# Patient Record
Sex: MALE | Race: WHITE | URBAN - NONMETROPOLITAN AREA
[De-identification: names, ages, dates, MRNs, and addresses within clinical notes are randomized per-mention and may not be internally consistent; named-entity substitution may affect disease eponyms.]

---

## 2024-02-28 ENCOUNTER — OFFICE VISIT (OUTPATIENT)
Dept: CARDIOLOGY CLINIC | Age: 64
End: 2024-02-28
Payer: COMMERCIAL

## 2024-02-28 VITALS
SYSTOLIC BLOOD PRESSURE: 132 MMHG | HEART RATE: 72 BPM | RESPIRATION RATE: 18 BRPM | BODY MASS INDEX: 38.36 KG/M2 | DIASTOLIC BLOOD PRESSURE: 70 MMHG | WEIGHT: 315 LBS | HEIGHT: 76 IN

## 2024-02-28 DIAGNOSIS — R07.89 CHEST DISCOMFORT: Primary | ICD-10-CM

## 2024-02-28 PROCEDURE — G8427 DOCREV CUR MEDS BY ELIG CLIN: HCPCS | Performed by: INTERNAL MEDICINE

## 2024-02-28 PROCEDURE — 4004F PT TOBACCO SCREEN RCVD TLK: CPT | Performed by: INTERNAL MEDICINE

## 2024-02-28 PROCEDURE — G8484 FLU IMMUNIZE NO ADMIN: HCPCS | Performed by: INTERNAL MEDICINE

## 2024-02-28 PROCEDURE — G8417 CALC BMI ABV UP PARAM F/U: HCPCS | Performed by: INTERNAL MEDICINE

## 2024-02-28 PROCEDURE — 99204 OFFICE O/P NEW MOD 45 MIN: CPT | Performed by: INTERNAL MEDICINE

## 2024-02-28 PROCEDURE — 3017F COLORECTAL CA SCREEN DOC REV: CPT | Performed by: INTERNAL MEDICINE

## 2024-02-28 PROCEDURE — 93000 ELECTROCARDIOGRAM COMPLETE: CPT | Performed by: INTERNAL MEDICINE

## 2024-02-28 RX ORDER — ATORVASTATIN CALCIUM 80 MG/1
80 TABLET, FILM COATED ORAL NIGHTLY
COMMUNITY
Start: 2024-01-30

## 2024-02-28 RX ORDER — FUROSEMIDE 40 MG/1
40 TABLET ORAL DAILY
COMMUNITY
Start: 2024-01-30

## 2024-02-28 RX ORDER — APIXABAN 5 MG/1
5 TABLET, FILM COATED ORAL EVERY 12 HOURS
COMMUNITY
Start: 2024-02-15

## 2024-02-28 RX ORDER — METOPROLOL SUCCINATE 25 MG/1
25 TABLET, EXTENDED RELEASE ORAL DAILY
COMMUNITY
Start: 2024-02-09

## 2024-02-28 RX ORDER — ASPIRIN 81 MG/1
81 TABLET ORAL DAILY
COMMUNITY

## 2024-02-28 NOTE — PROGRESS NOTES
CHIEF COMPLAINT: CAD/Aflutter    HISTORY OF PRESENT ILLNESS: Patient is a 64 y.o. male seen at the request of Otoniel Hope MD.      Patient presents to re-establish.     Hx of CAD and PAF.     No CP, SOB or palpitations.    Past Medical History:   Diagnosis Date    Heart attack (HCC)        There is no problem list on file for this patient.      No Known Allergies    Current Outpatient Medications   Medication Sig Dispense Refill    ELIQUIS 5 MG TABS tablet Take 1 tablet by mouth in the morning and 1 tablet in the evening.      atorvastatin (LIPITOR) 80 MG tablet Take 1 tablet by mouth nightly at bedtime.      metoprolol succinate (TOPROL XL) 25 MG extended release tablet Take 1 tablet by mouth daily      metFORMIN (GLUCOPHAGE) 1000 MG tablet Take 1 tablet by mouth every 12 hours      furosemide (LASIX) 40 MG tablet Take 1 tablet by mouth daily      aspirin 81 MG EC tablet Take 1 tablet by mouth daily       No current facility-administered medications for this visit.       Social History     Socioeconomic History    Marital status: Unknown     Spouse name: Not on file    Number of children: Not on file    Years of education: Not on file    Highest education level: Not on file   Occupational History    Not on file   Tobacco Use    Smoking status: Never    Smokeless tobacco: Never   Substance and Sexual Activity    Alcohol use: Yes     Comment: socially    Drug use: Not on file    Sexual activity: Not on file   Other Topics Concern    Not on file   Social History Narrative    Not on file     Social Determinants of Health     Financial Resource Strain: Not on file   Food Insecurity: Not on file   Transportation Needs: Not on file   Physical Activity: Not on file   Stress: Not on file   Social Connections: Not on file   Intimate Partner Violence: Not on file   Housing Stability: Not on file       No family history on file.    Review of Systems:  Heart: as above   Lungs: as above   Eyes: denies changes in vision or

## 2024-02-29 ENCOUNTER — TELEPHONE (OUTPATIENT)
Dept: CARDIOLOGY CLINIC | Age: 64
End: 2024-02-29

## 2024-02-29 NOTE — TELEPHONE ENCOUNTER
----- Message from César Wild DO sent at 2/28/2024 10:40 AM EST -----  Regarding: records  Records from [place] please

## 2024-05-18 ENCOUNTER — APPOINTMENT (OUTPATIENT)
Dept: ULTRASOUND IMAGING | Age: 64
DRG: 813 | End: 2024-05-18
Payer: COMMERCIAL

## 2024-05-18 ENCOUNTER — HOSPITAL ENCOUNTER (INPATIENT)
Age: 64
LOS: 4 days | Discharge: HOME | DRG: 813 | End: 2024-05-23
Attending: EMERGENCY MEDICINE | Admitting: HOSPITALIST
Payer: COMMERCIAL

## 2024-05-18 DIAGNOSIS — Z79.01 ANTICOAGULATED: ICD-10-CM

## 2024-05-18 DIAGNOSIS — R23.3 PETECHIAL RASH: ICD-10-CM

## 2024-05-18 DIAGNOSIS — D69.3 IMMUNE THROMBOCYTOPENIA (HCC): Primary | ICD-10-CM

## 2024-05-18 LAB
ALBUMIN SERPL-MCNC: 3.3 G/DL (ref 3.5–5.2)
ALP SERPL-CCNC: 175 U/L (ref 40–129)
ALT SERPL-CCNC: 26 U/L (ref 0–40)
ANION GAP SERPL CALCULATED.3IONS-SCNC: 9 MMOL/L (ref 7–16)
AST SERPL-CCNC: 69 U/L (ref 0–39)
BASOPHILS # BLD: 0.03 K/UL (ref 0–0.2)
BASOPHILS NFR BLD: 1 % (ref 0–2)
BILIRUB SERPL-MCNC: 1.9 MG/DL (ref 0–1.2)
BNP SERPL-MCNC: <36 PG/ML (ref 0–125)
BUN SERPL-MCNC: 23 MG/DL (ref 6–23)
CALCIUM SERPL-MCNC: 8.7 MG/DL (ref 8.6–10.2)
CHLORIDE SERPL-SCNC: 96 MMOL/L (ref 98–107)
CO2 SERPL-SCNC: 26 MMOL/L (ref 22–29)
CREAT SERPL-MCNC: 1 MG/DL (ref 0.7–1.2)
EOSINOPHIL # BLD: 0.17 K/UL (ref 0.05–0.5)
EOSINOPHILS RELATIVE PERCENT: 4 % (ref 0–6)
ERYTHROCYTE [DISTWIDTH] IN BLOOD BY AUTOMATED COUNT: 14.1 % (ref 11.5–15)
ERYTHROCYTE [SEDIMENTATION RATE] IN BLOOD BY WESTERGREN METHOD: 36 MM/HR (ref 0–15)
GFR, ESTIMATED: 82 ML/MIN/1.73M2
GLUCOSE SERPL-MCNC: 211 MG/DL (ref 74–99)
HCT VFR BLD AUTO: 35.8 % (ref 37–54)
HGB BLD-MCNC: 12 G/DL (ref 12.5–16.5)
IMM GRANULOCYTES # BLD AUTO: <0.03 K/UL (ref 0–0.58)
IMM GRANULOCYTES NFR BLD: 0 % (ref 0–5)
INR PPP: 2.2
LACTATE BLDV-SCNC: 2.2 MMOL/L (ref 0.5–2.2)
LYMPHOCYTES NFR BLD: 1.39 K/UL (ref 1.5–4)
LYMPHOCYTES RELATIVE PERCENT: 28 % (ref 20–42)
MAGNESIUM SERPL-MCNC: 1.5 MG/DL (ref 1.6–2.6)
MCH RBC QN AUTO: 31.3 PG (ref 26–35)
MCHC RBC AUTO-ENTMCNC: 33.5 G/DL (ref 32–34.5)
MCV RBC AUTO: 93.5 FL (ref 80–99.9)
MONOCYTES NFR BLD: 0.53 K/UL (ref 0.1–0.95)
MONOCYTES NFR BLD: 11 % (ref 2–12)
NEUTROPHILS NFR BLD: 57 % (ref 43–80)
NEUTS SEG NFR BLD: 2.79 K/UL (ref 1.8–7.3)
PLATELET CONFIRMATION: NORMAL
PLATELET, FLUORESCENCE: 3 K/UL (ref 130–450)
PMV BLD AUTO: ABNORMAL FL (ref 7–12)
POTASSIUM SERPL-SCNC: 3.8 MMOL/L (ref 3.5–5)
PROT SERPL-MCNC: 7.2 G/DL (ref 6.4–8.3)
PROTHROMBIN TIME: 23.9 SEC (ref 9.3–12.4)
RBC # BLD AUTO: 3.83 M/UL (ref 3.8–5.8)
SODIUM SERPL-SCNC: 131 MMOL/L (ref 132–146)
WBC OTHER # BLD: 4.9 K/UL (ref 4.5–11.5)

## 2024-05-18 PROCEDURE — 93005 ELECTROCARDIOGRAM TRACING: CPT | Performed by: EMERGENCY MEDICINE

## 2024-05-18 PROCEDURE — 83615 LACTATE (LD) (LDH) ENZYME: CPT

## 2024-05-18 PROCEDURE — 83735 ASSAY OF MAGNESIUM: CPT

## 2024-05-18 PROCEDURE — 86900 BLOOD TYPING SEROLOGIC ABO: CPT

## 2024-05-18 PROCEDURE — 6360000002 HC RX W HCPCS: Performed by: EMERGENCY MEDICINE

## 2024-05-18 PROCEDURE — 85652 RBC SED RATE AUTOMATED: CPT

## 2024-05-18 PROCEDURE — 99285 EMERGENCY DEPT VISIT HI MDM: CPT

## 2024-05-18 PROCEDURE — 86850 RBC ANTIBODY SCREEN: CPT

## 2024-05-18 PROCEDURE — 80053 COMPREHEN METABOLIC PANEL: CPT

## 2024-05-18 PROCEDURE — 85384 FIBRINOGEN ACTIVITY: CPT

## 2024-05-18 PROCEDURE — 85025 COMPLETE CBC W/AUTO DIFF WBC: CPT

## 2024-05-18 PROCEDURE — 93970 EXTREMITY STUDY: CPT

## 2024-05-18 PROCEDURE — 83880 ASSAY OF NATRIURETIC PEPTIDE: CPT

## 2024-05-18 PROCEDURE — 85610 PROTHROMBIN TIME: CPT

## 2024-05-18 PROCEDURE — 86901 BLOOD TYPING SEROLOGIC RH(D): CPT

## 2024-05-18 PROCEDURE — 96365 THER/PROPH/DIAG IV INF INIT: CPT

## 2024-05-18 PROCEDURE — 86140 C-REACTIVE PROTEIN: CPT

## 2024-05-18 PROCEDURE — 83605 ASSAY OF LACTIC ACID: CPT

## 2024-05-18 RX ORDER — MAGNESIUM SULFATE IN WATER 40 MG/ML
2000 INJECTION, SOLUTION INTRAVENOUS ONCE
Status: COMPLETED | OUTPATIENT
Start: 2024-05-18 | End: 2024-05-19

## 2024-05-18 RX ADMIN — MAGNESIUM SULFATE HEPTAHYDRATE 2000 MG: 40 INJECTION, SOLUTION INTRAVENOUS at 23:54

## 2024-05-18 ASSESSMENT — PAIN DESCRIPTION - ORIENTATION
ORIENTATION: RIGHT;LEFT
ORIENTATION: RIGHT;LEFT

## 2024-05-18 ASSESSMENT — PAIN - FUNCTIONAL ASSESSMENT: PAIN_FUNCTIONAL_ASSESSMENT: 0-10

## 2024-05-18 ASSESSMENT — PAIN DESCRIPTION - PAIN TYPE: TYPE: ACUTE PAIN

## 2024-05-18 ASSESSMENT — PAIN DESCRIPTION - LOCATION
LOCATION: LEG;FOOT
LOCATION: LEG;FOOT

## 2024-05-18 ASSESSMENT — LIFESTYLE VARIABLES
HOW OFTEN DO YOU HAVE A DRINK CONTAINING ALCOHOL: 2-4 TIMES A MONTH
HOW MANY STANDARD DRINKS CONTAINING ALCOHOL DO YOU HAVE ON A TYPICAL DAY: 1 OR 2

## 2024-05-18 ASSESSMENT — PAIN SCALES - GENERAL
PAINLEVEL_OUTOF10: 7
PAINLEVEL_OUTOF10: 7

## 2024-05-18 ASSESSMENT — PAIN DESCRIPTION - DESCRIPTORS
DESCRIPTORS: BURNING
DESCRIPTORS: BURNING

## 2024-05-19 ENCOUNTER — APPOINTMENT (OUTPATIENT)
Dept: CT IMAGING | Age: 64
DRG: 813 | End: 2024-05-19
Payer: COMMERCIAL

## 2024-05-19 PROBLEM — E11.65 TYPE 2 DIABETES MELLITUS WITH HYPERGLYCEMIA, WITHOUT LONG-TERM CURRENT USE OF INSULIN (HCC): Status: ACTIVE | Noted: 2024-05-19

## 2024-05-19 PROBLEM — E66.812 CLASS 2 SEVERE OBESITY WITH SERIOUS COMORBIDITY AND BODY MASS INDEX (BMI) OF 38.0 TO 38.9 IN ADULT: Status: ACTIVE | Noted: 2024-05-19

## 2024-05-19 PROBLEM — R79.89 ELEVATED LFTS: Status: ACTIVE | Noted: 2024-05-19

## 2024-05-19 PROBLEM — D64.9 ANEMIA: Status: ACTIVE | Noted: 2024-05-19

## 2024-05-19 PROBLEM — E78.2 MIXED HYPERLIPIDEMIA: Status: ACTIVE | Noted: 2024-05-19

## 2024-05-19 PROBLEM — R73.9 HYPERGLYCEMIA: Status: ACTIVE | Noted: 2024-05-19

## 2024-05-19 PROBLEM — Z86.79 HISTORY OF CAD (CORONARY ARTERY DISEASE): Status: ACTIVE | Noted: 2024-05-19

## 2024-05-19 PROBLEM — E87.1 HYPONATREMIA: Status: ACTIVE | Noted: 2024-05-19

## 2024-05-19 PROBLEM — I48.0 PAROXYSMAL ATRIAL FIBRILLATION (HCC): Status: ACTIVE | Noted: 2024-05-19

## 2024-05-19 PROBLEM — E66.01 CLASS 2 SEVERE OBESITY WITH SERIOUS COMORBIDITY AND BODY MASS INDEX (BMI) OF 38.0 TO 38.9 IN ADULT (HCC): Status: ACTIVE | Noted: 2024-05-19

## 2024-05-19 PROBLEM — E83.42 HYPOMAGNESEMIA: Status: ACTIVE | Noted: 2024-05-19

## 2024-05-19 PROBLEM — D69.3 ACUTE IDIOPATHIC THROMBOCYTOPENIC PURPURA (HCC): Status: ACTIVE | Noted: 2024-05-19

## 2024-05-19 LAB
ABO + RH BLD: NORMAL
ALBUMIN SERPL-MCNC: 3.2 G/DL (ref 3.5–5.2)
ALP SERPL-CCNC: 165 U/L (ref 40–129)
ALT SERPL-CCNC: 28 U/L (ref 0–40)
ANION GAP SERPL CALCULATED.3IONS-SCNC: 10 MMOL/L (ref 7–16)
ARM BAND NUMBER: NORMAL
AST SERPL-CCNC: 73 U/L (ref 0–39)
ATYPICAL LYMPHOCYTE ABSOLUTE COUNT: 0.03 K/UL (ref 0–0.46)
ATYPICAL LYMPHOCYTES: 1 % (ref 0–4)
BASOPHILS # BLD: 0 K/UL (ref 0–0.2)
BASOPHILS NFR BLD: 0 % (ref 0–2)
BILIRUB SERPL-MCNC: 1.8 MG/DL (ref 0–1.2)
BLOOD BANK SAMPLE EXPIRATION: NORMAL
BLOOD GROUP ANTIBODIES SERPL: NEGATIVE
BUN SERPL-MCNC: 22 MG/DL (ref 6–23)
CALCIUM SERPL-MCNC: 8.7 MG/DL (ref 8.6–10.2)
CHLORIDE SERPL-SCNC: 96 MMOL/L (ref 98–107)
CO2 SERPL-SCNC: 26 MMOL/L (ref 22–29)
CREAT SERPL-MCNC: 0.8 MG/DL (ref 0.7–1.2)
CRP SERPL HS-MCNC: 4 MG/L (ref 0–5)
EOSINOPHIL # BLD: 0 K/UL (ref 0.05–0.5)
EOSINOPHILS RELATIVE PERCENT: 0 % (ref 0–6)
ERYTHROCYTE [DISTWIDTH] IN BLOOD BY AUTOMATED COUNT: 14 % (ref 11.5–15)
FIBRINOGEN PPP-MCNC: 183 MG/DL (ref 200–400)
GFR, ESTIMATED: >90 ML/MIN/1.73M2
GLUCOSE BLD-MCNC: 137 MG/DL (ref 74–99)
GLUCOSE BLD-MCNC: 187 MG/DL (ref 74–99)
GLUCOSE BLD-MCNC: 189 MG/DL (ref 74–99)
GLUCOSE BLD-MCNC: 207 MG/DL (ref 74–99)
GLUCOSE SERPL-MCNC: 105 MG/DL (ref 74–99)
HCT VFR BLD AUTO: 33.7 % (ref 37–54)
HGB BLD-MCNC: 11.2 G/DL (ref 12.5–16.5)
LDH SERPL-CCNC: 223 U/L (ref 135–225)
LYMPHOCYTES NFR BLD: 0.1 K/UL (ref 1.5–4)
LYMPHOCYTES RELATIVE PERCENT: 4 % (ref 20–42)
MCH RBC QN AUTO: 31.4 PG (ref 26–35)
MCHC RBC AUTO-ENTMCNC: 33.2 G/DL (ref 32–34.5)
MCV RBC AUTO: 94.4 FL (ref 80–99.9)
MONOCYTES NFR BLD: 0.08 K/UL (ref 0.1–0.95)
MONOCYTES NFR BLD: 3 % (ref 2–12)
NEUTROPHILS NFR BLD: 93 % (ref 43–80)
NEUTS SEG NFR BLD: 2.7 K/UL (ref 1.8–7.3)
PLATELET CONFIRMATION: NORMAL
PLATELET, FLUORESCENCE: 3 K/UL (ref 130–450)
PMV BLD AUTO: 15.5 FL (ref 7–12)
POTASSIUM SERPL-SCNC: 4.1 MMOL/L (ref 3.5–5)
PROT SERPL-MCNC: 6.8 G/DL (ref 6.4–8.3)
RBC # BLD AUTO: 3.57 M/UL (ref 3.8–5.8)
RBC # BLD: ABNORMAL 10*6/UL
SODIUM SERPL-SCNC: 132 MMOL/L (ref 132–146)
WBC # BLD: ABNORMAL 10*3/UL
WBC OTHER # BLD: 2.9 K/UL (ref 4.5–11.5)

## 2024-05-19 PROCEDURE — 2060000000 HC ICU INTERMEDIATE R&B

## 2024-05-19 PROCEDURE — 2580000003 HC RX 258: Performed by: HOSPITALIST

## 2024-05-19 PROCEDURE — 6360000002 HC RX W HCPCS: Performed by: HOSPITALIST

## 2024-05-19 PROCEDURE — 80053 COMPREHEN METABOLIC PANEL: CPT

## 2024-05-19 PROCEDURE — P9073 PLATELETS PHERESIS PATH REDU: HCPCS

## 2024-05-19 PROCEDURE — 86038 ANTINUCLEAR ANTIBODIES: CPT

## 2024-05-19 PROCEDURE — 99223 1ST HOSP IP/OBS HIGH 75: CPT | Performed by: HOSPITALIST

## 2024-05-19 PROCEDURE — 82962 GLUCOSE BLOOD TEST: CPT

## 2024-05-19 PROCEDURE — 6370000000 HC RX 637 (ALT 250 FOR IP): Performed by: HOSPITALIST

## 2024-05-19 PROCEDURE — 85025 COMPLETE CBC W/AUTO DIFF WBC: CPT

## 2024-05-19 PROCEDURE — 36430 TRANSFUSION BLD/BLD COMPNT: CPT

## 2024-05-19 PROCEDURE — 30233R1 TRANSFUSION OF NONAUTOLOGOUS PLATELETS INTO PERIPHERAL VEIN, PERCUTANEOUS APPROACH: ICD-10-PCS | Performed by: INTERNAL MEDICINE

## 2024-05-19 PROCEDURE — 70450 CT HEAD/BRAIN W/O DYE: CPT

## 2024-05-19 PROCEDURE — 86039 ANTINUCLEAR ANTIBODIES (ANA): CPT

## 2024-05-19 RX ORDER — ACETAMINOPHEN 325 MG/1
650 TABLET ORAL EVERY 6 HOURS PRN
Status: DISCONTINUED | OUTPATIENT
Start: 2024-05-19 | End: 2024-05-23 | Stop reason: HOSPADM

## 2024-05-19 RX ORDER — BUMETANIDE 1 MG/1
2 TABLET ORAL DAILY
Status: ON HOLD | COMMUNITY
End: 2024-05-23 | Stop reason: HOSPADM

## 2024-05-19 RX ORDER — ACETAMINOPHEN 650 MG/1
650 SUPPOSITORY RECTAL EVERY 6 HOURS PRN
Status: DISCONTINUED | OUTPATIENT
Start: 2024-05-19 | End: 2024-05-23 | Stop reason: HOSPADM

## 2024-05-19 RX ORDER — ATORVASTATIN CALCIUM 40 MG/1
80 TABLET, FILM COATED ORAL NIGHTLY
Status: DISCONTINUED | OUTPATIENT
Start: 2024-05-19 | End: 2024-05-23 | Stop reason: HOSPADM

## 2024-05-19 RX ORDER — SODIUM CHLORIDE 0.9 % (FLUSH) 0.9 %
5-40 SYRINGE (ML) INJECTION PRN
Status: DISCONTINUED | OUTPATIENT
Start: 2024-05-19 | End: 2024-05-23 | Stop reason: HOSPADM

## 2024-05-19 RX ORDER — INSULIN LISPRO 100 [IU]/ML
0-4 INJECTION, SOLUTION INTRAVENOUS; SUBCUTANEOUS
Status: DISCONTINUED | OUTPATIENT
Start: 2024-05-19 | End: 2024-05-23 | Stop reason: HOSPADM

## 2024-05-19 RX ORDER — INSULIN LISPRO 100 [IU]/ML
0-4 INJECTION, SOLUTION INTRAVENOUS; SUBCUTANEOUS NIGHTLY
Status: DISCONTINUED | OUTPATIENT
Start: 2024-05-19 | End: 2024-05-23 | Stop reason: HOSPADM

## 2024-05-19 RX ORDER — EPINEPHRINE 1 MG/ML(1)
.2-.5 AMPUL (ML) INJECTION PRN
Status: DISCONTINUED | OUTPATIENT
Start: 2024-05-19 | End: 2024-05-23 | Stop reason: HOSPADM

## 2024-05-19 RX ORDER — ONDANSETRON 2 MG/ML
4 INJECTION INTRAMUSCULAR; INTRAVENOUS EVERY 6 HOURS PRN
Status: DISCONTINUED | OUTPATIENT
Start: 2024-05-19 | End: 2024-05-23 | Stop reason: HOSPADM

## 2024-05-19 RX ORDER — METOPROLOL SUCCINATE 25 MG/1
25 TABLET, EXTENDED RELEASE ORAL DAILY
Status: DISCONTINUED | OUTPATIENT
Start: 2024-05-19 | End: 2024-05-23 | Stop reason: HOSPADM

## 2024-05-19 RX ORDER — SODIUM CHLORIDE 9 MG/ML
INJECTION, SOLUTION INTRAVENOUS PRN
Status: DISCONTINUED | OUTPATIENT
Start: 2024-05-19 | End: 2024-05-23 | Stop reason: HOSPADM

## 2024-05-19 RX ORDER — POTASSIUM CHLORIDE 7.45 MG/ML
10 INJECTION INTRAVENOUS PRN
Status: DISCONTINUED | OUTPATIENT
Start: 2024-05-19 | End: 2024-05-23 | Stop reason: HOSPADM

## 2024-05-19 RX ORDER — POTASSIUM CHLORIDE 20 MEQ/1
40 TABLET, EXTENDED RELEASE ORAL PRN
Status: DISCONTINUED | OUTPATIENT
Start: 2024-05-19 | End: 2024-05-23 | Stop reason: HOSPADM

## 2024-05-19 RX ORDER — ONDANSETRON 4 MG/1
4 TABLET, ORALLY DISINTEGRATING ORAL EVERY 8 HOURS PRN
Status: DISCONTINUED | OUTPATIENT
Start: 2024-05-19 | End: 2024-05-23 | Stop reason: HOSPADM

## 2024-05-19 RX ORDER — MAGNESIUM SULFATE IN WATER 40 MG/ML
2000 INJECTION, SOLUTION INTRAVENOUS PRN
Status: DISCONTINUED | OUTPATIENT
Start: 2024-05-19 | End: 2024-05-23 | Stop reason: HOSPADM

## 2024-05-19 RX ORDER — FUROSEMIDE 40 MG/1
40 TABLET ORAL DAILY
Status: DISCONTINUED | OUTPATIENT
Start: 2024-05-19 | End: 2024-05-23 | Stop reason: HOSPADM

## 2024-05-19 RX ORDER — POLYETHYLENE GLYCOL 3350 17 G/17G
17 POWDER, FOR SOLUTION ORAL DAILY PRN
Status: DISCONTINUED | OUTPATIENT
Start: 2024-05-19 | End: 2024-05-23 | Stop reason: HOSPADM

## 2024-05-19 RX ORDER — SODIUM CHLORIDE 0.9 % (FLUSH) 0.9 %
5-40 SYRINGE (ML) INJECTION EVERY 12 HOURS SCHEDULED
Status: DISCONTINUED | OUTPATIENT
Start: 2024-05-19 | End: 2024-05-23 | Stop reason: HOSPADM

## 2024-05-19 RX ORDER — SPIRONOLACTONE 25 MG/1
25 TABLET ORAL DAILY
COMMUNITY

## 2024-05-19 RX ADMIN — ACETAMINOPHEN 650 MG: 325 TABLET ORAL at 21:16

## 2024-05-19 RX ADMIN — IMMUNE GLOBULIN (HUMAN) 70 G: 10 INJECTION INTRAVENOUS; SUBCUTANEOUS at 05:08

## 2024-05-19 RX ADMIN — METOPROLOL SUCCINATE 25 MG: 25 TABLET, FILM COATED, EXTENDED RELEASE ORAL at 07:56

## 2024-05-19 RX ADMIN — DEXAMETHASONE SODIUM PHOSPHATE 40 MG: 4 INJECTION, SOLUTION INTRAMUSCULAR; INTRAVENOUS at 04:16

## 2024-05-19 RX ADMIN — SODIUM CHLORIDE, PRESERVATIVE FREE 10 ML: 5 INJECTION INTRAVENOUS at 21:19

## 2024-05-19 RX ADMIN — SODIUM CHLORIDE, PRESERVATIVE FREE 10 ML: 5 INJECTION INTRAVENOUS at 07:56

## 2024-05-19 RX ADMIN — INSULIN LISPRO 1 UNITS: 100 INJECTION, SOLUTION INTRAVENOUS; SUBCUTANEOUS at 11:44

## 2024-05-19 RX ADMIN — FUROSEMIDE 40 MG: 40 TABLET ORAL at 07:56

## 2024-05-19 RX ADMIN — ATORVASTATIN CALCIUM 80 MG: 40 TABLET, FILM COATED ORAL at 21:16

## 2024-05-19 ASSESSMENT — PAIN SCALES - GENERAL
PAINLEVEL_OUTOF10: 4
PAINLEVEL_OUTOF10: 0

## 2024-05-19 ASSESSMENT — PAIN DESCRIPTION - PAIN TYPE: TYPE: ACUTE PAIN

## 2024-05-19 ASSESSMENT — PAIN DESCRIPTION - DESCRIPTORS: DESCRIPTORS: ACHING;DISCOMFORT

## 2024-05-19 ASSESSMENT — PAIN DESCRIPTION - LOCATION: LOCATION: LEG

## 2024-05-19 ASSESSMENT — PAIN DESCRIPTION - ONSET: ONSET: ON-GOING

## 2024-05-19 ASSESSMENT — PAIN - FUNCTIONAL ASSESSMENT: PAIN_FUNCTIONAL_ASSESSMENT: ACTIVITIES ARE NOT PREVENTED

## 2024-05-19 ASSESSMENT — PAIN DESCRIPTION - ORIENTATION: ORIENTATION: RIGHT;LEFT;LOWER

## 2024-05-19 ASSESSMENT — PAIN SCALES - WONG BAKER: WONGBAKER_NUMERICALRESPONSE: NO HURT

## 2024-05-19 ASSESSMENT — PAIN DESCRIPTION - FREQUENCY: FREQUENCY: INTERMITTENT

## 2024-05-19 NOTE — H&P
midline.  Respiratory: On room air, no significant wheezing/crackles  Cardiovascular:  regular rhythm with rate in acceptable range.  Abdomen:  soft, no tenderness or rebound pain, normal bowel sound  Musculoskeletal: No clubbing, cyanosis, 1+ edema of bilateral lower extremities.    Skin: Diffuse purplish petechia on bilateral lower extremities  Neurologic:  Neurovascularly intact without any focal sensory/motor deficits. Cranial nerves: II-XII intact, grossly non-focal.  Psychiatric: Alert and oriented, thought content appropriate, normal insight    Relevant labs:  CE:  No results for input(s): \"CKTOTAL\", \"TROPONINI\" in the last 72 hours.  PT/INR:   Recent Labs     05/18/24 2114   INR 2.2     BNP: No results for input(s): \"BNP\" in the last 72 hours.  ESR:   Lab Results   Component Value Date    SEDRATE 36 (H) 05/18/2024     CRP: No results found for: \"CRP\"  D Dimer: No results found for: \"DDIMER\"   Folate and B12: No results found for: \"PLHBFANG49\", No results found for: \"FOLATE\"  Lactic Acid:   Lab Results   Component Value Date    LACTA 2.2 05/18/2024     Thyroid Studies: No results found for: \"TSH\", \"J7RNLHR\", \"F0QCKJI\", \"THYROIDAB\"    Oupatient labs:  Lab Results   Component Value Date    INR 2.2 05/18/2024       Urinalysis:  No results found for: \"NITRU\", \"WBCUA\", \"BACTERIA\", \"RBCUA\", \"BLOODU\", \"SPECGRAV\", \"GLUCOSEU\"    Imaging:  CT HEAD WO CONTRAST    Result Date: 5/19/2024  EXAMINATION: CT OF THE HEAD WITHOUT CONTRAST  5/19/2024 1:21 am TECHNIQUE: CT of the head was performed without the administration of intravenous contrast. Automated exposure control, iterative reconstruction, and/or weight based adjustment of the mA/kV was utilized to reduce the radiation dose to as low as reasonably achievable. COMPARISON: None. HISTORY: ORDERING SYSTEM PROVIDED HISTORY: platelets 3 on eliquis TECHNOLOGIST PROVIDED HISTORY: Has a \"code stroke\" or \"stroke alert\" been called?->Yes Reason for exam:->platelets 3 on

## 2024-05-19 NOTE — CONSULTS
[facility]  Hematology/Oncology  Consult      Patient Name: Gilberto Villagran  YOB: 1960  PCP: Otoniel Hope MD   Referring Provider:      Reason for Consultation:   Chief Complaint   Patient presents with    Leg Swelling     Bilateral lower leg swelling and purplish-patchy discoloration: right>left: Numbness and burning sensation.        History of Present Illness:  Case discussed at length last night with Dr. Lentz in the ED department.    Mr. Villagran is a 64-year-old man with past medical history relevant for obesity, type 2 diabetes mellitus, hypertension, hyperlipidemia, CAD with PCI and paroxysmal atrial fibrillation on Eliquis.  He presented to ED for evaluation of bilateral leg edema and scattered diffuse petechial rash of few days duration.  No other bleeding manifestations such as nosebleeds or melena or hematuria.  No chest pain or shortness of breath.  His CMP was unremarkable with normal serum creatinine.  Hepatic panel showed slightly elevated AST with borderline bilirubin of 1.8 with an albumin of 3.2.  LDH was normal at 223.  CBC with slight borderline anemia with hemoglobin of 12 with an MCV of 93 with normal WBC count of 4.9 with WBC differential relevant for only mild lymphopenia without any immature cells.  Platelet count was very low at 3 confirmed on blood smear review.  Sed rate was elevated at 36.  PT was 23.9 seconds with an INR of 2.2  CT head was negative for any acute bleed  Patient was ordered 1 dose of platelets in ED.  He likely has a diagnosis of ITP and was recommended IVIG along with dexamethasone pulses.    Diagnostic Data:     Past Medical History:   Diagnosis Date    Heart attack (HCC)        Patient Active Problem List    Diagnosis Date Noted    Acute idiopathic thrombocytopenic purpura (HCC) 05/19/2024    Type 2 diabetes mellitus with hyperglycemia, without long-term current use of insulin (HCC) 05/19/2024    Hyponatremia 05/19/2024

## 2024-05-19 NOTE — ED PROVIDER NOTES
[facility] EMERGENCY DEPARTMENT  EMERGENCY DEPARTMENT ENCOUNTER        Pt Name: Gilberto Villagran  MRN: [id number]  Birthdate 1960  Date of evaluation: 5/18/2024  Provider: Dutch Lentz DO  PCP: Otoniel Hope MD  Note Started: 9:13 PM EDT 5/18/24    CHIEF COMPLAINT       Chief Complaint   Patient presents with    Leg Swelling     Bilateral lower leg swelling and purplish-patchy discoloration: right>left: Numbness and burning sensation.       HISTORY OF PRESENT ILLNESS: 1 or more Elements     History from : Patient and Family wife    Limitations to history : None    Gilberto Villagran is a 64 y.o. male who presents w history of CAD, PAF on asa and eliquis, metoprolol f/w dr rodrick cisneros recently put on diuretics for le edema by cp which include aldacone and bumex on 5/13, wife and pt notes new petechial rash within past 24-48 hours worse on lower legs and scant on abdomen and forearms. Pt also has bruise to left flank from work related injury. Pt denies any cp, sob, fever or any other complaints. He notes his legs burn. He notes the swelling improved w the diuretics    Nursing Notes were all reviewed and agreed with or any disagreements were addressed in the HPI.    REVIEW OF SYSTEMS :      Review of Systems   Skin:  Positive for rash.       Positives and Pertinent negatives as per HPI.     SURGICAL HISTORY     Past Surgical History:   Procedure Laterality Date    FOOT SURGERY      HX VASCULAR STENT  2021       CURRENTMEDICATIONS       Previous Medications    ASPIRIN 81 MG EC TABLET    Take 1 tablet by mouth daily    ATORVASTATIN (LIPITOR) 80 MG TABLET    Take 1 tablet by mouth nightly at bedtime.    ELIQUIS 5 MG TABS TABLET    Take 1 tablet by mouth in the morning and 1 tablet in the evening.    FUROSEMIDE (LASIX) 40 MG TABLET    Take 1 tablet by mouth daily    METFORMIN (GLUCOPHAGE) 1000 MG TABLET    Take 1 tablet by mouth every 12 hours    METOPROLOL SUCCINATE (TOPROL

## 2024-05-19 NOTE — ED NOTES
ED to Inpatient Handoff Report    Notified floor that electronic handoff available and patient ready for transport to room 621.    Safety Risks: None identified    Patient in Restraints: no    Constant Observer or Patient : no    Telemetry Monitoring Ordered: Yes          Order to transfer to unit without monitor: NO    Last MEWS: 1 Time completed: 0224         Vitals:    05/18/24 2059 05/19/24 0024 05/19/24 0125 05/19/24 0224   BP: 130/79 (!) 146/83 112/88 (!) 109/55   Pulse: 86 74 81 73   Resp: 16 15 19 17   Temp: 98.2 °F (36.8 °C)   97.9 °F (36.6 °C)   TempSrc: Oral      SpO2: 95% 93% 98% 95%   Weight:       Height:           Opportunity for questions and clarification was provided.

## 2024-05-20 LAB
ALBUMIN SERPL-MCNC: 3.1 G/DL (ref 3.5–5.2)
ALP SERPL-CCNC: 145 U/L (ref 40–129)
ALT SERPL-CCNC: 26 U/L (ref 0–40)
ANA SER QL IA: NEGATIVE
ANION GAP SERPL CALCULATED.3IONS-SCNC: 7 MMOL/L (ref 7–16)
ARM BAND NUMBER: NORMAL
AST SERPL-CCNC: 62 U/L (ref 0–39)
BASOPHILS # BLD: 0.01 K/UL (ref 0–0.2)
BASOPHILS NFR BLD: 0 % (ref 0–2)
BILIRUB SERPL-MCNC: 1.7 MG/DL (ref 0–1.2)
BLOOD BANK BLOOD PRODUCT EXPIRATION DATE: NORMAL
BLOOD BANK DISPENSE STATUS: NORMAL
BLOOD BANK ISBT PRODUCT BLOOD TYPE: 7300
BLOOD BANK PRODUCT CODE: NORMAL
BLOOD BANK UNIT TYPE AND RH: NORMAL
BPU ID: NORMAL
BUN SERPL-MCNC: 21 MG/DL (ref 6–23)
CALCIUM SERPL-MCNC: 8.1 MG/DL (ref 8.6–10.2)
CHLORIDE SERPL-SCNC: 104 MMOL/L (ref 98–107)
CO2 SERPL-SCNC: 24 MMOL/L (ref 22–29)
COMPONENT: NORMAL
CREAT SERPL-MCNC: 0.7 MG/DL (ref 0.7–1.2)
EKG ATRIAL RATE: 85 BPM
EKG P AXIS: 15 DEGREES
EKG P-R INTERVAL: 142 MS
EKG Q-T INTERVAL: 390 MS
EKG QRS DURATION: 98 MS
EKG QTC CALCULATION (BAZETT): 464 MS
EKG R AXIS: -33 DEGREES
EKG T AXIS: 14 DEGREES
EKG VENTRICULAR RATE: 85 BPM
EOSINOPHIL # BLD: 0.01 K/UL (ref 0.05–0.5)
EOSINOPHILS RELATIVE PERCENT: 0 % (ref 0–6)
ERYTHROCYTE [DISTWIDTH] IN BLOOD BY AUTOMATED COUNT: 14 % (ref 11.5–15)
GFR, ESTIMATED: >90 ML/MIN/1.73M2
GLUCOSE BLD-MCNC: 130 MG/DL (ref 74–99)
GLUCOSE BLD-MCNC: 164 MG/DL (ref 74–99)
GLUCOSE BLD-MCNC: 251 MG/DL (ref 74–99)
GLUCOSE BLD-MCNC: 263 MG/DL (ref 74–99)
GLUCOSE SERPL-MCNC: 150 MG/DL (ref 74–99)
HCT VFR BLD AUTO: 34 % (ref 37–54)
HGB BLD-MCNC: 11.4 G/DL (ref 12.5–16.5)
IMM GRANULOCYTES # BLD AUTO: 0.05 K/UL (ref 0–0.58)
IMM GRANULOCYTES NFR BLD: 0 % (ref 0–5)
LYMPHOCYTES NFR BLD: 0.67 K/UL (ref 1.5–4)
LYMPHOCYTES RELATIVE PERCENT: 6 % (ref 20–42)
MCH RBC QN AUTO: 31.4 PG (ref 26–35)
MCHC RBC AUTO-ENTMCNC: 33.5 G/DL (ref 32–34.5)
MCV RBC AUTO: 93.7 FL (ref 80–99.9)
MONOCYTES NFR BLD: 0.69 K/UL (ref 0.1–0.95)
MONOCYTES NFR BLD: 6 % (ref 2–12)
NEUTROPHILS NFR BLD: 88 % (ref 43–80)
NEUTS SEG NFR BLD: 9.98 K/UL (ref 1.8–7.3)
PATH REV BLD -IMP: NORMAL
PLATELET CONFIRMATION: NORMAL
PLATELET, FLUORESCENCE: 8 K/UL (ref 130–450)
PMV BLD AUTO: ABNORMAL FL (ref 7–12)
POTASSIUM SERPL-SCNC: 4.2 MMOL/L (ref 3.5–5)
PROT SERPL-MCNC: 7.9 G/DL (ref 6.4–8.3)
RBC # BLD AUTO: 3.63 M/UL (ref 3.8–5.8)
SODIUM SERPL-SCNC: 135 MMOL/L (ref 132–146)
TRANSFUSION STATUS: NORMAL
UNIT DIVISION: 0
UNIT ISSUE DATE/TIME: NORMAL
WBC OTHER # BLD: 11.4 K/UL (ref 4.5–11.5)

## 2024-05-20 PROCEDURE — 6370000000 HC RX 637 (ALT 250 FOR IP): Performed by: HOSPITALIST

## 2024-05-20 PROCEDURE — 36415 COLL VENOUS BLD VENIPUNCTURE: CPT

## 2024-05-20 PROCEDURE — 2060000000 HC ICU INTERMEDIATE R&B

## 2024-05-20 PROCEDURE — 85025 COMPLETE CBC W/AUTO DIFF WBC: CPT

## 2024-05-20 PROCEDURE — 2580000003 HC RX 258: Performed by: HOSPITALIST

## 2024-05-20 PROCEDURE — 80053 COMPREHEN METABOLIC PANEL: CPT

## 2024-05-20 PROCEDURE — 99232 SBSQ HOSP IP/OBS MODERATE 35: CPT | Performed by: INTERNAL MEDICINE

## 2024-05-20 PROCEDURE — 6360000002 HC RX W HCPCS: Performed by: HOSPITALIST

## 2024-05-20 PROCEDURE — 93010 ELECTROCARDIOGRAM REPORT: CPT | Performed by: INTERNAL MEDICINE

## 2024-05-20 PROCEDURE — 82962 GLUCOSE BLOOD TEST: CPT

## 2024-05-20 RX ORDER — DEXAMETHASONE 4 MG/1
40 TABLET ORAL DAILY
Status: COMPLETED | OUTPATIENT
Start: 2024-05-21 | End: 2024-05-22

## 2024-05-20 RX ADMIN — METOPROLOL SUCCINATE 25 MG: 25 TABLET, FILM COATED, EXTENDED RELEASE ORAL at 09:27

## 2024-05-20 RX ADMIN — FUROSEMIDE 40 MG: 40 TABLET ORAL at 09:27

## 2024-05-20 RX ADMIN — SODIUM CHLORIDE, PRESERVATIVE FREE 10 ML: 5 INJECTION INTRAVENOUS at 20:10

## 2024-05-20 RX ADMIN — IMMUNE GLOBULIN (HUMAN) 70 G: 10 INJECTION INTRAVENOUS; SUBCUTANEOUS at 10:35

## 2024-05-20 RX ADMIN — ATORVASTATIN CALCIUM 80 MG: 40 TABLET, FILM COATED ORAL at 20:08

## 2024-05-20 RX ADMIN — INSULIN LISPRO 2 UNITS: 100 INJECTION, SOLUTION INTRAVENOUS; SUBCUTANEOUS at 16:53

## 2024-05-20 RX ADMIN — DEXAMETHASONE SODIUM PHOSPHATE 40 MG: 4 INJECTION, SOLUTION INTRAMUSCULAR; INTRAVENOUS at 09:53

## 2024-05-20 RX ADMIN — SODIUM CHLORIDE, PRESERVATIVE FREE 10 ML: 5 INJECTION INTRAVENOUS at 09:27

## 2024-05-21 LAB
ALBUMIN SERPL-MCNC: 2.8 G/DL (ref 3.5–5.2)
ALP SERPL-CCNC: 142 U/L (ref 40–129)
ALT SERPL-CCNC: 29 U/L (ref 0–40)
ANION GAP SERPL CALCULATED.3IONS-SCNC: 6 MMOL/L (ref 7–16)
AST SERPL-CCNC: 56 U/L (ref 0–39)
BASOPHILS # BLD: 0.01 K/UL (ref 0–0.2)
BASOPHILS NFR BLD: 0 % (ref 0–2)
BILIRUB SERPL-MCNC: 1.4 MG/DL (ref 0–1.2)
BUN SERPL-MCNC: 21 MG/DL (ref 6–23)
CALCIUM SERPL-MCNC: 8 MG/DL (ref 8.6–10.2)
CHLORIDE SERPL-SCNC: 106 MMOL/L (ref 98–107)
CO2 SERPL-SCNC: 24 MMOL/L (ref 22–29)
CREAT SERPL-MCNC: 0.8 MG/DL (ref 0.7–1.2)
EOSINOPHIL # BLD: 0 K/UL (ref 0.05–0.5)
EOSINOPHILS RELATIVE PERCENT: 0 % (ref 0–6)
ERYTHROCYTE [DISTWIDTH] IN BLOOD BY AUTOMATED COUNT: 14.6 % (ref 11.5–15)
GFR, ESTIMATED: >90 ML/MIN/1.73M2
GLUCOSE BLD-MCNC: 151 MG/DL (ref 74–99)
GLUCOSE BLD-MCNC: 181 MG/DL (ref 74–99)
GLUCOSE BLD-MCNC: 214 MG/DL (ref 74–99)
GLUCOSE BLD-MCNC: 286 MG/DL (ref 74–99)
GLUCOSE SERPL-MCNC: 153 MG/DL (ref 74–99)
HCT VFR BLD AUTO: 31.4 % (ref 37–54)
HGB BLD-MCNC: 10.3 G/DL (ref 12.5–16.5)
IMM GRANULOCYTES # BLD AUTO: 0.07 K/UL (ref 0–0.58)
IMM GRANULOCYTES NFR BLD: 1 % (ref 0–5)
LYMPHOCYTES NFR BLD: 0.63 K/UL (ref 1.5–4)
LYMPHOCYTES RELATIVE PERCENT: 7 % (ref 20–42)
MCH RBC QN AUTO: 31.1 PG (ref 26–35)
MCHC RBC AUTO-ENTMCNC: 32.8 G/DL (ref 32–34.5)
MCV RBC AUTO: 94.9 FL (ref 80–99.9)
MONOCYTES NFR BLD: 0.4 K/UL (ref 0.1–0.95)
MONOCYTES NFR BLD: 4 % (ref 2–12)
NEUTROPHILS NFR BLD: 88 % (ref 43–80)
NEUTS SEG NFR BLD: 7.94 K/UL (ref 1.8–7.3)
PLATELET CONFIRMATION: NORMAL
PLATELET ESTIMATE: ABNORMAL
PLATELET, FLUORESCENCE: 13 K/UL (ref 130–450)
PMV BLD AUTO: 15.6 FL (ref 7–12)
POTASSIUM SERPL-SCNC: 4.5 MMOL/L (ref 3.5–5)
PROT SERPL-MCNC: 8 G/DL (ref 6.4–8.3)
RBC # BLD AUTO: 3.31 M/UL (ref 3.8–5.8)
SODIUM SERPL-SCNC: 136 MMOL/L (ref 132–146)
WBC OTHER # BLD: 9.1 K/UL (ref 4.5–11.5)

## 2024-05-21 PROCEDURE — 6360000002 HC RX W HCPCS: Performed by: INTERNAL MEDICINE

## 2024-05-21 PROCEDURE — 80053 COMPREHEN METABOLIC PANEL: CPT

## 2024-05-21 PROCEDURE — 99231 SBSQ HOSP IP/OBS SF/LOW 25: CPT | Performed by: INTERNAL MEDICINE

## 2024-05-21 PROCEDURE — 2580000003 HC RX 258: Performed by: HOSPITALIST

## 2024-05-21 PROCEDURE — 6370000000 HC RX 637 (ALT 250 FOR IP): Performed by: HOSPITALIST

## 2024-05-21 PROCEDURE — 82962 GLUCOSE BLOOD TEST: CPT

## 2024-05-21 PROCEDURE — 2060000000 HC ICU INTERMEDIATE R&B

## 2024-05-21 PROCEDURE — 36415 COLL VENOUS BLD VENIPUNCTURE: CPT

## 2024-05-21 PROCEDURE — 85025 COMPLETE CBC W/AUTO DIFF WBC: CPT

## 2024-05-21 RX ADMIN — FUROSEMIDE 40 MG: 40 TABLET ORAL at 10:00

## 2024-05-21 RX ADMIN — METOPROLOL SUCCINATE 25 MG: 25 TABLET, FILM COATED, EXTENDED RELEASE ORAL at 10:00

## 2024-05-21 RX ADMIN — IMMUNE GLOBULIN (HUMAN) 70 G: 10 INJECTION INTRAVENOUS; SUBCUTANEOUS at 12:27

## 2024-05-21 RX ADMIN — INSULIN LISPRO 2 UNITS: 100 INJECTION, SOLUTION INTRAVENOUS; SUBCUTANEOUS at 17:08

## 2024-05-21 RX ADMIN — SODIUM CHLORIDE, PRESERVATIVE FREE 10 ML: 5 INJECTION INTRAVENOUS at 20:26

## 2024-05-21 RX ADMIN — ROMIPLOSTIM: 250 INJECTION, POWDER, LYOPHILIZED, FOR SOLUTION SUBCUTANEOUS at 17:20

## 2024-05-21 RX ADMIN — SODIUM CHLORIDE, PRESERVATIVE FREE 10 ML: 5 INJECTION INTRAVENOUS at 10:00

## 2024-05-21 RX ADMIN — DEXAMETHASONE 40 MG: 4 TABLET ORAL at 11:34

## 2024-05-21 RX ADMIN — ATORVASTATIN CALCIUM 80 MG: 40 TABLET, FILM COATED ORAL at 20:19

## 2024-05-21 NOTE — ACP (ADVANCE CARE PLANNING)
Advance Care Planning   Healthcare Decision Maker:      Click here to complete Healthcare Decision Makers including selection of the Healthcare Decision Maker Relationship (ie \"Primary\").  Today we documented Decision Maker(s) consistent with Legal Next of Kin hierarchy.

## 2024-05-21 NOTE — CARE COORDINATION
Introduced my self and provided explanation of CM role to patient. Admitted for Acute idiopathic thrombocytopenic purpura, MILKA LE edema. Hx of DMT2, HTN, CAD, A-fib (Eliquis on hold). Hem/Onc is following. Plt 13 received 1u plt's.Currently on room air, PO lasix, PO decadron, IV immune globin. He voices he resides in a 1 story home with his spouse and completes his adl's with independence w/o assistive devices. Patient is established with a Dr. Hope and uses [facility] in [place]. Will continue to follow and assist with discharge planning as needed.  Cora Gordon BSN, RN  Case Management

## 2024-05-22 LAB
BASOPHILS # BLD: 0 K/UL (ref 0–0.2)
BASOPHILS NFR BLD: 0 % (ref 0–2)
EOSINOPHIL # BLD: 0 K/UL (ref 0.05–0.5)
EOSINOPHILS RELATIVE PERCENT: 0 % (ref 0–6)
ERYTHROCYTE [DISTWIDTH] IN BLOOD BY AUTOMATED COUNT: 14.6 % (ref 11.5–15)
GLUCOSE BLD-MCNC: 178 MG/DL (ref 74–99)
GLUCOSE BLD-MCNC: 258 MG/DL (ref 74–99)
GLUCOSE BLD-MCNC: 277 MG/DL (ref 74–99)
GLUCOSE BLD-MCNC: 403 MG/DL (ref 74–99)
HCT VFR BLD AUTO: 33.8 % (ref 37–54)
HGB BLD-MCNC: 10.9 G/DL (ref 12.5–16.5)
IMM GRANULOCYTES # BLD AUTO: 0.1 K/UL (ref 0–0.58)
IMM GRANULOCYTES NFR BLD: 1 % (ref 0–5)
LYMPHOCYTES NFR BLD: 0.74 K/UL (ref 1.5–4)
LYMPHOCYTES RELATIVE PERCENT: 7 % (ref 20–42)
MCH RBC QN AUTO: 31 PG (ref 26–35)
MCHC RBC AUTO-ENTMCNC: 32.2 G/DL (ref 32–34.5)
MCV RBC AUTO: 96 FL (ref 80–99.9)
MONOCYTES NFR BLD: 0.51 K/UL (ref 0.1–0.95)
MONOCYTES NFR BLD: 5 % (ref 2–12)
NEUTROPHILS NFR BLD: 87 % (ref 43–80)
NEUTS SEG NFR BLD: 9 K/UL (ref 1.8–7.3)
PLATELET CONFIRMATION: NORMAL
PLATELET, FLUORESCENCE: 30 K/UL (ref 130–450)
PMV BLD AUTO: 13.8 FL (ref 7–12)
RBC # BLD AUTO: 3.52 M/UL (ref 3.8–5.8)
WBC OTHER # BLD: 10.4 K/UL (ref 4.5–11.5)

## 2024-05-22 PROCEDURE — 2580000003 HC RX 258: Performed by: HOSPITALIST

## 2024-05-22 PROCEDURE — 6360000002 HC RX W HCPCS: Performed by: INTERNAL MEDICINE

## 2024-05-22 PROCEDURE — 82962 GLUCOSE BLOOD TEST: CPT

## 2024-05-22 PROCEDURE — 85025 COMPLETE CBC W/AUTO DIFF WBC: CPT

## 2024-05-22 PROCEDURE — 6370000000 HC RX 637 (ALT 250 FOR IP): Performed by: HOSPITALIST

## 2024-05-22 PROCEDURE — 2060000000 HC ICU INTERMEDIATE R&B

## 2024-05-22 PROCEDURE — 99231 SBSQ HOSP IP/OBS SF/LOW 25: CPT | Performed by: INTERNAL MEDICINE

## 2024-05-22 PROCEDURE — 36415 COLL VENOUS BLD VENIPUNCTURE: CPT

## 2024-05-22 RX ADMIN — ATORVASTATIN CALCIUM 80 MG: 40 TABLET, FILM COATED ORAL at 20:09

## 2024-05-22 RX ADMIN — INSULIN LISPRO 4 UNITS: 100 INJECTION, SOLUTION INTRAVENOUS; SUBCUTANEOUS at 20:15

## 2024-05-22 RX ADMIN — IMMUNE GLOBULIN (HUMAN) 70 G: 10 INJECTION INTRAVENOUS; SUBCUTANEOUS at 12:38

## 2024-05-22 RX ADMIN — DEXAMETHASONE 40 MG: 4 TABLET ORAL at 10:25

## 2024-05-22 RX ADMIN — FUROSEMIDE 40 MG: 40 TABLET ORAL at 10:25

## 2024-05-22 RX ADMIN — METOPROLOL SUCCINATE 25 MG: 25 TABLET, FILM COATED, EXTENDED RELEASE ORAL at 10:25

## 2024-05-22 RX ADMIN — INSULIN LISPRO 2 UNITS: 100 INJECTION, SOLUTION INTRAVENOUS; SUBCUTANEOUS at 16:50

## 2024-05-22 RX ADMIN — INSULIN LISPRO 2 UNITS: 100 INJECTION, SOLUTION INTRAVENOUS; SUBCUTANEOUS at 12:42

## 2024-05-22 RX ADMIN — SODIUM CHLORIDE, PRESERVATIVE FREE 10 ML: 5 INJECTION INTRAVENOUS at 10:56

## 2024-05-22 ASSESSMENT — ENCOUNTER SYMPTOMS: RESPIRATORY NEGATIVE: 1

## 2024-05-22 NOTE — CARE COORDINATION
Met with patient, currently on room air, IV IG, PO lasix, PO decadron. Plt 30. Anticipated to discharge home with no needs pending Hematology plan. Will continue to follow.  Cora CHERYN, RN  Case Management

## 2024-05-23 VITALS
DIASTOLIC BLOOD PRESSURE: 71 MMHG | RESPIRATION RATE: 18 BRPM | WEIGHT: 315 LBS | TEMPERATURE: 97.7 F | HEART RATE: 57 BPM | HEIGHT: 76 IN | BODY MASS INDEX: 38.36 KG/M2 | OXYGEN SATURATION: 97 % | SYSTOLIC BLOOD PRESSURE: 124 MMHG

## 2024-05-23 PROBLEM — E87.1 HYPONATREMIA: Status: RESOLVED | Noted: 2024-05-19 | Resolved: 2024-05-23

## 2024-05-23 PROBLEM — R79.89 ELEVATED LFTS: Status: RESOLVED | Noted: 2024-05-19 | Resolved: 2024-05-23

## 2024-05-23 PROBLEM — E83.42 HYPOMAGNESEMIA: Status: RESOLVED | Noted: 2024-05-19 | Resolved: 2024-05-23

## 2024-05-23 LAB
BASOPHILS # BLD: 0 K/UL (ref 0–0.2)
BASOPHILS NFR BLD: 0 % (ref 0–2)
EOSINOPHIL # BLD: 0 K/UL (ref 0.05–0.5)
EOSINOPHILS RELATIVE PERCENT: 0 % (ref 0–6)
ERYTHROCYTE [DISTWIDTH] IN BLOOD BY AUTOMATED COUNT: 14.8 % (ref 11.5–15)
GLUCOSE BLD-MCNC: 203 MG/DL (ref 74–99)
GLUCOSE BLD-MCNC: 238 MG/DL (ref 74–99)
HCT VFR BLD AUTO: 32.8 % (ref 37–54)
HGB BLD-MCNC: 11 G/DL (ref 12.5–16.5)
LYMPHOCYTES NFR BLD: 0.31 K/UL (ref 1.5–4)
LYMPHOCYTES RELATIVE PERCENT: 3 % (ref 20–42)
MCH RBC QN AUTO: 31.8 PG (ref 26–35)
MCHC RBC AUTO-ENTMCNC: 33.5 G/DL (ref 32–34.5)
MCV RBC AUTO: 94.8 FL (ref 80–99.9)
MONOCYTES NFR BLD: 1.03 K/UL (ref 0.1–0.95)
MONOCYTES NFR BLD: 9 % (ref 2–12)
NEUTROPHILS NFR BLD: 89 % (ref 43–80)
NEUTS SEG NFR BLD: 10.37 K/UL (ref 1.8–7.3)
PLATELET # BLD AUTO: 50 K/UL (ref 130–450)
PLATELET CONFIRMATION: NORMAL
PMV BLD AUTO: 12.6 FL (ref 7–12)
RBC # BLD AUTO: 3.46 M/UL (ref 3.8–5.8)
RBC # BLD: NORMAL 10*6/UL
WBC OTHER # BLD: 11.7 K/UL (ref 4.5–11.5)

## 2024-05-23 PROCEDURE — 82962 GLUCOSE BLOOD TEST: CPT

## 2024-05-23 PROCEDURE — 6370000000 HC RX 637 (ALT 250 FOR IP): Performed by: HOSPITALIST

## 2024-05-23 PROCEDURE — 85025 COMPLETE CBC W/AUTO DIFF WBC: CPT

## 2024-05-23 PROCEDURE — 2580000003 HC RX 258: Performed by: HOSPITALIST

## 2024-05-23 PROCEDURE — 99239 HOSP IP/OBS DSCHRG MGMT >30: CPT | Performed by: INTERNAL MEDICINE

## 2024-05-23 RX ADMIN — METOPROLOL SUCCINATE 25 MG: 25 TABLET, FILM COATED, EXTENDED RELEASE ORAL at 08:41

## 2024-05-23 RX ADMIN — INSULIN LISPRO 1 UNITS: 100 INJECTION, SOLUTION INTRAVENOUS; SUBCUTANEOUS at 06:41

## 2024-05-23 RX ADMIN — FUROSEMIDE 40 MG: 40 TABLET ORAL at 08:41

## 2024-05-23 RX ADMIN — SODIUM CHLORIDE, PRESERVATIVE FREE 10 ML: 5 INJECTION INTRAVENOUS at 08:42

## 2024-05-23 NOTE — PLAN OF CARE
Problem: Cardiovascular - Adult  Goal: Maintains optimal cardiac output and hemodynamic stability  Outcome: Progressing  Goal: Absence of cardiac dysrhythmias or at baseline  Outcome: Progressing     Problem: Skin/Tissue Integrity - Adult  Goal: Skin integrity remains intact  Outcome: Progressing     Problem: Metabolic/Fluid and Electrolytes - Adult  Goal: Electrolytes maintained within normal limits  Outcome: Progressing  Goal: Hemodynamic stability and optimal renal function maintained  Outcome: Progressing     
  Problem: Discharge Planning  Goal: Discharge to home or other facility with appropriate resources  Outcome: Progressing     Problem: Cardiovascular - Adult  Goal: Maintains optimal cardiac output and hemodynamic stability  Outcome: Progressing     Problem: Skin/Tissue Integrity - Adult  Goal: Skin integrity remains intact  Outcome: Progressing  Flowsheets (Taken 5/20/2024 0434)  Skin Integrity Remains Intact: Monitor for areas of redness and/or skin breakdown     
  Problem: Discharge Planning  Goal: Discharge to home or other facility with appropriate resources  Outcome: Progressing     Problem: Cardiovascular - Adult  Goal: Maintains optimal cardiac output and hemodynamic stability  Outcome: Progressing  Goal: Absence of cardiac dysrhythmias or at baseline  Outcome: Progressing     Problem: Skin/Tissue Integrity - Adult  Goal: Skin integrity remains intact  Outcome: Progressing  Flowsheets  Taken 5/20/2024 2231 by Indu Samuel, RN  Skin Integrity Remains Intact:   Monitor for areas of redness and/or skin breakdown   Assess vascular access sites hourly  Taken 5/20/2024 2030 by Indu Samuel, RN  Skin Integrity Remains Intact: Monitor for areas of redness and/or skin breakdown  Goal: Oral mucous membranes remain intact  Outcome: Progressing     Problem: Metabolic/Fluid and Electrolytes - Adult  Goal: Electrolytes maintained within normal limits  Outcome: Progressing  Goal: Hemodynamic stability and optimal renal function maintained  Outcome: Progressing  Goal: Glucose maintained within prescribed range  Outcome: Progressing     Problem: Hematologic - Adult  Goal: Maintains hematologic stability  Outcome: Progressing     Problem: Chronic Conditions and Co-morbidities  Goal: Patient's chronic conditions and co-morbidity symptoms are monitored and maintained or improved  Outcome: Progressing     
  Problem: Discharge Planning  Goal: Discharge to home or other facility with appropriate resources  Outcome: Progressing     Problem: Cardiovascular - Adult  Goal: Maintains optimal cardiac output and hemodynamic stability  Outcome: Progressing  Goal: Absence of cardiac dysrhythmias or at baseline  Outcome: Progressing     Problem: Skin/Tissue Integrity - Adult  Goal: Skin integrity remains intact  Outcome: Progressing  Goal: Oral mucous membranes remain intact  Outcome: Progressing     Problem: Metabolic/Fluid and Electrolytes - Adult  Goal: Electrolytes maintained within normal limits  Outcome: Progressing  Goal: Hemodynamic stability and optimal renal function maintained  Outcome: Progressing  Goal: Glucose maintained within prescribed range  Outcome: Progressing     Problem: Hematologic - Adult  Goal: Maintains hematologic stability  Outcome: Progressing     Problem: Chronic Conditions and Co-morbidities  Goal: Patient's chronic conditions and co-morbidity symptoms are monitored and maintained or improved  Outcome: Progressing     
  Problem: Discharge Planning  Goal: Discharge to home or other facility with appropriate resources  Outcome: Progressing  Flowsheets (Taken 5/22/2024 0730)  Discharge to home or other facility with appropriate resources:   Identify barriers to discharge with patient and caregiver   Refer to discharge planning if patient needs post-hospital services based on physician order or complex needs related to functional status, cognitive ability or social support system     Problem: Cardiovascular - Adult  Goal: Maintains optimal cardiac output and hemodynamic stability  Outcome: Progressing  Goal: Absence of cardiac dysrhythmias or at baseline  Outcome: Progressing     Problem: Skin/Tissue Integrity - Adult  Goal: Skin integrity remains intact  Outcome: Progressing  Flowsheets  Taken 5/22/2024 1107  Skin Integrity Remains Intact:   Monitor for areas of redness and/or skin breakdown   Assess vascular access sites hourly  Taken 5/22/2024 0730  Skin Integrity Remains Intact:   Monitor for areas of redness and/or skin breakdown   Assess vascular access sites hourly  Goal: Oral mucous membranes remain intact  Outcome: Progressing  Flowsheets (Taken 5/22/2024 1107)  Oral Mucous Membranes Remain Intact: Assess oral mucosa and hygiene practices     Problem: Metabolic/Fluid and Electrolytes - Adult  Goal: Electrolytes maintained within normal limits  Outcome: Progressing  Flowsheets (Taken 5/22/2024 0730)  Electrolytes maintained within normal limits: Monitor labs and assess patient for signs and symptoms of electrolyte imbalances  Goal: Hemodynamic stability and optimal renal function maintained  Outcome: Progressing  Flowsheets (Taken 5/22/2024 0730)  Hemodynamic stability and optimal renal function maintained:   Monitor labs and assess for signs and symptoms of volume excess or deficit   Monitor intake, output and patient weight  Goal: Glucose maintained within prescribed range  Outcome: Progressing     Problem: Hematologic - 
Patient was admitted this am by the nocturnist.  Chart reviewed, discussed with staff.   On Iv steroids  
exacerbation or deterioration   Update acute care plan with appropriate goals if chronic or comorbid symptoms are exacerbated and prevent overall improvement and discharge

## 2024-05-23 NOTE — PROGRESS NOTES
Progress  Note  Chief Complaint   Patient presents with    Leg Swelling     Bilateral lower leg swelling and purplish-patchy discoloration: right>left: Numbness and burning sensation.     Historical Issues:  Current Facility-Administered Medications   Medication Dose Route Frequency Provider Last Rate Last Admin    0.9 % sodium chloride infusion   IntraVENous PRN Mika Guzman MD        atorvastatin (LIPITOR) tablet 80 mg  80 mg Oral Nightly Mika Guzman MD   80 mg at 05/21/24 2019    furosemide (LASIX) tablet 40 mg  40 mg Oral Daily Mika Guzman MD   40 mg at 05/22/24 1025    metoprolol succinate (TOPROL XL) extended release tablet 25 mg  25 mg Oral Daily Mika Guzman MD   25 mg at 05/22/24 1025    sodium chloride flush 0.9 % injection 5-40 mL  5-40 mL IntraVENous 2 times per day Mika Guzman MD   10 mL at 05/22/24 1056    sodium chloride flush 0.9 % injection 5-40 mL  5-40 mL IntraVENous PRN Mika Guzman MD        0.9 % sodium chloride infusion   IntraVENous PRN Mika Guzman MD        potassium chloride (KLOR-CON M) extended release tablet 40 mEq  40 mEq Oral PRN Mika Guzman MD        Or    potassium bicarb-citric acid (EFFER-K) effervescent tablet 40 mEq  40 mEq Oral PRN Mika Guzman MD        Or    potassium chloride 10 mEq/100 mL IVPB (Peripheral Line)  10 mEq IntraVENous PRN Mika Guzman MD        magnesium sulfate 2000 mg in 50 mL IVPB premix  2,000 mg IntraVENous PRN Mika Guzman MD        ondansetron (ZOFRAN-ODT) disintegrating tablet 4 mg  4 mg Oral Q8H PRN Mika Guzman MD        Or    ondansetron (ZOFRAN) injection 4 mg  4 mg IntraVENous Q6H PRN Mika Guzman MD        polyethylene glycol (GLYCOLAX) packet 17 g  17 g Oral Daily PRN Mika Guzman MD        acetaminophen (TYLENOL) tablet 650 mg  650 mg Oral Q6H PRN Mika Guzman MD   650 mg at 05/19/24 2116    Or    acetaminophen (TYLENOL) suppository 650 mg  650 mg Rectal Q6H PRN Mika Guzman MD        insulin lispro (HUMALOG,ADMELOG) injection vial 0-4 Units  0-4 Units 
    Progress  Note  Chief Complaint   Patient presents with    Leg Swelling     Bilateral lower leg swelling and purplish-patchy discoloration: right>left: Numbness and burning sensation.     Historical Issues:  Current Facility-Administered Medications   Medication Dose Route Frequency Provider Last Rate Last Admin    dexAMETHasone (DECADRON) tablet 40 mg  40 mg Oral Daily Omar Johnson MD   40 mg at 05/21/24 1134    0.9 % sodium chloride infusion   IntraVENous PRN Mika Guzman MD        atorvastatin (LIPITOR) tablet 80 mg  80 mg Oral Nightly Mika Guzman MD   80 mg at 05/20/24 2008    furosemide (LASIX) tablet 40 mg  40 mg Oral Daily Mika Guzman MD   40 mg at 05/21/24 1000    metoprolol succinate (TOPROL XL) extended release tablet 25 mg  25 mg Oral Daily Mika Guzman MD   25 mg at 05/21/24 1000    sodium chloride flush 0.9 % injection 5-40 mL  5-40 mL IntraVENous 2 times per day Mika Guzman MD   10 mL at 05/20/24 2010    sodium chloride flush 0.9 % injection 5-40 mL  5-40 mL IntraVENous PRN Mika Guzman MD        0.9 % sodium chloride infusion   IntraVENous PRN Mika Guzman MD        potassium chloride (KLOR-CON M) extended release tablet 40 mEq  40 mEq Oral PRN Mika Guzman MD        Or    potassium bicarb-citric acid (EFFER-K) effervescent tablet 40 mEq  40 mEq Oral PRN Mika Guzman MD        Or    potassium chloride 10 mEq/100 mL IVPB (Peripheral Line)  10 mEq IntraVENous PRN Mika Guzman MD        magnesium sulfate 2000 mg in 50 mL IVPB premix  2,000 mg IntraVENous PRN Mika Guzman MD        ondansetron (ZOFRAN-ODT) disintegrating tablet 4 mg  4 mg Oral Q8H PRN Mika Guzman MD        Or    ondansetron (ZOFRAN) injection 4 mg  4 mg IntraVENous Q6H PRN Mika Guzman MD        polyethylene glycol (GLYCOLAX) packet 17 g  17 g Oral Daily PRN Mika Guzman MD        acetaminophen (TYLENOL) tablet 650 mg  650 mg Oral Q6H PRN Mika Guzman MD   650 mg at 05/19/24 2116    Or    acetaminophen (TYLENOL) suppository 650 mg  650 mg 
    Progress  Note  Chief Complaint   Patient presents with    Leg Swelling     Bilateral lower leg swelling and purplish-patchy discoloration: right>left: Numbness and burning sensation.     Historical Issues:  Current Facility-Administered Medications   Medication Dose Route Frequency Provider Last Rate Last Admin    immune globulin (GAMUNEX-C) 10% solution 70 g  70 g IntraVENous Once Jones FabyZOFIA Stevenson - CNP        0.9 % sodium chloride infusion   IntraVENous PRN Mika Guzman MD        atorvastatin (LIPITOR) tablet 80 mg  80 mg Oral Nightly Mika Guzman MD   80 mg at 05/19/24 2116    furosemide (LASIX) tablet 40 mg  40 mg Oral Daily iMka Guzman MD   40 mg at 05/20/24 0927    metoprolol succinate (TOPROL XL) extended release tablet 25 mg  25 mg Oral Daily Mika Guzman MD   25 mg at 05/20/24 0927    sodium chloride flush 0.9 % injection 5-40 mL  5-40 mL IntraVENous 2 times per day Mika Guzman MD   10 mL at 05/20/24 0927    sodium chloride flush 0.9 % injection 5-40 mL  5-40 mL IntraVENous PRN Mika Guzman MD        0.9 % sodium chloride infusion   IntraVENous PRN Mika Guzman MD        potassium chloride (KLOR-CON M) extended release tablet 40 mEq  40 mEq Oral PRN Mika Guzman MD        Or    potassium bicarb-citric acid (EFFER-K) effervescent tablet 40 mEq  40 mEq Oral PRN Mika Guzman MD        Or    potassium chloride 10 mEq/100 mL IVPB (Peripheral Line)  10 mEq IntraVENous PRN Mika Guzman MD        magnesium sulfate 2000 mg in 50 mL IVPB premix  2,000 mg IntraVENous PRN Mika Guzman MD        ondansetron (ZOFRAN-ODT) disintegrating tablet 4 mg  4 mg Oral Q8H PRN Mika Guzman MD        Or    ondansetron (ZOFRAN) injection 4 mg  4 mg IntraVENous Q6H PRN Mika Guzman MD        polyethylene glycol (GLYCOLAX) packet 17 g  17 g Oral Daily PRN Mika Guzman MD        acetaminophen (TYLENOL) tablet 650 mg  650 mg Oral Q6H PRN Mika Guzman MD   650 mg at 05/19/24 2116    Or    acetaminophen (TYLENOL) suppository 650 mg  650 mg 
  Physician Progress Note      PATIENT:               JEANNINE ELKINS  CSN #:                  [id number]  :                       1960  ADMIT DATE:       2024 9:01 PM  DISCH DATE:  RESPONDING  PROVIDER #:        Shilo De Anda MD        QUERY TEXT:    Type of Anemia: Please provide further specificity, if known.    Clinical indicators include: cancer, bleeding, nosebleeds, melena, hematuria,   anemia, hemoglobin, platelet, bleed, platelets, hgb, hct, rbc, iron, tibc,   ferritin, hemorrhage, hemolysis, 4 units, 1 units, 2 units  Options provided:  -- Anemia due to acute blood loss  -- Anemia due to chronic blood loss  -- Anemia due to iron deficiency  -- Anemia due to postoperative blood loss  -- Anemia due to chronic disease  -- Other - I will add my own diagnosis  -- Disagree - Not applicable / Not valid  -- Disagree - Clinically Unable to determine / Unknown        PROVIDER RESPONSE TEXT:    The patient has anemia due to acute blood loss.      Electronically signed by:  Shilo De Anda MD 2024 3:39 PM          
4 Eyes Skin Assessment     NAME:  Gilberto Villagran  YOB: 1960  MEDICAL RECORD NUMBER:  [id number]    The patient is being assessed for  Admission    I agree that at least one RN has performed a thorough Head to Toe Skin Assessment on the patient. ALL assessment sites listed below have been assessed.      Areas assessed by both nurses:    Head, Face, Ears, Shoulders, Back, Chest, Arms, Elbows, Hands, Sacrum. Buttock, Coccyx, Ischium, Legs. Feet and Heels, and Under Medical Devices         Does the Patient have a Wound? No noted wound(s)       Shan Prevention initiated by RN: No  Wound Care Orders initiated by RN: No    Pressure Injury (Stage 3,4, Unstageable, DTI, NWPT, and Complex wounds) if present, place Wound referral order by RN under : No    New Ostomies, if present place, Ostomy referral order under : No     Nurse 1 eSignature: Electronically signed by Caitlin Meraz RN on 5/19/24 at 5:59 AM EDT    **SHARE this note so that the co-signing nurse can place an eSignature**    Nurse 2 eSignature: Electronically signed by Yary Delgadillo RN on 5/19/24 at 6:00 AM EDT   
[facility]  Hematology/Oncology  Consult      Patient Name: Gilberto Villagran  YOB: 1960  PCP: Otoniel Hope MD   Referring Provider:      Reason for Consultation:   Chief Complaint   Patient presents with    Leg Swelling     Bilateral lower leg swelling and purplish-patchy discoloration: right>left: Numbness and burning sensation.        History of Present Illness:  Case discussed at length last night with Dr. Lentz in the ED department.    Mr. Villagran is a 64-year-old man with past medical history relevant for obesity, type 2 diabetes mellitus, hypertension, hyperlipidemia, CAD with PCI and paroxysmal atrial fibrillation on Eliquis.  He presented to ED for evaluation of bilateral leg edema and scattered diffuse petechial rash of few days duration.  No other bleeding manifestations such as nosebleeds or melena or hematuria.  No chest pain or shortness of breath.  His CMP was unremarkable with normal serum creatinine.  Hepatic panel showed slightly elevated AST with borderline bilirubin of 1.8 with an albumin of 3.2.  LDH was normal at 223.  CBC with slight borderline anemia with hemoglobin of 12 with an MCV of 93 with normal WBC count of 4.9 with WBC differential relevant for only mild lymphopenia without any immature cells.  Platelet count was very low at 3 confirmed on blood smear review.  Sed rate was elevated at 36.  PT was 23.9 seconds with an INR of 2.2  CT head was negative for any acute bleed  Patient was ordered 1 dose of platelets in ED.  He likely has a diagnosis of ITP and was recommended IVIG along with dexamethasone pulses.    Diagnostic Data:     Past Medical History:   Diagnosis Date    Heart attack (HCC)        Patient Active Problem List    Diagnosis Date Noted    Acute idiopathic thrombocytopenic purpura (HCC) 05/19/2024    Type 2 diabetes mellitus with hyperglycemia, without long-term current use of insulin (HCC) 05/19/2024    Hyponatremia 05/19/2024    
[facility]  Hematology/Oncology  Consult      Patient Name: Gilberto Villagran  YOB: 1960  PCP: Otoniel Hope MD   Referring Provider:      Reason for Consultation:   Chief Complaint   Patient presents with    Leg Swelling     Bilateral lower leg swelling and purplish-patchy discoloration: right>left: Numbness and burning sensation.        Subjective:  No overt bleeding reported. No side effects with medications    History of Present Illness:  Case discussed at length last night with Dr. Lentz in the ED department.    Mr. Villagran is a 64-year-old man with past medical history relevant for obesity, type 2 diabetes mellitus, hypertension, hyperlipidemia, CAD with PCI and paroxysmal atrial fibrillation on Eliquis.  He presented to ED for evaluation of bilateral leg edema and scattered diffuse petechial rash of few days duration.  No other bleeding manifestations such as nosebleeds or melena or hematuria.  No chest pain or shortness of breath.  His CMP was unremarkable with normal serum creatinine.  Hepatic panel showed slightly elevated AST with borderline bilirubin of 1.8 with an albumin of 3.2.  LDH was normal at 223.  CBC with slight borderline anemia with hemoglobin of 12 with an MCV of 93 with normal WBC count of 4.9 with WBC differential relevant for only mild lymphopenia without any immature cells.  Platelet count was very low at 3 confirmed on blood smear review.  Sed rate was elevated at 36.  PT was 23.9 seconds with an INR of 2.2  CT head was negative for any acute bleed  Patient was ordered 1 dose of platelets in ED.  He likely has a diagnosis of ITP and was recommended IVIG along with dexamethasone pulses.    Diagnostic Data:     Past Medical History:   Diagnosis Date    Heart attack (HCC)        Patient Active Problem List    Diagnosis Date Noted    Acute idiopathic thrombocytopenic purpura (HCC) 05/19/2024    Type 2 diabetes mellitus with hyperglycemia, without long-term 
Pt completed one bag platelets at 2302. Assessed pt, VS stable , no reaction , no distress this time. Recheck lab in the morning.   
Spoke with ADRIAN Reis for discharge and follow up next week.  
While rounding this  found the patient reclined in bed and receptive to the visit. The patient's spouse is visiting at this time. The patient is feeling better and looks forward to when he will be able to return home, didn't voice any concerns at this time. This  remained present to the patient and his spouse through attentive listening, validating feelings where appropriate, words of support, encouragement, reassurance and prayer. Hte patient expressed appreciation for the visit and  remains available for support.  
function  This is most consistent with ITP    Patient initiated on Decadron pulses and he will receive dexamethasone 40 mg IV x 2 days then he will be switched to oral Decadron 40 mg p.o. for 2 additional days and he will continue on IVIG 70 g daily for up to 3 days with daily monitoring of his CBC    If no improvement consideration for subcu romiplostim will be addressed.    Trend daily CBC.  Thank you for the consult, will follow.  Hold Eliquis till platelet count recovers above 50      5/20/24  - Acute severe thrombocytopenia most consistent with ITP as above  - Blood smear otherwise unremarkable except for severe thrombocytopenia without any schistocytes fragments or hemolysis  - Patient initiated on Decadron pulses and he will receive dexamethasone 40 mg IV x 2 days then he will be switched to oral Decadron 40 mg p.o. for 2 additional days and he will continue on IVIG 70 g daily for up to 3 days with daily monitoring of his CBC  - If no improvement consideration for subcu romiplostim will be addressed.  - CBC with platelets 8 today. 2nd dose of IVIG today. Trend daily CBC.  - Hold Eliquis till platelet count recovers above 50  - We will follow.    Thank you for the consult,      Faby Jones APRN-CNP  Electronically signed 5/20/2024 at 1:27 PM  Patient seen and examined.  Agree with CNP assessment plan.  Note updated.   Omar Johnson MD    
renal function  This is most consistent with ITP    Patient initiated on Decadron pulses and he will receive dexamethasone 40 mg IV x 2 days then he will be switched to oral Decadron 40 mg p.o. for 2 additional days and he will continue on IVIG 70 g daily for up to 3 days with daily monitoring of his CBC    If no improvement consideration for subcu romiplostim will be addressed.    Trend daily CBC.  Thank you for the consult, will follow.  Hold Eliquis till platelet count recovers above 50      5/20/24  - Acute severe thrombocytopenia most consistent with ITP as above  - Blood smear otherwise unremarkable except for severe thrombocytopenia without any schistocytes fragments or hemolysis  - Patient initiated on Decadron pulses and he will receive dexamethasone 40 mg IV x 2 days then he will be switched to oral Decadron 40 mg p.o. for 2 additional days and he will continue on IVIG 70 g daily for up to 3 days with daily monitoring of his CBC  - If no improvement consideration for subcu romiplostim will be addressed.  - CBC with platelets 8 today. 2nd dose of IVIG today. Trend daily CBC.  - Hold Eliquis till platelet count recovers above 50  - We will follow.    5/21/24  - Acute severe thrombocytopenia most consistent with ITP as above  - Blood smear otherwise unremarkable except for severe thrombocytopenia without any schistocytes fragments or hemolysis  - Patient initiated on Decadron- 40 mg q daily for a total of 4 days. He will continue on IVIG 70 g daily for a total of 4 days. platelets 13. subcu romiplostim also ordered-1 mcg/kg.   - Hold Eliquis till platelet count recovers above 50  - We will follow.    5/22/24  - Acute severe thrombocytopenia most consistent with ITP as above  - Blood smear as above.  - Patient initiated on Decadron- 40 mg q daily for a total of 4 days. S/p 4 days of IVIG 70 g. S/p 1 dose of subcu romiplostim 1 mcg/kg yesterday. Platelets today 30  - Seems to be improving. Continue to trend

## 2024-05-23 NOTE — DISCHARGE SUMMARY
THE BILATERAL LOWER EXTREMITIES5/18/2024 9:44 pm TECHNIQUE: Duplex ultrasound using B-mode/gray scaled imaging and Doppler spectral analysis and color flow was obtained of the deep venous structures of the bilateral extremities. COMPARISON: None. HISTORY: ORDERING SYSTEM PROVIDED HISTORY: Pain left entire leg FINDINGS: The visualized veins of the bilateral lower extremities are patent and free of echogenic thrombus. The veins demonstrate good compressibility with normal color flow study and spectral analysis.     No evidence of DVT in either lower extremity.       Patient Instructions:   Current Discharge Medication List        CONTINUE these medications which have NOT CHANGED    Details   spironolactone (ALDACTONE) 25 MG tablet Take 1 tablet by mouth daily Take the med at noon.      ELIQUIS 5 MG TABS tablet Take 1 tablet by mouth in the morning and 1 tablet in the evening.      atorvastatin (LIPITOR) 80 MG tablet Take 1 tablet by mouth nightly at bedtime.      metoprolol succinate (TOPROL XL) 25 MG extended release tablet Take 1 tablet by mouth daily      metFORMIN (GLUCOPHAGE) 1000 MG tablet Take 1 tablet by mouth every 12 hours      furosemide (LASIX) 40 MG tablet Take 1 tablet by mouth daily Pt updated the home med at 1930 5/19/24 that he stopped take the med instate of Bumex at 5/13/24.           STOP taking these medications       bumetanide (BUMEX) 1 MG tablet Comments:   Reason for Stopping:         aspirin 81 MG EC tablet Comments:   Reason for Stopping:                 Note that greater than 30 minutes was spent in preparing discharge papers, discussing discharge with patient, medication review, etc.      Signed:  Electronically signed by Shilo De Anda MD on 5/23/2024 at 2:35 PM

## 2024-07-05 ENCOUNTER — APPOINTMENT (OUTPATIENT)
Dept: GENERAL RADIOLOGY | Age: 64
End: 2024-07-05
Payer: COMMERCIAL

## 2024-07-05 ENCOUNTER — APPOINTMENT (OUTPATIENT)
Dept: CT IMAGING | Age: 64
End: 2024-07-05
Payer: COMMERCIAL

## 2024-07-05 ENCOUNTER — HOSPITAL ENCOUNTER (EMERGENCY)
Age: 64
Discharge: HOME | End: 2024-07-06
Attending: EMERGENCY MEDICINE
Payer: COMMERCIAL

## 2024-07-05 ENCOUNTER — APPOINTMENT (OUTPATIENT)
Dept: ULTRASOUND IMAGING | Age: 64
End: 2024-07-05
Payer: COMMERCIAL

## 2024-07-05 DIAGNOSIS — R18.8 OTHER ASCITES: ICD-10-CM

## 2024-07-05 DIAGNOSIS — R06.02 SHORTNESS OF BREATH: Primary | ICD-10-CM

## 2024-07-05 DIAGNOSIS — E87.1 HYPONATREMIA: ICD-10-CM

## 2024-07-05 DIAGNOSIS — K74.60 HEPATIC CIRRHOSIS, UNSPECIFIED HEPATIC CIRRHOSIS TYPE, UNSPECIFIED WHETHER ASCITES PRESENT (HCC): ICD-10-CM

## 2024-07-05 LAB
ABO + RH BLD: NORMAL
ALBUMIN SERPL-MCNC: 2.9 G/DL (ref 3.5–5.2)
ALP SERPL-CCNC: 254 U/L (ref 40–129)
ALT SERPL-CCNC: 44 U/L (ref 0–40)
ANION GAP SERPL CALCULATED.3IONS-SCNC: 11 MMOL/L (ref 7–16)
ARM BAND NUMBER: NORMAL
AST SERPL-CCNC: 84 U/L (ref 0–39)
BASOPHILS # BLD: 0.31 K/UL (ref 0–0.2)
BASOPHILS NFR BLD: 3 % (ref 0–2)
BILIRUB SERPL-MCNC: 1.9 MG/DL (ref 0–1.2)
BLOOD BANK SAMPLE EXPIRATION: NORMAL
BLOOD GROUP ANTIBODIES SERPL: NEGATIVE
BNP SERPL-MCNC: 82 PG/ML (ref 0–125)
BUN SERPL-MCNC: 19 MG/DL (ref 6–23)
CALCIUM SERPL-MCNC: 8.2 MG/DL (ref 8.6–10.2)
CHLORIDE SERPL-SCNC: 91 MMOL/L (ref 98–107)
CO2 SERPL-SCNC: 24 MMOL/L (ref 22–29)
CREAT SERPL-MCNC: 1 MG/DL (ref 0.7–1.2)
EKG ATRIAL RATE: 90 BPM
EKG P AXIS: 26 DEGREES
EKG P-R INTERVAL: 154 MS
EKG Q-T INTERVAL: 350 MS
EKG QRS DURATION: 58 MS
EKG QTC CALCULATION (BAZETT): 428 MS
EKG R AXIS: -65 DEGREES
EKG T AXIS: -97 DEGREES
EKG VENTRICULAR RATE: 90 BPM
EOSINOPHIL # BLD: 0.21 K/UL (ref 0.05–0.5)
EOSINOPHILS RELATIVE PERCENT: 2 % (ref 0–6)
ERYTHROCYTE [DISTWIDTH] IN BLOOD BY AUTOMATED COUNT: 15.2 % (ref 11.5–15)
GFR, ESTIMATED: 84 ML/MIN/1.73M2
GLUCOSE SERPL-MCNC: 106 MG/DL (ref 74–99)
HCT VFR BLD AUTO: 36.2 % (ref 37–54)
HGB BLD-MCNC: 11.9 G/DL (ref 12.5–16.5)
LYMPHOCYTES NFR BLD: 1.25 K/UL (ref 1.5–4)
LYMPHOCYTES RELATIVE PERCENT: 11 % (ref 20–42)
MAGNESIUM SERPL-MCNC: 1.8 MG/DL (ref 1.6–2.6)
MCH RBC QN AUTO: 30.1 PG (ref 26–35)
MCHC RBC AUTO-ENTMCNC: 32.9 G/DL (ref 32–34.5)
MCV RBC AUTO: 91.4 FL (ref 80–99.9)
MONOCYTES NFR BLD: 0.31 K/UL (ref 0.1–0.95)
MONOCYTES NFR BLD: 3 % (ref 2–12)
NEUTROPHILS NFR BLD: 83 % (ref 43–80)
NEUTS SEG NFR BLD: 9.81 K/UL (ref 1.8–7.3)
PLATELET # BLD AUTO: 264 K/UL (ref 130–450)
PMV BLD AUTO: 9.8 FL (ref 7–12)
POTASSIUM SERPL-SCNC: 3.8 MMOL/L (ref 3.5–5)
PROT SERPL-MCNC: 6.8 G/DL (ref 6.4–8.3)
RBC # BLD AUTO: 3.96 M/UL (ref 3.8–5.8)
RBC # BLD: NORMAL 10*6/UL
SARS-COV-2 RDRP RESP QL NAA+PROBE: NOT DETECTED
SODIUM SERPL-SCNC: 126 MMOL/L (ref 132–146)
SPECIMEN DESCRIPTION: NORMAL
TROPONIN I SERPL HS-MCNC: 14 NG/L (ref 0–11)
TSH SERPL DL<=0.05 MIU/L-ACNC: 1.72 UIU/ML (ref 0.27–4.2)
WBC OTHER # BLD: 11.9 K/UL (ref 4.5–11.5)

## 2024-07-05 PROCEDURE — 83735 ASSAY OF MAGNESIUM: CPT

## 2024-07-05 PROCEDURE — 83880 ASSAY OF NATRIURETIC PEPTIDE: CPT

## 2024-07-05 PROCEDURE — 93970 EXTREMITY STUDY: CPT

## 2024-07-05 PROCEDURE — 94640 AIRWAY INHALATION TREATMENT: CPT

## 2024-07-05 PROCEDURE — 71046 X-RAY EXAM CHEST 2 VIEWS: CPT

## 2024-07-05 PROCEDURE — 86850 RBC ANTIBODY SCREEN: CPT

## 2024-07-05 PROCEDURE — 6360000004 HC RX CONTRAST MEDICATION: Performed by: RADIOLOGY

## 2024-07-05 PROCEDURE — 85025 COMPLETE CBC W/AUTO DIFF WBC: CPT

## 2024-07-05 PROCEDURE — 87635 SARS-COV-2 COVID-19 AMP PRB: CPT

## 2024-07-05 PROCEDURE — 86901 BLOOD TYPING SEROLOGIC RH(D): CPT

## 2024-07-05 PROCEDURE — 84443 ASSAY THYROID STIM HORMONE: CPT

## 2024-07-05 PROCEDURE — 84484 ASSAY OF TROPONIN QUANT: CPT

## 2024-07-05 PROCEDURE — 80053 COMPREHEN METABOLIC PANEL: CPT

## 2024-07-05 PROCEDURE — 6370000000 HC RX 637 (ALT 250 FOR IP): Performed by: EMERGENCY MEDICINE

## 2024-07-05 PROCEDURE — 74177 CT ABD & PELVIS W/CONTRAST: CPT

## 2024-07-05 PROCEDURE — 71250 CT THORAX DX C-: CPT

## 2024-07-05 PROCEDURE — 86900 BLOOD TYPING SEROLOGIC ABO: CPT

## 2024-07-05 PROCEDURE — 0202U NFCT DS 22 TRGT SARS-COV-2: CPT

## 2024-07-05 PROCEDURE — 99285 EMERGENCY DEPT VISIT HI MDM: CPT

## 2024-07-05 RX ORDER — APIXABAN 5 MG/1
5 TABLET, FILM COATED ORAL EVERY 12 HOURS
COMMUNITY
Start: 2024-06-28

## 2024-07-05 RX ORDER — SODIUM CHLORIDE 9 MG/ML
INJECTION, SOLUTION INTRAVENOUS ONCE
Status: COMPLETED | OUTPATIENT
Start: 2024-07-05 | End: 2024-07-06

## 2024-07-05 RX ORDER — IPRATROPIUM BROMIDE AND ALBUTEROL SULFATE 2.5; .5 MG/3ML; MG/3ML
1 SOLUTION RESPIRATORY (INHALATION)
Status: COMPLETED | OUTPATIENT
Start: 2024-07-05 | End: 2024-07-05

## 2024-07-05 RX ADMIN — IPRATROPIUM BROMIDE AND ALBUTEROL SULFATE 1 DOSE: .5; 2.5 SOLUTION RESPIRATORY (INHALATION) at 21:23

## 2024-07-05 RX ADMIN — IPRATROPIUM BROMIDE AND ALBUTEROL SULFATE 1 DOSE: .5; 2.5 SOLUTION RESPIRATORY (INHALATION) at 21:24

## 2024-07-05 RX ADMIN — IPRATROPIUM BROMIDE AND ALBUTEROL SULFATE 1 DOSE: .5; 2.5 SOLUTION RESPIRATORY (INHALATION) at 21:25

## 2024-07-05 RX ADMIN — IOPAMIDOL 75 ML: 755 INJECTION, SOLUTION INTRAVENOUS at 23:28

## 2024-07-05 ASSESSMENT — LIFESTYLE VARIABLES
HOW OFTEN DO YOU HAVE A DRINK CONTAINING ALCOHOL: MONTHLY OR LESS
HOW MANY STANDARD DRINKS CONTAINING ALCOHOL DO YOU HAVE ON A TYPICAL DAY: 1 OR 2

## 2024-07-05 ASSESSMENT — PAIN DESCRIPTION - DESCRIPTORS: DESCRIPTORS: STABBING

## 2024-07-05 ASSESSMENT — PAIN DESCRIPTION - ORIENTATION: ORIENTATION: RIGHT

## 2024-07-05 ASSESSMENT — PAIN DESCRIPTION - LOCATION: LOCATION: ABDOMEN

## 2024-07-05 ASSESSMENT — PAIN SCALES - GENERAL: PAINLEVEL_OUTOF10: 7

## 2024-07-05 NOTE — ED NOTES
Department of Emergency Medicine  FIRST PROVIDER TRIAGE NOTE             In[facility]           7/5/24  3:14 PM EDT    Date of Encounter: 7/5/24   MRN: [id number]      HPI: Gilberto Villagran is a 64 y.o. male who presents to the ED for Shortness of Breath and Leg Swelling  Shortness of breath for the past few days.  Bilateral lower extremity swelling.  Patient also complaining of abdominal bloating and pain.  Pain worse on right side of abdomen.    ROS: Negative for fever, cough, vomiting, diarrhea, or urinary complaints.    PE: Gen Appearance/Constitutional: alert  Musculoskeletal: moves all extremities x 4     Initial Plan of Care: All treatment areas with department are currently occupied. Plan to order/Initiate the following while awaiting opening in ED: EKG and imaging studies.  Initiate Treatment-Testing, Proceed toTreatment Area When Bed Available for ED Attending/MLP to Continue Care    Electronically signed by ZOFIA Leonard NP   DD: 7/5/24       Avery Mcgovern APRN - NP  07/05/24 1444

## 2024-07-06 VITALS
BODY MASS INDEX: 38.36 KG/M2 | SYSTOLIC BLOOD PRESSURE: 130 MMHG | HEIGHT: 76 IN | WEIGHT: 315 LBS | TEMPERATURE: 97.9 F | HEART RATE: 82 BPM | OXYGEN SATURATION: 97 % | RESPIRATION RATE: 16 BRPM | DIASTOLIC BLOOD PRESSURE: 71 MMHG

## 2024-07-06 LAB
ANION GAP SERPL CALCULATED.3IONS-SCNC: 9 MMOL/L (ref 7–16)
B PARAP IS1001 DNA NPH QL NAA+NON-PROBE: NOT DETECTED
B PERT DNA SPEC QL NAA+PROBE: NOT DETECTED
BUN SERPL-MCNC: 18 MG/DL (ref 6–23)
C PNEUM DNA NPH QL NAA+NON-PROBE: NOT DETECTED
CALCIUM SERPL-MCNC: 7.5 MG/DL (ref 8.6–10.2)
CHLORIDE SERPL-SCNC: 97 MMOL/L (ref 98–107)
CO2 SERPL-SCNC: 24 MMOL/L (ref 22–29)
CREAT SERPL-MCNC: 0.8 MG/DL (ref 0.7–1.2)
FLUAV RNA NPH QL NAA+NON-PROBE: NOT DETECTED
FLUBV RNA NPH QL NAA+NON-PROBE: NOT DETECTED
GFR, ESTIMATED: >90 ML/MIN/1.73M2
GLUCOSE SERPL-MCNC: 113 MG/DL (ref 74–99)
HADV DNA NPH QL NAA+NON-PROBE: NOT DETECTED
HCOV 229E RNA NPH QL NAA+NON-PROBE: NOT DETECTED
HCOV HKU1 RNA NPH QL NAA+NON-PROBE: NOT DETECTED
HCOV NL63 RNA NPH QL NAA+NON-PROBE: NOT DETECTED
HCOV OC43 RNA NPH QL NAA+NON-PROBE: NOT DETECTED
HMPV RNA NPH QL NAA+NON-PROBE: NOT DETECTED
HPIV1 RNA NPH QL NAA+NON-PROBE: NOT DETECTED
HPIV2 RNA NPH QL NAA+NON-PROBE: NOT DETECTED
HPIV3 RNA NPH QL NAA+NON-PROBE: NOT DETECTED
HPIV4 RNA NPH QL NAA+NON-PROBE: NOT DETECTED
M PNEUMO DNA NPH QL NAA+NON-PROBE: NOT DETECTED
POTASSIUM SERPL-SCNC: 3.6 MMOL/L (ref 3.5–5)
RSV RNA NPH QL NAA+NON-PROBE: NOT DETECTED
RV+EV RNA NPH QL NAA+NON-PROBE: NOT DETECTED
SARS-COV-2 RNA NPH QL NAA+NON-PROBE: NOT DETECTED
SODIUM SERPL-SCNC: 130 MMOL/L (ref 132–146)
SPECIMEN DESCRIPTION: NORMAL
TROPONIN I SERPL HS-MCNC: 14 NG/L (ref 0–11)

## 2024-07-06 PROCEDURE — 2580000003 HC RX 258: Performed by: EMERGENCY MEDICINE

## 2024-07-06 PROCEDURE — 84484 ASSAY OF TROPONIN QUANT: CPT

## 2024-07-06 PROCEDURE — 80048 BASIC METABOLIC PNL TOTAL CA: CPT

## 2024-07-06 RX ORDER — 0.9 % SODIUM CHLORIDE 0.9 %
1000 INTRAVENOUS SOLUTION INTRAVENOUS ONCE
Status: COMPLETED | OUTPATIENT
Start: 2024-07-06 | End: 2024-07-06

## 2024-07-06 RX ADMIN — SODIUM CHLORIDE: 9 INJECTION, SOLUTION INTRAVENOUS at 01:07

## 2024-07-06 RX ADMIN — SODIUM CHLORIDE 1000 ML: 9 INJECTION, SOLUTION INTRAVENOUS at 02:48

## 2024-07-06 ASSESSMENT — PAIN - FUNCTIONAL ASSESSMENT
PAIN_FUNCTIONAL_ASSESSMENT: 0-10

## 2024-07-06 ASSESSMENT — PAIN DESCRIPTION - LOCATION
LOCATION: ABDOMEN
LOCATION: ABDOMEN

## 2024-07-06 ASSESSMENT — PAIN SCALES - GENERAL
PAINLEVEL_OUTOF10: 5

## 2024-07-06 NOTE — DISCHARGE INSTRUCTIONS
Call family doctor and GI doctor tomorrow and schedule a followup appointment     Have your doctor obtain  finalized report of all results

## 2024-07-07 NOTE — ED PROVIDER NOTES
[facility] EMERGENCY DEPARTMENT  EMERGENCY DEPARTMENT ENCOUNTER        Pt Name: Gilberto Villagran  MRN: [id number]  Birthdate 1960  Date of evaluation: 7/5/2024  Provider: Dutch Lentz DO  PCP: Otoniel Hope MD  Note Started: 12:08 AM EDT 7/7/24    CHIEF COMPLAINT       Chief Complaint   Patient presents with    Shortness of Breath    Leg Swelling       HISTORY OF PRESENT ILLNESS: 1 or more Elements     History from : Patient    Limitations to history : None    Gilberto Villagran is a 64 y.o. male who presents shortness of breath and leg swelling.  Patient notes he has abdominal fullness and pressure.  He states he previously was a regular drinker he denies any cough hemoptysis or hematemesis patient is chronically on Lasix Eliquis metoprolol and Aldactone for cardiomyopathy.  Patient denies any chest pain or exertional component.  He does have history of paroxysmal A-fib and CAD as well as ITP which I admitted him for previously    Nursing Notes were all reviewed and agreed with or any disagreements were addressed in the HPI.    REVIEW OF SYSTEMS :      Review of Systems   Respiratory:  Positive for shortness of breath.    Cardiovascular:  Positive for leg swelling.       Positives and Pertinent negatives as per HPI.     SURGICAL HISTORY     Past Surgical History:   Procedure Laterality Date    FOOT SURGERY      HX VASCULAR STENT  2021       CURRENTMEDICATIONS       Discharge Medication List as of 7/6/2024  8:43 AM        CONTINUE these medications which have NOT CHANGED    Details   ELIQUIS 5 MG TABS tablet Take 1 tablet by mouth in the morning and 1 tablet in the evening., DAWHistorical Med      spironolactone (ALDACTONE) 25 MG tablet Take 1 tablet by mouth daily Take the med at noon.Historical Med      atorvastatin (LIPITOR) 80 MG tablet Take 1 tablet by mouth nightly at bedtime.Historical Med      metoprolol succinate (TOPROL XL) 25 MG extended release tablet Take

## 2024-07-09 LAB
EKG ATRIAL RATE: 90 BPM
EKG P AXIS: 26 DEGREES
EKG P-R INTERVAL: 154 MS
EKG Q-T INTERVAL: 350 MS
EKG QRS DURATION: 58 MS
EKG QTC CALCULATION (BAZETT): 428 MS
EKG R AXIS: -65 DEGREES
EKG T AXIS: -97 DEGREES
EKG VENTRICULAR RATE: 90 BPM

## 2024-07-11 ENCOUNTER — HOSPITAL ENCOUNTER (OUTPATIENT)
Age: 64
Discharge: HOME | End: 2024-07-11
Payer: COMMERCIAL

## 2024-07-11 ENCOUNTER — HOSPITAL ENCOUNTER (OUTPATIENT)
Dept: INTERVENTIONAL RADIOLOGY/VASCULAR | Age: 64
Discharge: HOME | End: 2024-07-13
Payer: COMMERCIAL

## 2024-07-11 VITALS
OXYGEN SATURATION: 97 % | SYSTOLIC BLOOD PRESSURE: 119 MMHG | RESPIRATION RATE: 17 BRPM | DIASTOLIC BLOOD PRESSURE: 54 MMHG | HEART RATE: 85 BPM

## 2024-07-11 DIAGNOSIS — R18.8 CIRRHOSIS OF LIVER WITH ASCITES, UNSPECIFIED HEPATIC CIRRHOSIS TYPE (HCC): ICD-10-CM

## 2024-07-11 DIAGNOSIS — K74.60 CIRRHOSIS OF LIVER WITH ASCITES, UNSPECIFIED HEPATIC CIRRHOSIS TYPE (HCC): ICD-10-CM

## 2024-07-11 LAB
ALBUMIN FLD-MCNC: 0.2 G/DL
ALBUMIN SERPL-MCNC: 2.9 G/DL (ref 3.5–5.2)
ALP SERPL-CCNC: 222 U/L (ref 40–129)
ALT SERPL-CCNC: 41 U/L (ref 0–40)
AMYLASE FLD-CCNC: 14 U/L
ANION GAP SERPL CALCULATED.3IONS-SCNC: 15 MMOL/L (ref 7–16)
APPEARANCE FLD: CLEAR
AST SERPL-CCNC: 74 U/L (ref 0–39)
BASOPHILS # BLD: 0.1 K/UL (ref 0–0.2)
BASOPHILS NFR BLD: 1 % (ref 0–2)
BILIRUB SERPL-MCNC: 2.4 MG/DL (ref 0–1.2)
BODY FLD TYPE: NORMAL
BUN SERPL-MCNC: 27 MG/DL (ref 6–23)
CALCIUM SERPL-MCNC: 8.6 MG/DL (ref 8.6–10.2)
CHLORIDE SERPL-SCNC: 90 MMOL/L (ref 98–107)
CLOT CHECK: NORMAL
CO2 SERPL-SCNC: 22 MMOL/L (ref 22–29)
COLOR FLD: NORMAL
CREAT SERPL-MCNC: 1.4 MG/DL (ref 0.7–1.2)
CRP SERPL HS-MCNC: 13 MG/L (ref 0–5)
EOSINOPHIL # BLD: 0.28 K/UL (ref 0.05–0.5)
EOSINOPHILS RELATIVE PERCENT: 3 % (ref 0–6)
ERYTHROCYTE [DISTWIDTH] IN BLOOD BY AUTOMATED COUNT: 15.2 % (ref 11.5–15)
ERYTHROCYTE [SEDIMENTATION RATE] IN BLOOD BY WESTERGREN METHOD: 34 MM/HR (ref 0–15)
FERRITIN SERPL-MCNC: 64 NG/ML
GFR, ESTIMATED: 58 ML/MIN/1.73M2
GLUCOSE SERPL-MCNC: 116 MG/DL (ref 74–99)
HBV SURFACE AB SERPL IA-ACNC: 195.69 MIU/ML (ref 0–9.99)
HBV SURFACE AG SERPL QL IA: NONREACTIVE
HCT VFR BLD AUTO: 36.6 % (ref 37–54)
HCV AB SERPL QL IA: NONREACTIVE
HGB BLD-MCNC: 12.4 G/DL (ref 12.5–16.5)
IMM GRANULOCYTES # BLD AUTO: 0.03 K/UL (ref 0–0.58)
IMM GRANULOCYTES NFR BLD: 0 % (ref 0–5)
INR PPP: 1.4
IRON SATN MFR SERPL: 23 % (ref 20–55)
IRON SERPL-MCNC: 75 UG/DL (ref 59–158)
LYMPHOCYTES NFR BLD: 1.45 K/UL (ref 1.5–4)
LYMPHOCYTES RELATIVE PERCENT: 15 % (ref 20–42)
MCH RBC QN AUTO: 30.9 PG (ref 26–35)
MCHC RBC AUTO-ENTMCNC: 33.9 G/DL (ref 32–34.5)
MCV RBC AUTO: 91.3 FL (ref 80–99.9)
MONOCYTES NFR BLD: 1.1 K/UL (ref 0.1–0.95)
MONOCYTES NFR BLD: 11 % (ref 2–12)
MONOCYTES NFR FLD: 74 %
NEUTROPHILS NFR BLD: 69 % (ref 43–80)
NEUTROPHILS NFR FLD: 26 %
NEUTS SEG NFR BLD: 6.72 K/UL (ref 1.8–7.3)
PLATELET # BLD AUTO: 152 K/UL (ref 130–450)
PMV BLD AUTO: 10.3 FL (ref 7–12)
POTASSIUM SERPL-SCNC: 3.6 MMOL/L (ref 3.5–5)
PROT FLD-MCNC: 0.5 G/DL
PROT SERPL-MCNC: 6.8 G/DL (ref 6.4–8.3)
PROTHROMBIN TIME: 15.8 SEC (ref 9.3–12.4)
RBC # BLD AUTO: 4.01 M/UL (ref 3.8–5.8)
RBC # FLD: <2000 CELLS/UL
SODIUM SERPL-SCNC: 127 MMOL/L (ref 132–146)
SPECIMEN TYPE: NORMAL
TIBC SERPL-MCNC: 329 UG/DL (ref 250–450)
WBC # FLD: 140 CELLS/UL
WBC OTHER # BLD: 9.7 K/UL (ref 4.5–11.5)

## 2024-07-11 PROCEDURE — 82103 ALPHA-1-ANTITRYPSIN TOTAL: CPT

## 2024-07-11 PROCEDURE — 85025 COMPLETE CBC W/AUTO DIFF WBC: CPT

## 2024-07-11 PROCEDURE — 86376 MICROSOMAL ANTIBODY EACH: CPT

## 2024-07-11 PROCEDURE — 36415 COLL VENOUS BLD VENIPUNCTURE: CPT

## 2024-07-11 PROCEDURE — 82728 ASSAY OF FERRITIN: CPT

## 2024-07-11 PROCEDURE — 2500000003 HC RX 250 WO HCPCS: Performed by: PHYSICIAN ASSISTANT

## 2024-07-11 PROCEDURE — 87077 CULTURE AEROBIC IDENTIFY: CPT

## 2024-07-11 PROCEDURE — 87205 SMEAR GRAM STAIN: CPT

## 2024-07-11 PROCEDURE — 85610 PROTHROMBIN TIME: CPT

## 2024-07-11 PROCEDURE — 83540 ASSAY OF IRON: CPT

## 2024-07-11 PROCEDURE — 86317 IMMUNOASSAY INFECTIOUS AGENT: CPT

## 2024-07-11 PROCEDURE — 80053 COMPREHEN METABOLIC PANEL: CPT

## 2024-07-11 PROCEDURE — 49083 ABD PARACENTESIS W/IMAGING: CPT

## 2024-07-11 PROCEDURE — 84157 ASSAY OF PROTEIN OTHER: CPT

## 2024-07-11 PROCEDURE — 86038 ANTINUCLEAR ANTIBODIES: CPT

## 2024-07-11 PROCEDURE — 82390 ASSAY OF CERULOPLASMIN: CPT

## 2024-07-11 PROCEDURE — 83516 IMMUNOASSAY NONANTIBODY: CPT

## 2024-07-11 PROCEDURE — 86039 ANTINUCLEAR ANTIBODIES (ANA): CPT

## 2024-07-11 PROCEDURE — 86140 C-REACTIVE PROTEIN: CPT

## 2024-07-11 PROCEDURE — 86803 HEPATITIS C AB TEST: CPT

## 2024-07-11 PROCEDURE — 82150 ASSAY OF AMYLASE: CPT

## 2024-07-11 PROCEDURE — 85652 RBC SED RATE AUTOMATED: CPT

## 2024-07-11 PROCEDURE — 2709999900 IR US GUIDED PARACENTESIS

## 2024-07-11 PROCEDURE — 86255 FLUORESCENT ANTIBODY SCREEN: CPT

## 2024-07-11 PROCEDURE — 82105 ALPHA-FETOPROTEIN SERUM: CPT

## 2024-07-11 PROCEDURE — 96365 THER/PROPH/DIAG IV INF INIT: CPT

## 2024-07-11 PROCEDURE — 86704 HEP B CORE ANTIBODY TOTAL: CPT

## 2024-07-11 PROCEDURE — 82042 OTHER SOURCE ALBUMIN QUAN EA: CPT

## 2024-07-11 PROCEDURE — P9047 ALBUMIN (HUMAN), 25%, 50ML: HCPCS | Performed by: PHYSICIAN ASSISTANT

## 2024-07-11 PROCEDURE — 87070 CULTURE OTHR SPECIMN AEROBIC: CPT

## 2024-07-11 PROCEDURE — 83550 IRON BINDING TEST: CPT

## 2024-07-11 PROCEDURE — 89051 BODY FLUID CELL COUNT: CPT

## 2024-07-11 PROCEDURE — 6360000002 HC RX W HCPCS: Performed by: PHYSICIAN ASSISTANT

## 2024-07-11 PROCEDURE — 87340 HEPATITIS B SURFACE AG IA: CPT

## 2024-07-11 RX ORDER — ALBUMIN (HUMAN) 12.5 G/50ML
SOLUTION INTRAVENOUS CONTINUOUS PRN
Status: COMPLETED | OUTPATIENT
Start: 2024-07-11 | End: 2024-07-11

## 2024-07-11 RX ORDER — LIDOCAINE HYDROCHLORIDE 20 MG/ML
INJECTION, SOLUTION INFILTRATION; PERINEURAL PRN
Status: COMPLETED | OUTPATIENT
Start: 2024-07-11 | End: 2024-07-11

## 2024-07-11 RX ADMIN — ALBUMIN (HUMAN) 50 G: 25 SOLUTION INTRAVENOUS at 08:36

## 2024-07-11 RX ADMIN — LIDOCAINE HYDROCHLORIDE 10 ML: 20 INJECTION, SOLUTION INFILTRATION; PERINEURAL at 08:37

## 2024-07-11 NOTE — H&P
Interventional Radiology  Pre-operative History and Physical  [facility] SPECIAL PROCEDURES    DIAGNOSIS:    Patient Active Problem List   Diagnosis    Acute idiopathic thrombocytopenic purpura (HCC)    Type 2 diabetes mellitus with hyperglycemia, without long-term current use of insulin (HCC)    Class 2 severe obesity with serious comorbidity and body mass index (BMI) of 38.0 to 38.9 in adult (HCC)    Anemia    Mixed hyperlipidemia    History of CAD (coronary artery disease)    Paroxysmal atrial fibrillation (HCC)       CHIEF COMPLAINT: Abdominal Ascites      Current Outpatient Medications:     ELIQUIS 5 MG TABS tablet, Take 1 tablet by mouth in the morning and 1 tablet in the evening., Disp: , Rfl:     spironolactone (ALDACTONE) 25 MG tablet, Take 1 tablet by mouth daily Take the med at noon., Disp: , Rfl:     atorvastatin (LIPITOR) 80 MG tablet, Take 1 tablet by mouth nightly at bedtime., Disp: , Rfl:     metoprolol succinate (TOPROL XL) 25 MG extended release tablet, Take 1 tablet by mouth daily, Disp: , Rfl:     metFORMIN (GLUCOPHAGE) 1000 MG tablet, Take 1 tablet by mouth every 12 hours, Disp: , Rfl:     furosemide (LASIX) 40 MG tablet, Take 1 tablet by mouth daily Pt updated the home med at 1930 5/19/24 that he stopped take the med instate of Bumex at 5/13/24., Disp: , Rfl:   Allergy: No Known Allergies  Past Medical History:   Diagnosis Date    Heart attack (HCC)      Past Surgical History:   Procedure Laterality Date    FOOT SURGERY      HX VASCULAR STENT  2021     History reviewed. No pertinent family history.  Social History     Socioeconomic History    Marital status:      Spouse name: Not on file    Number of children: Not on file    Years of education: Not on file    Highest education level: Not on file   Occupational History    Not on file   Tobacco Use    Smoking status: Never    Smokeless tobacco: Never   Substance and Sexual Activity    Alcohol use: Yes     Comment:

## 2024-07-11 NOTE — OP NOTE
Estimated Blood Loss: < 10cc.     25g IV Albumin given.     Thank you for allowing Interventional Radiology to participate in the care of Gilberto Villagran.     Electronically signed by JOLEEN Fajardo   DD: 7/11/24  12:03 PM

## 2024-07-11 NOTE — PROGRESS NOTES
Patient arrived via private with wife to Radiology department for paracentesis. Allergies, home medications, H&P and fasting instructions reviewed with patient. Vital signs taken. 22g rac IV placed, blood obtained, IV flushed and prn adapter attached. Procedural instructions given, questions answered, understanding expressed and consent signed. Patient given fluoroscopy education, no questions at this time.

## 2024-07-11 NOTE — BRIEF OP NOTE
Brief-Op Note  ______________________________________________________________      IR U/S GUIDED PARACENTESIS  [facility] SPECIAL PROCEDURES    Patient Name: Gilberto Villagran   YOB: 1960  Medical Record Number: [id number]  Date of Procedure: 7/11/24  Room/Bed: Room/bed info not found      Pre-operative Diagnosis: Ascites    Post-operative Diagnosis: Ascites    Consent: Informed consent was obtained from the patient and spouse prior to the procedure. The details of the procedure, as well is its risks, benefits, and alternatives, were explained.      Anesthesia: Local anesthesia.    Performed by: JOLEEN Fajardo under on-site supervision by Fred Garcia MD.    Estimated blood loss: Minimal    Complications: None    Specimen Obtained: 7560mL of clear straw colored ascites fluid was withdrawn.    (See radiology dictation in PACs for image review and additional procedural information)    Electronically signed by JOLEEN Fajardo   DD: 7/11/24  12:02 PM

## 2024-07-12 LAB
A1AT SERPL-MCNC: 112 MG/DL (ref 90–200)
AFP SERPL-MCNC: 3.3 UG/L
ANA SER QL IA: NEGATIVE
CERULOPLASMIN SERPL-MCNC: 27 MG/DL (ref 15–30)
HBV CORE AB SER QL: NONREACTIVE
SMA IGG SER-ACNC: NEGATIVE

## 2024-07-14 LAB
MICROORGANISM SPEC CULT: ABNORMAL
MICROORGANISM/AGENT SPEC: ABNORMAL
MITOCHONDRIA M2 IGG SER-ACNC: 3.8 UNITS (ref 0–24.9)
SERVICE CMNT-IMP: ABNORMAL
SPECIMEN DESCRIPTION: ABNORMAL

## 2024-07-17 LAB — LKM-1 IGG SER IA-ACNC: 1.7 U (ref 0–24.9)

## 2024-07-19 ENCOUNTER — HOSPITAL ENCOUNTER (OUTPATIENT)
Dept: ULTRASOUND IMAGING | Age: 64
Discharge: HOME | End: 2024-07-19
Payer: COMMERCIAL

## 2024-07-19 VITALS
SYSTOLIC BLOOD PRESSURE: 105 MMHG | RESPIRATION RATE: 18 BRPM | OXYGEN SATURATION: 95 % | DIASTOLIC BLOOD PRESSURE: 58 MMHG | HEART RATE: 74 BPM

## 2024-07-19 DIAGNOSIS — R18.8 OTHER ASCITES: ICD-10-CM

## 2024-07-19 PROCEDURE — 2500000003 HC RX 250 WO HCPCS: Performed by: RADIOLOGY

## 2024-07-19 PROCEDURE — 6360000002 HC RX W HCPCS: Performed by: NURSE PRACTITIONER

## 2024-07-19 PROCEDURE — 49083 ABD PARACENTESIS W/IMAGING: CPT

## 2024-07-19 PROCEDURE — P9047 ALBUMIN (HUMAN), 25%, 50ML: HCPCS | Performed by: NURSE PRACTITIONER

## 2024-07-19 RX ORDER — LIDOCAINE HYDROCHLORIDE 10 MG/ML
INJECTION, SOLUTION INFILTRATION; PERINEURAL PRN
Status: COMPLETED | OUTPATIENT
Start: 2024-07-19 | End: 2024-07-19

## 2024-07-19 RX ORDER — ALBUMIN (HUMAN) 12.5 G/50ML
SOLUTION INTRAVENOUS CONTINUOUS PRN
Status: COMPLETED | OUTPATIENT
Start: 2024-07-19 | End: 2024-07-19

## 2024-07-19 RX ORDER — ASPIRIN 81 MG/1
81 TABLET ORAL DAILY
COMMUNITY

## 2024-07-19 RX ADMIN — ALBUMIN (HUMAN) 50 G: 25 SOLUTION INTRAVENOUS at 09:52

## 2024-07-19 RX ADMIN — LIDOCAINE HYDROCHLORIDE 10 ML: 10 INJECTION, SOLUTION INFILTRATION; PERINEURAL at 09:46

## 2024-07-19 NOTE — OR NURSING
Patient brought into room, discussed procedure. Dr. Clinton answered all questions and concerns related to the procedure. Treatment consent signed. Patient positioned and sterile prepped for the procedure. 1% Lidocaine administered by Dr. Clinton.  A total of 7150 mL clear yellow ascitic fluid was taken. Patient tolerated procedure well. Site cleaned and dressed with a bandage. Vitals monitored and remained stable throughout. Discharge papers reviewed and signed. Patient escorted out of department with all belongings and without distress.

## 2024-07-23 ENCOUNTER — HOSPITAL ENCOUNTER (OUTPATIENT)
Dept: ULTRASOUND IMAGING | Age: 64
Discharge: HOME | End: 2024-07-25
Payer: COMMERCIAL

## 2024-07-23 VITALS
SYSTOLIC BLOOD PRESSURE: 125 MMHG | RESPIRATION RATE: 18 BRPM | HEART RATE: 79 BPM | DIASTOLIC BLOOD PRESSURE: 58 MMHG | OXYGEN SATURATION: 98 %

## 2024-07-23 DIAGNOSIS — R18.8 OTHER ASCITES: ICD-10-CM

## 2024-07-23 PROCEDURE — P9047 ALBUMIN (HUMAN), 25%, 50ML: HCPCS | Performed by: NURSE PRACTITIONER

## 2024-07-23 PROCEDURE — 2500000003 HC RX 250 WO HCPCS: Performed by: PHYSICIAN ASSISTANT

## 2024-07-23 PROCEDURE — 6360000002 HC RX W HCPCS: Performed by: NURSE PRACTITIONER

## 2024-07-23 PROCEDURE — 49083 ABD PARACENTESIS W/IMAGING: CPT

## 2024-07-23 RX ORDER — ALBUMIN (HUMAN) 12.5 G/50ML
SOLUTION INTRAVENOUS CONTINUOUS PRN
Status: COMPLETED | OUTPATIENT
Start: 2024-07-23 | End: 2024-07-23

## 2024-07-23 RX ORDER — LIDOCAINE HYDROCHLORIDE 10 MG/ML
INJECTION, SOLUTION EPIDURAL; INFILTRATION; INTRACAUDAL; PERINEURAL PRN
Status: COMPLETED | OUTPATIENT
Start: 2024-07-23 | End: 2024-07-23

## 2024-07-23 RX ADMIN — LIDOCAINE HYDROCHLORIDE 10 ML: 10 INJECTION, SOLUTION EPIDURAL; INFILTRATION; INTRACAUDAL; PERINEURAL at 14:21

## 2024-07-23 RX ADMIN — ALBUMIN (HUMAN) 50 G: 25 SOLUTION INTRAVENOUS at 14:28

## 2024-07-23 NOTE — PROCEDURES
Procedure: Ultrasound Guided Paracentesis    Diagnosis: Ascites    Findings: Large volume ascites, successful catheter placement with clear pale yellow ascitic fluid return    Specimen: None at this time.  Total amount of fluid removed to be reported in PACS.    Anesthesia: Local infiltration of 10cc 1% Lidocaine without epi    EBL: Minimal.    Complication: None immediately post procedure.    Plan: Discharge to home following paracentesis.      Comments:    See radiology dictation in PACs for image review and additional procedural information.

## 2024-07-23 NOTE — OR NURSING
Patient arrived from home to  for ultrasound guided paracentesis. Vitals obtained, IV started in the RAC, and consent signed per patient. Mitzy Nance PA-C in to speak with the patient about the procedure, all questions answered. Abdomen scanned and prepped. 1% Lidocaine administered to procedural site. 5 [place] Islander centesis catheter inserted to (RLQ) with ultrasound guidance, catheter connected to suction. 50 G IV Albumin given per order. Patient tolerated procedure well. 5550 ml drained of clear pale yellow colored ascitic fluid. Centesis catheter removed post procedure. Puncture site cleansed and dry dressing applied. No bleeding, swelling or complications noted. Post procedure vitals obtained. IV site removed, dry dressing applied. Discharge instructions reviewed and understood by patient. Patient discharged home without complication.

## 2024-07-30 ENCOUNTER — HOSPITAL ENCOUNTER (OUTPATIENT)
Dept: ULTRASOUND IMAGING | Age: 64
Discharge: HOME | End: 2024-08-01
Payer: COMMERCIAL

## 2024-07-30 ENCOUNTER — HOSPITAL ENCOUNTER (OUTPATIENT)
Age: 64
Discharge: HOME | End: 2024-07-30
Payer: COMMERCIAL

## 2024-07-30 VITALS
RESPIRATION RATE: 18 BRPM | HEART RATE: 73 BPM | OXYGEN SATURATION: 96 % | SYSTOLIC BLOOD PRESSURE: 115 MMHG | DIASTOLIC BLOOD PRESSURE: 65 MMHG

## 2024-07-30 DIAGNOSIS — R18.8 OTHER ASCITES: ICD-10-CM

## 2024-07-30 LAB
ALBUMIN SERPL-MCNC: 3 G/DL (ref 3.5–5.2)
ALP SERPL-CCNC: 276 U/L (ref 40–129)
ALT SERPL-CCNC: 27 U/L (ref 0–40)
ANION GAP SERPL CALCULATED.3IONS-SCNC: 7 MMOL/L (ref 7–16)
AST SERPL-CCNC: 63 U/L (ref 0–39)
BILIRUB SERPL-MCNC: 1.4 MG/DL (ref 0–1.2)
BUN SERPL-MCNC: 13 MG/DL (ref 6–23)
CALCIUM SERPL-MCNC: 8.1 MG/DL (ref 8.6–10.2)
CHLORIDE SERPL-SCNC: 103 MMOL/L (ref 98–107)
CO2 SERPL-SCNC: 24 MMOL/L (ref 22–29)
CREAT SERPL-MCNC: 1.2 MG/DL (ref 0.7–1.2)
GFR, ESTIMATED: 68 ML/MIN/1.73M2
GLUCOSE SERPL-MCNC: 87 MG/DL (ref 74–99)
POTASSIUM SERPL-SCNC: 3.8 MMOL/L (ref 3.5–5)
PROT SERPL-MCNC: 6 G/DL (ref 6.4–8.3)
SODIUM SERPL-SCNC: 134 MMOL/L (ref 132–146)

## 2024-07-30 PROCEDURE — 36415 COLL VENOUS BLD VENIPUNCTURE: CPT

## 2024-07-30 PROCEDURE — 80053 COMPREHEN METABOLIC PANEL: CPT

## 2024-07-30 PROCEDURE — C1729 CATH, DRAINAGE: HCPCS

## 2024-07-30 PROCEDURE — 2500000003 HC RX 250 WO HCPCS: Performed by: PHYSICIAN ASSISTANT

## 2024-07-30 RX ORDER — LIDOCAINE HYDROCHLORIDE 10 MG/ML
INJECTION, SOLUTION INFILTRATION; PERINEURAL PRN
Status: COMPLETED | OUTPATIENT
Start: 2024-07-30 | End: 2024-07-30

## 2024-07-30 RX ORDER — LACTULOSE 10 G/15ML
20 SOLUTION ORAL 2 TIMES DAILY
COMMUNITY

## 2024-07-30 RX ADMIN — LIDOCAINE HYDROCHLORIDE 10 ML: 10 INJECTION, SOLUTION INFILTRATION; PERINEURAL at 14:17

## 2024-07-30 NOTE — OR NURSING
Patient brought into room, discussed procedure. Mitzy Nance PA-C answered all questions and concerns related to the procedure. Treatment consent signed. Patient positioned and sterile prepped for the procedure. 1% Lidocaine administered by Mitzy Nance PA-C.  A total of 5000 mL clear pale yellow ascitic fluid was taken. Patient tolerated procedure well. Site cleaned and dressed with a bandage. Vitals monitored and remained stable throughout. Discharge papers reviewed and signed. Patient escorted out of department with all belongings and without distress.

## 2024-07-30 NOTE — DISCHARGE INSTRUCTIONS
drug interactions.   Plan ahead for refills so you don't run out.   Lifestyle Changes    You and your doctor will plan lifestyle changes that will aid in your recovery. Keep in mind include that:   Fluid accumulation may continue until the reason for the extra fluid is diagnosed. Multiple paracenteses may be necessary until this happens.   A catheter may be inserted into the abdominal area if you have chronic fluid accumulation.   Possible complications include:   Bleeding   Persistent leakage of ascitic fluid   Low blood pressure   Peritonitis   Accidental piercing of other structures in the abdomen, such as the intestine, liver, spleen, stomach, bladder, or blood vessels   Follow-up    Schedule a follow-up appointment as directed by your doctor.    Call Your Doctor If Any of the Following Occurs   Monitor your recovery once you leave the hospital. If you have a problem, alert your doctor. If any of the following occur, call your doctor:   Signs of infection, including fever and chills   Redness, swelling, increasing pain, excessive bleeding, or fluid from the paracentesis site   Pain that you can't control with the medications you've been given   Cough, shortness of breath, feeling of faint, or chest pain   Swelling of the abdomen   In case of an emergency, call 911 immediately.     ***    Last Reviewed: December 2010 Vimal Mortensen MD   Updated: 12/1/2010

## 2024-07-30 NOTE — PROCEDURES
Procedure: Ultrasound Guided Paracentesis    Diagnosis: Ascites    Findings: Moderate volume ascites, successful catheter placement with clear pale yellow ascitic fluid return    Specimen: None at this time. Total amount of fluid removed 5000 mL.     Anesthesia: Local infiltration of 10cc 1% Lidocaine without epi    EBL: Minimal.    Complication: None immediately post procedure.    Plan: Discharge to home following paracentesis.      Comments:    See radiology dictation in PACs for image review and additional procedural information.

## 2024-08-06 ENCOUNTER — HOSPITAL ENCOUNTER (OUTPATIENT)
Age: 64
Discharge: HOME | End: 2024-08-06
Payer: COMMERCIAL

## 2024-08-06 ENCOUNTER — HOSPITAL ENCOUNTER (OUTPATIENT)
Dept: ULTRASOUND IMAGING | Age: 64
Discharge: HOME | End: 2024-08-08
Payer: COMMERCIAL

## 2024-08-06 VITALS
DIASTOLIC BLOOD PRESSURE: 56 MMHG | SYSTOLIC BLOOD PRESSURE: 118 MMHG | HEART RATE: 74 BPM | OXYGEN SATURATION: 96 % | RESPIRATION RATE: 18 BRPM

## 2024-08-06 DIAGNOSIS — R18.8 OTHER ASCITES: ICD-10-CM

## 2024-08-06 LAB
ALBUMIN SERPL-MCNC: 3.1 G/DL (ref 3.5–5.2)
ALP SERPL-CCNC: 219 U/L (ref 40–129)
ALT SERPL-CCNC: 24 U/L (ref 0–40)
ANION GAP SERPL CALCULATED.3IONS-SCNC: 13 MMOL/L (ref 7–16)
AST SERPL-CCNC: 67 U/L (ref 0–39)
BILIRUB SERPL-MCNC: 1.6 MG/DL (ref 0–1.2)
BUN SERPL-MCNC: 23 MG/DL (ref 6–23)
CALCIUM SERPL-MCNC: 8.6 MG/DL (ref 8.6–10.2)
CHLORIDE SERPL-SCNC: 97 MMOL/L (ref 98–107)
CO2 SERPL-SCNC: 21 MMOL/L (ref 22–29)
CREAT SERPL-MCNC: 1.3 MG/DL (ref 0.7–1.2)
ERYTHROCYTE [DISTWIDTH] IN BLOOD BY AUTOMATED COUNT: 15.9 % (ref 11.5–15)
GFR, ESTIMATED: 61 ML/MIN/1.73M2
GLUCOSE SERPL-MCNC: 110 MG/DL (ref 74–99)
HCT VFR BLD AUTO: 37.9 % (ref 37–54)
HGB BLD-MCNC: 12 G/DL (ref 12.5–16.5)
MCH RBC QN AUTO: 29.4 PG (ref 26–35)
MCHC RBC AUTO-ENTMCNC: 31.7 G/DL (ref 32–34.5)
MCV RBC AUTO: 92.9 FL (ref 80–99.9)
PLATELET, FLUORESCENCE: 139 K/UL (ref 130–450)
PMV BLD AUTO: 10.1 FL (ref 7–12)
POTASSIUM SERPL-SCNC: 3.8 MMOL/L (ref 3.5–5)
PROT SERPL-MCNC: 6.4 G/DL (ref 6.4–8.3)
RBC # BLD AUTO: 4.08 M/UL (ref 3.8–5.8)
SODIUM SERPL-SCNC: 131 MMOL/L (ref 132–146)
WBC OTHER # BLD: 7.9 K/UL (ref 4.5–11.5)

## 2024-08-06 PROCEDURE — 80053 COMPREHEN METABOLIC PANEL: CPT

## 2024-08-06 PROCEDURE — 49083 ABD PARACENTESIS W/IMAGING: CPT

## 2024-08-06 PROCEDURE — 2500000003 HC RX 250 WO HCPCS: Performed by: PHYSICIAN ASSISTANT

## 2024-08-06 PROCEDURE — 85027 COMPLETE CBC AUTOMATED: CPT

## 2024-08-06 PROCEDURE — 6360000002 HC RX W HCPCS: Performed by: NURSE PRACTITIONER

## 2024-08-06 PROCEDURE — P9047 ALBUMIN (HUMAN), 25%, 50ML: HCPCS | Performed by: NURSE PRACTITIONER

## 2024-08-06 PROCEDURE — 36415 COLL VENOUS BLD VENIPUNCTURE: CPT

## 2024-08-06 RX ORDER — AMOXICILLIN AND CLAVULANATE POTASSIUM 875; 125 MG/1; MG/1
1 TABLET, FILM COATED ORAL 2 TIMES DAILY
COMMUNITY

## 2024-08-06 RX ORDER — ALBUMIN (HUMAN) 12.5 G/50ML
SOLUTION INTRAVENOUS CONTINUOUS PRN
Status: COMPLETED | OUTPATIENT
Start: 2024-08-06 | End: 2024-08-06

## 2024-08-06 RX ORDER — METRONIDAZOLE 500 MG/1
500 TABLET ORAL 3 TIMES DAILY
COMMUNITY

## 2024-08-06 RX ORDER — LIDOCAINE HYDROCHLORIDE 10 MG/ML
INJECTION, SOLUTION EPIDURAL; INFILTRATION; INTRACAUDAL; PERINEURAL PRN
Status: COMPLETED | OUTPATIENT
Start: 2024-08-06 | End: 2024-08-06

## 2024-08-06 RX ADMIN — ALBUMIN (HUMAN) 50 G: 25 SOLUTION INTRAVENOUS at 13:28

## 2024-08-06 RX ADMIN — LIDOCAINE HYDROCHLORIDE 10 ML: 10 INJECTION, SOLUTION EPIDURAL; INFILTRATION; INTRACAUDAL; PERINEURAL at 13:19

## 2024-08-06 NOTE — OR NURSING
Patient arrived from home to  for ultrasound guided paracentesis. Vitals obtained, IV started in the LAC, and consent signed per patient. Mitzy Nance PA-C in to speak with the patient about the procedure, all questions answered. Abdomen scanned and prepped. 1% Lidocaine administered to procedural site. 5 Mosotho centesis catheter inserted to (LLQ) with ultrasound guidance, catheter connected to suction. 50 G IV Albumin given per order. Patient tolerated procedure well. (5080) ml drained of (clear pale yellow) colored ascitic fluid. Centesis catheter removed post procedure. Puncture site cleansed and dry dressing applied. No bleeding, swelling or complications noted. Post procedure vitals obtained. IV site removed, dry dressing applied. Discharge instructions reviewed and understood by patient. Patient discharged home without complication.

## 2024-08-13 ENCOUNTER — HOSPITAL ENCOUNTER (OUTPATIENT)
Age: 64
Discharge: HOME | End: 2024-08-13
Payer: COMMERCIAL

## 2024-08-13 ENCOUNTER — HOSPITAL ENCOUNTER (OUTPATIENT)
Dept: ULTRASOUND IMAGING | Age: 64
Discharge: HOME | End: 2024-08-15
Payer: COMMERCIAL

## 2024-08-13 VITALS
SYSTOLIC BLOOD PRESSURE: 101 MMHG | RESPIRATION RATE: 18 BRPM | DIASTOLIC BLOOD PRESSURE: 54 MMHG | OXYGEN SATURATION: 98 % | HEART RATE: 73 BPM

## 2024-08-13 DIAGNOSIS — Z87.898 HX OF ASCITES: ICD-10-CM

## 2024-08-13 LAB
ALBUMIN SERPL-MCNC: 3.2 G/DL (ref 3.5–5.2)
ALP SERPL-CCNC: 184 U/L (ref 40–129)
ALT SERPL-CCNC: 21 U/L (ref 0–40)
ANION GAP SERPL CALCULATED.3IONS-SCNC: 12 MMOL/L (ref 7–16)
AST SERPL-CCNC: 58 U/L (ref 0–39)
BILIRUB SERPL-MCNC: 1.7 MG/DL (ref 0–1.2)
BUN SERPL-MCNC: 29 MG/DL (ref 6–23)
CALCIUM SERPL-MCNC: 9 MG/DL (ref 8.6–10.2)
CHLORIDE SERPL-SCNC: 93 MMOL/L (ref 98–107)
CHOLEST SERPL-MCNC: 58 MG/DL
CK SERPL-CCNC: 118 U/L (ref 20–200)
CO2 SERPL-SCNC: 21 MMOL/L (ref 22–29)
CREAT SERPL-MCNC: 1.4 MG/DL (ref 0.7–1.2)
ERYTHROCYTE [DISTWIDTH] IN BLOOD BY AUTOMATED COUNT: 16.2 % (ref 11.5–15)
GFR, ESTIMATED: 55 ML/MIN/1.73M2
GLUCOSE SERPL-MCNC: 101 MG/DL (ref 74–99)
HBA1C MFR BLD: 6 % (ref 4–5.6)
HCT VFR BLD AUTO: 37.9 % (ref 37–54)
HDLC SERPL-MCNC: 27 MG/DL
HGB BLD-MCNC: 12 G/DL (ref 12.5–16.5)
INR PPP: 1.8
LDLC SERPL CALC-MCNC: 10 MG/DL
MCH RBC QN AUTO: 28.6 PG (ref 26–35)
MCHC RBC AUTO-ENTMCNC: 31.7 G/DL (ref 32–34.5)
MCV RBC AUTO: 90.2 FL (ref 80–99.9)
PLATELET # BLD AUTO: 141 K/UL (ref 130–450)
PLATELET # BLD AUTO: 156 K/UL (ref 130–450)
PMV BLD AUTO: 10.3 FL (ref 7–12)
POTASSIUM SERPL-SCNC: 4.2 MMOL/L (ref 3.5–5)
PROT SERPL-MCNC: 6.6 G/DL (ref 6.4–8.3)
PROTHROMBIN TIME: 19.7 SEC (ref 9.3–12.4)
RBC # BLD AUTO: 4.2 M/UL (ref 3.8–5.8)
SODIUM SERPL-SCNC: 126 MMOL/L (ref 132–146)
TRIGL SERPL-MCNC: 107 MG/DL
VLDLC SERPL CALC-MCNC: 21 MG/DL
WBC OTHER # BLD: 10.1 K/UL (ref 4.5–11.5)

## 2024-08-13 PROCEDURE — 80061 LIPID PANEL: CPT

## 2024-08-13 PROCEDURE — P9047 ALBUMIN (HUMAN), 25%, 50ML: HCPCS | Performed by: NURSE PRACTITIONER

## 2024-08-13 PROCEDURE — 6360000002 HC RX W HCPCS: Performed by: NURSE PRACTITIONER

## 2024-08-13 PROCEDURE — 86255 FLUORESCENT ANTIBODY SCREEN: CPT

## 2024-08-13 PROCEDURE — 80053 COMPREHEN METABOLIC PANEL: CPT

## 2024-08-13 PROCEDURE — 82550 ASSAY OF CK (CPK): CPT

## 2024-08-13 PROCEDURE — 85610 PROTHROMBIN TIME: CPT

## 2024-08-13 PROCEDURE — 83036 HEMOGLOBIN GLYCOSYLATED A1C: CPT

## 2024-08-13 PROCEDURE — 36415 COLL VENOUS BLD VENIPUNCTURE: CPT

## 2024-08-13 PROCEDURE — C1729 CATH, DRAINAGE: HCPCS

## 2024-08-13 PROCEDURE — 85049 AUTOMATED PLATELET COUNT: CPT

## 2024-08-13 PROCEDURE — 83516 IMMUNOASSAY NONANTIBODY: CPT

## 2024-08-13 PROCEDURE — 85027 COMPLETE CBC AUTOMATED: CPT

## 2024-08-13 PROCEDURE — 80074 ACUTE HEPATITIS PANEL: CPT

## 2024-08-13 RX ORDER — ALBUMIN (HUMAN) 12.5 G/50ML
SOLUTION INTRAVENOUS CONTINUOUS PRN
Status: COMPLETED | OUTPATIENT
Start: 2024-08-13 | End: 2024-08-13

## 2024-08-13 RX ADMIN — ALBUMIN (HUMAN) 50 G: 25 SOLUTION INTRAVENOUS at 13:52

## 2024-08-13 NOTE — OR NURSING
Patient arrival from home to IR for paracentesis. Vitals taken, Iv started, and consent signed. Dr. Clinton in to speak with the patient about the procedure, all questions answered. Abdomen scanned, prepped and centesis catheter inserted RLQ with ultrasound guidance by Dr Clinton @ 1358. Patient tolerated well. 5,290 ml drained of clear pale yellow colored ascitic fluid. Centesis catheter removed @ 1433. Puncture site cleansed and dry dressing applied. No bleeding, swelling or complications noted. Discharge instructions reviewed and understood by patient. Patient discharged home @ 1440.

## 2024-08-14 LAB
HAV IGM SERPL QL IA: NONREACTIVE
HBV CORE IGM SERPL QL IA: NONREACTIVE
HBV SURFACE AG SERPL QL IA: NONREACTIVE
HCV AB SERPL QL IA: NONREACTIVE
SMA IGG SER-ACNC: NEGATIVE

## 2024-08-16 LAB — MITOCHONDRIA M2 IGG SER-ACNC: 3.3 UNITS (ref 0–24.9)

## 2024-08-20 ENCOUNTER — HOSPITAL ENCOUNTER (OUTPATIENT)
Dept: ULTRASOUND IMAGING | Age: 64
Discharge: HOME | End: 2024-08-22
Payer: COMMERCIAL

## 2024-08-20 VITALS
SYSTOLIC BLOOD PRESSURE: 113 MMHG | DIASTOLIC BLOOD PRESSURE: 57 MMHG | RESPIRATION RATE: 16 BRPM | HEART RATE: 82 BPM | OXYGEN SATURATION: 98 %

## 2024-08-20 DIAGNOSIS — R18.8 OTHER ASCITES: ICD-10-CM

## 2024-08-20 PROCEDURE — 2500000003 HC RX 250 WO HCPCS: Performed by: PHYSICIAN ASSISTANT

## 2024-08-20 PROCEDURE — C1729 CATH, DRAINAGE: HCPCS

## 2024-08-20 PROCEDURE — P9047 ALBUMIN (HUMAN), 25%, 50ML: HCPCS | Performed by: NURSE PRACTITIONER

## 2024-08-20 PROCEDURE — 6360000002 HC RX W HCPCS: Performed by: NURSE PRACTITIONER

## 2024-08-20 RX ORDER — ALBUMIN (HUMAN) 12.5 G/50ML
SOLUTION INTRAVENOUS CONTINUOUS PRN
Status: COMPLETED | OUTPATIENT
Start: 2024-08-20 | End: 2024-08-20

## 2024-08-20 RX ORDER — BUMETANIDE 1 MG/1
2 TABLET ORAL DAILY
COMMUNITY

## 2024-08-20 RX ORDER — ATORVASTATIN CALCIUM 10 MG/1
10 TABLET, FILM COATED ORAL DAILY
COMMUNITY

## 2024-08-20 RX ORDER — LIDOCAINE HYDROCHLORIDE 10 MG/ML
INJECTION, SOLUTION EPIDURAL; INFILTRATION; INTRACAUDAL; PERINEURAL PRN
Status: COMPLETED | OUTPATIENT
Start: 2024-08-20 | End: 2024-08-20

## 2024-08-20 RX ADMIN — LIDOCAINE HYDROCHLORIDE 10 ML: 10 INJECTION, SOLUTION EPIDURAL; INFILTRATION; INTRACAUDAL; PERINEURAL at 14:33

## 2024-08-20 RX ADMIN — ALBUMIN (HUMAN) 50 G: 25 SOLUTION INTRAVENOUS at 14:43

## 2024-08-20 NOTE — PROCEDURES
Procedure: Ultrasound Guided Paracentesis    Diagnosis: Ascites    Findings: Large volume ascites, successful catheter placement with clear yellow ascitic fluid return    Specimen: None at this time.  Total amount of fluid removed to be reported in PACS.    Anesthesia: Local infiltration of 10cc 1% Lidocaine without epi    EBL: Minimal.    Complication: None immediately post procedure.    Plan: Discharge to home following paracentesis.      Comments:    See radiology dictation in PACs for image review and additional procedural information.

## 2024-08-20 NOTE — OR NURSING
Patient arrived from home to  for ultrasound guided paracentesis. Vitals obtained, IV started in the LFA, and consent signed per patient. Mitzy Nance PA-C in to speak with the patient about the procedure, all questions answered. Abdomen scanned and prepped. 1% Lidocaine administered to procedural site. 5 Sammarinese centesis catheter inserted to (RLQ) with ultrasound guidance, catheter connected to suction. 50 G IV Albumin given per order. Patient tolerated procedure well. () ml drained of (clear yellow) colored ascitic fluid. Centesis catheter removed post procedure. Puncture site cleansed and dry dressing applied. No bleeding, swelling or complications noted. Post procedure vitals obtained. IV site removed, dry dressing applied. Discharge instructions reviewed and understood by patient. Patient discharged home without complication.

## 2024-08-20 NOTE — OR NURSING
Patient tolerated procedure well. (3950) ml drained of (clear yellow) colored ascitic fluid. Centesis catheter removed post procedure. Puncture site cleansed and dry dressing applied. No bleeding, swelling or complications noted. Post procedure vitals obtained. IV site removed, dry dressing applied. Discharge instructions reviewed and understood by patient. Patient discharged home without complication.

## 2024-08-27 ENCOUNTER — HOSPITAL ENCOUNTER (OUTPATIENT)
Dept: ULTRASOUND IMAGING | Age: 64
Discharge: HOME | End: 2024-08-29
Payer: COMMERCIAL

## 2024-08-27 VITALS
DIASTOLIC BLOOD PRESSURE: 56 MMHG | OXYGEN SATURATION: 97 % | SYSTOLIC BLOOD PRESSURE: 116 MMHG | RESPIRATION RATE: 18 BRPM | HEART RATE: 81 BPM

## 2024-08-27 DIAGNOSIS — R18.8 OTHER ASCITES: ICD-10-CM

## 2024-08-27 PROCEDURE — 49083 ABD PARACENTESIS W/IMAGING: CPT

## 2024-08-27 PROCEDURE — 2500000003 HC RX 250 WO HCPCS: Performed by: PHYSICIAN ASSISTANT

## 2024-08-27 RX ORDER — LIDOCAINE HYDROCHLORIDE 10 MG/ML
INJECTION, SOLUTION INFILTRATION; PERINEURAL PRN
Status: COMPLETED | OUTPATIENT
Start: 2024-08-27 | End: 2024-08-27

## 2024-08-27 RX ADMIN — LIDOCAINE HYDROCHLORIDE 10 ML: 10 INJECTION, SOLUTION INFILTRATION; PERINEURAL at 14:15

## 2024-08-27 NOTE — OR NURSING
Patient brought into room, discussed procedure. Mitzy shepherd PA-C answered all questions and concerns related to the procedure. Treatment consent signed. Patient positioned and sterile prepped for the procedure. 1% Lidocaine administered by Mitzy Shepherd PA-C.  A total of 3320 mL clear yellow ascitic fluid was taken. Patient tolerated procedure well. Site cleaned and dressed with a bandage. Vitals monitored and remained stable throughout. Discharge papers reviewed and signed. Patient escorted out of department with all belongings and without distress.

## 2024-08-28 ENCOUNTER — OFFICE VISIT (OUTPATIENT)
Dept: CARDIOLOGY CLINIC | Age: 64
End: 2024-08-28
Payer: COMMERCIAL

## 2024-08-28 VITALS
DIASTOLIC BLOOD PRESSURE: 63 MMHG | HEART RATE: 88 BPM | BODY MASS INDEX: 35.19 KG/M2 | WEIGHT: 289 LBS | HEIGHT: 76 IN | SYSTOLIC BLOOD PRESSURE: 109 MMHG | RESPIRATION RATE: 18 BRPM

## 2024-08-28 DIAGNOSIS — I48.0 PAROXYSMAL ATRIAL FIBRILLATION (HCC): Primary | ICD-10-CM

## 2024-08-28 PROCEDURE — 99214 OFFICE O/P EST MOD 30 MIN: CPT | Performed by: INTERNAL MEDICINE

## 2024-08-28 PROCEDURE — 93000 ELECTROCARDIOGRAM COMPLETE: CPT | Performed by: INTERNAL MEDICINE

## 2024-08-28 RX ORDER — TRAZODONE HYDROCHLORIDE 50 MG/1
50 TABLET, FILM COATED ORAL NIGHTLY
COMMUNITY
Start: 2024-08-09

## 2024-08-28 NOTE — PROGRESS NOTES
CHIEF COMPLAINT: CAD/Aflutter    HISTORY OF PRESENT ILLNESS: Patient is a 64 y.o. male seen at the request of Otoniel Hope MD.      Hx of CAD and PAF.     No CP, SOB or palpitations.    Past Medical History:   Diagnosis Date    Heart attack (HCC)        Patient Active Problem List   Diagnosis    Acute idiopathic thrombocytopenic purpura (HCC)    Type 2 diabetes mellitus with hyperglycemia, without long-term current use of insulin (HCC)    Class 2 severe obesity with serious comorbidity and body mass index (BMI) of 38.0 to 38.9 in adult (HCC)    Anemia    Mixed hyperlipidemia    History of CAD (coronary artery disease)    Paroxysmal atrial fibrillation (HCC)       No Known Allergies    Current Outpatient Medications   Medication Sig Dispense Refill    traZODone (DESYREL) 50 MG tablet Take 1 tablet by mouth nightly      atorvastatin (LIPITOR) 10 MG tablet Take 1 tablet by mouth daily      bumetanide (BUMEX) 1 MG tablet Take 2 tablets by mouth daily      lactulose (CHRONULAC) 10 GM/15ML solution Take 30 mLs by mouth 2 times daily      ELIQUIS 5 MG TABS tablet Take 1 tablet by mouth in the morning and 1 tablet in the evening.      metFORMIN (GLUCOPHAGE) 1000 MG tablet Take 1 tablet by mouth every 12 hours       No current facility-administered medications for this visit.       Social History     Socioeconomic History    Marital status:      Spouse name: Not on file    Number of children: Not on file    Years of education: Not on file    Highest education level: Not on file   Occupational History    Not on file   Tobacco Use    Smoking status: Never    Smokeless tobacco: Never   Substance and Sexual Activity    Alcohol use: Yes     Comment: socially    Drug use: Not on file    Sexual activity: Not on file   Other Topics Concern    Not on file   Social History Narrative    Not on file     Social Determinants of Health     Financial Resource Strain: Not on file   Food Insecurity: No Food Insecurity (5/19/2024)

## 2024-09-03 ENCOUNTER — HOSPITAL ENCOUNTER (OUTPATIENT)
Dept: ULTRASOUND IMAGING | Age: 64
Discharge: HOME | End: 2024-09-05
Payer: COMMERCIAL

## 2024-09-03 ENCOUNTER — HOSPITAL ENCOUNTER (OUTPATIENT)
Age: 64
Discharge: HOME | End: 2024-09-03
Payer: COMMERCIAL

## 2024-09-03 VITALS
HEART RATE: 78 BPM | OXYGEN SATURATION: 99 % | SYSTOLIC BLOOD PRESSURE: 130 MMHG | DIASTOLIC BLOOD PRESSURE: 60 MMHG | RESPIRATION RATE: 18 BRPM

## 2024-09-03 DIAGNOSIS — R18.8 OTHER ASCITES: ICD-10-CM

## 2024-09-03 LAB
25(OH)D3 SERPL-MCNC: 29.2 NG/ML (ref 30–100)
ALBUMIN SERPL-MCNC: 3.3 G/DL (ref 3.5–5.2)
ALP SERPL-CCNC: 197 U/L (ref 40–129)
ALT SERPL-CCNC: 19 U/L (ref 0–40)
ANION GAP SERPL CALCULATED.3IONS-SCNC: 15 MMOL/L (ref 7–16)
AST SERPL-CCNC: 59 U/L (ref 0–39)
BILIRUB SERPL-MCNC: 2 MG/DL (ref 0–1.2)
BUN SERPL-MCNC: 21 MG/DL (ref 6–23)
CALCIUM SERPL-MCNC: 8.8 MG/DL (ref 8.6–10.2)
CHLORIDE SERPL-SCNC: 97 MMOL/L (ref 98–107)
CO2 SERPL-SCNC: 26 MMOL/L (ref 22–29)
CREAT SERPL-MCNC: 1.9 MG/DL (ref 0.7–1.2)
ERYTHROCYTE [DISTWIDTH] IN BLOOD BY AUTOMATED COUNT: 18.3 % (ref 11.5–15)
GFR, ESTIMATED: 39 ML/MIN/1.73M2
GLUCOSE SERPL-MCNC: 104 MG/DL (ref 74–99)
HBA1C MFR BLD: 5.5 % (ref 4–5.6)
HCT VFR BLD AUTO: 37.4 % (ref 37–54)
HGB BLD-MCNC: 11.6 G/DL (ref 12.5–16.5)
MCH RBC QN AUTO: 28.4 PG (ref 26–35)
MCHC RBC AUTO-ENTMCNC: 31 G/DL (ref 32–34.5)
MCV RBC AUTO: 91.7 FL (ref 80–99.9)
PLATELET # BLD AUTO: 98 K/UL (ref 130–450)
PLATELET CONFIRMATION: NORMAL
PMV BLD AUTO: 10.9 FL (ref 7–12)
POTASSIUM SERPL-SCNC: 3.5 MMOL/L (ref 3.5–5)
PROT SERPL-MCNC: 6.6 G/DL (ref 6.4–8.3)
RBC # BLD AUTO: 4.08 M/UL (ref 3.8–5.8)
SODIUM SERPL-SCNC: 138 MMOL/L (ref 132–146)
WBC OTHER # BLD: 6.2 K/UL (ref 4.5–11.5)

## 2024-09-03 PROCEDURE — 83036 HEMOGLOBIN GLYCOSYLATED A1C: CPT

## 2024-09-03 PROCEDURE — 85027 COMPLETE CBC AUTOMATED: CPT

## 2024-09-03 PROCEDURE — 82306 VITAMIN D 25 HYDROXY: CPT

## 2024-09-03 PROCEDURE — 80061 LIPID PANEL: CPT

## 2024-09-03 PROCEDURE — 36415 COLL VENOUS BLD VENIPUNCTURE: CPT

## 2024-09-03 PROCEDURE — C1729 CATH, DRAINAGE: HCPCS

## 2024-09-03 PROCEDURE — 2500000003 HC RX 250 WO HCPCS: Performed by: PHYSICIAN ASSISTANT

## 2024-09-03 PROCEDURE — 80053 COMPREHEN METABOLIC PANEL: CPT

## 2024-09-03 RX ORDER — LIDOCAINE HYDROCHLORIDE 10 MG/ML
INJECTION, SOLUTION EPIDURAL; INFILTRATION; INTRACAUDAL; PERINEURAL PRN
Status: COMPLETED | OUTPATIENT
Start: 2024-09-03 | End: 2024-09-03

## 2024-09-03 RX ADMIN — LIDOCAINE HYDROCHLORIDE 10 ML: 10 INJECTION, SOLUTION EPIDURAL; INFILTRATION; INTRACAUDAL; PERINEURAL at 13:27

## 2024-09-03 NOTE — PROCEDURES
Procedure: Ultrasound Guided Paracentesis    Diagnosis: Ascites    Findings: Moderate volume ascites, successful catheter placement with clear yellow ascitic fluid return    Specimen: Approximately 30cc obtained and sent for analysis (orders from referring physician)- awaiting a call back for lab orders.  Total amount of fluid removed to be reported in PACS.    Anesthesia: Local infiltration of 10cc 1% Lidocaine without epi    EBL: Minimal.    Complication: None immediately post procedure.    Plan: Discharge to home following paracentesis.      Comments:    See radiology dictation in PACs for image review and additional procedural information.

## 2024-09-03 NOTE — OR NURSING
Patient arrived from home to IR for ultrasound guided paracentesis. Vitals obtained, IV started in the RFA, and consent signed per patient. Mitzy Nance PA-C in to speak with the patient about the procedure, all questions answered. Abdomen scanned and prepped. 1% Lidocaine administered to procedural site. 5 [place]alese centesis catheter inserted to (LLQ) with ultrasound guidance, catheter connected to suction. Patient tolerated procedure well. 3950 ml drained of clear yellow colored ascitic fluid. Centesis catheter removed post procedure. Puncture site cleansed and dry dressing applied. No bleeding, swelling or complications noted. Post procedure vitals obtained. IV site removed, dry dressing applied. Discharge instructions reviewed and understood by patient. Patient discharged home without complication.

## 2024-09-05 LAB
CHOLEST SERPL-MCNC: 102 MG/DL
HDLC SERPL-MCNC: 31 MG/DL
LDLC SERPL CALC-MCNC: 37 MG/DL
TRIGL SERPL-MCNC: 168 MG/DL
VLDLC SERPL CALC-MCNC: 34 MG/DL

## 2024-09-10 ENCOUNTER — HOSPITAL ENCOUNTER (OUTPATIENT)
Dept: ULTRASOUND IMAGING | Age: 64
Discharge: HOME | End: 2024-09-12
Payer: COMMERCIAL

## 2024-09-10 VITALS
OXYGEN SATURATION: 97 % | HEART RATE: 88 BPM | SYSTOLIC BLOOD PRESSURE: 111 MMHG | DIASTOLIC BLOOD PRESSURE: 56 MMHG | RESPIRATION RATE: 18 BRPM

## 2024-09-10 DIAGNOSIS — R18.8 OTHER ASCITES: ICD-10-CM

## 2024-09-10 LAB
ALBUMIN FLD-MCNC: 0.5 G/DL
AMYLASE FLD-CCNC: 18 U/L
APPEARANCE FLD: CLEAR
BODY FLD TYPE: NORMAL
CLOT CHECK: NORMAL
COLOR FLD: YELLOW
MONOCYTES NFR FLD: 87 %
NEUTROPHILS NFR FLD: 13 %
PROT FLD-MCNC: 0.7 G/DL
RBC # FLD: <2000 CELLS/UL
SPECIMEN TYPE: NORMAL
WBC # FLD: 320 CELLS/UL

## 2024-09-10 PROCEDURE — 87205 SMEAR GRAM STAIN: CPT

## 2024-09-10 PROCEDURE — 6360000002 HC RX W HCPCS: Performed by: NURSE PRACTITIONER

## 2024-09-10 PROCEDURE — 82042 OTHER SOURCE ALBUMIN QUAN EA: CPT

## 2024-09-10 PROCEDURE — P9047 ALBUMIN (HUMAN), 25%, 50ML: HCPCS | Performed by: NURSE PRACTITIONER

## 2024-09-10 PROCEDURE — 82150 ASSAY OF AMYLASE: CPT

## 2024-09-10 PROCEDURE — 2500000003 HC RX 250 WO HCPCS: Performed by: PHYSICIAN ASSISTANT

## 2024-09-10 PROCEDURE — 88112 CYTOPATH CELL ENHANCE TECH: CPT

## 2024-09-10 PROCEDURE — 49083 ABD PARACENTESIS W/IMAGING: CPT

## 2024-09-10 PROCEDURE — 89051 BODY FLUID CELL COUNT: CPT

## 2024-09-10 PROCEDURE — 87070 CULTURE OTHR SPECIMN AEROBIC: CPT

## 2024-09-10 PROCEDURE — 84157 ASSAY OF PROTEIN OTHER: CPT

## 2024-09-10 PROCEDURE — 88305 TISSUE EXAM BY PATHOLOGIST: CPT

## 2024-09-10 RX ORDER — LIDOCAINE HYDROCHLORIDE 10 MG/ML
INJECTION, SOLUTION INFILTRATION; PERINEURAL PRN
Status: COMPLETED | OUTPATIENT
Start: 2024-09-10 | End: 2024-09-10

## 2024-09-10 RX ORDER — ALBUMIN (HUMAN) 12.5 G/50ML
SOLUTION INTRAVENOUS CONTINUOUS PRN
Status: COMPLETED | OUTPATIENT
Start: 2024-09-10 | End: 2024-09-10

## 2024-09-10 RX ADMIN — LIDOCAINE HYDROCHLORIDE 10 ML: 10 INJECTION, SOLUTION INFILTRATION; PERINEURAL at 14:25

## 2024-09-10 RX ADMIN — ALBUMIN (HUMAN) 50 G: 25 SOLUTION INTRAVENOUS at 14:23

## 2024-09-12 LAB — NON-GYN CYTOLOGY REPORT: NORMAL

## 2024-09-14 LAB
MICROORGANISM SPEC CULT: NO GROWTH
MICROORGANISM/AGENT SPEC: NORMAL
SERVICE CMNT-IMP: NORMAL
SPECIMEN DESCRIPTION: NORMAL

## 2024-09-17 ENCOUNTER — HOSPITAL ENCOUNTER (OUTPATIENT)
Dept: ULTRASOUND IMAGING | Age: 64
Discharge: HOME | End: 2024-09-19
Payer: COMMERCIAL

## 2024-09-17 ENCOUNTER — HOSPITAL ENCOUNTER (OUTPATIENT)
Age: 64
Discharge: HOME | End: 2024-09-17
Payer: COMMERCIAL

## 2024-09-17 VITALS
SYSTOLIC BLOOD PRESSURE: 116 MMHG | DIASTOLIC BLOOD PRESSURE: 56 MMHG | RESPIRATION RATE: 18 BRPM | HEART RATE: 84 BPM | OXYGEN SATURATION: 98 %

## 2024-09-17 DIAGNOSIS — R18.8 OTHER ASCITES: ICD-10-CM

## 2024-09-17 LAB
ALBUMIN FLD-MCNC: 0.7 G/DL
ALBUMIN SERPL-MCNC: 3.3 G/DL (ref 3.5–5.2)
ALP SERPL-CCNC: 259 U/L (ref 40–129)
ALT SERPL-CCNC: 17 U/L (ref 0–40)
AMYLASE FLD-CCNC: 15 U/L
ANION GAP SERPL CALCULATED.3IONS-SCNC: 12 MMOL/L (ref 7–16)
APPEARANCE FLD: CLEAR
AST SERPL-CCNC: 54 U/L (ref 0–39)
BASOPHILS # BLD: 0.1 K/UL (ref 0–0.2)
BASOPHILS NFR BLD: 1 % (ref 0–2)
BILIRUB SERPL-MCNC: 1.8 MG/DL (ref 0–1.2)
BODY FLD TYPE: NORMAL
BUN SERPL-MCNC: 27 MG/DL (ref 6–23)
CALCIUM SERPL-MCNC: 8.7 MG/DL (ref 8.6–10.2)
CHLORIDE SERPL-SCNC: 94 MMOL/L (ref 98–107)
CLOT CHECK: NORMAL
CO2 SERPL-SCNC: 26 MMOL/L (ref 22–29)
COLOR FLD: YELLOW
CREAT SERPL-MCNC: 2.8 MG/DL (ref 0.7–1.2)
EOSINOPHIL # BLD: 0.74 K/UL (ref 0.05–0.5)
EOSINOPHILS RELATIVE PERCENT: 8 % (ref 0–6)
ERYTHROCYTE [DISTWIDTH] IN BLOOD BY AUTOMATED COUNT: 18.3 % (ref 11.5–15)
GFR, ESTIMATED: 24 ML/MIN/1.73M2
GLUCOSE SERPL-MCNC: 135 MG/DL (ref 74–99)
HCT VFR BLD AUTO: 34.9 % (ref 37–54)
HGB BLD-MCNC: 11.4 G/DL (ref 12.5–16.5)
IMM GRANULOCYTES # BLD AUTO: 0.03 K/UL (ref 0–0.58)
IMM GRANULOCYTES NFR BLD: 0 % (ref 0–5)
INR PPP: 1.6
LYMPHOCYTES NFR BLD: 1.1 K/UL (ref 1.5–4)
LYMPHOCYTES RELATIVE PERCENT: 12 % (ref 20–42)
MCH RBC QN AUTO: 28.8 PG (ref 26–35)
MCHC RBC AUTO-ENTMCNC: 32.7 G/DL (ref 32–34.5)
MCV RBC AUTO: 88.1 FL (ref 80–99.9)
MONOCYTES NFR BLD: 1.04 K/UL (ref 0.1–0.95)
MONOCYTES NFR BLD: 12 % (ref 2–12)
MONOCYTES NFR FLD: 86 %
NEUTROPHILS NFR BLD: 66 % (ref 43–80)
NEUTROPHILS NFR FLD: 14 %
NEUTS SEG NFR BLD: 5.86 K/UL (ref 1.8–7.3)
PLATELET # BLD AUTO: 91 K/UL (ref 130–450)
PLATELET CONFIRMATION: NORMAL
PMV BLD AUTO: 10.8 FL (ref 7–12)
POTASSIUM SERPL-SCNC: 3.6 MMOL/L (ref 3.5–5)
PROT FLD-MCNC: 0.9 G/DL
PROT SERPL-MCNC: 6.2 G/DL (ref 6.4–8.3)
PROTHROMBIN TIME: 17.3 SEC (ref 9.3–12.4)
RBC # BLD AUTO: 3.96 M/UL (ref 3.8–5.8)
RBC # BLD: ABNORMAL 10*6/UL
RBC # FLD: <2000 CELLS/UL
SODIUM SERPL-SCNC: 132 MMOL/L (ref 132–146)
SPECIMEN TYPE: NORMAL
WBC # FLD: 292 CELLS/UL
WBC OTHER # BLD: 8.9 K/UL (ref 4.5–11.5)

## 2024-09-17 PROCEDURE — 36415 COLL VENOUS BLD VENIPUNCTURE: CPT

## 2024-09-17 PROCEDURE — 85610 PROTHROMBIN TIME: CPT

## 2024-09-17 PROCEDURE — 2500000003 HC RX 250 WO HCPCS: Performed by: PHYSICIAN ASSISTANT

## 2024-09-17 PROCEDURE — 6360000002 HC RX W HCPCS: Performed by: INTERNAL MEDICINE

## 2024-09-17 PROCEDURE — 82042 OTHER SOURCE ALBUMIN QUAN EA: CPT

## 2024-09-17 PROCEDURE — 82105 ALPHA-FETOPROTEIN SERUM: CPT

## 2024-09-17 PROCEDURE — 85025 COMPLETE CBC W/AUTO DIFF WBC: CPT

## 2024-09-17 PROCEDURE — P9047 ALBUMIN (HUMAN), 25%, 50ML: HCPCS | Performed by: INTERNAL MEDICINE

## 2024-09-17 PROCEDURE — 88112 CYTOPATH CELL ENHANCE TECH: CPT

## 2024-09-17 PROCEDURE — 87070 CULTURE OTHR SPECIMN AEROBIC: CPT

## 2024-09-17 PROCEDURE — 88305 TISSUE EXAM BY PATHOLOGIST: CPT

## 2024-09-17 PROCEDURE — 84157 ASSAY OF PROTEIN OTHER: CPT

## 2024-09-17 PROCEDURE — 80053 COMPREHEN METABOLIC PANEL: CPT

## 2024-09-17 PROCEDURE — 87205 SMEAR GRAM STAIN: CPT

## 2024-09-17 PROCEDURE — 89051 BODY FLUID CELL COUNT: CPT

## 2024-09-17 PROCEDURE — 49083 ABD PARACENTESIS W/IMAGING: CPT

## 2024-09-17 PROCEDURE — 82150 ASSAY OF AMYLASE: CPT

## 2024-09-17 RX ORDER — ALBUMIN (HUMAN) 12.5 G/50ML
SOLUTION INTRAVENOUS CONTINUOUS PRN
Status: COMPLETED | OUTPATIENT
Start: 2024-09-17 | End: 2024-09-17

## 2024-09-17 RX ORDER — LIDOCAINE HYDROCHLORIDE 10 MG/ML
INJECTION, SOLUTION EPIDURAL; INFILTRATION; INTRACAUDAL; PERINEURAL PRN
Status: COMPLETED | OUTPATIENT
Start: 2024-09-17 | End: 2024-09-17

## 2024-09-17 RX ORDER — ALBUMIN (HUMAN) 12.5 G/50ML
50 SOLUTION INTRAVENOUS ONCE
Status: DISCONTINUED | OUTPATIENT
Start: 2024-09-17 | End: 2024-09-20 | Stop reason: HOSPADM

## 2024-09-17 RX ADMIN — LIDOCAINE HYDROCHLORIDE 10 ML: 10 INJECTION, SOLUTION EPIDURAL; INFILTRATION; INTRACAUDAL; PERINEURAL at 14:02

## 2024-09-17 RX ADMIN — ALBUMIN (HUMAN) 50 G: 25 SOLUTION INTRAVENOUS at 14:22

## 2024-09-18 LAB — AFP SERPL-MCNC: 3.4 UG/L

## 2024-09-19 LAB — NON-GYN CYTOLOGY REPORT: NORMAL

## 2024-09-24 ENCOUNTER — HOSPITAL ENCOUNTER (OUTPATIENT)
Dept: ULTRASOUND IMAGING | Age: 64
Discharge: HOME | End: 2024-09-26
Payer: COMMERCIAL

## 2024-09-24 VITALS
SYSTOLIC BLOOD PRESSURE: 122 MMHG | HEART RATE: 78 BPM | OXYGEN SATURATION: 99 % | DIASTOLIC BLOOD PRESSURE: 60 MMHG | RESPIRATION RATE: 18 BRPM

## 2024-09-24 DIAGNOSIS — R18.8 OTHER ASCITES: ICD-10-CM

## 2024-09-24 LAB
ALBUMIN FLD-MCNC: 0.5 G/DL
AMYLASE FLD-CCNC: 20 U/L
APPEARANCE FLD: CLEAR
BODY FLD TYPE: NORMAL
CLOT CHECK: NORMAL
COLOR FLD: YELLOW
MONOCYTES NFR FLD: 90 %
NEUTROPHILS NFR FLD: 10 %
PROT FLD-MCNC: 1 G/DL
RBC # FLD: <2000 CELLS/UL
SPECIMEN TYPE: NORMAL
WBC # FLD: 359 CELLS/UL

## 2024-09-24 PROCEDURE — 2500000003 HC RX 250 WO HCPCS: Performed by: PHYSICIAN ASSISTANT

## 2024-09-24 PROCEDURE — P9047 ALBUMIN (HUMAN), 25%, 50ML: HCPCS | Performed by: NURSE PRACTITIONER

## 2024-09-24 PROCEDURE — 87205 SMEAR GRAM STAIN: CPT

## 2024-09-24 PROCEDURE — 82042 OTHER SOURCE ALBUMIN QUAN EA: CPT

## 2024-09-24 PROCEDURE — 82150 ASSAY OF AMYLASE: CPT

## 2024-09-24 PROCEDURE — 88305 TISSUE EXAM BY PATHOLOGIST: CPT

## 2024-09-24 PROCEDURE — 89051 BODY FLUID CELL COUNT: CPT

## 2024-09-24 PROCEDURE — 6360000002 HC RX W HCPCS: Performed by: NURSE PRACTITIONER

## 2024-09-24 PROCEDURE — 84157 ASSAY OF PROTEIN OTHER: CPT

## 2024-09-24 PROCEDURE — C1729 CATH, DRAINAGE: HCPCS

## 2024-09-24 PROCEDURE — 87070 CULTURE OTHR SPECIMN AEROBIC: CPT

## 2024-09-24 PROCEDURE — 88112 CYTOPATH CELL ENHANCE TECH: CPT

## 2024-09-24 RX ORDER — ALBUMIN (HUMAN) 12.5 G/50ML
SOLUTION INTRAVENOUS CONTINUOUS PRN
Status: COMPLETED | OUTPATIENT
Start: 2024-09-24 | End: 2024-09-24

## 2024-09-24 RX ORDER — LIDOCAINE HYDROCHLORIDE 10 MG/ML
INJECTION, SOLUTION INFILTRATION; PERINEURAL PRN
Status: COMPLETED | OUTPATIENT
Start: 2024-09-24 | End: 2024-09-24

## 2024-09-24 RX ADMIN — ALBUMIN (HUMAN) 50 G: 12.5 SOLUTION INTRAVENOUS at 14:42

## 2024-09-24 RX ADMIN — LIDOCAINE HYDROCHLORIDE 5 ML: 10 INJECTION, SOLUTION INFILTRATION; PERINEURAL at 14:41

## 2024-09-26 LAB — NON-GYN CYTOLOGY REPORT: NORMAL

## 2024-09-28 LAB
MICROORGANISM SPEC CULT: NO GROWTH
MICROORGANISM/AGENT SPEC: NORMAL
SPECIMEN DESCRIPTION: NORMAL

## 2024-10-01 ENCOUNTER — HOSPITAL ENCOUNTER (OUTPATIENT)
Dept: ULTRASOUND IMAGING | Age: 64
Discharge: HOME | End: 2024-10-03
Payer: COMMERCIAL

## 2024-10-01 VITALS
OXYGEN SATURATION: 97 % | HEART RATE: 82 BPM | DIASTOLIC BLOOD PRESSURE: 61 MMHG | SYSTOLIC BLOOD PRESSURE: 121 MMHG | RESPIRATION RATE: 16 BRPM

## 2024-10-01 DIAGNOSIS — R18.8 OTHER ASCITES: ICD-10-CM

## 2024-10-01 LAB
ALBUMIN FLD-MCNC: 0.6 G/DL
AMYLASE FLD-CCNC: 19 U/L
APPEARANCE FLD: CLEAR
BODY FLD TYPE: NORMAL
CLOT CHECK: NORMAL
COLOR FLD: NORMAL
MONOCYTES NFR FLD: 89 %
NEUTROPHILS NFR FLD: 11 %
PROT FLD-MCNC: 0.9 G/DL
RBC # FLD: <2000 CELLS/UL
SPECIMEN TYPE: NORMAL
WBC # FLD: 296 CELLS/UL

## 2024-10-01 PROCEDURE — 87070 CULTURE OTHR SPECIMN AEROBIC: CPT

## 2024-10-01 PROCEDURE — 2500000003 HC RX 250 WO HCPCS: Performed by: RADIOLOGY

## 2024-10-01 PROCEDURE — P9047 ALBUMIN (HUMAN), 25%, 50ML: HCPCS | Performed by: NURSE PRACTITIONER

## 2024-10-01 PROCEDURE — 82042 OTHER SOURCE ALBUMIN QUAN EA: CPT

## 2024-10-01 PROCEDURE — 89051 BODY FLUID CELL COUNT: CPT

## 2024-10-01 PROCEDURE — 6360000002 HC RX W HCPCS: Performed by: NURSE PRACTITIONER

## 2024-10-01 PROCEDURE — 88305 TISSUE EXAM BY PATHOLOGIST: CPT

## 2024-10-01 PROCEDURE — 87205 SMEAR GRAM STAIN: CPT

## 2024-10-01 PROCEDURE — 84157 ASSAY OF PROTEIN OTHER: CPT

## 2024-10-01 PROCEDURE — 88112 CYTOPATH CELL ENHANCE TECH: CPT

## 2024-10-01 PROCEDURE — 49083 ABD PARACENTESIS W/IMAGING: CPT

## 2024-10-01 PROCEDURE — 82150 ASSAY OF AMYLASE: CPT

## 2024-10-01 RX ORDER — LIDOCAINE HYDROCHLORIDE 10 MG/ML
INJECTION, SOLUTION EPIDURAL; INFILTRATION; INTRACAUDAL; PERINEURAL PRN
Status: COMPLETED | OUTPATIENT
Start: 2024-10-01 | End: 2024-10-01

## 2024-10-01 RX ORDER — ALBUMIN (HUMAN) 12.5 G/50ML
SOLUTION INTRAVENOUS CONTINUOUS PRN
Status: COMPLETED | OUTPATIENT
Start: 2024-10-01 | End: 2024-10-01

## 2024-10-01 RX ADMIN — LIDOCAINE HYDROCHLORIDE 10 ML: 10 INJECTION, SOLUTION EPIDURAL; INFILTRATION; INTRACAUDAL; PERINEURAL at 14:56

## 2024-10-01 RX ADMIN — ALBUMIN (HUMAN) 50 G: 0.25 INJECTION, SOLUTION INTRAVENOUS at 15:00

## 2024-10-01 NOTE — OR NURSING
Patient arrived from home to IR for ultrasound guided paracentesis. Vitals obtained, IV started in the LAC, and consent signed per patient. Dr Sam in to speak with the patient about the procedure, all questions answered. Abdomen scanned and prepped. 1% Lidocaine administered to procedural site. 5 Nepali centesis catheter inserted to (RLQ) with ultrasound guidance, catheter connected to suction. 50 G IV Albumin given per order. Patient tolerated procedure well. 3400 ml drained of clear yellow colored ascitic fluid. Centesis catheter removed post procedure. Specimens collected and sent to lab per order. Puncture site cleansed and dry dressing applied. No bleeding, swelling or complications noted. Post procedure vitals obtained. IV site removed, dry dressing applied. Discharge instructions reviewed and understood by patient. Patient discharged home without complication.

## 2024-10-03 LAB — NON-GYN CYTOLOGY REPORT: NORMAL

## 2024-10-05 LAB
MICROORGANISM SPEC CULT: NO GROWTH
MICROORGANISM/AGENT SPEC: NORMAL
SPECIMEN DESCRIPTION: NORMAL

## 2024-10-08 ENCOUNTER — HOSPITAL ENCOUNTER (OUTPATIENT)
Dept: ULTRASOUND IMAGING | Age: 64
Discharge: HOME | End: 2024-10-10
Payer: COMMERCIAL

## 2024-10-08 VITALS
HEART RATE: 83 BPM | RESPIRATION RATE: 16 BRPM | SYSTOLIC BLOOD PRESSURE: 129 MMHG | DIASTOLIC BLOOD PRESSURE: 74 MMHG | OXYGEN SATURATION: 100 %

## 2024-10-08 DIAGNOSIS — R18.8 OTHER ASCITES: ICD-10-CM

## 2024-10-08 LAB
ALBUMIN FLD-MCNC: 0.6 G/DL
AMYLASE FLD-CCNC: 20 U/L
APPEARANCE FLD: CLEAR
BODY FLD TYPE: NORMAL
CLOT CHECK: NORMAL
COLOR FLD: YELLOW
MONOCYTES NFR FLD: 90 %
NEUTROPHILS NFR FLD: 10 %
PROT FLD-MCNC: 0.9 G/DL
RBC # FLD: <2000 CELLS/UL
SPECIMEN TYPE: NORMAL
WBC # FLD: 271 CELLS/UL

## 2024-10-08 PROCEDURE — 89051 BODY FLUID CELL COUNT: CPT

## 2024-10-08 PROCEDURE — 87205 SMEAR GRAM STAIN: CPT

## 2024-10-08 PROCEDURE — 87070 CULTURE OTHR SPECIMN AEROBIC: CPT

## 2024-10-08 PROCEDURE — 6360000002 HC RX W HCPCS: Performed by: NURSE PRACTITIONER

## 2024-10-08 PROCEDURE — 2500000003 HC RX 250 WO HCPCS: Performed by: PHYSICIAN ASSISTANT

## 2024-10-08 PROCEDURE — 84157 ASSAY OF PROTEIN OTHER: CPT

## 2024-10-08 PROCEDURE — 88112 CYTOPATH CELL ENHANCE TECH: CPT

## 2024-10-08 PROCEDURE — 82042 OTHER SOURCE ALBUMIN QUAN EA: CPT

## 2024-10-08 PROCEDURE — P9047 ALBUMIN (HUMAN), 25%, 50ML: HCPCS | Performed by: NURSE PRACTITIONER

## 2024-10-08 PROCEDURE — 88305 TISSUE EXAM BY PATHOLOGIST: CPT

## 2024-10-08 PROCEDURE — 2709999900 US GUIDED PARACENTESIS

## 2024-10-08 PROCEDURE — 82150 ASSAY OF AMYLASE: CPT

## 2024-10-08 RX ORDER — ALBUMIN (HUMAN) 12.5 G/50ML
SOLUTION INTRAVENOUS CONTINUOUS PRN
Status: COMPLETED | OUTPATIENT
Start: 2024-10-08 | End: 2024-10-08

## 2024-10-08 RX ORDER — LIDOCAINE HYDROCHLORIDE 10 MG/ML
INJECTION, SOLUTION INFILTRATION; PERINEURAL PRN
Status: COMPLETED | OUTPATIENT
Start: 2024-10-08 | End: 2024-10-08

## 2024-10-08 RX ADMIN — LIDOCAINE HYDROCHLORIDE 5 ML: 10 INJECTION, SOLUTION INFILTRATION; PERINEURAL at 14:04

## 2024-10-08 RX ADMIN — ALBUMIN (HUMAN) 50 G: 0.25 INJECTION, SOLUTION INTRAVENOUS at 14:05

## 2024-10-08 NOTE — OR NURSING
Pt ambulated to IR room for paracentesis. Pt is alert and oriented, denies any pain prior to procedure. Ultrasound images taken prior to procedure. Procedure is explained to patient, including possible risks. Pt verbalizes understanding and consent from signed. Ultrasound images taken prior to procedure. Procedure is explained to patient, including possible risks. Pt verbalizes understanding and consent form signed. Procedure done under sterile technique and guidance of ultrasound imaging. 1% Lidocaine is used during procedure for comfort measures. A total of 3860 ml's of clear yellow colored ascitic fluid drained during procedure. Catheter removed and op-site dressing applied to site with no bleeding noted. Specimen collected and sent to lab for ordered testing. Albumin infusion given during procedure. Pt tolerated procedure well and denies any pain after. Pt given discharge instructions and pt verbalizes understanding of post procedure care/follow up. Pt ambulated out of department with no difficulties.

## 2024-10-10 LAB — NON-GYN CYTOLOGY REPORT: NORMAL

## 2024-10-12 LAB
MICROORGANISM SPEC CULT: NO GROWTH
MICROORGANISM/AGENT SPEC: NORMAL
SPECIMEN DESCRIPTION: NORMAL

## 2024-10-15 ENCOUNTER — HOSPITAL ENCOUNTER (OUTPATIENT)
Dept: ULTRASOUND IMAGING | Age: 64
Discharge: HOME | End: 2024-10-17
Payer: COMMERCIAL

## 2024-10-15 ENCOUNTER — HOSPITAL ENCOUNTER (OUTPATIENT)
Age: 64
Discharge: HOME | End: 2024-10-15
Payer: COMMERCIAL

## 2024-10-15 VITALS
SYSTOLIC BLOOD PRESSURE: 118 MMHG | OXYGEN SATURATION: 95 % | DIASTOLIC BLOOD PRESSURE: 64 MMHG | HEART RATE: 77 BPM | BODY MASS INDEX: 33.96 KG/M2 | RESPIRATION RATE: 16 BRPM | WEIGHT: 279 LBS

## 2024-10-15 DIAGNOSIS — R18.8 OTHER ASCITES: ICD-10-CM

## 2024-10-15 LAB
25(OH)D3 SERPL-MCNC: 27.1 NG/ML (ref 30–100)
ALBUMIN FLD-MCNC: 0.7 G/DL
ALBUMIN SERPL-MCNC: 3.3 G/DL (ref 3.5–5.2)
ALP SERPL-CCNC: 198 U/L (ref 40–129)
ALT SERPL-CCNC: 16 U/L (ref 0–40)
AMYLASE FLD-CCNC: 21 U/L
ANION GAP SERPL CALCULATED.3IONS-SCNC: 13 MMOL/L (ref 7–16)
APPEARANCE FLD: CLEAR
AST SERPL-CCNC: 45 U/L (ref 0–39)
BILIRUB SERPL-MCNC: 1.7 MG/DL (ref 0–1.2)
BODY FLD TYPE: NORMAL
BUN SERPL-MCNC: 19 MG/DL (ref 6–23)
CALCIUM SERPL-MCNC: 8.5 MG/DL (ref 8.6–10.2)
CHLORIDE SERPL-SCNC: 101 MMOL/L (ref 98–107)
CHOLEST SERPL-MCNC: 94 MG/DL
CLOT CHECK: NORMAL
CO2 SERPL-SCNC: 26 MMOL/L (ref 22–29)
COLOR FLD: YELLOW
CREAT SERPL-MCNC: 2.1 MG/DL (ref 0.7–1.2)
ERYTHROCYTE [DISTWIDTH] IN BLOOD BY AUTOMATED COUNT: 18 % (ref 11.5–15)
GFR, ESTIMATED: 34 ML/MIN/1.73M2
GGT SERPL-CCNC: 76 U/L (ref 10–71)
GLUCOSE SERPL-MCNC: 135 MG/DL (ref 74–99)
HCT VFR BLD AUTO: 32.8 % (ref 37–54)
HDLC SERPL-MCNC: 37 MG/DL
HGB BLD-MCNC: 10.3 G/DL (ref 12.5–16.5)
LDLC SERPL CALC-MCNC: 29 MG/DL
MAGNESIUM SERPL-MCNC: 1.5 MG/DL (ref 1.6–2.6)
MCH RBC QN AUTO: 29.5 PG (ref 26–35)
MCHC RBC AUTO-ENTMCNC: 31.4 G/DL (ref 32–34.5)
MCV RBC AUTO: 94 FL (ref 80–99.9)
MONOCYTES NFR FLD: 91 %
NEUTROPHILS NFR FLD: 9 %
PLATELET # BLD AUTO: 83 K/UL (ref 130–450)
PLATELET CONFIRMATION: NORMAL
PMV BLD AUTO: 10.5 FL (ref 7–12)
POTASSIUM SERPL-SCNC: 3.7 MMOL/L (ref 3.5–5)
PROT FLD-MCNC: 1.1 G/DL
PROT SERPL-MCNC: 6 G/DL (ref 6.4–8.3)
RBC # BLD AUTO: 3.49 M/UL (ref 3.8–5.8)
RBC # FLD: <2000 CELLS/UL
SODIUM SERPL-SCNC: 140 MMOL/L (ref 132–146)
SPECIMEN TYPE: NORMAL
TRIGL SERPL-MCNC: 140 MG/DL
VIT B12 SERPL-MCNC: >2000 PG/ML (ref 211–946)
VLDLC SERPL CALC-MCNC: 28 MG/DL
WBC # FLD: 341 CELLS/UL
WBC OTHER # BLD: 6.2 K/UL (ref 4.5–11.5)

## 2024-10-15 PROCEDURE — 2500000003 HC RX 250 WO HCPCS: Performed by: PHYSICIAN ASSISTANT

## 2024-10-15 PROCEDURE — 85027 COMPLETE CBC AUTOMATED: CPT

## 2024-10-15 PROCEDURE — 82306 VITAMIN D 25 HYDROXY: CPT

## 2024-10-15 PROCEDURE — 82042 OTHER SOURCE ALBUMIN QUAN EA: CPT

## 2024-10-15 PROCEDURE — 83735 ASSAY OF MAGNESIUM: CPT

## 2024-10-15 PROCEDURE — 80053 COMPREHEN METABOLIC PANEL: CPT

## 2024-10-15 PROCEDURE — 36415 COLL VENOUS BLD VENIPUNCTURE: CPT

## 2024-10-15 PROCEDURE — 88305 TISSUE EXAM BY PATHOLOGIST: CPT

## 2024-10-15 PROCEDURE — 84157 ASSAY OF PROTEIN OTHER: CPT

## 2024-10-15 PROCEDURE — 82977 ASSAY OF GGT: CPT

## 2024-10-15 PROCEDURE — 82607 VITAMIN B-12: CPT

## 2024-10-15 PROCEDURE — 80061 LIPID PANEL: CPT

## 2024-10-15 PROCEDURE — 87205 SMEAR GRAM STAIN: CPT

## 2024-10-15 PROCEDURE — 89051 BODY FLUID CELL COUNT: CPT

## 2024-10-15 PROCEDURE — 88112 CYTOPATH CELL ENHANCE TECH: CPT

## 2024-10-15 PROCEDURE — 87070 CULTURE OTHR SPECIMN AEROBIC: CPT

## 2024-10-15 PROCEDURE — 82150 ASSAY OF AMYLASE: CPT

## 2024-10-15 PROCEDURE — 49083 ABD PARACENTESIS W/IMAGING: CPT

## 2024-10-15 RX ORDER — LIDOCAINE HYDROCHLORIDE 10 MG/ML
INJECTION, SOLUTION INFILTRATION; PERINEURAL PRN
Status: COMPLETED | OUTPATIENT
Start: 2024-10-15 | End: 2024-10-15

## 2024-10-15 RX ADMIN — LIDOCAINE HYDROCHLORIDE 7 ML: 10 INJECTION, SOLUTION INFILTRATION; PERINEURAL at 13:40

## 2024-10-15 ASSESSMENT — PAIN - FUNCTIONAL ASSESSMENT: PAIN_FUNCTIONAL_ASSESSMENT: NONE - DENIES PAIN

## 2024-10-15 NOTE — OR NURSING
Pt ambulated with walker to IR room for paracentesis. Pt is alert and oriented, denies any pain prior to procedure. Ultrasound images taken prior to procedure. Procedure is explained to patient, including possible risks. Pt verbalizes understanding and consent form signed. Procedure done under sterile technique and guidance of ultrasound imaging. 1% Lidocaine is used during procedure for comfort measures one step needle used 5 Portuguese x 10 cm to LLQ.  A total of 4740 ml's of clear yellow  colored ascitic fluid drained during procedure.   Catheter removed and op-site dressing applied to site with no bleeding noted. Specimen collected and sent to lab for ordered testing.    Pt tolerated procedure well and denies any pain after. Pt given discharge instructions and pt verbalizes understanding of post procedure care/follow up. Pt escorted out of department with no difficulties.

## 2024-10-17 LAB — NON-GYN CYTOLOGY REPORT: NORMAL

## 2024-10-22 ENCOUNTER — HOSPITAL ENCOUNTER (OUTPATIENT)
Age: 64
Discharge: HOME | End: 2024-10-22
Payer: COMMERCIAL

## 2024-10-22 ENCOUNTER — HOSPITAL ENCOUNTER (OUTPATIENT)
Dept: ULTRASOUND IMAGING | Age: 64
Discharge: HOME | End: 2024-10-24
Payer: COMMERCIAL

## 2024-10-22 VITALS
SYSTOLIC BLOOD PRESSURE: 111 MMHG | DIASTOLIC BLOOD PRESSURE: 62 MMHG | HEART RATE: 81 BPM | RESPIRATION RATE: 18 BRPM | OXYGEN SATURATION: 96 %

## 2024-10-22 DIAGNOSIS — R18.8 OTHER ASCITES: ICD-10-CM

## 2024-10-22 LAB
ALBUMIN FLD-MCNC: 0.7 G/DL
ALBUMIN SERPL-MCNC: 3.3 G/DL (ref 3.5–5.2)
ALP SERPL-CCNC: 191 U/L (ref 40–129)
ALT SERPL-CCNC: 14 U/L (ref 0–40)
AMYLASE FLD-CCNC: 18 U/L
ANION GAP SERPL CALCULATED.3IONS-SCNC: 10 MMOL/L (ref 7–16)
APPEARANCE FLD: CLEAR
AST SERPL-CCNC: 46 U/L (ref 0–39)
BASOPHILS # BLD: 0.07 K/UL (ref 0–0.2)
BASOPHILS NFR BLD: 1 % (ref 0–2)
BILIRUB SERPL-MCNC: 2.6 MG/DL (ref 0–1.2)
BODY FLD TYPE: NORMAL
BUN SERPL-MCNC: 20 MG/DL (ref 6–23)
CALCIUM SERPL-MCNC: 8.7 MG/DL (ref 8.6–10.2)
CHLORIDE SERPL-SCNC: 98 MMOL/L (ref 98–107)
CLOT CHECK: NORMAL
CO2 SERPL-SCNC: 28 MMOL/L (ref 22–29)
COLOR FLD: YELLOW
CREAT SERPL-MCNC: 2.2 MG/DL (ref 0.7–1.2)
EOSINOPHIL # BLD: 1 K/UL (ref 0.05–0.5)
EOSINOPHILS RELATIVE PERCENT: 14 % (ref 0–6)
ERYTHROCYTE [DISTWIDTH] IN BLOOD BY AUTOMATED COUNT: 17.1 % (ref 11.5–15)
GFR, ESTIMATED: 34 ML/MIN/1.73M2
GLUCOSE SERPL-MCNC: 135 MG/DL (ref 74–99)
HCT VFR BLD AUTO: 32.9 % (ref 37–54)
HGB BLD-MCNC: 10.6 G/DL (ref 12.5–16.5)
IMM GRANULOCYTES # BLD AUTO: <0.03 K/UL (ref 0–0.58)
IMM GRANULOCYTES NFR BLD: 0 % (ref 0–5)
INR PPP: 1.5
LYMPHOCYTES NFR BLD: 1.3 K/UL (ref 1.5–4)
LYMPHOCYTES RELATIVE PERCENT: 18 % (ref 20–42)
MCH RBC QN AUTO: 29.9 PG (ref 26–35)
MCHC RBC AUTO-ENTMCNC: 32.2 G/DL (ref 32–34.5)
MCV RBC AUTO: 92.9 FL (ref 80–99.9)
MONOCYTES NFR BLD: 0.83 K/UL (ref 0.1–0.95)
MONOCYTES NFR BLD: 11 % (ref 2–12)
MONOCYTES NFR FLD: 90 %
NEUTROPHILS NFR BLD: 57 % (ref 43–80)
NEUTROPHILS NFR FLD: 10 %
NEUTS SEG NFR BLD: 4.21 K/UL (ref 1.8–7.3)
PLATELET # BLD AUTO: 94 K/UL (ref 130–450)
PLATELET CONFIRMATION: NORMAL
PMV BLD AUTO: 10.9 FL (ref 7–12)
POTASSIUM SERPL-SCNC: 3.5 MMOL/L (ref 3.5–5)
PROT FLD-MCNC: 0.9 G/DL
PROT SERPL-MCNC: 6.1 G/DL (ref 6.4–8.3)
PROTHROMBIN TIME: 16.1 SEC (ref 9.3–12.4)
RBC # BLD AUTO: 3.54 M/UL (ref 3.8–5.8)
RBC # FLD: <2000 CELLS/UL
SODIUM SERPL-SCNC: 136 MMOL/L (ref 132–146)
SPECIMEN TYPE: NORMAL
WBC # FLD: 299 CELLS/UL
WBC OTHER # BLD: 7.4 K/UL (ref 4.5–11.5)

## 2024-10-22 PROCEDURE — 80053 COMPREHEN METABOLIC PANEL: CPT

## 2024-10-22 PROCEDURE — 87070 CULTURE OTHR SPECIMN AEROBIC: CPT

## 2024-10-22 PROCEDURE — 85025 COMPLETE CBC W/AUTO DIFF WBC: CPT

## 2024-10-22 PROCEDURE — 88305 TISSUE EXAM BY PATHOLOGIST: CPT

## 2024-10-22 PROCEDURE — P9047 ALBUMIN (HUMAN), 25%, 50ML: HCPCS | Performed by: INTERNAL MEDICINE

## 2024-10-22 PROCEDURE — 88112 CYTOPATH CELL ENHANCE TECH: CPT

## 2024-10-22 PROCEDURE — 87205 SMEAR GRAM STAIN: CPT

## 2024-10-22 PROCEDURE — 89051 BODY FLUID CELL COUNT: CPT

## 2024-10-22 PROCEDURE — 82150 ASSAY OF AMYLASE: CPT

## 2024-10-22 PROCEDURE — 49083 ABD PARACENTESIS W/IMAGING: CPT

## 2024-10-22 PROCEDURE — 82105 ALPHA-FETOPROTEIN SERUM: CPT

## 2024-10-22 PROCEDURE — 85610 PROTHROMBIN TIME: CPT

## 2024-10-22 PROCEDURE — 36415 COLL VENOUS BLD VENIPUNCTURE: CPT

## 2024-10-22 PROCEDURE — 84157 ASSAY OF PROTEIN OTHER: CPT

## 2024-10-22 PROCEDURE — 6360000002 HC RX W HCPCS: Performed by: INTERNAL MEDICINE

## 2024-10-22 PROCEDURE — 2500000003 HC RX 250 WO HCPCS: Performed by: PHYSICIAN ASSISTANT

## 2024-10-22 PROCEDURE — 82042 OTHER SOURCE ALBUMIN QUAN EA: CPT

## 2024-10-22 RX ORDER — ALBUMIN (HUMAN) 12.5 G/50ML
SOLUTION INTRAVENOUS CONTINUOUS PRN
Status: COMPLETED | OUTPATIENT
Start: 2024-10-22 | End: 2024-10-22

## 2024-10-22 RX ORDER — ALBUMIN (HUMAN) 12.5 G/50ML
50 SOLUTION INTRAVENOUS ONCE
Status: DISCONTINUED | OUTPATIENT
Start: 2024-10-22 | End: 2024-10-25 | Stop reason: HOSPADM

## 2024-10-22 RX ORDER — LIDOCAINE HYDROCHLORIDE 10 MG/ML
INJECTION, SOLUTION INFILTRATION; PERINEURAL PRN
Status: COMPLETED | OUTPATIENT
Start: 2024-10-22 | End: 2024-10-22

## 2024-10-22 RX ADMIN — LIDOCAINE HYDROCHLORIDE 10 ML: 10 INJECTION, SOLUTION INFILTRATION; PERINEURAL at 14:58

## 2024-10-22 RX ADMIN — ALBUMIN (HUMAN) 50 G: 0.25 INJECTION, SOLUTION INTRAVENOUS at 15:14

## 2024-10-22 NOTE — OR NURSING
Patient arrived from home to IR for ultrasound guided paracentesis. Vitals obtained, IV started in the LAC, and consent signed per patient. Mitzy Nance PA-C in to speak with the patient about the procedure, all questions answered. Abdomen scanned and prepped. 1% Lidocaine administered to procedural site. 5 Algerian centesis catheter inserted to (area) with ultrasound guidance, catheter connected to suction. 50 G IV Albumin given per order. Patient tolerated procedure well. 5900 ml drained of clear yellow colored ascitic fluid. Centesis catheter removed post procedure. Specimens collected and sent to lab per order. Puncture site cleansed and dry dressing applied. No bleeding, swelling or complications noted. Post procedure vitals obtained. IV site removed, dry dressing applied. Discharge instructions reviewed and understood by patient. Patient discharged home without complication.

## 2024-10-24 LAB
AFP SERPL-MCNC: 3.4 UG/L
NON-GYN CYTOLOGY REPORT: NORMAL

## 2024-10-29 ENCOUNTER — HOSPITAL ENCOUNTER (OUTPATIENT)
Dept: ULTRASOUND IMAGING | Age: 64
Discharge: HOME | End: 2024-10-31
Payer: COMMERCIAL

## 2024-10-29 VITALS
OXYGEN SATURATION: 96 % | RESPIRATION RATE: 18 BRPM | DIASTOLIC BLOOD PRESSURE: 61 MMHG | SYSTOLIC BLOOD PRESSURE: 126 MMHG | HEART RATE: 83 BPM

## 2024-10-29 DIAGNOSIS — R18.8 OTHER ASCITES: ICD-10-CM

## 2024-10-29 LAB
ALBUMIN FLD-MCNC: 0.6 G/DL
AMYLASE FLD-CCNC: 18 U/L
APPEARANCE FLD: CLEAR
BODY FLD TYPE: NORMAL
CLOT CHECK: NORMAL
COLOR FLD: YELLOW
MONOCYTES NFR FLD: 89 %
NEUTROPHILS NFR FLD: 11 %
PROT FLD-MCNC: 0.9 G/DL
RBC # FLD: <2000 CELLS/UL
SPECIMEN TYPE: NORMAL
WBC # FLD: 301 CELLS/UL

## 2024-10-29 PROCEDURE — 49083 ABD PARACENTESIS W/IMAGING: CPT

## 2024-10-29 PROCEDURE — 2500000003 HC RX 250 WO HCPCS: Performed by: PHYSICIAN ASSISTANT

## 2024-10-29 PROCEDURE — 87205 SMEAR GRAM STAIN: CPT

## 2024-10-29 PROCEDURE — 88305 TISSUE EXAM BY PATHOLOGIST: CPT

## 2024-10-29 PROCEDURE — 87070 CULTURE OTHR SPECIMN AEROBIC: CPT

## 2024-10-29 PROCEDURE — 84157 ASSAY OF PROTEIN OTHER: CPT

## 2024-10-29 PROCEDURE — 89051 BODY FLUID CELL COUNT: CPT

## 2024-10-29 PROCEDURE — 88112 CYTOPATH CELL ENHANCE TECH: CPT

## 2024-10-29 PROCEDURE — 82150 ASSAY OF AMYLASE: CPT

## 2024-10-29 PROCEDURE — 82042 OTHER SOURCE ALBUMIN QUAN EA: CPT

## 2024-10-29 RX ORDER — FUROSEMIDE 20 MG/1
20 TABLET ORAL 2 TIMES DAILY
COMMUNITY

## 2024-10-29 RX ORDER — LIDOCAINE HYDROCHLORIDE 10 MG/ML
INJECTION, SOLUTION EPIDURAL; INFILTRATION; INTRACAUDAL; PERINEURAL PRN
Status: COMPLETED | OUTPATIENT
Start: 2024-10-29 | End: 2024-10-29

## 2024-10-29 RX ADMIN — LIDOCAINE HYDROCHLORIDE 10 ML: 10 INJECTION, SOLUTION EPIDURAL; INFILTRATION; INTRACAUDAL; PERINEURAL at 11:59

## 2024-10-29 NOTE — OR NURSING
Patient arrived from home to  for ultrasound guided paracentesis. Vitals obtained and consent signed per patient. Mitzy Nance PA-C in to speak with the patient about the procedure, all questions answered. Abdomen scanned and prepped. 1% Lidocaine administered to procedural site. 5 Macedonian centesis catheter inserted to (LLQ) with ultrasound guidance, catheter connected to suction. Patient tolerated procedure well. (5330) ml drained of (clear yellow) colored ascitic fluid. Centesis catheter removed post procedure. Specimens collected and sent to lab per order. Puncture site cleansed and dry dressing applied. No bleeding, swelling or complications noted. Post procedure vitals obtained. Discharge instructions reviewed and understood by patient. Patient discharged home without complication.

## 2024-10-30 LAB — NON-GYN CYTOLOGY REPORT: NORMAL

## 2024-11-01 LAB
MICROORGANISM SPEC CULT: NO GROWTH
MICROORGANISM/AGENT SPEC: NORMAL
SPECIMEN DESCRIPTION: NORMAL

## 2024-11-05 ENCOUNTER — HOSPITAL ENCOUNTER (OUTPATIENT)
Dept: ULTRASOUND IMAGING | Age: 64
Discharge: HOME | End: 2024-11-07
Payer: COMMERCIAL

## 2024-11-05 VITALS
TEMPERATURE: 64.4 F | SYSTOLIC BLOOD PRESSURE: 105 MMHG | OXYGEN SATURATION: 96 % | DIASTOLIC BLOOD PRESSURE: 58 MMHG | RESPIRATION RATE: 19 BRPM | HEART RATE: 81 BPM

## 2024-11-05 DIAGNOSIS — R18.8 OTHER ASCITES: ICD-10-CM

## 2024-11-05 LAB
ALBUMIN FLD-MCNC: 0.6 G/DL
AMYLASE FLD-CCNC: 23 U/L
APPEARANCE FLD: NORMAL
BODY FLD TYPE: NORMAL
CLOT CHECK: NORMAL
COLOR FLD: YELLOW
MONOCYTES NFR FLD: 90 %
NEUTROPHILS NFR FLD: 10 %
PROT FLD-MCNC: 0.8 G/DL
RBC # FLD: <2000 CELLS/UL
SPECIMEN TYPE: NORMAL
WBC # FLD: 263 CELLS/UL

## 2024-11-05 PROCEDURE — 87070 CULTURE OTHR SPECIMN AEROBIC: CPT

## 2024-11-05 PROCEDURE — 87205 SMEAR GRAM STAIN: CPT

## 2024-11-05 PROCEDURE — 88112 CYTOPATH CELL ENHANCE TECH: CPT

## 2024-11-05 PROCEDURE — P9047 ALBUMIN (HUMAN), 25%, 50ML: HCPCS | Performed by: INTERNAL MEDICINE

## 2024-11-05 PROCEDURE — 82150 ASSAY OF AMYLASE: CPT

## 2024-11-05 PROCEDURE — 84157 ASSAY OF PROTEIN OTHER: CPT

## 2024-11-05 PROCEDURE — 6360000002 HC RX W HCPCS: Performed by: INTERNAL MEDICINE

## 2024-11-05 PROCEDURE — 2500000003 HC RX 250 WO HCPCS: Performed by: PHYSICIAN ASSISTANT

## 2024-11-05 PROCEDURE — 88305 TISSUE EXAM BY PATHOLOGIST: CPT

## 2024-11-05 PROCEDURE — 82042 OTHER SOURCE ALBUMIN QUAN EA: CPT

## 2024-11-05 PROCEDURE — 89051 BODY FLUID CELL COUNT: CPT

## 2024-11-05 PROCEDURE — 49083 ABD PARACENTESIS W/IMAGING: CPT

## 2024-11-05 RX ORDER — ALBUMIN (HUMAN) 12.5 G/50ML
SOLUTION INTRAVENOUS CONTINUOUS PRN
Status: COMPLETED | OUTPATIENT
Start: 2024-11-05 | End: 2024-11-05

## 2024-11-05 RX ORDER — LIDOCAINE HYDROCHLORIDE 10 MG/ML
INJECTION, SOLUTION INFILTRATION; PERINEURAL PRN
Status: COMPLETED | OUTPATIENT
Start: 2024-11-05 | End: 2024-11-05

## 2024-11-05 RX ADMIN — ALBUMIN (HUMAN) 50 G: 0.25 INJECTION, SOLUTION INTRAVENOUS at 13:55

## 2024-11-05 RX ADMIN — LIDOCAINE HYDROCHLORIDE 10 ML: 10 INJECTION, SOLUTION INFILTRATION; PERINEURAL at 14:01

## 2024-11-05 NOTE — OR NURSING
Patient brought into room, discussed procedure. Mitzy Nance PA-C answered all questions and concerns related to the procedure. Treatment consent signed. Patient positioned and sterile prepped for the procedure. 1% Lidocaine administered by Mitzy Nance PA-C.  A total of 5520 mL clear yellow ascitic fluid was taken. Patient tolerated procedure well. Site cleaned and dressed with a bandage. Vitals monitored and remained stable throughout. Discharge papers declined by patient. Patient escorted out of department with all belongings and without distress.

## 2024-11-05 NOTE — PROCEDURES
Procedure: Ultrasound Guided Paracentesis    Diagnosis: Ascites    Findings: Large volume ascites, successful catheter placement with clear pale yellow ascitic fluid return    Specimen: Approximately 20cc obtained and sent for analysis (orders from referring physician).  Total amount of fluid removed to be reported in PACS.    Anesthesia: Local infiltration of 10cc 1% Lidocaine without epi    EBL: Minimal.    Complication: None immediately post procedure.    Plan: Discharge to home following paracentesis.      Comments:    See radiology dictation in PACs for image review and additional procedural information.

## 2024-11-07 LAB — NON-GYN CYTOLOGY REPORT: NORMAL

## 2024-11-08 ENCOUNTER — HOSPITAL ENCOUNTER (OUTPATIENT)
Age: 64
Discharge: HOME | End: 2024-11-08
Payer: COMMERCIAL

## 2024-11-08 LAB
ALBUMIN SERPL-MCNC: 3.4 G/DL (ref 3.5–5.2)
ALP SERPL-CCNC: 258 U/L (ref 40–129)
ALT SERPL-CCNC: 15 U/L (ref 0–40)
ANION GAP SERPL CALCULATED.3IONS-SCNC: 11 MMOL/L (ref 7–16)
AST SERPL-CCNC: 41 U/L (ref 0–39)
BASOPHILS # BLD: 0.06 K/UL (ref 0–0.2)
BASOPHILS NFR BLD: 1 % (ref 0–2)
BILIRUB SERPL-MCNC: 1.4 MG/DL (ref 0–1.2)
BUN SERPL-MCNC: 27 MG/DL (ref 6–23)
CALCIUM SERPL-MCNC: 8.8 MG/DL (ref 8.6–10.2)
CHLORIDE SERPL-SCNC: 99 MMOL/L (ref 98–107)
CO2 SERPL-SCNC: 24 MMOL/L (ref 22–29)
CREAT SERPL-MCNC: 3 MG/DL (ref 0.7–1.2)
EOSINOPHIL # BLD: 1.09 K/UL (ref 0.05–0.5)
EOSINOPHILS RELATIVE PERCENT: 16 % (ref 0–6)
ERYTHROCYTE [DISTWIDTH] IN BLOOD BY AUTOMATED COUNT: 16.2 % (ref 11.5–15)
GFR, ESTIMATED: 23 ML/MIN/1.73M2
GLUCOSE SERPL-MCNC: 119 MG/DL (ref 74–99)
HCT VFR BLD AUTO: 33.1 % (ref 37–54)
HGB BLD-MCNC: 10.8 G/DL (ref 12.5–16.5)
IMM GRANULOCYTES # BLD AUTO: <0.03 K/UL (ref 0–0.58)
IMM GRANULOCYTES NFR BLD: 0 % (ref 0–5)
INR PPP: 1.8
LYMPHOCYTES NFR BLD: 1.13 K/UL (ref 1.5–4)
LYMPHOCYTES RELATIVE PERCENT: 16 % (ref 20–42)
MCH RBC QN AUTO: 30.9 PG (ref 26–35)
MCHC RBC AUTO-ENTMCNC: 32.6 G/DL (ref 32–34.5)
MCV RBC AUTO: 94.8 FL (ref 80–99.9)
MICROORGANISM SPEC CULT: NO GROWTH
MICROORGANISM/AGENT SPEC: NORMAL
MONOCYTES NFR BLD: 0.67 K/UL (ref 0.1–0.95)
MONOCYTES NFR BLD: 10 % (ref 2–12)
NEUTROPHILS NFR BLD: 57 % (ref 43–80)
NEUTS SEG NFR BLD: 3.91 K/UL (ref 1.8–7.3)
PLATELET # BLD AUTO: 83 K/UL (ref 130–450)
PLATELET CONFIRMATION: NORMAL
PMV BLD AUTO: 10.4 FL (ref 7–12)
POTASSIUM SERPL-SCNC: 4.3 MMOL/L (ref 3.5–5)
PROT SERPL-MCNC: 6.2 G/DL (ref 6.4–8.3)
PROTHROMBIN TIME: 18.9 SEC (ref 9.3–12.4)
RBC # BLD AUTO: 3.49 M/UL (ref 3.8–5.8)
RBC # BLD: ABNORMAL 10*6/UL
SERVICE CMNT-IMP: NORMAL
SODIUM SERPL-SCNC: 134 MMOL/L (ref 132–146)
SPECIMEN DESCRIPTION: NORMAL
WBC OTHER # BLD: 6.9 K/UL (ref 4.5–11.5)

## 2024-11-08 PROCEDURE — 36415 COLL VENOUS BLD VENIPUNCTURE: CPT

## 2024-11-08 PROCEDURE — 85025 COMPLETE CBC W/AUTO DIFF WBC: CPT

## 2024-11-08 PROCEDURE — 85610 PROTHROMBIN TIME: CPT

## 2024-11-08 PROCEDURE — 82105 ALPHA-FETOPROTEIN SERUM: CPT

## 2024-11-08 PROCEDURE — 80053 COMPREHEN METABOLIC PANEL: CPT

## 2024-11-10 LAB — AFP SERPL-MCNC: 2.8 UG/L

## 2024-11-11 ENCOUNTER — HOSPITAL ENCOUNTER (OUTPATIENT)
Dept: ULTRASOUND IMAGING | Age: 64
Discharge: HOME | End: 2024-11-13
Payer: COMMERCIAL

## 2024-11-11 VITALS
HEART RATE: 82 BPM | OXYGEN SATURATION: 99 % | RESPIRATION RATE: 20 BRPM | DIASTOLIC BLOOD PRESSURE: 61 MMHG | SYSTOLIC BLOOD PRESSURE: 116 MMHG

## 2024-11-11 DIAGNOSIS — R18.8 OTHER ASCITES: ICD-10-CM

## 2024-11-11 PROBLEM — I48.0 PAROXYSMAL ATRIAL FIBRILLATION (MULTI): Status: ACTIVE | Noted: 2024-05-19

## 2024-11-11 PROBLEM — D64.9 ANEMIA: Status: ACTIVE | Noted: 2024-05-19

## 2024-11-11 PROBLEM — E11.65 TYPE 2 DIABETES MELLITUS WITH HYPERGLYCEMIA, WITHOUT LONG-TERM CURRENT USE OF INSULIN: Status: ACTIVE | Noted: 2024-05-19

## 2024-11-11 PROBLEM — Z86.79 HISTORY OF CAD (CORONARY ARTERY DISEASE): Status: ACTIVE | Noted: 2024-05-19

## 2024-11-11 PROBLEM — D69.3 ACUTE IDIOPATHIC THROMBOCYTOPENIC PURPURA (MULTI): Status: ACTIVE | Noted: 2024-05-19

## 2024-11-11 PROBLEM — E78.2 MIXED HYPERLIPIDEMIA: Status: ACTIVE | Noted: 2024-05-19

## 2024-11-11 LAB
ALBUMIN FLD-MCNC: 0.6 G/DL
AMYLASE FLD-CCNC: 24 U/L
APPEARANCE FLD: CLEAR
BODY FLD TYPE: NORMAL
CLOT CHECK: NORMAL
COLOR FLD: YELLOW
MONOCYTES NFR FLD: 90 %
NEUTROPHILS NFR FLD: 10 %
PROT FLD-MCNC: 0.9 G/DL
RBC # FLD: <2000 CELLS/UL
SPECIMEN TYPE: NORMAL
WBC # FLD: 294 CELLS/UL

## 2024-11-11 PROCEDURE — 82042 OTHER SOURCE ALBUMIN QUAN EA: CPT

## 2024-11-11 PROCEDURE — 2500000003 HC RX 250 WO HCPCS: Performed by: PHYSICIAN ASSISTANT

## 2024-11-11 PROCEDURE — 87205 SMEAR GRAM STAIN: CPT

## 2024-11-11 PROCEDURE — 89051 BODY FLUID CELL COUNT: CPT

## 2024-11-11 PROCEDURE — 6360000002 HC RX W HCPCS: Performed by: INTERNAL MEDICINE

## 2024-11-11 PROCEDURE — 88305 TISSUE EXAM BY PATHOLOGIST: CPT

## 2024-11-11 PROCEDURE — 84157 ASSAY OF PROTEIN OTHER: CPT

## 2024-11-11 PROCEDURE — 82150 ASSAY OF AMYLASE: CPT

## 2024-11-11 PROCEDURE — 88112 CYTOPATH CELL ENHANCE TECH: CPT

## 2024-11-11 PROCEDURE — P9047 ALBUMIN (HUMAN), 25%, 50ML: HCPCS | Performed by: INTERNAL MEDICINE

## 2024-11-11 PROCEDURE — 87070 CULTURE OTHR SPECIMN AEROBIC: CPT

## 2024-11-11 PROCEDURE — 49083 ABD PARACENTESIS W/IMAGING: CPT

## 2024-11-11 RX ORDER — ATORVASTATIN CALCIUM 10 MG/1
1 TABLET, FILM COATED ORAL DAILY
COMMUNITY

## 2024-11-11 RX ORDER — FUROSEMIDE 20 MG/1
1 TABLET ORAL 2 TIMES DAILY
COMMUNITY

## 2024-11-11 RX ORDER — METFORMIN HYDROCHLORIDE 1000 MG/1
1 TABLET ORAL EVERY 12 HOURS
COMMUNITY
Start: 2024-02-15

## 2024-11-11 RX ORDER — LACTULOSE 10 G/15ML
20 SOLUTION ORAL; RECTAL
COMMUNITY

## 2024-11-11 RX ORDER — LIDOCAINE HYDROCHLORIDE 10 MG/ML
INJECTION, SOLUTION INFILTRATION; PERINEURAL PRN
Status: COMPLETED | OUTPATIENT
Start: 2024-11-11 | End: 2024-11-11

## 2024-11-11 RX ORDER — BUMETANIDE 1 MG/1
2 TABLET ORAL DAILY
COMMUNITY

## 2024-11-11 RX ORDER — ALBUMIN (HUMAN) 12.5 G/50ML
SOLUTION INTRAVENOUS CONTINUOUS PRN
Status: COMPLETED | OUTPATIENT
Start: 2024-11-11 | End: 2024-11-11

## 2024-11-11 RX ORDER — TRAZODONE HYDROCHLORIDE 50 MG/1
1 TABLET ORAL NIGHTLY
COMMUNITY
Start: 2024-08-09

## 2024-11-11 RX ADMIN — LIDOCAINE HYDROCHLORIDE 10 ML: 10 INJECTION, SOLUTION INFILTRATION; PERINEURAL at 09:58

## 2024-11-11 RX ADMIN — ALBUMIN (HUMAN) 50 G: 0.25 INJECTION, SOLUTION INTRAVENOUS at 10:09

## 2024-11-11 ASSESSMENT — PAIN SCALES - GENERAL: PAINLEVEL_OUTOF10: 0

## 2024-11-11 NOTE — PROCEDURES
Procedure: Ultrasound Guided Paracentesis    Diagnosis: Ascites    Findings: Large volume ascites, successful catheter placement with clear yellow ascitic fluid return    Specimen:  Approximately 20cc obtained and sent for analysis (orders from referring physician).  Total amount of fluid removed to be reported in PACS.    Anesthesia: Local infiltration of 10cc 1% Lidocaine without epi    EBL: Minimal.    Complication: None immediately post procedure.    Plan: Discharge to home following paracentesis.     Comments:    See radiology dictation in PACs for image review and additional procedural information.

## 2024-11-11 NOTE — OR NURSING
Patient arrival from home to IR for paracentesis. Vitals taken, Iv started, and consent signed. Mitzy Nance PA-C in to speak with the patient about the procedure, all questions answered. Abdomen scanned, prepped and 5 Kenyan 10 cm centesis catheter inserted (LLQ) with ultrasound guidance by Mitzy Nance PA-C. Patient tolerated well. 5880 ml drained of clear yellow colored ascitic fluid. Centesis catheter removed @ 10:25. Puncture site cleansed and dry dressing applied. No bleeding, swelling or complications noted. Discharge instructions reviewed and understood by patient. Patient discharged home.

## 2024-11-12 ENCOUNTER — OFFICE VISIT (OUTPATIENT)
Dept: GASTROENTEROLOGY | Facility: CLINIC | Age: 64
End: 2024-11-12
Payer: COMMERCIAL

## 2024-11-12 ENCOUNTER — DOCUMENTATION (OUTPATIENT)
Dept: TRANSPLANT | Facility: HOSPITAL | Age: 64
End: 2024-11-12

## 2024-11-12 VITALS
TEMPERATURE: 98.2 F | HEART RATE: 70 BPM | HEIGHT: 76 IN | DIASTOLIC BLOOD PRESSURE: 67 MMHG | WEIGHT: 267 LBS | BODY MASS INDEX: 32.51 KG/M2 | SYSTOLIC BLOOD PRESSURE: 107 MMHG

## 2024-11-12 DIAGNOSIS — R60.9 EDEMA: ICD-10-CM

## 2024-11-12 DIAGNOSIS — K72.90 DECOMPENSATED CIRRHOSIS: ICD-10-CM

## 2024-11-12 DIAGNOSIS — K74.60 DECOMPENSATED CIRRHOSIS: ICD-10-CM

## 2024-11-12 DIAGNOSIS — K75.81 METABOLIC DYSFUNCTION-ASSOCIATED STEATOHEPATITIS (MASH): ICD-10-CM

## 2024-11-12 DIAGNOSIS — K74.60 UNSPECIFIED CIRRHOSIS OF LIVER (MULTI): ICD-10-CM

## 2024-11-12 DIAGNOSIS — R18.8 OTHER ASCITES: ICD-10-CM

## 2024-11-12 DIAGNOSIS — R16.1 SPLENOMEGALY, NOT ELSEWHERE CLASSIFIED: ICD-10-CM

## 2024-11-12 LAB — NON-GYN CYTOLOGY REPORT: NORMAL

## 2024-11-12 PROCEDURE — 1036F TOBACCO NON-USER: CPT | Performed by: INTERNAL MEDICINE

## 2024-11-12 PROCEDURE — 3074F SYST BP LT 130 MM HG: CPT | Performed by: INTERNAL MEDICINE

## 2024-11-12 PROCEDURE — 99215 OFFICE O/P EST HI 40 MIN: CPT | Performed by: INTERNAL MEDICINE

## 2024-11-12 PROCEDURE — 99205 OFFICE O/P NEW HI 60 MIN: CPT | Performed by: INTERNAL MEDICINE

## 2024-11-12 PROCEDURE — 3078F DIAST BP <80 MM HG: CPT | Performed by: INTERNAL MEDICINE

## 2024-11-12 ASSESSMENT — ENCOUNTER SYMPTOMS
FATIGUE: 1
ABDOMINAL DISTENTION: 1
ABDOMINAL PAIN: 1
ARTHRALGIAS: 1

## 2024-11-12 NOTE — PATIENT INSTRUCTIONS
If you have any questions or need assistance, please don't hesitate to contact us.     Our offices are located at [facility].  Please use the numbers below when calling.   Dr. Mckeon:         Office [phone] Fax: [phone]  Hepatology Nurse Coordinator:         Pallavi SPENCE [phone]     Main Scheduling Number: [phone]   (See below for department name when scheduling)        DAILY WEIGHTS  It is important to weigh yourself every morning.    Weight gain is the first sign of fluid retention and helps us recognize when fluid overload is potentially or already happening.  Smaller amounts of fluid cannot always be detected visually.   Daily weights helps us know if there is too much water on your body and when to make needed adjustments to diuretics and/or diet.    A paracentesis (tap) is performed when salt restriction or water pills are not enough.   Having a paracentesis is not a cure.  Long-term serial taps can lead to significant loss of protein and worsen malnutrition, leading to muscle wasting.     HOW IS ASCITES MANAGED AND TREATED?  Follow a low salt 2,000 mg/day diet  Take diuretics (water pills) as prescribed to get rid of excess fluid  Weigh yourself every morning after going to the bathroom and record it.  Reporting changes in your weight.  You should call the office if you are not losing at least 5 lbs a week or gaining 5 lbs or more a week.  Do not drink alcohol     You should have no more than 2,000 mg (sodium/salt) per day.  1 teaspoon of salt = about 2,300 mg.  An average sandwich is 1,522mg. Despite what people think, 70% of sodium intake comes from packaged and prepared foods-not from table salt added to food.  Read labels and choose foods low in sodium.  Using label information on food packages will help make the best low salt choices.  Avoid salt by preparing your own foods when you can, using fresh ingredients.  Season home cooking with fresh herbs and spices.      Foods to AVOID  Eliminate the salt shaker  Buy fresh  Avoid canned soups, entrees, vegetables, boxed, and frozen foods  Avoid bread, rolls, bagels...  Cold cuts, cured meats  Pizza  Poultry should be fresh, choose 15% solution  Sandwiches (bread and lunch meats)  Consider your condiments  Restaurant food

## 2024-11-12 NOTE — ASSESSMENT & PLAN NOTE
This patient has decompensated cirrhosis and we discussed natural history. we emphasized the importance of health maintenance interventions to prevent complications of liver disease including  a) lifelong abstinence  b) MELD/US/AFP every 6 months to assess liver function and survey for HCC  c) initiation of carvedilol if fibroscan and other fibrosis non invasive tests are consistent with clinically significant portal hypertension  d) initiation of a transplant evaluation given advanced disease as an effort to achieve the only potential curative option   e) minimize diuretics only to settings where there is dyspnea or extreme pain and suffering. Ideally no more than every 2-3 weeks

## 2024-11-12 NOTE — PROGRESS NOTES
Per [facility] Marlena SHRAVAN Plan active.  Opened case for liver txp eval with [facility]  and faxed clinical to [phone] ref# [id number].

## 2024-11-12 NOTE — PROGRESS NOTES
"Subjective    He has a history of severe obesity and decompensated cirrhosis manifest as fluid overload which has required an extraordinary frequency of LVPs (We count over 25 in the past 4 months) with resultant significant muscle wasting. In the meantime, kidney function has declined with eGFR fluctuating between 25 and 35 mgl/mn. He denies fluid overload or cognitive impairment. He notes a \"small\" heart attack 4 years ago at which time PCI stent was placed. He also carries a diagnosis of paroxismal Afib on apixaban. He consumes alcohol occasionally which his spouse corroborates.        Review of Systems   Constitutional:  Positive for fatigue.   Gastrointestinal:  Positive for abdominal distention and abdominal pain.   Endocrine: Positive for cold intolerance.   Musculoskeletal:  Positive for arthralgias.       Physical Exam  Constitutional:       General: He is awake.      Appearance: Normal appearance. He is obese.   HENT:      Head: Normocephalic and atraumatic.      Nose: Nose normal.      Mouth/Throat:      Mouth: Mucous membranes are moist.   Eyes:      Pupils: Pupils are equal, round, and reactive to light.   Neck:      Thyroid: No thyroid mass.      Trachea: Phonation normal.   Cardiovascular:      Rate and Rhythm: Normal rate and regular rhythm.      Heart sounds: Normal heart sounds. No murmur heard.     No gallop.   Pulmonary:      Effort: Pulmonary effort is normal. No respiratory distress.      Breath sounds: Normal air entry. No decreased breath sounds, wheezing, rhonchi or rales.   Abdominal:      General: Bowel sounds are normal. There is distension.      Palpations: Abdomen is soft.      Tenderness: There is no abdominal tenderness.   Musculoskeletal:      Cervical back: Neck supple.      Right lower leg: Edema present.      Left lower leg: Edema present.   Skin:     General: Skin is warm.      Capillary Refill: Capillary refill takes less than 2 seconds.   Neurological:      General: No focal " "deficit present.      Mental Status: He is alert and oriented to person, place, and time. Mental status is at baseline.      Cranial Nerves: Cranial nerves 2-12 are intact.      Motor: Motor function is intact.   Psychiatric:         Attention and Perception: Attention and perception normal.         Mood and Affect: Mood normal.         Speech: Speech normal.         Behavior: Behavior normal.       LABS: No results found for: \"ALBUMIN\", \"BILITOT\", \"BILIDIR\", \"ALKPHOS\", \"ALT\", \"AST\", \"PROT\", \"C4VMEQPYEHH\", \"AFP\", \"CHENG\", \"MITOAB\", \"SMOOTHMUSCAB\", \"CERULOPLSM\", \"HAVTO\", \"HEPATOT\", \"ASMAB\", \"HEPBSAB\", \"HEPBCAB\", \"HEPBSAG\", \"HEPCAB\", \"INR\", \"FERRITIN\", \"IRON\", \"IRONSAT\"   No results found for: \"HSVI487\", \"ARPS833\"   No results found for: \"NA\", \"CREATININE\", \"BILITOT\", \"INR\"  No results found for: \"AFP\"                  MELD 3.0 23-25    Decompensated cirrhosis  This patient has decompensated cirrhosis and we discussed natural history. we emphasized the importance of health maintenance interventions to prevent complications of liver disease including  a) lifelong abstinence  b) MELD/US/AFP every 6 months to assess liver function and survey for HCC  c) initiation of carvedilol if fibroscan and other fibrosis non invasive tests are consistent with clinically significant portal hypertension  d) initiation of a transplant evaluation given advanced disease as an effort to achieve the only potential curative option   e) minimize diuretics only to settings where there is dyspnea or extreme pain and suffering. Ideally no more than every 2-3 weeks    "

## 2024-11-13 ENCOUNTER — TELEPHONE (OUTPATIENT)
Dept: TRANSPLANT | Facility: HOSPITAL | Age: 64
End: 2024-11-13
Payer: COMMERCIAL

## 2024-11-13 ENCOUNTER — DOCUMENTATION (OUTPATIENT)
Dept: TRANSPLANT | Facility: HOSPITAL | Age: 64
End: 2024-11-13
Payer: COMMERCIAL

## 2024-11-13 ENCOUNTER — HOSPITAL ENCOUNTER (INPATIENT)
Age: 64
End: 2024-11-13
Attending: SURGERY | Admitting: SURGERY
Payer: COMMERCIAL

## 2024-11-13 NOTE — PROGRESS NOTES
Rec'd [facility] fax for kidney txp consult valid 11/12/24-11/12/25.  Patient will access [employer].

## 2024-11-13 NOTE — Clinical Note
Rec'd [facility] fax for kidney txp consult valid 11/12/24-11/12/25.  Patient will access [employer].

## 2024-11-13 NOTE — TELEPHONE ENCOUNTER
GENERAL HEALTH STATUS LI  What is the primary cause of your organ disease?   FATTY LIVER METOBOLIC  Who is your referring provider?   DR MARES  Have you received a transplant before?   no  If yes, what organ, and when and where was your transplant?   N/A  Do you have someone in your support system that can drive you to and from all your appointments?   yes  Are you currently listed or being evaluated by any other transplant center?   no  If yes, what transplant center are you listed or being evaluated at?   N/A  Have you had a heart attack or stroke?   yes  Do you have a history of diabetes?   yes  Have you ever been on dialysis?   no  Do you have a history of cancer?  no  If yes, what type of cancer, and when and where were you treated?   N/A  Have you had a colonoscopy or EGD in past 5 year?   no  If yes, where were they performed?   N/A  Are you currently or have you previously been seen by a mental health professional?   no  If yes, what is the name of your mental health provider?   N/A  Did you receive a specific diagnosis?   N/A  Are you a current or former tobacco user?   yes  Do you have history of alcohol abuse or dependence?  no  Do you have a history of illegal drug abuse or dependence?   no  Do you have difficulty reading or writing in English?  no  Comments:  HEART ATTACK END OF APRIL 2022 TREATED BY DR SANCHEZ AT [facility] IN [place]

## 2024-11-13 NOTE — PROGRESS NOTES
Per 11/12/24 v/m from cheyenne Steward of [employer]  [id number] liver eval has been approved eff 11/12/24-11/12/25 & patient has T&L benefits but must call .  Fax clinical to [phone] & call him prior to faxing.  Transplant services only are approved.  [facility] is OON with this plan.

## 2024-11-14 ENCOUNTER — OTHER (OUTPATIENT)
Dept: TRANSPLANT | Facility: HOSPITAL | Age: 64
End: 2024-11-14
Payer: COMMERCIAL

## 2024-11-14 ENCOUNTER — TELEPHONE (OUTPATIENT)
Dept: TRANSPLANT | Facility: HOSPITAL | Age: 64
End: 2024-11-14
Payer: COMMERCIAL

## 2024-11-14 LAB
MICROORGANISM SPEC CULT: NORMAL
MICROORGANISM/AGENT SPEC: NORMAL
SERVICE CMNT-IMP: NORMAL
SPECIMEN DESCRIPTION: NORMAL

## 2024-11-21 ENCOUNTER — APPOINTMENT (OUTPATIENT)
Dept: TRANSPLANT | Facility: HOSPITAL | Age: 64
End: 2024-11-21
Payer: COMMERCIAL

## 2024-11-21 ENCOUNTER — LAB (OUTPATIENT)
Dept: LAB | Facility: LAB | Age: 64
End: 2024-11-21
Payer: COMMERCIAL

## 2024-11-21 DIAGNOSIS — Z01.818 ENCOUNTER FOR PRE-TRANSPLANT EVALUATION FOR KIDNEY TRANSPLANT: ICD-10-CM

## 2024-11-21 DIAGNOSIS — Z01.818 ENCOUNTER FOR PRE-TRANSPLANT EVALUATION FOR LIVER TRANSPLANT: ICD-10-CM

## 2024-11-21 DIAGNOSIS — K72.90 DECOMPENSATED CIRRHOSIS: ICD-10-CM

## 2024-11-21 DIAGNOSIS — N18.9 ANEMIA DUE TO CHRONIC KIDNEY DISEASE, UNSPECIFIED CKD STAGE: ICD-10-CM

## 2024-11-21 DIAGNOSIS — K74.60 DECOMPENSATED CIRRHOSIS: ICD-10-CM

## 2024-11-21 DIAGNOSIS — D63.1 ANEMIA DUE TO CHRONIC KIDNEY DISEASE, UNSPECIFIED CKD STAGE: ICD-10-CM

## 2024-11-21 DIAGNOSIS — E11.65 TYPE 2 DIABETES MELLITUS WITH HYPERGLYCEMIA, WITHOUT LONG-TERM CURRENT USE OF INSULIN: ICD-10-CM

## 2024-11-21 LAB
ABO GROUP (TYPE) IN BLOOD: NORMAL
ALBUMIN SERPL BCP-MCNC: 3.3 G/DL (ref 3.4–5)
ALP SERPL-CCNC: 151 U/L (ref 33–136)
ALT SERPL W P-5'-P-CCNC: 15 U/L (ref 10–52)
AMPHETAMINES UR QL SCN: NORMAL
ANION GAP SERPL CALC-SCNC: 13 MMOL/L (ref 10–20)
ANTIBODY SCREEN: NORMAL
AST SERPL W P-5'-P-CCNC: 36 U/L (ref 9–39)
BARBITURATES UR QL SCN: NORMAL
BASOPHILS # BLD AUTO: 0.07 X10*3/UL (ref 0–0.1)
BASOPHILS NFR BLD AUTO: 1.2 %
BENZODIAZ UR QL SCN: NORMAL
BILIRUB DIRECT SERPL-MCNC: 0.5 MG/DL (ref 0–0.3)
BILIRUB SERPL-MCNC: 1.7 MG/DL (ref 0–1.2)
BUN SERPL-MCNC: 20 MG/DL (ref 6–23)
BZE UR QL SCN: NORMAL
CALCIUM SERPL-MCNC: 8.6 MG/DL (ref 8.6–10.6)
CANCER AG19-9 SERPL-ACNC: 16.19 U/ML
CANNABINOIDS UR QL SCN: NORMAL
CEA SERPL-MCNC: 15.9 UG/L
CHLORIDE SERPL-SCNC: 107 MMOL/L (ref 98–107)
CMV IGG AVIDITY SERPL IA-RTO: REACTIVE %
CO2 SERPL-SCNC: 23 MMOL/L (ref 21–32)
CREAT SERPL-MCNC: 2.45 MG/DL (ref 0.5–1.3)
CREAT UR-MCNC: 205.8 MG/DL (ref 20–370)
EBV EA IGG SER QL: NEGATIVE
EBV NA AB SER QL: POSITIVE
EBV VCA IGG SER IA-ACNC: POSITIVE
EBV VCA IGM SER IA-ACNC: NEGATIVE
EGFRCR SERPLBLD CKD-EPI 2021: 29 ML/MIN/1.73M*2
EOSINOPHIL # BLD AUTO: 0.93 X10*3/UL (ref 0–0.7)
EOSINOPHIL NFR BLD AUTO: 15.4 %
ERYTHROCYTE [DISTWIDTH] IN BLOOD BY AUTOMATED COUNT: 15.9 % (ref 11.5–14.5)
FENTANYL+NORFENTANYL UR QL SCN: NORMAL
GLUCOSE SERPL-MCNC: 97 MG/DL (ref 74–99)
HAV AB SER QL IA: REACTIVE
HBV CORE AB SER QL: NONREACTIVE
HCT VFR BLD AUTO: 30.7 % (ref 41–52)
HERPES SIMPLEX VIRUS 1 IGG: 5.8 INDEX
HERPES SIMPLEX VIRUS 2 IGG: <0.2 INDEX
HGB BLD-MCNC: 10 G/DL (ref 13.5–17.5)
HIV 1+2 AB+HIV1 P24 AG SERPL QL IA: NONREACTIVE
IMM GRANULOCYTES # BLD AUTO: 0.01 X10*3/UL (ref 0–0.7)
IMM GRANULOCYTES NFR BLD AUTO: 0.2 % (ref 0–0.9)
INR PPP: 2.1 (ref 0.9–1.1)
LYMPHOCYTES # BLD AUTO: 1.1 X10*3/UL (ref 1.2–4.8)
LYMPHOCYTES NFR BLD AUTO: 18.2 %
MCH RBC QN AUTO: 31.3 PG (ref 26–34)
MCHC RBC AUTO-ENTMCNC: 32.6 G/DL (ref 32–36)
MCV RBC AUTO: 96 FL (ref 80–100)
METHADONE UR QL SCN: NORMAL
MONOCYTES # BLD AUTO: 0.53 X10*3/UL (ref 0.1–1)
MONOCYTES NFR BLD AUTO: 8.8 %
NEUTROPHILS # BLD AUTO: 3.41 X10*3/UL (ref 1.2–7.7)
NEUTROPHILS NFR BLD AUTO: 56.2 %
NRBC BLD-RTO: 0 /100 WBCS (ref 0–0)
OPIATES UR QL SCN: NORMAL
OXYCODONE+OXYMORPHONE UR QL SCN: NORMAL
PCP UR QL SCN: NORMAL
PLATELET # BLD AUTO: 95 X10*3/UL (ref 150–450)
POTASSIUM SERPL-SCNC: 4.6 MMOL/L (ref 3.5–5.3)
PREALB SERPL-MCNC: 6.4 MG/DL (ref 18–40)
PROT SERPL-MCNC: 5.9 G/DL (ref 6.4–8.2)
PROT UR-ACNC: 13 MG/DL (ref 5–25)
PROT/CREAT UR: 0.06 MG/MG CREAT (ref 0–0.17)
PROTHROMBIN TIME: 23.8 SECONDS (ref 9.8–12.8)
RBC # BLD AUTO: 3.19 X10*6/UL (ref 4.5–5.9)
RH FACTOR (ANTIGEN D): NORMAL
SODIUM SERPL-SCNC: 138 MMOL/L (ref 136–145)
TREPONEMA PALLIDUM IGG+IGM AB [PRESENCE] IN SERUM OR PLASMA BY IMMUNOASSAY: NONREACTIVE
TSH SERPL-ACNC: 3.93 MIU/L (ref 0.44–3.98)
VARICELLA ZOSTER IGG INDEX: 5.4 IA
VZV IGG SER QL IA: POSITIVE
WBC # BLD AUTO: 6.1 X10*3/UL (ref 4.4–11.3)

## 2024-11-21 PROCEDURE — 86664 EPSTEIN-BARR NUCLEAR ANTIGEN: CPT

## 2024-11-21 PROCEDURE — 86833 HLA CLASS II HIGH DEFIN QUAL: CPT | Mod: OUT | Performed by: SURGERY

## 2024-11-21 PROCEDURE — 86833 HLA CLASS II HIGH DEFIN QUAL: CPT | Mod: 59,OUT | Performed by: INTERNAL MEDICINE

## 2024-11-21 PROCEDURE — 84153 ASSAY OF PSA TOTAL: CPT

## 2024-11-21 PROCEDURE — 86481 TB AG RESPONSE T-CELL SUSP: CPT

## 2024-11-21 PROCEDURE — 82610 CYSTATIN C: CPT

## 2024-11-21 PROCEDURE — 36415 COLL VENOUS BLD VENIPUNCTURE: CPT

## 2024-11-21 PROCEDURE — 84134 ASSAY OF PREALBUMIN: CPT

## 2024-11-21 PROCEDURE — 84156 ASSAY OF PROTEIN URINE: CPT

## 2024-11-21 PROCEDURE — 86644 CMV ANTIBODY: CPT

## 2024-11-21 PROCEDURE — 86695 HERPES SIMPLEX TYPE 1 TEST: CPT

## 2024-11-21 PROCEDURE — 82570 ASSAY OF URINE CREATININE: CPT

## 2024-11-21 PROCEDURE — 82248 BILIRUBIN DIRECT: CPT

## 2024-11-21 PROCEDURE — 86900 BLOOD TYPING SEROLOGIC ABO: CPT

## 2024-11-21 PROCEDURE — 86832 HLA CLASS I HIGH DEFIN QUAL: CPT | Mod: OUT | Performed by: INTERNAL MEDICINE

## 2024-11-21 PROCEDURE — 85025 COMPLETE CBC W/AUTO DIFF WBC: CPT

## 2024-11-21 PROCEDURE — 86850 RBC ANTIBODY SCREEN: CPT

## 2024-11-21 PROCEDURE — 81379 HLA I TYPING COMPLETE HR: CPT | Mod: OUT | Performed by: INTERNAL MEDICINE

## 2024-11-21 PROCEDURE — 85610 PROTHROMBIN TIME: CPT

## 2024-11-21 PROCEDURE — 86901 BLOOD TYPING SEROLOGIC RH(D): CPT

## 2024-11-21 PROCEDURE — 84443 ASSAY THYROID STIM HORMONE: CPT

## 2024-11-21 PROCEDURE — 82378 CARCINOEMBRYONIC ANTIGEN: CPT

## 2024-11-21 PROCEDURE — 86707 HEPATITIS BE ANTIBODY: CPT

## 2024-11-21 PROCEDURE — 80307 DRUG TEST PRSMV CHEM ANLYZR: CPT

## 2024-11-21 PROCEDURE — 81382 HLA II TYPING 1 LOC HR: CPT | Mod: 59,OUT | Performed by: INTERNAL MEDICINE

## 2024-11-21 PROCEDURE — 80053 COMPREHEN METABOLIC PANEL: CPT

## 2024-11-21 PROCEDURE — 86663 EPSTEIN-BARR ANTIBODY: CPT

## 2024-11-21 PROCEDURE — 87389 HIV-1 AG W/HIV-1&-2 AB AG IA: CPT

## 2024-11-21 PROCEDURE — 86708 HEPATITIS A ANTIBODY: CPT

## 2024-11-21 PROCEDURE — 86665 EPSTEIN-BARR CAPSID VCA: CPT

## 2024-11-21 PROCEDURE — 86704 HEP B CORE ANTIBODY TOTAL: CPT

## 2024-11-21 PROCEDURE — 84154 ASSAY OF PSA FREE: CPT

## 2024-11-21 PROCEDURE — 86780 TREPONEMA PALLIDUM: CPT

## 2024-11-21 PROCEDURE — 86301 IMMUNOASSAY TUMOR CA 19-9: CPT

## 2024-11-21 PROCEDURE — 86696 HERPES SIMPLEX TYPE 2 TEST: CPT

## 2024-11-21 PROCEDURE — 86787 VARICELLA-ZOSTER ANTIBODY: CPT

## 2024-11-21 PROCEDURE — 87350 HEPATITIS BE AG IA: CPT

## 2024-11-21 PROCEDURE — 86645 CMV ANTIBODY IGM: CPT

## 2024-11-22 ENCOUNTER — LAB REQUISITION (OUTPATIENT)
Dept: LAB | Facility: CLINIC | Age: 64
End: 2024-11-22
Payer: COMMERCIAL

## 2024-11-22 DIAGNOSIS — N18.6 END STAGE RENAL DISEASE (MULTI): ICD-10-CM

## 2024-11-22 LAB
CMV IGM SERPL-ACNC: <8 AU/ML
CREAT SERPL-MCNC: 2.57 MG/DL (ref 0.69–1.22)
CYSTATIN C SERPL-MCNC: 2.61 MG/L (ref 0.61–0.95)
EGFRCR-CYS SERPLBLD CKD-EPI 2021: 24 ML/MIN/{1.73_M2}
HBV E AB SERPL QL IA: NEGATIVE
HBV E AG SERPL QL IA: NEGATIVE
HLA CLS I TYP PNL BLD/T DONR HIGH RES: NORMAL
HLA RESULTS: NORMAL
HLA-A+B+C AB NFR SER: NORMAL %
HLA-DP+DQ+DR AB NFR SER: NORMAL %
HLA-DP2 QL: NORMAL
HLA-DQB1 HIGH RES: NORMAL
HLA-DRB1 HIGH RES: NORMAL
PSA FREE MFR SERPL: 40 %
PSA FREE SERPL-MCNC: 0.2 NG/ML
PSA SERPL IA-MCNC: 0.5 NG/ML (ref 0–4)

## 2024-11-23 LAB
NIL(NEG) CONTROL SPOT COUNT: NORMAL
PANEL A SPOT COUNT: 0
PANEL B SPOT COUNT: 0
POS CONTROL SPOT COUNT: NORMAL
T-SPOT. TB INTERPRETATION: NEGATIVE

## 2024-11-26 ENCOUNTER — HOSPITAL ENCOUNTER (OUTPATIENT)
Dept: ULTRASOUND IMAGING | Age: 64
Discharge: HOME | End: 2024-11-28
Payer: COMMERCIAL

## 2024-11-26 VITALS
SYSTOLIC BLOOD PRESSURE: 116 MMHG | DIASTOLIC BLOOD PRESSURE: 61 MMHG | HEART RATE: 76 BPM | RESPIRATION RATE: 18 BRPM | OXYGEN SATURATION: 99 %

## 2024-11-26 DIAGNOSIS — R18.8 OTHER ASCITES: ICD-10-CM

## 2024-11-26 LAB
ALBUMIN FLD-MCNC: 0.7 G/DL
AMYLASE FLD-CCNC: 17 U/L
APPEARANCE FLD: NORMAL
BODY FLD TYPE: NORMAL
CLOT CHECK: NORMAL
COLOR FLD: YELLOW
HLA RESULTS: NORMAL
HLA-A+B+C AB NFR SER: NORMAL %
HLA-DP+DQ+DR AB NFR SER: NORMAL %
MONOCYTES NFR FLD: 88 %
NEUTROPHILS NFR FLD: 12 %
PROT FLD-MCNC: 1.1 G/DL
RBC # FLD: <2000 CELLS/UL
SPECIMEN TYPE: NORMAL
WBC # FLD: 226 CELLS/UL

## 2024-11-26 PROCEDURE — 87205 SMEAR GRAM STAIN: CPT

## 2024-11-26 PROCEDURE — 82042 OTHER SOURCE ALBUMIN QUAN EA: CPT

## 2024-11-26 PROCEDURE — 49083 ABD PARACENTESIS W/IMAGING: CPT

## 2024-11-26 PROCEDURE — 88112 CYTOPATH CELL ENHANCE TECH: CPT

## 2024-11-26 PROCEDURE — 84157 ASSAY OF PROTEIN OTHER: CPT

## 2024-11-26 PROCEDURE — 82150 ASSAY OF AMYLASE: CPT

## 2024-11-26 PROCEDURE — 6360000002 HC RX W HCPCS: Performed by: INTERNAL MEDICINE

## 2024-11-26 PROCEDURE — P9047 ALBUMIN (HUMAN), 25%, 50ML: HCPCS | Performed by: INTERNAL MEDICINE

## 2024-11-26 PROCEDURE — 88305 TISSUE EXAM BY PATHOLOGIST: CPT

## 2024-11-26 PROCEDURE — 6360000002 HC RX W HCPCS: Performed by: PHYSICIAN ASSISTANT

## 2024-11-26 PROCEDURE — 89051 BODY FLUID CELL COUNT: CPT

## 2024-11-26 PROCEDURE — 87070 CULTURE OTHR SPECIMN AEROBIC: CPT

## 2024-11-26 RX ORDER — LIDOCAINE HYDROCHLORIDE 10 MG/ML
INJECTION, SOLUTION INFILTRATION; PERINEURAL PRN
Status: COMPLETED | OUTPATIENT
Start: 2024-11-26 | End: 2024-11-26

## 2024-11-26 RX ORDER — ALBUMIN (HUMAN) 12.5 G/50ML
SOLUTION INTRAVENOUS CONTINUOUS PRN
Status: COMPLETED | OUTPATIENT
Start: 2024-11-26 | End: 2024-11-26

## 2024-11-26 RX ADMIN — ALBUMIN (HUMAN) 50 G: 0.25 INJECTION, SOLUTION INTRAVENOUS at 13:47

## 2024-11-26 RX ADMIN — LIDOCAINE HYDROCHLORIDE 10 ML: 10 INJECTION, SOLUTION INFILTRATION; PERINEURAL at 13:42

## 2024-11-26 NOTE — OR NURSING
Patient brought into room, discussed procedure. Mitzy Nance PA-C answered all questions and concerns related to the procedure. Treatment consent signed. Patient positioned and sterile prepped for the procedure. 1% Lidocaine administered by Mitzy Nance PA-C.  A total of 7100 mL clear yellow ascitic fluid was taken. Patient tolerated procedure well. Site cleaned and dressed with a bandage. Vitals monitored and remained stable throughout. Discharge papers declined by patient. Patient escorted out of department with all belongings and without distress.

## 2024-11-28 LAB
HBV DNA SERPL NAA+PROBE-ACNC: NOT DETECTED [IU]/ML
HBV DNA SERPL NAA+PROBE-LOG IU: NORMAL {LOG_IU}/ML

## 2024-11-29 ENCOUNTER — LAB REQUISITION (OUTPATIENT)
Dept: LAB | Facility: CLINIC | Age: 64
End: 2024-11-29
Payer: COMMERCIAL

## 2024-11-29 DIAGNOSIS — N18.6 END STAGE RENAL DISEASE (MULTI): ICD-10-CM

## 2024-11-29 LAB
HLA RESULTS: NORMAL
HLA-DP+DQ+DR AB NFR SER: NORMAL %
NON-GYN CYTOLOGY REPORT: NORMAL

## 2024-11-30 LAB
MICROORGANISM SPEC CULT: NO GROWTH
MICROORGANISM/AGENT SPEC: NORMAL
SPECIMEN DESCRIPTION: NORMAL

## 2024-12-02 ENCOUNTER — APPOINTMENT (OUTPATIENT)
Dept: TRANSPLANT | Facility: HOSPITAL | Age: 64
End: 2024-12-02
Payer: COMMERCIAL

## 2024-12-02 ENCOUNTER — SOCIAL WORK (OUTPATIENT)
Dept: TRANSPLANT | Facility: HOSPITAL | Age: 64
End: 2024-12-02
Payer: COMMERCIAL

## 2024-12-02 ENCOUNTER — OFFICE VISIT (OUTPATIENT)
Dept: TRANSPLANT | Facility: HOSPITAL | Age: 64
End: 2024-12-02
Payer: COMMERCIAL

## 2024-12-02 ENCOUNTER — EDUCATION (OUTPATIENT)
Dept: TRANSPLANT | Facility: HOSPITAL | Age: 64
End: 2024-12-02

## 2024-12-02 ENCOUNTER — LAB (OUTPATIENT)
Dept: LAB | Facility: LAB | Age: 64
End: 2024-12-02
Payer: COMMERCIAL

## 2024-12-02 ENCOUNTER — NURSE ONLY (OUTPATIENT)
Dept: TRANSPLANT | Facility: HOSPITAL | Age: 64
End: 2024-12-02
Payer: COMMERCIAL

## 2024-12-02 VITALS
WEIGHT: 276.9 LBS | DIASTOLIC BLOOD PRESSURE: 75 MMHG | TEMPERATURE: 97.7 F | HEIGHT: 75 IN | OXYGEN SATURATION: 96 % | SYSTOLIC BLOOD PRESSURE: 122 MMHG | BODY MASS INDEX: 34.43 KG/M2 | HEART RATE: 90 BPM

## 2024-12-02 DIAGNOSIS — D64.9 ANEMIA, UNSPECIFIED TYPE: ICD-10-CM

## 2024-12-02 DIAGNOSIS — Z01.818 ENCOUNTER FOR PRE-TRANSPLANT EVALUATION FOR LIVER TRANSPLANT: Primary | ICD-10-CM

## 2024-12-02 DIAGNOSIS — Z01.818 ENCOUNTER FOR PRE-TRANSPLANT EVALUATION FOR KIDNEY TRANSPLANT: ICD-10-CM

## 2024-12-02 DIAGNOSIS — K74.60 DECOMPENSATED CIRRHOSIS: Primary | ICD-10-CM

## 2024-12-02 DIAGNOSIS — K75.81 METABOLIC DYSFUNCTION-ASSOCIATED STEATOHEPATITIS (MASH): ICD-10-CM

## 2024-12-02 DIAGNOSIS — K72.90 DECOMPENSATED CIRRHOSIS: Primary | ICD-10-CM

## 2024-12-02 DIAGNOSIS — Z01.818 ENCOUNTER FOR PRE-TRANSPLANT EVALUATION FOR LIVER TRANSPLANT: ICD-10-CM

## 2024-12-02 LAB
FERRITIN SERPL-MCNC: 85 NG/ML (ref 20–300)
HBV CORE IGM SER QL: NONREACTIVE
HLA CLS I TYP PNL BLD/T DONR HIGH RES: NORMAL
HLA RESULTS: NORMAL
HLA-DP2 QL: NORMAL
HLA-DQB1 HIGH RES: NORMAL
HLA-DRB1 HIGH RES: NORMAL
IRON SATN MFR SERPL: 67 % (ref 25–45)
IRON SERPL-MCNC: 177 UG/DL (ref 35–150)
TIBC SERPL-MCNC: 264 UG/DL (ref 240–445)
UIBC SERPL-MCNC: 87 UG/DL (ref 110–370)

## 2024-12-02 PROCEDURE — 80321 ALCOHOLS BIOMARKERS 1OR 2: CPT

## 2024-12-02 PROCEDURE — 90791 PSYCH DIAGNOSTIC EVALUATION: CPT | Performed by: PSYCHOLOGIST

## 2024-12-02 PROCEDURE — 1036F TOBACCO NON-USER: CPT | Performed by: PSYCHOLOGIST

## 2024-12-02 PROCEDURE — 36415 COLL VENOUS BLD VENIPUNCTURE: CPT

## 2024-12-02 PROCEDURE — 83540 ASSAY OF IRON: CPT

## 2024-12-02 PROCEDURE — 82728 ASSAY OF FERRITIN: CPT

## 2024-12-02 PROCEDURE — 99204 OFFICE O/P NEW MOD 45 MIN: CPT | Performed by: TRANSPLANT SURGERY

## 2024-12-02 PROCEDURE — 86705 HEP B CORE ANTIBODY IGM: CPT

## 2024-12-02 PROCEDURE — 3078F DIAST BP <80 MM HG: CPT | Performed by: TRANSPLANT SURGERY

## 2024-12-02 PROCEDURE — 3061F NEG MICROALBUMINURIA REV: CPT | Performed by: PSYCHOLOGIST

## 2024-12-02 PROCEDURE — 3061F NEG MICROALBUMINURIA REV: CPT | Performed by: TRANSPLANT SURGERY

## 2024-12-02 PROCEDURE — 3074F SYST BP LT 130 MM HG: CPT | Performed by: TRANSPLANT SURGERY

## 2024-12-02 PROCEDURE — 99214 OFFICE O/P EST MOD 30 MIN: CPT | Performed by: TRANSPLANT SURGERY

## 2024-12-02 PROCEDURE — 3008F BODY MASS INDEX DOCD: CPT | Performed by: TRANSPLANT SURGERY

## 2024-12-02 PROCEDURE — 83550 IRON BINDING TEST: CPT

## 2024-12-02 SDOH — ECONOMIC STABILITY: FOOD INSECURITY: WITHIN THE PAST 12 MONTHS, THE FOOD YOU BOUGHT JUST DIDN'T LAST AND YOU DIDN'T HAVE MONEY TO GET MORE.: NEVER TRUE

## 2024-12-02 SDOH — ECONOMIC STABILITY: FOOD INSECURITY: WITHIN THE PAST 12 MONTHS, YOU WORRIED THAT YOUR FOOD WOULD RUN OUT BEFORE YOU GOT MONEY TO BUY MORE.: NEVER TRUE

## 2024-12-02 SDOH — ECONOMIC STABILITY: TRANSPORTATION INSECURITY
IN THE PAST 12 MONTHS, HAS THE LACK OF TRANSPORTATION KEPT YOU FROM MEDICAL APPOINTMENTS OR FROM GETTING MEDICATIONS?: NO

## 2024-12-02 SDOH — ECONOMIC STABILITY: TRANSPORTATION INSECURITY
IN THE PAST 12 MONTHS, HAS LACK OF TRANSPORTATION KEPT YOU FROM MEETINGS, WORK, OR FROM GETTING THINGS NEEDED FOR DAILY LIVING?: NO

## 2024-12-02 SDOH — ECONOMIC STABILITY: INCOME INSECURITY: IN THE LAST 12 MONTHS, WAS THERE A TIME WHEN YOU WERE NOT ABLE TO PAY THE MORTGAGE OR RENT ON TIME?: NO

## 2024-12-02 ASSESSMENT — SOCIAL DETERMINANTS OF HEALTH (SDOH)
WITHIN THE LAST YEAR, HAVE TO BEEN RAPED OR FORCED TO HAVE ANY KIND OF SEXUAL ACTIVITY BY YOUR PARTNER OR EX-PARTNER?: NO
WITHIN THE LAST YEAR, HAVE YOU BEEN AFRAID OF YOUR PARTNER OR EX-PARTNER?: NO
WITHIN THE LAST YEAR, HAVE YOU BEEN KICKED, HIT, SLAPPED, OR OTHERWISE PHYSICALLY HURT BY YOUR PARTNER OR EX-PARTNER?: NO
WITHIN THE LAST YEAR, HAVE YOU BEEN HUMILIATED OR EMOTIONALLY ABUSED IN OTHER WAYS BY YOUR PARTNER OR EX-PARTNER?: NO
IN THE PAST 12 MONTHS, HAS THE ELECTRIC, GAS, OIL, OR WATER COMPANY THREATENED TO SHUT OFF SERVICE IN YOUR HOME?: NO
HOW HARD IS IT FOR YOU TO PAY FOR THE VERY BASICS LIKE FOOD, HOUSING, MEDICAL CARE, AND HEATING?: NOT VERY HARD

## 2024-12-02 ASSESSMENT — LIFESTYLE VARIABLES
AUDIT-C TOTAL SCORE: 0
HOW OFTEN DO YOU HAVE A DRINK CONTAINING ALCOHOL: NEVER
HOW OFTEN DO YOU HAVE SIX OR MORE DRINKS ON ONE OCCASION: NEVER
SKIP TO QUESTIONS 9-10: 1
HOW MANY STANDARD DRINKS CONTAINING ALCOHOL DO YOU HAVE ON A TYPICAL DAY: PATIENT DOES NOT DRINK

## 2024-12-02 ASSESSMENT — PATIENT HEALTH QUESTIONNAIRE - PHQ9
10. IF YOU CHECKED OFF ANY PROBLEMS, HOW DIFFICULT HAVE THESE PROBLEMS MADE IT FOR YOU TO DO YOUR WORK, TAKE CARE OF THINGS AT HOME, OR GET ALONG WITH OTHER PEOPLE: SOMEWHAT DIFFICULT
SUM OF ALL RESPONSES TO PHQ9 QUESTIONS 1 & 2: 2
2. FEELING DOWN, DEPRESSED OR HOPELESS: NOT AT ALL
1. LITTLE INTEREST OR PLEASURE IN DOING THINGS: MORE THAN HALF THE DAYS

## 2024-12-02 ASSESSMENT — PAIN SCALES - GENERAL: PAINLEVEL_OUTOF10: 0-NO PAIN

## 2024-12-02 NOTE — PROGRESS NOTES
"ENCOUNTER    Visit Type Initial Visit  Location: [place] [address]    What is your primary language? English  Other languages/fluency?     Barriers to Communication / Understanding:   [] Language [] Vision [] Hearing [] Other      []  Present     Accompanied By: WifeBobbi    Organ For Transplant: Liver and  Kidney    Ethnicity:  White    ADLs Fully Independent      Instrumental ADLs Fully Independent      Level of Activity Sedentary      DME: Rollator for long distances    Knowledge of Health Good    Why Do You Have End Stage Organ Disease   Pt reported the cause of his liver disease is metabolic.     When did you become aware of your diagnosis?   Pt reported he became aware in July.     Knowledge of Transplant / VAD:  Yes Patient Is Able To Make An Informed Decision    Patient Understands the Risks of Transplant / VAD   Yes Rejection  Yes Infection Yes Complications  Yes Low Risk of Death    Patient Understands Recovery and Follow-Up from Transplant / VAD  Yes Length Of State Yes Appointments  Yes Labs  Yes Rehabilitation    Patient Has Identified Goals of Transplant / VAD Yes  Pt reported a goal of transplant is \"to prolong my life.\"    What is your biggest concern?   Pt reported his biggest concern is \"hoping it all goes well.\"     If previously denied listing, why were you denied? Please describe that experience and how it is different now.   N/A    Overall Compliance  Good       Amount of Daily Medications: 5 (Pt's wife reported it is actually 7.)   Compliance With Medications Good    Managed By Patient  Organized By: Bottles    Have you ever changed the way you take a medication without talking to the doctor?   No     Understanding Of Medication  Good  Compliance With Appointments Good    What has your relationship been like with previous medical providers?   Pt reported he \"gets along pretty good\" with medical providers.      Fluid Restriction:   Yes [x] Compliant   64 oz    Dialysis:  [] " "What Dialysis Center   Pre [] Began       [] In Center [] Home Hemo [] Peritoneal       Attendance:  Treatment Attendance  Treatment Time     [] Shortened Treatments [] Rescheduled Treatments [] Missed Treatments          Diet:   Patient is compliant with renal restrictions     Patient is compliant with low sodium diet       # of Binders:  [] # of Binders per meal [] Meals per day      []  # of Binders per Snack [] Snacks per day       Pancreas:  [] Checks blood sugar      times/day     Hypoglycemic Episodes  Outside Interventions      Liver:  Is Lactulose prescribed Yes Dose: 15 mL  Timesper day: 2  Is patient compliant Yes      SOCIAL HISTORY  No       Years of Service      Were you involved in active combat?                 Do you receive benefits through the [facility]?        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     Resources:  SW provided resource sheets.    Patient was provided information on transplant fundraising       Insurance:  Primary Insurance [employer]  Pt reported this insurance is through his previous employer. Pt shared " "he is unsure how long this employer will continue to provide coverage due to him being laid off.     Secondary Insurance     Prescription Coverage Copay cost per month Deductible has been met for this year    Comments:     FAMILY SYSTEM    Single    Yes How long 35 years  Describe Relationship \"Good\"    How long    Describe Relationship    When      When  In a Relationship   How Long  Describe Relationship    Spouse / SO Name Bobbi  Age 58  Health  \"Good\"  Other Caregiver Responsibilities  Spouse / Significant others reaction to donation    Children:  Yes # Biological (2 sons)   # Adopted    # Step Children    :     Child #1 Name Iker  Age 34  Health \"Good\"    Lives With patient  How Much Contact Daily    Child #2 Name Karma  Age 32  Health \"Very good\"    Lives Local  How Much Contact Daily    Child #3 Name Age  Health    Lives   How Much Contact     Parents:  Raised By Both Biological Parents    Did the patient have contact with the other parent     Mother  Yes Age 68  Cause of Death Blood clot after surgery  Father  Yes Age 87  Cause of Death Heart failure    Living Parent #1  Living Parent #2    Additional Information    Siblings:  [x] # Biological (1 brother ) [x] # Half Siblings (1 brother, 1 sister total; 1 sister ) [] # Step Siblings     Sibling #1 Shahid, lives in [place], couple times a week contact  Sibling #2     Support & Recovery Plan:  Yes Adequate    Primary Support:  Name Bobbi Phone [phone]  Age 58  Relationship to Patient Wife  If employed, can they take time off work Not working   If so, is it paid time off    If not, will this impact your finances    Did they attend education classes Yes   Do they have other caregiver responsibilities (child or eldercare) No   Do they have their own conditions which may prevent them from providing care for you No  (Medical, psychological, physical limitations)    Are they available on short " notice Yes   Are they reliable Yes   Are they responsible Yes   Are they able to understand and process new information Yes   Do they have reliable transportation or will you allow them to use your vehicle Yes   Are they currently involved in your care Yes   Comments    Secondary Support:  Name Karma Phone [phone] Age 32  Relationship to Patient Son  If employed, can they take time off work Yes   If so, is it paid time off    If not, will this impact your finances    Did they attend education classes No   Do they have other caregiver responsibilities (child or eldercare) No   Do they have their own conditions which may prevent them from providing care for you No  (Medical, psychological, physical limitations)    Are they available on short notice Yes   Are they reliable Yes   Are they responsible Yes   Are they able to understand and process new information Yes   Do they have reliable transportation or will you allow them to use your vehicle Yes   Are they currently involved in your care Yes   Comments    Alternate Support:  Chito Dong Phone [phone] Age 36  Relationship to Patient Son  If employed, can they take time off work Yes   If so, is it paid time off    If not, will this impact your finances    Did they attend education classes No   Do they have other caregiver responsibilities (child or eldercare) No   Do they have their own conditions which may prevent them from providing care for you No  (Medical, psychological, physical limitations)    Are they available on short notice Yes   Are they reliable Yes   Are they responsible Yes   Are they able to understand and process new information Yes   Do they have reliable transportation or will you allow them to use your vehicle Yes   Are they currently involved in your care Yes   Comments    Alternate Support:  Name Lisandra Phone [phone] Age 71  Relationship to Patient OLINDA  If employed, can they take time off work Not working   If so, is it paid time  "off    If not, will this impact your finances    Did they attend education classes No   Do they have other caregiver responsibilities (child or eldercare) No   Do they have their own conditions which may prevent them from providing care for you No  (Medical, psychological, physical limitations)    Are they available on short notice Yes   Are they reliable Yes   Are they responsible Yes   Are they able to understand and process new information Yes   Do they have reliable transportation or will you allow them to use your vehicle Yes   Are they currently involved in your care Yes   Comments    How comfortable are you asking for and/or receiving help?      Housing:  Yes Adequate Owns home  Type of Home Trailer  Indicate steps into home/bed/bathroom: 1 step into home  Who all lives with Pt: Wife and son  Distance to [facility] 2.5 hrs  Pets 2 dogs    Transportation:  Yes Adequate  # Licensed Drivers in the Home 3  Does Patient Drive Yes If not, why  # Reliable Vehicles 3  Does Patient use Public Transportation No  Does Patient use Medical Transportation No  Comments      MENTAL HEALTH    Cognition:  No impairment observed / reported    The patient reports their mood as \"up and down.\" Pt shared he takes the good with the bad and tries to stay positive.     Reported Mental Health Diagnosis   Denied  Family History of Mental Health Diagnosis   Denied  What are patient psychosocial stressors   Pt reported his health and having low energy are current stressors.     Current Medications:  Yes  Mental Health Meds  Denied  Rx'd by   Sleep Meds  Trazodone Rx'd by PCP  Pain Meds  Denied  Rx'd by     OTC Meds   Vitamins  Past Mental Health Medications   Denied      Counseling Never  Pt declined MH resources at this time.     Has patient ever been hospitalized for mental health reasons No   Was the hospitalization voluntary  Duration   Where    When  Describe situation    Discharge Plan for Follow Up  Was Discharge Plan Completed "   Referral to Transplant Psych   Yes       Suicide Assessment:  History of Suicide Ideation No  [] Timeframe     Frequency   Plan Created  Intent to Follow Through  Outcome      History of Suicide Attempt No       History of Suicidal Ideation in the past 3 months No   Intensity   Duration     Description of Plan      Plans thought of:   Intent to Follow Through:     Current Plan for Safety    Plan for Follow-Up        In the past 6 months, has anyone physically, sexually, or emotionally abused you?   No     Ever in the past, has anyone physically, sexually, or emotionally abused you?   No    Patient's Reported Trauma History      Does Patient Feel Safe in Home   Yes        What are patient's coping behaviors   Pt reported watching videos on his phone and watching the history channel as coping behaviors.     Do you have any activities that you're unable to do now, that you hope to return to after transplant?   Pt reported he is looking forward to being able to do more outdoor activities.     Restorationism / Spirituality   Yarsani    Do you have any Zoroastrian, ethical, or personal objections to accepting blood products, surgery, and/or transplant?   No      Thought Processes:   Attitude toward interviewer Cooperative and Appropriate    Eye Contact Patient maintained good eye contact throughout appointment    Appearance The patient was neatly groomed, appropriately dressed and adequately nourished    Affect Appropriate    Thought Process Appropriate      Substance Use /Abuse History:    Current Tobacco User No  Patient uses   Tobacco Frequency   For How Long      Former Tobacco User Yes  Describe past tobacco use and date quit  Pt reported he stopped smoking cigarettes 27 years ago. Pt shared he smoked for 30 years and would smoke 1.5 packs a day at the heaviest.     Current Alcohol User No  Type of Alcohol Used   Amount  Frequency   Pattern of Alcohol Use      If alcohol use disorder is suspected, ask the following:  "  Continued to use the substance despite being told the substance is affecting their health    History of problems at work, school or home due to substance use    History of DUI's/legal issues related to substance use       Former Alcohol User Yes  Describe past alcohol use and date quit  Pt reported his last alcohol use was in April. Pt shared he would drink biweekly when out to dinner. Pt reported he would have 1-2 beers in a sitting. Pt shared he had one reckless operation in 1996 when he was \"right on the limit\" of receiving a DUI. Pt reported he attended [employer] classes after this charge.     Has patient ever gone to CD treatment   If yes, When, Where and What type of Program  Attends AA meetings    Sponsor  Do support people drink alcohol   If yes, describe support people's use  Is alcohol kept in the home   Does Patient need to sign a CD contract       Current Illegal / Unprescribed Drug User No  Type of Illegal Drug Used   Frequency  Pattern of Drug Use      Illegal / Unprescribed Drug #2  Type of Illegal Drug Used   Frequency  Pattern of Drug Use      Continue to use the substance despite being told the substance is affecting their health    History of problems at work, school or home due to substance use      Former Illegal / Unprescribed Drug User Yes  Describe past illegal drug use and date quit  Pt reported he smoked marijuana as a teenager.     Has patient ever gone to CD treatment   If yes, When, Where and What type of Program   Attends AA/NA  meetings    Is patient on a Methadone / Suboxone regiment   Do support people use illegal drugs   If yes, describe support people's use  Are illegal drugs kept in the home   Does Patient need to sign a CD contract       Prescription Drug Abuse:  No Has patient experienced feelings of addiction  No Has patient experienced symptoms of withdrawal  No Has patient experienced any side effects? e.g.  hallucinations or delusions    Does Patient Meet the Criteria " "for Alcohol Use Disorder No Diagnosis  Does Patient Meet [facility] guidelines Yes  Does Patient Meet the Criteria for Illegal Drug Use Disorder No Diagnosis  Does Patient Meet [facility] guidelines Yes    [facility] Substance Relapse Risk Factors   DSM-5 Severity Factors:       LEGAL ISSUES  Yes Arrests Pt reported he had a reckless operation in 1996.   No Currently probation or parole No   penitentiary No  When   How long   Where       Citizenship:   Where were you born? [place], [place]   If outside of , where?   What year did you move to the [place]?     Yes [place] Citizen   Green Card  Visa    Any outstanding citizenship concerns?      Advance Directives: No  HCPOA   Living Will   Who is the proxy?      SW provided documents.     Guardian:      CLARICE PARKER met with Pt and Pt's wife, Bobbi, for initial psychosocial assessment. Pt was pleasant and engaged. Pt reported he has [employer] for insurance. Pt showed a good understanding of the risks of transplant and recovery process. Pt expressed having financial concerns at this time. Pt reported the cause of his liver disease is metabolic. Pt reported a goal of transplant is \"to prolong my life.\" Pt reported good compliance with medications and appointments. Pt is pre-dialysis at this time. Pt listed his wife, Bobbi, as primary support and his son, Karma, as secondary support. Pt also listed his son, Iker, and his OLINDA, Lisandra, as alternate supports. Pt reported all supports are adequate. Pt reported his mood as \"up and down.\" Pt denied any MH history. Pt scored a 10 on the PHQ-9, indicating moderate clinical depression. Pt scored a 6 on the RAMY-7, indicating mild clinical anxiety. KEITH referral to transplant psych. Pt denied any current tobacco, alcohol, or illicit drug use. Pt reported he stopped smoking cigarettes 27 years ago. Pt shared he smoked for 30 years and would smoke 1.5 packs a day at the heaviest. Pt reported his last alcohol use was in April. Pt shared he " would have 1-2 beers in a sitting biweekly when out to dinner. Pt reported he had one reckless operation in 1996. Pt reported he smoked marijuana as a teenager. Pt scored an 11 on the SIPAT, indicating Pt is a good candidate for transplant. SW would recommend listing Pt while monitoring Pt's financial situation and once transplant psych clears Pt.    PLAN  Pt to meet with transplant psych. SW to await recommendations from transplant psych. SW to meet with Pt annually.

## 2024-12-02 NOTE — PROGRESS NOTES
BEHAVIORAL HEALTH: PSYCHOLOGICAL EVALUATION FOR TRANSPLANT CANDIDACY    Patient's Name: López Lopez  :  1960  MRN:  [id number]    Diagnosis: Pre-transplant evaluation for liver transplant and Pre-transplant evaluation for kidney transplant    Date of Service: 2024    Start Time: 11:10 am   End Time: 11:40 am  Location: [facility]plant [facility]     Reason for Referral: López Lopez has been diagnosed with end-stage liver and kidney disease in the setting of [facility] and is being seen for a psychological assessment as one part of a comprehensive evaluation for consideration for transplantation.    PROBLEM LIST   Patient Active Problem List   Diagnosis    Acute idiopathic thrombocytopenic purpura (Multi)    Anemia    History of CAD (coronary artery disease)    Mixed hyperlipidemia    Paroxysmal atrial fibrillation (Multi)    Type 2 diabetes mellitus with hyperglycemia, without long-term current use of insulin    Decompensated cirrhosis      CURRENT MEDICATIONS  Current Outpatient Medications:     apixaban (Eliquis) 5 mg tablet, Take 1 tablet (5 mg) by mouth 2 times daily (morning and late afternoon)., Disp: , Rfl:     atorvastatin (Lipitor) 10 mg tablet, Take 1 tablet (10 mg) by mouth once daily., Disp: , Rfl:     bumetanide (Bumex) 1 mg tablet, Take 2 tablets (2 mg) by mouth once daily., Disp: , Rfl:     furosemide (Lasix) 20 mg tablet, Take 1 tablet (20 mg) by mouth 2 times a day., Disp: , Rfl:     lactulose 20 gram/30 mL oral solution, Take 30 mL (20 g) by mouth., Disp: , Rfl:     metFORMIN (Glucophage) 1,000 mg tablet, Take 1 tablet (1,000 mg) by mouth every 12 hours., Disp: , Rfl:     traZODone (Desyrel) 50 mg tablet, Take 1 tablet (50 mg) by mouth once daily at bedtime., Disp: , Rfl:     PRESENTING INFORMATION: López was seen today in the company of his wife, Bobbi . Mood was  euthymic . Affect was mood congruent. Motivation to move forward with  "transplant was considered high. He spoke of his situation in a pragmatic fashion. He explained that his symptoms appeared to manifest in May 2024, at which time his platelets were found to be off. After a couple of months he started developing ascites. He expressed frustration that his doctor did not seem to realize what was happening; for instance, his ascites was attributed to the steroids he'd been prescribed. He has been going for paracenteses to every two weeks, though he was going weekly until his local treating physician told him that was too much.    ADHERENCE  Medications:   -Organization: turns bottles upside down after he takes a dose   -Missed doses: never or rarely.   -Refills: Pharmacy provides medications on time.   -Use of reminders: none.    Diet:   -Type of diet followed:  His appetite is low--he is trying to consume a low sodium diet. He's lost 65 or 70 pounds.   -Barriers to following this diet: poor appetite    Attendance at Appointments:   -Dialysis: not applicable.   -Medical appointments: Yes   -Mental health: N/A   -PT/Rehabilitation: he is doing weights at home and is walking    SUBSTANCE USE  Tobacco:  quit a long time ago    Alcohol: He was never much of a drinker, has stopped. He would have a beer with dinner on payday.     Illicit Substance Use: Never used recreational drugs    Substance Disorder Treatment: none reported    PSYCHOLOGICAL HISTORY   Current Mood: He described his mood as \"not too bad,\" adding that he has \"a lot of support and prayers.\" He admitted he has times he can't shut his brain off.    Previously Diagnosed Psychological Disorders:   Mood Disorders: none formally diagnosed  Developmental/Behavioral Disorders: none formally diagnosed   Cognitive Disorders: none formally diagnosed  Personality Disorders: none formally diagnosed  Psychotic Disorders: none formally diagnosed    Treatment for Psychological Disorders:  Outpatient counseling: none  Pharmacological " management: none  Inpatient treatment: none    Suicidal/Homicidal Tendencies: denied    Coping Strategies: talks to friends    Suicide Risk Assessment  Risk factors: chronic medical illness  Protective factors: marriage/partnered, children, expressed desire to live, and [employer] beliefs    Mental Status Exam:  General Appearance: well groomed, appropriate eye contact  Attitude/Behavior: cooperative  Motor: no psychomotor agitation or retardation, no tremor or other abnormal movements  Speech: normal rate, volume, prosody  Gait/Station: WFL  Mood: euthymic   Affect: euthymic, full-range  Thought Process: linear, goal directed  Thought Associations: no loosening of associations  Thought Content: normal  Perception: no perceptual abnormalities noted  Sensorium: alert and oriented to person, place, time and situation  Insight: intact  Judgment: intact  Cognition: cognitively intact to conversational testing with respect to attention, orientation, fund of knowledge, recent and remote memory, and language    SOCIAL HISTORY  Siblings: one living brother, 2 sibs have   Parents:   Relationship Status:  almost 35 years  Children: two   Education: high school, college prep class  Occupation: N/A  Employment Status: unemployed  Current living situation: Their older son, Iker, lives with them. Their younger son makes himself available to help when needed.      IMPRESSIONS  Adherence: Mr. Lopez appears to use his medications appropriately and is trying to adhere to a recommended diet. He attends appointments as scheduled.    Relapse Risk Issues:  no history of significant alcohol or other substance use    Psychological Issues: Mr. Lopez seems to be an even-keeled person with good coping skills; however, he acknowledged some tendency to ruminate lately over his situation. I did not perceive that this is interfering with his ability to engage in usual daily activities or his own care. No mental health  interventions appear warranted.     Social Support: appropriate    PLAN  This assessment was conducted as part of a comprehensive evaluation with input provided by all members of the multidisciplinary team. Recommendations are not developed by any one team member and might be modified over time with additional care visits, input from other providers, or new test results.    Mr. Lopez is appropriate for listing from a psychological perspective.    Doris Gómez, PhD  Clinical Psychologist, Transplant [facility]  Clinical Professor, Department of Psychiatry  [facility]      Note to patient: The 21st Century Cares Act makes medical notes like these available to patients in the interest of transparency; however, be advised this is a medical document. It is intended as communication with other medical professionals, not as a form of communication from the practitioner to the patient. It is written in medical language and may contain abbreviations or verbiage that are unfamiliar. It may appear blunt or direct. Medical documents are intended to carry relevant information, facts as evident, and the clinical opinion of the practitioner.

## 2024-12-02 NOTE — PROGRESS NOTES
Listing education provided to patient and wife by Dr. Snell.  Patient was alert and oriented. All questions answered. Patient provided a copy of consent.     LISTING EDUCATION   Patient educated regarding the following prior to placement on the transplant waiting list:   The patient's medical condition, prognosis, and treatment plan.   The expectations and patient responsibilities while on the waiting list, including:   Keeping the transplant center informed of any changes in contact information or insurance coverage   Notifying the transplant center of any changes in medical status   Required testing and/or re-evaluation appointments while awaiting transplant  An overview of the surgical procedure, including potential risks and alternatives.   Information regarding what to expect during the inpatient admission and recovery period.   A discussion regarding organ offers and types of potential donors, including potential risks that may be associated with specific types of donors that could affect the success of the transplant or the health of the patient.   The right to refuse transplantation.   Patient was given the opportunity to have questions answered. Patient was provided a copy of the signed informed consent for transplant listing.   Education provided by:   Transplant Coordinator: Desirae Clemons RN   Transplant Physician: Jason Snell MD   Signed listing informed consent received? Liver & Kidney  Patient agrees to be listed for the following: DCD, HCV+, HepBcAb+, KDPI > 85%

## 2024-12-02 NOTE — PATIENT INSTRUCTIONS
Plan:    Please go to the lab prior to leaving today.    Follow up with Dr. Shah's office to schedule an EGD and Colonoscopy within 1 month. If they are unable to complete procedures in that time frame please notify your transplant coordinator.     Your transplant coordinator will be placing several orders for upcoming evaluation testing. You may receive messages in [employer] advising you to schedule testing (related to your evaluation) - please do not schedule. You will be contacted by one of the liver transplant secretaries to schedule your evaluation testing and follow up appointments.     If you have any questions or concerns please contact your coordinator via [employer] message, or by calling the liver transplant office at [phone].

## 2024-12-03 ENCOUNTER — DOCUMENTATION (OUTPATIENT)
Dept: TRANSPLANT | Facility: HOSPITAL | Age: 64
End: 2024-12-03
Payer: COMMERCIAL

## 2024-12-03 ENCOUNTER — TELEPHONE (OUTPATIENT)
Dept: TRANSPLANT | Facility: HOSPITAL | Age: 64
End: 2024-12-03
Payer: COMMERCIAL

## 2024-12-03 DIAGNOSIS — L29.9 PRURITUS: Primary | ICD-10-CM

## 2024-12-03 DIAGNOSIS — K75.81 METABOLIC DYSFUNCTION-ASSOCIATED STEATOHEPATITIS (MASH): ICD-10-CM

## 2024-12-03 ASSESSMENT — PATIENT HEALTH QUESTIONNAIRE - PHQ9
1. LITTLE INTEREST OR PLEASURE IN DOING THINGS: MORE THAN HALF THE DAYS
2. FEELING DOWN, DEPRESSED OR HOPELESS: NOT AT ALL
6. FEELING BAD ABOUT YOURSELF - OR THAT YOU ARE A FAILURE OR HAVE LET YOURSELF OR YOUR FAMILY DOWN: SEVERAL DAYS
5. POOR APPETITE OR OVEREATING: NEARLY EVERY DAY
7. TROUBLE CONCENTRATING ON THINGS, SUCH AS READING THE NEWSPAPER OR WATCHING TELEVISION: NOT AT ALL
9. THOUGHTS THAT YOU WOULD BE BETTER OFF DEAD, OR OF HURTING YOURSELF: NOT AT ALL
SUM OF ALL RESPONSES TO PHQ9 QUESTIONS 1 & 2: 2
SUM OF ALL RESPONSES TO PHQ QUESTIONS 1-9: 10
3. TROUBLE FALLING OR STAYING ASLEEP OR SLEEPING TOO MUCH: MORE THAN HALF THE DAYS
8. MOVING OR SPEAKING SO SLOWLY THAT OTHER PEOPLE COULD HAVE NOTICED. OR THE OPPOSITE, BEING SO FIGETY OR RESTLESS THAT YOU HAVE BEEN MOVING AROUND A LOT MORE THAN USUAL: NOT AT ALL
10. IF YOU CHECKED OFF ANY PROBLEMS, HOW DIFFICULT HAVE THESE PROBLEMS MADE IT FOR YOU TO DO YOUR WORK, TAKE CARE OF THINGS AT HOME, OR GET ALONG WITH OTHER PEOPLE: SOMEWHAT DIFFICULT
4. FEELING TIRED OR HAVING LITTLE ENERGY: MORE THAN HALF THE DAYS

## 2024-12-03 ASSESSMENT — ANXIETY QUESTIONNAIRES
6. BECOMING EASILY ANNOYED OR IRRITABLE: SEVERAL DAYS
IF YOU CHECKED OFF ANY PROBLEMS ON THIS QUESTIONNAIRE, HOW DIFFICULT HAVE THESE PROBLEMS MADE IT FOR YOU TO DO YOUR WORK, TAKE CARE OF THINGS AT HOME, OR GET ALONG WITH OTHER PEOPLE: SOMEWHAT DIFFICULT
GAD7 TOTAL SCORE: 6
2. NOT BEING ABLE TO STOP OR CONTROL WORRYING: SEVERAL DAYS
1. FEELING NERVOUS, ANXIOUS, OR ON EDGE: SEVERAL DAYS
7. FEELING AFRAID AS IF SOMETHING AWFUL MIGHT HAPPEN: SEVERAL DAYS
4. TROUBLE RELAXING: SEVERAL DAYS
3. WORRYING TOO MUCH ABOUT DIFFERENT THINGS: SEVERAL DAYS
5. BEING SO RESTLESS THAT IT IS HARD TO SIT STILL: NOT AT ALL

## 2024-12-03 NOTE — PROGRESS NOTES
Late Entry: Patient attended Pre transplant education for liver/kidney transplant with his wife on 11/21/2024.     PRE-TRANSPLANT EDUCATION  .Patient received education regarding the following topics as part of their pre-transplant evaluation:  .The evaluation process, including:  .Transplant team members and roles     Required consultations and testing  · Selection criteria and suitability for transplant   · Listing process and receiving an organ offer   · Psychosocial and financial considerations for a successful transplant   · Patient responsibilities, including the necessity of adhering to a strict medical regimen   An overview of the surgical procedure   Potential medical, surgical, and psychosocial risks to transplantation, including:   · Wound infection   · Pneumonia   · Blood clot formation   · Organ rejection, failure, and possibility of re-transplantation   · Lifetime immunosuppression therapy and associated risks   · Arrhythmias and cardiovascular collapse   · Multi-organ system failure   · Death   · Depression   · Post-Traumatic Stress Disorder   · Generalized anxiety, issues of dependence, and feelings of guilt   Available alternatives to transplantation   Donor risk factors that could affect the success of the transplant and the health of the patient, including:   · Donor age   · Donor medical and social history   · Condition of the organ   · Risk of cy cancer, HIV, Hepatitis B, Hepatitis C, or malaria if the infection is not detectable at the time of donation   Patient's right to withdraw consent for transplantation at any time during the process   Transplants not performed in a Medicare-approved transplant center could affect the patient's ability to have immunosuppression medication paid for under Medicare part B.   Multiple listing options.   Patient was given the opportunity to have questions answered. Patient was provided a copy of the signed informed consent for transplant evaluation.    Signed evaluation informed consent received? Liver and Kidney 11/21/2024. Clotilde Monterroso RN

## 2024-12-05 LAB
LABORATORY REPORT: NORMAL
PETH INTERPRETATION: NORMAL
PLPETH BLD-MCNC: <10 NG/ML
POPETH BLD-MCNC: <10 NG/ML

## 2024-12-05 RX ORDER — CYPROHEPTADINE HYDROCHLORIDE 4 MG/1
4 TABLET ORAL 3 TIMES DAILY PRN
Qty: 30 TABLET | Refills: 0 | Status: SHIPPED | OUTPATIENT
Start: 2024-12-05 | End: 2025-03-05

## 2024-12-10 ENCOUNTER — HOSPITAL ENCOUNTER (OUTPATIENT)
Dept: ULTRASOUND IMAGING | Age: 64
Discharge: HOME | End: 2024-12-12
Payer: COMMERCIAL

## 2024-12-10 VITALS
HEART RATE: 79 BPM | OXYGEN SATURATION: 96 % | SYSTOLIC BLOOD PRESSURE: 111 MMHG | RESPIRATION RATE: 18 BRPM | DIASTOLIC BLOOD PRESSURE: 60 MMHG

## 2024-12-10 DIAGNOSIS — Z01.818 ENCOUNTER FOR PRE-TRANSPLANT EVALUATION FOR KIDNEY TRANSPLANT: ICD-10-CM

## 2024-12-10 DIAGNOSIS — Z86.79 HISTORY OF CAD (CORONARY ARTERY DISEASE): ICD-10-CM

## 2024-12-10 DIAGNOSIS — E78.2 MIXED HYPERLIPIDEMIA: ICD-10-CM

## 2024-12-10 DIAGNOSIS — R18.8 OTHER ASCITES: ICD-10-CM

## 2024-12-10 DIAGNOSIS — K75.81 METABOLIC DYSFUNCTION-ASSOCIATED STEATOHEPATITIS (MASH): ICD-10-CM

## 2024-12-10 DIAGNOSIS — Z01.818 ENCOUNTER FOR PRE-TRANSPLANT EVALUATION FOR LIVER TRANSPLANT: Primary | ICD-10-CM

## 2024-12-10 DIAGNOSIS — D64.9 ANEMIA, UNSPECIFIED TYPE: ICD-10-CM

## 2024-12-10 DIAGNOSIS — Z95.5 STATUS POST INSERTION OF DRUG-ELUTING STENT INTO LEFT ANTERIOR DESCENDING ARTERY FOR CORONARY ARTERY DISEASE: ICD-10-CM

## 2024-12-10 DIAGNOSIS — E11.22 TYPE 2 DIABETES MELLITUS WITH DIABETIC CHRONIC KIDNEY DISEASE, UNSPECIFIED CKD STAGE, UNSPECIFIED WHETHER LONG TERM INSULIN USE: ICD-10-CM

## 2024-12-10 LAB
ALBUMIN FLD-MCNC: 0.7 G/DL
AMYLASE FLD-CCNC: 19 U/L
APPEARANCE FLD: CLEAR
BODY FLD TYPE: NORMAL
CLOT CHECK: NORMAL
COLOR FLD: YELLOW
MONOCYTES NFR FLD: 89 %
NEUTROPHILS NFR FLD: 11 %
PROT FLD-MCNC: 1.1 G/DL
RBC # FLD: <2000 CELLS/UL
SPECIMEN TYPE: NORMAL
WBC # FLD: 255 CELLS/UL

## 2024-12-10 PROCEDURE — 82150 ASSAY OF AMYLASE: CPT

## 2024-12-10 PROCEDURE — 49083 ABD PARACENTESIS W/IMAGING: CPT

## 2024-12-10 PROCEDURE — P9047 ALBUMIN (HUMAN), 25%, 50ML: HCPCS | Performed by: INTERNAL MEDICINE

## 2024-12-10 PROCEDURE — 87205 SMEAR GRAM STAIN: CPT

## 2024-12-10 PROCEDURE — 84157 ASSAY OF PROTEIN OTHER: CPT

## 2024-12-10 PROCEDURE — 6360000002 HC RX W HCPCS: Performed by: INTERNAL MEDICINE

## 2024-12-10 PROCEDURE — 6360000002 HC RX W HCPCS: Performed by: PHYSICIAN ASSISTANT

## 2024-12-10 PROCEDURE — 88305 TISSUE EXAM BY PATHOLOGIST: CPT

## 2024-12-10 PROCEDURE — 82042 OTHER SOURCE ALBUMIN QUAN EA: CPT

## 2024-12-10 PROCEDURE — 89051 BODY FLUID CELL COUNT: CPT

## 2024-12-10 PROCEDURE — 87070 CULTURE OTHR SPECIMN AEROBIC: CPT

## 2024-12-10 PROCEDURE — 88112 CYTOPATH CELL ENHANCE TECH: CPT

## 2024-12-10 RX ORDER — ALBUTEROL SULFATE 90 UG/1
1 INHALANT RESPIRATORY (INHALATION) ONCE
OUTPATIENT
Start: 2024-12-10

## 2024-12-10 RX ORDER — ALBUTEROL SULFATE 0.83 MG/ML
3 SOLUTION RESPIRATORY (INHALATION) ONCE
OUTPATIENT
Start: 2024-12-10 | End: 2024-12-10

## 2024-12-10 RX ORDER — ALBUMIN (HUMAN) 12.5 G/50ML
SOLUTION INTRAVENOUS CONTINUOUS PRN
Status: COMPLETED | OUTPATIENT
Start: 2024-12-10 | End: 2024-12-10

## 2024-12-10 RX ORDER — LIDOCAINE HYDROCHLORIDE 10 MG/ML
INJECTION, SOLUTION INFILTRATION; PERINEURAL PRN
Status: COMPLETED | OUTPATIENT
Start: 2024-12-10 | End: 2024-12-10

## 2024-12-10 RX ADMIN — ALBUMIN (HUMAN) 50 G: 0.25 INJECTION, SOLUTION INTRAVENOUS at 12:05

## 2024-12-10 RX ADMIN — LIDOCAINE HYDROCHLORIDE 10 ML: 10 INJECTION, SOLUTION INFILTRATION; PERINEURAL at 12:09

## 2024-12-10 ASSESSMENT — ENCOUNTER SYMPTOMS
CONFUSION: 0
TREMORS: 0
WEAKNESS: 0
SHORTNESS OF BREATH: 0
FREQUENCY: 1
ABDOMINAL PAIN: 0
FATIGUE: 1
ABDOMINAL DISTENTION: 1

## 2024-12-10 NOTE — PROGRESS NOTES
"History of Present Illness  López Lopez is a 64 y.o. male who presents for kidney/pancreas transplant evaluation visit. He is not currently on dialysis.  The cause of his liver disease MASLD.  History of morbid obesity.  GFR is now less than 30. .  He does have a history of heart attack. He  does not have a history of malignancy. López Lopez's overall functional status is Fair.  He has good support.  He continues on Apixiban.  Occasional alcohol.    Patient Active Problem List   Diagnosis    Acute idiopathic thrombocytopenic purpura (Multi)    Anemia    History of CAD (coronary artery disease)    Mixed hyperlipidemia    Paroxysmal atrial fibrillation (Multi)    Type 2 diabetes mellitus with hyperglycemia, without long-term current use of insulin    Decompensated cirrhosis       Review of Systems   Constitutional:  Positive for fatigue.   Respiratory:  Negative for shortness of breath.    Cardiovascular:  Positive for leg swelling.   Gastrointestinal:  Positive for abdominal distention. Negative for abdominal pain.   Genitourinary:  Positive for frequency.   Neurological:  Negative for tremors and weakness.   Psychiatric/Behavioral:  Negative for confusion.         Objective   /75   Pulse 90   Temp 36.5 °C (97.7 °F) (Temporal)   Ht 1.892 m (6' 2.5\")   Wt 126 kg (276 lb 14.4 oz)   SpO2 96%   BMI 35.08 kg/m²   Physical Exam  Constitutional:       General: He is not in acute distress.     Appearance: He is obese. He is not ill-appearing.   Cardiovascular:      Rate and Rhythm: Normal rate.      Pulses: Normal pulses.   Pulmonary:      Effort: Pulmonary effort is normal.   Abdominal:      Palpations: Abdomen is soft. There is no mass.      Hernia: No hernia is present.   Musculoskeletal:         General: Normal range of motion.   Skin:     General: Skin is warm.          Lab Review    Blood Type: No results found for: \"ABORH\"  Lab Results   Component Value Date    CREATININE 6.09 (H) 09/26/2023    " K 4.8 09/26/2023    GLUCOSE 180 (H) 09/26/2023    HCT 37.8 09/26/2023    WBC 8.1 09/26/2023     09/26/2023    CALCIUM 10.3 09/26/2023     MELD 3.0: 25 at 11/21/2024 11:36 AM  MELD-Na: 26 at 11/21/2024 11:36 AM  Calculated from:  Serum Creatinine: 2.45 mg/dL at 11/21/2024 11:36 AM  Serum Sodium: 138 mmol/L (Using max of 137 mmol/L) at 11/21/2024 11:36 AM  Total Bilirubin: 1.7 mg/dL at 11/21/2024 11:36 AM  Serum Albumin: 3.3 g/dL at 11/21/2024 11:36 AM  INR(ratio): 2.1 at 11/21/2024 11:36 AM  Age at listing (hypothetical): 64 years  Sex: Male at 11/21/2024 11:36 AM      CT   Yes    Date: 7/25/24  demonstrating   1. Hepatic cirrhosis with mild splenomegaly and small/moderate abdominopelvic   ascites.  The portal vein appears patent.   2. Perisplenic collaterals related to spleno renal shunt noted.     Cardiographics  ECHO: No  STRESS TEST; No  CATH: No    Assessment/Plan    Diagnoses: ESRD requiring renal replacement therapy.     López Lopez is a good candidate for liver/kidney transplantation. He has morbid obesity but his BMI is down to 35.  He has    He will need the following tests prior to listing     Cardiac:    Echocardiogram  Stress test  MRI cardiac  CT Cardiac calcium score  Malignancy screening: Yes - needs colonoscopy, PSA  Six minute walk: No  CT Scan: Yes - import [facility] from [facility]  Screening Chest CT: No    Advantages and disadvantages of transplantation were reviewed. Discussed the operative procedure and potential complications, particularly in regard to the need for re-operation. I addressed issues of post-operative recovery and follow-up. Lastly, I discussed the need for immunosuppressive therapy and the risk associated with this treatment.  I reviewed the risks of various donor organs including transmission of disease and need for dialysis after transplant. The patient consents to receive offers from high KDPI (Yes), hepatitis B core +  (Yes), Hepatitis C+ (Yes) donors. Current  center SRTR results were provided to the patient. I answered all of his questions to the best of my ability.     He appears to have good understanding of his medical condition and the transplant process.    Jason Snell MD  Professor of Surgery    I spent 45 minutes on the professional care of this patient including record review, physical exam, patient counseling, and documentation.

## 2024-12-10 NOTE — PROCEDURES
Procedure: Ultrasound Guided Paracentesis    Diagnosis: Ascites    Findings: Large volume ascites, successful catheter placement with clear yellow ascitic fluid return    Specimen:  Approximately 60cc obtained and sent for analysis (orders from referring physician).  Total amount of fluid removed 8450 ml    Anesthesia: Local infiltration of 10cc 1% Lidocaine without epi    EBL: Minimal.    Complication: None immediately post procedure.    Plan: Discharge to home following paracentesis.      Comments:    See radiology dictation in PACs for image review and additional procedural information.

## 2024-12-10 NOTE — OR NURSING
Patient brought into room for ultrasound guided paracentesis, discussed procedure. Mitzy Nance PA-C answered all questions and concerns related to the procedure. Treatment consent signed. Patient positioned and sterile prepped for the procedure. 1% Lidocaine administered by Mitzy Nance PA-C.  A total of 8450  mL clear yellow ascitic fluid was taken. Patient tolerated procedure well. Site cleaned and dressed with a bandage. Albumin given per order.  Vitals monitored and remained stable throughout. Discharge papers declined. Patient escorted out of department with all belongings and without distress.

## 2024-12-11 LAB — NON-GYN CYTOLOGY REPORT: NORMAL

## 2024-12-13 ENCOUNTER — TELEPHONE (OUTPATIENT)
Dept: TRANSPLANT | Facility: HOSPITAL | Age: 64
End: 2024-12-13
Payer: COMMERCIAL

## 2024-12-13 NOTE — TELEPHONE ENCOUNTER
PT called saying he is scheduled for an EGD on 12/23 with Amador at [facility], and they were going to discuss a colonoscopy. Told PT we can ask Desirae about getting the colonoscopy scheduled through our [facility], since he has had one scheduled several times but due to hospitalizations or low PLT counts, it never got done. Dr. Schroeder phone number is [phone] and PT number is [phone]

## 2024-12-24 ENCOUNTER — HOSPITAL ENCOUNTER (OUTPATIENT)
Dept: ULTRASOUND IMAGING | Age: 64
Discharge: HOME | End: 2024-12-26
Payer: COMMERCIAL

## 2024-12-24 ENCOUNTER — HOSPITAL ENCOUNTER (OUTPATIENT)
Age: 64
Discharge: HOME | End: 2024-12-24
Payer: COMMERCIAL

## 2024-12-24 VITALS
RESPIRATION RATE: 18 BRPM | OXYGEN SATURATION: 98 % | HEART RATE: 73 BPM | SYSTOLIC BLOOD PRESSURE: 114 MMHG | DIASTOLIC BLOOD PRESSURE: 58 MMHG

## 2024-12-24 DIAGNOSIS — R18.8 OTHER ASCITES: ICD-10-CM

## 2024-12-24 LAB
ALBUMIN FLD-MCNC: 0.6 G/DL
AMYLASE FLD-CCNC: 20 U/L
APPEARANCE FLD: CLEAR
BODY FLD TYPE: NORMAL
CLOT CHECK: NORMAL
COLOR FLD: YELLOW
INR PPP: 1.5
MONOCYTES NFR FLD: 89 %
NEUTROPHILS NFR FLD: 11 %
PLATELET # BLD AUTO: 98 K/UL (ref 130–450)
PLATELET CONFIRMATION: NORMAL
PROT FLD-MCNC: 1.1 G/DL
PROTHROMBIN TIME: 16.4 SEC (ref 9.3–12.4)
RBC # FLD: <2000 CELLS/UL
SPECIMEN TYPE: NORMAL
WBC # FLD: 209 CELLS/UL

## 2024-12-24 PROCEDURE — 87205 SMEAR GRAM STAIN: CPT

## 2024-12-24 PROCEDURE — 6360000002 HC RX W HCPCS: Performed by: INTERNAL MEDICINE

## 2024-12-24 PROCEDURE — 36415 COLL VENOUS BLD VENIPUNCTURE: CPT

## 2024-12-24 PROCEDURE — 88112 CYTOPATH CELL ENHANCE TECH: CPT

## 2024-12-24 PROCEDURE — 89051 BODY FLUID CELL COUNT: CPT

## 2024-12-24 PROCEDURE — 82042 OTHER SOURCE ALBUMIN QUAN EA: CPT

## 2024-12-24 PROCEDURE — 85049 AUTOMATED PLATELET COUNT: CPT

## 2024-12-24 PROCEDURE — 6360000002 HC RX W HCPCS: Performed by: PHYSICIAN ASSISTANT

## 2024-12-24 PROCEDURE — 49083 ABD PARACENTESIS W/IMAGING: CPT

## 2024-12-24 PROCEDURE — 88305 TISSUE EXAM BY PATHOLOGIST: CPT

## 2024-12-24 PROCEDURE — 87077 CULTURE AEROBIC IDENTIFY: CPT

## 2024-12-24 PROCEDURE — P9047 ALBUMIN (HUMAN), 25%, 50ML: HCPCS | Performed by: INTERNAL MEDICINE

## 2024-12-24 PROCEDURE — 85610 PROTHROMBIN TIME: CPT

## 2024-12-24 PROCEDURE — 87070 CULTURE OTHR SPECIMN AEROBIC: CPT

## 2024-12-24 PROCEDURE — 84157 ASSAY OF PROTEIN OTHER: CPT

## 2024-12-24 PROCEDURE — 82150 ASSAY OF AMYLASE: CPT

## 2024-12-24 RX ORDER — ALBUMIN (HUMAN) 12.5 G/50ML
SOLUTION INTRAVENOUS CONTINUOUS PRN
Status: COMPLETED | OUTPATIENT
Start: 2024-12-24 | End: 2024-12-24

## 2024-12-24 RX ORDER — LIDOCAINE HYDROCHLORIDE 10 MG/ML
INJECTION, SOLUTION INFILTRATION; PERINEURAL PRN
Status: COMPLETED | OUTPATIENT
Start: 2024-12-24 | End: 2024-12-24

## 2024-12-24 RX ADMIN — ALBUMIN (HUMAN) 50 G: 0.25 INJECTION, SOLUTION INTRAVENOUS at 08:50

## 2024-12-24 RX ADMIN — LIDOCAINE HYDROCHLORIDE 10 ML: 10 INJECTION, SOLUTION INFILTRATION; PERINEURAL at 08:47

## 2024-12-24 NOTE — OR NURSING
Patient brought into room, discussed procedure. Mitzy Nance PA-C answered all questions and concerns related to the procedure. Treatment consent signed. Patient positioned and sterile prepped for the procedure. 1% Lidocaine administered by Mitzy Nance PA-C.  A total of 8900 mL clear yellow ascitic fluid was taken. Patient tolerated procedure well. Site cleaned and dressed with a bandage. Vitals monitored and remained stable throughout. Discharge papers declined. Patient escorted out of department with all belongings and without distress.

## 2024-12-24 NOTE — PROCEDURES
Procedure: Ultrasound Guided Paracentesis    Diagnosis: Ascites    Findings: Large volume ascites, successful catheter placement with clear yellow ascitic fluid return    Specimen: Approximately 50cc obtained and sent for analysis (orders from referring physician).  Total amount of fluid removed to be reported in PACS.    Anesthesia: Local infiltration of 10cc 1% Lidocaine without epi    EBL: Minimal.    Complication: None immediately post procedure.    Plan: Discharge to home following paracentesis.     Comments:    See radiology dictation in PACs for image review and additional procedural information.

## 2024-12-26 LAB — NON-GYN CYTOLOGY REPORT: NORMAL

## 2024-12-27 LAB
MICROORGANISM SPEC CULT: ABNORMAL
MICROORGANISM/AGENT SPEC: ABNORMAL
SPECIMEN DESCRIPTION: ABNORMAL

## 2025-01-07 ENCOUNTER — HOSPITAL ENCOUNTER (OUTPATIENT)
Dept: ULTRASOUND IMAGING | Age: 65
Discharge: HOME | End: 2025-01-09
Payer: COMMERCIAL

## 2025-01-07 VITALS
DIASTOLIC BLOOD PRESSURE: 68 MMHG | HEART RATE: 81 BPM | OXYGEN SATURATION: 95 % | SYSTOLIC BLOOD PRESSURE: 129 MMHG | RESPIRATION RATE: 18 BRPM

## 2025-01-07 DIAGNOSIS — R18.8 OTHER ASCITES: ICD-10-CM

## 2025-01-07 PROCEDURE — P9047 ALBUMIN (HUMAN), 25%, 50ML: HCPCS | Performed by: PHYSICIAN ASSISTANT

## 2025-01-07 PROCEDURE — 6360000002 HC RX W HCPCS: Performed by: PHYSICIAN ASSISTANT

## 2025-01-07 PROCEDURE — 49083 ABD PARACENTESIS W/IMAGING: CPT

## 2025-01-07 RX ORDER — ALBUMIN (HUMAN) 12.5 G/50ML
SOLUTION INTRAVENOUS CONTINUOUS PRN
Status: DISCONTINUED | OUTPATIENT
Start: 2025-01-07 | End: 2025-01-10 | Stop reason: HOSPADM

## 2025-01-07 RX ADMIN — ALBUMIN (HUMAN) 50 G: 0.25 INJECTION, SOLUTION INTRAVENOUS at 14:20

## 2025-01-07 NOTE — PROGRESS NOTES
Patient was identified via 2 patient identifiers and arrived to pre-work up area in stable condition.  The patient is here for an ultrasound-guided abdominal paracentesis. Procedure explained by TIFF Guzmán and Mitzy GOODRICH.  All questions answered, patient expresses understanding and informed consent was signed. Pre-procedure routine/checklist was completed.  The patient's last paracentesis was on 12/24/24, performed by Mitzy GOODRICH with removal of 8900 mL of ascitic fluid.    The abdomen was scanned and marked under the guidance of ultrasound.  The tray was prepared sterilely and the patient was prepped and draped sterilely.  Lidocaine 2% 7 mL was injected intradermally for anesthetic and an incision was made into the RLQ of the abdomen.      Time Out: 1401  Procedure Start:  1401    Drain Pulled:  1437  Fluid drained: 6750 mL, clear pale yellow colored fluid  No specimens were sent for testing.     The patient tolerated the procedure well.  The incision site cleansed and dry dressing of gauze and OpSite were applied. No bleeding, swelling or complications noted.   Discharge instructions were reviewed.  The patient had no questions.  Vital signs remained stable and the dressing remained clean, dry, and intact.  The patient left the department in stable condition, with no complaints of pain or discomfort, .

## 2025-01-08 ENCOUNTER — APPOINTMENT (OUTPATIENT)
Dept: RADIOLOGY | Facility: CLINIC | Age: 65
End: 2025-01-08
Payer: COMMERCIAL

## 2025-01-08 ENCOUNTER — HOSPITAL ENCOUNTER (OUTPATIENT)
Dept: CARDIOLOGY | Facility: CLINIC | Age: 65
Discharge: HOME | End: 2025-01-08
Payer: COMMERCIAL

## 2025-01-08 ENCOUNTER — HOSPITAL ENCOUNTER (OUTPATIENT)
Dept: RADIOLOGY | Facility: CLINIC | Age: 65
Discharge: HOME | End: 2025-01-08
Payer: COMMERCIAL

## 2025-01-08 DIAGNOSIS — Z86.79 HISTORY OF CAD (CORONARY ARTERY DISEASE): ICD-10-CM

## 2025-01-08 DIAGNOSIS — E11.22 TYPE 2 DIABETES MELLITUS WITH DIABETIC CHRONIC KIDNEY DISEASE, UNSPECIFIED CKD STAGE, UNSPECIFIED WHETHER LONG TERM INSULIN USE: ICD-10-CM

## 2025-01-08 DIAGNOSIS — Z95.5 STATUS POST INSERTION OF DRUG-ELUTING STENT INTO LEFT ANTERIOR DESCENDING ARTERY FOR CORONARY ARTERY DISEASE: ICD-10-CM

## 2025-01-08 DIAGNOSIS — Z01.818 ENCOUNTER FOR PRE-TRANSPLANT EVALUATION FOR KIDNEY TRANSPLANT: ICD-10-CM

## 2025-01-08 DIAGNOSIS — D64.9 ANEMIA, UNSPECIFIED TYPE: ICD-10-CM

## 2025-01-08 DIAGNOSIS — K75.81 METABOLIC DYSFUNCTION-ASSOCIATED STEATOHEPATITIS (MASH): ICD-10-CM

## 2025-01-08 DIAGNOSIS — Z01.818 ENCOUNTER FOR PRE-TRANSPLANT EVALUATION FOR LIVER TRANSPLANT: ICD-10-CM

## 2025-01-08 PROCEDURE — 93005 ELECTROCARDIOGRAM TRACING: CPT

## 2025-01-08 PROCEDURE — 93306 TTE W/DOPPLER COMPLETE: CPT | Performed by: INTERNAL MEDICINE

## 2025-01-08 PROCEDURE — 2500000004 HC RX 250 GENERAL PHARMACY W/ HCPCS (ALT 636 FOR OP/ED): Performed by: INTERNAL MEDICINE

## 2025-01-08 PROCEDURE — 76700 US EXAM ABDOM COMPLETE: CPT | Mod: 59

## 2025-01-08 PROCEDURE — C8929 TTE W OR WO FOL WCON,DOPPLER: HCPCS

## 2025-01-08 PROCEDURE — 93975 VASCULAR STUDY: CPT | Mod: 59

## 2025-01-08 RX ADMIN — PERFLUTREN 2 ML OF DILUTION: 6.52 INJECTION, SUSPENSION INTRAVENOUS at 13:47

## 2025-01-08 NOTE — PROGRESS NOTES
"Reason for Nutrition Visit:  Pt is a 65 y.o. male referred for initial nutrition assessment for SLK transplant evaluation.     Transplant Coordinator: Desirae Clemons RN    Past Medical Hx:  Patient Active Problem List   Diagnosis    Acute idiopathic thrombocytopenic purpura (Multi)    Anemia    History of CAD (coronary artery disease)    Mixed hyperlipidemia    Paroxysmal atrial fibrillation (Multi)    Type 2 diabetes mellitus with hyperglycemia, without long-term current use of insulin    Decompensated cirrhosis    Encounter for pre-transplant evaluation for liver transplant    Encounter for pre-transplant evaluation for kidney transplant    Metabolic dysfunction-associated steatohepatitis (MASH)    Status post insertion of drug-eluting stent into left anterior descending artery for coronary artery disease    Type 2 diabetes mellitus with diabetic chronic kidney disease      Labs:  Lab Results   Component Value Date    HGBA1C 5.5 09/03/2024    HGBA1C 6.0 (H) 08/13/2024     11/21/2024    K 4.6 11/21/2024     11/21/2024    CO2 23 11/21/2024    BUN 20 11/21/2024    CREATININE 2.45 (H) 11/21/2024    CALCIUM 8.6 11/21/2024    ALBUMIN 3.3 (L) 11/21/2024    PROT 5.9 (L) 11/21/2024    BILITOT 1.7 (H) 11/21/2024    ALKPHOS 151 (H) 11/21/2024    ALT 15 11/21/2024    AST 36 11/21/2024    GLUCOSE 97 11/21/2024     Lab Results   Component Value Date    HGB 10.0 (L) 11/21/2024     Vit D: No results found for: \"VITD25\" , Iron Panel:   Lab Results   Component Value Date    IRON 177 (H) 12/02/2024    TIBC 264 12/02/2024    FERRITIN 85 12/02/2024    , and Vit B12: No results found for: \"TZHAOFHO90\"     Food History and Nutrition Assessment    Appetite: Poor  Intake: 50-75%  GI Symptoms : diarrhea and reflux. Patient describes sensitivity to tomato sauces and does not eat before bed d/t symptoms of reflux lying down.   Swallowing Difficulty: No problems with swallowing  Dentition : own  Allergies: None  Intolerance: " None    Types of Activities: Walking  Duration: <30 minutes daily    Sleep duration/quality : 7+ hours, disrupted sleep, and inconsistent sleep pattern . Napping throughout the day.   Sleep disorders: none    Energy Levels: Low    Food and Nutrition History:  Met with patient and wife. Patient describes low appetite, dysgeusia, and sensitivity to food smells. Typically eats two meals with minimal snacks, skips lunch. Is familiar with sodium restriction - does not use salt shaker and wife makes most meals homemade. Utilizes Mrs. Velasco for seasoning select foods. Has Boost each day for breakfast.    24 Diet Recall:  Meal 1: Toast with peanut butter and honey and fruit cup or protein yogurt with blueberries and granola. Has Boost in morning.   AM Snack: None  Meal 2: None  PM Snack: None  Meal 3 (4-5 pm): Chicken piccatta (4-6oz) or Fettucini vicki or homemade soups (tomato, broccoli cheese, wedding [with 10 meatballs], vegetable beef)  Night Snack: Cheese with crackers (5) and mustard and clementines   Beverages: Water, milk, sparkling water, and 7 up with raspberry lemonade.   Alcohol: None  Fluid restrictions: Patient states they were told to limit fluid but unclear exact restriction. Sips on water bottle throughout the day.     Food Preparation: Partner/Spouse  Cooking Skills/Barriers: None reported  Grocery Shopping: Partner/Spouse    Eating Out Type: Restaurant  2 times per month   Convenience Foods: Denies     Supplements: Vitamin B12 every 3 days    Weight History:    CBW: 120 kg (265 lbs)  BMI: 33.1 kg/m2  IBW: 89.1 kg    Wt Readings from Last 10 Encounters:   01/09/25 120 kg (265 lb)   12/02/24 126 kg (276 lb 14.4 oz)   11/12/24 121 kg (267 lb)   8/28/24           131 kg (289 lbs) per EMR  7/5/2024         147.9 kg (326 lbs) per EMR     Weight change:  Significant weight loss of 18.9% over 6 months. Note fluid shifts may influence recorded weight as patient has been receiving regular paracentesis.   Patient  Stated Weight: Patient states that was 320 lbs in May 2024. Lost significant amount of weight and now has plateaued around 260-265 lbs. Recorded weight in EMR confirms. Patient stated that at most recent paracentesis, he was 270# prior and 260# after 7L removal.     Nutrition Focused Physical Exam:    Performed/Deferred: Performed  Patient states he has lost muscle mass with weight loss    Muscle Wasting:  Temporal: Moderate  Shoulder: Moderate  Clavicle: Moderate  Interosseous Muscle: Moderate    Loss of Subcutaneous Fat:  Eyes: Moderate  Perioral: Mild  Triceps: Moderate    Other Physical Findings:  Hair: None  Eyes: None  Skin: Pallor    Malnutrition Present: Severe Malnutrition    Estimated Energy Needs:    Calorie Needs (25 - 30 kcal/kg IBW): 6475-0781 kcal  Protein Needs (0.8 - 1.0 g/kg IBW): 71 - 89 g    Nutrition Diagnosis    Diagnosis Status: New   Diagnosis: Malnutrition; severe chronic disease or condition related related to increased needs due to prolonged catabolic illness (cirrhosis) as evidence by weight loss of >10% in 6 months, moderate loss of muscle mass, mild-moderate loss of subcutaneous fat, and estimated intake <75% estimated energy requirements for >1 month .    Nutrition Education, Intervention, and Goals    Educated on dietary guidance for cirrhosis and related liver conditions. Highlights include:     Eat small, frequent meals and snacks to provide your body with a regular supply of energy and to prevent muscle loss and malnutrition. Attempt to eat every 3-4 hours.   Maintain adequate protein intake. Reviewed animal- and plant-based sources and appropriate portion sizes to meet protein recommendation of 71-89 grams per day.    Limit sodium to less than 2,000 mg per day. Discussed common sources of high sodium including fast food, frozen dinners, canned soups, and other convenience foods. When reading a food label, try to minimize to either 1) less than 140 mg or 2) less than 20% daily  value (DV) per serving.   Continue oral nutrition supplements (protein shakes) high in calorie and protein. Please increase to two Boost per day. Try to have one in evening before bed.     Provided Nutrition in Cirrhosis: A Guide for Patients. Please review the information packet provided during our appointment, including the protein foods list, high and low sodium foods list, tip sheet for when you do not feel like eating and healthy recipe ideas.    Please contact us immediately if you see any signs of malnutrition occurring such as unexpected weight loss, loss of muscle mass (in your face, upper arms, chest or thighs), muscle weakness (such as climbing stairs etc.) or you have a loss of appetite/find yourself eating less food/skipping meals.     Educational Handouts: Nutrition in Cirrhosis: A Guide for Patients    Nutrition Goals:    Nutrition Goals : Consistent meal/snack pattern  Maintain stable weight  Oral intake greater than 75%  Consume meal or snack every 3-4 hours    Nutrition Monitoring and Evaluation    Additional Nutrition Recommendations / Referrals: N/A    Follow up: It was a pleasure speaking with you today,  Mr. Lopez!  Let's plan to follow up in 3 month(s) to check in on your progress. I'll had the 's put you on my schedule for Monday April 7th at 11:00 for a phone visit. Please feel free to reach out with any questions. Take care!

## 2025-01-09 ENCOUNTER — HOSPITAL ENCOUNTER (OUTPATIENT)
Dept: RADIOLOGY | Facility: CLINIC | Age: 65
Discharge: HOME | End: 2025-01-09
Payer: COMMERCIAL

## 2025-01-09 ENCOUNTER — NUTRITION (OUTPATIENT)
Dept: TRANSPLANT | Facility: HOSPITAL | Age: 65
End: 2025-01-09
Payer: COMMERCIAL

## 2025-01-09 ENCOUNTER — HOSPITAL ENCOUNTER (OUTPATIENT)
Dept: RADIOLOGY | Facility: HOSPITAL | Age: 65
Discharge: HOME | End: 2025-01-09
Payer: COMMERCIAL

## 2025-01-09 ENCOUNTER — HOSPITAL ENCOUNTER (OUTPATIENT)
Dept: RESPIRATORY THERAPY | Facility: HOSPITAL | Age: 65
Discharge: HOME | End: 2025-01-09
Payer: COMMERCIAL

## 2025-01-09 ENCOUNTER — CONSULT (OUTPATIENT)
Dept: CARDIOLOGY | Facility: HOSPITAL | Age: 65
End: 2025-01-09
Payer: COMMERCIAL

## 2025-01-09 ENCOUNTER — APPOINTMENT (OUTPATIENT)
Dept: CARDIOLOGY | Facility: HOSPITAL | Age: 65
End: 2025-01-09
Payer: COMMERCIAL

## 2025-01-09 ENCOUNTER — HOSPITAL ENCOUNTER (OUTPATIENT)
Dept: CARDIOLOGY | Facility: HOSPITAL | Age: 65
Discharge: HOME | End: 2025-01-09
Payer: COMMERCIAL

## 2025-01-09 VITALS
TEMPERATURE: 98.4 F | BODY MASS INDEX: 32.95 KG/M2 | HEART RATE: 97 BPM | SYSTOLIC BLOOD PRESSURE: 119 MMHG | WEIGHT: 265 LBS | HEIGHT: 75 IN | OXYGEN SATURATION: 98 % | DIASTOLIC BLOOD PRESSURE: 71 MMHG

## 2025-01-09 DIAGNOSIS — E11.22 TYPE 2 DIABETES MELLITUS WITH DIABETIC CHRONIC KIDNEY DISEASE, UNSPECIFIED CKD STAGE, UNSPECIFIED WHETHER LONG TERM INSULIN USE: ICD-10-CM

## 2025-01-09 DIAGNOSIS — Z01.818 ENCOUNTER FOR PRE-TRANSPLANT EVALUATION FOR LIVER TRANSPLANT: ICD-10-CM

## 2025-01-09 DIAGNOSIS — Z01.818 ENCOUNTER FOR PRE-TRANSPLANT EVALUATION FOR KIDNEY TRANSPLANT: ICD-10-CM

## 2025-01-09 DIAGNOSIS — Z01.818 PRE-OPERATIVE CLEARANCE: Primary | ICD-10-CM

## 2025-01-09 DIAGNOSIS — D64.9 ANEMIA, UNSPECIFIED TYPE: ICD-10-CM

## 2025-01-09 DIAGNOSIS — K75.81 METABOLIC DYSFUNCTION-ASSOCIATED STEATOHEPATITIS (MASH): ICD-10-CM

## 2025-01-09 DIAGNOSIS — Z95.5 STATUS POST INSERTION OF DRUG-ELUTING STENT INTO LEFT ANTERIOR DESCENDING ARTERY FOR CORONARY ARTERY DISEASE: ICD-10-CM

## 2025-01-09 DIAGNOSIS — Z86.79 HISTORY OF CAD (CORONARY ARTERY DISEASE): ICD-10-CM

## 2025-01-09 DIAGNOSIS — Z95.5 STATUS POST INSERTION OF NON-DRUG ELUTING CORONARY ARTERY STENT: ICD-10-CM

## 2025-01-09 DIAGNOSIS — I48.0 PAROXYSMAL ATRIAL FIBRILLATION (MULTI): ICD-10-CM

## 2025-01-09 LAB
AORTIC VALVE PEAK VELOCITY: 1.45 M/S
AV PEAK GRADIENT: 8 MMHG
AVA (PEAK VEL): 3.68 CM2
BASE EXCESS BLDA CALC-SCNC: -9.1 MMOL/L (ref -2–3)
BODY TEMPERATURE: 37 DEGREES CELSIUS
COHGB MFR BLDA: 1.8 %
DO-HGB MFR BLDA: 0.6 % (ref 0–5)
EJECTION FRACTION APICAL 4 CHAMBER: 76.7
EJECTION FRACTION: 77 %
HCO3 BLDA-SCNC: 15.6 MMOL/L (ref 22–26)
HGB BLDA-MCNC: 9.6 G/DL (ref 13.5–17.5)
LEFT ATRIUM VOLUME AREA LENGTH INDEX BSA: 28.5 ML/M2
LEFT VENTRICLE INTERNAL DIMENSION DIASTOLE: 5.06 CM (ref 3.5–6)
LEFT VENTRICULAR OUTFLOW TRACT DIAMETER: 2.34 CM
METHGB MFR BLDA: 0.7 % (ref 0–1.5)
MGC ASCENT PFT - FEV1 - PRE: 3.43
MGC ASCENT PFT - FEV1 - PRE: 3.43
MGC ASCENT PFT - FEV1 - PREDICTED: 3.8
MGC ASCENT PFT - FEV1 - PREDICTED: 3.8
MGC ASCENT PFT - FVC - PRE: 4.47
MGC ASCENT PFT - FVC - PRE: 4.47
MGC ASCENT PFT - FVC - PREDICTED: 5
MGC ASCENT PFT - FVC - PREDICTED: 5
MITRAL VALVE E/A RATIO: 0.87
OXYHGB MFR BLDA: 96.9 % (ref 94–98)
PCO2 BLDA: 29 MM HG (ref 38–42)
PH BLDA: 7.34 PH (ref 7.38–7.42)
PO2 BLDA: 97 MM HG (ref 85–95)
RIGHT VENTRICLE FREE WALL PEAK S': 17.51 CM/S
RIGHT VENTRICLE PEAK SYSTOLIC PRESSURE: 11.8 MMHG
SAO2 % BLDA: 99 % (ref 94–100)
TRICUSPID ANNULAR PLANE SYSTOLIC EXCURSION: 2.4 CM

## 2025-01-09 PROCEDURE — 82375 ASSAY CARBOXYHB QUANT: CPT

## 2025-01-09 PROCEDURE — A9502 TC99M TETROFOSMIN: HCPCS | Performed by: INTERNAL MEDICINE

## 2025-01-09 PROCEDURE — 93005 ELECTROCARDIOGRAM TRACING: CPT

## 2025-01-09 PROCEDURE — 3430000001 HC RX 343 DIAGNOSTIC RADIOPHARMACEUTICALS: Performed by: INTERNAL MEDICINE

## 2025-01-09 PROCEDURE — 99204 OFFICE O/P NEW MOD 45 MIN: CPT | Performed by: INTERNAL MEDICINE

## 2025-01-09 PROCEDURE — 94726 PLETHYSMOGRAPHY LUNG VOLUMES: CPT

## 2025-01-09 PROCEDURE — 74183 MRI ABD W/O CNTR FLWD CNTR: CPT

## 2025-01-09 PROCEDURE — 78452 HT MUSCLE IMAGE SPECT MULT: CPT | Performed by: STUDENT IN AN ORGANIZED HEALTH CARE EDUCATION/TRAINING PROGRAM

## 2025-01-09 PROCEDURE — 2550000001 HC RX 255 CONTRASTS: Performed by: INTERNAL MEDICINE

## 2025-01-09 PROCEDURE — 99214 OFFICE O/P EST MOD 30 MIN: CPT | Mod: 25 | Performed by: INTERNAL MEDICINE

## 2025-01-09 PROCEDURE — 78452 HT MUSCLE IMAGE SPECT MULT: CPT

## 2025-01-09 PROCEDURE — 93005 ELECTROCARDIOGRAM TRACING: CPT | Performed by: INTERNAL MEDICINE

## 2025-01-09 PROCEDURE — A9575 INJ GADOTERATE MEGLUMI 0.1ML: HCPCS | Performed by: INTERNAL MEDICINE

## 2025-01-09 PROCEDURE — 93017 CV STRESS TEST TRACING ONLY: CPT

## 2025-01-09 PROCEDURE — 93018 CV STRESS TEST I&R ONLY: CPT | Performed by: INTERNAL MEDICINE

## 2025-01-09 PROCEDURE — 93016 CV STRESS TEST SUPVJ ONLY: CPT | Performed by: INTERNAL MEDICINE

## 2025-01-09 RX ORDER — GADOTERATE MEGLUMINE 376.9 MG/ML
0.2 INJECTION INTRAVENOUS
Status: COMPLETED | OUTPATIENT
Start: 2025-01-09 | End: 2025-01-09

## 2025-01-09 RX ORDER — REGADENOSON 0.08 MG/ML
0.4 INJECTION, SOLUTION INTRAVENOUS
OUTPATIENT
Start: 2025-01-09

## 2025-01-09 RX ADMIN — GADOTERATE MEGLUMINE 24 ML: 376.9 INJECTION INTRAVENOUS at 18:15

## 2025-01-09 RX ADMIN — TETROFOSMIN 11.9 MILLICURIE: 0.23 INJECTION, POWDER, LYOPHILIZED, FOR SOLUTION INTRAVENOUS at 08:36

## 2025-01-09 RX ADMIN — TETROFOSMIN 32.1 MILLICURIE: 0.23 INJECTION, POWDER, LYOPHILIZED, FOR SOLUTION INTRAVENOUS at 11:44

## 2025-01-09 ASSESSMENT — PAIN SCALES - GENERAL: PAINLEVEL_OUTOF10: 0-NO PAIN

## 2025-01-09 NOTE — PROGRESS NOTES
Subjective   López Lopez is a 65 y.o. male.    Past medical history  End-stage renal disease and liver disease..  He is currently being considered for double transplant of liver and kidney.  Unclear etiology.  Diagnosed in May 2024 with rapid decline in both liver and kidney function  History of multiple large-volume paracenteses, according to recent notes from hepatology more than 25 in the past 4 months    Cardiac history:   -MI 4 years back with a PCI in the proximal LAD  -Paroxysmal A-fib on apixaban.  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  Lives with his wife    Alcohol: Currently none.  Previously social use    Drugs: None    Tobacco: None      Chief Complaint:  No chief complaint on file.    HPI  Patient denies all cardiopulmonary symptoms.  He does get short of breath when there is excessive ascites pressing on his diaphragm and also get lower extremity edema intermittently because of ascites.  No chest pain.  No palpitations.  No syncope history.    ROS  No significant history    Objective   Constitutional:       Appearance: Not in distress.   Neck:      Vascular: JVD normal.   Pulmonary:      Effort: Pulmonary effort is normal.      Breath sounds: Normal breath sounds.   Cardiovascular:      PMI at left midclavicular line. Normal rate. Regular rhythm. Normal S1. Normal S2.       Murmurs: There is no murmur.   Edema:     Peripheral edema present.  Abdominal:      General: Bowel sounds are normal.      Palpations: Abdomen is soft.   Skin:     General: Skin is warm.   Neurological:      General: No focal deficit present.      Mental Status: Alert.           Lab Review:   Lab Results   Component Value Date     11/21/2024    K 4.6 11/21/2024     11/21/2024    CO2 23 11/21/2024    BUN 20 11/21/2024    CREATININE 2.45 (H) 11/21/2024    GLUCOSE 97 11/21/2024    CALCIUM 8.6 11/21/2024     Lab Results   Component Value Date    WBC 6.1 11/21/2024    HGB 10.0  "(L) 11/21/2024    HCT 30.7 (L) 11/21/2024    MCV 96 11/21/2024    PLT 95 (L) 11/21/2024     No results found for: \"CHOL\", \"TRIG\", \"HDL\", \"LDLDIRECT\"      TTE Jan 2025  CONCLUSIONS:   1. Left ventricular ejection fraction is hyperdynamic, calculated by Manzanares's biplane at 77%.   2. There is normal right ventricular global systolic function.   3. Right ventricular systolic pressure is within normal limits.     Nuclear stress test: Pending, done today            Assessment/Plan   The encounter diagnosis was Pre-operative clearance.    65-year-old gentleman is here for preop clearance for renal plus liver transplant.  Patient had a rapid decline in kidney and liver functions since May 2024 unknown etiology.  Past medical history of MI s/p PCI of the proximal LAD and a single episode of atrial flutter apparently 10 years back on anticoagulation.  No cardiopulmonary symptoms except related to ascites causing dyspnea and peripheral edema    -Transthoracic echo completely normal    Preop clearance  -No significant cardiopulmonary symptoms  -TTE normal  -Nuclear stress test is done today pending results  -Given that patient is going for a double transplant and have a prior history of stent, will refer patient for MRI stress test as it is more sensitive.  -If MRI stress test is normal, patient can be cleared for surgery      CAD with a history of MI and status post PCI  -Asymptomatic,  -Continue secondary prevention  -Continue with atorvastatin    Atrial flutter:  -A single episode 10 years back apparently  -Asymptomatic  -On high-dose Eliquis      No follow-up necessary.  Patient is advised to reach out to me after the MRI stress test  "

## 2025-01-10 ENCOUNTER — APPOINTMENT (OUTPATIENT)
Dept: CARDIOLOGY | Facility: CLINIC | Age: 65
End: 2025-01-10
Payer: COMMERCIAL

## 2025-01-13 LAB
ATRIAL RATE: 89 BPM
MGC ASCENT PFT - FEV1 - PRE: 3.43
MGC ASCENT PFT - FEV1 - PRE: 3.43
MGC ASCENT PFT - FEV1 - PREDICTED: 3.8
MGC ASCENT PFT - FEV1 - PREDICTED: 3.8
MGC ASCENT PFT - FVC - PRE: 4.47
MGC ASCENT PFT - FVC - PRE: 4.47
MGC ASCENT PFT - FVC - PREDICTED: 5
MGC ASCENT PFT - FVC - PREDICTED: 5
P AXIS: 6 DEGREES
P OFFSET: 196 MS
P ONSET: 142 MS
PR INTERVAL: 138 MS
Q ONSET: 211 MS
QRS COUNT: 15 BEATS
QRS DURATION: 88 MS
QT INTERVAL: 362 MS
QTC CALCULATION(BAZETT): 440 MS
QTC FREDERICIA: 412 MS
R AXIS: -14 DEGREES
T AXIS: 32 DEGREES
T OFFSET: 392 MS
VENTRICULAR RATE: 89 BPM

## 2025-01-16 ENCOUNTER — OFFICE VISIT (OUTPATIENT)
Facility: HOSPITAL | Age: 65
End: 2025-01-16
Payer: MEDICARE

## 2025-01-16 ENCOUNTER — LAB (OUTPATIENT)
Dept: LAB | Facility: LAB | Age: 65
End: 2025-01-16
Payer: COMMERCIAL

## 2025-01-16 VITALS
TEMPERATURE: 97.8 F | HEIGHT: 74 IN | HEART RATE: 94 BPM | SYSTOLIC BLOOD PRESSURE: 121 MMHG | OXYGEN SATURATION: 95 % | BODY MASS INDEX: 35.58 KG/M2 | WEIGHT: 277.2 LBS | DIASTOLIC BLOOD PRESSURE: 72 MMHG

## 2025-01-16 VITALS
HEIGHT: 74 IN | HEART RATE: 94 BPM | BODY MASS INDEX: 35.58 KG/M2 | TEMPERATURE: 97.8 F | OXYGEN SATURATION: 95 % | DIASTOLIC BLOOD PRESSURE: 72 MMHG | SYSTOLIC BLOOD PRESSURE: 121 MMHG | WEIGHT: 277.2 LBS

## 2025-01-16 DIAGNOSIS — K75.81 METABOLIC DYSFUNCTION-ASSOCIATED STEATOHEPATITIS (MASH): ICD-10-CM

## 2025-01-16 DIAGNOSIS — Z01.818 ENCOUNTER FOR PRE-TRANSPLANT EVALUATION FOR LIVER TRANSPLANT: ICD-10-CM

## 2025-01-16 DIAGNOSIS — K72.90 LIVER FAILURE WITHOUT HEPATIC COMA, UNSPECIFIED CHRONICITY (MULTI): ICD-10-CM

## 2025-01-16 DIAGNOSIS — D64.9 ANEMIA, UNSPECIFIED TYPE: ICD-10-CM

## 2025-01-16 DIAGNOSIS — N18.4 CKD (CHRONIC KIDNEY DISEASE) STAGE 4, GFR 15-29 ML/MIN (MULTI): ICD-10-CM

## 2025-01-16 DIAGNOSIS — Z01.818 ENCOUNTER FOR PRE-TRANSPLANT EVALUATION FOR KIDNEY TRANSPLANT: ICD-10-CM

## 2025-01-16 DIAGNOSIS — Z01.818 PRE-TRANSPLANT EVALUATION FOR KIDNEY TRANSPLANT: Primary | ICD-10-CM

## 2025-01-16 LAB
ABO GROUP (TYPE) IN BLOOD: NORMAL
ALBUMIN SERPL BCP-MCNC: 3.1 G/DL (ref 3.4–5)
ALP SERPL-CCNC: 144 U/L (ref 33–136)
ALT SERPL W P-5'-P-CCNC: 14 U/L (ref 10–52)
ANION GAP SERPL CALC-SCNC: 14 MMOL/L (ref 10–20)
ANTIBODY SCREEN: NORMAL
AST SERPL W P-5'-P-CCNC: 34 U/L (ref 9–39)
BASOPHILS # BLD AUTO: 0.06 X10*3/UL (ref 0–0.1)
BASOPHILS NFR BLD AUTO: 0.8 %
BILIRUB SERPL-MCNC: 1.5 MG/DL (ref 0–1.2)
BUN SERPL-MCNC: 34 MG/DL (ref 6–23)
CALCIUM SERPL-MCNC: 8.6 MG/DL (ref 8.6–10.6)
CHLORIDE SERPL-SCNC: 106 MMOL/L (ref 98–107)
CO2 SERPL-SCNC: 23 MMOL/L (ref 21–32)
CREAT SERPL-MCNC: 2.5 MG/DL (ref 0.5–1.3)
EGFRCR SERPLBLD CKD-EPI 2021: 28 ML/MIN/1.73M*2
EOSINOPHIL # BLD AUTO: 0.75 X10*3/UL (ref 0–0.7)
EOSINOPHIL NFR BLD AUTO: 10.1 %
ERYTHROCYTE [DISTWIDTH] IN BLOOD BY AUTOMATED COUNT: 15 % (ref 11.5–14.5)
GLUCOSE SERPL-MCNC: 103 MG/DL (ref 74–99)
HCT VFR BLD AUTO: 29.9 % (ref 41–52)
HGB BLD-MCNC: 9.9 G/DL (ref 13.5–17.5)
IMM GRANULOCYTES # BLD AUTO: 0.04 X10*3/UL (ref 0–0.7)
IMM GRANULOCYTES NFR BLD AUTO: 0.5 % (ref 0–0.9)
INR PPP: 2 (ref 0.9–1.1)
LYMPHOCYTES # BLD AUTO: 1.4 X10*3/UL (ref 1.2–4.8)
LYMPHOCYTES NFR BLD AUTO: 18.9 %
MCH RBC QN AUTO: 31.5 PG (ref 26–34)
MCHC RBC AUTO-ENTMCNC: 33.1 G/DL (ref 32–36)
MCV RBC AUTO: 95 FL (ref 80–100)
MONOCYTES # BLD AUTO: 0.79 X10*3/UL (ref 0.1–1)
MONOCYTES NFR BLD AUTO: 10.7 %
NEUTROPHILS # BLD AUTO: 4.36 X10*3/UL (ref 1.2–7.7)
NEUTROPHILS NFR BLD AUTO: 59 %
NRBC BLD-RTO: 0 /100 WBCS (ref 0–0)
PLATELET # BLD AUTO: 124 X10*3/UL (ref 150–450)
POTASSIUM SERPL-SCNC: 4.5 MMOL/L (ref 3.5–5.3)
PROT SERPL-MCNC: 6.4 G/DL (ref 6.4–8.2)
PROTHROMBIN TIME: 22.5 SECONDS (ref 9.8–12.8)
RBC # BLD AUTO: 3.14 X10*6/UL (ref 4.5–5.9)
RH FACTOR (ANTIGEN D): NORMAL
SODIUM SERPL-SCNC: 138 MMOL/L (ref 136–145)
WBC # BLD AUTO: 7.4 X10*3/UL (ref 4.4–11.3)

## 2025-01-16 PROCEDURE — 86900 BLOOD TYPING SEROLOGIC ABO: CPT

## 2025-01-16 PROCEDURE — 80053 COMPREHEN METABOLIC PANEL: CPT

## 2025-01-16 PROCEDURE — 86901 BLOOD TYPING SEROLOGIC RH(D): CPT

## 2025-01-16 PROCEDURE — 3008F BODY MASS INDEX DOCD: CPT | Performed by: INTERNAL MEDICINE

## 2025-01-16 PROCEDURE — 99215 OFFICE O/P EST HI 40 MIN: CPT | Performed by: INTERNAL MEDICINE

## 2025-01-16 PROCEDURE — 99214 OFFICE O/P EST MOD 30 MIN: CPT | Performed by: INTERNAL MEDICINE

## 2025-01-16 PROCEDURE — 85610 PROTHROMBIN TIME: CPT

## 2025-01-16 PROCEDURE — 3074F SYST BP LT 130 MM HG: CPT | Performed by: INTERNAL MEDICINE

## 2025-01-16 PROCEDURE — 3078F DIAST BP <80 MM HG: CPT | Performed by: INTERNAL MEDICINE

## 2025-01-16 PROCEDURE — 82610 CYSTATIN C: CPT

## 2025-01-16 PROCEDURE — 36415 COLL VENOUS BLD VENIPUNCTURE: CPT

## 2025-01-16 PROCEDURE — 82565 ASSAY OF CREATININE: CPT

## 2025-01-16 PROCEDURE — 86850 RBC ANTIBODY SCREEN: CPT

## 2025-01-16 PROCEDURE — 85025 COMPLETE CBC W/AUTO DIFF WBC: CPT

## 2025-01-16 ASSESSMENT — PAIN SCALES - GENERAL
PAINLEVEL_OUTOF10: 0-NO PAIN
PAINLEVEL_OUTOF10: 0-NO PAIN

## 2025-01-17 LAB
CREAT SERPL-MCNC: 2.47 MG/DL (ref 0.69–1.22)
CYSTATIN C SERPL-MCNC: 2.69 MG/L (ref 0.61–0.95)
EGFRCR-CYS SERPLBLD CKD-EPI 2021: 24 ML/MIN/{1.73_M2}

## 2025-01-17 RX ORDER — LACTULOSE 10 G/15ML
20 SOLUTION ORAL; RECTAL 2 TIMES DAILY
Qty: 1800 ML | Refills: 2 | Status: SHIPPED | OUTPATIENT
Start: 2025-01-17 | End: 2025-04-17

## 2025-01-17 ASSESSMENT — ENCOUNTER SYMPTOMS
DIARRHEA: 0
COUGH: 0
ARTHRALGIAS: 1
SPEECH DIFFICULTY: 0
VOMITING: 0
HEADACHES: 0
CONSTIPATION: 0
JOINT SWELLING: 0
TROUBLE SWALLOWING: 0
NAUSEA: 0
DIZZINESS: 0
CONFUSION: 0
WHEEZING: 0
COLOR CHANGE: 0
DIFFICULTY URINATING: 0
FEVER: 0
ABDOMINAL PAIN: 1
CHILLS: 0
SLEEP DISTURBANCE: 0
ABDOMINAL DISTENTION: 1
FATIGUE: 1
SHORTNESS OF BREATH: 0
LIGHT-HEADEDNESS: 0
UNEXPECTED WEIGHT CHANGE: 0

## 2025-01-17 NOTE — PROGRESS NOTES
Subjective  He continues to need to be tapped every other week. He has completed most of his transplant eval with pending items including a stress Cardiac MRI. He denies cognitive impairment. He has not had reassessment of renal function since November but at the time clearly was in need of SLK.     Review of Systems   Constitutional:  Positive for fatigue. Negative for chills, fever and unexpected weight change.   HENT:  Negative for congestion and trouble swallowing.    Respiratory:  Negative for cough, shortness of breath and wheezing.    Cardiovascular:  Negative for chest pain.   Gastrointestinal:  Positive for abdominal distention and abdominal pain. Negative for constipation, diarrhea, nausea and vomiting.   Genitourinary:  Negative for difficulty urinating.   Musculoskeletal:  Positive for arthralgias. Negative for joint swelling.   Skin:  Negative for color change.   Neurological:  Negative for dizziness, speech difficulty, light-headedness and headaches.   Psychiatric/Behavioral:  Negative for confusion and sleep disturbance.        Physical Exam  Constitutional:       General: He is awake.      Appearance: Normal appearance.   HENT:      Head: Normocephalic and atraumatic.      Nose: Nose normal.      Mouth/Throat:      Mouth: Mucous membranes are moist.   Eyes:      Pupils: Pupils are equal, round, and reactive to light.   Neck:      Thyroid: No thyroid mass.      Trachea: Phonation normal.   Cardiovascular:      Rate and Rhythm: Normal rate and regular rhythm.      Heart sounds: Normal heart sounds. No murmur heard.     No gallop.   Pulmonary:      Effort: Pulmonary effort is normal. No respiratory distress.      Breath sounds: Normal air entry. No decreased breath sounds, wheezing, rhonchi or rales.   Abdominal:      General: Bowel sounds are normal. There is no distension.      Palpations: Abdomen is soft.      Tenderness: There is no abdominal tenderness.      Comments: SHIFTING DULLNESS.  "SPLENOMEGALY   Musculoskeletal:      Cervical back: Neck supple.      Right lower leg: No edema.      Left lower leg: No edema.   Skin:     General: Skin is warm.      Capillary Refill: Capillary refill takes less than 2 seconds.   Neurological:      General: No focal deficit present.      Mental Status: He is alert and oriented to person, place, and time. Mental status is at baseline.      Cranial Nerves: Cranial nerves 2-12 are intact.      Motor: Motor function is intact.   Psychiatric:         Attention and Perception: Attention and perception normal.         Mood and Affect: Mood normal.         Speech: Speech normal.         Behavior: Behavior normal.       MELD 3.0: 24 at 1/16/2025  9:35 AM  MELD-Na: 24 at 1/16/2025  9:35 AM  Calculated from:  Serum Creatinine: 2.5 mg/dL at 1/16/2025  9:35 AM  Serum Sodium: 138 mmol/L (Using max of 137 mmol/L) at 1/16/2025  9:35 AM  Total Bilirubin: 1.5 mg/dL at 1/16/2025  9:35 AM  Serum Albumin: 3.1 g/dL at 1/16/2025  9:35 AM  INR(ratio): 2 at 1/16/2025  9:35 AM  Age at listing (hypothetical): 65 years  Sex: Male at 1/16/2025  9:35 AM   LABS:   Lab Results   Component Value Date    ALBUMIN 3.1 (L) 01/16/2025    BILITOT 1.5 (H) 01/16/2025    BILIDIR 0.5 (H) 11/21/2024    ALKPHOS 144 (H) 01/16/2025    ALT 14 01/16/2025    AST 34 01/16/2025    PROT 6.4 01/16/2025    HEPATOT Reactive (A) 11/21/2024    HEPBCAB Nonreactive 11/21/2024    INR 2.0 (H) 01/16/2025    FERRITIN 85 12/02/2024    IRON 177 (H) 12/02/2024    IRONSAT 67 (H) 12/02/2024      Lab Results   Component Value Date    BSRL218 <10 12/02/2024    EEPM461 <10 12/02/2024      Lab Results   Component Value Date     01/16/2025    CREATININE 2.50 (H) 01/16/2025    BILITOT 1.5 (H) 01/16/2025    INR 2.0 (H) 01/16/2025     No results found for: \"AFP\"   Lab Results   Component Value Date    ALBUMIN 3.1 (L) 01/16/2025    BILITOT 1.5 (H) 01/16/2025    BILIDIR 0.5 (H) 11/21/2024    ALKPHOS 144 (H) 01/16/2025    ALT 14 " 01/16/2025    AST 34 01/16/2025    PROT 6.4 01/16/2025      Lab Results   Component Value Date    WBC 7.4 01/16/2025    HGB 9.9 (L) 01/16/2025    HCT 29.9 (L) 01/16/2025    MCV 95 01/16/2025     (L) 01/16/2025     Lab Results   Component Value Date    GLUCOSE 103 (H) 01/16/2025    CALCIUM 8.6 01/16/2025     01/16/2025    K 4.5 01/16/2025    CO2 23 01/16/2025     01/16/2025    BUN 34 (H) 01/16/2025    CREATININE 2.50 (H) 01/16/2025     Lab Results   Component Value Date    INR 2.0 (H) 01/16/2025        === 01/08/25 ===    US ABDOMEN COMPLETE    - Impression -  Liver small in size with heterogeneous coarse echogenicity and with  splenorenal varices noted. The main portal vein is dilated at 1.4 cm  with small amount of ascites seen in abdomen and with splenomegaly  observed. The findings would indicate cirrhosis and portal venous  hypertension.    Gallbladder wall thickening with small amount of gallbladder sludge.  The wall thickening may very well be secondary to systemic process  such as hepatic or renal failure.    No biliary ductal dilatation with pancreas unremarkable.    Normal abdominal aorta and IVC. No renal abnormality on either side  is observed.        MACRO:  None.    Signed by: Kecia Rasmussen 1/9/2025 10:07 PM  Dictation workstation:   [id number]     Encounter for pre-transplant evaluation for liver transplant  He will complete his SLK evaluation soon  This patient has cirrhosis and we discussed natural history. we emphasized the importance of health maintenance interventions to prevent complications of liver disease including  a) lifelong abstinence  b) MELD/US/AFP every 6 months to assess liver function and survey for HCC  c) current meds  d) watch out for symptoms of decompensated liver disease including cognitive impairment, excessive somnolence, lower extremity edema and increase in abdominal girth  e) follow up in the office every 3 months

## 2025-01-17 NOTE — ASSESSMENT & PLAN NOTE
He will complete his SLK evaluation soon  This patient has cirrhosis and we discussed natural history. we emphasized the importance of health maintenance interventions to prevent complications of liver disease including  a) lifelong abstinence  b) MELD/US/AFP every 6 months to assess liver function and survey for HCC  c) current meds  d) watch out for symptoms of decompensated liver disease including cognitive impairment, excessive somnolence, lower extremity edema and increase in abdominal girth  e) follow up in the office every 3 months

## 2025-01-21 ENCOUNTER — HOSPITAL ENCOUNTER (OUTPATIENT)
Dept: ULTRASOUND IMAGING | Age: 65
Discharge: HOME | End: 2025-01-23
Payer: COMMERCIAL

## 2025-01-21 VITALS
HEART RATE: 80 BPM | OXYGEN SATURATION: 94 % | RESPIRATION RATE: 18 BRPM | SYSTOLIC BLOOD PRESSURE: 108 MMHG | DIASTOLIC BLOOD PRESSURE: 59 MMHG

## 2025-01-21 DIAGNOSIS — R18.8 OTHER ASCITES: ICD-10-CM

## 2025-01-21 LAB
ALBUMIN FLD-MCNC: 0.7 G/DL
AMYLASE FLD-CCNC: 19 U/L
APPEARANCE FLD: NORMAL
BODY FLD TYPE: NORMAL
CLOT CHECK: NORMAL
COLOR FLD: YELLOW
MONOCYTES NFR FLD: 88 %
NEUTROPHILS NFR FLD: 12 %
PROT FLD-MCNC: 1.3 G/DL
RBC # FLD: <2000 CELLS/UL
SPECIMEN TYPE: NORMAL
WBC # FLD: 201 CELLS/UL

## 2025-01-21 PROCEDURE — 87205 SMEAR GRAM STAIN: CPT

## 2025-01-21 PROCEDURE — 88305 TISSUE EXAM BY PATHOLOGIST: CPT

## 2025-01-21 PROCEDURE — 88112 CYTOPATH CELL ENHANCE TECH: CPT

## 2025-01-21 PROCEDURE — 82150 ASSAY OF AMYLASE: CPT

## 2025-01-21 PROCEDURE — 84157 ASSAY OF PROTEIN OTHER: CPT

## 2025-01-21 PROCEDURE — 6360000002 HC RX W HCPCS: Performed by: PHYSICIAN ASSISTANT

## 2025-01-21 PROCEDURE — C1729 CATH, DRAINAGE: HCPCS

## 2025-01-21 PROCEDURE — 87070 CULTURE OTHR SPECIMN AEROBIC: CPT

## 2025-01-21 PROCEDURE — 82042 OTHER SOURCE ALBUMIN QUAN EA: CPT

## 2025-01-21 PROCEDURE — 89051 BODY FLUID CELL COUNT: CPT

## 2025-01-21 RX ORDER — LIDOCAINE HYDROCHLORIDE 10 MG/ML
INJECTION, SOLUTION EPIDURAL; INFILTRATION; INTRACAUDAL; PERINEURAL PRN
Status: COMPLETED | OUTPATIENT
Start: 2025-01-21 | End: 2025-01-21

## 2025-01-21 RX ORDER — ALBUMIN (HUMAN) 12.5 G/50ML
50 SOLUTION INTRAVENOUS ONCE
Status: DISCONTINUED | OUTPATIENT
Start: 2025-01-21 | End: 2025-01-24 | Stop reason: HOSPADM

## 2025-01-21 RX ADMIN — LIDOCAINE HYDROCHLORIDE 10 ML: 10 INJECTION, SOLUTION EPIDURAL; INFILTRATION; INTRACAUDAL; PERINEURAL at 13:52

## 2025-01-21 NOTE — OR NURSING
Patient arrived from home to  for ultrasound guided paracentesis. Vitals obtained, IV started in the LFA, and consent signed per patient. Mitzy Nance PA-C in to speak with the patient about the procedure, all questions answered. Abdomen scanned and prepped. 1% Lidocaine administered to procedural site. 5 [place] centesis catheter inserted to (LLQ) with ultrasound guidance, catheter connected to suction. 50 G IV Albumin given per order. Patient tolerated procedure well. (30287) ml drained of (clear yellow) colored ascitic fluid. Centesis catheter removed post procedure. Specimens collected and sent to lab per order. Puncture site cleansed and dry dressing applied. No bleeding, swelling or complications noted. Post procedure vitals obtained. IV site removed, dry dressing applied. Discharge instructions reviewed and understood by patient. Patient discharged home without complication.

## 2025-01-21 NOTE — PROCEDURES
Procedure: Ultrasound Guided Paracentesis    Diagnosis: Ascites    Findings: Large volume ascites, successful catheter placement with clear pale yellow ascitic fluid return    Specimen: Approximately 40cc obtained and sent for analysis (orders from referring physician).  Total amount of fluid removed to be reported in PACS.    Anesthesia: Local infiltration of 10cc 1% Lidocaine without epi    EBL: Minimal.    Complication: None immediately post procedure.    Plan: Discharge to home following paracentesis.      Comments:    See radiology dictation in PACs for image review and additional procedural information.

## 2025-01-22 ENCOUNTER — APPOINTMENT (OUTPATIENT)
Dept: RADIOLOGY | Facility: HOSPITAL | Age: 65
End: 2025-01-22
Payer: MEDICARE

## 2025-01-22 ENCOUNTER — TELEPHONE (OUTPATIENT)
Facility: HOSPITAL | Age: 65
End: 2025-01-22
Payer: MEDICARE

## 2025-01-22 NOTE — TELEPHONE ENCOUNTER
Pt left a vm about getting a call back to get appts rescheduled   At different location [facility] or other location     Callback number [phone]

## 2025-01-23 ENCOUNTER — DOCUMENTATION (OUTPATIENT)
Dept: TRANSPLANT | Facility: HOSPITAL | Age: 65
End: 2025-01-23
Payer: MEDICARE

## 2025-01-23 LAB — NON-GYN CYTOLOGY REPORT: NORMAL

## 2025-01-23 NOTE — PROGRESS NOTES
TRANSPLANT NEPHROLOGY CONSULT :   KIDNEY TRANSPLANT RECIPIENT EVALUATION        SERVICE DATE: 01/16/2025     REASON FOR CONSULT/CHIEF COMPLAINT:    FOR KIDNEY TRANSPLANT RECIPIENT EVALUATION.    HPI:    Mr. Lopez is a 65 y.o. male with past medical history significant for decompensated cirrhosis sec to MASLD (recurrent ascites, portal HTN,), CKD 4, DM2, HTN, CAD s/p PCI to prox LAD 2021, h/o pAFib on Eliquis, h/o ITP dx 5/2024 s/p steroids and IVIG, hyperlipidemia, obesity and other pmh as listed below who is being evaluated for SLK.    Pt was diagnosed with cirrhosis May '24 which has progressed rapidly. Now requiring large volume paracentesis every other week.     Noted to have decline in GFR over the past several months. GFR has been <30 since 11/2024.   Most recent labs with GFR 28.     Pt has completed several pre-op testing. TTE normal, stress test neg. Cardiac MRI pending.    Renal US 1/2025 unremarkable. Normal sized kidneys, no obstruction, normal echogenicity.   No h/o proteinuria.    Pt makes normal amt of urine.  Reports being fairly active on a daily basis limited by worsening fatigue due to muscle wasting.     Dx with ITP 5/2024, treated with dexamethasone and IVIG. Recent plt count 124k.     Denied chest pain, shortness of breath, palpitation, dyspnea on exertion, dysuria, fever, nausea, vomiting, diarrhea and flu-liked symptoms. No recent hospitalization or ED visit.      ROS:  Review of  14 systems was performed system by system. See HPI. Otherwise, the symptoms were negative.    PAST MEDICAL HISTORY: Please see HPI.    No past medical history on file.     PAST SURGICAL HISTORY: Please see HPI.    No past surgical history on file.     SOCIAL HISTORY: Please see our 's note for details.    Social History     Socioeconomic History    Marital status:      Spouse name: Not on file    Number of children: Not on file    Years of education: Not on file    Highest education  level: Not on file   Occupational History    Not on file   Tobacco Use    Smoking status: Former     Types: Cigarettes    Smokeless tobacco: Never   Substance and Sexual Activity    Alcohol use: Not Currently     Comment: occ    Drug use: Not Currently     Types: Marijuana    Sexual activity: Not on file   Other Topics Concern    Not on file   Social History Narrative    Not on file     Social Drivers of Health     Financial Resource Strain: Low Risk  (12/2/2024)    Overall Financial Resource Strain (CARDIA)     Difficulty of Paying Living Expenses: Not very hard   Food Insecurity: No Food Insecurity (12/2/2024)    Hunger Vital Sign     Worried About Running Out of Food in the Last Year: Never true     Ran Out of Food in the Last Year: Never true   Transportation Needs: No Transportation Needs (12/2/2024)    PRAPARE - Transportation     Lack of Transportation (Medical): No     Lack of Transportation (Non-Medical): No   Physical Activity: Not on file   Stress: Not on file   Social Connections: Not on file   Intimate Partner Violence: Not At Risk (12/2/2024)    Humiliation, Afraid, Rape, and Kick questionnaire     Fear of Current or Ex-Partner: No     Emotionally Abused: No     Physically Abused: No     Sexually Abused: No   Housing Stability: Unknown (12/2/2024)    Housing Stability Vital Sign     Unable to Pay for Housing in the Last Year: No     Number of Times Moved in the Last Year: Not on file     Homeless in the Last Year: No       FAMILY HISTORY:  No family history on file.    MEDICATION LIST:  Current Outpatient Medications   Medication Instructions    apixaban (Eliquis) 5 mg tablet 1 tablet, 2 times daily (morning and late afternoon)    atorvastatin (Lipitor) 10 mg tablet 1 tablet, Daily    cyproheptadine (PERIACTIN) 4 mg, oral, 3 times daily PRN, Please follow instructions provided by transplant team.    furosemide (Lasix) 20 mg tablet 1 tablet, 2 times daily    lactulose 20 g, oral, 2 times daily     "metFORMIN (Glucophage) 1,000 mg tablet 1 tablet, Every 12 hours    traZODone (Desyrel) 50 mg tablet 1 tablet, Nightly       ALLERGY  No Known Allergies    PHYSICAL EXAM:    Visit Vitals  /72   Pulse 94   Temp 36.6 °C (97.8 °F) (Temporal)   Ht 1.88 m (6' 2\")   Wt 126 kg (277 lb 3.2 oz)   SpO2 95%   BMI 35.59 kg/m²   Smoking Status Former   BSA 2.57 m²        General Appearance - NAD, Good speech, oriented and alert  HEENT - Supple. Not pale. No jaundice. No cervical lymphadenopathy. Pharynx and tonsils are not injected.  CVS - RRR. Normal S1/S2. No murmur, click , rub or gallop  Lungs- clear to auscultation bilaterally  Abdomen - soft , not tender, no guarding, no rigidity. No hepatosplenomegaly. Normal bowel sounds. No masses and ascites.   Musculoskeletal /Extremities - no edema. Full ROM. No joint tenderness.   Neuro/Psych - appropriate mood and affect. Motor power V/V all extremities. CN I -XII were grossly intact.  Skin - No visible rash    LABS:    Lab Results   Component Value Date    WBC 7.4 01/16/2025    HGB 9.9 (L) 01/16/2025    HCT 29.9 (L) 01/16/2025     (L) 01/16/2025    ALT 14 01/16/2025    AST 34 01/16/2025     01/16/2025    K 4.5 01/16/2025     01/16/2025    CREATININE 2.50 (H) 01/16/2025    BUN 34 (H) 01/16/2025    CO2 23 01/16/2025    TSH 3.93 11/21/2024    PSA 0.5 11/21/2024    INR 2.0 (H) 01/16/2025    HGBA1C 5.5 09/03/2024       EKG:  Reviewed    Echocardiogram:   No results found for this or any previous visit from the past 1825 days.    MR liver: 12/2024  1. Cirrhotic morphology of the liver with splenomegaly, moderate  caliber collaterals with splenorenal shunt, and large volume  abdominopelvic ascites consistent with portal hypertension. No  evidence of suspicious arterially enhancing hepatic lesion.  2. Moderate left pleural effusion.    Abd US: 1/2025  IMPRESSION:  Liver small in size with heterogeneous coarse echogenicity and with  splenorenal varices noted. The " main portal vein is dilated at 1.4 cm  with small amount of ascites seen in abdomen and with splenomegaly  observed. The findings would indicate cirrhosis and portal venous  hypertension.      Gallbladder wall thickening with small amount of gallbladder sludge.  The wall thickening may very well be secondary to systemic process  such as hepatic or renal failure.      No biliary ductal dilatation with pancreas unremarkable.      Normal abdominal aorta and IVC. No renal abnormality on either side  is observed.        ASSESSMENT AND PLAN:    Pt appears to be a reasonable candidate for simultaneous liver kidney transplant.  GFR has been <30 ml/min since 11/2024 and <60 since 8/2024. eGFR by cystatin C 24 ml/min.  Renal imaging with no abnormalities. No h/o proteinuria. No recent UA on file.   CKD likely sec to type 2 HRS.    Pt has started pre-txp eval testing. Seen by cardiology, final recs pending cardiac MRI. TTE and stress test unremarkable.     Pt with good functional status. Appears to have adequate family support.    Abd imaging reviewed by txp surgery.     Rest of the eval per protocol.     Update cancer screening per age/sex    The case will be presented at the selection committee at the Transplant [place], [facility].  The final decision from the committee will be sent out to notify the patient/primary care physician/ nephrologist. The above recommendations were discussed with the patient at length.     In addition, the following were also discussed:    - Risks and benefits of transplantation, both short-term and long-term    - Risk of primary graft non-function, DGF, SGF, rejection, primary disease recurrence, return to dialysis    - Risks of immunosuppression including infections, CA, CV risk    - Need for compliance with medications and medical care in general    - We reviewed the necessity of HBV vaccination and other recommended vaccines before a kidney transplant,  following the Centers for Disease Control and Prevention(CDC)'s guidelines [https://www.cdc.gov/vaccines/schedules/downloads/adult/adult-combined-schedule.pdf]     Currently, the patient has received the following vaccines:    Immunization History   Administered Date(s) Administered    COVID-19, mRNA, LNP-S, PF, 30 mcg/0.3 mL dose 10/25/2021, 11/16/2021    Pfizer COVID-19 vaccine, bivalent, age 12 years and older (30 mcg/0.3 mL) 11/14/2022         The patient expressed understanding of the above and wishes to proceed.  I answered all of his questions. I urged the patient to look for living donors.    - I have spent over 60 minutes with the patient, reviewing medical record, lab result , CXR result and other specialty's notes. More than 50% of the time was spent in counseling, explaining about the transplantation and answering the questions. I also reviewed the medical record, blood test results, imaging and previous studies which were obtained from the nephrologists. I also order the tests needed to complete the evaluation and I will review the results of those tests.    Thank you for this consultation. Please feel free to contact me for questions.

## 2025-01-23 NOTE — PROGRESS NOTES
"Per EPIC message; \"López Lopez, [id number], cancelled his Renal US and Cardiac MRI because [employer] ins. said we were out of network. He has an [employer] [facility] coverage. Am I able to schedule him for this testing? He is pre-transplant liver. Thanks!\"   [facility] response;   plan is OON for services other than transplant. Beatriz has auth on file for transplant evaluation. Per 11/12/24 v/m from [place] Steward of [employer]  [id number] liver eval has been approved eff 11/12/24-11/12/25 & patient has T&L benefits but must call .  Fax clinical to [phone] & call him prior to faxing.  Transplant services only are approved.  [facility] is OON with this plan.\"  This same issue just came up with kidney pt.  staff don't seemed to be trained on what [employer] is or means and some  staff can't even see the transplant guarantor. Has been an issue and continues to be an issue. Maybe when Beatriz and I know when their eval testing is schd we can pre empt  precert staff and pt from canceling? I added Barak because Yogi Steele has a do not disturb status. Some new [employer]place plans for 2025 with [employer] and [employer].   "

## 2025-01-27 ENCOUNTER — DOCUMENTATION (OUTPATIENT)
Dept: TRANSPLANT | Facility: HOSPITAL | Age: 65
End: 2025-01-27
Payer: COMMERCIAL

## 2025-01-27 ENCOUNTER — TELEPHONE (OUTPATIENT)
Facility: HOSPITAL | Age: 65
End: 2025-01-27
Payer: COMMERCIAL

## 2025-01-27 NOTE — TELEPHONE ENCOUNTER
1/27/25 - US no  longer needed, called PT and scheduled [facility] at [facility] on 2[facility] for Cardiac MRI. Gave him directions and prep

## 2025-01-27 NOTE — PROGRESS NOTES
Transplant Candidate Pharmacist Screening Note     Current Outpatient Medications on File Prior to Visit   Medication Sig Dispense Refill    apixaban (Eliquis) 5 mg tablet Take 1 tablet (5 mg) by mouth 2 times daily (morning and late afternoon).      atorvastatin (Lipitor) 10 mg tablet Take 1 tablet (10 mg) by mouth once daily.      cyproheptadine (Periactin) 4 mg tablet Take 1 tablet (4 mg) by mouth 3 times a day as needed for allergies. Please follow instructions provided by transplant team. 30 tablet 0    furosemide (Lasix) 20 mg tablet Take 1 tablet (20 mg) by mouth 2 times a day.      lactulose 20 gram/30 mL oral solution Take 30 mL (20 g) by mouth 2 times a day. 1800 mL 2    metFORMIN (Glucophage) 1,000 mg tablet Take 1 tablet (1,000 mg) by mouth every 12 hours.      traZODone (Desyrel) 50 mg tablet Take 1 tablet (50 mg) by mouth once daily at bedtime.       No current facility-administered medications on file prior to visit.     The patient's reported medications have been reviewed. Based on the above medication list, there are no pharmacologic contraindications to kidney or liver transplantation.    Of note, the patient is on apixaban for history of afib.    Sonja Garcia, PharmD, BCTXP   Solid Organ Transplant Clinical Pharmacy Specialist

## 2025-02-03 ENCOUNTER — DOCUMENTATION (OUTPATIENT)
Dept: TRANSPLANT | Facility: HOSPITAL | Age: 65
End: 2025-02-03
Payer: MEDICARE

## 2025-02-03 NOTE — PROGRESS NOTES
[employer] marketplace policy is active for 2025. Pt has some issues with pre txp testing at ; staff recognize [employer] coverage. [employer] auth is in place for kid lvr eval.

## 2025-02-04 ENCOUNTER — HOSPITAL ENCOUNTER (OUTPATIENT)
Dept: ULTRASOUND IMAGING | Age: 65
Discharge: HOME | End: 2025-02-06
Payer: COMMERCIAL

## 2025-02-04 ENCOUNTER — TELEPHONE (OUTPATIENT)
Facility: HOSPITAL | Age: 65
End: 2025-02-04
Payer: MEDICARE

## 2025-02-04 ENCOUNTER — HOSPITAL ENCOUNTER (OUTPATIENT)
Age: 65
Discharge: HOME | End: 2025-02-04
Payer: COMMERCIAL

## 2025-02-04 ENCOUNTER — DOCUMENTATION (OUTPATIENT)
Facility: HOSPITAL | Age: 65
End: 2025-02-04
Payer: MEDICARE

## 2025-02-04 ENCOUNTER — COMMITTEE REVIEW (OUTPATIENT)
Facility: HOSPITAL | Age: 65
End: 2025-02-04
Payer: MEDICARE

## 2025-02-04 VITALS
HEART RATE: 89 BPM | DIASTOLIC BLOOD PRESSURE: 63 MMHG | OXYGEN SATURATION: 99 % | RESPIRATION RATE: 18 BRPM | SYSTOLIC BLOOD PRESSURE: 131 MMHG

## 2025-02-04 DIAGNOSIS — R18.8 OTHER ASCITES: ICD-10-CM

## 2025-02-04 LAB
INR PPP: 1.5
PLATELET # BLD AUTO: 101 K/UL (ref 130–450)
PROTHROMBIN TIME: 15.5 SEC (ref 9.3–12.4)

## 2025-02-04 PROCEDURE — 36415 COLL VENOUS BLD VENIPUNCTURE: CPT

## 2025-02-04 PROCEDURE — 6360000002 HC RX W HCPCS: Performed by: PHYSICIAN ASSISTANT

## 2025-02-04 PROCEDURE — 85610 PROTHROMBIN TIME: CPT

## 2025-02-04 PROCEDURE — 85049 AUTOMATED PLATELET COUNT: CPT

## 2025-02-04 PROCEDURE — P9047 ALBUMIN (HUMAN), 25%, 50ML: HCPCS | Performed by: INTERNAL MEDICINE

## 2025-02-04 PROCEDURE — C1729 CATH, DRAINAGE: HCPCS

## 2025-02-04 PROCEDURE — 6360000002 HC RX W HCPCS: Performed by: INTERNAL MEDICINE

## 2025-02-04 RX ORDER — LIDOCAINE HYDROCHLORIDE 10 MG/ML
INJECTION, SOLUTION INFILTRATION; PERINEURAL PRN
Status: COMPLETED | OUTPATIENT
Start: 2025-02-04 | End: 2025-02-04

## 2025-02-04 RX ORDER — ALBUMIN (HUMAN) 12.5 G/50ML
SOLUTION INTRAVENOUS CONTINUOUS PRN
Status: COMPLETED | OUTPATIENT
Start: 2025-02-04 | End: 2025-02-04

## 2025-02-04 RX ADMIN — LIDOCAINE HYDROCHLORIDE 10 ML: 10 INJECTION, SOLUTION INFILTRATION; PERINEURAL at 13:10

## 2025-02-04 RX ADMIN — ALBUMIN (HUMAN) 50 G: 0.25 INJECTION, SOLUTION INTRAVENOUS at 13:05

## 2025-02-04 ASSESSMENT — PAIN - FUNCTIONAL ASSESSMENT: PAIN_FUNCTIONAL_ASSESSMENT: NONE - DENIES PAIN

## 2025-02-04 NOTE — TELEPHONE ENCOUNTER
Called PT to let him know Dr. Kevan Bennett will see him tomorrow morning at 8:40 am in [place] [address]. Called Jacqueline to ask when to schedule him, she said 8:40 am. Put note saying okay per Dr. Bennett in the appointment

## 2025-02-04 NOTE — COMMITTEE REVIEW
"Presentation for Listing:     Evaluation Date: 11/21/2024   Committee Review Date: 2/3/2025   Organ being evaluated for: Liver     Transplant Phase:  Evaluation   Transplant Status: Active     Transplant Physician: Abel Mckeon     Primary Diagnosis: Cirrhosis: Metabolic Dysfunction-Associated Steatohepatitis (MASH)     Committee Members:   Lynn Hope RDN, LD   Manuel Mata   Pharmacy Gwen Sanz, PharmD   Psychology Doris Gómez, PhD    Jacqueline Ruggiero [facility]   Transplant Daina Garcia RN; Memo Chicas Jr., RN; Clotilde Monterroso RN; Melody Pelayo RN; Desirae Clemons RN   Transplant Hepatology Ger Jones MD; Abel Mckeon MD; Linnette Humphrey, APRN-CNP; David Mao MD; Richi Conteh MD; Lizette Jha MD; Marco Christiansen MD   Transplant Surgery Jason Snell MD; Franky Cross MD; Meghan Noyola MD; Yaa Wolf MD; Eliezer Barrett MD       Committee Review Decision: Needs Re-presentation     Committee Discussion Details:   The candidate's evaluation was presented and discussed at the Liver Transplant Selection Committee meeting. After review of the candidate's diagnosis and the evaluations of the multidisciplinary team members, it was the consensus of the Selection Committee that the candidate does not meet Liver Selection Criteria at this time and requires completion of the following committee recommendations prior to re-presentation.    Committee Recommendations:  Per cardiac algorithm patient requires LHC.   Dr. Wilson will reach out to Dr. Bennett to discuss scheduling.     Patient attests to being fully vaccinated against Hepatitis B: No - Immunity           Do we have records uploaded into Epic? Yes    No results found for: \"HEPBSAB\"    Immunization History   Administered Date(s) Administered Comments    None   Deferred Date(s) Deferred Comments    Hepatitis B vaccine, adult *Check Product/Dose* 02/04/2025 Immunity     "

## 2025-02-04 NOTE — OR NURSING
Pt ambulated to IR room with use of rolling walker for paracentesis. Pt is alert and oriented, denies any pain prior to procedure. Ultrasound images taken prior to procedure. Procedure is explained to patient, including possible risks. Pt verbalizes understanding and consent from signed.   Procedure done under sterile technique and guidance of ultrasound imaging. 1% Lidocaine is used during procedure for comfort measures. A total of 10,850 ml's of clear yellow colored ascitic fluid drained during procedure. Catheter removed and op-site dressing applied to site with no bleeding noted.  Albumin infusion given during procedure. Pt tolerated procedure well and denies any pain after. Pt given discharge instructions and pt verbalizes understanding of post procedure care/follow up. Pt  via rolling walker out of department with no difficulties.

## 2025-02-04 NOTE — TELEPHONE ENCOUNTER
Spoke with patient and reviewed LTSC discussion and need for LHC. Per Dr. Bennett, patient can be scheduled in his clinic tomorrow in [place]. Patient states he is available for appointment. Will inquire if stress MRI is still needed. [employer] will contact patient today to schedule.

## 2025-02-05 ENCOUNTER — OFFICE VISIT (OUTPATIENT)
Dept: CARDIOLOGY | Facility: HOSPITAL | Age: 65
End: 2025-02-05
Payer: COMMERCIAL

## 2025-02-05 ENCOUNTER — HOSPITAL ENCOUNTER (OUTPATIENT)
Dept: RADIOLOGY | Facility: HOSPITAL | Age: 65
Discharge: HOME | End: 2025-02-05
Payer: COMMERCIAL

## 2025-02-05 VITALS
BODY MASS INDEX: 33.21 KG/M2 | WEIGHT: 258.8 LBS | OXYGEN SATURATION: 99 % | HEIGHT: 74 IN | SYSTOLIC BLOOD PRESSURE: 113 MMHG | HEART RATE: 94 BPM | DIASTOLIC BLOOD PRESSURE: 77 MMHG

## 2025-02-05 DIAGNOSIS — Z98.61 HISTORY OF PERCUTANEOUS TRANSLUMINAL CORONARY ANGIOPLASTY: ICD-10-CM

## 2025-02-05 DIAGNOSIS — I25.10 CORONARY ARTERY DISEASE INVOLVING NATIVE CORONARY ARTERY OF NATIVE HEART, UNSPECIFIED WHETHER ANGINA PRESENT: ICD-10-CM

## 2025-02-05 DIAGNOSIS — Z01.818 PRE-TRANSPLANT EVALUATION FOR LIVER TRANSPLANT: Primary | ICD-10-CM

## 2025-02-05 DIAGNOSIS — Z01.818 PRE-TRANSPLANT EVALUATION FOR KIDNEY TRANSPLANT: ICD-10-CM

## 2025-02-05 PROCEDURE — 3078F DIAST BP <80 MM HG: CPT | Performed by: STUDENT IN AN ORGANIZED HEALTH CARE EDUCATION/TRAINING PROGRAM

## 2025-02-05 PROCEDURE — 3074F SYST BP LT 130 MM HG: CPT | Performed by: STUDENT IN AN ORGANIZED HEALTH CARE EDUCATION/TRAINING PROGRAM

## 2025-02-05 PROCEDURE — 99215 OFFICE O/P EST HI 40 MIN: CPT | Performed by: STUDENT IN AN ORGANIZED HEALTH CARE EDUCATION/TRAINING PROGRAM

## 2025-02-05 PROCEDURE — 3008F BODY MASS INDEX DOCD: CPT | Performed by: STUDENT IN AN ORGANIZED HEALTH CARE EDUCATION/TRAINING PROGRAM

## 2025-02-05 ASSESSMENT — PATIENT HEALTH QUESTIONNAIRE - PHQ9
SUM OF ALL RESPONSES TO PHQ QUESTIONS 1-9: 0
9. THOUGHTS THAT YOU WOULD BE BETTER OFF DEAD, OR OF HURTING YOURSELF: NOT AT ALL
8. MOVING OR SPEAKING SO SLOWLY THAT OTHER PEOPLE COULD HAVE NOTICED. OR THE OPPOSITE, BEING SO FIGETY OR RESTLESS THAT YOU HAVE BEEN MOVING AROUND A LOT MORE THAN USUAL: NOT AT ALL
1. LITTLE INTEREST OR PLEASURE IN DOING THINGS: NOT AT ALL
5. POOR APPETITE OR OVEREATING: NOT AT ALL
3. TROUBLE FALLING OR STAYING ASLEEP OR SLEEPING TOO MUCH: NOT AT ALL
7. TROUBLE CONCENTRATING ON THINGS, SUCH AS READING THE NEWSPAPER OR WATCHING TELEVISION: NOT AT ALL
SUM OF ALL RESPONSES TO PHQ9 QUESTIONS 1 AND 2: 0
2. FEELING DOWN, DEPRESSED OR HOPELESS: NOT AT ALL
6. FEELING BAD ABOUT YOURSELF - OR THAT YOU ARE A FAILURE OR HAVE LET YOURSELF OR YOUR FAMILY DOWN: NOT AT ALL
4. FEELING TIRED OR HAVING LITTLE ENERGY: NOT AT ALL

## 2025-02-05 ASSESSMENT — ENCOUNTER SYMPTOMS
OCCASIONAL FEELINGS OF UNSTEADINESS: 1
DEPRESSION: 0
LOSS OF SENSATION IN FEET: 0

## 2025-02-05 ASSESSMENT — COLUMBIA-SUICIDE SEVERITY RATING SCALE - C-SSRS
2. HAVE YOU ACTUALLY HAD ANY THOUGHTS OF KILLING YOURSELF?: NO
6. HAVE YOU EVER DONE ANYTHING, STARTED TO DO ANYTHING, OR PREPARED TO DO ANYTHING TO END YOUR LIFE?: NO
1. IN THE PAST MONTH, HAVE YOU WISHED YOU WERE DEAD OR WISHED YOU COULD GO TO SLEEP AND NOT WAKE UP?: NO

## 2025-02-05 ASSESSMENT — PAIN SCALES - GENERAL: PAINLEVEL_OUTOF10: 0-NO PAIN

## 2025-02-05 NOTE — PATIENT INSTRUCTIONS
Dear López Lopez,    It was a pleasure meeting you today at the Cardiology office. As we dicussed, your clinical condition is pre-transplant evaluation for liver and kidney. I recommended a diagnostic Left heart catheterization due to your prior history of coronary artery disease and coronary stent. I will contact the Transplant Team once the results are available to define if the cardiac clearance is completed or if you will need a follow-up appointment.    Sincerely,     Kevan Bennett MD

## 2025-02-05 NOTE — PROGRESS NOTES
Location of visit: [facility]   Type of Visit: Established      Chief Complaint:  Patient was referred to Cardiology by Abdominal Transplant Team for double transplant evaluation.    History Of Present Illness:    López Lopez is a 65 y.o. male, with history significant for CAD s/p LAD PCI 2021, mixed HLD, PAFib on DOAC, T2DM, MASH, end stage liver disease MELD Score 24, who visits Cardiology today as an follow-up assessment for liver and kidney pre-transplant evaluation. He was recently assessed by Dr. Gamboa planning a stress MRI but per protocol he will require a Left heart catheterization after more than 3 years from prior invasive study. His liver disease was diagnosed in May 2024 with rapid decline in both liver and kidney function.    Patient denies chest pain, dyspnea on exertion, shortness of breath, orthopnea, PND, nocturia, edema, palpitations, dizziness, lightheadedness, syncope, claudication, or snoring/apnea.    Blood pressure: 113/77 mmHg  Heart rate: 94 bpm    Contrast allergy: no    Past Medical History:  He has no past medical history on file.    Past Surgical History:  He has no past surgical history on file.    Social History:  He reports that he has quit smoking. His smoking use included cigarettes. He has never used smokeless tobacco. He reports that he does not currently use alcohol. He reports that he does not currently use drugs after having used the following drugs: Marijuana.    Family History:  No family history on file.  Allergies:  Patient has no known allergies.    Outpatient Medications:  Current Outpatient Medications   Medication Instructions    apixaban (Eliquis) 5 mg tablet 1 tablet, 2 times daily (morning and late afternoon)    atorvastatin (Lipitor) 10 mg tablet 1 tablet, Daily    cyproheptadine (PERIACTIN) 4 mg, oral, 3 times daily PRN, Please follow instructions provided by transplant team.    furosemide (Lasix) 20 mg tablet 1 tablet, 2 times daily    lactulose 20  "g, oral, 2 times daily    metFORMIN (Glucophage) 1,000 mg tablet 1 tablet, Every 12 hours    traZODone (Desyrel) 50 mg tablet 1 tablet, Nightly     Last Recorded Vitals:  Vitals:    02/05/25 0802   BP: 113/77   BP Location: Left arm   Patient Position: Sitting   Pulse: 94   SpO2: 99%   Weight: 117 kg (258 lb 12.8 oz)   Height: 1.88 m (6' 2\")     Physical Exam:      2/5/2025     8:02 AM 1/16/2025     8:06 AM 1/16/2025     8:05 AM 1/9/2025    12:51 PM 12/2/2024    12:44 PM 11/12/2024     9:05 AM   Vitals   Systolic 113 121 121 119 122 107   Diastolic 77 72 72 71 75 67   BP Location Left arm   Left arm  Right arm   Heart Rate 94 94 94 97 90 70   Temp  36.6 °C (97.8 °F) 36.6 °C (97.8 °F) 36.9 °C (98.4 °F) 36.5 °C (97.7 °F) 36.8 °C (98.2 °F)   Height 1.88 m (6' 2\") 1.88 m (6' 2\") 1.88 m (6' 2\") 1.905 m (6' 3\") 1.892 m (6' 2.5\") 1.93 m (6' 4\")   Weight (lb) 258.8 277.2 277.2 265 276.9 267   BMI 33.23 kg/m2 35.59 kg/m2 35.59 kg/m2 33.12 kg/m2 35.08 kg/m2 32.5 kg/m2   BSA (m2) 2.47 m2 2.56 m2 2.56 m2 2.52 m2 2.57 m2 2.55 m2   Visit Report Report Report    Report Report    Report  Report    Report Report     Wt Readings from Last 5 Encounters:   02/05/25 117 kg (258 lb 12.8 oz)   01/16/25 126 kg (277 lb 3.2 oz)   01/16/25 126 kg (277 lb 3.2 oz)   01/09/25 120 kg (265 lb)   12/02/24 126 kg (276 lb 14.4 oz)     General: Sitting up comfortably in chair; in no apparent distress.  HEENT: Normocephalic; atraumatic. Well hydrated.  Eyes: Anicteric sclera. Extraocular movement intact.  Neck: Supple; no thyromegaly; normal jugular venous pressure, no bruits.  Respiratory: Bilateral air entry equal. No wheezing.  Cardiovascular: Normal S1, S2; no murmurs auscultated.  Abdomen: Nondistended; nontender. (+) bowel sounds.  Extremities: No peripheral edema present. Pulses 2+ diffusely.  Neurological: Oriented to time, place, and person; nonfocal.  Psychiatric: Normal affect.     Last Labs Reviewed:  CBC -  Recent Labs     01/16/25  0935 " "11/21/24  1136   WBC 7.4 6.1   HGB 9.9* 10.0*   HCT 29.9* 30.7*   * 95*   MCV 95 96     CMP -  Recent Labs     01/16/25  0935 11/21/24  1136    138   K 4.5 4.6    107   CO2 23 23   ANIONGAP 14 13   BUN 34* 20   CREATININE 2.50* 2.45*   EGFR 28* 29*     Recent Labs     01/16/25  0935 11/21/24  1136   ALBUMIN 3.1* 3.3*   ALKPHOS 144* 151*   ALT 14 15   AST 34 36   BILITOT 1.5* 1.7*     LIPID PANEL -   No results for input(s): \"CHOL\", \"LDLF\", \"LDLCALC\", \"HDL\", \"TRIG\" in the last 76541 hours.  COAGULATION PANEL -  Recent Labs     01/16/25  0935 11/21/24  1136   INR 2.0* 2.1*     HEME/ENDO -  Recent Labs     12/02/24  1429 11/21/24  1136 09/03/24  1123 08/13/24  1225   FERRITIN 85  --   --   --    IRONSAT 67*  --   --   --    TSH  --  3.93  --   --    HGBA1C  --   --  5.5 6.0*      Last Cardiology/Imaging Tests Personally Reviewed (if images available) and Interpreted:  ECG:  Encounter Date: 01/09/25   ECG 12 Lead   Result Value    Ventricular Rate 89    Atrial Rate 89    OR Interval 138    QRS Duration 88    QT Interval 362    QTC Calculation(Bazett) 440    P Axis 6    R Axis -14    T Axis 32    QRS Count 15    Q Onset 211    P Onset 142    P Offset 196    T Offset 392    QTC Fredericia 412    Narrative    Normal sinus rhythm  Low voltage QRS     Echocardiogram:  Transthoracic Echo (TTE) Complete 01/08/2025   1. Left ventricular ejection fraction is hyperdynamic, calculated by Manzanares's biplane at 77%.   2. There is normal right ventricular global systolic function.   3. Right ventricular systolic pressure is within normal limits.    Cath:  No results found.    Stress Test:  Nuclear Stress Test 01/09/2025  1. Negative myocardial perfusion study without evidence of inducible myocardial ischemia or prior infarction.  2. The left ventricle is normal in size.  3. Normal LV wall motion with a post-stress LV EF estimated at 63%.   4. Low-dose CT images demonstrate a large left pleural effusion and " ascites    Cardiac CT/MRI:  No results found.    Other CT:  No results found.    CV RISK FACTORS:   # Hypertension: Last BP: 113/77.  # Hyperlipidemia: Last Tchol No results found for requested labs within last 365 days. / LDL No results found for requested labs within last 365 days. / HDL No results found for requested labs within last 365 days. / TRIG No results found for requested labs within last 365 days. (No results in last year.).  # Type II Diabetes Mellitus: Last A1c 5.5 (9/3/2024: 11:23 AM).  # Obesity: Last BMI: 33.21.  # CKD: Last BUN/Cr (GFR): 34/2.50 (28), 1/16/2025:  9:35 AM.    ASCV RISK:  The ASCVD Risk score (Paulo DRAKE, et al., 2019) failed to calculate for the following reasons:    Cannot find a previous HDL lab    Cannot find a previous total cholesterol lab    Assessment:  65 y.o. male, with history significant for CAD s/p LAD PCI 2021, mixed HLD, PAFib, T2DM, MASH, end stage liver disease MELD Score 24, who visits Cardiology today as an follow-up assessment for liver and kidney pre-transplant evaluation.     Assessment & Plan  Pre-transplant evaluation for liver transplant  - Per pretransplant protocol and high risk procedure patient needs diagnostic Left heart catheterization in the context of known coronary artery disease a prior LAD stent.  Pre-transplant evaluation for kidney transplant  -  Per pretransplant protocol and high risk procedure patient needs diagnostic Left heart catheterization in the context of known coronary artery disease a prior LAD stent.  History of percutaneous transluminal coronary angioplasty  - On apixaban as a single agent in context of PAfib and PCI >6 months  Coronary artery disease involving native coronary artery of native heart, unspecified whether angina present  - Continue moderate dose Lipitor for LDL <70 (last LDL 29 10/2024)    I will contact the Transplant team once the results are available to define if the cardiac clearance is completed or patient will  need a follow-up appointment.  I spent 40 minutes assessing the case between pre-charting, face-to-face patient interaction, and documentation    Kevan Bennett MD

## 2025-02-06 ENCOUNTER — HOSPITAL ENCOUNTER (OUTPATIENT)
Dept: RADIOLOGY | Facility: HOSPITAL | Age: 65
Discharge: HOME | End: 2025-02-06
Payer: COMMERCIAL

## 2025-02-07 ENCOUNTER — TELEPHONE (OUTPATIENT)
Facility: HOSPITAL | Age: 65
End: 2025-02-07
Payer: MEDICARE

## 2025-02-07 NOTE — TELEPHONE ENCOUNTER
Returned call to patient let him know he should here from the cath lab regarding scheduling for his catheterization.  Told him to let us know if he has not heard by mid next week.  Clotilde Monterroso RN

## 2025-02-10 ENCOUNTER — TELEPHONE (OUTPATIENT)
Dept: CARDIOLOGY | Facility: HOSPITAL | Age: 65
End: 2025-02-10
Payer: MEDICARE

## 2025-02-10 ENCOUNTER — TELEPHONE (OUTPATIENT)
Facility: HOSPITAL | Age: 65
End: 2025-02-10
Payer: MEDICARE

## 2025-02-10 PROBLEM — Z98.61 HISTORY OF PERCUTANEOUS TRANSLUMINAL CORONARY ANGIOPLASTY: Status: ACTIVE | Noted: 2025-02-10

## 2025-02-10 PROBLEM — I25.10 CORONARY ARTERY DISEASE INVOLVING NATIVE CORONARY ARTERY OF NATIVE HEART: Status: ACTIVE | Noted: 2025-02-10

## 2025-02-10 NOTE — TELEPHONE ENCOUNTER
Returned patients call.  Discussed that he should call the cardiology department regarding having heart cath scheduled.   Dr. Bennett ordered heart cath at his appointment on 2/5/25

## 2025-02-10 NOTE — ASSESSMENT & PLAN NOTE
-  Per pretransplant protocol and high risk procedure patient needs diagnostic Left heart catheterization in the context of known coronary artery disease a prior LAD stent.   O-L Flap Text: The defect edges were debeveled with a #15 scalpel blade.  Given the location of the defect, shape of the defect and the proximity to free margins an O-L flap was deemed most appropriate.  Using a sterile surgical marker, an appropriate advancement flap was drawn incorporating the defect and placing the expected incisions within the relaxed skin tension lines where possible.    The area thus outlined was incised deep to adipose tissue with a #15 scalpel blade.  The skin margins were undermined to an appropriate distance in all directions utilizing iris scissors.

## 2025-02-10 NOTE — ASSESSMENT & PLAN NOTE
- Per pretransplant protocol and high risk procedure patient needs diagnostic Left heart catheterization in the context of known coronary artery disease a prior LAD stent.

## 2025-02-14 ENCOUNTER — DOCUMENTATION (OUTPATIENT)
Dept: TRANSPLANT | Facility: HOSPITAL | Age: 65
End: 2025-02-14
Payer: MEDICARE

## 2025-02-14 ENCOUNTER — TELEPHONE (OUTPATIENT)
Facility: HOSPITAL | Age: 65
End: 2025-02-14
Payer: MEDICARE

## 2025-02-14 NOTE — PROGRESS NOTES
Transplant evaluation auth is on file with [facility];  should be able to assist with authorization for pre transplant testing. [facility] Gold SHRAVAN Plan. Case was Opened for liver txp eval with [facility] transplant ph# ref# [id number].    Preet Steward of [employer]  [id number] liver eval has been approved eff 11/12/24-11/12/25.  fax is [phone]

## 2025-02-14 NOTE — TELEPHONE ENCOUNTER
PT called - Cardiology hasn't scheduled his L heart cath yet, it's supposed to be Monday 2/17. I called and left VM at Dr. Bennett's office to call PT with the info.

## 2025-02-18 ENCOUNTER — HOSPITAL ENCOUNTER (OUTPATIENT)
Dept: ULTRASOUND IMAGING | Age: 65
Discharge: HOME | End: 2025-02-20
Payer: COMMERCIAL

## 2025-02-18 VITALS
DIASTOLIC BLOOD PRESSURE: 57 MMHG | HEART RATE: 83 BPM | SYSTOLIC BLOOD PRESSURE: 113 MMHG | RESPIRATION RATE: 18 BRPM | OXYGEN SATURATION: 98 %

## 2025-02-18 DIAGNOSIS — R18.8 OTHER ASCITES: ICD-10-CM

## 2025-02-18 PROCEDURE — 6360000002 HC RX W HCPCS: Performed by: INTERNAL MEDICINE

## 2025-02-18 PROCEDURE — P9047 ALBUMIN (HUMAN), 25%, 50ML: HCPCS | Performed by: INTERNAL MEDICINE

## 2025-02-18 PROCEDURE — 6360000002 HC RX W HCPCS: Performed by: PHYSICIAN ASSISTANT

## 2025-02-18 PROCEDURE — C1729 CATH, DRAINAGE: HCPCS

## 2025-02-18 RX ORDER — ALBUMIN (HUMAN) 12.5 G/50ML
SOLUTION INTRAVENOUS CONTINUOUS PRN
Status: COMPLETED | OUTPATIENT
Start: 2025-02-18 | End: 2025-02-18

## 2025-02-18 RX ORDER — LIDOCAINE HYDROCHLORIDE 10 MG/ML
INJECTION, SOLUTION INFILTRATION; PERINEURAL PRN
Status: COMPLETED | OUTPATIENT
Start: 2025-02-18 | End: 2025-02-18

## 2025-02-18 RX ADMIN — ALBUMIN (HUMAN) 50 G: 0.25 INJECTION, SOLUTION INTRAVENOUS at 13:22

## 2025-02-18 RX ADMIN — LIDOCAINE HYDROCHLORIDE 10 ML: 10 INJECTION, SOLUTION INFILTRATION; PERINEURAL at 13:12

## 2025-02-18 NOTE — OR NURSING
Patient brought into room, discussed procedure. Mitzy Nance PA-C answered all questions and concerns related to the procedure. Treatment consent signed. Patient positioned and sterile prepped for the procedure. 1% Lidocaine administered by Mitzy Nance PA-C.  A total of 12,200 mL clear pale yellow ascitic fluid was taken. Patient tolerated procedure well. Site cleaned and dressed with a bandage. Vitals monitored and remained stable throughout. Discharge papers declined. Patient escorted out of department with all belongings and without distress.

## 2025-02-26 ENCOUNTER — HOSPITAL ENCOUNTER (OUTPATIENT)
Facility: HOSPITAL | Age: 65
Setting detail: OUTPATIENT SURGERY
Discharge: HOME | End: 2025-02-26
Attending: HOSPITALIST | Admitting: HOSPITALIST
Payer: MEDICARE

## 2025-02-26 ENCOUNTER — DOCUMENTATION (OUTPATIENT)
Dept: CARDIOLOGY | Facility: CLINIC | Age: 65
End: 2025-02-26

## 2025-02-26 VITALS
RESPIRATION RATE: 16 BRPM | TEMPERATURE: 97.2 F | BODY MASS INDEX: 33.37 KG/M2 | SYSTOLIC BLOOD PRESSURE: 100 MMHG | WEIGHT: 260 LBS | HEART RATE: 74 BPM | HEIGHT: 74 IN | OXYGEN SATURATION: 96 % | DIASTOLIC BLOOD PRESSURE: 56 MMHG

## 2025-02-26 DIAGNOSIS — Z01.818 PRE-TRANSPLANT EVALUATION FOR KIDNEY TRANSPLANT: ICD-10-CM

## 2025-02-26 DIAGNOSIS — Z01.818 PRE-TRANSPLANT EVALUATION FOR LIVER TRANSPLANT: Primary | ICD-10-CM

## 2025-02-26 DIAGNOSIS — N18.6 END STAGE RENAL DISEASE (MULTI): ICD-10-CM

## 2025-02-26 DIAGNOSIS — I25.10 CORONARY ARTERY DISEASE INVOLVING NATIVE CORONARY ARTERY OF NATIVE HEART, UNSPECIFIED WHETHER ANGINA PRESENT: ICD-10-CM

## 2025-02-26 DIAGNOSIS — Z98.61 HISTORY OF PERCUTANEOUS TRANSLUMINAL CORONARY ANGIOPLASTY: ICD-10-CM

## 2025-02-26 LAB
ANION GAP SERPL CALC-SCNC: 15 MMOL/L (ref 10–20)
BUN SERPL-MCNC: 40 MG/DL (ref 6–23)
CALCIUM SERPL-MCNC: 8.6 MG/DL (ref 8.6–10.3)
CHLORIDE SERPL-SCNC: 100 MMOL/L (ref 98–107)
CO2 SERPL-SCNC: 23 MMOL/L (ref 21–32)
CREAT SERPL-MCNC: 2.74 MG/DL (ref 0.5–1.3)
EGFRCR SERPLBLD CKD-EPI 2021: 25 ML/MIN/1.73M*2
ERYTHROCYTE [DISTWIDTH] IN BLOOD BY AUTOMATED COUNT: 14.5 % (ref 11.5–14.5)
GLUCOSE BLD MANUAL STRIP-MCNC: 91 MG/DL (ref 74–99)
GLUCOSE SERPL-MCNC: 90 MG/DL (ref 74–99)
HCT VFR BLD AUTO: 30.9 % (ref 41–52)
HGB BLD-MCNC: 10.2 G/DL (ref 13.5–17.5)
MCH RBC QN AUTO: 31.1 PG (ref 26–34)
MCHC RBC AUTO-ENTMCNC: 33 G/DL (ref 32–36)
MCV RBC AUTO: 94 FL (ref 80–100)
NRBC BLD-RTO: 0 /100 WBCS (ref 0–0)
PLATELET # BLD AUTO: 125 X10*3/UL (ref 150–450)
POTASSIUM SERPL-SCNC: 3.7 MMOL/L (ref 3.5–5.3)
RBC # BLD AUTO: 3.28 X10*6/UL (ref 4.5–5.9)
SODIUM SERPL-SCNC: 134 MMOL/L (ref 136–145)
WBC # BLD AUTO: 9.9 X10*3/UL (ref 4.4–11.3)

## 2025-02-26 PROCEDURE — 93454 CORONARY ARTERY ANGIO S&I: CPT | Performed by: HOSPITALIST

## 2025-02-26 PROCEDURE — 36415 COLL VENOUS BLD VENIPUNCTURE: CPT | Performed by: NURSE PRACTITIONER

## 2025-02-26 PROCEDURE — C1769 GUIDE WIRE: HCPCS | Performed by: HOSPITALIST

## 2025-02-26 PROCEDURE — 99152 MOD SED SAME PHYS/QHP 5/>YRS: CPT | Performed by: HOSPITALIST

## 2025-02-26 PROCEDURE — 80048 BASIC METABOLIC PNL TOTAL CA: CPT | Performed by: NURSE PRACTITIONER

## 2025-02-26 PROCEDURE — 82947 ASSAY GLUCOSE BLOOD QUANT: CPT | Mod: 59

## 2025-02-26 PROCEDURE — 2500000001 HC RX 250 WO HCPCS SELF ADMINISTERED DRUGS (ALT 637 FOR MEDICARE OP): Performed by: NURSE PRACTITIONER

## 2025-02-26 PROCEDURE — 96373 THER/PROPH/DIAG INJ IA: CPT | Performed by: HOSPITALIST

## 2025-02-26 PROCEDURE — 2500000005 HC RX 250 GENERAL PHARMACY W/O HCPCS: Performed by: HOSPITALIST

## 2025-02-26 PROCEDURE — 7100000009 HC PHASE TWO TIME - INITIAL BASE CHARGE: Performed by: HOSPITALIST

## 2025-02-26 PROCEDURE — 82374 ASSAY BLOOD CARBON DIOXIDE: CPT | Performed by: NURSE PRACTITIONER

## 2025-02-26 PROCEDURE — 2550000001 HC RX 255 CONTRASTS: Performed by: HOSPITALIST

## 2025-02-26 PROCEDURE — 85027 COMPLETE CBC AUTOMATED: CPT | Performed by: NURSE PRACTITIONER

## 2025-02-26 PROCEDURE — 7100000010 HC PHASE TWO TIME - EACH INCREMENTAL 1 MINUTE: Performed by: HOSPITALIST

## 2025-02-26 PROCEDURE — C1760 CLOSURE DEV, VASC: HCPCS | Performed by: HOSPITALIST

## 2025-02-26 PROCEDURE — 2720000007 HC OR 272 NO HCPCS: Performed by: HOSPITALIST

## 2025-02-26 PROCEDURE — 99222 1ST HOSP IP/OBS MODERATE 55: CPT | Performed by: NURSE PRACTITIONER

## 2025-02-26 PROCEDURE — C1894 INTRO/SHEATH, NON-LASER: HCPCS | Performed by: HOSPITALIST

## 2025-02-26 PROCEDURE — 93458 L HRT ARTERY/VENTRICLE ANGIO: CPT | Performed by: HOSPITALIST

## 2025-02-26 PROCEDURE — 2500000004 HC RX 250 GENERAL PHARMACY W/ HCPCS (ALT 636 FOR OP/ED): Performed by: HOSPITALIST

## 2025-02-26 RX ORDER — MIDAZOLAM HYDROCHLORIDE 5 MG/ML
INJECTION, SOLUTION INTRAMUSCULAR; INTRAVENOUS AS NEEDED
Status: DISCONTINUED | OUTPATIENT
Start: 2025-02-26 | End: 2025-02-26 | Stop reason: HOSPADM

## 2025-02-26 RX ORDER — FENTANYL CITRATE 50 UG/ML
INJECTION, SOLUTION INTRAMUSCULAR; INTRAVENOUS AS NEEDED
Status: DISCONTINUED | OUTPATIENT
Start: 2025-02-26 | End: 2025-02-26 | Stop reason: HOSPADM

## 2025-02-26 RX ORDER — NITROGLYCERIN 40 MG/100ML
INJECTION INTRAVENOUS AS NEEDED
Status: DISCONTINUED | OUTPATIENT
Start: 2025-02-26 | End: 2025-02-26 | Stop reason: HOSPADM

## 2025-02-26 RX ORDER — DEXTROSE 50 % IN WATER (D50W) INTRAVENOUS SYRINGE
12.5
Status: DISCONTINUED | OUTPATIENT
Start: 2025-02-26 | End: 2025-02-26 | Stop reason: HOSPADM

## 2025-02-26 RX ORDER — NAPROXEN SODIUM 220 MG/1
324 TABLET, FILM COATED ORAL ONCE
Status: COMPLETED | OUTPATIENT
Start: 2025-02-26 | End: 2025-02-26

## 2025-02-26 RX ORDER — ACETAMINOPHEN 325 MG/1
650 TABLET ORAL EVERY 6 HOURS PRN
Status: DISCONTINUED | OUTPATIENT
Start: 2025-02-26 | End: 2025-02-26 | Stop reason: HOSPADM

## 2025-02-26 RX ORDER — SODIUM CHLORIDE 9 MG/ML
100 INJECTION, SOLUTION INTRAVENOUS CONTINUOUS
Status: DISCONTINUED | OUTPATIENT
Start: 2025-02-26 | End: 2025-02-26

## 2025-02-26 RX ORDER — DEXTROSE 50 % IN WATER (D50W) INTRAVENOUS SYRINGE
25
Status: DISCONTINUED | OUTPATIENT
Start: 2025-02-26 | End: 2025-02-26 | Stop reason: HOSPADM

## 2025-02-26 RX ORDER — ACETAMINOPHEN 160 MG/5ML
650 SOLUTION ORAL EVERY 6 HOURS PRN
Status: DISCONTINUED | OUTPATIENT
Start: 2025-02-26 | End: 2025-02-26 | Stop reason: HOSPADM

## 2025-02-26 RX ORDER — LIDOCAINE HYDROCHLORIDE 10 MG/ML
INJECTION, SOLUTION EPIDURAL; INFILTRATION; INTRACAUDAL; PERINEURAL AS NEEDED
Status: DISCONTINUED | OUTPATIENT
Start: 2025-02-26 | End: 2025-02-26 | Stop reason: HOSPADM

## 2025-02-26 RX ORDER — HEPARIN SODIUM 1000 [USP'U]/ML
INJECTION, SOLUTION INTRAVENOUS; SUBCUTANEOUS AS NEEDED
Status: DISCONTINUED | OUTPATIENT
Start: 2025-02-26 | End: 2025-02-26 | Stop reason: HOSPADM

## 2025-02-26 RX ORDER — ACETAMINOPHEN 650 MG/1
650 SUPPOSITORY RECTAL EVERY 6 HOURS PRN
Status: DISCONTINUED | OUTPATIENT
Start: 2025-02-26 | End: 2025-02-26 | Stop reason: HOSPADM

## 2025-02-26 RX ORDER — SODIUM CHLORIDE 9 MG/ML
150 INJECTION, SOLUTION INTRAVENOUS CONTINUOUS
Status: DISCONTINUED | OUTPATIENT
Start: 2025-02-26 | End: 2025-02-26 | Stop reason: HOSPADM

## 2025-02-26 RX ADMIN — ASPIRIN 324 MG: 81 TABLET, CHEWABLE ORAL at 09:57

## 2025-02-26 ASSESSMENT — PAIN SCALES - GENERAL
PAINLEVEL_OUTOF10: 0 - NO PAIN

## 2025-02-26 ASSESSMENT — ENCOUNTER SYMPTOMS
NEUROLOGICAL NEGATIVE: 1
EYES NEGATIVE: 1
RESPIRATORY NEGATIVE: 1
CARDIOVASCULAR NEGATIVE: 1
DIARRHEA: 1
MUSCULOSKELETAL NEGATIVE: 1
PSYCHIATRIC NEGATIVE: 1
CONSTITUTIONAL NEGATIVE: 1
ENDOCRINE NEGATIVE: 1
ALLERGIC/IMMUNOLOGIC NEGATIVE: 1
HEMATOLOGIC/LYMPHATIC NEGATIVE: 1
ROS GI COMMENTS: ASCITES

## 2025-02-26 ASSESSMENT — PAIN - FUNCTIONAL ASSESSMENT
PAIN_FUNCTIONAL_ASSESSMENT: 0-10

## 2025-02-26 NOTE — PROGRESS NOTES
CARDIOLOGY UPDATE    Patient underwent Left heart catheterization with Dr. Teresa which showed mild CAD with patent mid LAD stent in a right-dominant system. The mid LAD stent had only minimal to mild ISR and it appears undersized but is widely patent. Only 12 mL of contrast used.    Recommendation:  - Given the patient's clinical assessment, there is no contraindication to list for kidney and liver transplant from the cardiac standpoint.      Kevan Bennett MD

## 2025-02-26 NOTE — Clinical Note
Closure device placed in the right radial artery. Site closed by radial compression system. 14ml of air

## 2025-02-26 NOTE — H&P
History Of Present Illness  López Lopez is a 65 y.o. male with PMHx significant for CAD s/p LAD PCI 2021, mixed HLD, pAFib on DOAC, T2DM, FORMAN, end stage liver disease, recurrent ascites requiring paracentesis ~every 2 weeks, CKD IV here for [facility] in the setting of CAD, pre-op cardiac clearance for liver and kidney transplant.   Last dose of Eliquis this past Saturday 2/22/25. Referred by Dr. Bennett.     Past Medical History:  No past medical history on file.     Past Surgical History:  No past surgical history on file.       Social History:  Social History     Tobacco Use    Smoking status: Former     Types: Cigarettes    Smokeless tobacco: Never   Substance Use Topics    Alcohol use: Not Currently     Comment: occ    Drug use: Not Currently     Types: Marijuana       Family History:  No family history on file.     Allergies:  No Known Allergies     Home Medications:  Current Outpatient Medications   Medication Instructions    apixaban (Eliquis) 5 mg tablet 1 tablet, 2 times daily (morning and late afternoon)    atorvastatin (Lipitor) 10 mg tablet 1 tablet, Daily    cyproheptadine (PERIACTIN) 4 mg, oral, 3 times daily PRN, Please follow instructions provided by transplant team.    furosemide (Lasix) 20 mg tablet 1 tablet, 2 times daily    lactulose 20 g, oral, 2 times daily    metFORMIN (Glucophage) 1,000 mg tablet 1 tablet, Every 12 hours    traZODone (Desyrel) 50 mg tablet 1 tablet, Nightly       Inpatient Medications:  Scheduled medications   Medication Dose Route Frequency    aspirin  324 mg oral Once     PRN medications   Medication    dextrose    dextrose    glucagon    glucagon     Continuous Medications   Medication Dose Last Rate    sodium chloride 0.9%  100 mL/hr           Review of Systems   Constitutional: Negative.    HENT: Negative.     Eyes: Negative.    Respiratory: Negative.     Cardiovascular: Negative.    Gastrointestinal:  Positive for diarrhea.        Ascites   Endocrine: Negative.   "  Genitourinary: Negative.    Musculoskeletal: Negative.    Skin: Negative.    Allergic/Immunologic: Negative.    Neurological: Negative.    Hematological: Negative.    Psychiatric/Behavioral: Negative.            Physical Exam  General:  Patient is awake, alert, and oriented.  Patient is in no acute distress.  HEENT:  Pupils equal and reactive.  Normocephalic.  Moist mucosa.    Neck:  No JVD.   Cardiovascular:  Regular rate and rhythm.  Normal S1 and S2. No murmurs/rubs/gallops. Radial pulses 2+.   Pulmonary:  Clear to auscultation bilaterally.  Abdomen: Ascites.  Soft. Non-tender.   Non-distended.  Positive bowel sounds.  Lower Extremities:  Pedal pulses 1+ No LE edema.  Neurologic:  Cranial nerves II-XII grossly intact.   No focal deficit.   Skin: Skin warm and dry, no lesions. Normal skin turgor.   Psychiatric: Normal affect.     Sedation Preparation     NPO since 0000    Last Recorded Vitals  Blood pressure 125/75, pulse 88, temperature 36.6 °C (97.9 °F), temperature source Temporal, resp. rate 18, height 1.88 m (6' 2\"), weight 118 kg (260 lb).         Vitals from the Past 24 Hours            Relevant Results    Labs    POCT Glucose:      POCT Urine Pregnancy:       CBC:   Recent Labs     01/16/25  0935 11/21/24  1136   WBC 7.4 6.1   HGB 9.9* 10.0*   HCT 29.9* 30.7*   * 95*   MCV 95 96     BMP/CMP:   Recent Labs     01/16/25  0935 11/21/24  1136    138   K 4.5 4.6    107   BUN 34* 20   CREATININE 2.50* 2.45*   CO2 23 23   CALCIUM 8.6 8.6   PROT 6.4 5.9*   BILITOT 1.5* 1.7*   ALKPHOS 144* 151*   ALT 14 15   AST 34 36   GLUCOSE 103* 97      Magnesium: No results for input(s): \"MG\" in the last 64287 hours.  Lipid Panel: No results for input(s): \"CHOL\", \"HDL\", \"CHHDL\", \"LDL\", \"VLDL\", \"TRIG\", \"NHDL\" in the last 15695 hours.  Cardiac       No lab exists for component: \"CK\", \"CKMBP\"   Hemoglobin A1C:   Recent Labs     09/03/24  1123 08/13/24  1225   HGBA1C 5.5 6.0*     TSH/ Free T4:   Recent Labs    " " 11/21/24  1136   TSH 3.93     Iron:   Recent Labs     12/02/24  1429   FERRITIN 85   TIBC 264   IRONSAT 67*     Coag:     ABO: No results found for: \"ABO\"    Past Cardiology Tests (Last 3 Years):    EKG:  Recent Labs     01/09/25  1408   ATRRATE 89   VENTRATE 89   PRINT 138   QRSDUR 88   QTCFRED 412   QTCCALCB 440     Encounter Date: 01/09/25   ECG 12 Lead   Result Value    Ventricular Rate 89    Atrial Rate 89    NV Interval 138    QRS Duration 88    QT Interval 362    QTC Calculation(Bazett) 440    P Axis 6    R Axis -14    T Axis 32    QRS Count 15    Q Onset 211    P Onset 142    P Offset 196    T Offset 392    QTC Fredericia 412    Narrative    Normal sinus rhythm  Low voltage QRS  Cannot rule out Anterior infarct , age undetermined  Abnormal ECG  No previous ECGs available  Confirmed by Ming Andrade ([id number]) on 1/13/2025 4:28:25 PM     Echo:  Echocardiogram:   Transthoracic Echo (TTE) Complete With Contrast 01/08/2025    [place] [id number]  Tel [phone] and Fax [phone]    TRANSTHORACIC ECHOCARDIOGRAM REPORT      Patient Name:       CHEMA MACIAS   Reading Physician:    [id number] Araiza MD  Study Date:         1/8/2025            Ordering Provider:    [id number] GRISELDA MARES  MRN/PID:            [id number]            Fellow:  Accession#:         [id number]        Nurse:                Clotilde Nagel RN  Date of Birth/Age:  1960 / 65 years Sonographer:          Mckayla DAMIAN  Gender assigned at                     Additional Staff:  Birth:  Height:             187.96 cm           Admit Date:  Weight:             125.19 kg           Admission Status:     Outpatient  BSA / BMI:          2.49 m2 / 35.44     Encounter#:           [id number]  kg/m2  Blood Pressure:     122/76 mmHg         Department Location:  [facility]    Study Type:    TRANSTHORACIC ECHO (TTE) COMPLETE  Diagnosis/ICD: Encounter for other preprocedural " examination-Z01.818  Indication:    Pre-liver and kidney transplant  CPT Code:      Echo Complete w Full Doppler-80595    Patient History:  Diabetes:          Yes  Pertinent History: A-Fib, CAD and Hyperlipidemia.    Study Detail: The following Echo studies were performed: 2D, M-Mode, Doppler and  color flow. Image quality for this study is poor. Technically  challenging study due to body habitus. Definity used as a contrast  agent for endocardial border definition and agitated saline used  as a contrast agent for intraseptal flow evaluation. Total  contrast used for this procedure was 2 mL via IV push.      PHYSICIAN INTERPRETATION:  Left Ventricle: Left ventricular ejection fraction is hyperdynamic, calculated by Manzanares's biplane at 77%. There are no regional left ventricular wall motion abnormalities. The left ventricular cavity size is normal. There is normal septal and normal posterior left ventricular wall thickness. Spectral Doppler shows a normal pattern of left ventricular diastolic filling.  Left Atrium: The left atrium is normal in size. A bubble study using agitated saline was performed. Bubble study is negative.  Right Ventricle: The right ventricle is normal in size. There is normal right ventricular global systolic function.  Right Atrium: The right atrium is normal in size.  Aortic Valve: The aortic valve is probably trileaflet. There is no evidence of aortic valve regurgitation. The peak instantaneous gradient of the aortic valve is 8 mmHg.  Mitral Valve: The mitral valve is normal in structure. There is no evidence of mitral valve regurgitation.  Tricuspid Valve: The tricuspid valve is structurally normal. There is trace tricuspid regurgitation. The Doppler estimated RVSP is within normal limits at 11.8 mmHg.  Pulmonic Valve: The pulmonic valve is structurally normal. There is physiologic pulmonic valve regurgitation.  Pericardium: There is no pericardial effusion noted.  Aorta: The aortic root is  normal. The Ao Sinus is 3.70 cm. The Asc Ao is 4.00 cm.  Systemic Veins: The inferior vena cava appears normal in size, with IVC inspiratory collapse greater than 50%.  In comparison to the previous echocardiogram(s): There are no prior studies on this patient for comparison purposes.      CONCLUSIONS:  1. Left ventricular ejection fraction is hyperdynamic, calculated by Manzanares's biplane at 77%.  2. There is normal right ventricular global systolic function.  3. Right ventricular systolic pressure is within normal limits.    QUANTITATIVE DATA SUMMARY:    2D MEASUREMENTS:          Normal Ranges:  LAs:             3.36 cm  (2.7-4.0cm)  RVIDd:           3.09 cm  (0.9-3.6cm)  IVSd:            0.97 cm  (0.6-1.1cm)  LVPWd:           0.95 cm  (0.6-1.1cm)  LVIDd:           5.06 cm  (3.9-5.9cm)  LVIDs:           3.02 cm  LV Mass Index:   70 g/m2  LVEDV Index:     60 ml/m2  LV % FS          40.2 %      LA VOLUME:                    Normal Ranges:  LA Vol A4C:        70.2 ml    (22+/-6mL/m2)  LA Vol A2C:        70.9 ml  LA Vol BP:         71.1 ml  LA Vol Index A4C:  28.2ml/m2  LA Vol Index A2C:  28.4 ml/m2  LA Vol Index BP:   28.5 ml/m2  LA Area A4C:       21.5 cm2  LA Area A2C:       21.8 cm2  LA Major Axis A4C: 5.6 cm  LA Major Axis A2C: 5.7 cm  LA Volume Index:   28.5 ml/m2  LA Vol A4C:        61.4 ml  LA Vol A2C:        67.9 ml  LA Vol Index BSA:  25.9 ml/m2      RA VOLUME BY A/L METHOD:           Normal Ranges:  RA Vol A4C:              22.0 ml   (8.3-19.5ml)  RA Vol Index A4C:        8.8 ml/m2  RA Area A4C:             10.8 cm2  RA Major Axis A4C:       4.5 cm      AORTA MEASUREMENTS:         Normal Ranges:  Ao Sinus, d:        3.70 cm (2.1-3.5cm)  Asc Ao, d:          4.00 cm (2.1-3.4cm)      LV SYSTOLIC FUNCTION BY 2D PLANIMETRY (MOD):  Normal Ranges:  EF-A4C View:    77 % (>=55%)  EF-A2C View:    79 %  EF-Biplane:     77 %  LV EF Reported: 77 %      LV DIASTOLIC FUNCTION:             Normal Ranges:  MV Peak E:              0.82 m/s    (0.7-1.2 m/s)  MV Peak A:             0.93 m/s    (0.42-0.7 m/s)  E/A Ratio:             0.87        (1.0-2.2)  MV e'                  0.158 m/s   (>8.0)  MV lateral e'          0.17 m/s  MV medial e'           0.14 m/s  MV A Dur:              139.87 msec  E/e' Ratio:            5.15        (<8.0)  MV DT:                 295 msec    (150-240 msec)  PulmV Sys Mark:         86.55 cm/s  PulmV Lin Mark:        63.51 cm/s  PulmV S/D Mark:         1.36  PulmV A Revs Mark:      35.83 cm/s  PulmV A Revs Dur:      119.89 msec      MITRAL VALVE:          Normal Ranges:  MV DT:        295 msec (150-240msec)      AORTIC VALVE:           Normal Ranges:  AoV Vmax:      1.45 m/s (<=1.7m/s)  AoV Peak P.4 mmHg (<20mmHg)  LVOT Max Mark:  1.24 m/s (<=1.1m/s)  LVOT VTI:      28.85 cm  LVOT Diameter: 2.34 cm  (1.8-2.4cm)  AoV Area,Vmax: 3.68 cm2 (2.5-4.5cm2)      RIGHT VENTRICLE:  RV Basal 4.50 cm  RV Mid   3.10 cm  RV Major 8.4 cm  TAPSE:   24.0 mm  RV s'    0.18 m/s      TRICUSPID VALVE/RVSP:          Normal Ranges:  Peak TR Velocity:     1.48 m/s  Est. RA Pressure:     3 mmHg  RV Syst Pressure:     12 mmHg  (< 30mmHg)  IVC Diam:             1.90 cm      PULMONIC VALVE:          Normal Ranges:  PV Accel Time:  88 msec  (>120ms)  PV Max Mark:     1.2 m/s  (0.6-0.9m/s)  PV Max P.3 mmHg      Pulmonary Veins:  PulmV A Revs Dur: 119.89 msec  PulmV A Revs Mark: 35.83 cm/s  PulmV Lin Mark:   63.51 cm/s  PulmV S/D Mark:    1.36  PulmV Sys Mark:    86.55 cm/s      AORTA:  Asc Ao Diam 3.98 cm      [id number] Handy Araiza MD  Electronically signed on 2025 at 8:04:28 AM        ** Final **    Ejection Fractions:  LV EF   Date/Time Value Ref Range Status   2025 02:04 PM 77 %      Cath:  Coronary Angiography: No results found for this or any previous visit from the past 1800 days.    Right Heart Cath: No results found for this or any previous visit from the past 1800 days.    Stress Test:  Nuclear:No results found for  this or any previous visit from the past 1800 days.    Metabolic Stress: No results found for this or any previous visit from the past 1800 days.    Cardiac Imaging:  Cardiac Scoring: No results found for this or any previous visit from the past 1800 days.    Cardiac MRI: No results found for this or any previous visit from the past 1800 days.         Assessment/Plan  Assessment/Plan   Assessment & Plan  Encounter for pre-transplant evaluation for liver transplant    Pre-transplant evaluation for kidney transplant    Coronary artery disease involving native coronary artery of native heart        -C with Dr. Teresa today 2/26/25  - mg PO pre-procedure  -CBC, BMP, INR pre-procedure        NP discussed with Dr. Teresa regarding plan of care/ discharge plan      I spent 30 minutes in the professional and overall care of this patient.      Josefina Amaya, XENIA-CNP

## 2025-02-26 NOTE — POST-PROCEDURE NOTE
Physician Transition of Care Summary  Invasive Cardiovascular Lab    Procedure Date: 2/26/2025  Attending:    Mike Teresa - Primary  Resident/Fellow/Other Assistant: Surgeons and Role:  * No surgeons found with a matching role *    Indications:   Pre-op Diagnosis      * Pre-transplant evaluation for liver transplant [Z01.818]     * Pre-transplant evaluation for kidney transplant [Z01.818]     * History of percutaneous transluminal coronary angioplasty [Z98.61]     * Coronary artery disease involving native coronary artery of native heart, unspecified whether angina present [I25.10]    Post-procedure diagnosis:   Post-op Diagnosis     * Pre-transplant evaluation for liver transplant [Z01.818]     * Pre-transplant evaluation for kidney transplant [Z01.818]     * History of percutaneous transluminal coronary angioplasty [Z98.61]     * Coronary artery disease involving native coronary artery of native heart, unspecified whether angina present [I25.10]    Procedure(s):   Left Heart Cath with Coronary Angiography and LV  87540 - [facility] CATH PLMT L HRT & ARTS W/NJX & ANGIO IMG S&I    Procedure Findings:   -Mild CAD with patent mid LAD stent in a right-dominant system.  The mid LAD stent has only minimal to mild ISR and it appears undersized but is widely patent.    -We only used 12 mL of contrast.    Access of the Procedure:   6 Welsh right radial artery, closed with TR band.    Complications:   None.    Stents/Implants:   None.    Anticoagulation/Antiplatelet Plan:   Heparin bolus for radial access.    Estimated Blood Loss:   5 mL    Anesthesia: Moderate Sedation Anesthesia Staff: No anesthesia staff entered.    Any Specimen(s) Removed:   Order Name Source Comment Collection Info Order Time   CBC Blood, Venous  Collected By: Mustapha Goncalves RN 2/26/2025  9:41 AM     Release result to [facility]   Immediate        BASIC METABOLIC PANEL Blood, Venous  Collected By: Mustapha Goncalves RN 2/26/2025  9:41 AM     Release result to  [facility]   Immediate            Disposition:   -TR band removal and hydration per protocol.  -Okay to resume home Eliquis tomorrow morning.  -Follow-up with Dr. Bennett in the clinic and renal/ liver transplant teams.     Electronically signed by: Cesilia Teresa MD, 2/26/2025 11:37 AM

## 2025-02-26 NOTE — DISCHARGE INSTRUCTIONS
CARDIAC CATHETERIZATION DISCHARGE INSTRUCTIONS    RESUME ELIQUIS TOMORROW THURSDAY 2/27.    HOLD METFORMIN FOR 48 HOURS DUE TO CONTRAST. OK TO RESUME FRIDAY 2/28.    DR. HOPE'S OFFICE WILL BE IN TOUCH WITH THE TRANSPLANT TEAM AND WILL NOTIFY YOU IF ADDITIONAL TESTING AND/OR FOLLOW UP IS NEEDED.        FOR SUDDEN AND SEVERE CHEST PAIN, SHORTNESS OF BREATH, EXCESSIVE BLEEDING, SIGNS OF STROKE, OR CHANGES IN MENTAL STATUS YOU SHOULD CALL 911 IMMEDIATELY.     If your provider has prescribed aspirin and/or clopidogrel (Plavix), or prasugrel (Effient), or ticagrelor (Brilinta), DO NOT STOP THESE MEDICATIONS for any reason without talking to your cardiologist first. If any of these were prescribed, you must take them every day without missing a single dose. If you are getting low on these medications, contact your provider immediately for a refill.     FOR NEXT 24 HOURS  - Upon discharge, you should return home and rest for the remainder of the day and evening. You do not have to stay on bed rest but should not be very active.  It is recommended a responsible adult be with you for the first 24 hours after the procedure.    - No driving for 24 hours after procedure. Please arrange for someone to drive you home from the hospital today.     - Do not drive, operate machinery, or use power tools for 24 hours after your procedure.     - Do not make any legal decisions for 24 hours after your procedure.     - Do not drink alcoholic beverages for 24 hours after your procedure.    -Stay extra hydrated for 24 hours after your procedure; goal fluid intake around 72-96 ounces for the next 24 hours.       WOUND CARE   *FOR FEMORAL (LEG) ACCESS*  ·      Avoid heavy lifting (over 10 pounds) for 7 days, squatting or excessive bending for 2 days, and strenuous exercise for 7 days.  ·      No submerged bathing, swimming, or hot tubs for the next 7 days, or until fully healed.  ·      Avoid sexual activity for 3-4 days until any  groin discomfort has ceased.     *FOR RADIAL (WRIST) ACCESS*  ·      No lifting more than 5 pounds or excessive use of the wrist for 24 hours - for example, treat your wrist as if it is sprained.  ·      Do not engage in vigorous activities (tennis, golf, bowling, weights) for at least 48 hours after the procedure.  ·      Do not submerge the wrist for 7 days after the procedure.  ·      You should expect mild tingling in your hand and tenderness at the puncture site for up to 3 days.    - The transparent dressing should be removed from the site 24 hours after the procedure.  Wash the site gently with soap and water. Rinse well and pat dry. Keep the area clean and dry. You may apply a Band-Aid to the site. Avoid lotions, ointments, or powders until fully healed.     - You may shower the day after your procedure.      - It is normal to notice a small bruise around the puncture site and/or a small grape sized or smaller lump. Any large bruising or large lump warrants a call to the office.     - If bleeding should occur, lay down and apply pressure to the affected area for 10 minutes.  If the bleeding stops notify your physician.  If there is a large amount of bleeding or spurting of blood CALL 911 immediately.  DO NOT drive yourself to the hospital.    - You may experience some tenderness, bruising or minimal inflammation.  If you have any concerns, you may contact the Cath Lab or if any of these symptoms become excessive, contact your cardiologist or go to the emergency room.     OTHER INSTRUCTIONS  - You may take acetaminophen (Tylenol) as directed for discomfort.  If pain is not relieved with acetaminophen (Tylenol), contact your doctor.    - If you notice or experience any of the following, you should notify your doctor or seek medical attention  Chest pain or discomfort  Change in mental status or weakness in extremities.  Dizziness, light headedness, or feeling faint.  Change in the site where the procedure was  performed, such as bleeding or an increased area of bruising or swelling.  Tingling, numbness, pain, or coolness in the leg/arm beyond the site where the procedure was performed.  Signs of infection (i.e. shaking chills, temperature > 100 degrees Fahrenheit, warmth, redness) in the leg/arm area where the procedure was performed.  Changes in urination   Bloody or black stools  Vomiting blood  Severe nose bleeds  Any excessive bleeding    - If you DO NOT have an appointment with your cardiologist within 2-4 weeks following your procedure, please contact their office.

## 2025-02-27 ENCOUNTER — DOCUMENTATION (OUTPATIENT)
Facility: HOSPITAL | Age: 65
End: 2025-02-27
Payer: MEDICARE

## 2025-02-27 NOTE — PROGRESS NOTES
KEITH received VM from Pt's wife, Bobbi, stating Pt's insurance plan will be ending soon. Pt's wife requested assistance with choosing a plan that will allow Pt to still have transplant benefits. KEITH reached out to financial counselors and requested they contact Pt.     Plan: SW to remain available.

## 2025-02-28 ENCOUNTER — DOCUMENTATION (OUTPATIENT)
Dept: TRANSPLANT | Facility: HOSPITAL | Age: 65
End: 2025-02-28
Payer: MEDICARE

## 2025-02-28 NOTE — PROGRESS NOTES
Rec'd email from S/W patient would like a call to discuss insurance coverage.  Left v/m for patient to call me at [phone].

## 2025-03-04 ENCOUNTER — HOSPITAL ENCOUNTER (OUTPATIENT)
Dept: ULTRASOUND IMAGING | Age: 65
Discharge: HOME | End: 2025-03-06
Payer: COMMERCIAL

## 2025-03-04 ENCOUNTER — HOSPITAL ENCOUNTER (OUTPATIENT)
Age: 65
Discharge: HOME | End: 2025-03-04
Payer: COMMERCIAL

## 2025-03-04 VITALS
RESPIRATION RATE: 18 BRPM | HEART RATE: 75 BPM | SYSTOLIC BLOOD PRESSURE: 131 MMHG | OXYGEN SATURATION: 99 % | DIASTOLIC BLOOD PRESSURE: 70 MMHG

## 2025-03-04 DIAGNOSIS — Z01.818 ENCOUNTER FOR PRE-TRANSPLANT EVALUATION FOR KIDNEY TRANSPLANT: ICD-10-CM

## 2025-03-04 DIAGNOSIS — N18.4 CKD (CHRONIC KIDNEY DISEASE) STAGE 4, GFR 15-29 ML/MIN (MULTI): ICD-10-CM

## 2025-03-04 DIAGNOSIS — Z01.818 ENCOUNTER FOR PRE-TRANSPLANT EVALUATION FOR LIVER TRANSPLANT: Primary | ICD-10-CM

## 2025-03-04 DIAGNOSIS — K75.81 METABOLIC DYSFUNCTION-ASSOCIATED STEATOHEPATITIS (MASH): ICD-10-CM

## 2025-03-04 DIAGNOSIS — R18.8 OTHER ASCITES: ICD-10-CM

## 2025-03-04 LAB
ALBUMIN FLD-MCNC: 0.6 G/DL
AMYLASE FLD-CCNC: 21 U/L
APPEARANCE FLD: CLEAR
BODY FLD TYPE: NORMAL
CLOT CHECK: NORMAL
COLOR FLD: YELLOW
INR PPP: 1.7
MONOCYTES NFR FLD: 89 %
NEUTROPHILS NFR FLD: 11 %
PLATELET # BLD AUTO: 109 K/UL (ref 130–450)
PROT FLD-MCNC: 1 G/DL
PROTHROMBIN TIME: 18 SEC (ref 9.3–12.4)
RBC # FLD: <2000 CELLS/UL
SPECIMEN TYPE: NORMAL
WBC # FLD: 169 CELLS/UL

## 2025-03-04 PROCEDURE — 88112 CYTOPATH CELL ENHANCE TECH: CPT

## 2025-03-04 PROCEDURE — 6360000002 HC RX W HCPCS: Performed by: RADIOLOGY

## 2025-03-04 PROCEDURE — 82150 ASSAY OF AMYLASE: CPT

## 2025-03-04 PROCEDURE — 87070 CULTURE OTHR SPECIMN AEROBIC: CPT

## 2025-03-04 PROCEDURE — 6360000002 HC RX W HCPCS: Performed by: INTERNAL MEDICINE

## 2025-03-04 PROCEDURE — 89051 BODY FLUID CELL COUNT: CPT

## 2025-03-04 PROCEDURE — 87205 SMEAR GRAM STAIN: CPT

## 2025-03-04 PROCEDURE — 88305 TISSUE EXAM BY PATHOLOGIST: CPT

## 2025-03-04 PROCEDURE — 85049 AUTOMATED PLATELET COUNT: CPT

## 2025-03-04 PROCEDURE — 85610 PROTHROMBIN TIME: CPT

## 2025-03-04 PROCEDURE — C1729 CATH, DRAINAGE: HCPCS

## 2025-03-04 PROCEDURE — 36415 COLL VENOUS BLD VENIPUNCTURE: CPT

## 2025-03-04 PROCEDURE — 84157 ASSAY OF PROTEIN OTHER: CPT

## 2025-03-04 PROCEDURE — 82042 OTHER SOURCE ALBUMIN QUAN EA: CPT

## 2025-03-04 PROCEDURE — P9047 ALBUMIN (HUMAN), 25%, 50ML: HCPCS | Performed by: INTERNAL MEDICINE

## 2025-03-04 RX ORDER — CYPROHEPTADINE HYDROCHLORIDE 4 MG/1
4 TABLET ORAL
COMMUNITY

## 2025-03-04 RX ORDER — ALBUMIN (HUMAN) 12.5 G/50ML
SOLUTION INTRAVENOUS CONTINUOUS PRN
Status: COMPLETED | OUTPATIENT
Start: 2025-03-04 | End: 2025-03-04

## 2025-03-04 RX ORDER — LIDOCAINE HYDROCHLORIDE 10 MG/ML
INJECTION, SOLUTION INFILTRATION; PERINEURAL PRN
Status: COMPLETED | OUTPATIENT
Start: 2025-03-04 | End: 2025-03-04

## 2025-03-04 RX ADMIN — LIDOCAINE HYDROCHLORIDE 10 ML: 10 INJECTION, SOLUTION INFILTRATION; PERINEURAL at 13:42

## 2025-03-04 RX ADMIN — ALBUMIN (HUMAN) 50 G: 0.25 INJECTION, SOLUTION INTRAVENOUS at 13:50

## 2025-03-04 NOTE — OR NURSING
Patient brought into room, discussed procedure. Dr. Irene answered all questions and concerns related to the procedure. Treatment consent signed. Patient positioned and sterile prepped for the procedure. 1% Lidocaine administered by Dr. Irene.  A total of 10,950 mL clear yellow ascitic fluid was taken. Patient tolerated procedure well. Site cleaned and dressed with a bandage. Vitals monitored and remained stable throughout. Discharge papers declined. Patient escorted out of department with all belongings and without distress.

## 2025-03-06 ENCOUNTER — LAB (OUTPATIENT)
Dept: LAB | Facility: HOSPITAL | Age: 65
End: 2025-03-06
Payer: COMMERCIAL

## 2025-03-06 DIAGNOSIS — N18.4 CHRONIC KIDNEY DISEASE, STAGE 4 (SEVERE) (MULTI): ICD-10-CM

## 2025-03-06 DIAGNOSIS — Z01.818 ENCOUNTER FOR OTHER PREPROCEDURAL EXAMINATION: Primary | ICD-10-CM

## 2025-03-06 DIAGNOSIS — K75.81 NONALCOHOLIC STEATOHEPATITIS (NASH): ICD-10-CM

## 2025-03-06 LAB — NON-GYN CYTOLOGY REPORT: NORMAL

## 2025-03-06 PROCEDURE — 82565 ASSAY OF CREATININE: CPT

## 2025-03-06 PROCEDURE — 86900 BLOOD TYPING SEROLOGIC ABO: CPT

## 2025-03-06 PROCEDURE — 80053 COMPREHEN METABOLIC PANEL: CPT

## 2025-03-06 PROCEDURE — 86850 RBC ANTIBODY SCREEN: CPT

## 2025-03-06 PROCEDURE — 85025 COMPLETE CBC W/AUTO DIFF WBC: CPT

## 2025-03-06 PROCEDURE — 85610 PROTHROMBIN TIME: CPT

## 2025-03-06 PROCEDURE — 36415 COLL VENOUS BLD VENIPUNCTURE: CPT

## 2025-03-06 PROCEDURE — 86901 BLOOD TYPING SEROLOGIC RH(D): CPT

## 2025-03-06 PROCEDURE — 86832 HLA CLASS I HIGH DEFIN QUAL: CPT

## 2025-03-06 PROCEDURE — 86833 HLA CLASS II HIGH DEFIN QUAL: CPT

## 2025-03-06 PROCEDURE — 82610 CYSTATIN C: CPT

## 2025-03-10 ENCOUNTER — COMMITTEE REVIEW (OUTPATIENT)
Facility: HOSPITAL | Age: 65
End: 2025-03-10
Payer: MEDICARE

## 2025-03-10 ENCOUNTER — TELEPHONE (OUTPATIENT)
Facility: HOSPITAL | Age: 65
End: 2025-03-10
Payer: MEDICARE

## 2025-03-10 NOTE — TELEPHONE ENCOUNTER
VM left with notification of [facility] decision to list pending call with  (to be done tomorrow -  will call patient with time), presentation to kidney selection on Thursday, final insurance auth and updated MELD labs.

## 2025-03-10 NOTE — COMMITTEE REVIEW
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)     Committee Members:   Lynn Hope RDN, LD   Beatriz Seaman   Pharmacy Maximo Ogden, PharmD   Psychology Doris Gómez, PhD   Transplant Daina Garcia RN; Clotilde Monterroso, BEBE; Melody Pelayo, BEBE; Desirae Clemons RN   Transplant Hepatology Ger Jones MD; David Mao MD; Richi Conteh MD; Lizette Jha MD; Marco Christiansen MD   Transplant Surgery Jason Snell MD; Franky Cross MD; Meghan Noyola MD; Yaa Wolf MD; Eliezer Barrett MD       Committee Review Decision: Pending     Committee Discussion Details:   The candidate's evaluation was presented and discussed at the Liver Transplant Selection Committee meeting. After review of the candidate's diagnosis and the evaluations of the multidisciplinary team members, it was the consensus of the Selection Committee that the candidate does meet Liver Selection Criteria at this time and is approved for liver transplant listing pending completion of the following committee recommendations.    Committee Recommendations:  Candidate pending phone call with .   Present to Kidney Selection on 3/13/25 for final SLK approval.       Patient attests to being fully vaccinated against Hepatitis B: Yes            Do we have records uploaded into Epic? Yes - OS ([facility]) lab on 7/11/2024 showing Hep B S ab 195.69    No results found for: \"HEPBSAB\"    Immunization History   Administered Date(s) Administered Comments    None   Deferred Date(s) Deferred Comments    Hepatitis B vaccine, adult *Check Product/Dose* 02/04/2025 Immunity     "

## 2025-03-11 ENCOUNTER — DOCUMENTATION (OUTPATIENT)
Dept: TRANSPLANT | Facility: HOSPITAL | Age: 65
End: 2025-03-11
Payer: MEDICARE

## 2025-03-12 ENCOUNTER — APPOINTMENT (OUTPATIENT)
Facility: HOSPITAL | Age: 65
End: 2025-03-12
Payer: COMMERCIAL

## 2025-03-12 ENCOUNTER — DOCUMENTATION (OUTPATIENT)
Dept: TRANSPLANT | Facility: HOSPITAL | Age: 65
End: 2025-03-12
Payer: MEDICARE

## 2025-03-12 NOTE — PROGRESS NOTES
Per 03/11/25 kavita [place] of [employer]  [id number] liver/kidney evl approved under ref# [id number].  He was able to add kidney to the existing liver eval approval.  Clinical & Selection notes from both organs will need to be faxed once he is approved by kidney.

## 2025-03-12 NOTE — PROGRESS NOTES
Called patient back and discussed ESRD Medicare.  He is aware if he has a L/K he can apply for Medicare post txp and cancel his SHRAVAN coverage once the Medicare is active.

## 2025-03-12 NOTE — Clinical Note
Patient now has Ambetter and will need dental.  Patient is aware of this and will call his dentist.

## 2025-03-12 NOTE — Clinical Note
Per 03/11/25 kavita [place] of [employer]  [id number] liver/kidney evl approved under ref# [id number].  He was able to add kidney to the existing liver eval approval.  Clinical & Selection notes from both organs will need to be faxed once he is approved by kidney.

## 2025-03-12 NOTE — PROGRESS NOTES
Spoke to patient & support person today via the phone re his [facility] SHRAVAN benefits.  Patient signed up for [employer] SHRAVAN coverage eff 03/01/25 but did not term his [facility] SHRAVAN plan.  They are aware he cannot have both and they will call [facility] and cancel that plan.  The [employer] plan has coverage for his local providers and [facility].  Kuldip has a smaller oop max.  His prior employer was picking up a majority of the permium but that has stopped.  He is aware he will need dental clearance and will call his dentist.  Patient last saw a dentist 01/24.  Patient is collecting about $3,042.00 per month in SS disability but will not be eligible for Medicare for about 2 yrs.  They will call me after they call [facility].

## 2025-03-13 ENCOUNTER — TELEPHONE (OUTPATIENT)
Facility: HOSPITAL | Age: 65
End: 2025-03-13

## 2025-03-13 ENCOUNTER — LAB (OUTPATIENT)
Dept: LAB | Facility: HOSPITAL | Age: 65
End: 2025-03-13
Payer: COMMERCIAL

## 2025-03-13 DIAGNOSIS — Z01.818 ENCOUNTER FOR OTHER PREPROCEDURAL EXAMINATION: Primary | ICD-10-CM

## 2025-03-13 DIAGNOSIS — Z01.818 ENCOUNTER FOR PRE-TRANSPLANT EVALUATION FOR LIVER TRANSPLANT: Primary | ICD-10-CM

## 2025-03-13 DIAGNOSIS — Z01.818 PRE-TRANSPLANT EVALUATION FOR KIDNEY TRANSPLANT: ICD-10-CM

## 2025-03-13 DIAGNOSIS — Z98.61 CORONARY ANGIOPLASTY STATUS: ICD-10-CM

## 2025-03-13 DIAGNOSIS — K75.81 METABOLIC DYSFUNCTION-ASSOCIATED STEATOHEPATITIS (MASH): ICD-10-CM

## 2025-03-13 DIAGNOSIS — I25.10 ATHEROSCLEROTIC HEART DISEASE OF NATIVE CORONARY ARTERY WITHOUT ANGINA PECTORIS: ICD-10-CM

## 2025-03-13 LAB
ANION GAP SERPL CALC-SCNC: 17 MMOL/L (ref 10–20)
BUN SERPL-MCNC: 49 MG/DL (ref 6–23)
CALCIUM SERPL-MCNC: 8.8 MG/DL (ref 8.6–10.3)
CHLORIDE SERPL-SCNC: 101 MMOL/L (ref 98–107)
CO2 SERPL-SCNC: 21 MMOL/L (ref 21–32)
CREAT SERPL-MCNC: 3.09 MG/DL (ref 0.5–1.3)
EGFRCR SERPLBLD CKD-EPI 2021: 22 ML/MIN/1.73M*2
GLUCOSE SERPL-MCNC: 96 MG/DL (ref 74–99)
POTASSIUM SERPL-SCNC: 4.7 MMOL/L (ref 3.5–5.3)
SODIUM SERPL-SCNC: 134 MMOL/L (ref 136–145)

## 2025-03-13 PROCEDURE — 80048 BASIC METABOLIC PNL TOTAL CA: CPT

## 2025-03-13 NOTE — TELEPHONE ENCOUNTER
VM left for patient to follow up on need for dental clearance for listing. Asked patient to call office to let us know when he is scheduled for dental appointment and if he's not able to be seen soon we can just have him get a Panorex xray for clearance. Patient returned call and said that his appointment is on 3/19 but prefers to get Panorex if possible. Order entered.  tasked for scheduling.

## 2025-03-13 NOTE — TELEPHONE ENCOUNTER
PT called to say his dental appointment is scheduled Wed. 3/19. Desirae put in a Panorex order to get that done beforehand. Will call PT [phone]   Winlevi Pregnancy And Lactation Text: This medication is considered safe during pregnancy and breastfeeding.

## 2025-03-17 ENCOUNTER — DOCUMENTATION (OUTPATIENT)
Dept: TRANSPLANT | Facility: HOSPITAL | Age: 65
End: 2025-03-17
Payer: COMMERCIAL

## 2025-03-17 ENCOUNTER — HOSPITAL ENCOUNTER (OUTPATIENT)
Dept: RADIOLOGY | Facility: HOSPITAL | Age: 65
Discharge: HOME | End: 2025-03-17
Payer: COMMERCIAL

## 2025-03-17 DIAGNOSIS — Z01.818 ENCOUNTER FOR PRE-TRANSPLANT EVALUATION FOR LIVER TRANSPLANT: ICD-10-CM

## 2025-03-17 DIAGNOSIS — K75.81 METABOLIC DYSFUNCTION-ASSOCIATED STEATOHEPATITIS (MASH): ICD-10-CM

## 2025-03-17 DIAGNOSIS — Z01.818 PRE-TRANSPLANT EVALUATION FOR KIDNEY TRANSPLANT: ICD-10-CM

## 2025-03-17 PROCEDURE — 70355 PANORAMIC X-RAY OF JAWS: CPT

## 2025-03-17 PROCEDURE — 70355 PANORAMIC X-RAY OF JAWS: CPT | Performed by: RADIOLOGY

## 2025-03-17 NOTE — PROGRESS NOTES
Per EV [facility] termed 02/28/25.  Kuldip a/theodore 03/01/25.  Faxed L/K listing request to Kuldip at [phone].

## 2025-03-18 ENCOUNTER — TELEPHONE (OUTPATIENT)
Facility: HOSPITAL | Age: 65
End: 2025-03-18
Payer: COMMERCIAL

## 2025-03-18 ENCOUNTER — DOCUMENTATION (OUTPATIENT)
Dept: TRANSPLANT | Facility: HOSPITAL | Age: 65
End: 2025-03-18
Payer: COMMERCIAL

## 2025-03-18 ENCOUNTER — HOSPITAL ENCOUNTER (OUTPATIENT)
Dept: ULTRASOUND IMAGING | Age: 65
Discharge: HOME | End: 2025-03-20
Payer: COMMERCIAL

## 2025-03-18 VITALS
OXYGEN SATURATION: 100 % | RESPIRATION RATE: 18 BRPM | HEART RATE: 86 BPM | DIASTOLIC BLOOD PRESSURE: 59 MMHG | SYSTOLIC BLOOD PRESSURE: 105 MMHG

## 2025-03-18 DIAGNOSIS — K75.81 METABOLIC DYSFUNCTION-ASSOCIATED STEATOHEPATITIS (MASH): ICD-10-CM

## 2025-03-18 DIAGNOSIS — R18.8 OTHER ASCITES: ICD-10-CM

## 2025-03-18 DIAGNOSIS — Z01.818 ENCOUNTER FOR PRE-TRANSPLANT EVALUATION FOR LIVER TRANSPLANT: Primary | ICD-10-CM

## 2025-03-18 LAB
ALBUMIN FLD-MCNC: 0.6 G/DL
AMYLASE FLD-CCNC: 20 U/L
APPEARANCE FLD: CLEAR
BODY FLD TYPE: NORMAL
CLOT CHECK: NORMAL
COLOR FLD: YELLOW
MONOCYTES NFR FLD: 85 %
NEUTROPHILS NFR FLD: 15 %
PROT FLD-MCNC: 0.8 G/DL
RBC # FLD: <2000 CELLS/UL
SPECIMEN TYPE: NORMAL
WBC # FLD: 150 CELLS/UL

## 2025-03-18 PROCEDURE — P9047 ALBUMIN (HUMAN), 25%, 50ML: HCPCS | Performed by: PHYSICIAN ASSISTANT

## 2025-03-18 PROCEDURE — 49083 ABD PARACENTESIS W/IMAGING: CPT

## 2025-03-18 PROCEDURE — 6360000002 HC RX W HCPCS: Performed by: PHYSICIAN ASSISTANT

## 2025-03-18 PROCEDURE — 87070 CULTURE OTHR SPECIMN AEROBIC: CPT

## 2025-03-18 PROCEDURE — 82042 OTHER SOURCE ALBUMIN QUAN EA: CPT

## 2025-03-18 PROCEDURE — 88305 TISSUE EXAM BY PATHOLOGIST: CPT

## 2025-03-18 PROCEDURE — 89051 BODY FLUID CELL COUNT: CPT

## 2025-03-18 PROCEDURE — 88112 CYTOPATH CELL ENHANCE TECH: CPT

## 2025-03-18 PROCEDURE — 84157 ASSAY OF PROTEIN OTHER: CPT

## 2025-03-18 PROCEDURE — 82150 ASSAY OF AMYLASE: CPT

## 2025-03-18 PROCEDURE — 87205 SMEAR GRAM STAIN: CPT

## 2025-03-18 RX ORDER — ALBUMIN (HUMAN) 12.5 G/50ML
SOLUTION INTRAVENOUS CONTINUOUS PRN
Status: COMPLETED | OUTPATIENT
Start: 2025-03-18 | End: 2025-03-18

## 2025-03-18 RX ORDER — LIDOCAINE HYDROCHLORIDE 10 MG/ML
INJECTION, SOLUTION EPIDURAL; INFILTRATION; INTRACAUDAL; PERINEURAL PRN
Status: COMPLETED | OUTPATIENT
Start: 2025-03-18 | End: 2025-03-18

## 2025-03-18 RX ORDER — ALBUMIN (HUMAN) 12.5 G/50ML
50 SOLUTION INTRAVENOUS ONCE
Status: DISCONTINUED | OUTPATIENT
Start: 2025-03-18 | End: 2025-03-21 | Stop reason: HOSPADM

## 2025-03-18 RX ADMIN — LIDOCAINE HYDROCHLORIDE 10 ML: 10 INJECTION, SOLUTION EPIDURAL; INFILTRATION; INTRACAUDAL; PERINEURAL at 14:10

## 2025-03-18 RX ADMIN — ALBUMIN (HUMAN) 50 G: 0.25 INJECTION, SOLUTION INTRAVENOUS at 14:35

## 2025-03-18 NOTE — PROGRESS NOTES
Rec'd [employer]etter fax requesting Hep C & statement why we do not have a drug screen within 90 days.  Emailed the nurse.

## 2025-03-18 NOTE — TELEPHONE ENCOUNTER
Liver txp coordinator notified by  that Kuldip is requiring an updated drug screen for listing authorization. VM left for patient explaining his insurance is now requiring an updated drug screen for listing authorization. Patient advised to go to lab today/tomorrow. Coordinator will follow up once listing auth is obtained.

## 2025-03-18 NOTE — TELEPHONE ENCOUNTER
PT called back, had just gotten done with Paracentesis and hadn't listened to message from Desirae. Read it to him and he will go tomorrow for labs because he's tired from the Paracentesis.

## 2025-03-18 NOTE — OR NURSING
Patient arrived from home to  for ultrasound guided paracentesis. Vitals obtained, IV started in the (left arm) , and consent signed per patient. Mitzy Nance PA-C in to speak with the patient about the procedure, all questions answered. Abdomen scanned and prepped. 1% Lidocaine administered to procedural site. 5 Ugandan centesis catheter inserted to (LLQ) with ultrasound guidance, catheter connected to suction. 50 G IV Albumin given per order. Patient tolerated procedure well. (12762) ml drained of (clear yellow) colored ascitic fluid. Centesis catheter removed post procedure. Specimens collected and sent to lab per order. Puncture site cleansed and dry dressing applied. No bleeding, swelling or complications noted. Post procedure vitals obtained. IV site removed, dry dressing applied. Discharge instructions reviewed and understood by patient. Patient discharged home without complication.

## 2025-03-18 NOTE — PROGRESS NOTES
Faxed Kuldip RFI to  with a note staing if an updated DS was needed we would need to withdraw request.

## 2025-03-19 ENCOUNTER — APPOINTMENT (OUTPATIENT)
Dept: LAB | Facility: HOSPITAL | Age: 65
End: 2025-03-19
Payer: COMMERCIAL

## 2025-03-19 DIAGNOSIS — Z01.818 ENCOUNTER FOR PRE-TRANSPLANT EVALUATION FOR LIVER TRANSPLANT: ICD-10-CM

## 2025-03-19 DIAGNOSIS — K75.81 METABOLIC DYSFUNCTION-ASSOCIATED STEATOHEPATITIS (MASH): ICD-10-CM

## 2025-03-19 LAB
AMPHETAMINES UR QL SCN: NORMAL
BARBITURATES UR QL SCN: NORMAL
BENZODIAZ UR QL SCN: NORMAL
BZE UR QL SCN: NORMAL
CANNABINOIDS UR QL SCN: NORMAL
FENTANYL+NORFENTANYL UR QL SCN: NORMAL
METHADONE UR QL SCN: NORMAL
OPIATES UR QL SCN: NORMAL
OXYCODONE+OXYMORPHONE UR QL SCN: NORMAL
PCP UR QL SCN: NORMAL

## 2025-03-19 PROCEDURE — 80307 DRUG TEST PRSMV CHEM ANLYZR: CPT

## 2025-03-20 ENCOUNTER — DOCUMENTATION (OUTPATIENT)
Dept: TRANSPLANT | Facility: HOSPITAL | Age: 65
End: 2025-03-20
Payer: COMMERCIAL

## 2025-03-20 ENCOUNTER — TELEPHONE (OUTPATIENT)
Facility: HOSPITAL | Age: 65
End: 2025-03-20
Payer: COMMERCIAL

## 2025-03-20 LAB — NON-GYN CYTOLOGY REPORT: NORMAL

## 2025-03-20 NOTE — PROGRESS NOTES
Rec'd 03/20/25 Kuldip fax for L/K txp listing ref# [id number] valid 03/17/25-03/17/26.  Precert needed at time of txp.

## 2025-03-20 NOTE — TELEPHONE ENCOUNTER
Shelia PT and spoke to Bobbi - asked her to tell PT, per Desirae, to go back to the lab and tell them to draw the INR, CMP and CBC so she can list him. Bobbi said he came home frustrated because he knew they didn't do it right but wasn't sure exactly what to tell them.

## 2025-03-21 ENCOUNTER — TELEPHONE (OUTPATIENT)
Facility: HOSPITAL | Age: 65
End: 2025-03-21
Payer: COMMERCIAL

## 2025-03-21 ENCOUNTER — LAB (OUTPATIENT)
Dept: LAB | Facility: HOSPITAL | Age: 65
End: 2025-03-21
Payer: COMMERCIAL

## 2025-03-21 DIAGNOSIS — Z01.818 ENCOUNTER FOR OTHER PREPROCEDURAL EXAMINATION: Primary | ICD-10-CM

## 2025-03-21 DIAGNOSIS — K75.81 NONALCOHOLIC STEATOHEPATITIS (NASH): ICD-10-CM

## 2025-03-21 LAB
ALBUMIN SERPL BCP-MCNC: 2.9 G/DL (ref 3.4–5)
ALP SERPL-CCNC: 143 U/L (ref 33–136)
ALT SERPL W P-5'-P-CCNC: 15 U/L (ref 10–52)
ANION GAP SERPL CALC-SCNC: 14 MMOL/L (ref 10–20)
AST SERPL W P-5'-P-CCNC: 31 U/L (ref 9–39)
BASOPHILS # BLD AUTO: 0.07 X10*3/UL (ref 0–0.1)
BASOPHILS NFR BLD AUTO: 1.2 %
BILIRUB SERPL-MCNC: 1.3 MG/DL (ref 0–1.2)
BUN SERPL-MCNC: 49 MG/DL (ref 6–23)
CALCIUM SERPL-MCNC: 8.5 MG/DL (ref 8.6–10.3)
CHLORIDE SERPL-SCNC: 103 MMOL/L (ref 98–107)
CO2 SERPL-SCNC: 23 MMOL/L (ref 21–32)
CREAT SERPL-MCNC: 3.19 MG/DL (ref 0.5–1.3)
EGFRCR SERPLBLD CKD-EPI 2021: 21 ML/MIN/1.73M*2
EOSINOPHIL # BLD AUTO: 1 X10*3/UL (ref 0–0.7)
EOSINOPHIL NFR BLD AUTO: 17.8 %
ERYTHROCYTE [DISTWIDTH] IN BLOOD BY AUTOMATED COUNT: 15.1 % (ref 11.5–14.5)
GLUCOSE SERPL-MCNC: 88 MG/DL (ref 74–99)
HCT VFR BLD AUTO: 29.4 % (ref 41–52)
HGB BLD-MCNC: 9.4 G/DL (ref 13.5–17.5)
IMM GRANULOCYTES # BLD AUTO: 0.01 X10*3/UL (ref 0–0.7)
IMM GRANULOCYTES NFR BLD AUTO: 0.2 % (ref 0–0.9)
INR PPP: 1.6 (ref 0.9–1.1)
LYMPHOCYTES # BLD AUTO: 1.36 X10*3/UL (ref 1.2–4.8)
LYMPHOCYTES NFR BLD AUTO: 24.2 %
MCH RBC QN AUTO: 30.1 PG (ref 26–34)
MCHC RBC AUTO-ENTMCNC: 32 G/DL (ref 32–36)
MCV RBC AUTO: 94 FL (ref 80–100)
MONOCYTES # BLD AUTO: 0.43 X10*3/UL (ref 0.1–1)
MONOCYTES NFR BLD AUTO: 7.6 %
NEUTROPHILS # BLD AUTO: 2.76 X10*3/UL (ref 1.2–7.7)
NEUTROPHILS NFR BLD AUTO: 49 %
NRBC BLD-RTO: 0 /100 WBCS (ref 0–0)
PLATELET # BLD AUTO: 112 X10*3/UL (ref 150–450)
POTASSIUM SERPL-SCNC: 4.5 MMOL/L (ref 3.5–5.3)
PROT SERPL-MCNC: 5.8 G/DL (ref 6.4–8.2)
PROTHROMBIN TIME: 18.1 SECONDS (ref 9.8–12.4)
RBC # BLD AUTO: 3.12 X10*6/UL (ref 4.5–5.9)
SODIUM SERPL-SCNC: 135 MMOL/L (ref 136–145)
WBC # BLD AUTO: 5.6 X10*3/UL (ref 4.4–11.3)

## 2025-03-21 PROCEDURE — 80053 COMPREHEN METABOLIC PANEL: CPT

## 2025-03-21 PROCEDURE — 85025 COMPLETE CBC W/AUTO DIFF WBC: CPT

## 2025-03-21 PROCEDURE — 85610 PROTHROMBIN TIME: CPT

## 2025-03-22 ENCOUNTER — TELEPHONE (OUTPATIENT)
Facility: HOSPITAL | Age: 65
End: 2025-03-22
Payer: COMMERCIAL

## 2025-03-22 DIAGNOSIS — N18.4 STAGE 4 CHRONIC KIDNEY DISEASE (MULTI): ICD-10-CM

## 2025-03-22 DIAGNOSIS — K75.81 METABOLIC DYSFUNCTION-ASSOCIATED STEATOHEPATITIS (MASH): ICD-10-CM

## 2025-03-22 DIAGNOSIS — Z76.82 PRE-LIVER TRANSPLANT, LISTED: Primary | ICD-10-CM

## 2025-03-22 DIAGNOSIS — D64.9 ANEMIA, UNSPECIFIED TYPE: ICD-10-CM

## 2025-03-22 DIAGNOSIS — Z76.82 PRE-KIDNEY TRANSPLANT, LISTED: ICD-10-CM

## 2025-03-22 PROBLEM — Z01.818 ENCOUNTER FOR PRE-TRANSPLANT EVALUATION FOR LIVER TRANSPLANT: Status: RESOLVED | Noted: 2024-12-10 | Resolved: 2025-03-22

## 2025-03-22 PROBLEM — R18.8 REFRACTORY ASCITES: Status: ACTIVE | Noted: 2025-03-22

## 2025-03-22 PROBLEM — Z01.818 PRE-TRANSPLANT EVALUATION FOR KIDNEY TRANSPLANT: Status: RESOLVED | Noted: 2024-12-10 | Resolved: 2025-03-22

## 2025-03-22 LAB
MICROORGANISM SPEC CULT: NORMAL
MICROORGANISM/AGENT SPEC: NORMAL
SPECIMEN DESCRIPTION: NORMAL

## 2025-03-22 NOTE — TELEPHONE ENCOUNTER
Patient notified of active listing for liver/kidney transplant at .   Listing MELD: 25  Labs due: 3/27   Upcoming appointment: Tasked for scheduling with Dr. Mckeon and surgeon    Reviewed alert for ELIQUIS.     Reviewed demographics, answering every phone call, voicemail inbox cleared for messages, 1 hour to respond to donor offer, listing letter, transfer to [facility] if at outside hospital ED, and importance of nutrition and exercise.     Patient knows to contact coordinator with any questions or concerns via Zero9 message, or by calling the liver transplant office at [phone]. Patient knows to contact the on call liver transplant coordinator for medical emergencies outside of office hours and on weekends.     Also discussed patient having 11-13L fluid removed q2 weeks. Advised weekly willie to reduce large volume removal. Patient confirmed he receives albumin infusions.    Standing lab orders entered: (MELD labs weekly, Cystatin C q2 weeks, HLA q3 months)  Patient will go to local [facility] for blood draw.

## 2025-03-27 ENCOUNTER — LAB (OUTPATIENT)
Dept: LAB | Facility: HOSPITAL | Age: 65
End: 2025-03-27
Payer: COMMERCIAL

## 2025-03-27 ENCOUNTER — TELEPHONE (OUTPATIENT)
Facility: HOSPITAL | Age: 65
End: 2025-03-27

## 2025-03-27 DIAGNOSIS — Z76.82 AWAITING ORGAN TRANSPLANT STATUS: Primary | ICD-10-CM

## 2025-03-27 DIAGNOSIS — K76.82 HEPATIC ENCEPHALOPATHY: ICD-10-CM

## 2025-03-27 LAB
ALBUMIN SERPL BCP-MCNC: 2.9 G/DL (ref 3.4–5)
ALP SERPL-CCNC: 192 U/L (ref 33–136)
ALT SERPL W P-5'-P-CCNC: 17 U/L (ref 10–52)
ANION GAP SERPL CALC-SCNC: 17 MMOL/L (ref 10–20)
AST SERPL W P-5'-P-CCNC: 33 U/L (ref 9–39)
BASOPHILS # BLD AUTO: 0.09 X10*3/UL (ref 0–0.1)
BASOPHILS NFR BLD AUTO: 1.3 %
BILIRUB SERPL-MCNC: 1.2 MG/DL (ref 0–1.2)
BUN SERPL-MCNC: 60 MG/DL (ref 6–23)
CALCIUM SERPL-MCNC: 8.7 MG/DL (ref 8.6–10.6)
CHLORIDE SERPL-SCNC: 100 MMOL/L (ref 98–107)
CO2 SERPL-SCNC: 21 MMOL/L (ref 21–32)
CREAT SERPL-MCNC: 3.65 MG/DL (ref 0.5–1.3)
EGFRCR SERPLBLD CKD-EPI 2021: 18 ML/MIN/1.73M*2
EOSINOPHIL # BLD AUTO: 1.49 X10*3/UL (ref 0–0.7)
EOSINOPHIL NFR BLD AUTO: 20.8 %
ERYTHROCYTE [DISTWIDTH] IN BLOOD BY AUTOMATED COUNT: 14.6 % (ref 11.5–14.5)
GLUCOSE SERPL-MCNC: 95 MG/DL (ref 74–99)
HCT VFR BLD AUTO: 26.8 % (ref 41–52)
HGB BLD-MCNC: 8.9 G/DL (ref 13.5–17.5)
IMM GRANULOCYTES # BLD AUTO: 0.02 X10*3/UL (ref 0–0.7)
IMM GRANULOCYTES NFR BLD AUTO: 0.3 % (ref 0–0.9)
INR PPP: 1.7 (ref 0.9–1.1)
LYMPHOCYTES # BLD AUTO: 1.47 X10*3/UL (ref 1.2–4.8)
LYMPHOCYTES NFR BLD AUTO: 20.6 %
MCH RBC QN AUTO: 31 PG (ref 26–34)
MCHC RBC AUTO-ENTMCNC: 33.2 G/DL (ref 32–36)
MCV RBC AUTO: 93 FL (ref 80–100)
MONOCYTES # BLD AUTO: 0.56 X10*3/UL (ref 0.1–1)
MONOCYTES NFR BLD AUTO: 7.8 %
NEUTROPHILS # BLD AUTO: 3.52 X10*3/UL (ref 1.2–7.7)
NEUTROPHILS NFR BLD AUTO: 49.2 %
NRBC BLD-RTO: 0 /100 WBCS (ref 0–0)
PLATELET # BLD AUTO: 125 X10*3/UL (ref 150–450)
POTASSIUM SERPL-SCNC: 4.7 MMOL/L (ref 3.5–5.3)
PROT SERPL-MCNC: 6.2 G/DL (ref 6.4–8.2)
PROTHROMBIN TIME: 19.1 SECONDS (ref 9.8–12.4)
RBC # BLD AUTO: 2.87 X10*6/UL (ref 4.5–5.9)
SODIUM SERPL-SCNC: 133 MMOL/L (ref 136–145)
WBC # BLD AUTO: 7.2 X10*3/UL (ref 4.4–11.3)

## 2025-03-27 PROCEDURE — 82565 ASSAY OF CREATININE: CPT

## 2025-03-27 PROCEDURE — 80053 COMPREHEN METABOLIC PANEL: CPT

## 2025-03-27 PROCEDURE — 82610 CYSTATIN C: CPT

## 2025-03-27 PROCEDURE — 85025 COMPLETE CBC W/AUTO DIFF WBC: CPT

## 2025-03-27 PROCEDURE — 86832 HLA CLASS I HIGH DEFIN QUAL: CPT

## 2025-03-27 PROCEDURE — 86833 HLA CLASS II HIGH DEFIN QUAL: CPT

## 2025-03-27 PROCEDURE — 85610 PROTHROMBIN TIME: CPT

## 2025-03-27 NOTE — TELEPHONE ENCOUNTER
03/27/25  Can't get refill of Xifaxan due to how its written, insurance is not accepting it, Mrs Lopez was wondering if we can help with this.

## 2025-03-28 ENCOUNTER — SPECIALTY PHARMACY (OUTPATIENT)
Dept: PHARMACY | Facility: CLINIC | Age: 65
End: 2025-03-28

## 2025-03-28 DIAGNOSIS — Z76.82 PRE-LIVER TRANSPLANT, LISTED: ICD-10-CM

## 2025-03-28 DIAGNOSIS — D64.9 ANEMIA, UNSPECIFIED TYPE: ICD-10-CM

## 2025-03-28 DIAGNOSIS — Z76.82 PRE-KIDNEY TRANSPLANT, LISTED: ICD-10-CM

## 2025-03-28 DIAGNOSIS — N18.4 STAGE 4 CHRONIC KIDNEY DISEASE (MULTI): ICD-10-CM

## 2025-03-28 DIAGNOSIS — K75.81 METABOLIC DYSFUNCTION-ASSOCIATED STEATOHEPATITIS (MASH): ICD-10-CM

## 2025-03-28 LAB
CREAT SERPL-MCNC: 3.65 MG/DL (ref 0.69–1.22)
CYSTATIN C SERPL-MCNC: 3.6 MG/L (ref 0.61–0.95)
EGFRCR-CYS SERPLBLD CKD-EPI 2021: 15 ML/MIN/{1.73_M2}

## 2025-03-29 ENCOUNTER — TELEPHONE (OUTPATIENT)
Facility: HOSPITAL | Age: 65
End: 2025-03-29
Payer: COMMERCIAL

## 2025-03-29 NOTE — TELEPHONE ENCOUNTER
Spoke with patient and his wife about worsening creatinine. Reviewed s/s of uremia - none noted. Patient is on fluid restriction. Paracentesis weekly now with 7L cap and confirmed receiving albumin. Advised patient/wife to contact coordinator immediately if s/s of uremia develop.

## 2025-03-31 ENCOUNTER — TELEPHONE (OUTPATIENT)
Dept: TRANSPLANT | Facility: HOSPITAL | Age: 65
End: 2025-03-31
Payer: COMMERCIAL

## 2025-03-31 ENCOUNTER — SPECIALTY PHARMACY (OUTPATIENT)
Dept: PHARMACY | Facility: CLINIC | Age: 65
End: 2025-03-31

## 2025-03-31 PROCEDURE — RXMED WILLOW AMBULATORY MEDICATION CHARGE

## 2025-03-31 PROCEDURE — 80505 PATH CLIN CONSLTJ HIGH 41-60: CPT | Performed by: TRANSPLANT SURGERY

## 2025-03-31 NOTE — TELEPHONE ENCOUNTER
Spoke with Dr. Snell about patient's paracentesis tomorrow and that he was holding his Eliquis since Saturday due to this. He stated this is okay and continue to hold. Patient verbalized understanding.

## 2025-03-31 NOTE — TELEPHONE ENCOUNTER
UNOS ID: [id number]  Match ID: [id number]  Organ: Liver/Kidney  KDPI: 49.0  Known risk status: None (meets criteria within 30 days)  Local vs Import: Import  HCV: Ab: Anti-HCV: Negative; HSUBHAM: HCV SHUBHAM: Negative  HepBcAb: Anti-HBc: Negative    Confirm the following:  Any COVID symptoms (nausea, vomiting, diarrhea, fever, chills, cough, sore throat, headache): YES-DESCRIBE/NO: No  Any contact with anyone positive for or suspected to have COVID: YES-DESCRIBE/NO: No  Any recent hospitalizations: YES-DESCRIBE/NO: No  Any recent illnesses: YES-DESCRIBE/NO: No  Any recent injuries (cracked teeth, broken bones, wounds that won’t heal): YES-DESCRIBE/NO: No  Any antibiotics: YES-DESCRIBE/NO: No  Any blood thinners: YES-DESCRIBE/NO: Yes, describe: Has been holding Eliquis since Saturday for his paracentesis   Any blood transfusions: YES-DESCRIBE/NO: No  When was last dialysis/type: YES/NA/NO: No    Patient is on standby for DCD donor and is aware we will call him with an update.

## 2025-04-01 ENCOUNTER — LAB REQUISITION (OUTPATIENT)
Dept: LAB | Facility: CLINIC | Age: 65
End: 2025-04-01
Payer: COMMERCIAL

## 2025-04-01 ENCOUNTER — TELEPHONE (OUTPATIENT)
Dept: TRANSPLANT | Facility: HOSPITAL | Age: 65
End: 2025-04-01
Payer: COMMERCIAL

## 2025-04-01 ENCOUNTER — PHARMACY VISIT (OUTPATIENT)
Dept: PHARMACY | Facility: CLINIC | Age: 65
End: 2025-04-01
Payer: COMMERCIAL

## 2025-04-01 ENCOUNTER — HOSPITAL ENCOUNTER (OUTPATIENT)
Dept: ULTRASOUND IMAGING | Age: 65
Discharge: HOME | End: 2025-04-03
Payer: COMMERCIAL

## 2025-04-01 ENCOUNTER — TELEPHONE (OUTPATIENT)
Facility: HOSPITAL | Age: 65
End: 2025-04-01
Payer: COMMERCIAL

## 2025-04-01 VITALS
RESPIRATION RATE: 18 BRPM | HEART RATE: 80 BPM | DIASTOLIC BLOOD PRESSURE: 57 MMHG | OXYGEN SATURATION: 100 % | SYSTOLIC BLOOD PRESSURE: 112 MMHG

## 2025-04-01 DIAGNOSIS — N18.6 END STAGE RENAL DISEASE (MULTI): ICD-10-CM

## 2025-04-01 DIAGNOSIS — R18.8 OTHER ASCITES: ICD-10-CM

## 2025-04-01 LAB
ALBUMIN FLD-MCNC: 0.6 G/DL
AMYLASE FLD-CCNC: 26 U/L
APPEARANCE FLD: CLEAR
BODY FLD TYPE: NORMAL
CLOT CHECK: NORMAL
COLOR FLD: YELLOW
DIAGNOSIS-VIRTUAL CROSSMATCH: NORMAL
MONOCYTES NFR FLD: 85 %
NEUTROPHILS NFR FLD: 15 %
PATH REVIEW-VIRTUAL CROSSMATCH: NORMAL
PATHOLOGIST ID-VIRTUAL CROSSMATCH: NORMAL
PROT FLD-MCNC: 0.7 G/DL
RBC # FLD: <2000 CELLS/UL
SPECIMEN TYPE: NORMAL
WBC # FLD: 131 CELLS/UL

## 2025-04-01 PROCEDURE — 89051 BODY FLUID CELL COUNT: CPT

## 2025-04-01 PROCEDURE — 87205 SMEAR GRAM STAIN: CPT

## 2025-04-01 PROCEDURE — 87070 CULTURE OTHR SPECIMN AEROBIC: CPT

## 2025-04-01 PROCEDURE — 49083 ABD PARACENTESIS W/IMAGING: CPT

## 2025-04-01 PROCEDURE — 82042 OTHER SOURCE ALBUMIN QUAN EA: CPT

## 2025-04-01 PROCEDURE — 84157 ASSAY OF PROTEIN OTHER: CPT

## 2025-04-01 PROCEDURE — 82150 ASSAY OF AMYLASE: CPT

## 2025-04-01 PROCEDURE — 80505 PATH CLIN CONSLTJ HIGH 41-60: CPT | Performed by: TRANSPLANT SURGERY

## 2025-04-01 PROCEDURE — 6360000002 HC RX W HCPCS: Performed by: PHYSICIAN ASSISTANT

## 2025-04-01 PROCEDURE — 88112 CYTOPATH CELL ENHANCE TECH: CPT

## 2025-04-01 PROCEDURE — P9047 ALBUMIN (HUMAN), 25%, 50ML: HCPCS | Performed by: INTERNAL MEDICINE

## 2025-04-01 PROCEDURE — 6360000002 HC RX W HCPCS: Performed by: INTERNAL MEDICINE

## 2025-04-01 PROCEDURE — 88305 TISSUE EXAM BY PATHOLOGIST: CPT

## 2025-04-01 RX ORDER — ALBUMIN (HUMAN) 12.5 G/50ML
SOLUTION INTRAVENOUS CONTINUOUS PRN
Status: COMPLETED | OUTPATIENT
Start: 2025-04-01 | End: 2025-04-01

## 2025-04-01 RX ORDER — LIDOCAINE HYDROCHLORIDE 10 MG/ML
INJECTION, SOLUTION INFILTRATION; PERINEURAL PRN
Status: COMPLETED | OUTPATIENT
Start: 2025-04-01 | End: 2025-04-01

## 2025-04-01 RX ADMIN — ALBUMIN (HUMAN) 50 G: 0.25 INJECTION, SOLUTION INTRAVENOUS at 13:46

## 2025-04-01 RX ADMIN — LIDOCAINE HYDROCHLORIDE 10 ML: 10 INJECTION, SOLUTION INFILTRATION; PERINEURAL at 13:41

## 2025-04-01 NOTE — TELEPHONE ENCOUNTER
LATE ENTRY 3/31/2025 1859  Call received from [place] coordinator Suzi asking to confirm the last time HLAs were drawn on patient- advised 3/27/25. NFQ at this time

## 2025-04-01 NOTE — TELEPHONE ENCOUNTER
6:04 PM  04/01/25  Called pt. To give update to become NPO after midnight with Am admission anticipation per Dr. Snell.     Ac Zuniga RN, [facility] Transplant Coordinator  [phone]

## 2025-04-01 NOTE — OR NURSING
Patient brought into room, discussed procedure. Mitzy Nance PA-C answered all questions and concerns related to the procedure. Treatment consent signed. Patient positioned and sterile prepped for the procedure. 1% Lidocaine administered by Mitzy Nance PA-C.  A total of 9640 mL hazy yellow ascitic fluid was taken. Patient tolerated procedure well. Site cleaned and dressed with a bandage. Vitals monitored and remained stable throughout. Discharge papers declined. Patient escorted out of department with all belongings and without distress.

## 2025-04-02 ENCOUNTER — TELEPHONE (OUTPATIENT)
Dept: TRANSPLANT | Facility: HOSPITAL | Age: 65
End: 2025-04-02
Payer: COMMERCIAL

## 2025-04-02 ENCOUNTER — ANESTHESIA (OUTPATIENT)
Dept: OPERATING ROOM | Facility: HOSPITAL | Age: 65
End: 2025-04-02
Payer: COMMERCIAL

## 2025-04-02 ENCOUNTER — SURGERY (OUTPATIENT)
Age: 65
End: 2025-04-02
Payer: COMMERCIAL

## 2025-04-02 ENCOUNTER — TELEPHONE (OUTPATIENT)
Facility: HOSPITAL | Age: 65
End: 2025-04-02
Payer: COMMERCIAL

## 2025-04-02 ENCOUNTER — ANESTHESIA EVENT (OUTPATIENT)
Dept: OPERATING ROOM | Facility: HOSPITAL | Age: 65
End: 2025-04-02
Payer: COMMERCIAL

## 2025-04-02 ENCOUNTER — APPOINTMENT (OUTPATIENT)
Dept: RADIOLOGY | Facility: HOSPITAL | Age: 65
DRG: 005 | End: 2025-04-02
Payer: COMMERCIAL

## 2025-04-02 ENCOUNTER — HOSPITAL ENCOUNTER (INPATIENT)
Facility: HOSPITAL | Age: 65
End: 2025-04-02
Attending: TRANSPLANT SURGERY | Admitting: TRANSPLANT SURGERY
Payer: COMMERCIAL

## 2025-04-02 DIAGNOSIS — G89.18 POST-OPERATIVE PAIN: ICD-10-CM

## 2025-04-02 DIAGNOSIS — K70.31 ALCOHOLIC CIRRHOSIS OF LIVER WITH ASCITES (MULTI): ICD-10-CM

## 2025-04-02 DIAGNOSIS — Z76.82 PRE-LIVER TRANSPLANT, LISTED: Primary | ICD-10-CM

## 2025-04-02 DIAGNOSIS — T38.0X5A STEROID-INDUCED HYPERGLYCEMIA: ICD-10-CM

## 2025-04-02 DIAGNOSIS — N18.4 STAGE 4 CHRONIC KIDNEY DISEASE (MULTI): ICD-10-CM

## 2025-04-02 DIAGNOSIS — R73.9 STEROID-INDUCED HYPERGLYCEMIA: ICD-10-CM

## 2025-04-02 DIAGNOSIS — E11.65 TYPE 2 DIABETES MELLITUS WITH HYPERGLYCEMIA, WITHOUT LONG-TERM CURRENT USE OF INSULIN: Primary | ICD-10-CM

## 2025-04-02 DIAGNOSIS — R26.2 DIFFICULTY WALKING: ICD-10-CM

## 2025-04-02 DIAGNOSIS — Z76.82 PRE-KIDNEY TRANSPLANT, LISTED: ICD-10-CM

## 2025-04-02 DIAGNOSIS — Z94.4 LIVER TRANSPLANT RECIPIENT (MULTI): ICD-10-CM

## 2025-04-02 DIAGNOSIS — N18.4 CKD (CHRONIC KIDNEY DISEASE) STAGE 4, GFR 15-29 ML/MIN (MULTI): ICD-10-CM

## 2025-04-02 DIAGNOSIS — I48.0 PAROXYSMAL ATRIAL FIBRILLATION (MULTI): ICD-10-CM

## 2025-04-02 LAB
ABO GROUP (TYPE) IN BLOOD: NORMAL
ALBUMIN SERPL BCP-MCNC: 3.2 G/DL (ref 3.4–5)
ALP SERPL-CCNC: 163 U/L (ref 33–136)
ALT SERPL W P-5'-P-CCNC: 18 U/L (ref 10–52)
ANION GAP BLDA CALCULATED.4IONS-SCNC: 13 MMO/L (ref 10–25)
ANION GAP BLDA CALCULATED.4IONS-SCNC: 14 MMO/L (ref 10–25)
ANION GAP BLDA CALCULATED.4IONS-SCNC: 14 MMO/L (ref 10–25)
ANION GAP BLDA CALCULATED.4IONS-SCNC: 15 MMO/L (ref 10–25)
ANION GAP BLDA CALCULATED.4IONS-SCNC: 16 MMO/L (ref 10–25)
ANION GAP BLDA CALCULATED.4IONS-SCNC: 16 MMO/L (ref 10–25)
ANION GAP BLDA CALCULATED.4IONS-SCNC: 17 MMO/L (ref 10–25)
ANION GAP BLDV CALCULATED.4IONS-SCNC: 13 MMOL/L (ref 10–25)
ANION GAP SERPL CALC-SCNC: 16 MMOL/L (ref 10–20)
ANTIBODY SCREEN: NORMAL
APTT PPP: 35 SECONDS (ref 26–36)
AST SERPL W P-5'-P-CCNC: 31 U/L (ref 9–39)
BASE EXCESS BLDA CALC-SCNC: -10 MMOL/L (ref -2–3)
BASE EXCESS BLDA CALC-SCNC: -10.2 MMOL/L (ref -2–3)
BASE EXCESS BLDA CALC-SCNC: -6.2 MMOL/L (ref -2–3)
BASE EXCESS BLDA CALC-SCNC: -7.2 MMOL/L (ref -2–3)
BASE EXCESS BLDA CALC-SCNC: -7.2 MMOL/L (ref -2–3)
BASE EXCESS BLDA CALC-SCNC: -7.6 MMOL/L (ref -2–3)
BASE EXCESS BLDA CALC-SCNC: -7.6 MMOL/L (ref -2–3)
BASE EXCESS BLDV CALC-SCNC: -7 MMOL/L (ref -2–3)
BASOPHILS # BLD AUTO: 0.05 X10*3/UL (ref 0–0.1)
BASOPHILS NFR BLD AUTO: 0.9 %
BILIRUB DIRECT SERPL-MCNC: 0.6 MG/DL (ref 0–0.3)
BILIRUB SERPL-MCNC: 1.8 MG/DL (ref 0–1.2)
BLOOD EXPIRATION DATE: NORMAL
BODY TEMPERATURE: 37 DEGREES CELSIUS
BUN SERPL-MCNC: 67 MG/DL (ref 6–23)
CA-I BLDA-SCNC: 0.82 MMOL/L (ref 1.1–1.33)
CA-I BLDA-SCNC: 1 MMOL/L (ref 1.1–1.33)
CA-I BLDA-SCNC: 1.08 MMOL/L (ref 1.1–1.33)
CA-I BLDA-SCNC: 1.1 MMOL/L (ref 1.1–1.33)
CA-I BLDA-SCNC: 1.13 MMOL/L (ref 1.1–1.33)
CA-I BLDA-SCNC: 1.16 MMOL/L (ref 1.1–1.33)
CA-I BLDA-SCNC: 1.23 MMOL/L (ref 1.1–1.33)
CA-I BLDV-SCNC: 1.2 MMOL/L (ref 1.1–1.33)
CALCIUM SERPL-MCNC: 8.8 MG/DL (ref 8.6–10.6)
CHLORIDE BLDA-SCNC: 103 MMOL/L (ref 98–107)
CHLORIDE BLDA-SCNC: 104 MMOL/L (ref 98–107)
CHLORIDE BLDV-SCNC: 105 MMOL/L (ref 98–107)
CHLORIDE SERPL-SCNC: 103 MMOL/L (ref 98–107)
CMV IGG AVIDITY SERPL IA-RTO: REACTIVE %
CO2 SERPL-SCNC: 18 MMOL/L (ref 21–32)
CREAT SERPL-MCNC: 3.89 MG/DL (ref 0.5–1.3)
DISPENSE STATUS: NORMAL
EBV NA AB SER QL: POSITIVE
EGFRCR SERPLBLD CKD-EPI 2021: 17 ML/MIN/1.73M*2
EOSINOPHIL # BLD AUTO: 1.16 X10*3/UL (ref 0–0.7)
EOSINOPHIL NFR BLD AUTO: 21.2 %
ERYTHROCYTE [DISTWIDTH] IN BLOOD BY AUTOMATED COUNT: 15.3 % (ref 11.5–14.5)
GLUCOSE BLD MANUAL STRIP-MCNC: 86 MG/DL (ref 74–99)
GLUCOSE BLDA-MCNC: 122 MG/DL (ref 74–99)
GLUCOSE BLDA-MCNC: 133 MG/DL (ref 74–99)
GLUCOSE BLDA-MCNC: 135 MG/DL (ref 74–99)
GLUCOSE BLDA-MCNC: 142 MG/DL (ref 74–99)
GLUCOSE BLDA-MCNC: 231 MG/DL (ref 74–99)
GLUCOSE BLDA-MCNC: 92 MG/DL (ref 74–99)
GLUCOSE BLDA-MCNC: 94 MG/DL (ref 74–99)
GLUCOSE BLDV-MCNC: 96 MG/DL (ref 74–99)
GLUCOSE SERPL-MCNC: 98 MG/DL (ref 74–99)
HBV CORE AB SER QL: NONREACTIVE
HBV SURFACE AB SER-ACNC: 4.8 MIU/ML
HBV SURFACE AG SERPL QL IA: NONREACTIVE
HCO3 BLDA-SCNC: 16.2 MMOL/L (ref 22–26)
HCO3 BLDA-SCNC: 16.7 MMOL/L (ref 22–26)
HCO3 BLDA-SCNC: 17.8 MMOL/L (ref 22–26)
HCO3 BLDA-SCNC: 17.8 MMOL/L (ref 22–26)
HCO3 BLDA-SCNC: 18.2 MMOL/L (ref 22–26)
HCO3 BLDA-SCNC: 18.3 MMOL/L (ref 22–26)
HCO3 BLDA-SCNC: 18.9 MMOL/L (ref 22–26)
HCO3 BLDV-SCNC: 18.5 MMOL/L (ref 22–26)
HCT VFR BLD AUTO: 25.3 % (ref 41–52)
HCT VFR BLD EST: 23 % (ref 41–52)
HCT VFR BLD EST: 24 % (ref 41–52)
HCT VFR BLD EST: 27 % (ref 41–52)
HCT VFR BLD EST: 28 % (ref 41–52)
HCT VFR BLD EST: 28 % (ref 41–52)
HCT VFR BLD EST: 29 % (ref 41–52)
HCV AB SER QL: NONREACTIVE
HGB BLD-MCNC: 8.2 G/DL (ref 13.5–17.5)
HGB BLDA-MCNC: 7.5 G/DL (ref 13.5–17.5)
HGB BLDA-MCNC: 7.8 G/DL (ref 13.5–17.5)
HGB BLDA-MCNC: 8 G/DL (ref 13.5–17.5)
HGB BLDA-MCNC: 9.1 G/DL (ref 13.5–17.5)
HGB BLDA-MCNC: 9.3 G/DL (ref 13.5–17.5)
HGB BLDA-MCNC: 9.3 G/DL (ref 13.5–17.5)
HGB BLDA-MCNC: 9.5 G/DL (ref 13.5–17.5)
HGB BLDV-MCNC: 7.8 G/DL (ref 13.5–17.5)
HIV 1+2 AB+HIV1 P24 AG SERPL QL IA: NONREACTIVE
IMM GRANULOCYTES # BLD AUTO: 0.02 X10*3/UL (ref 0–0.7)
IMM GRANULOCYTES NFR BLD AUTO: 0.4 % (ref 0–0.9)
INHALED O2 CONCENTRATION: 40 %
INHALED O2 CONCENTRATION: 50 %
INHALED O2 CONCENTRATION: 96 %
INR PPP: 1.4 (ref 0.9–1.1)
LACTATE BLDA-SCNC: 2.3 MMOL/L (ref 0.4–2)
LACTATE BLDA-SCNC: 2.7 MMOL/L (ref 0.4–2)
LACTATE BLDA-SCNC: 2.9 MMOL/L (ref 0.4–2)
LACTATE BLDA-SCNC: 3.1 MMOL/L (ref 0.4–2)
LACTATE BLDA-SCNC: 3.4 MMOL/L (ref 0.4–2)
LACTATE BLDA-SCNC: 3.6 MMOL/L (ref 0.4–2)
LACTATE BLDA-SCNC: 3.8 MMOL/L (ref 0.4–2)
LACTATE BLDV-SCNC: 2.7 MMOL/L (ref 0.4–2)
LYMPHOCYTES # BLD AUTO: 1.09 X10*3/UL (ref 1.2–4.8)
LYMPHOCYTES NFR BLD AUTO: 19.9 %
MAGNESIUM SERPL-MCNC: 2.34 MG/DL (ref 1.6–2.4)
MCH RBC QN AUTO: 30.4 PG (ref 26–34)
MCHC RBC AUTO-ENTMCNC: 32.4 G/DL (ref 32–36)
MCV RBC AUTO: 94 FL (ref 80–100)
MONOCYTES # BLD AUTO: 0.46 X10*3/UL (ref 0.1–1)
MONOCYTES NFR BLD AUTO: 8.4 %
NEUTROPHILS # BLD AUTO: 2.69 X10*3/UL (ref 1.2–7.7)
NEUTROPHILS NFR BLD AUTO: 49.2 %
NRBC BLD-RTO: 0 /100 WBCS (ref 0–0)
OXYHGB MFR BLDA: 97.6 % (ref 94–98)
OXYHGB MFR BLDA: 97.7 % (ref 94–98)
OXYHGB MFR BLDA: 97.8 % (ref 94–98)
OXYHGB MFR BLDA: 97.9 % (ref 94–98)
OXYHGB MFR BLDA: 98 % (ref 94–98)
OXYHGB MFR BLDA: 98.4 % (ref 94–98)
OXYHGB MFR BLDA: 98.4 % (ref 94–98)
OXYHGB MFR BLDV: 49.1 % (ref 45–75)
PCO2 BLDA: 33 MM HG (ref 38–42)
PCO2 BLDA: 33 MM HG (ref 38–42)
PCO2 BLDA: 35 MM HG (ref 38–42)
PCO2 BLDA: 37 MM HG (ref 38–42)
PCO2 BLDA: 37 MM HG (ref 38–42)
PCO2 BLDA: 38 MM HG (ref 38–42)
PCO2 BLDA: 39 MM HG (ref 38–42)
PCO2 BLDV: 36 MM HG (ref 41–51)
PH BLDA: 7.24 PH (ref 7.38–7.42)
PH BLDA: 7.25 PH (ref 7.38–7.42)
PH BLDA: 7.29 PH (ref 7.38–7.42)
PH BLDA: 7.3 PH (ref 7.38–7.42)
PH BLDA: 7.34 PH (ref 7.38–7.42)
PH BLDV: 7.32 PH (ref 7.33–7.43)
PHOSPHATE SERPL-MCNC: 4.5 MG/DL (ref 2.5–4.9)
PLATELET # BLD AUTO: 92 X10*3/UL (ref 150–450)
PO2 BLDA: 170 MM HG (ref 85–95)
PO2 BLDA: 172 MM HG (ref 85–95)
PO2 BLDA: 176 MM HG (ref 85–95)
PO2 BLDA: 187 MM HG (ref 85–95)
PO2 BLDA: 203 MM HG (ref 85–95)
PO2 BLDA: 213 MM HG (ref 85–95)
PO2 BLDA: 244 MM HG (ref 85–95)
PO2 BLDV: 34 MM HG (ref 35–45)
POTASSIUM BLDA-SCNC: 4.5 MMOL/L (ref 3.5–5.3)
POTASSIUM BLDA-SCNC: 4.7 MMOL/L (ref 3.5–5.3)
POTASSIUM BLDA-SCNC: 5 MMOL/L (ref 3.5–5.3)
POTASSIUM BLDA-SCNC: 5.2 MMOL/L (ref 3.5–5.3)
POTASSIUM BLDA-SCNC: 5.4 MMOL/L (ref 3.5–5.3)
POTASSIUM BLDV-SCNC: 4.5 MMOL/L (ref 3.5–5.3)
POTASSIUM SERPL-SCNC: 4.5 MMOL/L (ref 3.5–5.3)
PRODUCT BLOOD TYPE: 7300
PRODUCT CODE: NORMAL
PROT SERPL-MCNC: 6.2 G/DL (ref 6.4–8.2)
PROTHROMBIN TIME: 15.5 SECONDS (ref 9.8–12.4)
RBC # BLD AUTO: 2.7 X10*6/UL (ref 4.5–5.9)
RH FACTOR (ANTIGEN D): NORMAL
SAO2 % BLDA: 100 % (ref 94–100)
SAO2 % BLDV: 50 % (ref 45–75)
SODIUM BLDA-SCNC: 131 MMOL/L (ref 136–145)
SODIUM BLDA-SCNC: 132 MMOL/L (ref 136–145)
SODIUM BLDA-SCNC: 133 MMOL/L (ref 136–145)
SODIUM BLDV-SCNC: 132 MMOL/L (ref 136–145)
SODIUM SERPL-SCNC: 132 MMOL/L (ref 136–145)
UNIT ABO: NORMAL
UNIT NUMBER: NORMAL
UNIT RH: NORMAL
UNIT VOLUME: 350
WBC # BLD AUTO: 5.5 X10*3/UL (ref 4.4–11.3)
XM INTEP: NORMAL

## 2025-04-02 PROCEDURE — 81379 HLA I TYPING COMPLETE HR: CPT | Mod: OUT | Performed by: TRANSPLANT SURGERY

## 2025-04-02 PROCEDURE — 3600000010 HC OR TIME - EACH INCREMENTAL 1 MINUTE - PROCEDURE LEVEL FIVE: Performed by: TRANSPLANT SURGERY

## 2025-04-02 PROCEDURE — 86644 CMV ANTIBODY: CPT

## 2025-04-02 PROCEDURE — A4312 CATH W/O BAG 2-WAY SILICONE: HCPCS | Performed by: TRANSPLANT SURGERY

## 2025-04-02 PROCEDURE — 86803 HEPATITIS C AB TEST: CPT

## 2025-04-02 PROCEDURE — 86704 HEP B CORE ANTIBODY TOTAL: CPT

## 2025-04-02 PROCEDURE — 88309 TISSUE EXAM BY PATHOLOGIST: CPT | Performed by: STUDENT IN AN ORGANIZED HEALTH CARE EDUCATION/TRAINING PROGRAM

## 2025-04-02 PROCEDURE — 86664 EPSTEIN-BARR NUCLEAR ANTIGEN: CPT

## 2025-04-02 PROCEDURE — 2500000005 HC RX 250 GENERAL PHARMACY W/O HCPCS

## 2025-04-02 PROCEDURE — 2720000007 HC OR 272 NO HCPCS: Performed by: TRANSPLANT SURGERY

## 2025-04-02 PROCEDURE — 71045 X-RAY EXAM CHEST 1 VIEW: CPT | Performed by: RADIOLOGY

## 2025-04-02 PROCEDURE — 8120000003 HC CADAVER DONOR - LIVER: Performed by: TRANSPLANT SURGERY

## 2025-04-02 PROCEDURE — 82435 ASSAY OF BLOOD CHLORIDE: CPT

## 2025-04-02 PROCEDURE — 2500000005 HC RX 250 GENERAL PHARMACY W/O HCPCS: Performed by: ANESTHESIOLOGIST ASSISTANT

## 2025-04-02 PROCEDURE — 99223 1ST HOSP IP/OBS HIGH 75: CPT | Performed by: TRANSPLANT SURGERY

## 2025-04-02 PROCEDURE — 36556 INSERT NON-TUNNEL CV CATH: CPT | Performed by: STUDENT IN AN ORGANIZED HEALTH CARE EDUCATION/TRAINING PROGRAM

## 2025-04-02 PROCEDURE — 83735 ASSAY OF MAGNESIUM: CPT

## 2025-04-02 PROCEDURE — 86706 HEP B SURFACE ANTIBODY: CPT

## 2025-04-02 PROCEDURE — 37799 UNLISTED PX VASCULAR SURGERY: CPT

## 2025-04-02 PROCEDURE — 84100 ASSAY OF PHOSPHORUS: CPT

## 2025-04-02 PROCEDURE — 87070 CULTURE OTHR SPECIMN AEROBIC: CPT

## 2025-04-02 PROCEDURE — 2500000004 HC RX 250 GENERAL PHARMACY W/ HCPCS (ALT 636 FOR OP/ED)

## 2025-04-02 PROCEDURE — 82330 ASSAY OF CALCIUM: CPT

## 2025-04-02 PROCEDURE — 85610 PROTHROMBIN TIME: CPT

## 2025-04-02 PROCEDURE — 87389 HIV-1 AG W/HIV-1&-2 AB AG IA: CPT

## 2025-04-02 PROCEDURE — 36415 COLL VENOUS BLD VENIPUNCTURE: CPT

## 2025-04-02 PROCEDURE — 86901 BLOOD TYPING SEROLOGIC RH(D): CPT

## 2025-04-02 PROCEDURE — 88108 CYTOPATH CONCENTRATE TECH: CPT | Performed by: PATHOLOGY

## 2025-04-02 PROCEDURE — 71045 X-RAY EXAM CHEST 1 VIEW: CPT

## 2025-04-02 PROCEDURE — 90937 HEMODIALYSIS REPEATED EVAL: CPT

## 2025-04-02 PROCEDURE — 2500000001 HC RX 250 WO HCPCS SELF ADMINISTERED DRUGS (ALT 637 FOR MEDICARE OP)

## 2025-04-02 PROCEDURE — 3700000001 HC GENERAL ANESTHESIA TIME - INITIAL BASE CHARGE: Performed by: TRANSPLANT SURGERY

## 2025-04-02 PROCEDURE — 87340 HEPATITIS B SURFACE AG IA: CPT

## 2025-04-02 PROCEDURE — 88108 CYTOPATH CONCENTRATE TECH: CPT | Mod: TC,MCY | Performed by: TRANSPLANT SURGERY

## 2025-04-02 PROCEDURE — 2500000004 HC RX 250 GENERAL PHARMACY W/ HCPCS (ALT 636 FOR OP/ED): Performed by: ANESTHESIOLOGIST ASSISTANT

## 2025-04-02 PROCEDURE — 2500000004 HC RX 250 GENERAL PHARMACY W/ HCPCS (ALT 636 FOR OP/ED): Performed by: INTERNAL MEDICINE

## 2025-04-02 PROCEDURE — 82248 BILIRUBIN DIRECT: CPT

## 2025-04-02 PROCEDURE — 82947 ASSAY GLUCOSE BLOOD QUANT: CPT

## 2025-04-02 PROCEDURE — 93503 INSERT/PLACE HEART CATHETER: CPT | Performed by: STUDENT IN AN ORGANIZED HEALTH CARE EDUCATION/TRAINING PROGRAM

## 2025-04-02 PROCEDURE — 36430 TRANSFUSION BLD/BLD COMPNT: CPT | Performed by: STUDENT IN AN ORGANIZED HEALTH CARE EDUCATION/TRAINING PROGRAM

## 2025-04-02 PROCEDURE — P9040 RBC LEUKOREDUCED IRRADIATED: HCPCS

## 2025-04-02 PROCEDURE — 85025 COMPLETE CBC W/AUTO DIFF WBC: CPT

## 2025-04-02 PROCEDURE — 47135 TRANSPLANTATION OF LIVER: CPT | Performed by: TRANSPLANT SURGERY

## 2025-04-02 PROCEDURE — 2780000003 HC OR 278 NO HCPCS: Performed by: TRANSPLANT SURGERY

## 2025-04-02 PROCEDURE — 6ABF0BZ PERFUSION OF HEPATOBILIARY SYSTEM AND PANCREAS DONOR ORGAN, SINGLE: ICD-10-PCS | Performed by: TRANSPLANT SURGERY

## 2025-04-02 PROCEDURE — 87522 HEPATITIS C REVRS TRNSCRPJ: CPT

## 2025-04-02 PROCEDURE — 86923 COMPATIBILITY TEST ELECTRIC: CPT

## 2025-04-02 PROCEDURE — P9012 CRYOPRECIPITATE EACH UNIT: HCPCS

## 2025-04-02 PROCEDURE — P9017 PLASMA 1 DONOR FRZ W/IN 8 HR: HCPCS

## 2025-04-02 PROCEDURE — 0FY00Z0 TRANSPLANTATION OF LIVER, ALLOGENEIC, OPEN APPROACH: ICD-10-PCS | Performed by: TRANSPLANT SURGERY

## 2025-04-02 PROCEDURE — 3700000002 HC GENERAL ANESTHESIA TIME - EACH INCREMENTAL 1 MINUTE: Performed by: TRANSPLANT SURGERY

## 2025-04-02 PROCEDURE — 1100000001 HC PRIVATE ROOM DAILY

## 2025-04-02 PROCEDURE — 3600000005 HC OR TIME - INITIAL BASE CHARGE - PROCEDURE LEVEL FIVE: Performed by: TRANSPLANT SURGERY

## 2025-04-02 PROCEDURE — 88309 TISSUE EXAM BY PATHOLOGIST: CPT | Mod: TC,SUR | Performed by: TRANSPLANT SURGERY

## 2025-04-02 PROCEDURE — P9016 RBC LEUKOCYTES REDUCED: HCPCS

## 2025-04-02 PROCEDURE — 88313 SPECIAL STAINS GROUP 2: CPT | Performed by: STUDENT IN AN ORGANIZED HEALTH CARE EDUCATION/TRAINING PROGRAM

## 2025-04-02 RX ORDER — CALCIUM CHLORIDE INJECTION 100 MG/ML
INJECTION, SOLUTION INTRAVENOUS AS NEEDED
Status: DISCONTINUED | OUTPATIENT
Start: 2025-04-02 | End: 2025-04-03

## 2025-04-02 RX ORDER — MIDAZOLAM HYDROCHLORIDE 1 MG/ML
INJECTION INTRAMUSCULAR; INTRAVENOUS AS NEEDED
Status: DISCONTINUED | OUTPATIENT
Start: 2025-04-02 | End: 2025-04-03

## 2025-04-02 RX ORDER — NOREPINEPHRINE BITARTRATE 0.03 MG/ML
INJECTION, SOLUTION INTRAVENOUS CONTINUOUS PRN
Status: DISCONTINUED | OUTPATIENT
Start: 2025-04-02 | End: 2025-04-03

## 2025-04-02 RX ORDER — DEXTROSE 50 % IN WATER (D50W) INTRAVENOUS SYRINGE
12.5
Status: DISCONTINUED | OUTPATIENT
Start: 2025-04-02 | End: 2025-04-03

## 2025-04-02 RX ORDER — HEPARIN SODIUM 1000 [USP'U]/ML
1000 INJECTION, SOLUTION INTRAVENOUS; SUBCUTANEOUS AS NEEDED
Status: DISCONTINUED | OUTPATIENT
Start: 2025-04-02 | End: 2025-04-05

## 2025-04-02 RX ORDER — AMIODARONE HYDROCHLORIDE 150 MG/3ML
INJECTION, SOLUTION INTRAVENOUS AS NEEDED
Status: DISCONTINUED | OUTPATIENT
Start: 2025-04-02 | End: 2025-04-03

## 2025-04-02 RX ORDER — ATORVASTATIN CALCIUM 10 MG/1
10 TABLET, FILM COATED ORAL DAILY
Status: DISCONTINUED | OUTPATIENT
Start: 2025-04-02 | End: 2025-04-03

## 2025-04-02 RX ORDER — FLUCONAZOLE 2 MG/ML
400 INJECTION, SOLUTION INTRAVENOUS ONCE
Status: COMPLETED | OUTPATIENT
Start: 2025-04-02 | End: 2025-04-02

## 2025-04-02 RX ORDER — DEXTROSE MONOHYDRATE AND SODIUM CHLORIDE 5; .45 G/100ML; G/100ML
40 INJECTION, SOLUTION INTRAVENOUS CONTINUOUS
Status: DISCONTINUED | OUTPATIENT
Start: 2025-04-02 | End: 2025-04-03

## 2025-04-02 RX ORDER — SODIUM CHLORIDE, SODIUM GLUCONATE, SODIUM ACETATE, POTASSIUM CHLORIDE AND MAGNESIUM CHLORIDE 30; 37; 368; 526; 502 MG/100ML; MG/100ML; MG/100ML; MG/100ML; MG/100ML
INJECTION, SOLUTION INTRAVENOUS CONTINUOUS PRN
Status: DISCONTINUED | OUTPATIENT
Start: 2025-04-02 | End: 2025-04-03

## 2025-04-02 RX ORDER — FUROSEMIDE 10 MG/ML
INJECTION INTRAMUSCULAR; INTRAVENOUS AS NEEDED
Status: DISCONTINUED | OUTPATIENT
Start: 2025-04-02 | End: 2025-04-03

## 2025-04-02 RX ORDER — FENTANYL CITRATE 50 UG/ML
INJECTION, SOLUTION INTRAMUSCULAR; INTRAVENOUS AS NEEDED
Status: DISCONTINUED | OUTPATIENT
Start: 2025-04-02 | End: 2025-04-03

## 2025-04-02 RX ORDER — DEXTROSE MONOHYDRATE AND SODIUM CHLORIDE 5; .45 G/100ML; G/100ML
40 INJECTION, SOLUTION INTRAVENOUS CONTINUOUS
Status: DISCONTINUED | OUTPATIENT
Start: 2025-04-02 | End: 2025-04-02

## 2025-04-02 RX ORDER — ROCURONIUM BROMIDE 10 MG/ML
INJECTION, SOLUTION INTRAVENOUS AS NEEDED
Status: DISCONTINUED | OUTPATIENT
Start: 2025-04-02 | End: 2025-04-03

## 2025-04-02 RX ORDER — INDOMETHACIN 25 MG/1
CAPSULE ORAL AS NEEDED
Status: DISCONTINUED | OUTPATIENT
Start: 2025-04-02 | End: 2025-04-03

## 2025-04-02 RX ORDER — VECURONIUM BROMIDE 1 MG/ML
INJECTION, POWDER, LYOPHILIZED, FOR SOLUTION INTRAVENOUS AS NEEDED
Status: DISCONTINUED | OUTPATIENT
Start: 2025-04-02 | End: 2025-04-03

## 2025-04-02 RX ORDER — LIDOCAINE HYDROCHLORIDE 20 MG/ML
INJECTION, SOLUTION INFILTRATION; PERINEURAL AS NEEDED
Status: DISCONTINUED | OUTPATIENT
Start: 2025-04-02 | End: 2025-04-03

## 2025-04-02 RX ORDER — VASOPRESSIN 20 [USP'U]/ML
INJECTION, SOLUTION INTRAVENOUS AS NEEDED
Status: DISCONTINUED | OUTPATIENT
Start: 2025-04-02 | End: 2025-04-03

## 2025-04-02 RX ORDER — INSULIN LISPRO 100 [IU]/ML
0-5 INJECTION, SOLUTION INTRAVENOUS; SUBCUTANEOUS EVERY 4 HOURS
Status: DISCONTINUED | OUTPATIENT
Start: 2025-04-02 | End: 2025-04-03

## 2025-04-02 RX ORDER — PHENYLEPHRINE HYDROCHLORIDE 10 MG/ML
INJECTION INTRAVENOUS AS NEEDED
Status: DISCONTINUED | OUTPATIENT
Start: 2025-04-02 | End: 2025-04-03

## 2025-04-02 RX ORDER — ESMOLOL HYDROCHLORIDE 10 MG/ML
INJECTION INTRAVENOUS AS NEEDED
Status: DISCONTINUED | OUTPATIENT
Start: 2025-04-02 | End: 2025-04-03

## 2025-04-02 RX ORDER — TRAZODONE HYDROCHLORIDE 50 MG/1
50 TABLET ORAL NIGHTLY
Status: DISCONTINUED | OUTPATIENT
Start: 2025-04-02 | End: 2025-04-03

## 2025-04-02 RX ORDER — ALBUMIN HUMAN 50 G/1000ML
SOLUTION INTRAVENOUS AS NEEDED
Status: DISCONTINUED | OUTPATIENT
Start: 2025-04-02 | End: 2025-04-03

## 2025-04-02 RX ORDER — PROPOFOL 10 MG/ML
INJECTION, EMULSION INTRAVENOUS AS NEEDED
Status: DISCONTINUED | OUTPATIENT
Start: 2025-04-02 | End: 2025-04-03

## 2025-04-02 RX ORDER — DEXTROSE 50 % IN WATER (D50W) INTRAVENOUS SYRINGE
25
Status: DISCONTINUED | OUTPATIENT
Start: 2025-04-02 | End: 2025-04-03

## 2025-04-02 RX ORDER — MAGNESIUM SULFATE 1 G/100ML
INJECTION INTRAVENOUS AS NEEDED
Status: DISCONTINUED | OUTPATIENT
Start: 2025-04-02 | End: 2025-04-03

## 2025-04-02 RX ORDER — LACTULOSE 10 G/15ML
20 SOLUTION ORAL 2 TIMES DAILY
Status: DISCONTINUED | OUTPATIENT
Start: 2025-04-02 | End: 2025-04-03

## 2025-04-02 RX ORDER — FUROSEMIDE 20 MG/1
20 TABLET ORAL 2 TIMES DAILY
Status: DISCONTINUED | OUTPATIENT
Start: 2025-04-02 | End: 2025-04-03

## 2025-04-02 RX ADMIN — SODIUM BICARBONATE 50 MEQ: 84 INJECTION, SOLUTION INTRAVENOUS at 22:41

## 2025-04-02 RX ADMIN — PIPERACILLIN SODIUM AND TAZOBACTAM SODIUM 3.38 G: 3; .375 INJECTION, SOLUTION INTRAVENOUS at 20:58

## 2025-04-02 RX ADMIN — CALCIUM CHLORIDE 0.5 G: 100 INJECTION, SOLUTION INTRAVENOUS at 23:13

## 2025-04-02 RX ADMIN — CALCIUM CHLORIDE, MAGNESIUM CHLORIDE, DEXTROSE MONOHYDRATE, LACTIC ACID, SODIUM CHLORIDE, SODIUM BICARBONATE AND POTASSIUM CHLORIDE 3000 ML/HR: 3.68; 3.05; 22; 5.4; 6.46; 3.09; .314 INJECTION INTRAVENOUS at 23:00

## 2025-04-02 RX ADMIN — Medication 0.02 MCG/KG/MIN: at 22:03

## 2025-04-02 RX ADMIN — MIDAZOLAM HYDROCHLORIDE 2 MG: 2 INJECTION, SOLUTION INTRAMUSCULAR; INTRAVENOUS at 20:31

## 2025-04-02 RX ADMIN — CALCIUM CHLORIDE, MAGNESIUM CHLORIDE, DEXTROSE MONOHYDRATE, LACTIC ACID, SODIUM CHLORIDE, SODIUM BICARBONATE AND POTASSIUM CHLORIDE 3000 ML/HR: 5.15; 2.03; 22; 5.4; 6.46; 3.09; .157 INJECTION INTRAVENOUS at 23:19

## 2025-04-02 RX ADMIN — CALCIUM CHLORIDE, MAGNESIUM CHLORIDE, DEXTROSE MONOHYDRATE, LACTIC ACID, SODIUM CHLORIDE, SODIUM BICARBONATE AND POTASSIUM CHLORIDE 2400 ML/HR: 3.68; 3.05; 22; 5.4; 6.46; 3.09; .314 INJECTION INTRAVENOUS at 22:00

## 2025-04-02 RX ADMIN — PHENYLEPHRINE HYDROCHLORIDE 120 MCG: 10 INJECTION INTRAVENOUS at 21:19

## 2025-04-02 RX ADMIN — DEXTROSE MONOHYDRATE 500 MG: 50 INJECTION, SOLUTION INTRAVENOUS at 23:54

## 2025-04-02 RX ADMIN — SODIUM BICARBONATE 50 MEQ: 84 INJECTION, SOLUTION INTRAVENOUS at 23:14

## 2025-04-02 RX ADMIN — ALBUMIN HUMAN 250 ML: 0.05 INJECTION, SOLUTION INTRAVENOUS at 22:08

## 2025-04-02 RX ADMIN — VECURONIUM BROMIDE 5 MG: 10 INJECTION, POWDER, LYOPHILIZED, FOR SOLUTION INTRAVENOUS at 22:03

## 2025-04-02 RX ADMIN — LIDOCAINE HYDROCHLORIDE 100 MG: 20 INJECTION, SOLUTION INFILTRATION; PERINEURAL at 20:35

## 2025-04-02 RX ADMIN — ROCURONIUM BROMIDE 100 MG: 10 INJECTION INTRAVENOUS at 20:36

## 2025-04-02 RX ADMIN — NOREPINEPHRINE BITARTRATE 8 MCG: 1 INJECTION, SOLUTION, CONCENTRATE INTRAVENOUS at 22:05

## 2025-04-02 RX ADMIN — DEXTROSE AND SODIUM CHLORIDE 40 ML/HR: 5; 450 INJECTION, SOLUTION INTRAVENOUS at 17:25

## 2025-04-02 RX ADMIN — NOREPINEPHRINE BITARTRATE 16 MCG: 1 INJECTION, SOLUTION, CONCENTRATE INTRAVENOUS at 22:29

## 2025-04-02 RX ADMIN — NOREPINEPHRINE BITARTRATE 16 MCG: 1 INJECTION, SOLUTION, CONCENTRATE INTRAVENOUS at 23:53

## 2025-04-02 RX ADMIN — SODIUM CHLORIDE, SODIUM GLUCONATE, SODIUM ACETATE, POTASSIUM CHLORIDE AND MAGNESIUM CHLORIDE: 526; 502; 368; 37; 30 INJECTION, SOLUTION INTRAVENOUS at 20:26

## 2025-04-02 RX ADMIN — ALBUMIN HUMAN 500 ML: 0.05 INJECTION, SOLUTION INTRAVENOUS at 23:52

## 2025-04-02 RX ADMIN — PHENYLEPHRINE HYDROCHLORIDE 200 MCG: 10 INJECTION INTRAVENOUS at 21:22

## 2025-04-02 RX ADMIN — EPINEPHRINE 32 MCG: 1 INJECTION INTRAMUSCULAR; INTRAVENOUS; SUBCUTANEOUS at 21:22

## 2025-04-02 RX ADMIN — DEXTROSE MONOHYDRATE 25 G: 25 INJECTION, SOLUTION INTRAVENOUS at 23:46

## 2025-04-02 RX ADMIN — NOREPINEPHRINE BITARTRATE 16 MCG: 1 INJECTION, SOLUTION, CONCENTRATE INTRAVENOUS at 22:17

## 2025-04-02 RX ADMIN — NOREPINEPHRINE BITARTRATE 8 MCG: 1 INJECTION, SOLUTION, CONCENTRATE INTRAVENOUS at 22:03

## 2025-04-02 RX ADMIN — VECURONIUM BROMIDE 3 MG: 10 INJECTION, POWDER, LYOPHILIZED, FOR SOLUTION INTRAVENOUS at 23:06

## 2025-04-02 RX ADMIN — FLUCONAZOLE 400 MG: 400 INJECTION, SOLUTION INTRAVENOUS at 20:57

## 2025-04-02 RX ADMIN — ATORVASTATIN CALCIUM 10 MG: 10 TABLET, FILM COATED ORAL at 17:26

## 2025-04-02 RX ADMIN — FENTANYL CITRATE 50 MCG: 50 INJECTION, SOLUTION INTRAMUSCULAR; INTRAVENOUS at 20:35

## 2025-04-02 RX ADMIN — PHENYLEPHRINE HYDROCHLORIDE 80 MCG: 10 INJECTION INTRAVENOUS at 21:16

## 2025-04-02 RX ADMIN — ESMOLOL HYDROCHLORIDE 50 MG: 10 INJECTION, SOLUTION INTRAVENOUS at 21:21

## 2025-04-02 RX ADMIN — SODIUM CHLORIDE, SODIUM GLUCONATE, SODIUM ACETATE, POTASSIUM CHLORIDE AND MAGNESIUM CHLORIDE: 30; 37; 368; 526; 502 INJECTION, SOLUTION INTRAVENOUS at 20:50

## 2025-04-02 RX ADMIN — MAGNESIUM SULFATE IN DEXTROSE 1 G: 10 INJECTION, SOLUTION INTRAVENOUS at 23:58

## 2025-04-02 RX ADMIN — FUROSEMIDE 60 MG: 10 INJECTION, SOLUTION INTRAMUSCULAR; INTRAVENOUS at 22:56

## 2025-04-02 RX ADMIN — CALCIUM CHLORIDE 0.5 G: 100 INJECTION, SOLUTION INTRAVENOUS at 23:57

## 2025-04-02 RX ADMIN — LIDOCAINE HYDROCHLORIDE 100 MG: 20 INJECTION, SOLUTION INFILTRATION; PERINEURAL at 21:20

## 2025-04-02 RX ADMIN — NOREPINEPHRINE BITARTRATE 16 MCG: 1 INJECTION, SOLUTION, CONCENTRATE INTRAVENOUS at 22:10

## 2025-04-02 RX ADMIN — VASOPRESSIN 1 UNITS: 20 INJECTION, SOLUTION INTRAMUSCULAR; SUBCUTANEOUS at 22:05

## 2025-04-02 RX ADMIN — VASOPRESSIN 0.03 UNITS/MIN: 20 INJECTION, SOLUTION INTRAMUSCULAR; SUBCUTANEOUS at 22:16

## 2025-04-02 RX ADMIN — SODIUM CHLORIDE, SODIUM GLUCONATE, SODIUM ACETATE, POTASSIUM CHLORIDE AND MAGNESIUM CHLORIDE: 30; 37; 368; 526; 502 INJECTION, SOLUTION INTRAVENOUS at 21:04

## 2025-04-02 RX ADMIN — NOREPINEPHRINE BITARTRATE 16 MCG: 1 INJECTION, SOLUTION, CONCENTRATE INTRAVENOUS at 22:06

## 2025-04-02 RX ADMIN — PROPOFOL 120 MG: 10 INJECTION, EMULSION INTRAVENOUS at 20:35

## 2025-04-02 RX ADMIN — AMIODARONE HYDROCHLORIDE 150 MG: 50 INJECTION, SOLUTION INTRAVENOUS at 21:22

## 2025-04-02 SDOH — SOCIAL STABILITY: SOCIAL INSECURITY: HAVE YOU HAD THOUGHTS OF HARMING ANYONE ELSE?: NO

## 2025-04-02 SDOH — ECONOMIC STABILITY: FOOD INSECURITY: WITHIN THE PAST 12 MONTHS, THE FOOD YOU BOUGHT JUST DIDN'T LAST AND YOU DIDN'T HAVE MONEY TO GET MORE.: NEVER TRUE

## 2025-04-02 SDOH — SOCIAL STABILITY: SOCIAL INSECURITY: WITHIN THE LAST YEAR, HAVE YOU BEEN AFRAID OF YOUR PARTNER OR EX-PARTNER?: NO

## 2025-04-02 SDOH — ECONOMIC STABILITY: TRANSPORTATION INSECURITY: IN THE PAST 12 MONTHS, HAS LACK OF TRANSPORTATION KEPT YOU FROM MEDICAL APPOINTMENTS OR FROM GETTING MEDICATIONS?: NO

## 2025-04-02 SDOH — SOCIAL STABILITY: SOCIAL INSECURITY: WITHIN THE LAST YEAR, HAVE YOU BEEN HUMILIATED OR EMOTIONALLY ABUSED IN OTHER WAYS BY YOUR PARTNER OR EX-PARTNER?: NO

## 2025-04-02 SDOH — SOCIAL STABILITY: SOCIAL INSECURITY: WERE YOU ABLE TO COMPLETE ALL THE BEHAVIORAL HEALTH SCREENINGS?: YES

## 2025-04-02 SDOH — ECONOMIC STABILITY: FOOD INSECURITY: WITHIN THE PAST 12 MONTHS, YOU WORRIED THAT YOUR FOOD WOULD RUN OUT BEFORE YOU GOT THE MONEY TO BUY MORE.: NEVER TRUE

## 2025-04-02 SDOH — SOCIAL STABILITY: SOCIAL INSECURITY: DO YOU FEEL UNSAFE GOING BACK TO THE PLACE WHERE YOU ARE LIVING?: NO

## 2025-04-02 SDOH — ECONOMIC STABILITY: HOUSING INSECURITY: AT ANY TIME IN THE PAST 12 MONTHS, WERE YOU HOMELESS OR LIVING IN A SHELTER (INCLUDING NOW)?: NO

## 2025-04-02 SDOH — ECONOMIC STABILITY: HOUSING INSECURITY: IN THE LAST 12 MONTHS, WAS THERE A TIME WHEN YOU WERE NOT ABLE TO PAY THE MORTGAGE OR RENT ON TIME?: NO

## 2025-04-02 SDOH — SOCIAL STABILITY: SOCIAL INSECURITY: HAVE YOU HAD ANY THOUGHTS OF HARMING ANYONE ELSE?: NO

## 2025-04-02 SDOH — ECONOMIC STABILITY: FOOD INSECURITY: HOW HARD IS IT FOR YOU TO PAY FOR THE VERY BASICS LIKE FOOD, HOUSING, MEDICAL CARE, AND HEATING?: NOT HARD AT ALL

## 2025-04-02 SDOH — SOCIAL STABILITY: SOCIAL INSECURITY: HAS ANYONE EVER THREATENED TO HURT YOUR FAMILY OR YOUR PETS?: NO

## 2025-04-02 SDOH — SOCIAL STABILITY: SOCIAL INSECURITY
WITHIN THE LAST YEAR, HAVE YOU BEEN RAPED OR FORCED TO HAVE ANY KIND OF SEXUAL ACTIVITY BY YOUR PARTNER OR EX-PARTNER?: NO

## 2025-04-02 SDOH — ECONOMIC STABILITY: HOUSING INSECURITY: IN THE PAST 12 MONTHS, HOW MANY TIMES HAVE YOU MOVED WHERE YOU WERE LIVING?: 0

## 2025-04-02 SDOH — ECONOMIC STABILITY: HOUSING INSECURITY: DO YOU FEEL UNSAFE GOING BACK TO THE PLACE WHERE YOU LIVE?: NO

## 2025-04-02 SDOH — ECONOMIC STABILITY: INCOME INSECURITY: IN THE PAST 12 MONTHS HAS THE ELECTRIC, GAS, OIL, OR WATER COMPANY THREATENED TO SHUT OFF SERVICES IN YOUR HOME?: NO

## 2025-04-02 SDOH — SOCIAL STABILITY: SOCIAL INSECURITY
WITHIN THE LAST YEAR, HAVE YOU BEEN KICKED, HIT, SLAPPED, OR OTHERWISE PHYSICALLY HURT BY YOUR PARTNER OR EX-PARTNER?: NO

## 2025-04-02 SDOH — SOCIAL STABILITY: SOCIAL INSECURITY: ABUSE: ADULT

## 2025-04-02 SDOH — SOCIAL STABILITY: SOCIAL INSECURITY: DO YOU FEEL ANYONE HAS EXPLOITED OR TAKEN ADVANTAGE OF YOU FINANCIALLY OR OF YOUR PERSONAL PROPERTY?: NO

## 2025-04-02 SDOH — SOCIAL STABILITY: SOCIAL INSECURITY: ARE THERE ANY APPARENT SIGNS OF INJURIES/BEHAVIORS THAT COULD BE RELATED TO ABUSE/NEGLECT?: NO

## 2025-04-02 SDOH — HEALTH STABILITY: MENTAL HEALTH: CURRENT SMOKER: 0

## 2025-04-02 SDOH — SOCIAL STABILITY: SOCIAL INSECURITY: ARE YOU OR HAVE YOU BEEN THREATENED OR ABUSED PHYSICALLY, EMOTIONALLY, OR SEXUALLY BY ANYONE?: NO

## 2025-04-02 SDOH — SOCIAL STABILITY: SOCIAL INSECURITY: DOES ANYONE TRY TO KEEP YOU FROM HAVING/CONTACTING OTHER FRIENDS OR DOING THINGS OUTSIDE YOUR HOME?: NO

## 2025-04-02 ASSESSMENT — PATIENT HEALTH QUESTIONNAIRE - PHQ9
2. FEELING DOWN, DEPRESSED OR HOPELESS: NOT AT ALL
SUM OF ALL RESPONSES TO PHQ9 QUESTIONS 1 & 2: 0
1. LITTLE INTEREST OR PLEASURE IN DOING THINGS: NOT AT ALL

## 2025-04-02 ASSESSMENT — COGNITIVE AND FUNCTIONAL STATUS - GENERAL
DAILY ACTIVITIY SCORE: 24
MOBILITY SCORE: 24
DAILY ACTIVITIY SCORE: 24
MOBILITY SCORE: 24
PATIENT BASELINE BEDBOUND: NO

## 2025-04-02 ASSESSMENT — LIFESTYLE VARIABLES
SUBSTANCE_ABUSE_PAST_12_MONTHS: NO
HOW OFTEN DO YOU HAVE 6 OR MORE DRINKS ON ONE OCCASION: NEVER
PRESCIPTION_ABUSE_PAST_12_MONTHS: NO
SKIP TO QUESTIONS 9-10: 1
AUDIT-C TOTAL SCORE: 0
HOW MANY STANDARD DRINKS CONTAINING ALCOHOL DO YOU HAVE ON A TYPICAL DAY: PATIENT DOES NOT DRINK
AUDIT-C TOTAL SCORE: 0
HOW OFTEN DO YOU HAVE A DRINK CONTAINING ALCOHOL: NEVER

## 2025-04-02 ASSESSMENT — COLUMBIA-SUICIDE SEVERITY RATING SCALE - C-SSRS
1. IN THE PAST MONTH, HAVE YOU WISHED YOU WERE DEAD OR WISHED YOU COULD GO TO SLEEP AND NOT WAKE UP?: NO
6. HAVE YOU EVER DONE ANYTHING, STARTED TO DO ANYTHING, OR PREPARED TO DO ANYTHING TO END YOUR LIFE?: NO
2. HAVE YOU ACTUALLY HAD ANY THOUGHTS OF KILLING YOURSELF?: NO

## 2025-04-02 ASSESSMENT — ACTIVITIES OF DAILY LIVING (ADL)
WALKS IN HOME: INDEPENDENT
GROOMING: INDEPENDENT
DRESSING YOURSELF: INDEPENDENT
LACK_OF_TRANSPORTATION: NO
BATHING: INDEPENDENT
ADEQUATE_TO_COMPLETE_ADL: YES
FEEDING YOURSELF: INDEPENDENT
HEARING - LEFT EAR: FUNCTIONAL
TOILETING: INDEPENDENT
PATIENT'S MEMORY ADEQUATE TO SAFELY COMPLETE DAILY ACTIVITIES?: YES
JUDGMENT_ADEQUATE_SAFELY_COMPLETE_DAILY_ACTIVITIES: YES
LACK_OF_TRANSPORTATION: NO
HEARING - RIGHT EAR: FUNCTIONAL

## 2025-04-02 ASSESSMENT — ENCOUNTER SYMPTOMS: DYSRHYTHMIAS: 1

## 2025-04-02 ASSESSMENT — PAIN SCALES - GENERAL: PAINLEVEL_OUTOF10: 0 - NO PAIN

## 2025-04-02 ASSESSMENT — PAIN - FUNCTIONAL ASSESSMENT: PAIN_FUNCTIONAL_ASSESSMENT: 0-10

## 2025-04-02 NOTE — H&P
Transplant Surgery History and Physical    Subjective   Chief Complaint/Reason for Admission: Possible simultaneous Liver and Kidney Transplant     HPI:  López Lopez is a 65 y.o. male with history of  decompensated cirrhosis 2/2 to MASLD (recurrent ascites, portal HTN requires paracentesis every other week), CKD 4, DM2, HTN, CAD s/p PCI to prox LAD 2021, h/o pAFib on Eliquis, h/o ITP dx 5/2024 s/p steroids and IVIG, hyperlipidemia, obesity   who presents to [facility] for possible simultaneous liver and kidney transplant.      Per cards, he underwent LHC and showed mild CAD with patent mid LAD stent in a right-dominant system. Mid LAD stent had only minimal to mild ISR and it appears undersized but is widely patent. Was subsequently cleared by [facility] for transplant surgery.     He makes 2-3 cups of urine at baseline, drinks 2L fluids per day. Had a paracentesis done yesterday where they removed >8L fluids. Reports low appetite and muscle wasting since June 2024. Last took Eliquis on Saturday. Denies hx of chronic diarrhea/constipation. Denies fever, chills, nausea/vomiting or chest pain/SOB.     A 12-point ROS was performed and was unremarkable except as above.    PMH:  Past Medical History:   Diagnosis Date    Chronic kidney disease     Coronary artery disease     Diabetes mellitus (Multi)     Hyperlipidemia      PSH:  Past Surgical History:   Procedure Laterality Date    CARDIAC CATHETERIZATION N/A 2/26/2025    Procedure: Left Heart Cath with Coronary Angiography and LV;  Surgeon: Cesilia Teresa MD;  Location: [facility] Cardiac Cath Lab;  Service: Cardiovascular;  Laterality: N/A;     Soc Hx:  Social History     Socioeconomic History    Marital status:      Spouse name: Not on file    Number of children: Not on file    Years of education: Not on file    Highest education level: Not on file   Occupational History    Not on file   Tobacco Use    Smoking status: Former     Types: Cigarettes    Smokeless tobacco:  Never   Substance and Sexual Activity    Alcohol use: Not Currently     Comment: occ    Drug use: Not Currently    Sexual activity: Not on file   Other Topics Concern    Not on file   Social History Narrative    Not on file     Social Drivers of Health     Financial Resource Strain: Low Risk  (12/2/2024)    Overall Financial Resource Strain (CARDIA)     Difficulty of Paying Living Expenses: Not very hard   Food Insecurity: No Food Insecurity (12/2/2024)    Hunger Vital Sign     Worried About Running Out of Food in the Last Year: Never true     Ran Out of Food in the Last Year: Never true   Transportation Needs: No Transportation Needs (12/2/2024)    PRAPARE - Transportation     Lack of Transportation (Medical): No     Lack of Transportation (Non-Medical): No   Physical Activity: Not on file   Stress: Not on file   Social Connections: Not on file   Intimate Partner Violence: Not At Risk (12/2/2024)    Humiliation, Afraid, Rape, and Kick questionnaire     Fear of Current or Ex-Partner: No     Emotionally Abused: No     Physically Abused: No     Sexually Abused: No   Housing Stability: Unknown (12/2/2024)    Housing Stability Vital Sign     Unable to Pay for Housing in the Last Year: No     Number of Times Moved in the Last Year: Not on file     Homeless in the Last Year: No     Fam Hx:  No family history on file.   Allergies:  No Known Allergies  Current Medications:  No current facility-administered medications on file prior to encounter.     Current Outpatient Medications on File Prior to Encounter   Medication Sig Dispense Refill    apixaban (Eliquis) 5 mg tablet Take 1 tablet (5 mg) by mouth 2 times daily (morning and late afternoon).      atorvastatin (Lipitor) 10 mg tablet Take 1 tablet (10 mg) by mouth once daily.      furosemide (Lasix) 20 mg tablet Take 1 tablet (20 mg) by mouth 2 times a day.      lactulose 20 gram/30 mL oral solution Take 30 mL (20 g) by mouth 2 times a day. 1800 mL 2    metFORMIN  (Glucophage) 1,000 mg tablet Take 1 tablet (1,000 mg) by mouth every 12 hours.      rifAXIMin (Xifaxan) 550 mg tablet Take 1 tablet (550 mg) by mouth 2 times a day. 60 tablet 11    traZODone (Desyrel) 50 mg tablet Take 1 tablet (50 mg) by mouth once daily at bedtime.      [DISCONTINUED] rifAXIMin (Xifaxan) 550 mg tablet Take 1 tablet (550 mg) by mouth 2 times a day.           Objective   Vitals:  There were no vitals taken for this visit.    Physical Exam:  GEN: No acute distress. Alert, awake and conversant.  HEENT: Sclera anicteric. Dry mucous membranes.  RESP: Breathing non-labored, equal chest rise. On RA.  CV: Regular rate, normotensive  GI: Abdomen soft, mildly distended, nontender. Bandage from paracentesis in the RUQ.   : Deferred.  MSK: No gross deformities. Moves all extremities spontaneously.  NEURO: Alert and oriented x3. No focal deficits.  PSYCH: Appropriate mood and affect.  SKIN: No rashes or lesions.    Labs within past 24h:  No results found for this or any previous visit (from the past 24 hours).    Imaging within past 24h:  Imaging  No results found.    Cardiology, Vascular, and Other Imaging  No other imaging results found for the past 2 days      No pertinent imaging to review.     ASSESSMENT  López Lopez is a 65 y.o. male with history of  decompensated cirrhosis 2/2 to MASLD (recurrent ascites, portal HTN requires paracentesis every other week), CKD 4, DM2, HTN, CAD s/p PCI to prox LAD 2021, h/o pAFib on Eliquis, h/o ITP dx 5/2024 s/p steroids and IVIG, hyperlipidemia, obesity  who presents to [facility] for possible simultaneous liver and kidney transplant.Patient is in fair condition.     PLAN:  - Admit to transplant surgery  - Preop labs and viral serologies   - preop CXR   - T&S   - admission EKG   - Case request placed      Discussed with Dr. Snell.    Samson Coleman MD  PGY-1 General Surgery  Transplant Surgery [id number]       I have reviewed all laboratory and imaging results  ordered/pertinent for this encounter.     I saw and evaluated the patient. I personally obtained the key and critical portions of the history and physical exam for López Lopez or was physically present for key and critical portions performed by the resident/fellow. I reviewed the resident/fellow's documentation and discussed the patient with the resident/fellow. I agree with the resident/fellow's medical decision making as documented in the note.      There are no surgical or medical contraindications to proceeding with  donor organ transplant. The risks and benefits of the transplant procedure were reviewed with López Lopez.  Informed consent was obtained.  I have determined that it is safe to proceed with the planned transplant procedure.     Jason Snell MD

## 2025-04-02 NOTE — TELEPHONE ENCOUNTER
Called and spoke with patient, he is to start heading in to the hospital now, NPO now. He verbalized understanding.

## 2025-04-02 NOTE — SIGNIFICANT EVENT
Discussed indications/risks / benefits of dialysis with patient , Esau- wife,Mekhi-son.    All questions were answered. Patient and family verbally understood and want to proceed with dialysis.

## 2025-04-02 NOTE — TELEPHONE ENCOUNTER
On call liver txp coordinator notified by Lacy bro that patient is being admitted for potential OLT. Patient will be arriving in approximately 2.5 hour(s). Lacy coordinator to be notified with bed assignment. Direct admission order entered.

## 2025-04-02 NOTE — ANESTHESIA PREPROCEDURE EVALUATION
Patient: López Lopez    Procedure Information       Date/Time: 04/02/25 2135    Procedures:       TRANSPLANT, LIVER      TRANSPLANT, KIDNEY    Location: [facility] OR 15 / [facility] OR    Surgeons: Jason Snell MD        ALLERGIES:  No Known Allergies     MEDICAL HISTORY:  Past Medical History:   Diagnosis Date   • Alcoholic cirrhosis (Multi)    • Anticoagulated    • Ascites    • CKD (chronic kidney disease) stage 4, GFR 15-29 ml/min (Multi)    • Coronary artery disease    • Diabetes mellitus (Multi)    • Hyperlipidemia    • FORMAN (nonalcoholic steatohepatitis)    • Pancytopenia         Relevant Problems   Anesthesia (within normal limits)      Cardiac   (+) Coronary artery disease involving native coronary artery of native heart   (+) Mixed hyperlipidemia   (+) Paroxysmal atrial fibrillation (Multi)      /Renal   (+) Chronic renal impairment, stage 4 (severe) (Multi)      Liver   (+) Alcoholic cirrhosis of liver with ascites (Multi)   (+) Liver failure without hepatic coma (Multi)   (+) Metabolic dysfunction-associated steatohepatitis (MASH)      Endocrine   (+) Type 2 diabetes mellitus with diabetic chronic kidney disease   (+) Type 2 diabetes mellitus with hyperglycemia, without long-term current use of insulin      Hematology   (+) Acute idiopathic thrombocytopenic purpura (Multi)   (+) Anemia        SURGICAL HISTORY:  Past Surgical History:   Procedure Laterality Date   • CARDIAC CATHETERIZATION N/A 02/26/2025    Procedure: Left Heart Cath with Coronary Angiography and LV;  Surgeon: Cesilia Teresa MD;  Location: [facility];  Service: Cardiovascular;  Laterality: N/A;   • CORONARY ANGIOPLASTY WITH STENT PLACEMENT          MEDICATIONS:  Current Outpatient Medications   Medication Instructions   • apixaban (Eliquis) 5 mg tablet 1 tablet, 2 times daily (morning and late afternoon)   • atorvastatin (Lipitor) 10 mg tablet 1 tablet, Daily   • furosemide (Lasix) 20 mg tablet 1 tablet, 2  "times daily   • lactulose 20 g, oral, 2 times daily   • metFORMIN (Glucophage) 1,000 mg tablet 1 tablet, Every 12 hours   • traZODone (Desyrel) 50 mg tablet 1 tablet, Nightly   • Xifaxan 550 mg, oral, 2 times daily        VITALS:      4/2/2025     3:40 PM 4/2/2025     3:30 PM 2/26/2025     1:45 PM   Vitals   Systolic 122 122 100   Diastolic 74 74 56   BP Location Left arm Left arm    Heart Rate 72 85 74   Temp 36.6 °C (97.9 °F) 36.8 °C (98.2 °F)    Resp 17 16 16   Height 1.93 m (6' 4\")     Weight (lb) 247.7     BMI 30.15 kg/m2     BSA (m2) 2.45 m2         LABS:   BMP   Lab Results   Component Value Date    GLUCOSE 98 04/02/2025    CALCIUM 8.8 04/02/2025     (L) 04/02/2025    K 4.5 04/02/2025    CO2 18 (L) 04/02/2025     04/02/2025    BUN 67 (H) 04/02/2025    CREATININE 3.89 (H) 04/02/2025   , LFT   Lab Results   Component Value Date    ALT 18 04/02/2025    AST 31 04/02/2025    ALKPHOS 163 (H) 04/02/2025    BILITOT 1.8 (H) 04/02/2025   , CBC  Lab Results   Component Value Date    WBC 5.5 04/02/2025    HGB 8.2 (L) 04/02/2025    HCT 25.3 (L) 04/02/2025    MCV 94 04/02/2025    PLT 92 (L) 04/02/2025          , Coags   Lab Results   Component Value Date/Time    PROTIME 15.5 (H) 04/02/2025 1532    INR 1.4 (H) 04/02/2025 1532    APTT 35 04/02/2025 1532      , A1C   Lab Results   Component Value Date    HGBA1C 5.5 09/03/2024       IMAGES:  EKG          Encounter Date: 01/09/25   ECG 12 Lead   Result Value    Ventricular Rate 89    Atrial Rate 89    WV Interval 138    QRS Duration 88    QT Interval 362    QTC Calculation(Bazett) 440    P Axis 6    R Axis -14    T Axis 32    QRS Count 15    Q Onset 211    P Onset 142    P Offset 196    T Offset 392    QTC Fredericia 412    Narrative    Normal sinus rhythm  Low voltage QRS  Cannot rule out Anterior infarct , age undetermined  Abnormal ECG  No previous ECGs available  Confirmed by Ming Andrade ([id number]) on 1/13/2025 4:28:25 PM      , ECHO         Transthoracic Echo " (TTE) Complete With Contrast 01/08/2025    [facility], [address] [facility], [place] [id number]  Tel [phone] and Fax [phone]    TRANSTHORACIC ECHOCARDIOGRAM REPORT      Patient Name:       CHEMA MACIAS   Reading Physician:    [id number] Araiza MD  Study Date:         1/8/2025            Ordering Provider:    [id number] GRISELDA MARES  MRN/PID:            [id number]            Fellow:  Accession#:         [id number]        Nurse:                Clotilde Nagel RN  Date of Birth/Age:  1960 / 65 years Sonographer:          Mckayla DAMIAN  Gender assigned at                     Additional Staff:  Birth:  Height:             187.96 cm           Admit Date:  Weight:             125.19 kg           Admission Status:     Outpatient  BSA / BMI:          2.49 m2 / 35.44     Encounter#:           [id number]  kg/m2  Blood Pressure:     122/76 mmHg         Department Location:  [facility]    Study Type:    TRANSTHORACIC ECHO (TTE) COMPLETE  Diagnosis/ICD: Encounter for other preprocedural examination-Z01.818  Indication:    Pre-liver and kidney transplant  CPT Code:      Echo Complete w Full Doppler-46281    Patient History:  Diabetes:          Yes  Pertinent History: A-Fib, CAD and Hyperlipidemia.    Study Detail: The following Echo studies were performed: 2D, M-Mode, Doppler and  color flow. Image quality for this study is poor. Technically  challenging study due to body habitus. Definity used as a contrast  agent for endocardial border definition and agitated saline used  as a contrast agent for intraseptal flow evaluation. Total  contrast used for this procedure was 2 mL via IV push.      PHYSICIAN INTERPRETATION:  Left Ventricle: Left ventricular ejection fraction is hyperdynamic, calculated by Manzanares's biplane at 77%. There are no regional left ventricular wall motion abnormalities. The left ventricular cavity size is normal. There is normal septal and normal  posterior left ventricular wall thickness. Spectral Doppler shows a normal pattern of left ventricular diastolic filling.  Left Atrium: The left atrium is normal in size. A bubble study using agitated saline was performed. Bubble study is negative.  Right Ventricle: The right ventricle is normal in size. There is normal right ventricular global systolic function.  Right Atrium: The right atrium is normal in size.  Aortic Valve: The aortic valve is probably trileaflet. There is no evidence of aortic valve regurgitation. The peak instantaneous gradient of the aortic valve is 8 mmHg.  Mitral Valve: The mitral valve is normal in structure. There is no evidence of mitral valve regurgitation.  Tricuspid Valve: The tricuspid valve is structurally normal. There is trace tricuspid regurgitation. The Doppler estimated RVSP is within normal limits at 11.8 mmHg.  Pulmonic Valve: The pulmonic valve is structurally normal. There is physiologic pulmonic valve regurgitation.  Pericardium: There is no pericardial effusion noted.  Aorta: The aortic root is normal. The Ao Sinus is 3.70 cm. The Asc Ao is 4.00 cm.  Systemic Veins: The inferior vena cava appears normal in size, with IVC inspiratory collapse greater than 50%.  In comparison to the previous echocardiogram(s): There are no prior studies on this patient for comparison purposes.      CONCLUSIONS:  1. Left ventricular ejection fraction is hyperdynamic, calculated by Manzanares's biplane at 77%.  2. There is normal right ventricular global systolic function.  3. Right ventricular systolic pressure is within normal limits.    QUANTITATIVE DATA SUMMARY:    2D MEASUREMENTS:          Normal Ranges:  LAs:             3.36 cm  (2.7-4.0cm)  RVIDd:           3.09 cm  (0.9-3.6cm)  IVSd:            0.97 cm  (0.6-1.1cm)  LVPWd:           0.95 cm  (0.6-1.1cm)  LVIDd:           5.06 cm  (3.9-5.9cm)  LVIDs:           3.02 cm  LV Mass Index:   70 g/m2  LVEDV Index:     60 ml/m2  LV % FS           40.2 %      LA VOLUME:                    Normal Ranges:  LA Vol A4C:        70.2 ml    (22+/-6mL/m2)  LA Vol A2C:        70.9 ml  LA Vol BP:         71.1 ml  LA Vol Index A4C:  28.2ml/m2  LA Vol Index A2C:  28.4 ml/m2  LA Vol Index BP:   28.5 ml/m2  LA Area A4C:       21.5 cm2  LA Area A2C:       21.8 cm2  LA Major Axis A4C: 5.6 cm  LA Major Axis A2C: 5.7 cm  LA Volume Index:   28.5 ml/m2  LA Vol A4C:        61.4 ml  LA Vol A2C:        67.9 ml  LA Vol Index BSA:  25.9 ml/m2      RA VOLUME BY A/L METHOD:           Normal Ranges:  RA Vol A4C:              22.0 ml   (8.3-19.5ml)  RA Vol Index A4C:        8.8 ml/m2  RA Area A4C:             10.8 cm2  RA Major Axis A4C:       4.5 cm      AORTA MEASUREMENTS:         Normal Ranges:  Ao Sinus, d:        3.70 cm (2.1-3.5cm)  Asc Ao, d:          4.00 cm (2.1-3.4cm)      LV SYSTOLIC FUNCTION BY 2D PLANIMETRY (MOD):  Normal Ranges:  EF-A4C View:    77 % (>=55%)  EF-A2C View:    79 %  EF-Biplane:     77 %  LV EF Reported: 77 %      LV DIASTOLIC FUNCTION:             Normal Ranges:  MV Peak E:             0.82 m/s    (0.7-1.2 m/s)  MV Peak A:             0.93 m/s    (0.42-0.7 m/s)  E/A Ratio:             0.87        (1.0-2.2)  MV e'                  0.158 m/s   (>8.0)  MV lateral e'          0.17 m/s  MV medial e'           0.14 m/s  MV A Dur:              139.87 msec  E/e' Ratio:            5.15        (<8.0)  MV DT:                 295 msec    (150-240 msec)  PulmV Sys Mark:         86.55 cm/s  PulmV Lin Mark:        63.51 cm/s  PulmV S/D Mark:         1.36  PulmV A Revs Mark:      35.83 cm/s  PulmV A Revs Dur:      119.89 msec      MITRAL VALVE:          Normal Ranges:  MV DT:        295 msec (150-240msec)      AORTIC VALVE:           Normal Ranges:  AoV Vmax:      1.45 m/s (<=1.7m/s)  AoV Peak P.4 mmHg (<20mmHg)  LVOT Max Mark:  1.24 m/s (<=1.1m/s)  LVOT VTI:      28.85 cm  LVOT Diameter: 2.34 cm  (1.8-2.4cm)  AoV Area,Vmax: 3.68 cm2 (2.5-4.5cm2)      RIGHT VENTRICLE:  RV  Basal 4.50 cm  RV Mid   3.10 cm  RV Major 8.4 cm  TAPSE:   24.0 mm  RV s'    0.18 m/s      TRICUSPID VALVE/RVSP:          Normal Ranges:  Peak TR Velocity:     1.48 m/s  Est. RA Pressure:     3 mmHg  RV Syst Pressure:     12 mmHg  (< 30mmHg)  IVC Diam:             1.90 cm      PULMONIC VALVE:          Normal Ranges:  PV Accel Time:  88 msec  (>120ms)  PV Max Mark:     1.2 m/s  (0.6-0.9m/s)  PV Max P.3 mmHg      Pulmonary Veins:  PulmV A Revs Dur: 119.89 msec  PulmV A Revs Mark: 35.83 cm/s  PulmV Lin Mark:   63.51 cm/s  PulmV S/D Mark:    1.36  PulmV Sys Mark:    86.55 cm/s      AORTA:  Asc Ao Diam 3.98 cm      [id number] Handy Araiza MD  Electronically signed on 2025 at 8:04:28 AM        ** Final **    , CARDIAC CATH        Left Heart Cath with Coronary Angiography and LV 2025    [facility], Cath Lab, [address] [place] [address]    Cardiovascular Catheterization Report    Patient Name:      CHEMA MACIAS  Performing Physician:  [id number] Teresa MD  Study Date:        2025          Verifying Physician:   Kaylee Teresa MD  MRN/PID:           [id number]           Cardiologist/Co-Scrub:  Accession#:        [id number]       Ordering Provider:     [id number] HOPE  Date of Birth/Age: 1960      Cardiologist:  reinaldo  Gender:            M                  Fellow:  Encounter#:        [id number]         Surgeon:      Study: Left Heart Cath      Indications:  CHEMA MACIAS is a 65 year old male who presents with diabetes, dyslipidemia, Advanced CKD and liver cirrhosis and an asymptomatic chest pain assessment. Pre-operative evaluation.    Procedure Description:  After infiltration with 2% Lidocaine, the right radial artery was cannulated with a modified Seldinger technique. Subsequently a 6 Liberian sheath was placed in the right radial artery. Selective coronary catheterization was performed using a 5 Fr catheter(s) exchanged over a guide wire to  cannulate the coronary arteries. A JL 3.5 tip catheter was used for left coronary injections. A JR 4 tip catheter was used for right coronary injections.  Multiple injections of contrast were made into the left and right coronary arteries with angiograms recorded in multiple projections.    Coronary Angiography:  The coronary circulation is right dominant.    Left Main Coronary Artery:  The left main coronary artery is a normal caliber vessel. The left main arises normally from the left coronary sinus of Valsalva and bifurcates into the LAD and circumflex coronary arteries. The left main coronary artery showed no significant disease or stenosis greater than 30%.    Left Anterior Descending Coronary Artery Distribution:  The left anterior descending coronary artery is a normal caliber vessel. The LAD arises normally from the left main coronary artery. The LAD demonstrated no significant disease or stenosis greater than 30%. The mid LAD stent has only minimal to mild ISR and it appears undersized but is widely patent.  The 1st diagonal branch showed no significant disease or stenosis greater than 30%. The 2nd diagonal branch demonstrated no significant disease or stenosis greater than 30%. The 3rd diagonal branch revealed no significant disease or stenosis greater than 30%.    Circumflex Coronary Artery Distribution:  The circumflex coronary artery is a normal caliber vessel. The circumflex arises normally from the left main coronary artery and terminates in the AV groove. The circumflex revealed no significant disease or stenosis greater than 30%. The 1st obtuse marginal branch showed no significant disease or stenosis greater than 30%. The 2nd obtuse marginal branch demonstrated no significant disease or stenosis greater than 30%.    Right Coronary Artery Distribution:    The right coronary artery is a normal caliber vessel. The RCA arises normally from the right sinus of Valsalva. The RCA showed no significant  disease or stenosis greater than 30%. The acute marginal branch showed no significant disease or stenosis greater than 30%.  The right posterolateral branch showed no significant disease or stenosis greater than 30%. The right posterior descending artery showed no significant disease or stenosis greater than 30%.    Hemo Personnel:  +----------------+---------+  Name            Duty       +----------------+---------+  Cesilia Teresa MD 1  +----------------+---------+      Hemodynamic Pressures:    +----+---------------------+----------+-------------+--------------+---------+  Site      Date Time      Phase NameSystolic mmHgDiastolic mmHgMean mmHg  +----+---------------------+----------+-------------+--------------+---------+    AO2/26/2025 11:05:05 AM  AIR REST           73            60       65  +----+---------------------+----------+-------------+--------------+---------+    AO2/26/2025 11:10:19 AM  AIR REST           94            45       60  +----+---------------------+----------+-------------+--------------+---------+      Complications:  No in-lab complications observed.    Cardiac Cath Post Procedure Notes:  Post Procedure Diagnosis: See below.  Blood Loss:               Estimated blood loss during the procedure was 5 mls.  Specimens Removed:        Number of specimen(s) removed: none.    ____________________________________________________________________________________  CONCLUSIONS:  1. Mild CAD with patent mid LAD stent in a right_dominant system. The mid LAD stent has only minimal to mild ISR and it appears undersized but is widely patent.  2. We only used 12 mL of contrast.  3. Okay to resume home Eliquis tomorrow morning.  4. Follow-up with Dr. Bennett in the clinic and renal/ liver transplant teams.    ICD 10 Codes:  End stage renal disease (ESRD)-N18.6    CPT Codes:  Coronary Angiography ([facility])-39323    [id number] Cesilia Teresa MD  Performing Physician  Electronically  "signed by [id number] Cesilia Teresa MD on 2/26/2025 at 11:55:59 AM          ** Final **   , CXR       XR chest 1 view 04/02/2025    Narrative  Interpreted By:  Daniele Mike and Stevens Alex  STUDY:  XR CHEST 1 VIEW;  4/2/2025 4:15 pm    INDICATION:  Signs/Symptoms:LIver Transplant Protocol.      COMPARISON:  XR chest 07/05/2024    ACCESSION NUMBER(S):  [id number]    ORDERING CLINICIAN:  RICKY WALKER    FINDINGS:  AP radiograph of the chest was provided.        CARDIOMEDIASTINAL SILHOUETTE:  Cardiomediastinal silhouette is normal in size and configuration.    LUNGS:  Mild blunting of the left costophrenic angle consistent with a small  pleural effusion or nonspecific pleural thickening. No focal  consolidations. No pneumothorax.    ABDOMEN:  No remarkable upper abdominal findings.    BONES:  No acute osseous changes.    Impression  1.  Mild blunting of the left costophrenic angle which is slightly  more prominent than on the prior study. No focal consolidations or  pneumothorax.    I personally reviewed the images/study and I agree with the findings  as stated by John Martin DO PGY-2. This study was interpreted at  [place], [place].    MACRO:  None    Signed by: Daniele Mike 4/2/2025 4:33 PM  Dictation workstation:   [id number]    , CT Head/Neck       CT head wo IV contrast 05/19/2024    Narrative  EXAMINATION:  CT OF THE HEAD WITHOUT CONTRAST  5/19/2024 1:21 am    TECHNIQUE:  CT of the head was performed without the administration of intravenous  contrast. Automated exposure control, iterative reconstruction, and/or weight  based adjustment of the mA/kV was utilized to reduce the radiation dose to as  low as reasonably achievable.    COMPARISON:  None.    HISTORY:  ORDERING SYSTEM PROVIDED HISTORY: platelets 3 on eliquis  TECHNOLOGIST PROVIDED HISTORY:  Has a \"code stroke\" or \"stroke alert\" been called?->Yes  Reason for exam:->platelets 3 on eliquis  Decision " Support Exception - unselect if not a suspected or confirmed  emergency medical condition->Emergency Medical Condition (MA)    FINDINGS:  BRAIN/VENTRICLES: There is no acute intracranial hemorrhage, mass effect or  midline shift.  No abnormal extra-axial fluid collection.  The gray-white  differentiation is maintained without evidence of an acute infarct.  There is  no evidence of hydrocephalus.    ORBITS: The visualized portion of the orbits demonstrate no acute abnormality.    SINUSES: The visualized paranasal sinuses and mastoid air cells demonstrate  no acute abnormality.    SOFT TISSUES/SKULL:  No acute abnormality of the visualized skull or soft  tissues.    Impression  No acute intracranial abnormality.    , CT Chest        CT chest wo IV contrast 07/05/2024    Narrative  EXAMINATION:  CT OF THE CHEST WITHOUT CONTRAST 7/5/2024 11:25 pm    TECHNIQUE:  CT of the chest was performed without the administration of intravenous  contrast. Multiplanar reformatted images are provided for review. Automated  exposure control, iterative reconstruction, and/or weight based adjustment of  the mA/kV was utilized to reduce the radiation dose to as low as reasonably  achievable.    COMPARISON:  None.    HISTORY:  ORDERING SYSTEM PROVIDED HISTORY: sob hyponatremia  TECHNOLOGIST PROVIDED HISTORY:  Reason for exam:->sob hyponatremia  Decision Support Exception - unselect if not a suspected or confirmed  emergency medical condition->Emergency Medical Condition (MA)    FINDINGS:  Mediastinum: Normal caliber and contour of the thoracic aorta.  No  mediastinal adenopathy.  No pericardial effusion.    Lungs/pleura: No pulmonary infiltrate or effusion.  No pneumothorax.  Mild  basilar atelectasis.    Upper Abdomen: Please see accompanying report.    Soft Tissues/Bones: No acute osseous thoracic or body wall soft tissue  abnormalities.    Impression  No acute cardiopulmonary process. Mild basilar atelectasis.   , CT Abdomin       CT  abdomen pelvis w IV contrast 07/05/2024    Narrative  EXAMINATION:  CT OF THE ABDOMEN AND PELVIS WITH CONTRAST 7/5/2024 11:25 pm    TECHNIQUE:  CT of the abdomen and pelvis was performed with the administration of  intravenous contrast. Multiplanar reformatted images are provided for review.  Automated exposure control, iterative reconstruction, and/or weight based  adjustment of the mA/kV was utilized to reduce the radiation dose to as low  as reasonably achievable.    COMPARISON:  None.    HISTORY:  ORDERING SYSTEM PROVIDED HISTORY: abdominal pain, bloating  TECHNOLOGIST PROVIDED HISTORY:  Additional Contrast?->None  Reason for exam:->abdominal pain, bloating  Decision Support Exception - unselect if not a suspected or confirmed  emergency medical condition->Emergency Medical Condition (MA)    FINDINGS:  Lower Chest: No infiltrate or effusion.    Organs: Nodular surface contour of the liver compatible with hepatic  cirrhosis.  Portal vein patent.  Mild splenomegaly, 16.1 cm.  Gallbladder,  pancreas, bilateral kidneys and adrenal glands are otherwise unremarkable.    GI/Bowel:  No ileus or obstruction. No inflammatory bowel process. Normal  appendix right lower quadrant.    Pelvis: No adenopathy or mass.    Peritoneum/Retroperitoneum: Small/moderate abdominopelvic ascites.  Small,  fatty umbilical hernia.  Normal caliber and contour of the aorta.  Perisplenic collaterals related to spleno renal shunt noted.    Bones/Soft Tissues: No acute osseous or body wall soft tissue abnormalities.    Impression  1. Hepatic cirrhosis with mild splenomegaly and small/moderate abdominopelvic  ascites.  The portal vein appears patent.  2. Perisplenic collaterals related to spleno renal shunt noted.    SOCIAL:  Social History     Tobacco Use   Smoking Status Former   • Types: Cigarettes   Smokeless Tobacco Never      Social History     Substance and Sexual Activity   Alcohol Use Not Currently    Comment: occ      Social History      Substance and Sexual Activity   Drug Use Not Currently        NPO STATUS:  No data recorded    Clinical Areas Reviewed:    Allergies                Anesthesia Assessment:    Physical Exam    Airway  Mallampati: III  TM distance: >3 FB  Neck ROM: full     Cardiovascular - normal exam     Dental    Pulmonary - normal exam     Abdominal - normal exam           Anesthesia Plan    History of general anesthesia?: yes  History of complications of general anesthesia?: no    ASA 4     general   (RSI and intubation. RIJ Mac with PAC, RIJ trialysis with intra-operative CVVH. )  The patient is not a current smoker.    intravenous induction   Postoperative administration of opioids is intended.  Anesthetic plan and risks discussed with patient.  Use of blood products discussed with patient and spouse who.    Plan discussed with CAA and attending.

## 2025-04-02 NOTE — CARE PLAN
The patient's goals for the shift include  assist when needed    The clinical goals for the shift include will remain safe and free from injury throughout shift  Problem: Diabetes  Goal: Achieve decreasing blood glucose levels by end of shift  Outcome: Progressing  Goal: Increase stability of blood glucose readings by end of shift  Outcome: Progressing  Goal: Decrease in ketones present in urine by end of shift  Outcome: Progressing  Goal: Maintain electrolyte levels within acceptable range throughout shift  Outcome: Progressing  Goal: Maintain glucose levels >70mg/dl to <250mg/dl throughout shift  Outcome: Progressing  Goal: No changes in neurological exam by end of shift  Outcome: Progressing  Goal: Learn about and adhere to nutrition recommendations by end of shift  Outcome: Progressing  Goal: Vital signs within normal range for age by end of shift  Outcome: Progressing  Goal: Increase self care and/or family involovement by end of shift  Outcome: Progressing  Goal: Receive DSME education by end of shift  Outcome: Progressing     Problem: Pain - Adult  Goal: Verbalizes/displays adequate comfort level or baseline comfort level  Outcome: Progressing     Problem: Safety - Adult  Goal: Free from fall injury  Outcome: Progressing     Problem: Discharge Planning  Goal: Discharge to home or other facility with appropriate resources  Outcome: Progressing     Problem: Chronic Conditions and Co-morbidities  Goal: Patient's chronic conditions and co-morbidity symptoms are monitored and maintained or improved  Outcome: Progressing     Problem: Nutrition  Goal: Nutrient intake appropriate for maintaining nutritional needs  Outcome: Progressing     Problem: Skin  Goal: Decreased wound size/increased tissue granulation at next dressing change  Outcome: Progressing  Goal: Participates in plan/prevention/treatment measures  Outcome: Progressing  Goal: Prevent/manage excess moisture  Outcome: Progressing  Goal: Prevent/minimize  sheer/friction injuries  Outcome: Progressing  Goal: Promote/optimize nutrition  Outcome: Progressing  Goal: Promote skin healing  Outcome: Progressing     Problem: Fall/Injury  Goal: Not fall by end of shift  Outcome: Progressing  Goal: Be free from injury by end of the shift  Outcome: Progressing  Goal: Verbalize understanding of personal risk factors for fall in the hospital  Outcome: Progressing  Goal: Verbalize understanding of risk factor reduction measures to prevent injury from fall in the home  Outcome: Progressing  Goal: Use assistive devices by end of the shift  Outcome: Progressing  Goal: Pace activities to prevent fatigue by end of the shift  Outcome: Progressing

## 2025-04-03 ENCOUNTER — APPOINTMENT (OUTPATIENT)
Dept: RADIOLOGY | Facility: HOSPITAL | Age: 65
DRG: 005 | End: 2025-04-03
Payer: COMMERCIAL

## 2025-04-03 ENCOUNTER — DOCUMENTATION (OUTPATIENT)
Dept: TRANSPLANT | Facility: HOSPITAL | Age: 65
End: 2025-04-03
Payer: COMMERCIAL

## 2025-04-03 ENCOUNTER — PREP FOR PROCEDURE (OUTPATIENT)
Facility: HOSPITAL | Age: 65
End: 2025-04-03
Payer: COMMERCIAL

## 2025-04-03 ENCOUNTER — SURGERY (OUTPATIENT)
Age: 65
End: 2025-04-03
Payer: COMMERCIAL

## 2025-04-03 ENCOUNTER — ANESTHESIA EVENT (OUTPATIENT)
Dept: OPERATING ROOM | Facility: HOSPITAL | Age: 65
End: 2025-04-03
Payer: COMMERCIAL

## 2025-04-03 ENCOUNTER — ANESTHESIA (OUTPATIENT)
Dept: OPERATING ROOM | Facility: HOSPITAL | Age: 65
End: 2025-04-03
Payer: COMMERCIAL

## 2025-04-03 DIAGNOSIS — N18.4 CKD (CHRONIC KIDNEY DISEASE) STAGE 4, GFR 15-29 ML/MIN (MULTI): Primary | ICD-10-CM

## 2025-04-03 LAB
ALBUMIN SERPL BCP-MCNC: 2.7 G/DL (ref 3.4–5)
ALBUMIN SERPL BCP-MCNC: 2.7 G/DL (ref 3.4–5)
ALBUMIN SERPL BCP-MCNC: 2.8 G/DL (ref 3.4–5)
ALBUMIN SERPL BCP-MCNC: 2.9 G/DL (ref 3.4–5)
ALBUMIN SERPL BCP-MCNC: 3 G/DL (ref 3.4–5)
ALBUMIN SERPL BCP-MCNC: 3 G/DL (ref 3.4–5)
ALBUMIN SERPL BCP-MCNC: 3.1 G/DL (ref 3.4–5)
ALBUMIN SERPL BCP-MCNC: 3.1 G/DL (ref 3.4–5)
ALBUMIN SERPL BCP-MCNC: 3.3 G/DL (ref 3.4–5)
ALP SERPL-CCNC: 42 U/L (ref 33–136)
ALP SERPL-CCNC: 42 U/L (ref 33–136)
ALP SERPL-CCNC: 57 U/L (ref 33–136)
ALP SERPL-CCNC: 62 U/L (ref 33–136)
ALP SERPL-CCNC: 64 U/L (ref 33–136)
ALP SERPL-CCNC: 78 U/L (ref 33–136)
ALT SERPL W P-5'-P-CCNC: 398 U/L (ref 10–52)
ALT SERPL W P-5'-P-CCNC: 419 U/L (ref 10–52)
ALT SERPL W P-5'-P-CCNC: 606 U/L (ref 10–52)
ALT SERPL W P-5'-P-CCNC: 738 U/L (ref 10–52)
ALT SERPL W P-5'-P-CCNC: 748 U/L (ref 10–52)
ALT SERPL W P-5'-P-CCNC: 770 U/L (ref 10–52)
ANION GAP BLDA CALCULATED.4IONS-SCNC: 10 MMO/L (ref 10–25)
ANION GAP BLDA CALCULATED.4IONS-SCNC: 11 MMO/L (ref 10–25)
ANION GAP BLDA CALCULATED.4IONS-SCNC: 12 MMO/L (ref 10–25)
ANION GAP BLDA CALCULATED.4IONS-SCNC: 13 MMO/L (ref 10–25)
ANION GAP BLDA CALCULATED.4IONS-SCNC: 14 MMO/L (ref 10–25)
ANION GAP BLDA CALCULATED.4IONS-SCNC: 16 MMO/L (ref 10–25)
ANION GAP BLDA CALCULATED.4IONS-SCNC: 17 MMO/L (ref 10–25)
ANION GAP BLDA CALCULATED.4IONS-SCNC: 9 MMO/L (ref 10–25)
ANION GAP BLDA CALCULATED.4IONS-SCNC: ABNORMAL MMOL/L
ANION GAP SERPL CALC-SCNC: 13 MMOL/L (ref 10–20)
ANION GAP SERPL CALC-SCNC: 14 MMOL/L (ref 10–20)
ANION GAP SERPL CALC-SCNC: 14 MMOL/L (ref 10–20)
ANION GAP SERPL CALC-SCNC: 15 MMOL/L (ref 10–20)
ANION GAP SERPL CALC-SCNC: 16 MMOL/L (ref 10–20)
ANION GAP SERPL CALC-SCNC: 21 MMOL/L (ref 10–20)
APTT PPP: 30 SECONDS (ref 26–36)
APTT PPP: 30 SECONDS (ref 26–36)
APTT PPP: 31 SECONDS (ref 26–36)
APTT PPP: 31 SECONDS (ref 26–36)
APTT PPP: 35 SECONDS (ref 26–36)
APTT PPP: 47 SECONDS (ref 26–36)
AST SERPL W P-5'-P-CCNC: 248 U/L (ref 9–39)
AST SERPL W P-5'-P-CCNC: 311 U/L (ref 9–39)
AST SERPL W P-5'-P-CCNC: 484 U/L (ref 9–39)
AST SERPL W P-5'-P-CCNC: 581 U/L (ref 9–39)
AST SERPL W P-5'-P-CCNC: 648 U/L (ref 9–39)
AST SERPL W P-5'-P-CCNC: 690 U/L (ref 9–39)
BASE EXCESS BLDA CALC-SCNC: -2 MMOL/L (ref -2–3)
BASE EXCESS BLDA CALC-SCNC: -2.1 MMOL/L (ref -2–3)
BASE EXCESS BLDA CALC-SCNC: -2.3 MMOL/L (ref -2–3)
BASE EXCESS BLDA CALC-SCNC: -2.5 MMOL/L (ref -2–3)
BASE EXCESS BLDA CALC-SCNC: -2.6 MMOL/L (ref -2–3)
BASE EXCESS BLDA CALC-SCNC: -2.7 MMOL/L (ref -2–3)
BASE EXCESS BLDA CALC-SCNC: -2.8 MMOL/L (ref -2–3)
BASE EXCESS BLDA CALC-SCNC: -2.9 MMOL/L (ref -2–3)
BASE EXCESS BLDA CALC-SCNC: -3.4 MMOL/L (ref -2–3)
BASE EXCESS BLDA CALC-SCNC: -3.9 MMOL/L (ref -2–3)
BASE EXCESS BLDA CALC-SCNC: -4.1 MMOL/L (ref -2–3)
BASE EXCESS BLDA CALC-SCNC: -4.8 MMOL/L (ref -2–3)
BASE EXCESS BLDA CALC-SCNC: -5.8 MMOL/L (ref -2–3)
BASE EXCESS BLDA CALC-SCNC: -5.9 MMOL/L (ref -2–3)
BASE EXCESS BLDA CALC-SCNC: -7.7 MMOL/L (ref -2–3)
BASE EXCESS BLDA CALC-SCNC: -7.8 MMOL/L (ref -2–3)
BILIRUB DIRECT SERPL-MCNC: 0.9 MG/DL (ref 0–0.3)
BILIRUB DIRECT SERPL-MCNC: 1.2 MG/DL (ref 0–0.3)
BILIRUB DIRECT SERPL-MCNC: 1.6 MG/DL (ref 0–0.3)
BILIRUB DIRECT SERPL-MCNC: 2.2 MG/DL (ref 0–0.3)
BILIRUB DIRECT SERPL-MCNC: 3.1 MG/DL (ref 0–0.3)
BILIRUB DIRECT SERPL-MCNC: 3.4 MG/DL (ref 0–0.3)
BILIRUB SERPL-MCNC: 1.7 MG/DL (ref 0–1.2)
BILIRUB SERPL-MCNC: 2.1 MG/DL (ref 0–1.2)
BILIRUB SERPL-MCNC: 2.2 MG/DL (ref 0–1.2)
BILIRUB SERPL-MCNC: 2.9 MG/DL (ref 0–1.2)
BILIRUB SERPL-MCNC: 4 MG/DL (ref 0–1.2)
BILIRUB SERPL-MCNC: 4.4 MG/DL (ref 0–1.2)
BLOOD EXPIRATION DATE: NORMAL
BODY TEMPERATURE: 37 DEGREES CELSIUS
BODY TEMPERATURE: ABNORMAL
BODY TEMPERATURE: ABNORMAL
BUN SERPL-MCNC: 52 MG/DL (ref 6–23)
BUN SERPL-MCNC: 54 MG/DL (ref 6–23)
BUN SERPL-MCNC: 54 MG/DL (ref 6–23)
BUN SERPL-MCNC: 55 MG/DL (ref 6–23)
BUN SERPL-MCNC: 56 MG/DL (ref 6–23)
BUN SERPL-MCNC: 56 MG/DL (ref 6–23)
CA-I BLD-SCNC: 1.07 MMOL/L (ref 1.1–1.33)
CA-I BLD-SCNC: 1.09 MMOL/L (ref 1.1–1.33)
CA-I BLD-SCNC: 1.1 MMOL/L (ref 1.1–1.33)
CA-I BLD-SCNC: 1.14 MMOL/L (ref 1.1–1.33)
CA-I BLD-SCNC: 1.17 MMOL/L (ref 1.1–1.33)
CA-I BLDA-SCNC: 1.07 MMOL/L (ref 1.1–1.33)
CA-I BLDA-SCNC: 1.08 MMOL/L (ref 1.1–1.33)
CA-I BLDA-SCNC: 1.12 MMOL/L (ref 1.1–1.33)
CA-I BLDA-SCNC: 1.13 MMOL/L (ref 1.1–1.33)
CA-I BLDA-SCNC: 1.13 MMOL/L (ref 1.1–1.33)
CA-I BLDA-SCNC: 1.14 MMOL/L (ref 1.1–1.33)
CA-I BLDA-SCNC: 1.15 MMOL/L (ref 1.1–1.33)
CA-I BLDA-SCNC: 1.16 MMOL/L (ref 1.1–1.33)
CA-I BLDA-SCNC: 1.17 MMOL/L (ref 1.1–1.33)
CA-I BLDA-SCNC: 1.17 MMOL/L (ref 1.1–1.33)
CA-I BLDA-SCNC: 1.18 MMOL/L (ref 1.1–1.33)
CA-I BLDA-SCNC: 1.18 MMOL/L (ref 1.1–1.33)
CA-I BLDA-SCNC: 1.21 MMOL/L (ref 1.1–1.33)
CA-I BLDA-SCNC: 1.21 MMOL/L (ref 1.1–1.33)
CA-I BLDA-SCNC: 1.26 MMOL/L (ref 1.1–1.33)
CALCIUM SERPL-MCNC: 7.8 MG/DL (ref 8.6–10.6)
CALCIUM SERPL-MCNC: 8 MG/DL (ref 8.6–10.6)
CALCIUM SERPL-MCNC: 8.1 MG/DL (ref 8.6–10.6)
CALCIUM SERPL-MCNC: 8.2 MG/DL (ref 8.6–10.6)
CALCIUM SERPL-MCNC: 8.3 MG/DL (ref 8.6–10.6)
CALCIUM SERPL-MCNC: 8.5 MG/DL (ref 8.6–10.6)
CFT FORM KAOLIN IND BLD RES TEG: 2.3 MIN (ref 0.8–2.1)
CFT FORM KAOLIN IND BLD RES TEG: 2.4 MIN (ref 0.8–2.1)
CFT FORM KAOLIN IND BLD RES TEG: 2.4 MIN (ref 0.8–2.1)
CFT FORM KAOLIN IND BLD RES TEG: 2.9 MIN (ref 0.8–2.1)
CHLORIDE BLDA-SCNC: 103 MMOL/L (ref 98–107)
CHLORIDE BLDA-SCNC: 104 MMOL/L (ref 98–107)
CHLORIDE BLDA-SCNC: 105 MMOL/L (ref 98–107)
CHLORIDE BLDA-SCNC: 107 MMOL/L (ref 98–107)
CHLORIDE BLDA-SCNC: ABNORMAL MMOL/L
CHLORIDE SERPL-SCNC: 103 MMOL/L (ref 98–107)
CHLORIDE SERPL-SCNC: 104 MMOL/L (ref 98–107)
CHLORIDE SERPL-SCNC: 105 MMOL/L (ref 98–107)
CHLORIDE SERPL-SCNC: 106 MMOL/L (ref 98–107)
CLOT ANGLE.KAOLIN INDUCED BLD RES TEG: 62 DEG (ref 63–78)
CLOT ANGLE.KAOLIN INDUCED BLD RES TEG: 64 DEG (ref 63–78)
CLOT INIT KAO IND P HEP NEUT BLD RES TEG: 6.1 MIN (ref 4.6–9.1)
CLOT INIT KAO IND P HEP NEUT BLD RES TEG: 6.4 MIN (ref 4.6–9.1)
CLOT INIT KAO IND P HEP NEUT BLD RES TEG: 6.7 MIN (ref 4.3–8.3)
CLOT INIT KAO IND P HEP NEUT BLD RES TEG: 6.7 MIN (ref 4.6–9.1)
CLOT INIT KAO IND P HEP NEUT BLD RES TEG: 7.4 MIN (ref 4.6–9.1)
CLOT INIT KAO IND P HEP NEUT BLD RES TEG: 7.6 MIN (ref 4.3–8.3)
CLOT INIT KAO IND P HEP NEUT BLD RES TEG: 7.7 MIN (ref 4.3–8.3)
CLOT INIT KAO IND P HEP NEUT BLD RES TEG: 7.7 MIN (ref 4.3–8.3)
CLOT INIT KAO IND P HEP NEUT BLD RES TEG: 7.8 MIN (ref 4.6–9.1)
CO2 SERPL-SCNC: 18 MMOL/L (ref 21–32)
CO2 SERPL-SCNC: 21 MMOL/L (ref 21–32)
CO2 SERPL-SCNC: 22 MMOL/L (ref 21–32)
CO2 SERPL-SCNC: 23 MMOL/L (ref 21–32)
CO2 SERPL-SCNC: 23 MMOL/L (ref 21–32)
CO2 SERPL-SCNC: 24 MMOL/L (ref 21–32)
CREAT SERPL-MCNC: 2.64 MG/DL (ref 0.5–1.3)
CREAT SERPL-MCNC: 2.69 MG/DL (ref 0.5–1.3)
CREAT SERPL-MCNC: 2.93 MG/DL (ref 0.5–1.3)
CREAT SERPL-MCNC: 2.96 MG/DL (ref 0.5–1.3)
CREAT SERPL-MCNC: 3.01 MG/DL (ref 0.5–1.3)
CREAT SERPL-MCNC: 3.05 MG/DL (ref 0.5–1.3)
DISPENSE STATUS: NORMAL
EGFRCR SERPLBLD CKD-EPI 2021: 22 ML/MIN/1.73M*2
EGFRCR SERPLBLD CKD-EPI 2021: 23 ML/MIN/1.73M*2
EGFRCR SERPLBLD CKD-EPI 2021: 26 ML/MIN/1.73M*2
EGFRCR SERPLBLD CKD-EPI 2021: 26 ML/MIN/1.73M*2
ERYTHROCYTE [DISTWIDTH] IN BLOOD BY AUTOMATED COUNT: 14.9 % (ref 11.5–14.5)
ERYTHROCYTE [DISTWIDTH] IN BLOOD BY AUTOMATED COUNT: 15.1 % (ref 11.5–14.5)
ERYTHROCYTE [DISTWIDTH] IN BLOOD BY AUTOMATED COUNT: 15.4 % (ref 11.5–14.5)
ERYTHROCYTE [DISTWIDTH] IN BLOOD BY AUTOMATED COUNT: 15.6 % (ref 11.5–14.5)
ERYTHROCYTE [DISTWIDTH] IN BLOOD BY AUTOMATED COUNT: 15.9 % (ref 11.5–14.5)
ERYTHROCYTE [DISTWIDTH] IN BLOOD BY AUTOMATED COUNT: 16 % (ref 11.5–14.5)
FIBRINOGEN BLD CALC-MCNC: 208 MG/DL (ref 278–581)
FIBRINOGEN BLD CALC-MCNC: 219 MG/DL (ref 278–581)
FIBRINOGEN BLD CALC-MCNC: 252 MG/DL (ref 278–581)
FIBRINOGEN BLD CALC-MCNC: 257 MG/DL (ref 278–581)
FIBRINOGEN PPP-MCNC: 118 MG/DL (ref 200–400)
FIBRINOGEN PPP-MCNC: 172 MG/DL (ref 200–400)
FIBRINOGEN PPP-MCNC: 185 MG/DL (ref 200–400)
FIBRINOGEN PPP-MCNC: 194 MG/DL (ref 200–400)
FIBRINOGEN PPP-MCNC: 197 MG/DL (ref 200–400)
GGT SERPL-CCNC: 30 U/L (ref 5–64)
GGT SERPL-CCNC: 46 U/L (ref 5–64)
GGT SERPL-CCNC: 48 U/L (ref 5–64)
GGT SERPL-CCNC: 53 U/L (ref 5–64)
GGT SERPL-CCNC: 53 U/L (ref 5–64)
GLUCOSE BLD MANUAL STRIP-MCNC: 119 MG/DL (ref 74–99)
GLUCOSE BLD MANUAL STRIP-MCNC: 130 MG/DL (ref 74–99)
GLUCOSE BLD MANUAL STRIP-MCNC: 131 MG/DL (ref 74–99)
GLUCOSE BLD MANUAL STRIP-MCNC: 134 MG/DL (ref 74–99)
GLUCOSE BLD MANUAL STRIP-MCNC: 141 MG/DL (ref 74–99)
GLUCOSE BLD MANUAL STRIP-MCNC: 164 MG/DL (ref 74–99)
GLUCOSE BLD MANUAL STRIP-MCNC: 191 MG/DL (ref 74–99)
GLUCOSE BLDA-MCNC: 122 MG/DL (ref 74–99)
GLUCOSE BLDA-MCNC: 126 MG/DL (ref 74–99)
GLUCOSE BLDA-MCNC: 136 MG/DL (ref 74–99)
GLUCOSE BLDA-MCNC: 142 MG/DL (ref 74–99)
GLUCOSE BLDA-MCNC: 146 MG/DL (ref 74–99)
GLUCOSE BLDA-MCNC: 149 MG/DL (ref 74–99)
GLUCOSE BLDA-MCNC: 154 MG/DL (ref 74–99)
GLUCOSE BLDA-MCNC: 162 MG/DL (ref 74–99)
GLUCOSE BLDA-MCNC: 169 MG/DL (ref 74–99)
GLUCOSE BLDA-MCNC: 177 MG/DL (ref 74–99)
GLUCOSE BLDA-MCNC: 188 MG/DL (ref 74–99)
GLUCOSE BLDA-MCNC: 193 MG/DL (ref 74–99)
GLUCOSE BLDA-MCNC: 193 MG/DL (ref 74–99)
GLUCOSE BLDA-MCNC: 197 MG/DL (ref 74–99)
GLUCOSE BLDA-MCNC: 208 MG/DL (ref 74–99)
GLUCOSE SERPL-MCNC: 133 MG/DL (ref 74–99)
GLUCOSE SERPL-MCNC: 142 MG/DL (ref 74–99)
GLUCOSE SERPL-MCNC: 157 MG/DL (ref 74–99)
GLUCOSE SERPL-MCNC: 182 MG/DL (ref 74–99)
GLUCOSE SERPL-MCNC: 204 MG/DL (ref 74–99)
GLUCOSE SERPL-MCNC: 212 MG/DL (ref 74–99)
HCO3 BLDA-SCNC: 17.4 MMOL/L (ref 22–26)
HCO3 BLDA-SCNC: 18.3 MMOL/L (ref 22–26)
HCO3 BLDA-SCNC: 19.4 MMOL/L (ref 22–26)
HCO3 BLDA-SCNC: 20.2 MMOL/L (ref 22–26)
HCO3 BLDA-SCNC: 20.5 MMOL/L (ref 22–26)
HCO3 BLDA-SCNC: 20.9 MMOL/L (ref 22–26)
HCO3 BLDA-SCNC: 21.3 MMOL/L (ref 22–26)
HCO3 BLDA-SCNC: 21.6 MMOL/L (ref 22–26)
HCO3 BLDA-SCNC: 21.7 MMOL/L (ref 22–26)
HCO3 BLDA-SCNC: 21.8 MMOL/L (ref 22–26)
HCO3 BLDA-SCNC: 21.9 MMOL/L (ref 22–26)
HCO3 BLDA-SCNC: 22.1 MMOL/L (ref 22–26)
HCO3 BLDA-SCNC: 22.2 MMOL/L (ref 22–26)
HCO3 BLDA-SCNC: 22.4 MMOL/L (ref 22–26)
HCT VFR BLD AUTO: 22.5 % (ref 41–52)
HCT VFR BLD AUTO: 24.8 % (ref 41–52)
HCT VFR BLD AUTO: 25.8 % (ref 41–52)
HCT VFR BLD AUTO: 25.9 % (ref 41–52)
HCT VFR BLD AUTO: 26 % (ref 41–52)
HCT VFR BLD AUTO: 28.7 % (ref 41–52)
HCT VFR BLD EST: 23 % (ref 41–52)
HCT VFR BLD EST: 24 % (ref 41–52)
HCT VFR BLD EST: 26 % (ref 41–52)
HCT VFR BLD EST: 26 % (ref 41–52)
HCT VFR BLD EST: 27 % (ref 41–52)
HCT VFR BLD EST: 28 % (ref 41–52)
HCT VFR BLD EST: 29 % (ref 41–52)
HCT VFR BLD EST: 34 % (ref 41–52)
HCV RNA SERPL NAA+PROBE-ACNC: NOT DETECTED K[IU]/ML
HCV RNA SERPL NAA+PROBE-LOG IU: NORMAL {LOG_IU}/ML
HGB BLD-MCNC: 7.6 G/DL (ref 13.5–17.5)
HGB BLD-MCNC: 8.7 G/DL (ref 13.5–17.5)
HGB BLD-MCNC: 8.8 G/DL (ref 13.5–17.5)
HGB BLD-MCNC: 8.9 G/DL (ref 13.5–17.5)
HGB BLD-MCNC: 9.5 G/DL (ref 13.5–17.5)
HGB BLD-MCNC: 9.9 G/DL (ref 13.5–17.5)
HGB BLDA-MCNC: 11.2 G/DL (ref 13.5–17.5)
HGB BLDA-MCNC: 7.8 G/DL (ref 13.5–17.5)
HGB BLDA-MCNC: 7.9 G/DL (ref 13.5–17.5)
HGB BLDA-MCNC: 8.5 G/DL (ref 13.5–17.5)
HGB BLDA-MCNC: 8.8 G/DL (ref 13.5–17.5)
HGB BLDA-MCNC: 8.9 G/DL (ref 13.5–17.5)
HGB BLDA-MCNC: 8.9 G/DL (ref 13.5–17.5)
HGB BLDA-MCNC: 9.1 G/DL (ref 13.5–17.5)
HGB BLDA-MCNC: 9.2 G/DL (ref 13.5–17.5)
HGB BLDA-MCNC: 9.3 G/DL (ref 13.5–17.5)
HGB BLDA-MCNC: 9.4 G/DL (ref 13.5–17.5)
HGB BLDA-MCNC: 9.5 G/DL (ref 13.5–17.5)
HGB BLDA-MCNC: 9.6 G/DL (ref 13.5–17.5)
HGB BLDA-MCNC: 9.7 G/DL (ref 13.5–17.5)
HGB BLDA-MCNC: 9.7 G/DL (ref 13.5–17.5)
HLA RESULTS: NORMAL
HLA-A+B+C AB NFR SER: NORMAL %
HLA-DP+DQ+DR AB NFR SER: NORMAL %
INHALED O2 CONCENTRATION: 30 %
INHALED O2 CONCENTRATION: 40 %
INHALED O2 CONCENTRATION: 60 %
INR PPP: 1.4 (ref 0.9–1.1)
INR PPP: 1.4 (ref 0.9–1.1)
INR PPP: 1.5 (ref 0.9–1.1)
INR PPP: 1.5 (ref 0.9–1.1)
INR PPP: 1.6 (ref 0.9–1.1)
INR PPP: 1.8 (ref 0.9–1.1)
LACTATE BLDA-SCNC: 1.1 MMOL/L (ref 0.4–2)
LACTATE BLDA-SCNC: 1.2 MMOL/L (ref 0.4–2)
LACTATE BLDA-SCNC: 1.3 MMOL/L (ref 0.4–2)
LACTATE BLDA-SCNC: 1.5 MMOL/L (ref 0.4–2)
LACTATE BLDA-SCNC: 2.3 MMOL/L (ref 0.4–2)
LACTATE BLDA-SCNC: 2.4 MMOL/L (ref 0.4–2)
LACTATE BLDA-SCNC: 2.5 MMOL/L (ref 0.4–2)
LACTATE BLDA-SCNC: 2.7 MMOL/L (ref 0.4–2)
LACTATE BLDA-SCNC: 2.8 MMOL/L (ref 0.4–2)
LACTATE BLDA-SCNC: 2.8 MMOL/L (ref 0.4–2)
LACTATE BLDA-SCNC: 4.3 MMOL/L (ref 0.4–2)
LACTATE BLDA-SCNC: 4.9 MMOL/L (ref 0.4–2)
LACTATE BLDA-SCNC: 5 MMOL/L (ref 0.4–2)
LACTATE BLDA-SCNC: 5.4 MMOL/L (ref 0.4–2)
LACTATE BLDA-SCNC: 5.5 MMOL/L (ref 0.4–2)
LYS KAO IND CLT 30M P MA LENFR BL RESTEG: 0 % (ref 0–2.6)
MA KAOLIN BLD RES TEG: 42 MM (ref 52–69)
MA KAOLIN BLD RES TEG: 45 MM (ref 52–69)
MA KAOLIN BLD RES TEG: 45 MM (ref 52–69)
MA KAOLIN BLD RES TEG: 46 MM (ref 52–69)
MA KAOLIN+TF BLD RES TEG: 41 MM (ref 52–70)
MA KAOLIN+TF BLD RES TEG: 44 MM (ref 52–70)
MA KAOLIN+TF BLD RES TEG: 45 MM (ref 52–70)
MA KAOLIN+TF BLD RES TEG: 46 MM (ref 52–70)
MA KAOLIN+TF BLD RES TEG: 47 MM (ref 52–70)
MA TF IND+IIB-IIIA INH BLD RES TEG: 11 MM (ref 15–32)
MA TF IND+IIB-IIIA INH BLD RES TEG: 12 MM (ref 15–32)
MA TF IND+IIB-IIIA INH BLD RES TEG: 13 MM (ref 15–32)
MA TF IND+IIB-IIIA INH BLD RES TEG: 14 MM (ref 15–32)
MA TF IND+IIB-IIIA INH BLD RES TEG: 14 MM (ref 15–32)
MAGNESIUM SERPL-MCNC: 2.01 MG/DL (ref 1.6–2.4)
MAGNESIUM SERPL-MCNC: 2.01 MG/DL (ref 1.6–2.4)
MAGNESIUM SERPL-MCNC: 2.04 MG/DL (ref 1.6–2.4)
MAGNESIUM SERPL-MCNC: 2.19 MG/DL (ref 1.6–2.4)
MAGNESIUM SERPL-MCNC: 2.23 MG/DL (ref 1.6–2.4)
MCH RBC QN AUTO: 29.9 PG (ref 26–34)
MCH RBC QN AUTO: 30.3 PG (ref 26–34)
MCH RBC QN AUTO: 30.3 PG (ref 26–34)
MCH RBC QN AUTO: 30.6 PG (ref 26–34)
MCH RBC QN AUTO: 30.7 PG (ref 26–34)
MCH RBC QN AUTO: 30.8 PG (ref 26–34)
MCHC RBC AUTO-ENTMCNC: 33.8 G/DL (ref 32–36)
MCHC RBC AUTO-ENTMCNC: 33.8 G/DL (ref 32–36)
MCHC RBC AUTO-ENTMCNC: 34.5 G/DL (ref 32–36)
MCHC RBC AUTO-ENTMCNC: 34.5 G/DL (ref 32–36)
MCHC RBC AUTO-ENTMCNC: 35.1 G/DL (ref 32–36)
MCHC RBC AUTO-ENTMCNC: 36.7 G/DL (ref 32–36)
MCV RBC AUTO: 84 FL (ref 80–100)
MCV RBC AUTO: 86 FL (ref 80–100)
MCV RBC AUTO: 88 FL (ref 80–100)
MCV RBC AUTO: 88 FL (ref 80–100)
MCV RBC AUTO: 89 FL (ref 80–100)
MCV RBC AUTO: 91 FL (ref 80–100)
NON-GYN CYTOLOGY REPORT: NORMAL
NRBC BLD-RTO: 0 /100 WBCS (ref 0–0)
OXYHGB MFR BLDA: 94.2 % (ref 94–98)
OXYHGB MFR BLDA: 96.2 % (ref 94–98)
OXYHGB MFR BLDA: 96.3 % (ref 94–98)
OXYHGB MFR BLDA: 96.4 % (ref 94–98)
OXYHGB MFR BLDA: 96.7 % (ref 94–98)
OXYHGB MFR BLDA: 96.8 % (ref 94–98)
OXYHGB MFR BLDA: 96.9 % (ref 94–98)
OXYHGB MFR BLDA: 96.9 % (ref 94–98)
OXYHGB MFR BLDA: 97 % (ref 94–98)
OXYHGB MFR BLDA: 97.3 % (ref 94–98)
OXYHGB MFR BLDA: 97.4 % (ref 94–98)
OXYHGB MFR BLDA: 97.4 % (ref 94–98)
OXYHGB MFR BLDA: 97.7 % (ref 94–98)
OXYHGB MFR BLDA: 98.1 % (ref 94–98)
PCO2 BLDA: 33 MM HG (ref 38–42)
PCO2 BLDA: 34 MM HG (ref 38–42)
PCO2 BLDA: 35 MM HG (ref 38–42)
PCO2 BLDA: 35 MM HG (ref 38–42)
PCO2 BLDA: 36 MM HG (ref 38–42)
PCO2 BLDA: 37 MM HG (ref 38–42)
PCO2 BLDA: 38 MM HG (ref 38–42)
PCO2 BLDA: 39 MM HG (ref 38–42)
PCO2 BLDA: 40 MM HG (ref 38–42)
PCO2 BLDA: 42 MM HG (ref 38–42)
PH BLDA: 7.28 PH (ref 7.38–7.42)
PH BLDA: 7.29 PH (ref 7.38–7.42)
PH BLDA: 7.33 PH (ref 7.38–7.42)
PH BLDA: 7.34 PH (ref 7.38–7.42)
PH BLDA: 7.36 PH (ref 7.38–7.42)
PH BLDA: 7.38 PH (ref 7.38–7.42)
PH BLDA: 7.38 PH (ref 7.38–7.42)
PH BLDA: 7.39 PH (ref 7.38–7.42)
PH BLDA: 7.39 PH (ref 7.38–7.42)
PH BLDA: 7.4 PH (ref 7.38–7.42)
PH BLDA: 7.41 PH (ref 7.38–7.42)
PH BLDA: 7.42 PH (ref 7.38–7.42)
PHOSPHATE SERPL-MCNC: 2.9 MG/DL (ref 2.5–4.9)
PHOSPHATE SERPL-MCNC: 3.8 MG/DL (ref 2.5–4.9)
PHOSPHATE SERPL-MCNC: 3.8 MG/DL (ref 2.5–4.9)
PHOSPHATE SERPL-MCNC: 4 MG/DL (ref 2.5–4.9)
PHOSPHATE SERPL-MCNC: 4.5 MG/DL (ref 2.5–4.9)
PHOSPHATE SERPL-MCNC: 5 MG/DL (ref 2.5–4.9)
PLATELET # BLD AUTO: 49 X10*3/UL (ref 150–450)
PLATELET # BLD AUTO: 55 X10*3/UL (ref 150–450)
PLATELET # BLD AUTO: 60 X10*3/UL (ref 150–450)
PLATELET # BLD AUTO: 62 X10*3/UL (ref 150–450)
PLATELET # BLD AUTO: 75 X10*3/UL (ref 150–450)
PLATELET # BLD AUTO: 88 X10*3/UL (ref 150–450)
PO2 BLDA: 112 MM HG (ref 85–95)
PO2 BLDA: 128 MM HG (ref 85–95)
PO2 BLDA: 129 MM HG (ref 85–95)
PO2 BLDA: 134 MM HG (ref 85–95)
PO2 BLDA: 135 MM HG (ref 85–95)
PO2 BLDA: 142 MM HG (ref 85–95)
PO2 BLDA: 162 MM HG (ref 85–95)
PO2 BLDA: 162 MM HG (ref 85–95)
PO2 BLDA: 163 MM HG (ref 85–95)
PO2 BLDA: 175 MM HG (ref 85–95)
PO2 BLDA: 177 MM HG (ref 85–95)
PO2 BLDA: 177 MM HG (ref 85–95)
PO2 BLDA: 183 MM HG (ref 85–95)
PO2 BLDA: 229 MM HG (ref 85–95)
PO2 BLDA: 73 MM HG (ref 85–95)
PO2 BLDA: 98 MM HG (ref 85–95)
POTASSIUM BLDA-SCNC: 4 MMOL/L (ref 3.5–5.3)
POTASSIUM BLDA-SCNC: 4.1 MMOL/L (ref 3.5–5.3)
POTASSIUM BLDA-SCNC: 4.2 MMOL/L (ref 3.5–5.3)
POTASSIUM BLDA-SCNC: 4.3 MMOL/L (ref 3.5–5.3)
POTASSIUM BLDA-SCNC: 4.5 MMOL/L (ref 3.5–5.3)
POTASSIUM BLDA-SCNC: 4.8 MMOL/L (ref 3.5–5.3)
POTASSIUM SERPL-SCNC: 4 MMOL/L (ref 3.5–5.3)
POTASSIUM SERPL-SCNC: 4 MMOL/L (ref 3.5–5.3)
POTASSIUM SERPL-SCNC: 4.1 MMOL/L (ref 3.5–5.3)
POTASSIUM SERPL-SCNC: 4.2 MMOL/L (ref 3.5–5.3)
PRODUCT BLOOD TYPE: 7300
PRODUCT BLOOD TYPE: 8400
PRODUCT CODE: NORMAL
PROT SERPL-MCNC: 4.3 G/DL (ref 6.4–8.2)
PROT SERPL-MCNC: 4.4 G/DL (ref 6.4–8.2)
PROT SERPL-MCNC: 4.6 G/DL (ref 6.4–8.2)
PROTHROMBIN TIME: 15.3 SECONDS (ref 9.8–12.4)
PROTHROMBIN TIME: 15.6 SECONDS (ref 9.8–12.4)
PROTHROMBIN TIME: 16.5 SECONDS (ref 9.8–12.4)
PROTHROMBIN TIME: 16.7 SECONDS (ref 9.8–12.4)
PROTHROMBIN TIME: 17.6 SECONDS (ref 9.8–12.4)
PROTHROMBIN TIME: 20.3 SECONDS (ref 9.8–12.4)
RBC # BLD AUTO: 2.48 X10*6/UL (ref 4.5–5.9)
RBC # BLD AUTO: 2.87 X10*6/UL (ref 4.5–5.9)
RBC # BLD AUTO: 2.9 X10*6/UL (ref 4.5–5.9)
RBC # BLD AUTO: 2.94 X10*6/UL (ref 4.5–5.9)
RBC # BLD AUTO: 3.08 X10*6/UL (ref 4.5–5.9)
RBC # BLD AUTO: 3.27 X10*6/UL (ref 4.5–5.9)
SAO2 % BLDA: 100 % (ref 94–100)
SAO2 % BLDA: 97 % (ref 94–100)
SAO2 % BLDA: 99 % (ref 94–100)
SODIUM BLDA-SCNC: 132 MMOL/L (ref 136–145)
SODIUM BLDA-SCNC: 133 MMOL/L (ref 136–145)
SODIUM BLDA-SCNC: 133 MMOL/L (ref 136–145)
SODIUM BLDA-SCNC: 134 MMOL/L (ref 136–145)
SODIUM BLDA-SCNC: 135 MMOL/L (ref 136–145)
SODIUM BLDA-SCNC: 136 MMOL/L (ref 136–145)
SODIUM SERPL-SCNC: 137 MMOL/L (ref 136–145)
SODIUM SERPL-SCNC: 138 MMOL/L (ref 136–145)
SODIUM SERPL-SCNC: 139 MMOL/L (ref 136–145)
UNIT ABO: NORMAL
UNIT NUMBER: NORMAL
UNIT RH: NORMAL
UNIT VOLUME: 196
UNIT VOLUME: 206
UNIT VOLUME: 208
UNIT VOLUME: 217
UNIT VOLUME: 218
UNIT VOLUME: 220
UNIT VOLUME: 221
UNIT VOLUME: 224
UNIT VOLUME: 228
UNIT VOLUME: 228
UNIT VOLUME: 229
UNIT VOLUME: 230
UNIT VOLUME: 239
UNIT VOLUME: 278
UNIT VOLUME: 295
UNIT VOLUME: 299
UNIT VOLUME: 300
UNIT VOLUME: 301
UNIT VOLUME: 306
UNIT VOLUME: 315
UNIT VOLUME: 319
UNIT VOLUME: 350
UNIT VOLUME: 388
UNIT VOLUME: 94
WBC # BLD AUTO: 10.5 X10*3/UL (ref 4.4–11.3)
WBC # BLD AUTO: 5.3 X10*3/UL (ref 4.4–11.3)
WBC # BLD AUTO: 5.9 X10*3/UL (ref 4.4–11.3)
WBC # BLD AUTO: 6.7 X10*3/UL (ref 4.4–11.3)
WBC # BLD AUTO: 7.5 X10*3/UL (ref 4.4–11.3)
WBC # BLD AUTO: 9.4 X10*3/UL (ref 4.4–11.3)
XM INTEP: NORMAL

## 2025-04-03 PROCEDURE — P9037 PLATE PHERES LEUKOREDU IRRAD: HCPCS

## 2025-04-03 PROCEDURE — 82947 ASSAY GLUCOSE BLOOD QUANT: CPT

## 2025-04-03 PROCEDURE — 80053 COMPREHEN METABOLIC PANEL: CPT

## 2025-04-03 PROCEDURE — 37799 UNLISTED PX VASCULAR SURGERY: CPT | Performed by: ANESTHESIOLOGIST ASSISTANT

## 2025-04-03 PROCEDURE — 85610 PROTHROMBIN TIME: CPT

## 2025-04-03 PROCEDURE — 74018 RADEX ABDOMEN 1 VIEW: CPT

## 2025-04-03 PROCEDURE — 3700000002 HC GENERAL ANESTHESIA TIME - EACH INCREMENTAL 1 MINUTE: Performed by: TRANSPLANT SURGERY

## 2025-04-03 PROCEDURE — 82330 ASSAY OF CALCIUM: CPT

## 2025-04-03 PROCEDURE — 94002 VENT MGMT INPAT INIT DAY: CPT

## 2025-04-03 PROCEDURE — 71045 X-RAY EXAM CHEST 1 VIEW: CPT

## 2025-04-03 PROCEDURE — P9045 ALBUMIN (HUMAN), 5%, 250 ML: HCPCS | Mod: JZ

## 2025-04-03 PROCEDURE — P9012 CRYOPRECIPITATE EACH UNIT: HCPCS

## 2025-04-03 PROCEDURE — 2500000004 HC RX 250 GENERAL PHARMACY W/ HCPCS (ALT 636 FOR OP/ED)

## 2025-04-03 PROCEDURE — 3600000009 HC OR TIME - EACH INCREMENTAL 1 MINUTE - PROCEDURE LEVEL FOUR: Performed by: TRANSPLANT SURGERY

## 2025-04-03 PROCEDURE — 88307 TISSUE EXAM BY PATHOLOGIST: CPT | Mod: TC,SUR | Performed by: TRANSPLANT SURGERY

## 2025-04-03 PROCEDURE — 76705 ECHO EXAM OF ABDOMEN: CPT

## 2025-04-03 PROCEDURE — 85730 THROMBOPLASTIN TIME PARTIAL: CPT | Performed by: ANESTHESIOLOGIST ASSISTANT

## 2025-04-03 PROCEDURE — 88307 TISSUE EXAM BY PATHOLOGIST: CPT | Performed by: STUDENT IN AN ORGANIZED HEALTH CARE EDUCATION/TRAINING PROGRAM

## 2025-04-03 PROCEDURE — 76776 US EXAM K TRANSPL W/DOPPLER: CPT

## 2025-04-03 PROCEDURE — 2500000005 HC RX 250 GENERAL PHARMACY W/O HCPCS: Performed by: ANESTHESIOLOGIST ASSISTANT

## 2025-04-03 PROCEDURE — 8120000002 HC CADAVER DONOR - KIDNEY: Performed by: TRANSPLANT SURGERY

## 2025-04-03 PROCEDURE — 85384 FIBRINOGEN ACTIVITY: CPT

## 2025-04-03 PROCEDURE — 82435 ASSAY OF BLOOD CHLORIDE: CPT

## 2025-04-03 PROCEDURE — 76705 ECHO EXAM OF ABDOMEN: CPT | Performed by: RADIOLOGY

## 2025-04-03 PROCEDURE — P9045 ALBUMIN (HUMAN), 5%, 250 ML: HCPCS | Mod: JZ | Performed by: ANESTHESIOLOGIST ASSISTANT

## 2025-04-03 PROCEDURE — 84132 ASSAY OF SERUM POTASSIUM: CPT

## 2025-04-03 PROCEDURE — 86825 HLA X-MATH NON-CYTOTOXIC: CPT | Mod: OUT | Performed by: INTERNAL MEDICINE

## 2025-04-03 PROCEDURE — P9047 ALBUMIN (HUMAN), 25%, 50ML: HCPCS

## 2025-04-03 PROCEDURE — 3600000004 HC OR TIME - INITIAL BASE CHARGE - PROCEDURE LEVEL FOUR: Performed by: TRANSPLANT SURGERY

## 2025-04-03 PROCEDURE — 2020000001 HC ICU ROOM DAILY

## 2025-04-03 PROCEDURE — 82040 ASSAY OF SERUM ALBUMIN: CPT | Performed by: ANESTHESIOLOGIST ASSISTANT

## 2025-04-03 PROCEDURE — 3700000001 HC GENERAL ANESTHESIA TIME - INITIAL BASE CHARGE: Performed by: TRANSPLANT SURGERY

## 2025-04-03 PROCEDURE — 47000 NEEDLE BIOPSY OF LIVER PERQ: CPT | Performed by: TRANSPLANT SURGERY

## 2025-04-03 PROCEDURE — 88304 TISSUE EXAM BY PATHOLOGIST: CPT | Performed by: STUDENT IN AN ORGANIZED HEALTH CARE EDUCATION/TRAINING PROGRAM

## 2025-04-03 PROCEDURE — 0TY10Z0 TRANSPLANTATION OF LEFT KIDNEY, ALLOGENEIC, OPEN APPROACH: ICD-10-PCS | Performed by: TRANSPLANT SURGERY

## 2025-04-03 PROCEDURE — 93975 VASCULAR STUDY: CPT | Performed by: RADIOLOGY

## 2025-04-03 PROCEDURE — 99291 CRITICAL CARE FIRST HOUR: CPT | Performed by: STUDENT IN AN ORGANIZED HEALTH CARE EDUCATION/TRAINING PROGRAM

## 2025-04-03 PROCEDURE — 83735 ASSAY OF MAGNESIUM: CPT

## 2025-04-03 PROCEDURE — 85384 FIBRINOGEN ACTIVITY: CPT | Performed by: ANESTHESIOLOGIST ASSISTANT

## 2025-04-03 PROCEDURE — 37799 UNLISTED PX VASCULAR SURGERY: CPT

## 2025-04-03 PROCEDURE — 82805 BLOOD GASES W/O2 SATURATION: CPT | Performed by: INTERNAL MEDICINE

## 2025-04-03 PROCEDURE — 2500000004 HC RX 250 GENERAL PHARMACY W/ HCPCS (ALT 636 FOR OP/ED): Performed by: TRANSPLANT SURGERY

## 2025-04-03 PROCEDURE — 88304 TISSUE EXAM BY PATHOLOGIST: CPT | Mod: TC,SUR | Performed by: TRANSPLANT SURGERY

## 2025-04-03 PROCEDURE — 85610 PROTHROMBIN TIME: CPT | Performed by: ANESTHESIOLOGIST ASSISTANT

## 2025-04-03 PROCEDURE — 2500000005 HC RX 250 GENERAL PHARMACY W/O HCPCS

## 2025-04-03 PROCEDURE — P9017 PLASMA 1 DONOR FRZ W/IN 8 HR: HCPCS

## 2025-04-03 PROCEDURE — 87081 CULTURE SCREEN ONLY: CPT

## 2025-04-03 PROCEDURE — P9073 PLATELETS PHERESIS PATH REDU: HCPCS

## 2025-04-03 PROCEDURE — 2500000005 HC RX 250 GENERAL PHARMACY W/O HCPCS: Performed by: TRANSPLANT SURGERY

## 2025-04-03 PROCEDURE — 2500000004 HC RX 250 GENERAL PHARMACY W/ HCPCS (ALT 636 FOR OP/ED): Mod: JZ

## 2025-04-03 PROCEDURE — 82040 ASSAY OF SERUM ALBUMIN: CPT

## 2025-04-03 PROCEDURE — 88313 SPECIAL STAINS GROUP 2: CPT | Performed by: STUDENT IN AN ORGANIZED HEALTH CARE EDUCATION/TRAINING PROGRAM

## 2025-04-03 PROCEDURE — P9016 RBC LEUKOCYTES REDUCED: HCPCS

## 2025-04-03 PROCEDURE — 36430 TRANSFUSION BLD/BLD COMPNT: CPT | Performed by: ANESTHESIOLOGY

## 2025-04-03 PROCEDURE — P9040 RBC LEUKOREDUCED IRRADIATED: HCPCS

## 2025-04-03 PROCEDURE — 2500000004 HC RX 250 GENERAL PHARMACY W/ HCPCS (ALT 636 FOR OP/ED): Performed by: STUDENT IN AN ORGANIZED HEALTH CARE EDUCATION/TRAINING PROGRAM

## 2025-04-03 PROCEDURE — 2720000007 HC OR 272 NO HCPCS: Performed by: TRANSPLANT SURGERY

## 2025-04-03 PROCEDURE — 71045 X-RAY EXAM CHEST 1 VIEW: CPT | Performed by: RADIOLOGY

## 2025-04-03 PROCEDURE — 0FB10ZX EXCISION OF RIGHT LOBE LIVER, OPEN APPROACH, DIAGNOSTIC: ICD-10-PCS | Performed by: TRANSPLANT SURGERY

## 2025-04-03 PROCEDURE — 76705 ECHO EXAM OF ABDOMEN: CPT | Mod: 59

## 2025-04-03 PROCEDURE — 80076 HEPATIC FUNCTION PANEL: CPT | Mod: CCI | Performed by: STUDENT IN AN ORGANIZED HEALTH CARE EDUCATION/TRAINING PROGRAM

## 2025-04-03 PROCEDURE — 82977 ASSAY OF GGT: CPT

## 2025-04-03 PROCEDURE — 74018 RADEX ABDOMEN 1 VIEW: CPT | Performed by: RADIOLOGY

## 2025-04-03 PROCEDURE — 82435 ASSAY OF BLOOD CHLORIDE: CPT | Performed by: ANESTHESIOLOGIST ASSISTANT

## 2025-04-03 PROCEDURE — 50360 RNL ALTRNSPLJ W/O RCP NFRCT: CPT | Performed by: TRANSPLANT SURGERY

## 2025-04-03 PROCEDURE — 84100 ASSAY OF PHOSPHORUS: CPT

## 2025-04-03 PROCEDURE — 2780000003 HC OR 278 NO HCPCS: Performed by: TRANSPLANT SURGERY

## 2025-04-03 PROCEDURE — 85027 COMPLETE CBC AUTOMATED: CPT

## 2025-04-03 PROCEDURE — 85027 COMPLETE CBC AUTOMATED: CPT | Performed by: ANESTHESIOLOGIST ASSISTANT

## 2025-04-03 PROCEDURE — 2500000004 HC RX 250 GENERAL PHARMACY W/ HCPCS (ALT 636 FOR OP/ED): Performed by: ANESTHESIOLOGIST ASSISTANT

## 2025-04-03 PROCEDURE — 50605 INSERT URETERAL SUPPORT: CPT | Performed by: TRANSPLANT SURGERY

## 2025-04-03 PROCEDURE — 36430 TRANSFUSION BLD/BLD COMPNT: CPT

## 2025-04-03 PROCEDURE — C2617 STENT, NON-COR, TEM W/O DEL: HCPCS | Performed by: TRANSPLANT SURGERY

## 2025-04-03 DEVICE — 6.0FR(2MM)X 12 CM SURGITEK DOUBLE-J CLOSED TIP URETERAL STENT
Type: IMPLANTABLE DEVICE | Site: KIDNEY | Status: FUNCTIONAL
Brand: DOUBLE-J

## 2025-04-03 RX ORDER — SODIUM CHLORIDE 9 MG/ML
0-1000 INJECTION, SOLUTION INTRAVENOUS CONTINUOUS
Status: DISCONTINUED | OUTPATIENT
Start: 2025-04-03 | End: 2025-04-04

## 2025-04-03 RX ORDER — FUROSEMIDE 10 MG/ML
80 INJECTION INTRAMUSCULAR; INTRAVENOUS ONCE
Status: COMPLETED | OUTPATIENT
Start: 2025-04-03 | End: 2025-04-03

## 2025-04-03 RX ORDER — PANTOPRAZOLE SODIUM 40 MG/10ML
40 INJECTION, POWDER, LYOPHILIZED, FOR SOLUTION INTRAVENOUS 2 TIMES DAILY
Status: DISCONTINUED | OUTPATIENT
Start: 2025-04-03 | End: 2025-04-06

## 2025-04-03 RX ORDER — ROCURONIUM BROMIDE 10 MG/ML
INJECTION, SOLUTION INTRAVENOUS AS NEEDED
Status: DISCONTINUED | OUTPATIENT
Start: 2025-04-03 | End: 2025-04-03

## 2025-04-03 RX ORDER — INSULIN LISPRO 100 [IU]/ML
0-5 INJECTION, SOLUTION INTRAVENOUS; SUBCUTANEOUS EVERY 4 HOURS
Status: DISCONTINUED | OUTPATIENT
Start: 2025-04-03 | End: 2025-04-03

## 2025-04-03 RX ORDER — PROPOFOL 10 MG/ML
0-50 INJECTION, EMULSION INTRAVENOUS CONTINUOUS
Status: DISCONTINUED | OUTPATIENT
Start: 2025-04-03 | End: 2025-04-04

## 2025-04-03 RX ORDER — MAGNESIUM SULFATE HEPTAHYDRATE 40 MG/ML
2 INJECTION, SOLUTION INTRAVENOUS EVERY 6 HOURS PRN
Status: DISCONTINUED | OUTPATIENT
Start: 2025-04-03 | End: 2025-04-05

## 2025-04-03 RX ORDER — MAGNESIUM SULFATE HEPTAHYDRATE 40 MG/ML
4 INJECTION, SOLUTION INTRAVENOUS EVERY 6 HOURS PRN
Status: DISCONTINUED | OUTPATIENT
Start: 2025-04-03 | End: 2025-04-05

## 2025-04-03 RX ORDER — PREDNISONE 20 MG/1
20 TABLET ORAL DAILY
Status: DISCONTINUED | OUTPATIENT
Start: 2025-04-07 | End: 2025-04-12 | Stop reason: HOSPADM

## 2025-04-03 RX ORDER — NOREPINEPHRINE BITARTRATE/D5W 8 MG/250ML
0-.5 PLASTIC BAG, INJECTION (ML) INTRAVENOUS CONTINUOUS
Status: DISCONTINUED | OUTPATIENT
Start: 2025-04-03 | End: 2025-04-04

## 2025-04-03 RX ORDER — PANTOPRAZOLE SODIUM 40 MG/10ML
40 INJECTION, POWDER, LYOPHILIZED, FOR SOLUTION INTRAVENOUS
Status: DISCONTINUED | OUTPATIENT
Start: 2025-04-03 | End: 2025-04-03

## 2025-04-03 RX ORDER — FLUCONAZOLE 2 MG/ML
400 INJECTION, SOLUTION INTRAVENOUS EVERY 24 HOURS
Status: DISCONTINUED | OUTPATIENT
Start: 2025-04-03 | End: 2025-04-04

## 2025-04-03 RX ORDER — INSULIN LISPRO 100 [IU]/ML
0-10 INJECTION, SOLUTION INTRAVENOUS; SUBCUTANEOUS EVERY 4 HOURS
Status: DISCONTINUED | OUTPATIENT
Start: 2025-04-03 | End: 2025-04-06

## 2025-04-03 RX ORDER — PREDNISONE 20 MG/1
20 TABLET ORAL DAILY
Status: DISCONTINUED | OUTPATIENT
Start: 2025-04-07 | End: 2025-04-03

## 2025-04-03 RX ORDER — ALBUMIN HUMAN 50 G/1000ML
25 SOLUTION INTRAVENOUS ONCE
Status: COMPLETED | OUTPATIENT
Start: 2025-04-03 | End: 2025-04-03

## 2025-04-03 RX ORDER — MANNITOL 20 G/100ML
INJECTION, SOLUTION INTRAVENOUS AS NEEDED
Status: DISCONTINUED | OUTPATIENT
Start: 2025-04-03 | End: 2025-04-03

## 2025-04-03 RX ORDER — OXYCODONE HYDROCHLORIDE 5 MG/1
5 TABLET ORAL EVERY 6 HOURS PRN
Status: DISCONTINUED | OUTPATIENT
Start: 2025-04-03 | End: 2025-04-03

## 2025-04-03 RX ORDER — DEXTROSE 50 % IN WATER (D50W) INTRAVENOUS SYRINGE
12.5
Status: DISCONTINUED | OUTPATIENT
Start: 2025-04-03 | End: 2025-04-12 | Stop reason: HOSPADM

## 2025-04-03 RX ORDER — HYDROMORPHONE HYDROCHLORIDE 0.2 MG/ML
0.2 INJECTION INTRAMUSCULAR; INTRAVENOUS; SUBCUTANEOUS EVERY 4 HOURS PRN
Status: DISCONTINUED | OUTPATIENT
Start: 2025-04-03 | End: 2025-04-05

## 2025-04-03 RX ORDER — SODIUM CHLORIDE 450 MG/100ML
60 INJECTION, SOLUTION INTRAVENOUS CONTINUOUS
Status: DISPENSED | OUTPATIENT
Start: 2025-04-03 | End: 2025-04-04

## 2025-04-03 RX ORDER — DEXTROSE 50 % IN WATER (D50W) INTRAVENOUS SYRINGE
25
Status: DISCONTINUED | OUTPATIENT
Start: 2025-04-03 | End: 2025-04-12 | Stop reason: HOSPADM

## 2025-04-03 RX ORDER — ALBUMIN HUMAN 50 G/1000ML
SOLUTION INTRAVENOUS AS NEEDED
Status: DISCONTINUED | OUTPATIENT
Start: 2025-04-03 | End: 2025-04-03

## 2025-04-03 RX ORDER — ALBUMIN HUMAN 250 G/1000ML
25 SOLUTION INTRAVENOUS EVERY 6 HOURS
Status: COMPLETED | OUTPATIENT
Start: 2025-04-03 | End: 2025-04-04

## 2025-04-03 RX ORDER — SODIUM CHLORIDE 0.9 G/100ML
INJECTION, SOLUTION IRRIGATION AS NEEDED
Status: DISCONTINUED | OUTPATIENT
Start: 2025-04-03 | End: 2025-04-03 | Stop reason: HOSPADM

## 2025-04-03 RX ADMIN — SODIUM CHLORIDE 50 ML/HR: 0.9 INJECTION, SOLUTION INTRAVENOUS at 22:08

## 2025-04-03 RX ADMIN — PROPOFOL 50 MCG/KG/MIN: 10 INJECTION, EMULSION INTRAVENOUS at 02:22

## 2025-04-03 RX ADMIN — PIPERACILLIN SODIUM AND TAZOBACTAM SODIUM 3.38 G: 3; .375 INJECTION, SOLUTION INTRAVENOUS at 10:10

## 2025-04-03 RX ADMIN — NOREPINEPHRINE BITARTRATE 32 MCG: 1 INJECTION, SOLUTION, CONCENTRATE INTRAVENOUS at 00:35

## 2025-04-03 RX ADMIN — FENTANYL CITRATE 25 MCG: 50 INJECTION, SOLUTION INTRAMUSCULAR; INTRAVENOUS at 02:23

## 2025-04-03 RX ADMIN — ALBUMIN HUMAN 250 ML: 0.05 INJECTION, SOLUTION INTRAVENOUS at 10:37

## 2025-04-03 RX ADMIN — ROCURONIUM 20 MG: 50 INJECTION, SOLUTION INTRAVENOUS at 11:23

## 2025-04-03 RX ADMIN — ALBUMIN HUMAN 25 G: 0.05 INJECTION, SOLUTION INTRAVENOUS at 17:30

## 2025-04-03 RX ADMIN — DEXTROSE MONOHYDRATE 250 MG: 50 INJECTION, SOLUTION INTRAVENOUS at 08:08

## 2025-04-03 RX ADMIN — SODIUM CHLORIDE: 9 INJECTION, SOLUTION INTRAVENOUS at 08:28

## 2025-04-03 RX ADMIN — CALCIUM CHLORIDE 500 MG: 100 INJECTION INTRAVENOUS; INTRAVENTRICULAR at 22:28

## 2025-04-03 RX ADMIN — ALBUMIN HUMAN 25 G: 0.25 SOLUTION INTRAVENOUS at 23:02

## 2025-04-03 RX ADMIN — PIPERACILLIN SODIUM AND TAZOBACTAM SODIUM 3.38 G: 3; .375 INJECTION, SOLUTION INTRAVENOUS at 04:10

## 2025-04-03 RX ADMIN — ALBUMIN HUMAN 25 G: 0.05 INJECTION, SOLUTION INTRAVENOUS at 22:30

## 2025-04-03 RX ADMIN — HYDROMORPHONE HYDROCHLORIDE 0.2 MG: 0.2 INJECTION, SOLUTION INTRAMUSCULAR; INTRAVENOUS; SUBCUTANEOUS at 15:54

## 2025-04-03 RX ADMIN — Medication 40 PERCENT: at 08:00

## 2025-04-03 RX ADMIN — METHYLPREDNISOLONE SODIUM SUCCINATE 250 MG: 125 INJECTION INTRAMUSCULAR; INTRAVENOUS at 07:28

## 2025-04-03 RX ADMIN — PROPOFOL 30 MCG/KG/MIN: 10 INJECTION, EMULSION INTRAVENOUS at 15:53

## 2025-04-03 RX ADMIN — NOREPINEPHRINE BITARTRATE 16 MCG: 1 INJECTION, SOLUTION, CONCENTRATE INTRAVENOUS at 00:29

## 2025-04-03 RX ADMIN — TACROLIMUS 1 MG: 5 CAPSULE, GELATIN COATED ORAL at 18:49

## 2025-04-03 RX ADMIN — CALCIUM CHLORIDE 0.5 G: 100 INJECTION INTRAVENOUS; INTRAVENTRICULAR at 18:49

## 2025-04-03 RX ADMIN — MYCOPHENOLATE MOFETIL HYDROCHLORIDE 1000 MG: 500 INJECTION, POWDER, LYOPHILIZED, FOR SOLUTION INTRAVENOUS at 18:49

## 2025-04-03 RX ADMIN — VASOPRESSIN 1 UNITS: 20 INJECTION, SOLUTION INTRAMUSCULAR; SUBCUTANEOUS at 00:29

## 2025-04-03 RX ADMIN — NOREPINEPHRINE BITARTRATE 16 MCG: 1 INJECTION, SOLUTION, CONCENTRATE INTRAVENOUS at 00:32

## 2025-04-03 RX ADMIN — ALBUMIN HUMAN 250 ML: 0.05 INJECTION, SOLUTION INTRAVENOUS at 00:25

## 2025-04-03 RX ADMIN — NOREPINEPHRINE BITARTRATE 0.01 MCG/KG/MIN: 8 INJECTION, SOLUTION INTRAVENOUS at 20:56

## 2025-04-03 RX ADMIN — PROPOFOL 40 MCG/KG/MIN: 10 INJECTION, EMULSION INTRAVENOUS at 23:35

## 2025-04-03 RX ADMIN — ROCURONIUM 30 MG: 50 INJECTION, SOLUTION INTRAVENOUS at 09:42

## 2025-04-03 RX ADMIN — HEPARIN SODIUM 1000 UNITS: 1000 INJECTION INTRAVENOUS; SUBCUTANEOUS at 05:15

## 2025-04-03 RX ADMIN — ALBUMIN HUMAN 250 ML: 0.05 INJECTION, SOLUTION INTRAVENOUS at 12:42

## 2025-04-03 RX ADMIN — ALBUMIN HUMAN 250 ML: 0.05 INJECTION, SOLUTION INTRAVENOUS at 00:16

## 2025-04-03 RX ADMIN — SUGAMMADEX 200 MG: 100 INJECTION, SOLUTION INTRAVENOUS at 13:30

## 2025-04-03 RX ADMIN — ALBUMIN HUMAN 25 G: 0.25 SOLUTION INTRAVENOUS at 15:02

## 2025-04-03 RX ADMIN — VECURONIUM BROMIDE 2 MG: 10 INJECTION, POWDER, LYOPHILIZED, FOR SOLUTION INTRAVENOUS at 01:32

## 2025-04-03 RX ADMIN — PANTOPRAZOLE SODIUM 40 MG: 40 INJECTION, POWDER, FOR SOLUTION INTRAVENOUS at 06:43

## 2025-04-03 RX ADMIN — SODIUM CHLORIDE 20 MG: 9 INJECTION, SOLUTION INTRAVENOUS at 09:54

## 2025-04-03 RX ADMIN — HEPARIN SODIUM 25000 UNITS: 5000 INJECTION INTRAVENOUS; SUBCUTANEOUS at 13:19

## 2025-04-03 RX ADMIN — ALBUMIN HUMAN 250 ML: 0.05 INJECTION, SOLUTION INTRAVENOUS at 10:45

## 2025-04-03 RX ADMIN — NOREPINEPHRINE BITARTRATE 16 MCG: 1 INJECTION, SOLUTION, CONCENTRATE INTRAVENOUS at 00:24

## 2025-04-03 RX ADMIN — INSULIN HUMAN 2 UNITS/HR: 1 INJECTION, SOLUTION INTRAVENOUS at 04:11

## 2025-04-03 RX ADMIN — SODIUM CHLORIDE 45 ML/HR: 0.9 INJECTION, SOLUTION INTRAVENOUS at 23:03

## 2025-04-03 RX ADMIN — PIPERACILLIN SODIUM AND TAZOBACTAM SODIUM 3.38 G: 3; .375 INJECTION, SOLUTION INTRAVENOUS at 20:31

## 2025-04-03 RX ADMIN — FUROSEMIDE 80 MG: 10 INJECTION, SOLUTION INTRAMUSCULAR; INTRAVENOUS at 05:12

## 2025-04-03 RX ADMIN — SODIUM CHLORIDE 3000 ML: 900 IRRIGANT IRRIGATION at 09:43

## 2025-04-03 RX ADMIN — ALBUMIN HUMAN 25 G: 0.05 INJECTION, SOLUTION INTRAVENOUS at 15:02

## 2025-04-03 RX ADMIN — SODIUM CHLORIDE 60 ML/HR: 4.5 INJECTION, SOLUTION INTRAVENOUS at 15:41

## 2025-04-03 RX ADMIN — VASOPRESSIN 1 UNITS: 20 INJECTION, SOLUTION INTRAMUSCULAR; SUBCUTANEOUS at 00:27

## 2025-04-03 RX ADMIN — VASOPRESSIN 2 UNITS: 20 INJECTION, SOLUTION INTRAMUSCULAR; SUBCUTANEOUS at 00:33

## 2025-04-03 RX ADMIN — NOREPINEPHRINE BITARTRATE 32 MCG: 1 INJECTION, SOLUTION, CONCENTRATE INTRAVENOUS at 00:27

## 2025-04-03 RX ADMIN — CALCIUM CHLORIDE 0.5 G: 100 INJECTION, SOLUTION INTRAVENOUS at 01:07

## 2025-04-03 RX ADMIN — ROCURONIUM 20 MG: 50 INJECTION, SOLUTION INTRAVENOUS at 12:17

## 2025-04-03 RX ADMIN — NOREPINEPHRINE BITARTRATE 16 MCG: 1 INJECTION, SOLUTION, CONCENTRATE INTRAVENOUS at 00:26

## 2025-04-03 RX ADMIN — PANTOPRAZOLE SODIUM 40 MG: 40 INJECTION, POWDER, LYOPHILIZED, FOR SOLUTION INTRAVENOUS at 20:31

## 2025-04-03 RX ADMIN — NOREPINEPHRINE BITARTRATE 16 MCG: 1 INJECTION, SOLUTION, CONCENTRATE INTRAVENOUS at 00:31

## 2025-04-03 RX ADMIN — PROPOFOL 40 MCG/KG/MIN: 10 INJECTION, EMULSION INTRAVENOUS at 06:44

## 2025-04-03 RX ADMIN — PIPERACILLIN SODIUM AND TAZOBACTAM SODIUM 3.38 G: 3; .375 INJECTION, SOLUTION INTRAVENOUS at 00:20

## 2025-04-03 RX ADMIN — ANGIOTENSIN II 20 NG/KG/MIN: 2.5 INJECTION INTRAVENOUS at 00:51

## 2025-04-03 RX ADMIN — HYDROMORPHONE HYDROCHLORIDE 0.4 MG: 1 INJECTION, SOLUTION INTRAMUSCULAR; INTRAVENOUS; SUBCUTANEOUS at 03:34

## 2025-04-03 RX ADMIN — FLUCONAZOLE 400 MG: 2 INJECTION, SOLUTION INTRAVENOUS at 04:52

## 2025-04-03 RX ADMIN — MANNITOL 25 G: 20 INJECTION, SOLUTION INTRAVENOUS at 10:41

## 2025-04-03 RX ADMIN — SODIUM CHLORIDE 230 ML/HR: 0.9 INJECTION, SOLUTION INTRAVENOUS at 15:23

## 2025-04-03 RX ADMIN — PIPERACILLIN SODIUM AND TAZOBACTAM SODIUM 3.38 G: 3; .375 INJECTION, SOLUTION INTRAVENOUS at 15:02

## 2025-04-03 RX ADMIN — SODIUM CHLORIDE 60 ML/HR: 0.9 INJECTION, SOLUTION INTRAVENOUS at 21:19

## 2025-04-03 RX ADMIN — NOREPINEPHRINE BITARTRATE 32 MCG: 1 INJECTION, SOLUTION, CONCENTRATE INTRAVENOUS at 01:44

## 2025-04-03 RX ADMIN — ROCURONIUM 20 MG: 50 INJECTION, SOLUTION INTRAVENOUS at 10:38

## 2025-04-03 RX ADMIN — PROPOFOL 35 MCG/KG/MIN: 10 INJECTION, EMULSION INTRAVENOUS at 02:09

## 2025-04-03 RX ADMIN — ROCURONIUM 50 MG: 50 INJECTION, SOLUTION INTRAVENOUS at 08:44

## 2025-04-03 RX ADMIN — FENTANYL CITRATE 25 MCG: 50 INJECTION, SOLUTION INTRAMUSCULAR; INTRAVENOUS at 02:37

## 2025-04-03 RX ADMIN — NOREPINEPHRINE BITARTRATE 16 MCG: 1 INJECTION, SOLUTION, CONCENTRATE INTRAVENOUS at 00:28

## 2025-04-03 RX ADMIN — SODIUM BICARBONATE 50 MEQ: 84 INJECTION, SOLUTION INTRAVENOUS at 00:16

## 2025-04-03 ASSESSMENT — COGNITIVE AND FUNCTIONAL STATUS - GENERAL
DRESSING REGULAR LOWER BODY CLOTHING: TOTAL
EATING MEALS: TOTAL
MOVING TO AND FROM BED TO CHAIR: TOTAL
STANDING UP FROM CHAIR USING ARMS: TOTAL
HELP NEEDED FOR BATHING: TOTAL
CLIMB 3 TO 5 STEPS WITH RAILING: TOTAL
PERSONAL GROOMING: TOTAL
MOVING FROM LYING ON BACK TO SITTING ON SIDE OF FLAT BED WITH BEDRAILS: TOTAL
TOILETING: TOTAL
DAILY ACTIVITIY SCORE: 6
WALKING IN HOSPITAL ROOM: TOTAL
DRESSING REGULAR UPPER BODY CLOTHING: TOTAL
MOBILITY SCORE: 6
TURNING FROM BACK TO SIDE WHILE IN FLAT BAD: TOTAL

## 2025-04-03 ASSESSMENT — PAIN SCALES - GENERAL: PAIN_LEVEL: 0

## 2025-04-03 ASSESSMENT — PAIN - FUNCTIONAL ASSESSMENT
PAIN_FUNCTIONAL_ASSESSMENT: CPOT (CRITICAL CARE PAIN OBSERVATION TOOL)
PAIN_FUNCTIONAL_ASSESSMENT: CPOT (CRITICAL CARE PAIN OBSERVATION TOOL)

## 2025-04-03 ASSESSMENT — PAIN SCALES - PAIN ASSESSMENT IN ADVANCED DEMENTIA (PAINAD): TOTALSCORE: MEDICATION (SEE MAR)

## 2025-04-03 NOTE — ANESTHESIA POSTPROCEDURE EVALUATION
Patient: López Lopez    Procedure Summary       Date: 04/03/25 Room / Location: [facility] OR 15 / [facility] OR    Anesthesia Start: 0829 Anesthesia Stop:     Procedures:       TRANSPLANT, KIDNEY (Abdomen)      LAPAROTOMY, EXPLORATORY (Abdomen) Diagnosis:       CKD (chronic kidney disease) stage 4, GFR 15-29 ml/min (Multi)      (CKD (chronic kidney disease) stage 4, GFR 15-29 ml/min (Multi) [N18.4])    Surgeons: Eliezer Barrett MD; Jason Snell MD Responsible Provider: Hakan Carter MD    Anesthesia Type: general ASA Status: 3            Anesthesia Type: general    Vitals Value Taken Time   BP  04/03/25 1357   Temp 37.2 °C (98.96 °F) 04/03/25 1354   Pulse 83 04/03/25 1355   Resp 15 04/03/25 1339   SpO2 99 % 04/03/25 1356   Vitals shown include unfiled device data.    Anesthesia Post Evaluation    Patient location during evaluation: ICU  Patient participation: complete - patient cannot participate  Level of consciousness: sedated  Pain score: 0  Pain management: adequate  Airway patency: patent  Cardiovascular status: hemodynamically stable and hypotensive  Respiratory status: ETT and acceptable  Hydration status: acceptable  Postoperative Nausea and Vomiting: none        No notable events documented.

## 2025-04-03 NOTE — ANESTHESIA PROCEDURE NOTES
Central Venous Line:    Date/Time: 4/2/2025 9:00 PM    A central venous line was placed in the OR for the following indication(s): central venous access and CVP monitoring.  Staffing  Performed: resident   Authorized by: Yadira Graham MD    Performed by: Hillary Orozco MD    Sterility preparation included the following: provider hand hygiene performed prior to central venous catheter insertion, all 5 sterile barriers used (gloves, gown, cap, mask, large sterile drape) during central venous catheter insertion, antiseptic used during central venous catheter insertion and skin prep agent completely dried prior to procedure.  Medical reason for not performing maximal sterile barrier technique: no  The patient was placed in Trendelenburg position.    Right internal jugular vein was prepped.    The site was prepped with Chlorhexidine.  Catheter size: 13 Fr.   Length: 15  Catheter type: introducer   Number of Lumens: double lumen    This catheter was not an oximetric catheter.    During the procedure, the following specific steps were taken: target vein identified, needle advanced into vein and blood aspirated and guidewire advanced into vein.  Seldinger technique used.  Procedure performed using ultrasound guidance.  Sterile gel and probe cover used in ultrasound-guided central venous catheter insertion.    Intravenous verification was obtained by ultrasound, venous blood return and manometry.      Post insertion care included: all ports aspirated, all ports flushed easily, guidewire removed intact, Biopatch applied, line sutured in place and dressing applied.    During the procedure the patient experienced: patient tolerated procedure well with no complications.           images stored in chart

## 2025-04-03 NOTE — PROGRESS NOTES
This LSW met with the patient’s wife following her request to speak with a  regarding accommodations at [facility]. Together, we completed and submitted the online application. This LSW then attempted to contact the [facility] by phone to check on same-day availability; however, there was no response. Follow-up will continue to support a safe and timely discharge for the patient.

## 2025-04-03 NOTE — BRIEF OP NOTE
Date: 4/3/2025  OR Location: [facility] OR    Name: López Lopez, : 1960, Age: 65 y.o., MRN: [id number], Sex: male    Diagnosis  Pre-op Diagnosis      * CKD (chronic kidney disease) stage 4, GFR 15-29 ml/min (Multi) [N18.4] Post-op Diagnosis     * CKD (chronic kidney disease) stage 4, GFR 15-29 ml/min (Multi) [N18.4]     Procedures  TRANSPLANT, KIDNEY  63502 - DC RENAL ALTRNSPLJ IMPLTJ GRF W/O RCP NEPHRECTOMY    LAPAROTOMY, EXPLORATORY  40705 - DC EXPLORATORY LAPAROTOMY CELIOTOMY W/WO BIOPSY SPX      Surgeons   Panel 1:     * Eliezer Barrett - Primary  Panel 2:     * Jason Snell - Primary    Resident/Fellow/Other Assistant:  Surgeons and Role:  Panel 1:     * Jason Snell MD - Assisting     * Samson Coleman MD - Resident - Assisting    Staff:   Circulator: Jake Parks Person: Pernell Garcia Scrub: Aleena    Anesthesia Staff: Anesthesiologist: Hakan Carter MD  C-AA: DONNA Meyers    Procedure Summary  Anesthesia: General  ASA: III  Estimated Blood Loss: 200 mL  Intra-op Medications:   Administrations occurring from 0737 to 1302 on 25:   Medication Name Total Dose   sodium chloride 0.9 % irrigation solution 3,000 mL   methylene blue (Provayblue) 1 % (10 mg/mL) 3 mL, polymyxin B 500,000 Units in sodium chloride 0.9 % 1,000 mL irrigation Cannot be calculated   alteplase (Cathflo Activase) injection 2 mg Cannot be calculated   angiotensin II (Giapreza) 2.5 mg in sodium chloride 0.9% 250 mL (0.01 mg/mL) infusion 265,440 ng   calcium chloride 0.5 g in dextrose 5% 50 mL IV Cannot be calculated   calcium chloride 1 g in dextrose 5% 50 mL IV Cannot be calculated   HYDROmorphone (Dilaudid) injection 0.4 mg Cannot be calculated   HYDROmorphone PF (Dilaudid) injection 0.2 mg Cannot be calculated   magnesium sulfate 2 g in sterile water for injection 50 mL Cannot be calculated   magnesium sulfate 4 g in sterile water for injection 100 mL Cannot be calculated   norepinephrine (Levophed) 8  mg in dextrose 5% 250 mL (0.032 mg/mL) infusion (premix) 1.51 mg   oxygen (O2) therapy 12,080 percent   pantoprazole (Protonix) injection 40 mg Cannot be calculated   piperacillin-tazobactam (Zosyn) 3.375 g in dextrose (iso) IV 50 mL 3.375 g   propofol (Diprivan) infusion Cannot be calculated   basiliximab (Simulect) 20 mg in sodium chloride 0.9% 50 mL IV 20 mg   methylPREDNISolone sodium succinate (SOLU-Medrol) 500 mg in dextrose 5% 100 mL IV Cannot be calculated   albumin human 5 % 750 mL   insulin regular (HumuLIN, NovoLIN) bolus from bag 2-6 Units Cannot be calculated   mannitol 20% 25 g   rocuronium (ZeMuron) 50 mg/5 mL injection 140 mg   NaCl 0.9 % bolus Cannot be calculated              Anesthesia Record               Intraprocedure I/O Totals          Intake    Transfuse Plasma :30 Minutes 300.00 mL    Transfuse RBC :30 Minutes 700.00 mL    NaCl 0.9 % bolus 1100.00 mL    basiliximab (Simulect) 20 mg in sodium chloride 0.9% 50 mL IV 55.00 mL    Norepinephrine Drip 51.10 mL    The total shown is the total volume documented since Anesthesia Start was filed.    mannitol 20% 125.00 mL    Angiotensin II 43.46 mL    The total shown is the total volume documented since Anesthesia Start was filed.    Insulin Drip 3.00 mL    The total shown is the total volume documented since Anesthesia Start was filed.    Propofol Drip 23.19 mL    The total shown is the total volume documented since Anesthesia Start was filed.    albumin human 5 % 750.00 mL    piperacillin-tazobactam (Zosyn) 3.375 g in dextrose (iso) IV 50 mL 50.00 mL    Total Intake 3200.75 mL       Output    Urine 1060 mL    NG/OG Tube Output 50 mL    Total Output 1110 mL       Net    Net Volume 2090.75 mL          Specimen:   ID Type Source Tests Collected by Time   1 : RIGHT LOBE LIVER BIOPSY Tissue LIVER BIOPSY RIGHT SURGICAL PATHOLOGY EXAM Eliezer Barrett MD 4/3/2025 2940                  Findings:   Left kidney implanted to left pelvis. 1x left renal vein  anastomosed to left external iliac vein. 2x renal arteries (1 accessory renal artery) attached to an aortic patch anastomosed to left external iliac artery. Both anastomosis end-side fashion.     Organ arrival 08:02   Donor cross clamp time 23h 34 mins  Organ out of ice 10:01  Reperfusion 10:40     (Ex lap and relook of liver transplant site will be dictated in a separate note)       Complications:  None; patient tolerated the procedure well.     Disposition: PACU - hemodynamically stable.  Condition: stable  Specimens Collected:   ID Type Source Tests Collected by Time   1 : RIGHT LOBE LIVER BIOPSY Tissue LIVER BIOPSY RIGHT SURGICAL PATHOLOGY EXAM Eliezer Barrett MD 4/3/2025 7223     Attending Attestation:     Eliezer Barrett  Phone Number: [phone]

## 2025-04-03 NOTE — ANESTHESIA PROCEDURE NOTES
Central Venous Line:    Date/Time: 4/2/2025 9:00 PM    A central venous line was placed in the OR for the following indication(s): CVP monitoring.  Staffing  Performed: attending and resident   Authorized by: Yadira Graham MD    Performed by: Hillary Orozco MD    Sterility preparation included the following: provider hand hygiene performed prior to central venous catheter insertion, all 5 sterile barriers used (gloves, gown, cap, mask, large sterile drape) during central venous catheter insertion, antiseptic used during central venous catheter insertion and skin prep agent completely dried prior to procedure.  The patient was placed in Trendelenburg position.    Right internal jugular vein was prepped.    The site was prepped with Chlorhexidine.  Size: 9 Fr   Catheter type: introducer   Number of Lumens: single lumen    This catheter was not an oximetric catheter.    During the procedure, the following specific steps were taken: target vein identified, needle advanced into vein and blood aspirated and guidewire advanced into vein.  Seldinger technique used.  Procedure performed using ultrasound guidance.  Sterile gel and probe cover used in ultrasound-guided central venous catheter insertion.      Post insertion care included: all ports aspirated, all ports flushed easily, guidewire removed intact, Biopatch applied, line sutured in place and dressing applied.    During the procedure the patient experienced: patient tolerated procedure well with no complications.      A non-oximetric, 8 (size) Pulmonary Artery Catheter (PAC) was placed through the Introducer CVL in the right internal jugular vein.  The PAC placement was confirmed by pressure tracing changes.  The patient experienced the following events during the procedure: dysrhythmias and Tachyarrhythmia into 150s (transient V fib) noted on EKG with PAC advancement. PAC removed and once pateint HDS with treatment, attempted to float again,  successfully..

## 2025-04-03 NOTE — ANESTHESIA PROCEDURE NOTES
Airway  Date/Time: 4/2/2025 8:38 PM  Urgency: elective    Airway not difficult    Staffing  Performed: DAVID   Authorized by: Yadira Graham MD    Performed by: DONNA Blanco  Patient location during procedure: OR    Indications and Patient Condition  Indications for airway management: anesthesia and airway protection  Spontaneous Ventilation: absent  Sedation level: deep  Preoxygenated: yes  Patient position: sniffing  Mask difficulty assessment: 0 - not attempted  No planned trial extubation    Final Airway Details  Final airway type: endotracheal airway      Successful airway: ETT  Cuffed: yes   Successful intubation technique: direct laryngoscopy  Facilitating devices/methods: cricoid pressure  Endotracheal tube insertion site: oral  Blade: Marcela  Blade size: #4  ETT size (mm): 7.5  Cormack-Lehane Classification: grade IIb - view of arytenoids or posterior of glottis only  Placement verified by: chest auscultation and capnometry   Cuff volume (mL): 8  Measured from: teeth  ETT to teeth (cm): 23  Number of attempts at approach: 1  Number of other approaches attempted: 0

## 2025-04-03 NOTE — BRIEF OP NOTE
Date: 2025 - 4/3/2025  OR Location: [facility] OR    Name: López Lopez, : 1960, Age: 65 y.o., MRN: [id number], Sex: male    Diagnosis  Pre-op Diagnosis      * Alcoholic cirrhosis of liver with ascites (Multi) [K70.31]     * Stage 4 chronic kidney disease (Multi) [N18.4] Post-op Diagnosis     * Alcoholic cirrhosis of liver with ascites (Multi) [K70.31]     * Stage 4 chronic kidney disease (Multi) [N18.4]     Procedures  TRANSPLANT, LIVER  65774 - OH LVR ALTRNSPLJ ORTHOTOPIC PRTL/WHL DON ANY AGE      Surgeons      * Jason Snell - Primary    Resident/Fellow/Other Assistant:  Surgeons and Role:     * Eliezer Barrett MD - Assisting     * Shilo Garcias MD - Resident - Assisting    Staff:   Lowulator: Greer  Scrub Person: Ricki Reynoldsub Person: Paola    Anesthesia Staff: Anesthesiologist: Yadira Graham MD  C-AA: DONNA Blanco  Anesthesia Resident: Hillary Orozco MD    Procedure Summary  Anesthesia: General  ASA: IV  Estimated Blood Loss: 3000mL  Intra-op Medications:   Administrations occurring from 25 1855 to 25 0735:   Medication Name Total Dose   angiotensin II (Giapreza) 2.5 mg in sodium chloride 0.9% 250 mL (0.01 mg/mL) infusion 1,345,120 ng   heparin 1,000 unit/mL injection 1,000 Units Cannot be calculated   heparin 1,000 unit/mL injection 1,000 Units Cannot be calculated   PrismaSol BGK 2/3.5 CRRT solution 24,800 mL   fluconazole (Diflucan) 400 mg in sodium chloride (iso)  mL 400 mg   methylPREDNISolone sodium succinate (SOLU-Medrol) 500 mg in dextrose 5% 100 mL  mg   piperacillin-tazobactam (Zosyn) 3.375 g in dextrose (iso) IV 50 mL 6.75 g   albumin human bottle 5% 1,250 mL   amiodarone bolus 50 mg/mL 150 mg   apixaban (Eliquis) tablet 5 mg Cannot be calculated   calcium chloride 10% 1.5 g   dextrose 50 % injection 25 g 25 g   electrolyte-A (Plasmalyte-A) Cannot be calculated   electrolyte-A (Plasmalyte-A) Cannot be calculated   electrolyte-A (Plasmalyte-A) Cannot  be calculated   EPINEPHrine (Adrenalin) 16 mcg/mL IV bolus 32 mcg   esmolol 10 mg/mL 50 mg   fentaNYL (Sublimaze) injection 50 mcg/mL 100 mcg   furosemide (Lasix) 10 mg/mL 60 mg   insulin regular (HumuLIN R, NovoLIN R) bolus from bag 10 Units   lidocaine (Xylocaine) injection 2 % 200 mg   magnesium sulfate IV 1 g/100 mL D5 (premix) 1 g   midazolam PF (Versed) injection 1 mg/mL 2 mg   norepinephrine (Levophed) 8 mg / 250 mL NS  (premix) 2.92 mg   norepinephrine (Levophed) 16 mcg/mL IV bolus 288 mcg   phenylephrine (Moody-Synephrine) injection 400 mcg   PrismaSol 4/2.5 CRRT solution 2,440 mL   PrismaSol 4/2.5 CRRT solution 950 mL   propofol (Diprivan) injection 10 mg/mL 336.64 mg   rocuronium (ZeMuron) 50 mg/5 mL injection 100 mg   sodium bicarbonate injection 1 mEq/mL 150 mEq   vasopressin (Vasostrict) 20 Units in sodium chloride 0.9% 100 mL (0.2 Units/mL) infusion 5.87 Units   vasopressin 20 units/mL 5 Units   vecuronium 10 mg 10 mg              Anesthesia Record               Intraprocedure I/O Totals          Intake    Magnesium Sulfate 0.00 mL    The total shown is the total volume documented since Anesthesia Start was filed.    Transfuse Plasma :30 Minutes 2400.00 mL    Transfuse Platelets :30 Minutes 500.00 mL    Transfuse RBC :30 Minutes 2450.00 mL    electrolyte-A (Plasmalyte-A) 3000.00 mL    Norepinephrine Drip 0.00 mL    The total shown is the total volume documented since Anesthesia Start was filed.    Angiotensin II 0.00 mL    The total shown is the total volume documented since Anesthesia Start was filed.    Insulin Drip 0.00 mL    The total shown is the total volume documented since Anesthesia Start was filed.    Vasopressin Drip 0.00 mL    The total shown is the total volume documented since Anesthesia Start was filed.    Cell Saver 1686 mL    Total Intake 58631 mL       Output    Urine 355 mL    Other 4630 mL    Total Output 4985 mL       Net    Net Volume 5051 mL          Specimen:   ID Type Source  Tests Collected by Time   1 : ASCITES Non-Gynecologic Cytology ASCITIC FLUID CYTOLOGY CONSULTATION (NON-GYNECOLOGIC) Jason Snell MD 4/2/2025 2219   2 : HEPATECTOMY Tissue LIVER TOTAL HEPATECTOMY SURGICAL PATHOLOGY EXAM Jason Snell MD 4/2/2025 2359   3 : DONOR GALLBLADDER Tissue GALLBLADDER CHOLECYSTECTOMY SURGICAL PATHOLOGY EXAM Jason Snell MD 4/3/2025 0055           Findings:   Donor liver with atrophied left lobe otherwise conventional anatomy.  Donor cross clamp time 4/2/25 @ 1106  Organ arrival 2305  Incision 2157  Out of ice 2359  Portal reperfusion 0026  Artery reperfusion 0043    Duct to duct, hepatic vein piggyback  Ascites: 4.63L    Transfusions:  pRBCs - 7  FFP - 8  Plt - 2  Cryo - 2  Cell-saver 1467  Crystalloid - 2  Albumin 5% - 6    Complications:  None; patient tolerated the procedure well.     Disposition: ICU - intubated and hemodynamically stable.  Condition: stable  Specimens Collected:   ID Type Source Tests Collected by Time   1 : ASCITES Non-Gynecologic Cytology ASCITIC FLUID CYTOLOGY CONSULTATION (NON-GYNECOLOGIC) Jason Snell MD 4/2/2025 2219   2 : HEPATECTOMY Tissue LIVER TOTAL HEPATECTOMY SURGICAL PATHOLOGY EXAM Jason Snell MD 4/2/2025 2359   3 : DONOR GALLBLADDER Tissue GALLBLADDER CHOLECYSTECTOMY SURGICAL PATHOLOGY EXAM Jason Snell MD 4/3/2025 0055     Attending Attestation: I was present and scrubbed for the entire procedure.    Jason Snell  Phone Number: [phone]

## 2025-04-03 NOTE — PROGRESS NOTES
Pharmacist Post-Transplant Note    The Clinical Transplant Pharmacist is aware that López Lopez  has been admitted and has undergone kidney/liver transplantation. A transplant pharmacist will be rounding with the multidisciplinary transplant team and making recommendations on his medication regimen.     The patient's medications have been reviewed in conjunction with the multidisciplinary transplant team. The pharmacist is in agreement with the plan of care for medications at this time.    Patient and donor factors were screened to determine the appropriate post-transplant protocol and current immunosuppression plan includes:    Induction Regimen:  Basiliximab due to kidney/liver transplant    Maintenance Regimen:  Standard maintenance immunosuppression with tacrolimus, mycophenolate, and a steroid taper.     Maintenance immunosuppression and anti-infective prophylaxis will begin according to protocol. Home medications will begin when the patient is clinically stable.     Of note, CMV D-/R+ so patient will require 3 months of CMV prophylaxis with valganciclovir per protocol.    Cuca Saab, PharmD, BCTXP  Clinical Pharmacy Specialist - Solid Organ Transplant

## 2025-04-03 NOTE — PROGRESS NOTES
Faxed notification to Kuldip at [phone].   Subjective   Patient ID: Alba is a 26 year old female.    Chief Complaint   Patient presents with   • Mouth/Lip Problem     patient reports redness on upper lip with\"crust and oozing around top lip\" since yesterday. Patient reports started using a new exfoliator yesterday. Redness and swelling noted.      Patient   States    She  Has  Eczema  And acne  And  Was  Using an  Exfoliate  That yesteday caused  Redness  Crusting and oozing of  Area  Of her  Upper lip        Past Medical History:   Diagnosis Date   • RAD (reactive airway disease)        MEDICATIONS:  Current Outpatient Medications   Medication Sig   • azithromycin (ZITHROMAX) 500 MG tablet Take 2 tablets by mouth daily.   • Norgestimate-Ethinyl Estradiol 0.18/0.215/0.25 MG-25 MCG tablet TAKE 1 TABLET DAILY   • triamcinolone (ARISTOCORT) 0.1 % cream APPLY 2-3 TIMES DAILY TO AFFECTED AREA(S)   • fluticasone-salmeterol (ADVAIR DISKUS) 250-50 MCG/DOSE inhaler Inhale 1 puff into the lungs two times daily.     No current facility-administered medications for this visit.        ALLERGIES:  ALLERGIES:   Allergen Reactions   • Amoxicillin Other (See Comments)   • Corn-Related Products Other (See Comments)   • Erythromycin SWELLING   • Fish   (Food Or Med) Other (See Comments)   • Grass Other (See Comments)     Unknown   • No Name Available Other (See Comments)     m-Unknown     • Peanut   (Food Or Med) Other (See Comments)   • Soybean Allergy    (Food Or Med) Other (See Comments)   • Trees Other (See Comments)     Unknown   • Wheat   (Food Or Med) Other (See Comments)       PAST SURGICAL HISTORY:  No past surgical history on file.    FAMILY HISTORY:  Family History   Problem Relation Age of Onset   • Diabetes Mother    • Asthma Father    • Asthma Maternal Grandmother        SOCIAL HISTORY:  Social History     Tobacco Use   • Smoking status: Never Smoker   • Smokeless tobacco: Never Used   Substance Use Topics   • Alcohol use: Yes     Alcohol/week: 3.0 standard  drinks     Types: 3 Glasses of wine per week   • Drug use: No         Patient's medications, allergies, past medical, surgical, social and family histories were reviewed and updated as appropriate.  Patient's medications, allergies, past medical, surgical, and social history  were reviewed and updated as appropriate.    Review of Systems   Constitutional: Negative.    HENT: Negative.    Eyes: Negative.    Respiratory: Negative.    Cardiovascular: Negative.    Gastrointestinal: Negative.    Endocrine: Negative.    Genitourinary: Negative.    Musculoskeletal: Negative.    Skin: Positive for rash.   Neurological: Negative.    Psychiatric/Behavioral: Negative.    All other systems reviewed and are negative.      Objective   Physical Exam  Vitals signs and nursing note reviewed.   Constitutional:       General: She is not in acute distress.  HENT:      Head: Normocephalic and atraumatic.      Right Ear: Tympanic membrane, ear canal and external ear normal.      Left Ear: Tympanic membrane, ear canal and external ear normal.      Nose: Nose normal.      Mouth/Throat:      Mouth: Mucous membranes are moist.      Pharynx: No posterior oropharyngeal erythema.   Eyes:      Extraocular Movements: Extraocular movements intact.      Conjunctiva/sclera: Conjunctivae normal.      Pupils: Pupils are equal, round, and reactive to light.   Neck:      Musculoskeletal: Normal range of motion and neck supple.   Cardiovascular:      Rate and Rhythm: Normal rate and regular rhythm.   Pulmonary:      Effort: Pulmonary effort is normal. No respiratory distress.      Breath sounds: Normal breath sounds.   Abdominal:      General: Bowel sounds are normal.      Palpations: Abdomen is soft.      Tenderness: There is no abdominal tenderness.   Musculoskeletal: Normal range of motion.   Skin:     Findings: Rash present.      Comments: Patient  Has   Facial  Acne  She has  On make up  There  Is  A erythematous  Rash above the  Lip with weeping  Of   Clear  material   Neurological:      General: No focal deficit present.      Mental Status: She is alert and oriented to person, place, and time.   Psychiatric:         Mood and Affect: Mood normal.         Behavior: Behavior normal.       Visit Vitals  /72 (BP Location: LUE - Left upper extremity, Patient Position: Sitting, Cuff Size: Regular)   Pulse 100   Temp 98.9 °F (37.2 °C) (Oral)   Resp 20   Ht 5' 3\" (1.6 m)   Wt 62.6 kg (138 lb)   LMP 04/15/2020 (Exact Date)   SpO2 (!) 89%   BMI 24.45 kg/m²       Assessment   Problem List Items Addressed This Visit     None      Visit Diagnoses     Rash on lips    -  Primary    Relevant Medications    hydroCORTisone (CORTIZONE) 2.5 % cream          This is a 26 year old year-old female who presents with rash  Above  lip.    Instructions provided as documented in the AVS.    Thank you for visiting [facility].  To establish care with an [facility] Provider, please call [phone] ([phone]).

## 2025-04-03 NOTE — ANESTHESIA POSTPROCEDURE EVALUATION
Patient: López Lopez    Procedure Summary       Date: 04/02/25 Room / Location: [facility] OR 15 / [facility]    Anesthesia Start: 2026 Anesthesia Stop: 04/03/25 0302    Procedure: TRANSPLANT, LIVER Diagnosis:       Alcoholic cirrhosis of liver with ascites (Multi)      Stage 4 chronic kidney disease (Multi)      (Alcoholic cirrhosis of liver with ascites (Multi) [K70.31])      (Stage 4 chronic kidney disease (Multi) [N18.4])    Surgeons: Jason Snell MD Responsible Provider: Yadira Graham MD    Anesthesia Type: general ASA Status: 4            Anesthesia Type: general    Vitals Value Taken Time   /65 04/03/25 0252   Temp 36.2 °C (97.16 °F) 04/03/25 0302   Pulse 89 04/03/25 0300   Resp 17 04/03/25 0300   SpO2 95 % 04/03/25 0302   Vitals shown include unfiled device data.    Anesthesia Post Evaluation    Patient location during evaluation: ICU  Patient participation: complete - patient cannot participate  Level of consciousness: sedated  Pain management: adequate  Airway patency: patent  Cardiovascular status: acceptable  Respiratory status: acceptable, ETT and ventilator  Hydration status: acceptable  Postoperative Nausea and Vomiting: none  Comments: Patient transported to ICU intubated, bag ventilated, monitored. Vasopressor requirements decreasing. Bedside handoff provided to ICU intensivist, fellow, resident, respiratory therapy, and nursing.    No notable events documented.

## 2025-04-03 NOTE — ANESTHESIA PREPROCEDURE EVALUATION
Patient: López Lopez    Procedure Information       Anesthesia Start Date/Time: 04/03/25 0829    Procedures:       TRANSPLANT, KIDNEY (Abdomen)      LAPAROTOMY, EXPLORATORY (Abdomen) - RE EXPLORATION LIVER ULTRA SOUND LIVER BIOPSY WASH OUT    Location: [facility] OR 15 / [facility]    Surgeons: Eliezer Barrett MD; Jason Snell MD            Relevant Problems   Cardiac   (+) Coronary artery disease involving native coronary artery of native heart   (+) Mixed hyperlipidemia   (+) Paroxysmal atrial fibrillation (Multi)      /Renal   (+) Chronic renal impairment, stage 4 (severe) (Multi)      Liver   (+) Alcoholic cirrhosis of liver with ascites (Multi)   (+) Liver failure without hepatic coma (Multi)   (+) Metabolic dysfunction-associated steatohepatitis (MASH)      Endocrine   (+) Type 2 diabetes mellitus with diabetic chronic kidney disease   (+) Type 2 diabetes mellitus with hyperglycemia, without long-term current use of insulin      Hematology   (+) Acute idiopathic thrombocytopenic purpura (Multi)   (+) Anemia       Clinical information reviewed:    Allergies                NPO Detail:  No data recorded     Physical Exam    Airway   Cardiovascular    Dental    Pulmonary    Abdominal      Other findings: Patient intubated and sedated in ICU following liver txp overnight.          Anesthesia Plan    History of general anesthesia?: yes  History of complications of general anesthesia?: no    ASA 3     general     intravenous induction   Postoperative administration of opioids is intended.    Plan discussed with CAA.

## 2025-04-03 NOTE — SIGNIFICANT EVENT
04/03/25 1109   Patient Interaction   Organ Liver/kidney   Type of Interaction Phone conference   Interdisciplinary Rounds   Attendance Surgeon;Physician;TALIA;Coordinator   Topics Discussed Blood test results;Medications;Imaging/test results     Transplant Surgery Multidisciplinary Team Note    López Lopez is a 65 y.o. male   POD#1 from a Liver from a DCD donor. His post operative complications: None    24 Hour Events  1. OR today for DDKT    Last Recorded Vitals  Visit Vitals  /68   Pulse 83   Temp 37 °C (98.6 °F)   Resp 18      Intake/Output last 3 Shifts:    Intake/Output Summary (Last 24 hours) at 4/3/2025 1110  Last data filed at 4/3/2025 1103  Gross per 24 hour   Intake 13717.96 ml   Output 67485 ml   Net 733.96 ml      Vitals:    04/02/25 1540   Weight: 112 kg (247 lb 11.2 oz)        Assessment/Plan   OLT POD 1, OR today for DDKT    Liver graft function  -Alk phos 64, , , T bili 4  -Repeat liver ultrasound after OR    Immunosuppression/PPX  -simulect, next dose due 4/7  -MMF 1gm BID, start tac tonight  -fluc    Principal Problem:    Management after organ transplant  Active Problems:  Patient Active Problem List   Diagnosis    Acute idiopathic thrombocytopenic purpura (Multi)    Anemia    History of CAD (coronary artery disease)    Mixed hyperlipidemia    Paroxysmal atrial fibrillation (Multi)    Type 2 diabetes mellitus with hyperglycemia, without long-term current use of insulin    Liver failure without hepatic coma (Multi)    Metabolic dysfunction-associated steatohepatitis (MASH)    Status post insertion of drug-eluting stent into left anterior descending artery for coronary artery disease    Type 2 diabetes mellitus with diabetic chronic kidney disease    History of percutaneous transluminal coronary angioplasty    Coronary artery disease involving native coronary artery of native heart    Pre-liver transplant, listed    Pre-kidney transplant, listed    Refractory ascites     Alcoholic cirrhosis of liver with ascites (Multi)    Stage 4 chronic kidney disease (Multi)    Chronic renal impairment, stage 4 (severe) (Multi)    CKD (chronic kidney disease) stage 4, GFR 15-29 ml/min (Multi)        Immunosuppression reviewed and adjusted       Induction: Simulect and Steroid       Tacrolimus goal 8-10 ng/mL. Current dose  to start tonight  mg BID         MMF 1000 mg po BID       Solumedrol taper  DVT prophylaxis SCDS  PT/OT  Diet:  NPO  Anticipated discharge 7 days    Imelda Em, APRN-CNP

## 2025-04-03 NOTE — OP NOTE
TRANSPLANT, KIDNEY Operative Note     Date: 4/3/2025  OR Location: [facility] OR    Name: López Lopez, : 1960, Age: 65 y.o., MRN: [id number], Sex: male    Diagnosis  Pre-op Diagnosis      * CKD (chronic kidney disease) stage 4, GFR 15-29 ml/min (Multi) [N18.4] Post-op Diagnosis     * CKD (chronic kidney disease) stage 4, GFR 15-29 ml/min (Multi) [N18.4]     Procedures  TRANSPLANT, KIDNEY  12395 - [facility] RENAL ALTRNSPLJ IMPLTJ GRF W/O RCP NEPHRECTOMY    LAPAROTOMY, EXPLORATORY  69823 - [facility] EXPLORATORY LAPAROTOMY CELIOTOMY W/WO BIOPSY SPX  Intraoperative ultrasound of the liver.     Surgeons   Panel 1:     * Eliezer Barrett - Primary  Panel 2:     * Jason Snell - Primary    Resident/Fellow/Other Assistant:  Surgeons and Role:  Panel 1:     * Jason Snell MD - Assisting     * Samson Coleman MD - Resident - Assisting    Staff:   Circulator: Jake Parks Person: Pernell Garcia Scrub: Aleena    Anesthesia Staff: Anesthesiologist: Hakan Carter MD  C-AA: DONNA Meyers    Procedure Summary  Anesthesia: General  ASA: III  Estimated Blood Loss: 100 mL  Intra-op Medications:   Administrations occurring from 0737 to 1302 on 25:   Medication Name Total Dose   sodium chloride 0.9 % irrigation solution 3,000 mL   methylene blue (Provayblue) 1 % (10 mg/mL) 3 mL, polymyxin B 500,000 Units in sodium chloride 0.9 % 1,000 mL irrigation Cannot be calculated   alteplase (Cathflo Activase) injection 2 mg Cannot be calculated   calcium chloride 0.5 g in dextrose 5% 50 mL IV Cannot be calculated   calcium chloride 1 g in dextrose 5% 50 mL IV Cannot be calculated   HYDROmorphone (Dilaudid) injection 0.4 mg Cannot be calculated   HYDROmorphone PF (Dilaudid) injection 0.2 mg Cannot be calculated   magnesium sulfate 2 g in sterile water for injection 50 mL Cannot be calculated   magnesium sulfate 4 g in sterile water for injection 100 mL Cannot be calculated   norepinephrine (Levophed) 8 mg in dextrose 5% 250  mL (0.032 mg/mL) infusion (premix) 1.51 mg   oxygen (O2) therapy 12,080 percent   pantoprazole (Protonix) injection 40 mg Cannot be calculated   piperacillin-tazobactam (Zosyn) 3.375 g in dextrose (iso) IV 50 mL 3.375 g   propofol (Diprivan) infusion Cannot be calculated   basiliximab (Simulect) 20 mg in sodium chloride 0.9% 50 mL IV 20 mg   methylPREDNISolone sodium succinate (SOLU-Medrol) 500 mg in dextrose 5% 100 mL IV Cannot be calculated   albumin human 5 % 750 mL   angiotensin II (Giapreza) 2.5 mg in sodium chloride 0.9% 250 mL (0.01 mg/mL) infusion 265,440 ng   insulin regular (HumuLIN, NovoLIN) bolus from bag 2-6 Units Cannot be calculated   mannitol 20% 25 g   rocuronium (ZeMuron) 50 mg/5 mL injection 140 mg   NaCl 0.9 % bolus Cannot be calculated              Anesthesia Record               Intraprocedure I/O Totals          Intake    Transfuse Plasma :30 Minutes 300.00 mL    Transfuse RBC :30 Minutes 700.00 mL    NaCl 0.9 % bolus 1100.00 mL    basiliximab (Simulect) 20 mg in sodium chloride 0.9% 50 mL IV 55.00 mL    Norepinephrine Drip 51.10 mL    The total shown is the total volume documented since Anesthesia Start was filed.    mannitol 20% 125.00 mL    Angiotensin II 43.46 mL    The total shown is the total volume documented since Anesthesia Start was filed.    Insulin Drip 3.00 mL    The total shown is the total volume documented since Anesthesia Start was filed.    Propofol Drip 23.19 mL    The total shown is the total volume documented since Anesthesia Start was filed.    albumin human 5 % 750.00 mL    piperacillin-tazobactam (Zosyn) 3.375 g in dextrose (iso) IV 50 mL 50.00 mL    Total Intake 3200.75 mL       Output    Urine 1060 mL    NG/OG Tube Output 50 mL    Total Output 1110 mL       Net    Net Volume 2090.75 mL          Specimen:   ID Type Source Tests Collected by Time   1 : RIGHT LOBE LIVER BIOPSY Tissue LIVER BIOPSY RIGHT SURGICAL PATHOLOGY EXAM Eliezer Barrett MD 4/3/2025 3144                  Drains and/or Catheters:   Closed/Suction Drain 1 RLQ Bulb 19 Fr. (Active)   Site Description Reddened;Healing 04/03/25 0800   Dressing Status Clean;Dry;Occlusive 04/03/25 0800   Drainage Appearance Serosanguineous;Bright red 04/03/25 0800   Status To bulb suction 04/03/25 0800   Output (mL) 75 mL 04/03/25 1900       Closed/Suction Drain 2 RLQ Bulb 19 Fr. (Active)   Site Description Healing 04/03/25 0800   Dressing Status Clean;Dry;Occlusive 04/03/25 0800   Drainage Appearance Serosanguineous;Bright red 04/03/25 0800   Status To bulb suction 04/03/25 0800   Number of Sutures Removed 90 04/03/25 0600   Output (mL) 25 mL 04/03/25 1900       Closed/Suction Drain 3 LLQ Bulb 19 Fr. (Active)   Site Description Healing;Leaking at site 04/03/25 0800   Dressing Status Clean;Dry;Occlusive 04/03/25 0800   Drainage Appearance Bright red;Serosanguineous 04/03/25 0800   Status To bulb suction 04/03/25 0800   Output (mL) 125 mL 04/03/25 1900       Closed/Suction Drain LLQ Bulb 19 Fr. (Active)   Output (mL) 25 mL 04/03/25 1900       NG/OG/Feeding Tube NG - [place] sump 16 Fr Left nostril (Active)   Tube Status Unclamped;Low intermittent suction 04/03/25 0800   Placement Verification Measurements 04/03/25 0800   Distal Tube Measurement 63 cm 04/03/25 0800   Site Assessment Clean;Dry;Intact 04/03/25 0800   Drainage Appearance Dark red 04/03/25 0800   NG/OG Interventions Air injected into blue air vent port 04/03/25 0800   Response To Intervention No resistance met 04/03/25 0800   Tube Securement Taped to nostril center 04/03/25 0800   Output (mL) 100 mL 04/03/25 1600       Urethral Catheter Non-latex;Straight-tip 14 Fr. (Active)   Site Assessment Clean;Skin intact 04/03/25 0800   Collection Container Standard drainage bag 04/03/25 0800   Securement Method Securing device (Describe) 04/03/25 0800   Reason for Continuing Urinary Catheterization accurate hourly measurement of urine volume in a critically ill patient that cannot be  assessed by other volumes and urine collection strategies 04/03/25 0800   Output (mL) 60 mL 04/03/25 1900       Tourniquet Times:         Implants:  Implants       Type Name Action Serial No.      Stent STENT, URETERAL 6.5FR X 12CM - [id number] Implanted               Findings: Patent hepatic artery with minimally diminished doppler signals improved with repositioning. Intraoperative ultrasound demonstrated excellent intrahepatic flow.     Procedure and findings: López Lopez is an 65 y.o. male who underwent liver transplant earlier today.  A postoperative ultrasound was performed in the intensive care unit after he was taken there following his liver transplant procedure.  This demonstrated a patent hepatic artery but with with concern of poor intrahepatic arterial flows.  As the patient was in the operating room to have a kidney transplant we elected to reexplore his abdomen to inspect the hepatic artery directly.    At the completion of his kidney transplant the abdomen was reprepped and draped in the standard surgical fashion.  We performed a proper timeout procedure.  The staples were removed.  The fascial stitch was divided.  Fixed Weathers retractor was placed for exposure.  We palpated the hepatic artery and appeared to have an excellent pulse.  We dopplered the artery in the intrahepatic signal and we noted slightly diminished diastolic flow.  However it was clearly patent.  We reposition the artery until we identified a position where the flow appeared to be maximal.  We then confirmed excellent flow with Doppler ultrasonography.    We performed a Rudy-Cut needle biopsy in the right lobe of the liver.  Hemostasis was achieved with argon coagulation.  Once we were convinced we had excellent hemostasis we once again confirmed excellent flow in the artery.  We then we closed the incision in a single layer of looped #1 PDS suture.  Subcutaneous tissues were irrigated.  Skin was closed surgical staples.   Sterile dressings were applied.  The patient was taken to the surgical intensive care unit intubated.  His hemodynamics did improve through the case.  He is making urine from his recently kidney transplant.    Complications:  None; patient tolerated the procedure well.    Disposition: ICU - intubated and hemodynamically stable.  Condition: stable     Additional Details: This was an urgent procedure. Phone consent was obtained from the patient spouse    Attending Attestation: I was present and scrubbed for the entire procedure.  Jason Snell MD

## 2025-04-03 NOTE — ANESTHESIA PROCEDURE NOTES
Airway  Date/Time: 4/3/2025 8:29 AM  Urgency: elective      Staffing  Performed: CAA   Authorized by: Yadira Graham MD    Performed by: DONNA Meyers  Patient location during procedure: OR    Indications and Patient Condition  Indications for airway management: anesthesia      Final Airway Details  Final airway type: endotracheal airway          Additional Comments  Patient intubated from liver transplant overnight.

## 2025-04-03 NOTE — PROGRESS NOTES
Rec'd [facility] fax stating they could not process because the vendor does them.  Faxed Notification to [facility] Dept .

## 2025-04-03 NOTE — ANESTHESIA PROCEDURE NOTES
Peripheral IV  Date/Time: 4/2/2025 9:04 PM  Inserted by: DONNA Blanco    Placement  Needle size: 14 G  Laterality: right  Location: wrist  Local anesthetic: none  Site prep: alcohol  Technique: anatomical landmarks  Attempts: 1

## 2025-04-03 NOTE — H&P
History Of Present Illness  López Lopez is a 65 y.o. male with pmhx of CKD 4, DM2, HTN, CAD s/p PCI to prox LAD 2021, h/o pAFib on Eliquis, h/o ITP dx 5/2024 s/p steroids and IVIG, hyperlipidemia, obesity presenting with after OLT for MASLD/cirrhosis. CVVH with 2 k bath initiated for 1st time in OR given high K+ at case start. Urinating after 60 lasix Patient had intra-op event of brief Vtach/A fib RVR related to PA cath placement and improved with amio bolus. Planned ROR for renal transplant this AM     N: intubated and sedated. Post-op pain management. Hold home trazodone.   CV:  hx CAD s.p PCA to LAD. Normal BiVfx. Pressor requirement in OR that is improved with ANG II 30 and Levo 0.02 on arrival to SICU. Hold home eliquis.   P: to remain intubated given AM ROR. Post-op CXR with low lung volumas and some pulmonary congestion.   GI: enteric tube to LIWS. PPI while intubated, NPO.   : CVVH intra-op. Not continued in ICU given improved K+ and adequate UOP. Redose lasix if UOP decreases. Q4 RFPs   H: multiple blood products in OR. Correcting TEG. Trend drain OP.   E: hx prediabetes. Insulin gtt for tight BG control between ORs.  ID: steroids, zosyn, fluc per transplant team. Further immunosuppression after kidney transplant  Lines: right radial JOSE castro MAC with PAC, RIJ trialysis, PIV, LISA drains x 3. Nelson   Dispo: ROR this AM for kidney transplant.     Crit care time: 43 min

## 2025-04-03 NOTE — ANESTHESIA PROCEDURE NOTES
Arterial Line:    Date/Time: 4/2/2025 8:41 PM    Staffing  Performed: CAA   Authorized by: Yadira Graham MD    Performed by: DONNA Blanco    An arterial line was placed. Procedure performed using surface landmarks.in the OR for the following indication(s): continuous blood pressure monitoring and blood sampling needed.    A 20 gauge (size), 8 cm (length), Angiocath (type) catheter was placed into the Left radial artery, secured by Tegaderm   Seldinger technique used.  Events:  patient tolerated procedure well with no complications.

## 2025-04-03 NOTE — OP NOTE
Date:  4/3/2025                 OR Location: [facility] OR     Name: López Lopez  :   Age: AGE@  MRN: [id number]     Diagnosis  Pre-op Diagnosis     * ESLD Post-op Diagnosis     * S/p liver transplant      Procedures   donor liver transplant     Surgeon: Jason Snell     Assistant: DR. GEORGIANA MARTINEZ     Specimen: explant liver. Donor gallbladder: Yes     Indications: López Lopez is a 65 y.o. [unfilled] presents with ESLD for liver transplant.     Procedure Details:  The patient was brought into the operating room and placed in a supine position on the OR table. Prior to proceeding, a complete time out (verified at the same time by the circulating nurse and myself) was taken and recorded in the EMR to include identification of the patient, identification of the procedure, identification of the operative site, presence of all required radiographs and/or equipment, and the timely administration of appropriate antibiotics. In addition, I personally verified that the B blood type of the recipient was compatible with the B blood type of the donor and that the UNOS ID number [id number] on the paperwork matched [facility] for expected organs for the patient. This was separately recorded in the EMR with two signatures prior to anesthesia induction. Donor aortic cross clamp time was recorded in the transplant surgical form.     After the induction of GET anesthesia, arterial, volume, and monitoring lines were placed by the anesthesiologist. The urinary bladder was catheterized. The left arm was placed on an arm board and the right arm tucked at the patient's side. There was no tension on the axillae and all pressure points were padded. Sequential compression boots and Rico Huggers were used. The abdomen was prepped and draped in the usual sterile fashion.      A right subcostal incision with a midline extension was performed with a knife through skin and cautery through the deeper layers.  The abdominal cavity was opened. The round ligament was ligated and divided. The falciform was divided to the level of the hepatic veins. The gastro-hepatic omentum was divided. The left triangular ligament was divided with ligasure to allow placement of the self-retaining retractor.     The recipient joanne hepatis was carefully dissected to identify hepatic artery, portal vein, and common bile duct. The artery was dissected from the bifurcation of the right and left hepatic arteries, which were ligated and divided, toward the common hepatic and GDA. A replaced right hepatic artery was not:  The common bile duct was dissected for an adequate length and ligated and divided. The portal vein was dissected free from its investing connective tissue from the bifurcation to the superior border of the pancreas.  The portal vein was not thombosed     Attention was next directed to the right side of the liver where the right triangular ligament was taken down, exposing the bare area of the liver, and the IVC. The liver was taken off the IVC by dividing the short hepatic veins from the caudate and right lobes directly to the IVC. The right hepatic vein was divided with endovascular staple load. The portal vein was divided with an endovascular staple load. The left middle hepatic vein confluence was clamped with a Klintmalm vascular clamp and the liver was removed     At this juncture, the liver arrived to the operating form and the final ABO/organ verification was performed by Dr. Barrettand the circulating nurse.  [employer] perfusion was used in this case.  If [employer] was used the liver was flushed prior to removal of the organ from the preservation device. Then the liver backtable was completed. The vena cava was cleared of its perivascular tissues and the branches ligated. The joanne hepatis was dissected. The portal vein was dissected and its small branches ligated. After dissection of the artery, it was apparent that  "the donor graft had the following arterial anatomy: conventional. The bile duct appeared normal.     Veno-venous bypass was not not used.     IVC reconstruction technique: Piggyback suprahepatic to left-middle confluence with extension      The liver was brought to the OR table at the time documented in surgical form. The suprahepatic and infrahepatic donor vena cava were closed with JESSE endovascular staple loads. A sidebiting Klintmalm clamp was placed on the recipient IVC and a longitudinal opening was made. A corresponding venotomy on the donor IVC was made. The anastomosis was performed with a running 3-0 Prolene suture. Next the recipient portal vein was flushed and irrigated. The donor portal vein was cut to appropriate length and sewn to end portal vein as a continuous anastomosis incorporating \"growth factor\" to allow for adequate expansion of the portal vein. Once this was completed, anesthesia was notified and the liver was re-perfused by opening up the hepatic outflow and then the portal inflow. At each stage of unclamping, hemostasis was obtained through suture ligatures or cautery where appropriate. After a few minutes, the liver became pink and looked to be well perfused. Hemostasis was obtained. Reperfusion on the portal vein at the time documented on the transplant surgical form. Arterial inflow was provided to the graft by sewing the celiac axis to the Right-Left Branch Patch     Attention was then turned to the donor bile duct which was cut and active bleeding from the marbin-choledochal vessels was controlled with 6-0 suture. Biliary reconstruction was then performed to Common Bile Duct with a interrupted 5-0 PDS back wall and front wall.       A final check for hemostasis was made. 3 LISA drains were placed through separate incisions below the transverse abdominal incision and placed in the usual positions with the lateral 2 drains alongside the IVC and the middle drain abutting the biliary " anastomosis. The cavity was irrigated with saline. The abdomen was closed in layers using running suture. The incision was irrigated and the skin was closed with staples.     At the end of the case all instrument and sponge counts were correct. The patient remained intubated and was taken to the SICU, hemodynamically stable.       Additional findings or comments: Liver reperfused well and did make bile intraop. Good bile duct size match.     Complications: None; patient tolerated the procedure well     Attending Attestation: I was present and scrubbed for the entire case. Please note that Dr. Barrett served as first assistant for the donor hepatecomy and all vascular anastomoses as there was no suitably qualified resident available.      Jason Snell MD, IVETH  Professor of Surgery

## 2025-04-03 NOTE — H&P
History Of Present Illness  López Lopez is a 65 y.o. male with past medical history of decompensated cirrhosis 2/2 to MASLD (recurrent ascites, portal HTN requires paracentesis every other week), CKD 4, DM2, HTN, CAD s/p PCI to prox LAD 2021, h/o pAFib on Eliquis, h/o ITP dx 5/2024 s/p steroids and IVIG, hyperlipidemia, obesity who presents to [facility] for possible simultaneous liver and kidney transplant. He is now s/p liver transplant with plans to RTOR for kidney transplant.     OR Course:   EBL: 4000  UOP: 355  Crystalloid: 3000  Colloid: 1500  Cellsaver: 1868  Products: 7 RBC, 8 FFP, 2 cryo, 10 pack plt  Antibiotic time: 0020  Intubation: 7.5 ETT, DL grade IIb view, MAC4 blade  Lines/Access: R IJ MAC, R IJ trialysis, PIVx3, L radial art line     Past Medical History  Past Medical History:   Diagnosis Date    Alcoholic cirrhosis (Multi)     Anticoagulated     Ascites     CKD (chronic kidney disease) stage 4, GFR 15-29 ml/min (Multi)     Coronary artery disease     Diabetes mellitus (Multi)     Hyperlipidemia     FORMAN (nonalcoholic steatohepatitis)     Pancytopenia        Surgical History  Past Surgical History:   Procedure Laterality Date    CARDIAC CATHETERIZATION N/A 02/26/2025    Procedure: Left Heart Cath with Coronary Angiography and LV;  Surgeon: Cesilia Teresa MD;  Location: [facility];  Service: Cardiovascular;  Laterality: N/A;    CORONARY ANGIOPLASTY WITH STENT PLACEMENT          Social History  He reports that he has quit smoking. His smoking use included cigarettes. He has never used smokeless tobacco. He reports that he does not currently use alcohol. He reports that he does not currently use drugs.    Family History  No family history on file.     Allergies  Patient has no known allergies.    Review of Systems   Unable to perform ROS: Intubated        Physical Exam  Constitutional:       Comments: Critically ill patient intubated and sedated   HENT:      Head: Normocephalic and  "atraumatic.      Nose:      Comments: NG tube  Eyes:      Pupils: Pupils are equal, round, and reactive to light.   Cardiovascular:      Rate and Rhythm: Normal rate and regular rhythm.      Pulses: Normal pulses.      Heart sounds: Normal heart sounds.   Pulmonary:      Comments: ACVC  60% FiO2  PEEP 5  Abdominal:      Comments: Abdominal incision c/d/i  3 LISA drains   Skin:     General: Skin is warm and dry.   Neurological:      General: No focal deficit present.      Comments: Sedated on propofol  When paused woke up and followed commands and moved all extremities          Last Recorded Vitals  Blood pressure 122/74, pulse 72, temperature 36.6 °C (97.9 °F), temperature source Temporal, resp. rate 17, height 1.93 m (6' 4\"), weight 112 kg (247 lb 11.2 oz), SpO2 96%.    Relevant Results  Scheduled medications  fluconazole, 400 mg, intravenous, q24h  insulin regular, 0-10 Units, intravenous, Once  methylPREDNISolone sodium succinate (PF), 250 mg, intravenous, Daily   Followed by  [START ON 4/4/2025] methylPREDNISolone sodium succinate (PF), 125 mg, intravenous, Daily   Followed by  [START ON 4/6/2025] methylPREDNISolone sodium succinate (PF), 60 mg, intravenous, Daily   Followed by  [START ON 4/7/2025] predniSONE, 20 mg, oral, Daily  pantoprazole, 40 mg, intravenous, Daily before breakfast  piperacillin-tazobactam, 3.375 g, intravenous, q6h      Continuous medications  angiotensin II (Giapreza) 2.5 mg in sodium chloride 0.9% 250 mL (0.01 mg/mL) infusion, 1.25-40 ng/kg/min, Last Rate: 30 ng/kg/min (04/03/25 0102)  insulin regular infusion, 0-20 Units/hr  norepinephrine, 0-0.5 mcg/kg/min, Last Rate: Stopped (04/03/25 0315)  PrismaSol BGK 2/3.5, 3,000 mL/hr, Last Rate: Stopped (04/03/25 0156)  propofol, 0-50 mcg/kg/min, Last Rate: 50 mcg/kg/min (04/03/25 0337)      PRN medications  PRN medications: alteplase, calcium chloride, calcium chloride, heparin, heparin, HYDROmorphone, HYDROmorphone, insulin regular, magnesium " sulfate, magnesium sulfate, oxyCODONE, oxygen     Results for orders placed or performed during the hospital encounter of 04/02/25 (from the past 24 hours)   Prepare RBC: 10 Units   Result Value Ref Range    PRODUCT CODE S1477M85     Unit Number [id number]     Unit ABO B     Unit RH POS     XM INTEP COMP     Dispense Status TR     Blood Expiration Date 4/5/2025 11:59:00 PM EDT     PRODUCT BLOOD TYPE 7300     UNIT VOLUME 350    Prepare Platelets: 2 Units   Result Value Ref Range    PRODUCT CODE I7572W78     Unit Number [id number]     Unit ABO B     Unit RH POS     Dispense Status TR     Blood Expiration Date 4/4/2025 11:59:00 PM EDT     PRODUCT BLOOD TYPE 7300     UNIT VOLUME 228     PRODUCT CODE O8902C08     Unit Number [id number]     Unit ABO B     Unit RH POS     Dispense Status TR     Blood Expiration Date 4/4/2025 11:59:00 PM EDT     PRODUCT BLOOD TYPE 7300     UNIT VOLUME 196    Prepare Plasma: 10 Units   Result Value Ref Range    PRODUCT CODE F6341Q13     Unit Number [id number]     Unit ABO B     Unit RH POS     Dispense Status TR     Blood Expiration Date 4/7/2025  3:45:00 PM EDT     PRODUCT BLOOD TYPE 7300     UNIT VOLUME 224     PRODUCT CODE J1398E71     Unit Number [id number]     Unit ABO B     Unit RH POS     Dispense Status IS     Blood Expiration Date 4/7/2025  3:45:00 PM EDT     PRODUCT BLOOD TYPE 7300     UNIT VOLUME 206     PRODUCT CODE Y6331W59     Unit Number [id number]     Unit ABO B     Unit RH POS     Dispense Status TR     Blood Expiration Date 4/7/2025  3:45:00 PM EDT     PRODUCT BLOOD TYPE 7300     UNIT VOLUME 315     PRODUCT CODE H4381N96     Unit Number [id number]     Unit ABO B     Unit RH POS     Dispense Status IS     Blood Expiration Date 4/7/2025  3:45:00 PM EDT     PRODUCT BLOOD TYPE 7300     UNIT VOLUME 301     PRODUCT CODE A5319V25     Unit Number [id number]     Unit ABO B     Unit RH POS     Dispense Status TR     Blood Expiration Date  4/7/2025  3:45:00 PM EDT     PRODUCT BLOOD TYPE 7300     UNIT VOLUME 300     PRODUCT CODE T0144U75     Unit Number [id number]     Unit ABO B     Unit RH POS     Dispense Status TR     Blood Expiration Date 4/7/2025  3:45:00 PM EDT     PRODUCT BLOOD TYPE 7300     UNIT VOLUME 306     PRODUCT CODE D5661R90     Unit Number [id number]     Unit ABO B     Unit RH POS     Dispense Status TR     Blood Expiration Date 4/6/2025  6:50:00 AM EDT     PRODUCT BLOOD TYPE 7300     UNIT VOLUME 217     PRODUCT CODE R1149X35     Unit Number [id number]     Unit ABO B     Unit RH POS     Dispense Status TR     Blood Expiration Date 4/7/2025 12:14:00 PM EDT     PRODUCT BLOOD TYPE 7300     UNIT VOLUME 218     PRODUCT CODE E4649V98     Unit Number [id number]     Unit ABO B     Unit RH POS     Dispense Status TR     Blood Expiration Date 4/7/2025 12:14:00 PM EDT     PRODUCT BLOOD TYPE 7300     UNIT VOLUME 220     PRODUCT CODE U0401O96     Unit Number [id number]     Unit ABO B     Unit RH POS     Dispense Status TR     Blood Expiration Date 4/7/2025 12:14:00 PM EDT     PRODUCT BLOOD TYPE 7300     UNIT VOLUME 299    Coagulation Screen   Result Value Ref Range    Protime 15.5 (H) 9.8 - 12.4 seconds    INR 1.4 (H) 0.9 - 1.1    aPTT 35 26 - 36 seconds   CBC and Auto Differential   Result Value Ref Range    WBC 5.5 4.4 - 11.3 x10*3/uL    nRBC 0.0 0.0 - 0.0 /100 WBCs    RBC 2.70 (L) 4.50 - 5.90 x10*6/uL    Hemoglobin 8.2 (L) 13.5 - 17.5 g/dL    Hematocrit 25.3 (L) 41.0 - 52.0 %    MCV 94 80 - 100 fL    MCH 30.4 26.0 - 34.0 pg    MCHC 32.4 32.0 - 36.0 g/dL    RDW 15.3 (H) 11.5 - 14.5 %    Platelets 92 (L) 150 - 450 x10*3/uL    Neutrophils % 49.2 40.0 - 80.0 %    Immature Granulocytes %, Automated 0.4 0.0 - 0.9 %    Lymphocytes % 19.9 13.0 - 44.0 %    Monocytes % 8.4 2.0 - 10.0 %    Eosinophils % 21.2 0.0 - 6.0 %    Basophils % 0.9 0.0 - 2.0 %    Neutrophils Absolute 2.69 1.20 - 7.70 x10*3/uL    Immature Granulocytes  Absolute, Automated 0.02 0.00 - 0.70 x10*3/uL    Lymphocytes Absolute 1.09 (L) 1.20 - 4.80 x10*3/uL    Monocytes Absolute 0.46 0.10 - 1.00 x10*3/uL    Eosinophils Absolute 1.16 (H) 0.00 - 0.70 x10*3/uL    Basophils Absolute 0.05 0.00 - 0.10 x10*3/uL   Type And Screen   Result Value Ref Range    ABO TYPE B     Rh TYPE POS     ANTIBODY SCREEN NEG    Hepatitis C Antibody   Result Value Ref Range    Hepatitis C AB Nonreactive Nonreactive   Hepatitis B Core Antibody, Total   Result Value Ref Range    Hepatitis B Core AB- Total Nonreactive Nonreactive   Hepatitis B Surface Antibody   Result Value Ref Range    Hepatitis B Surface AB 4.8 <10.0 mIU/mL   Hepatitis B Surface Antigen   Result Value Ref Range    Hepatitis B Surface AG Nonreactive Nonreactive   Prepare RBC: 10 Units   Result Value Ref Range    PRODUCT CODE B8308B29     Unit Number [id number]     Unit ABO B     Unit RH POS     XM INTEP COMP     Dispense Status IS     Blood Expiration Date 4/5/2025 11:59:00 PM EDT     PRODUCT BLOOD TYPE 7300     UNIT VOLUME 350     PRODUCT CODE A8743J91     Unit Number [id number]     Unit ABO B     Unit RH POS     XM INTEP COMP     Dispense Status IS     Blood Expiration Date 4/17/2025 11:59:00 PM EDT     PRODUCT BLOOD TYPE 7300     UNIT VOLUME 350     PRODUCT CODE M4318D36     Unit Number [id number]     Unit ABO B     Unit RH POS     XM INTEP COMP     Dispense Status TR     Blood Expiration Date 4/18/2025 11:59:00 PM EDT     PRODUCT BLOOD TYPE 7300     UNIT VOLUME 350     PRODUCT CODE S1797C98     Unit Number [id number]     Unit ABO B     Unit RH POS     XM INTEP COMP     Dispense Status TR     Blood Expiration Date 4/24/2025 11:59:00 PM EDT     PRODUCT BLOOD TYPE 7300     UNIT VOLUME 350     PRODUCT CODE D2583G57     Unit Number [id number]     Unit ABO B     Unit RH POS     XM INTEP COMP     Dispense Status TR     Blood Expiration Date 4/18/2025 11:59:00 PM EDT     PRODUCT BLOOD TYPE 7300     UNIT VOLUME  350     PRODUCT CODE H9635W18     Unit Number [id number]     Unit ABO B     Unit RH POS     XM INTEP COMP     Dispense Status IS     Blood Expiration Date 4/18/2025 11:59:00 PM EDT     PRODUCT BLOOD TYPE 7300     UNIT VOLUME 350     PRODUCT CODE M6064A62     Unit Number [id number]     Unit ABO B     Unit RH POS     XM INTEP COMP     Dispense Status TR     Blood Expiration Date 4/18/2025 11:59:00 PM EDT     PRODUCT BLOOD TYPE 7300     UNIT VOLUME 350     PRODUCT CODE C1325C03     Unit Number [id number]     Unit ABO B     Unit RH POS     XM INTEP COMP     Dispense Status TR     Blood Expiration Date 4/19/2025 11:59:00 PM EDT     PRODUCT BLOOD TYPE 7300     UNIT VOLUME 350     PRODUCT CODE O2639P55     Unit Number [id number]     Unit ABO B     Unit RH POS     XM INTEP COMP     Dispense Status TR     Blood Expiration Date 4/18/2025 11:59:00 PM EDT     PRODUCT BLOOD TYPE 7300     UNIT VOLUME 290     PRODUCT CODE Q2712O96     Unit Number [id number]     Unit ABO B     Unit RH POS     XM INTEP COMP     Dispense Status RE     Blood Expiration Date 4/18/2025 11:59:00 PM EDT     PRODUCT BLOOD TYPE 7300     UNIT VOLUME 285    Prepare Cryoprecipitated AHF (Pooled Units): 2 Pools   Result Value Ref Range    PRODUCT CODE S0097R44     Unit Number [id number]     Unit ABO O     Unit RH POS     Dispense Status IS     Blood Expiration Date 4/3/2025  5:06:00 AM EDT     PRODUCT BLOOD TYPE 5100     UNIT VOLUME 88     PRODUCT CODE A2156V18     Unit Number [id number]     Unit ABO B     Unit RH POS     Dispense Status IS     Blood Expiration Date 4/3/2025  5:13:00 AM EDT     PRODUCT BLOOD TYPE 7300     UNIT VOLUME 86    Cytomegalovirus IgG   Result Value Ref Range    Cytomegalovirus IgG Reactive (A) Nonreactive   Dianne-Barr Virus Nuclear Antigen Antibody, IgG   Result Value Ref Range    EBV Nuclear Antigen Antibody, IgG Positive (A) Negative   Hepatic Function Panel   Result Value Ref Range    Albumin 3.2  (L) 3.4 - 5.0 g/dL    Bilirubin, Total 1.8 (H) 0.0 - 1.2 mg/dL    Bilirubin, Direct 0.6 (H) 0.0 - 0.3 mg/dL    Alkaline Phosphatase 163 (H) 33 - 136 U/L    ALT 18 10 - 52 U/L    AST 31 9 - 39 U/L    Total Protein 6.2 (L) 6.4 - 8.2 g/dL   HIV 1/2 Antigen/Antibody Screen with Reflex to Confirmation   Result Value Ref Range    HIV 1/2 Antigen/Antibody Screen with Reflex to Confirmation Nonreactive Nonreactive   Magnesium   Result Value Ref Range    Magnesium 2.34 1.60 - 2.40 mg/dL   Phosphorus   Result Value Ref Range    Phosphorus 4.5 2.5 - 4.9 mg/dL   Basic Metabolic Panel   Result Value Ref Range    Glucose 98 74 - 99 mg/dL    Sodium 132 (L) 136 - 145 mmol/L    Potassium 4.5 3.5 - 5.3 mmol/L    Chloride 103 98 - 107 mmol/L    Bicarbonate 18 (L) 21 - 32 mmol/L    Anion Gap 16 10 - 20 mmol/L    Urea Nitrogen 67 (H) 6 - 23 mg/dL    Creatinine 3.89 (H) 0.50 - 1.30 mg/dL    eGFR 17 (L) >60 mL/min/1.73m*2    Calcium 8.8 8.6 - 10.6 mg/dL   BLOOD GAS VENOUS FULL PANEL   Result Value Ref Range    POCT pH, Venous 7.32 (L) 7.33 - 7.43 pH    POCT pCO2, Venous 36 (L) 41 - 51 mm Hg    POCT pO2, Venous 34 (L) 35 - 45 mm Hg    POCT SO2, Venous 50 45 - 75 %    POCT Oxy Hemoglobin, Venous 49.1 45.0 - 75.0 %    POCT Hematocrit Calculated, Venous 23.0 (L) 41.0 - 52.0 %    POCT Sodium, Venous 132 (L) 136 - 145 mmol/L    POCT Potassium, Venous 4.5 3.5 - 5.3 mmol/L    POCT Chloride, Venous 105 98 - 107 mmol/L    POCT Ionized Calicum, Venous 1.20 1.10 - 1.33 mmol/L    POCT Glucose, Venous 96 74 - 99 mg/dL    POCT Lactate, Venous 2.7 (H) 0.4 - 2.0 mmol/L    POCT Base Excess, Venous -7.0 (L) -2.0 - 3.0 mmol/L    POCT HCO3 Calculated, Venous 18.5 (L) 22.0 - 26.0 mmol/L    POCT Hemoglobin, Venous 7.8 (L) 13.5 - 17.5 g/dL    POCT Anion Gap, Venous 13.0 10.0 - 25.0 mmol/L    Patient Temperature 37.0 degrees Celsius    FiO2 96 %   POCT GLUCOSE   Result Value Ref Range    POCT Glucose 86 74 - 99 mg/dL   Blood Gas Arterial Full Panel Unsolicited    Result Value Ref Range    POCT pH, Arterial 7.34 (L) 7.38 - 7.42 pH    POCT pCO2, Arterial 33 (L) 38 - 42 mm Hg    POCT pO2, Arterial 244 (H) 85 - 95 mm Hg    POCT SO2, Arterial 100 94 - 100 %    POCT Oxy Hemoglobin, Arterial 98.0 94.0 - 98.0 %    POCT Hematocrit Calculated, Arterial 24.0 (L) 41.0 - 52.0 %    POCT Sodium, Arterial 131 (L) 136 - 145 mmol/L    POCT Potassium, Arterial 4.5 3.5 - 5.3 mmol/L    POCT Chloride, Arterial 104 98 - 107 mmol/L    POCT Ionized Calcium, Arterial 1.23 1.10 - 1.33 mmol/L    POCT Glucose, Arterial 94 74 - 99 mg/dL    POCT Lactate, Arterial 2.7 (H) 0.4 - 2.0 mmol/L    POCT Base Excess, Arterial -7.2 (L) -2.0 - 3.0 mmol/L    POCT HCO3 Calculated, Arterial 17.8 (L) 22.0 - 26.0 mmol/L    POCT Hemoglobin, Arterial 8.0 (L) 13.5 - 17.5 g/dL    POCT Anion Gap, Arterial 14 10 - 25 mmo/L    Patient Temperature 37.0 degrees Celsius    FiO2 50 %   Blood Gas Arterial Full Panel Unsolicited   Result Value Ref Range    POCT pH, Arterial 7.29 (L) 7.38 - 7.42 pH    POCT pCO2, Arterial 38 38 - 42 mm Hg    POCT pO2, Arterial 213 (H) 85 - 95 mm Hg    POCT SO2, Arterial 100 94 - 100 %    POCT Oxy Hemoglobin, Arterial 98.4 (H) 94.0 - 98.0 %    POCT Hematocrit Calculated, Arterial 23.0 (L) 41.0 - 52.0 %    POCT Sodium, Arterial 131 (L) 136 - 145 mmol/L    POCT Potassium, Arterial 4.7 3.5 - 5.3 mmol/L    POCT Chloride, Arterial 104 98 - 107 mmol/L    POCT Ionized Calcium, Arterial 1.16 1.10 - 1.33 mmol/L    POCT Glucose, Arterial 92 74 - 99 mg/dL    POCT Lactate, Arterial 2.3 (H) 0.4 - 2.0 mmol/L    POCT Base Excess, Arterial -7.6 (L) -2.0 - 3.0 mmol/L    POCT HCO3 Calculated, Arterial 18.3 (L) 22.0 - 26.0 mmol/L    POCT Hemoglobin, Arterial 7.5 (L) 13.5 - 17.5 g/dL    POCT Anion Gap, Arterial 13 10 - 25 mmo/L    Patient Temperature 37.0 degrees Celsius    FiO2 50 %   Blood Gas Arterial Full Panel Unsolicited   Result Value Ref Range    POCT pH, Arterial 7.25 (LL) 7.38 - 7.42 pH    POCT pCO2, Arterial  37 (L) 38 - 42 mm Hg    POCT pO2, Arterial 172 (H) 85 - 95 mm Hg    POCT SO2, Arterial 100 94 - 100 %    POCT Oxy Hemoglobin, Arterial 98.4 (H) 94.0 - 98.0 %    POCT Hematocrit Calculated, Arterial 23.0 (L) 41.0 - 52.0 %    POCT Sodium, Arterial 132 (L) 136 - 145 mmol/L    POCT Potassium, Arterial 5.0 3.5 - 5.3 mmol/L    POCT Chloride, Arterial 104 98 - 107 mmol/L    POCT Ionized Calcium, Arterial 0.82 (L) 1.10 - 1.33 mmol/L    POCT Glucose, Arterial 122 (H) 74 - 99 mg/dL    POCT Lactate, Arterial 2.9 (H) 0.4 - 2.0 mmol/L    POCT Base Excess, Arterial -10.2 (L) -2.0 - 3.0 mmol/L    POCT HCO3 Calculated, Arterial 16.2 (L) 22.0 - 26.0 mmol/L    POCT Hemoglobin, Arterial 7.8 (L) 13.5 - 17.5 g/dL    POCT Anion Gap, Arterial 17 10 - 25 mmo/L    Patient Temperature 37.0 degrees Celsius    FiO2 40 %   Blood Gas Arterial Full Panel Unsolicited   Result Value Ref Range    POCT pH, Arterial 7.24 (LL) 7.38 - 7.42 pH    POCT pCO2, Arterial 39 38 - 42 mm Hg    POCT pO2, Arterial 170 (H) 85 - 95 mm Hg    POCT SO2, Arterial 100 94 - 100 %    POCT Oxy Hemoglobin, Arterial 97.9 94.0 - 98.0 %    POCT Hematocrit Calculated, Arterial 28.0 (L) 41.0 - 52.0 %    POCT Sodium, Arterial 131 (L) 136 - 145 mmol/L    POCT Potassium, Arterial 5.4 (H) 3.5 - 5.3 mmol/L    POCT Chloride, Arterial 104 98 - 107 mmol/L    POCT Ionized Calcium, Arterial 1.10 1.10 - 1.33 mmol/L    POCT Glucose, Arterial 133 (H) 74 - 99 mg/dL    POCT Lactate, Arterial 3.1 (H) 0.4 - 2.0 mmol/L    POCT Base Excess, Arterial -10.0 (L) -2.0 - 3.0 mmol/L    POCT HCO3 Calculated, Arterial 16.7 (L) 22.0 - 26.0 mmol/L    POCT Hemoglobin, Arterial 9.3 (L) 13.5 - 17.5 g/dL    POCT Anion Gap, Arterial 16 10 - 25 mmo/L    Patient Temperature 37.0 degrees Celsius    FiO2 50 %   Blood Gas Arterial Full Panel Unsolicited   Result Value Ref Range    POCT pH, Arterial 7.30 (L) 7.38 - 7.42 pH    POCT pCO2, Arterial 37 (L) 38 - 42 mm Hg    POCT pO2, Arterial 203 (H) 85 - 95 mm Hg    POCT  SO2, Arterial 100 94 - 100 %    POCT Oxy Hemoglobin, Arterial 97.7 94.0 - 98.0 %    POCT Hematocrit Calculated, Arterial 29.0 (L) 41.0 - 52.0 %    POCT Sodium, Arterial 131 (L) 136 - 145 mmol/L    POCT Potassium, Arterial 5.4 (H) 3.5 - 5.3 mmol/L    POCT Chloride, Arterial 104 98 - 107 mmol/L    POCT Ionized Calcium, Arterial 1.00 (L) 1.10 - 1.33 mmol/L    POCT Glucose, Arterial 142 (H) 74 - 99 mg/dL    POCT Lactate, Arterial 3.4 (H) 0.4 - 2.0 mmol/L    POCT Base Excess, Arterial -7.6 (L) -2.0 - 3.0 mmol/L    POCT HCO3 Calculated, Arterial 18.2 (L) 22.0 - 26.0 mmol/L    POCT Hemoglobin, Arterial 9.5 (L) 13.5 - 17.5 g/dL    POCT Anion Gap, Arterial 14 10 - 25 mmo/L    Patient Temperature 37.0 degrees Celsius    FiO2 40 %   Blood Gas Arterial Full Panel Unsolicited   Result Value Ref Range    POCT pH, Arterial 7.34 (L) 7.38 - 7.42 pH    POCT pCO2, Arterial 35 (L) 38 - 42 mm Hg    POCT pO2, Arterial 176 (H) 85 - 95 mm Hg    POCT SO2, Arterial 100 94 - 100 %    POCT Oxy Hemoglobin, Arterial 97.8 94.0 - 98.0 %    POCT Hematocrit Calculated, Arterial 28.0 (L) 41.0 - 52.0 %    POCT Sodium, Arterial 133 (L) 136 - 145 mmol/L    POCT Potassium, Arterial 5.4 (H) 3.5 - 5.3 mmol/L    POCT Chloride, Arterial 104 98 - 107 mmol/L    POCT Ionized Calcium, Arterial 1.13 1.10 - 1.33 mmol/L    POCT Glucose, Arterial 135 (H) 74 - 99 mg/dL    POCT Lactate, Arterial 3.6 (H) 0.4 - 2.0 mmol/L    POCT Base Excess, Arterial -6.2 (L) -2.0 - 3.0 mmol/L    POCT HCO3 Calculated, Arterial 18.9 (L) 22.0 - 26.0 mmol/L    POCT Hemoglobin, Arterial 9.3 (L) 13.5 - 17.5 g/dL    POCT Anion Gap, Arterial 16 10 - 25 mmo/L    Patient Temperature 37.0 degrees Celsius    FiO2 40 %   Blood Gas Arterial Full Panel Unsolicited   Result Value Ref Range    POCT pH, Arterial 7.34 (L) 7.38 - 7.42 pH    POCT pCO2, Arterial 33 (L) 38 - 42 mm Hg    POCT pO2, Arterial 187 (H) 85 - 95 mm Hg    POCT SO2, Arterial 100 94 - 100 %    POCT Oxy Hemoglobin, Arterial 97.6 94.0 -  98.0 %    POCT Hematocrit Calculated, Arterial 27.0 (L) 41.0 - 52.0 %    POCT Sodium, Arterial 131 (L) 136 - 145 mmol/L    POCT Potassium, Arterial 5.2 3.5 - 5.3 mmol/L    POCT Chloride, Arterial 103 98 - 107 mmol/L    POCT Ionized Calcium, Arterial 1.08 (L) 1.10 - 1.33 mmol/L    POCT Glucose, Arterial 231 (H) 74 - 99 mg/dL    POCT Lactate, Arterial 3.8 (H) 0.4 - 2.0 mmol/L    POCT Base Excess, Arterial -7.2 (L) -2.0 - 3.0 mmol/L    POCT HCO3 Calculated, Arterial 17.8 (L) 22.0 - 26.0 mmol/L    POCT Hemoglobin, Arterial 9.1 (L) 13.5 - 17.5 g/dL    POCT Anion Gap, Arterial 15 10 - 25 mmo/L    Patient Temperature 37.0 degrees Celsius    FiO2 40 %   Prepare RBC: 10 Units   Result Value Ref Range    PRODUCT CODE J8444P87     Unit Number [id number]     Unit ABO B     Unit RH POS     XM INTEP COMP     Dispense Status IS     Blood Expiration Date 4/28/2025 11:59:00 PM EDT     PRODUCT BLOOD TYPE 7300     UNIT VOLUME 350     PRODUCT CODE D3280Y65     Unit Number [id number]     Unit ABO B     Unit RH POS     XM INTEP COMP     Dispense Status IS     Blood Expiration Date 4/28/2025 11:59:00 PM EDT     PRODUCT BLOOD TYPE 7300     UNIT VOLUME 350     PRODUCT CODE T1644R70     Unit Number [id number]     Unit ABO B     Unit RH POS     XM INTEP COMP     Dispense Status IS     Blood Expiration Date 4/30/2025 11:59:00 PM EDT     PRODUCT BLOOD TYPE 7300     UNIT VOLUME 350     PRODUCT CODE D7106A82     Unit Number [id number]     Unit ABO B     Unit RH POS     XM INTEP COMP     Dispense Status IS     Blood Expiration Date 4/30/2025 11:59:00 PM EDT     PRODUCT BLOOD TYPE 7300     UNIT VOLUME 350     PRODUCT CODE N1812F12     Unit Number [id number]     Unit ABO B     Unit RH POS     XM INTEP COMP     Dispense Status IS     Blood Expiration Date 4/30/2025 11:59:00 PM EDT     PRODUCT BLOOD TYPE 7300     UNIT VOLUME 350     PRODUCT CODE F4191S95     Unit Number [id number]     Unit ABO B     Unit RH POS     XM  INTEP COMP     Dispense Status IS     Blood Expiration Date 5/9/2025 11:59:00 PM EDT     PRODUCT BLOOD TYPE 7300     UNIT VOLUME 350     PRODUCT CODE I9691S07     Unit Number [id number]     Unit ABO B     Unit RH POS     XM INTEP COMP     Dispense Status IS     Blood Expiration Date 5/9/2025 11:59:00 PM EDT     PRODUCT BLOOD TYPE 7300     UNIT VOLUME 350     PRODUCT CODE M2203Y46     Unit Number [id number]     Unit ABO B     Unit RH POS     XM INTEP COMP     Dispense Status IS     Blood Expiration Date 5/9/2025 11:59:00 PM EDT     PRODUCT BLOOD TYPE 7300     UNIT VOLUME 350     PRODUCT CODE J8815K48     Unit Number [id number]     Unit ABO B     Unit RH POS     XM INTEP COMP     Dispense Status IS     Blood Expiration Date 5/9/2025 11:59:00 PM EDT     PRODUCT BLOOD TYPE 7300     UNIT VOLUME 350     PRODUCT CODE F7308L09     Unit Number [id number]     Unit ABO B     Unit RH POS     XM INTEP COMP     Dispense Status IS     Blood Expiration Date 5/9/2025 11:59:00 PM EDT     PRODUCT BLOOD TYPE 7300     UNIT VOLUME 350    Prepare Plasma: 10 Units   Result Value Ref Range    PRODUCT CODE K4890Z68     Unit Number [id number]     Unit ABO B     Unit RH POS     Dispense Status IS     Blood Expiration Date 4/7/2025 12:14:00 PM EDT     PRODUCT BLOOD TYPE 7300     UNIT VOLUME 388     PRODUCT CODE C7439A24     Unit Number [id number]     Unit ABO B     Unit RH POS     Dispense Status IS     Blood Expiration Date 4/7/2025 12:14:00 PM EDT     PRODUCT BLOOD TYPE 7300     UNIT VOLUME 239     PRODUCT CODE M7986P27     Unit Number [id number]     Unit ABO B     Unit RH POS     Dispense Status IS     Blood Expiration Date 4/7/2025 12:26:00 PM EDT     PRODUCT BLOOD TYPE 7300     UNIT VOLUME 228     PRODUCT CODE U8520L55     Unit Number [id number]     Unit ABO B     Unit RH POS     Dispense Status IS     Blood Expiration Date 4/7/2025 12:26:00 PM EDT     PRODUCT BLOOD TYPE 7300     UNIT VOLUME 229      PRODUCT CODE D8710A43     Unit Number [id number]     Unit ABO B     Unit RH POS     Dispense Status IS     Blood Expiration Date 4/7/2025  4:49:00 PM EDT     PRODUCT BLOOD TYPE 7300     UNIT VOLUME 217     PRODUCT CODE L6902E03     Unit Number [id number]     Unit ABO B     Unit RH POS     Dispense Status IS     Blood Expiration Date 4/7/2025 12:14:00 PM EDT     PRODUCT BLOOD TYPE 7300     UNIT VOLUME 306     PRODUCT CODE Y6785L30     Unit Number [id number]     Unit ABO B     Unit RH POS     Dispense Status IS     Blood Expiration Date 4/7/2025 12:14:00 PM EDT     PRODUCT BLOOD TYPE 7300     UNIT VOLUME 319     PRODUCT CODE L2357O40     Unit Number [id number]     Unit ABO AB     Unit RH POS     Dispense Status IS     Blood Expiration Date 4/3/2025  7:45:00 AM EDT     PRODUCT BLOOD TYPE 8400     UNIT VOLUME 208     PRODUCT CODE B7226H24     Unit Number [id number]     Unit ABO B     Unit RH POS     Dispense Status IS     Blood Expiration Date 4/7/2025 12:14:00 PM EDT     PRODUCT BLOOD TYPE 7300     UNIT VOLUME 221     PRODUCT CODE X5020Y12     Unit Number [id number]     Unit ABO B     Unit RH POS     Dispense Status IS     Blood Expiration Date 4/7/2025  4:42:00 PM EDT     PRODUCT BLOOD TYPE 7300     UNIT VOLUME 295    Blood Gas Arterial Full Panel Unsolicited   Result Value Ref Range    POCT pH, Arterial 7.33 (L) 7.38 - 7.42 pH    POCT pCO2, Arterial 33 (L) 38 - 42 mm Hg    POCT pO2, Arterial 175 (H) 85 - 95 mm Hg    POCT SO2, Arterial 100 94 - 100 %    POCT Oxy Hemoglobin, Arterial 98.1 (H) 94.0 - 98.0 %    POCT Hematocrit Calculated, Arterial 28.0 (L) 41.0 - 52.0 %    POCT Sodium, Arterial 133 (L) 136 - 145 mmol/L    POCT Potassium, Arterial 4.8 3.5 - 5.3 mmol/L    POCT Chloride, Arterial 104 98 - 107 mmol/L    POCT Ionized Calcium, Arterial 1.15 1.10 - 1.33 mmol/L    POCT Glucose, Arterial 188 (H) 74 - 99 mg/dL    POCT Lactate, Arterial 4.3 (HH) 0.4 - 2.0 mmol/L    POCT Base Excess,  Arterial -7.7 (L) -2.0 - 3.0 mmol/L    POCT HCO3 Calculated, Arterial 17.4 (L) 22.0 - 26.0 mmol/L    POCT Hemoglobin, Arterial 9.2 (L) 13.5 - 17.5 g/dL    POCT Anion Gap, Arterial 16 10 - 25 mmo/L    Patient Temperature 37.0 degrees Celsius    FiO2 40 %   Blood Gas Arterial Full Panel Unsolicited   Result Value Ref Range    POCT pH, Arterial 7.34 (L) 7.38 - 7.42 pH    POCT pCO2, Arterial 36 (L) 38 - 42 mm Hg    POCT pO2, Arterial 177 (H) 85 - 95 mm Hg    POCT SO2, Arterial 100 94 - 100 %    POCT Oxy Hemoglobin, Arterial 97.7 94.0 - 98.0 %    POCT Hematocrit Calculated, Arterial 27.0 (L) 41.0 - 52.0 %    POCT Sodium, Arterial 134 (L) 136 - 145 mmol/L    POCT Potassium, Arterial 4.5 3.5 - 5.3 mmol/L    POCT Chloride, Arterial 103 98 - 107 mmol/L    POCT Ionized Calcium, Arterial 1.13 1.10 - 1.33 mmol/L    POCT Glucose, Arterial 169 (H) 74 - 99 mg/dL    POCT Lactate, Arterial 4.9 (HH) 0.4 - 2.0 mmol/L    POCT Base Excess, Arterial -5.8 (L) -2.0 - 3.0 mmol/L    POCT HCO3 Calculated, Arterial 19.4 (L) 22.0 - 26.0 mmol/L    POCT Hemoglobin, Arterial 8.9 (L) 13.5 - 17.5 g/dL    POCT Anion Gap, Arterial 16 10 - 25 mmo/L    Patient Temperature 37.0 degrees Celsius    FiO2 40 %   CBC   Result Value Ref Range    WBC 7.5 4.4 - 11.3 x10*3/uL    nRBC 0.0 0.0 - 0.0 /100 WBCs    RBC 2.48 (L) 4.50 - 5.90 x10*6/uL    Hemoglobin 7.6 (L) 13.5 - 17.5 g/dL    Hematocrit 22.5 (L) 41.0 - 52.0 %    MCV 91 80 - 100 fL    MCH 30.6 26.0 - 34.0 pg    MCHC 33.8 32.0 - 36.0 g/dL    RDW 14.9 (H) 11.5 - 14.5 %    Platelets 88 (L) 150 - 450 x10*3/uL   Protime-INR   Result Value Ref Range    Protime 20.3 (H) 9.8 - 12.4 seconds    INR 1.8 (H) 0.9 - 1.1   APTT   Result Value Ref Range    aPTT 47 (H) 26 - 36 seconds   Fibrinogen   Result Value Ref Range    Fibrinogen 118 (L) 200 - 400 mg/dL   Renal Function Panel   Result Value Ref Range    Glucose 204 (H) 74 - 99 mg/dL    Sodium 138 136 - 145 mmol/L    Potassium 4.0 3.5 - 5.3 mmol/L    Chloride 103 98  - 107 mmol/L    Bicarbonate 18 (L) 21 - 32 mmol/L    Anion Gap 21 (H) 10 - 20 mmol/L    Urea Nitrogen 56 (H) 6 - 23 mg/dL    Creatinine 2.64 (H) 0.50 - 1.30 mg/dL    eGFR 26 (L) >60 mL/min/1.73m*2    Calcium 8.2 (L) 8.6 - 10.6 mg/dL    Phosphorus 4.0 2.5 - 4.9 mg/dL    Albumin 3.0 (L) 3.4 - 5.0 g/dL   Hepatic function panel   Result Value Ref Range    Albumin 3.0 (L) 3.4 - 5.0 g/dL    Bilirubin, Total 2.1 (H) 0.0 - 1.2 mg/dL    Bilirubin, Direct 0.9 (H) 0.0 - 0.3 mg/dL    Alkaline Phosphatase 78 33 - 136 U/L     (H) 10 - 52 U/L     (H) 9 - 39 U/L    Total Protein 4.6 (L) 6.4 - 8.2 g/dL   TEG Clot Lysis Profile   Result Value Ref Range    R (Reaction Time) K 7.8 4.6 - 9.1 min    LY30 (Lysis) 0.0 0.0 - 2.6 %    MA (Max Amplitude) RT 45.0 (L) 52.0 - 70.0 mm    MA ( Apolinar Amplitude) FF 13.0 (L) 15.0 - 32.0 mm   Blood Gas Arterial Full Panel Unsolicited   Result Value Ref Range    POCT pH, Arterial 7.28 (L) 7.38 - 7.42 pH    POCT pCO2, Arterial 39 38 - 42 mm Hg    POCT pO2, Arterial 129 (H) 85 - 95 mm Hg    POCT SO2, Arterial 100 94 - 100 %    POCT Oxy Hemoglobin, Arterial 97.3 94.0 - 98.0 %    POCT Hematocrit Calculated, Arterial 24.0 (L) 41.0 - 52.0 %    POCT Sodium, Arterial 134 (L) 136 - 145 mmol/L    POCT Potassium, Arterial 4.2 3.5 - 5.3 mmol/L    POCT Chloride, Arterial 103 98 - 107 mmol/L    POCT Ionized Calcium, Arterial 1.08 (L) 1.10 - 1.33 mmol/L    POCT Glucose, Arterial 197 (H) 74 - 99 mg/dL    POCT Lactate, Arterial 5.4 (HH) 0.4 - 2.0 mmol/L    POCT Base Excess, Arterial -7.8 (L) -2.0 - 3.0 mmol/L    POCT HCO3 Calculated, Arterial 18.3 (L) 22.0 - 26.0 mmol/L    POCT Hemoglobin, Arterial 7.9 (L) 13.5 - 17.5 g/dL    POCT Anion Gap, Arterial 17 10 - 25 mmo/L    Patient Temperature 37.0 degrees Celsius    FiO2 40 %   Blood Gas Arterial Full Panel Unsolicited   Result Value Ref Range    POCT pH, Arterial 7.29 (L) 7.38 - 7.42 pH    POCT pCO2, Arterial 42 38 - 42 mm Hg    POCT pO2, Arterial 135 (H) 85  - 95 mm Hg    POCT SO2, Arterial 100 94 - 100 %    POCT Oxy Hemoglobin, Arterial 97.4 94.0 - 98.0 %    POCT Hematocrit Calculated, Arterial 23.0 (L) 41.0 - 52.0 %    POCT Sodium, Arterial 135 (L) 136 - 145 mmol/L    POCT Potassium, Arterial 4.1 3.5 - 5.3 mmol/L    POCT Chloride, Arterial 103 98 - 107 mmol/L    POCT Ionized Calcium, Arterial 1.07 (L) 1.10 - 1.33 mmol/L    POCT Glucose, Arterial 193 (H) 74 - 99 mg/dL    POCT Lactate, Arterial 5.5 (HH) 0.4 - 2.0 mmol/L    POCT Base Excess, Arterial -5.9 (L) -2.0 - 3.0 mmol/L    POCT HCO3 Calculated, Arterial 20.2 (L) 22.0 - 26.0 mmol/L    POCT Hemoglobin, Arterial 7.8 (L) 13.5 - 17.5 g/dL    POCT Anion Gap, Arterial 16 10 - 25 mmo/L    Patient Temperature 37.0 degrees Celsius    FiO2 40 %   Blood Gas Arterial Full Panel Unsolicited   Result Value Ref Range    POCT pH, Arterial 7.34 (L) 7.38 - 7.42 pH    POCT pCO2, Arterial 38 38 - 42 mm Hg    POCT pO2, Arterial 98 (H) 85 - 95 mm Hg    POCT SO2, Arterial 99 94 - 100 %    POCT Oxy Hemoglobin, Arterial 96.8 94.0 - 98.0 %    POCT Hematocrit Calculated, Arterial 26.0 (L) 41.0 - 52.0 %    POCT Sodium, Arterial 134 (L) 136 - 145 mmol/L    POCT Potassium, Arterial 4.2 3.5 - 5.3 mmol/L    POCT Chloride, Arterial 105 98 - 107 mmol/L    POCT Ionized Calcium, Arterial 1.17 1.10 - 1.33 mmol/L    POCT Glucose, Arterial 193 (H) 74 - 99 mg/dL    POCT Lactate, Arterial 5.0 (HH) 0.4 - 2.0 mmol/L    POCT Base Excess, Arterial -4.8 (L) -2.0 - 3.0 mmol/L    POCT HCO3 Calculated, Arterial 20.5 (L) 22.0 - 26.0 mmol/L    POCT Hemoglobin, Arterial 8.8 (L) 13.5 - 17.5 g/dL    POCT Anion Gap, Arterial 13 10 - 25 mmo/L    Patient Temperature 37.0 degrees Celsius    FiO2 40 %   Calcium, Ionized   Result Value Ref Range    POCT Calcium, Ionized 1.14 1.1 - 1.33 mmol/L   CBC   Result Value Ref Range    WBC 5.9 4.4 - 11.3 x10*3/uL    nRBC 0.0 0.0 - 0.0 /100 WBCs    RBC 2.90 (L) 4.50 - 5.90 x10*6/uL    Hemoglobin 8.9 (L) 13.5 - 17.5 g/dL    Hematocrit  25.8 (L) 41.0 - 52.0 %    MCV 89 80 - 100 fL    MCH 30.7 26.0 - 34.0 pg    MCHC 34.5 32.0 - 36.0 g/dL    RDW 15.1 (H) 11.5 - 14.5 %    Platelets 62 (L) 150 - 450 x10*3/uL   Coagulation Screen   Result Value Ref Range    Protime 17.6 (H) 9.8 - 12.4 seconds    INR 1.6 (H) 0.9 - 1.1    aPTT 35 26 - 36 seconds   Magnesium   Result Value Ref Range    Magnesium 2.23 1.60 - 2.40 mg/dL   Renal Function Panel   Result Value Ref Range    Glucose 212 (H) 74 - 99 mg/dL    Sodium 137 136 - 145 mmol/L    Potassium 4.2 3.5 - 5.3 mmol/L    Chloride 106 98 - 107 mmol/L    Bicarbonate 21 21 - 32 mmol/L    Anion Gap 14 10 - 20 mmol/L    Urea Nitrogen 54 (H) 6 - 23 mg/dL    Creatinine 2.69 (H) 0.50 - 1.30 mg/dL    eGFR 26 (L) >60 mL/min/1.73m*2    Calcium 8.5 (L) 8.6 - 10.6 mg/dL    Phosphorus 3.8 2.5 - 4.9 mg/dL    Albumin 2.8 (L) 3.4 - 5.0 g/dL   TEG Clot Global Profile   Result Value Ref Range    R (Reaction Time) K 6.7 4.6 - 9.1 min    K (Clot Kinetics) 2.4 (H) 0.8 - 2.1 min    ANGLE 64.0 63.0 - 78.0 deg    MA (Max Amplitude) K 45.0 (L) 52.0 - 69.0 mm    R (Reaction Time) KH 7.6 4.3 - 8.3 min    MA (Max Amplitude) RT 44.0 (L) 52.0 - 70.0 mm    MA ( Apolinar Amplitude) FF 12.0 (L) 15.0 - 32.0 mm    FLEV 219 (L) 278 - 581 mg/dL           Assessment/Plan   Assessment & Plan  Alcoholic cirrhosis of liver with ascites (Multi)    Chronic renal impairment, stage 4 (severe) (Multi)    Stage 4 chronic kidney disease (Multi)      Assessment:  Decompensated cirrhosis 2/2 to MASLD (recurrent ascites, portal HTN requires paracentesis every other week), CKD 4, DM2, HTN, CAD s/p PCI to prox LAD 2021, h/o pAFib on Eliquis, h/o ITP dx 5/2024 s/p steroids and IVIG, hyperlipidemia, obesity who presents to [facility] for possible simultaneous liver and kidney transplant. He presents to the SICU s/p liver transplant with plans to RTOR for kidney transplant later this morning.     He presented to the SICU on angiotensin II at 30, levo 0.02, and propofol at 35.  He was quickly weaned off levo.     Plan:  NEURO: Patient sedated in propofol. To remain intubated pending RTOR for kidney transplant later this morning. When propofol was paused he followed commands and moved all extremities.  - ongoing neuro and pain assessments  - PRN hydromorphone for pain control  - PT/OT consult  - Home meds: trazodone 50mg nightly     CV: History of HTN, HLD, CAD s/p PCI to prox LAD 2021, h/o pAFib on Eliquis. Baseline /75. Baseline echo (12/10/2024) with EF Left ventricular ejection fraction is hyperdynamic, calculated by Manzanares's biplane at 77%. %, normal RV . Arrived to SICU on angiotensin II at 30 and levo 0.02. He was quickly weaned off levo.  - continuous EKG/abp monitoring  - Goal map range 65-90  - Home meds: atorvastatin 10mg daily, apixaban 5mg BID.     PULM: Arrived to SICU intubated. ACVC 60% FiO2 PEEP 5.  - Wean FiO2 as tolerated but to remain intubated for RTOR this morning  - additional pulm toilet prn.    - F/U post op CXR     GI: Cirrhosis 2/2 to MASLD (recurrent ascites, portal HTN requires paracentesis every other week). Now s/p liver transplant.  - NPO   - NG to LIWS  - PPI for GI prophylaxis  - F/u liver US  - Home: lactulose, rifaximin     : CKD 4 Baseline creatinine 3.89. Got 60 IV lasix in the OR along with intra-op CVVH. RTOR later this morning for kidney transplant.  - Volume resuscitation as needed  - Maintain U/O >0.5ml/kg/hr  - Check RFP q4h  - Replete electrolytes to goal K>4, Mg>2, Phos>2.5, ionized Ca>1.10.  - Home meds: furosemide 20 mg BID     HEME: h/o ITP dx 5/2024 s/p steroids and IVIG. Acute blood loss anemia. OR EBL 4L. Intra-op received 7 RBC, 8 FFP, 2 cryo, 10 pack plt.  - Check CBC, coags, and TEG q4h alternating with ABG at the 2 hour purvi  - Goal hgb >9, plt >50, fibrinogen >100, INR <1.8  - SCDs for DVT prophylaxis  - 3 LISA drains, monitor output  - ongoing monitoring for s/s bleeding     ENDO: History of prediabetes.  - On insulin drip for  tight glucose control before RTOR  - Home: metformin     ID: Afebrile. Awaiting post op WBC.  - temp q4h, wbc daily  - zosyn and fluconazole for 72h  - methylprednisolone taper ordered by transplant  - not due for other immunosuppression currently, transplant to order after the kidney transplant   - ongoing monitoring for s/s infection     Lines:  - L radial arterial line  - R IJ MAC with PAC  - R IJ trialysis  - PIV x3  - LISA drains x3  - noel     Dispo: Admit to ICU. Patient seen and discussed with ICU attending Dr. Zapata.     SICU phone [phone]     Freddy Tariq MD  PGY-1

## 2025-04-03 NOTE — CARE PLAN
Problem: Skin  Goal: Participates in plan/prevention/treatment measures  Outcome: Progressing  Flowsheets (Taken 4/3/2025 1317)  Participates in plan/prevention/treatment measures: Elevate heels  Goal: Prevent/manage excess moisture  Outcome: Progressing  Flowsheets (Taken 4/3/2025 1317)  Prevent/manage excess moisture:   Cleanse incontinence/protect with barrier cream   Follow provider orders for dressing changes   Monitor for/manage infection if present   Moisturize dry skin     Problem: Safety - Medical Restraint  Goal: Remains free of injury from restraints (Restraint for Interference with Medical Device)  Outcome: Progressing  Flowsheets (Taken 4/3/2025 1320)  Remains free of injury from restraints (restraint for interference with medical device): Determine that other, less restrictive measures have been tried or would not be effective before applying the restraint  Goal: Free from restraint(s) (Restraint for Interference with Medical Device)  Outcome: Progressing  Flowsheets (Taken 4/3/2025 1320)  Free from restraint(s) (restraint for interference with medical device): ONCE/SHIFT or MINIMUM Every 12 hours: Assess and document the continuing need for restraints

## 2025-04-03 NOTE — SIGNIFICANT EVENT
Return to SICU from OR    López Lopez returned to SICU from OR s/p DDKT and re-exploration of hepatic artery. Patient remains intubated and sedated on propofol infusion. On arrival levo at 0.05, AT2 at 10, and insulin at 3. NMB reversed by Anesthesia on arrival.    OR Course:   EBL: 200ml  UOP: 385ml  Crystalloid: 1L  Colloid: 750ml  Cellsaver: none  Products: 2u PRBC, 1u FFP  Intubation: ETT in situ  Lines/Access: unchanged    Physical Exam  Constitutional:       Interventions: He is sedated, intubated and restrained.   HENT:      Head: Normocephalic and atraumatic.      Nose:      Comments: NG tube in place.     Mouth/Throat:      Comments: ETT in place.  Eyes:      Pupils: Pupils are equal, round, and reactive to light.   Neck:      Comments: RIJ MAC w/ PA catheter and RIJ trialysis in place, dressing c/d/i.   Cardiovascular:      Rate and Rhythm: Normal rate and regular rhythm.      Pulses: Normal pulses.      Heart sounds: Normal heart sounds.   Pulmonary:      Effort: Pulmonary effort is normal. He is intubated.      Breath sounds: Normal breath sounds.      Comments: Mechanically ventilated via ETT.  Abdominal:      General: There is distension.      Palpations: Abdomen is soft.      Comments: RUQ hockey stick incision surgical dressing with mild strikethrough. LLQ hockey stick incision surgical dressing with mild strikethrough. LISA drain x4 with sanguinous output.   Genitourinary:     Comments: Nelson in place with yellow/pink tinged UOP.   Skin:     General: Skin is warm and dry.   Neurological:      Comments: Intubated and sedated.   Psychiatric:      Comments: DERIC.       Assessment   López Lopez is a 65 y.o. male with PMHx of decompensated cirrhosis 2/2 to MASLD (recurrent ascites, portal HTN requires paracentesis every other week), CKD 4, DM2, HTN, CAD s/p PCI to prox LAD 2021, h/o pAFib on Eliquis, h/o ITP dx 5/2024 s/p steroids and IVIG, hyperlipidemia, obesity who presents to [facility] for  possible simultaneous liver and kidney transplant. He presents to the SICU s/p OLT 4/2-4/3. Returned to OR 4/3 am, now s/p DDKT and re-exploration of hepatic artery.    Plan:  NEURO: Returned to SICU intubated and sedated on propofol infusion at 40 mcg/kg/min. NMB reversed with sugammadex by Anesthesia on arrival.  - ongoing neuro and pain assessments  - PRN hydromorphone for pain control  - PT/OT consult  - Home meds: trazodone 50mg nightly  - Restraints indicated while patient remains intubated, restrain with soft wrist restraints until medical devices are discontinued.      CV: History of HTN, HLD, CAD s/p PCI to prox LAD 2021, h/o pAFib on Eliquis. Baseline /75. Baseline echo (12/10/2024) with EF Left ventricular ejection fraction is hyperdynamic, calculated by Manzanares's biplane at 77%. %, normal RV. Arrived to SICU s/p OLT on angiotensin II and levophed infusions. Lactic acidosis improving. Returned from OR s/p DDKT on AT2 at 10 and levo at 0.05.  - continuous EKG/abp/cvp/pap monitoring  - titrate levo and AT2 to MAP goal >65  - Goal CVP 8-12, CI >2.5   - 25% albumin q6h x4 doses  - additional volume resuscitation as indicated  - Holding home meds: atorvastatin 10mg daily, apixaban 5mg BID, furosemide 20 mg BID     PULM: Former smoker. Returns to SICU from OR intubated.  - wean vent as tolerated, maintain Spo2 >92%  - SBT when appropriate  - VAP bundle  - q1h IS after extubation  - additional pulm toilet prn  - CXR daily  - ABG prn     GI: Hx of Cirrhosis 2/2 to MASLD (recurrent ascites, portal HTN requires paracentesis every other week). Now s/p OLT on 4/2-4/3. Post-op liver US with atrophied Left hepatic lobe, Right HA not well visualized, Common HA w/ borderline elevated RI, Left HA w/ borderline low RI, and slightly elevated main portal vein velocities. Return to OR 4/3 s/p re-exploration of hepatic artery.  - NPO, NG to LIWS  - PPI for GI prophylaxis  - LFTs/GGT post-op and q4h  - repeat liver US  post-op  - serial abdominal exams  - monitor drain output  - IS/ppx per Transplant: methylpred taper, Transplant starting MMF and tacro this evening     : Hx of CKD 4. Baseline creatinine 3.89. Intra-op CVVH during OLT. Received lasix 80mg IV post-op. Now s/p DDKT 4/3.  - obtain post-op Kidney US  - Check renal function panel post op, then q4h.  - Maintenance IVF -> 1/2NS at 60ml/hr  - Additional 1:1 IVF replacement with NS  - Maintain UOP >0.5ml/kg/hr   - Maintain noel for strict I&Os  - Replete electrolytes judiciously  - IS/ppx per Transplant: as above +Simulect induction     HEME: Hx of ITP dx 5/2024 s/p steroids and IVIG. Acute blood loss anemia. Coagulopathy. Hypofibrinogenemia. Thrombocytopenia. OR EBL 4L. Intra-op received 7u PRBC, 8u FFP, 2u cryo, 10pk Plts. Post-op overnight transfused 1u cryo and 5pk Plts. OR 4/3 EBL 200ml, intra-op received 2u PRBC and 1u FFP.  - Check CBC, coags, fibrinogen post op, then q4h  - f/u post-op TEG  - Goal hgb >9, plt >50, fibrinogen >100, INR <1.8  - SCDs for DVT prophylaxis  - No SQH/ASA at this time  - ongoing monitoring for s/s bleeding  - maintain active T&S (4/2)      ENDO: History of prediabetes. A1c 5.5 (9/2024). Steroid induced hyperglycemia. Arrived to SICU on insulin infusion from OR. Remains on insulin infusion at 3u/hr.    - continue insulin infusion per ICU protocol  - q1h BG while on insulin infusion  - goal -180  - Home meds: metformin 1000mg q12h     ID: Afebrile. No leukocytosis. MRSA swab pending.  - trend q4h WBC, q4h temps  - Post-op Zosyn x72 hrs  - continue prophylactic Fluconazole x30 days  - close monitoring for s/s infection. Low threshold to culture.     Lines:  - L radial arterial line (4/2, OR)  - R IJ MAC with PAC (4/2, OR)  - R IJ trialysis (4/2, OR)  - PIV x3     Dispo: Continue ICU care. Patient seen and discussed with ICU attending Dr. Camilo.      Mike Yates, XENIA-CNP  SICU phone [phone]

## 2025-04-03 NOTE — PROGRESS NOTES
Per CLAYTON Christiansen active.  TFC to precert when OP note is available.  Prior auth is on file for L/K txp.

## 2025-04-03 NOTE — BRIEF OP NOTE
Date: 4/3/2025  OR Location: [facility] OR    Name: López Lopez, : 1960, Age: 65 y.o., MRN: [id number], Sex: male    Diagnosis  Pre-op Diagnosis      * CKD (chronic kidney disease) stage 4, GFR 15-29 ml/min (Multi) [N18.4] Post-op Diagnosis     * CKD (chronic kidney disease) stage 4, GFR 15-29 ml/min (Multi) [N18.4]     Procedures  LAPAROTOMY, EXPLORATORY  34629 - NM EXPLORATORY LAPAROTOMY CELIOTOMY W/WO BIOPSY SPX      Surgeons   Panel 1:     * Jason Snell - Primary    Resident/Fellow/Other Assistant:  Surgeons and Role:  Panel 1:     * Jason Snell MD - Assisting     * Jake Gupta MD - Resident - Assisting    Staff:   Circulator: Jake Parks Person: Pernell Garcia Scrub: Aleena    Anesthesia Staff: Anesthesiologist: Hakan Carter MD  C-AA: DONNA Meyers    Procedure Summary  Anesthesia: General  ASA: III  Estimated Blood Loss: 100mL  Intra-op Medications:   Administrations occurring from 0737 to 1302 on 25:   Medication Name Total Dose   sodium chloride 0.9 % irrigation solution 3,000 mL   methylene blue (Provayblue) 1 % (10 mg/mL) 3 mL, polymyxin B 500,000 Units in sodium chloride 0.9 % 1,000 mL irrigation Cannot be calculated   alteplase (Cathflo Activase) injection 2 mg Cannot be calculated   angiotensin II (Giapreza) 2.5 mg in sodium chloride 0.9% 250 mL (0.01 mg/mL) infusion 265,440 ng   calcium chloride 0.5 g in dextrose 5% 50 mL IV Cannot be calculated   calcium chloride 1 g in dextrose 5% 50 mL IV Cannot be calculated   HYDROmorphone (Dilaudid) injection 0.4 mg Cannot be calculated   HYDROmorphone PF (Dilaudid) injection 0.2 mg Cannot be calculated   magnesium sulfate 2 g in sterile water for injection 50 mL Cannot be calculated   magnesium sulfate 4 g in sterile water for injection 100 mL Cannot be calculated   norepinephrine (Levophed) 8 mg in dextrose 5% 250 mL (0.032 mg/mL) infusion (premix) 1.51 mg   oxygen (O2) therapy 12,080 percent   pantoprazole  (Protonix) injection 40 mg Cannot be calculated   piperacillin-tazobactam (Zosyn) 3.375 g in dextrose (iso) IV 50 mL 3.375 g   propofol (Diprivan) infusion Cannot be calculated   basiliximab (Simulect) 20 mg in sodium chloride 0.9% 50 mL IV 20 mg   methylPREDNISolone sodium succinate (SOLU-Medrol) 500 mg in dextrose 5% 100 mL IV Cannot be calculated   albumin human 5 % 750 mL   insulin regular (HumuLIN, NovoLIN) bolus from bag 2-6 Units Cannot be calculated   mannitol 20% 25 g   rocuronium (ZeMuron) 50 mg/5 mL injection 140 mg   NaCl 0.9 % bolus Cannot be calculated              Anesthesia Record               Intraprocedure I/O Totals          Intake    Transfuse Plasma :30 Minutes 300.00 mL    Transfuse RBC :30 Minutes 700.00 mL    NaCl 0.9 % bolus 1100.00 mL    basiliximab (Simulect) 20 mg in sodium chloride 0.9% 50 mL IV 55.00 mL    Norepinephrine Drip 51.10 mL    The total shown is the total volume documented since Anesthesia Start was filed.    mannitol 20% 125.00 mL    Angiotensin II 43.46 mL    The total shown is the total volume documented since Anesthesia Start was filed.    Insulin Drip 3.00 mL    The total shown is the total volume documented since Anesthesia Start was filed.    Propofol Drip 23.19 mL    The total shown is the total volume documented since Anesthesia Start was filed.    albumin human 5 % 750.00 mL    piperacillin-tazobactam (Zosyn) 3.375 g in dextrose (iso) IV 50 mL 50.00 mL    Total Intake 3200.75 mL       Output    Urine 1060 mL    NG/OG Tube Output 50 mL    Total Output 1110 mL       Net    Net Volume 2090.75 mL          Specimen:   ID Type Source Tests Collected by Time   1 : RIGHT LOBE LIVER BIOPSY Tissue LIVER BIOPSY RIGHT SURGICAL PATHOLOGY EXAM Eliezer Barrett MD 4/3/2025 1319      Findings: Exploratory laparotomy with washout.  Minor oozing.  Hepatic anastomosis appeared intact on examination, with no identifiable areas of bleeding.  Doppler signals intact.  Intraoperative ultrasound  demonstrated good arterial flow.    Complications:  None; patient tolerated the procedure well.     Disposition: ICU - intubated and critically ill.  Condition: unstable  Specimens Collected:   ID Type Source Tests Collected by Time   1 : RIGHT LOBE LIVER BIOPSY Tissue LIVER BIOPSY RIGHT SURGICAL PATHOLOGY EXAM Eliezer Barrett MD 4/3/2025 7360

## 2025-04-03 NOTE — PROGRESS NOTES
Occupational Therapy    Communication Note    Patient Name: López Lopez  MRN: [id number]  Today's Date: 4/3/2025   Room: 01/01-A    Discipline: Occupational Therapy      Missed Visit Reason:  (0840: RTOR today per ID rounds. OT evaluation held)      04/03/25 at 8:40 AM   Renetta Hill, OT   Rehab Office: [phone]

## 2025-04-03 NOTE — ANESTHESIA PROCEDURE NOTES
Peripheral IV  Date/Time: 4/2/2025 8:50 PM  Inserted by: Viri Schultz    Placement  Needle size: 16 G  Laterality: left  Location: forearm  Local anesthetic: none  Site prep: alcohol  Technique: anatomical landmarks  Attempts: 1

## 2025-04-04 ENCOUNTER — APPOINTMENT (OUTPATIENT)
Dept: RADIOLOGY | Facility: HOSPITAL | Age: 65
DRG: 005 | End: 2025-04-04
Payer: COMMERCIAL

## 2025-04-04 DIAGNOSIS — N18.4 STAGE 4 CHRONIC KIDNEY DISEASE (MULTI): ICD-10-CM

## 2025-04-04 DIAGNOSIS — Z76.82 PRE-KIDNEY TRANSPLANT, LISTED: ICD-10-CM

## 2025-04-04 DIAGNOSIS — D64.9 ANEMIA, UNSPECIFIED TYPE: ICD-10-CM

## 2025-04-04 DIAGNOSIS — Z76.82 PRE-LIVER TRANSPLANT, LISTED: ICD-10-CM

## 2025-04-04 DIAGNOSIS — K75.81 METABOLIC DYSFUNCTION-ASSOCIATED STEATOHEPATITIS (MASH): ICD-10-CM

## 2025-04-04 LAB
ALBUMIN SERPL BCP-MCNC: 3.3 G/DL (ref 3.4–5)
ALBUMIN SERPL BCP-MCNC: 3.4 G/DL (ref 3.4–5)
ALBUMIN SERPL BCP-MCNC: 3.4 G/DL (ref 3.4–5)
ALBUMIN SERPL BCP-MCNC: 3.5 G/DL (ref 3.4–5)
ALBUMIN SERPL BCP-MCNC: 3.7 G/DL (ref 3.4–5)
ALP SERPL-CCNC: 39 U/L (ref 33–136)
ALP SERPL-CCNC: 39 U/L (ref 33–136)
ALP SERPL-CCNC: 40 U/L (ref 33–136)
ALP SERPL-CCNC: 41 U/L (ref 33–136)
ALP SERPL-CCNC: 44 U/L (ref 33–136)
ALT SERPL W P-5'-P-CCNC: 284 U/L (ref 10–52)
ALT SERPL W P-5'-P-CCNC: 286 U/L (ref 10–52)
ALT SERPL W P-5'-P-CCNC: 307 U/L (ref 10–52)
ALT SERPL W P-5'-P-CCNC: 314 U/L (ref 10–52)
ALT SERPL W P-5'-P-CCNC: 347 U/L (ref 10–52)
ANION GAP BLDA CALCULATED.4IONS-SCNC: 12 MMO/L (ref 10–25)
ANION GAP BLDA CALCULATED.4IONS-SCNC: 13 MMO/L (ref 10–25)
ANION GAP BLDA CALCULATED.4IONS-SCNC: 13 MMO/L (ref 10–25)
ANION GAP SERPL CALC-SCNC: 16 MMOL/L (ref 10–20)
ANION GAP SERPL CALC-SCNC: 16 MMOL/L (ref 10–20)
ANION GAP SERPL CALC-SCNC: 17 MMOL/L (ref 10–20)
ANION GAP SERPL CALC-SCNC: 18 MMOL/L (ref 10–20)
ANION GAP SERPL CALC-SCNC: 19 MMOL/L (ref 10–20)
APTT PPP: 27 SECONDS (ref 26–36)
APTT PPP: 29 SECONDS (ref 26–36)
APTT PPP: 29 SECONDS (ref 26–36)
APTT PPP: 30 SECONDS (ref 26–36)
APTT PPP: 32 SECONDS (ref 26–36)
AST SERPL W P-5'-P-CCNC: 112 U/L (ref 9–39)
AST SERPL W P-5'-P-CCNC: 134 U/L (ref 9–39)
AST SERPL W P-5'-P-CCNC: 150 U/L (ref 9–39)
AST SERPL W P-5'-P-CCNC: 168 U/L (ref 9–39)
AST SERPL W P-5'-P-CCNC: 192 U/L (ref 9–39)
BASE EXCESS BLDA CALC-SCNC: -2.1 MMOL/L (ref -2–3)
BASE EXCESS BLDA CALC-SCNC: -2.6 MMOL/L (ref -2–3)
BASE EXCESS BLDA CALC-SCNC: -2.7 MMOL/L (ref -2–3)
BASE EXCESS BLDA CALC-SCNC: -2.9 MMOL/L (ref -2–3)
BASE EXCESS BLDA CALC-SCNC: -3.2 MMOL/L (ref -2–3)
BASE EXCESS BLDA CALC-SCNC: -3.3 MMOL/L (ref -2–3)
BILIRUB DIRECT SERPL-MCNC: 1.1 MG/DL (ref 0–0.3)
BILIRUB DIRECT SERPL-MCNC: 1.2 MG/DL (ref 0–0.3)
BILIRUB DIRECT SERPL-MCNC: 1.3 MG/DL (ref 0–0.3)
BILIRUB SERPL-MCNC: 1.6 MG/DL (ref 0–1.2)
BILIRUB SERPL-MCNC: 1.6 MG/DL (ref 0–1.2)
BILIRUB SERPL-MCNC: 1.7 MG/DL (ref 0–1.2)
BILIRUB SERPL-MCNC: 1.7 MG/DL (ref 0–1.2)
BILIRUB SERPL-MCNC: 1.8 MG/DL (ref 0–1.2)
BLOOD EXPIRATION DATE: NORMAL
BLOOD EXPIRATION DATE: NORMAL
BODY TEMPERATURE: 37 DEGREES CELSIUS
BUN SERPL-MCNC: 52 MG/DL (ref 6–23)
BUN SERPL-MCNC: 55 MG/DL (ref 6–23)
BUN SERPL-MCNC: 56 MG/DL (ref 6–23)
BUN SERPL-MCNC: 56 MG/DL (ref 6–23)
BUN SERPL-MCNC: 59 MG/DL (ref 6–23)
CA-I BLD-SCNC: 1.07 MMOL/L (ref 1.1–1.33)
CA-I BLD-SCNC: 1.09 MMOL/L (ref 1.1–1.33)
CA-I BLD-SCNC: 1.1 MMOL/L (ref 1.1–1.33)
CA-I BLD-SCNC: 1.11 MMOL/L (ref 1.1–1.33)
CA-I BLD-SCNC: 1.14 MMOL/L (ref 1.1–1.33)
CA-I BLDA-SCNC: 1.15 MMOL/L (ref 1.1–1.33)
CA-I BLDA-SCNC: 1.17 MMOL/L (ref 1.1–1.33)
CA-I BLDA-SCNC: 1.18 MMOL/L (ref 1.1–1.33)
CA-I BLDA-SCNC: 1.19 MMOL/L (ref 1.1–1.33)
CALCIUM SERPL-MCNC: 8 MG/DL (ref 8.6–10.6)
CALCIUM SERPL-MCNC: 8.1 MG/DL (ref 8.6–10.6)
CALCIUM SERPL-MCNC: 8.2 MG/DL (ref 8.6–10.6)
CALCIUM SERPL-MCNC: 8.2 MG/DL (ref 8.6–10.6)
CALCIUM SERPL-MCNC: 8.6 MG/DL (ref 8.6–10.6)
CFT FORM KAOLIN IND BLD RES TEG: 3.3 MIN (ref 0.8–2.1)
CFT FORM KAOLIN IND BLD RES TEG: 3.7 MIN (ref 0.8–2.1)
CFT FORM KAOLIN IND BLD RES TEG: 4.3 MIN (ref 0.8–2.1)
CHLORIDE BLDA-SCNC: 103 MMOL/L (ref 98–107)
CHLORIDE BLDA-SCNC: 103 MMOL/L (ref 98–107)
CHLORIDE BLDA-SCNC: 104 MMOL/L (ref 98–107)
CHLORIDE BLDA-SCNC: 105 MMOL/L (ref 98–107)
CHLORIDE SERPL-SCNC: 103 MMOL/L (ref 98–107)
CHLORIDE SERPL-SCNC: 104 MMOL/L (ref 98–107)
CHLORIDE SERPL-SCNC: 105 MMOL/L (ref 98–107)
CLOT ANGLE.KAOLIN INDUCED BLD RES TEG: 54 DEG (ref 63–78)
CLOT ANGLE.KAOLIN INDUCED BLD RES TEG: 57 DEG (ref 63–78)
CLOT ANGLE.KAOLIN INDUCED BLD RES TEG: 61 DEG (ref 63–78)
CLOT INIT KAO IND P HEP NEUT BLD RES TEG: 6.2 MIN (ref 4.3–8.3)
CLOT INIT KAO IND P HEP NEUT BLD RES TEG: 6.3 MIN (ref 4.6–9.1)
CLOT INIT KAO IND P HEP NEUT BLD RES TEG: 6.5 MIN (ref 4.6–9.1)
CLOT INIT KAO IND P HEP NEUT BLD RES TEG: 7 MIN (ref 4.3–8.3)
CLOT INIT KAO IND P HEP NEUT BLD RES TEG: 7 MIN (ref 4.6–9.1)
CLOT INIT KAO IND P HEP NEUT BLD RES TEG: 7.5 MIN (ref 4.3–8.3)
CO2 SERPL-SCNC: 21 MMOL/L (ref 21–32)
CO2 SERPL-SCNC: 21 MMOL/L (ref 21–32)
CO2 SERPL-SCNC: 22 MMOL/L (ref 21–32)
CO2 SERPL-SCNC: 22 MMOL/L (ref 21–32)
CO2 SERPL-SCNC: 23 MMOL/L (ref 21–32)
CREAT SERPL-MCNC: 2.98 MG/DL (ref 0.5–1.3)
CREAT SERPL-MCNC: 3.13 MG/DL (ref 0.5–1.3)
CREAT SERPL-MCNC: 3.24 MG/DL (ref 0.5–1.3)
CREAT SERPL-MCNC: 3.26 MG/DL (ref 0.5–1.3)
CREAT SERPL-MCNC: 3.29 MG/DL (ref 0.5–1.3)
DISPENSE STATUS: NORMAL
DISPENSE STATUS: NORMAL
EGFRCR SERPLBLD CKD-EPI 2021: 20 ML/MIN/1.73M*2
EGFRCR SERPLBLD CKD-EPI 2021: 20 ML/MIN/1.73M*2
EGFRCR SERPLBLD CKD-EPI 2021: 21 ML/MIN/1.73M*2
EGFRCR SERPLBLD CKD-EPI 2021: 22 ML/MIN/1.73M*2
EGFRCR SERPLBLD CKD-EPI 2021: 23 ML/MIN/1.73M*2
ERYTHROCYTE [DISTWIDTH] IN BLOOD BY AUTOMATED COUNT: 16 % (ref 11.5–14.5)
ERYTHROCYTE [DISTWIDTH] IN BLOOD BY AUTOMATED COUNT: 16.5 % (ref 11.5–14.5)
ERYTHROCYTE [DISTWIDTH] IN BLOOD BY AUTOMATED COUNT: 16.5 % (ref 11.5–14.5)
ERYTHROCYTE [DISTWIDTH] IN BLOOD BY AUTOMATED COUNT: 16.6 % (ref 11.5–14.5)
ERYTHROCYTE [DISTWIDTH] IN BLOOD BY AUTOMATED COUNT: 16.6 % (ref 11.5–14.5)
FIBRINOGEN BLD CALC-MCNC: 153 MG/DL (ref 278–581)
FIBRINOGEN BLD CALC-MCNC: 159 MG/DL (ref 278–581)
FIBRINOGEN BLD CALC-MCNC: 172 MG/DL (ref 278–581)
FIBRINOGEN PPP-MCNC: 178 MG/DL (ref 200–400)
FIBRINOGEN PPP-MCNC: 184 MG/DL (ref 200–400)
FIBRINOGEN PPP-MCNC: 186 MG/DL (ref 200–400)
FIBRINOGEN PPP-MCNC: 191 MG/DL (ref 200–400)
FIBRINOGEN PPP-MCNC: 203 MG/DL (ref 200–400)
GGT SERPL-CCNC: 48 U/L (ref 5–64)
GGT SERPL-CCNC: 53 U/L (ref 5–64)
GGT SERPL-CCNC: 54 U/L (ref 5–64)
GGT SERPL-CCNC: 57 U/L (ref 5–64)
GGT SERPL-CCNC: 67 U/L (ref 5–64)
GLUCOSE BLD MANUAL STRIP-MCNC: 136 MG/DL (ref 74–99)
GLUCOSE BLD MANUAL STRIP-MCNC: 137 MG/DL (ref 74–99)
GLUCOSE BLD MANUAL STRIP-MCNC: 150 MG/DL (ref 74–99)
GLUCOSE BLDA-MCNC: 127 MG/DL (ref 74–99)
GLUCOSE BLDA-MCNC: 133 MG/DL (ref 74–99)
GLUCOSE BLDA-MCNC: 138 MG/DL (ref 74–99)
GLUCOSE BLDA-MCNC: 155 MG/DL (ref 74–99)
GLUCOSE BLDA-MCNC: 156 MG/DL (ref 74–99)
GLUCOSE BLDA-MCNC: 166 MG/DL (ref 74–99)
GLUCOSE SERPL-MCNC: 129 MG/DL (ref 74–99)
GLUCOSE SERPL-MCNC: 139 MG/DL (ref 74–99)
GLUCOSE SERPL-MCNC: 151 MG/DL (ref 74–99)
GLUCOSE SERPL-MCNC: 157 MG/DL (ref 74–99)
GLUCOSE SERPL-MCNC: 160 MG/DL (ref 74–99)
HCO3 BLDA-SCNC: 20.8 MMOL/L (ref 22–26)
HCO3 BLDA-SCNC: 21.1 MMOL/L (ref 22–26)
HCO3 BLDA-SCNC: 21.2 MMOL/L (ref 22–26)
HCO3 BLDA-SCNC: 21.6 MMOL/L (ref 22–26)
HCO3 BLDA-SCNC: 21.7 MMOL/L (ref 22–26)
HCO3 BLDA-SCNC: 22.2 MMOL/L (ref 22–26)
HCT VFR BLD AUTO: 26.1 % (ref 41–52)
HCT VFR BLD AUTO: 26.1 % (ref 41–52)
HCT VFR BLD AUTO: 26.8 % (ref 41–52)
HCT VFR BLD AUTO: 27.2 % (ref 41–52)
HCT VFR BLD AUTO: 28.1 % (ref 41–52)
HCT VFR BLD EST: 29 % (ref 41–52)
HCT VFR BLD EST: 30 % (ref 41–52)
HCT VFR BLD EST: 30 % (ref 41–52)
HCT VFR BLD EST: 37 % (ref 41–52)
HGB BLD-MCNC: 8.7 G/DL (ref 13.5–17.5)
HGB BLD-MCNC: 9.1 G/DL (ref 13.5–17.5)
HGB BLD-MCNC: 9.4 G/DL (ref 13.5–17.5)
HGB BLD-MCNC: 9.5 G/DL (ref 13.5–17.5)
HGB BLD-MCNC: 9.6 G/DL (ref 13.5–17.5)
HGB BLDA-MCNC: 10 G/DL (ref 13.5–17.5)
HGB BLDA-MCNC: 12.2 G/DL (ref 13.5–17.5)
HGB BLDA-MCNC: 9.5 G/DL (ref 13.5–17.5)
HGB BLDA-MCNC: 9.5 G/DL (ref 13.5–17.5)
HGB BLDA-MCNC: 9.8 G/DL (ref 13.5–17.5)
HGB BLDA-MCNC: 9.9 G/DL (ref 13.5–17.5)
INHALED O2 CONCENTRATION: 21 %
INHALED O2 CONCENTRATION: 30 %
INHALED O2 CONCENTRATION: 32 %
INR PPP: 1.2 (ref 0.9–1.1)
INR PPP: 1.3 (ref 0.9–1.1)
INR PPP: 1.4 (ref 0.9–1.1)
INR PPP: 1.5 (ref 0.9–1.1)
INR PPP: 1.5 (ref 0.9–1.1)
LABORATORY COMMENT REPORT: NORMAL
LABORATORY COMMENT REPORT: NORMAL
LACTATE BLDA-SCNC: 0.9 MMOL/L (ref 0.4–2)
LACTATE BLDA-SCNC: 0.9 MMOL/L (ref 0.4–2)
LACTATE BLDA-SCNC: 1 MMOL/L (ref 0.4–2)
LACTATE BLDA-SCNC: 1.1 MMOL/L (ref 0.4–2)
MA KAOLIN BLD RES TEG: <40 MM (ref 52–69)
MA KAOLIN+TF BLD RES TEG: <40 MM (ref 52–70)
MA TF IND+IIB-IIIA INH BLD RES TEG: 8 MM (ref 15–32)
MA TF IND+IIB-IIIA INH BLD RES TEG: 9 MM (ref 15–32)
MA TF IND+IIB-IIIA INH BLD RES TEG: 9 MM (ref 15–32)
MAGNESIUM SERPL-MCNC: 2.05 MG/DL (ref 1.6–2.4)
MAGNESIUM SERPL-MCNC: 2.06 MG/DL (ref 1.6–2.4)
MAGNESIUM SERPL-MCNC: 2.07 MG/DL (ref 1.6–2.4)
MAGNESIUM SERPL-MCNC: 2.08 MG/DL (ref 1.6–2.4)
MAGNESIUM SERPL-MCNC: 2.16 MG/DL (ref 1.6–2.4)
MCH RBC QN AUTO: 28.8 PG (ref 26–34)
MCH RBC QN AUTO: 29.7 PG (ref 26–34)
MCH RBC QN AUTO: 30.2 PG (ref 26–34)
MCH RBC QN AUTO: 30.3 PG (ref 26–34)
MCH RBC QN AUTO: 30.4 PG (ref 26–34)
MCHC RBC AUTO-ENTMCNC: 33.3 G/DL (ref 32–36)
MCHC RBC AUTO-ENTMCNC: 33.5 G/DL (ref 32–36)
MCHC RBC AUTO-ENTMCNC: 34.9 G/DL (ref 32–36)
MCHC RBC AUTO-ENTMCNC: 34.9 G/DL (ref 32–36)
MCHC RBC AUTO-ENTMCNC: 35.8 G/DL (ref 32–36)
MCV RBC AUTO: 85 FL (ref 80–100)
MCV RBC AUTO: 86 FL (ref 80–100)
MCV RBC AUTO: 87 FL (ref 80–100)
MCV RBC AUTO: 87 FL (ref 80–100)
MCV RBC AUTO: 89 FL (ref 80–100)
MICROORGANISM SPEC CULT: NO GROWTH
MICROORGANISM/AGENT SPEC: NORMAL
NRBC BLD-RTO: 0 /100 WBCS (ref 0–0)
OXYHGB MFR BLDA: 95.6 % (ref 94–98)
OXYHGB MFR BLDA: 95.8 % (ref 94–98)
OXYHGB MFR BLDA: 96.3 % (ref 94–98)
OXYHGB MFR BLDA: 96.5 % (ref 94–98)
OXYHGB MFR BLDA: 96.7 % (ref 94–98)
OXYHGB MFR BLDA: 97.1 % (ref 94–98)
PATH REPORT.FINAL DX SPEC: NORMAL
PATH REPORT.GROSS SPEC: NORMAL
PATH REPORT.RELEVANT HX SPEC: NORMAL
PATH REPORT.TOTAL CANCER: NORMAL
PCO2 BLDA: 32 MM HG (ref 38–42)
PCO2 BLDA: 34 MM HG (ref 38–42)
PCO2 BLDA: 34 MM HG (ref 38–42)
PCO2 BLDA: 35 MM HG (ref 38–42)
PH BLDA: 7.39 PH (ref 7.38–7.42)
PH BLDA: 7.4 PH (ref 7.38–7.42)
PH BLDA: 7.4 PH (ref 7.38–7.42)
PH BLDA: 7.41 PH (ref 7.38–7.42)
PH BLDA: 7.41 PH (ref 7.38–7.42)
PH BLDA: 7.42 PH (ref 7.38–7.42)
PHOSPHATE SERPL-MCNC: 5.4 MG/DL (ref 2.5–4.9)
PHOSPHATE SERPL-MCNC: 5.8 MG/DL (ref 2.5–4.9)
PHOSPHATE SERPL-MCNC: 5.9 MG/DL (ref 2.5–4.9)
PHOSPHATE SERPL-MCNC: 6 MG/DL (ref 2.5–4.9)
PHOSPHATE SERPL-MCNC: 6.5 MG/DL (ref 2.5–4.9)
PLATELET # BLD AUTO: 36 X10*3/UL (ref 150–450)
PLATELET # BLD AUTO: 40 X10*3/UL (ref 150–450)
PLATELET # BLD AUTO: 43 X10*3/UL (ref 150–450)
PLATELET # BLD AUTO: 46 X10*3/UL (ref 150–450)
PLATELET # BLD AUTO: 52 X10*3/UL (ref 150–450)
PO2 BLDA: 84 MM HG (ref 85–95)
PO2 BLDA: 86 MM HG (ref 85–95)
PO2 BLDA: 89 MM HG (ref 85–95)
PO2 BLDA: 94 MM HG (ref 85–95)
PO2 BLDA: 97 MM HG (ref 85–95)
PO2 BLDA: 99 MM HG (ref 85–95)
POTASSIUM BLDA-SCNC: 3.8 MMOL/L (ref 3.5–5.3)
POTASSIUM BLDA-SCNC: 3.8 MMOL/L (ref 3.5–5.3)
POTASSIUM BLDA-SCNC: 3.9 MMOL/L (ref 3.5–5.3)
POTASSIUM BLDA-SCNC: 4 MMOL/L (ref 3.5–5.3)
POTASSIUM BLDA-SCNC: 4.1 MMOL/L (ref 3.5–5.3)
POTASSIUM BLDA-SCNC: 4.4 MMOL/L (ref 3.5–5.3)
POTASSIUM SERPL-SCNC: 3.7 MMOL/L (ref 3.5–5.3)
POTASSIUM SERPL-SCNC: 3.8 MMOL/L (ref 3.5–5.3)
POTASSIUM SERPL-SCNC: 4 MMOL/L (ref 3.5–5.3)
POTASSIUM SERPL-SCNC: 4 MMOL/L (ref 3.5–5.3)
POTASSIUM SERPL-SCNC: 4.1 MMOL/L (ref 3.5–5.3)
PRODUCT BLOOD TYPE: 5100
PRODUCT BLOOD TYPE: 7300
PRODUCT CODE: NORMAL
PRODUCT CODE: NORMAL
PROT SERPL-MCNC: 4.6 G/DL (ref 6.4–8.2)
PROT SERPL-MCNC: 4.6 G/DL (ref 6.4–8.2)
PROT SERPL-MCNC: 4.7 G/DL (ref 6.4–8.2)
PROT SERPL-MCNC: 4.8 G/DL (ref 6.4–8.2)
PROT SERPL-MCNC: 5 G/DL (ref 6.4–8.2)
PROTHROMBIN TIME: 13.6 SECONDS (ref 9.8–12.4)
PROTHROMBIN TIME: 14.9 SECONDS (ref 9.8–12.4)
PROTHROMBIN TIME: 15 SECONDS (ref 9.8–12.4)
PROTHROMBIN TIME: 16.1 SECONDS (ref 9.8–12.4)
PROTHROMBIN TIME: 16.4 SECONDS (ref 9.8–12.4)
RBC # BLD AUTO: 3.01 X10*6/UL (ref 4.5–5.9)
RBC # BLD AUTO: 3.02 X10*6/UL (ref 4.5–5.9)
RBC # BLD AUTO: 3.13 X10*6/UL (ref 4.5–5.9)
RBC # BLD AUTO: 3.17 X10*6/UL (ref 4.5–5.9)
RBC # BLD AUTO: 3.17 X10*6/UL (ref 4.5–5.9)
RESIDENT REVIEW: NORMAL
SAO2 % BLDA: 100 % (ref 94–100)
SAO2 % BLDA: 98 % (ref 94–100)
SAO2 % BLDA: 98 % (ref 94–100)
SAO2 % BLDA: 99 % (ref 94–100)
SODIUM BLDA-SCNC: 133 MMOL/L (ref 136–145)
SODIUM BLDA-SCNC: 134 MMOL/L (ref 136–145)
SODIUM BLDA-SCNC: 134 MMOL/L (ref 136–145)
SODIUM SERPL-SCNC: 138 MMOL/L (ref 136–145)
SODIUM SERPL-SCNC: 139 MMOL/L (ref 136–145)
SODIUM SERPL-SCNC: 140 MMOL/L (ref 136–145)
SPECIMEN DESCRIPTION: NORMAL
STAPHYLOCOCCUS SPEC CULT: NORMAL
TACROLIMUS BLD-MCNC: <2 NG/ML
UNIT ABO: NORMAL
UNIT ABO: NORMAL
UNIT NUMBER: NORMAL
UNIT NUMBER: NORMAL
UNIT RH: NORMAL
UNIT RH: NORMAL
UNIT VOLUME: 86
UNIT VOLUME: 88
WBC # BLD AUTO: 5.5 X10*3/UL (ref 4.4–11.3)
WBC # BLD AUTO: 5.6 X10*3/UL (ref 4.4–11.3)
WBC # BLD AUTO: 6.8 X10*3/UL (ref 4.4–11.3)
WBC # BLD AUTO: 7.3 X10*3/UL (ref 4.4–11.3)
WBC # BLD AUTO: 8.5 X10*3/UL (ref 4.4–11.3)

## 2025-04-04 PROCEDURE — 82977 ASSAY OF GGT: CPT

## 2025-04-04 PROCEDURE — 84075 ASSAY ALKALINE PHOSPHATASE: CPT

## 2025-04-04 PROCEDURE — 97530 THERAPEUTIC ACTIVITIES: CPT | Mod: GO

## 2025-04-04 PROCEDURE — 2500000004 HC RX 250 GENERAL PHARMACY W/ HCPCS (ALT 636 FOR OP/ED): Mod: JZ

## 2025-04-04 PROCEDURE — P9047 ALBUMIN (HUMAN), 25%, 50ML: HCPCS

## 2025-04-04 PROCEDURE — 97166 OT EVAL MOD COMPLEX 45 MIN: CPT | Mod: GO

## 2025-04-04 PROCEDURE — 2500000004 HC RX 250 GENERAL PHARMACY W/ HCPCS (ALT 636 FOR OP/ED)

## 2025-04-04 PROCEDURE — 83735 ASSAY OF MAGNESIUM: CPT

## 2025-04-04 PROCEDURE — 99232 SBSQ HOSP IP/OBS MODERATE 35: CPT | Performed by: SURGERY

## 2025-04-04 PROCEDURE — 37799 UNLISTED PX VASCULAR SURGERY: CPT

## 2025-04-04 PROCEDURE — 82435 ASSAY OF BLOOD CHLORIDE: CPT

## 2025-04-04 PROCEDURE — 80053 COMPREHEN METABOLIC PANEL: CPT

## 2025-04-04 PROCEDURE — 85027 COMPLETE CBC AUTOMATED: CPT

## 2025-04-04 PROCEDURE — 82330 ASSAY OF CALCIUM: CPT

## 2025-04-04 PROCEDURE — 99291 CRITICAL CARE FIRST HOUR: CPT | Performed by: STUDENT IN AN ORGANIZED HEALTH CARE EDUCATION/TRAINING PROGRAM

## 2025-04-04 PROCEDURE — 2500000004 HC RX 250 GENERAL PHARMACY W/ HCPCS (ALT 636 FOR OP/ED): Performed by: NURSE PRACTITIONER

## 2025-04-04 PROCEDURE — P9045 ALBUMIN (HUMAN), 5%, 250 ML: HCPCS | Mod: JZ

## 2025-04-04 PROCEDURE — 82947 ASSAY GLUCOSE BLOOD QUANT: CPT

## 2025-04-04 PROCEDURE — 82810 BLOOD GASES O2 SAT ONLY: CPT

## 2025-04-04 PROCEDURE — 71045 X-RAY EXAM CHEST 1 VIEW: CPT

## 2025-04-04 PROCEDURE — 99222 1ST HOSP IP/OBS MODERATE 55: CPT | Performed by: STUDENT IN AN ORGANIZED HEALTH CARE EDUCATION/TRAINING PROGRAM

## 2025-04-04 PROCEDURE — 84100 ASSAY OF PHOSPHORUS: CPT

## 2025-04-04 PROCEDURE — P9047 ALBUMIN (HUMAN), 25%, 50ML: HCPCS | Performed by: NURSE PRACTITIONER

## 2025-04-04 PROCEDURE — 2500000002 HC RX 250 W HCPCS SELF ADMINISTERED DRUGS (ALT 637 FOR MEDICARE OP, ALT 636 FOR OP/ED)

## 2025-04-04 PROCEDURE — 2020000001 HC ICU ROOM DAILY

## 2025-04-04 PROCEDURE — 85384 FIBRINOGEN ACTIVITY: CPT

## 2025-04-04 PROCEDURE — 94003 VENT MGMT INPAT SUBQ DAY: CPT

## 2025-04-04 PROCEDURE — 85610 PROTHROMBIN TIME: CPT

## 2025-04-04 PROCEDURE — 80197 ASSAY OF TACROLIMUS: CPT

## 2025-04-04 PROCEDURE — 82248 BILIRUBIN DIRECT: CPT

## 2025-04-04 PROCEDURE — 71045 X-RAY EXAM CHEST 1 VIEW: CPT | Performed by: RADIOLOGY

## 2025-04-04 RX ORDER — ALBUMIN HUMAN 50 G/1000ML
12.5 SOLUTION INTRAVENOUS ONCE
Status: COMPLETED | OUTPATIENT
Start: 2025-04-04 | End: 2025-04-04

## 2025-04-04 RX ORDER — ONDANSETRON HYDROCHLORIDE 2 MG/ML
4 INJECTION, SOLUTION INTRAVENOUS EVERY 6 HOURS PRN
Status: DISCONTINUED | OUTPATIENT
Start: 2025-04-04 | End: 2025-04-12 | Stop reason: HOSPADM

## 2025-04-04 RX ORDER — TACROLIMUS 1 MG/1
1 CAPSULE ORAL
Status: DISCONTINUED | OUTPATIENT
Start: 2025-04-04 | End: 2025-04-05

## 2025-04-04 RX ORDER — ONDANSETRON HYDROCHLORIDE 2 MG/ML
INJECTION, SOLUTION INTRAVENOUS
Status: COMPLETED
Start: 2025-04-04 | End: 2025-04-04

## 2025-04-04 RX ORDER — ALBUMIN HUMAN 250 G/1000ML
25 SOLUTION INTRAVENOUS EVERY 6 HOURS
Status: COMPLETED | OUTPATIENT
Start: 2025-04-04 | End: 2025-04-05

## 2025-04-04 RX ORDER — FUROSEMIDE 10 MG/ML
40 INJECTION INTRAMUSCULAR; INTRAVENOUS ONCE
Status: COMPLETED | OUTPATIENT
Start: 2025-04-04 | End: 2025-04-04

## 2025-04-04 RX ORDER — FLUCONAZOLE 2 MG/ML
400 INJECTION, SOLUTION INTRAVENOUS EVERY 24 HOURS
Status: DISCONTINUED | OUTPATIENT
Start: 2025-04-05 | End: 2025-04-06

## 2025-04-04 RX ADMIN — PROPOFOL 45 MCG/KG/MIN: 10 INJECTION, EMULSION INTRAVENOUS at 03:31

## 2025-04-04 RX ADMIN — METHYLPREDNISOLONE SODIUM SUCCINATE 125 MG: 125 INJECTION INTRAMUSCULAR; INTRAVENOUS at 08:30

## 2025-04-04 RX ADMIN — FUROSEMIDE 40 MG: 10 INJECTION INTRAMUSCULAR; INTRAVENOUS at 05:20

## 2025-04-04 RX ADMIN — PIPERACILLIN SODIUM AND TAZOBACTAM SODIUM 3.38 G: 3; .375 INJECTION, SOLUTION INTRAVENOUS at 03:08

## 2025-04-04 RX ADMIN — PIPERACILLIN SODIUM AND TAZOBACTAM SODIUM 3.38 G: 3; .375 INJECTION, SOLUTION INTRAVENOUS at 14:56

## 2025-04-04 RX ADMIN — FLUCONAZOLE 400 MG: 2 INJECTION, SOLUTION INTRAVENOUS at 03:45

## 2025-04-04 RX ADMIN — PANTOPRAZOLE SODIUM 40 MG: 40 INJECTION, POWDER, LYOPHILIZED, FOR SOLUTION INTRAVENOUS at 20:12

## 2025-04-04 RX ADMIN — HYDROMORPHONE HYDROCHLORIDE 0.2 MG: 0.2 INJECTION, SOLUTION INTRAMUSCULAR; INTRAVENOUS; SUBCUTANEOUS at 09:55

## 2025-04-04 RX ADMIN — SODIUM CHLORIDE 50 ML/HR: 0.9 INJECTION, SOLUTION INTRAVENOUS at 03:18

## 2025-04-04 RX ADMIN — INSULIN LISPRO 2 UNITS: 100 INJECTION, SOLUTION INTRAVENOUS; SUBCUTANEOUS at 08:32

## 2025-04-04 RX ADMIN — SODIUM CHLORIDE 60 ML/HR: 0.9 INJECTION, SOLUTION INTRAVENOUS at 06:02

## 2025-04-04 RX ADMIN — PIPERACILLIN SODIUM AND TAZOBACTAM SODIUM 3.38 G: 3; .375 INJECTION, SOLUTION INTRAVENOUS at 21:01

## 2025-04-04 RX ADMIN — INSULIN LISPRO 2 UNITS: 100 INJECTION, SOLUTION INTRAVENOUS; SUBCUTANEOUS at 17:16

## 2025-04-04 RX ADMIN — ALBUMIN HUMAN 25 G: 0.25 SOLUTION INTRAVENOUS at 14:54

## 2025-04-04 RX ADMIN — ALBUMIN HUMAN 12.5 G: 0.05 INJECTION, SOLUTION INTRAVENOUS at 02:08

## 2025-04-04 RX ADMIN — ALBUMIN HUMAN 25 G: 0.25 SOLUTION INTRAVENOUS at 08:31

## 2025-04-04 RX ADMIN — HYDROMORPHONE HYDROCHLORIDE 0.2 MG: 0.2 INJECTION, SOLUTION INTRAMUSCULAR; INTRAVENOUS; SUBCUTANEOUS at 20:12

## 2025-04-04 RX ADMIN — SODIUM CHLORIDE 35 ML/HR: 0.9 INJECTION, SOLUTION INTRAVENOUS at 05:21

## 2025-04-04 RX ADMIN — SODIUM CHLORIDE 60 ML/HR: 0.9 INJECTION, SOLUTION INTRAVENOUS at 02:24

## 2025-04-04 RX ADMIN — PIPERACILLIN SODIUM AND TAZOBACTAM SODIUM 3.38 G: 3; .375 INJECTION, SOLUTION INTRAVENOUS at 08:31

## 2025-04-04 RX ADMIN — ALBUMIN HUMAN 25 G: 0.25 SOLUTION INTRAVENOUS at 20:12

## 2025-04-04 RX ADMIN — MYCOPHENOLATE MOFETIL HYDROCHLORIDE 1000 MG: 500 INJECTION, POWDER, LYOPHILIZED, FOR SOLUTION INTRAVENOUS at 05:40

## 2025-04-04 RX ADMIN — ALBUMIN HUMAN 25 G: 0.25 SOLUTION INTRAVENOUS at 05:19

## 2025-04-04 RX ADMIN — PROPOFOL 40 MCG/KG/MIN: 10 INJECTION, EMULSION INTRAVENOUS at 06:51

## 2025-04-04 RX ADMIN — MYCOPHENOLATE MOFETIL HYDROCHLORIDE 1000 MG: 500 INJECTION, POWDER, LYOPHILIZED, FOR SOLUTION INTRAVENOUS at 18:38

## 2025-04-04 RX ADMIN — TACROLIMUS 1 MG: 1 CAPSULE ORAL at 18:38

## 2025-04-04 RX ADMIN — PANTOPRAZOLE SODIUM 40 MG: 40 INJECTION, POWDER, LYOPHILIZED, FOR SOLUTION INTRAVENOUS at 08:31

## 2025-04-04 RX ADMIN — SODIUM CHLORIDE 75 ML/HR: 0.9 INJECTION, SOLUTION INTRAVENOUS at 00:51

## 2025-04-04 RX ADMIN — ONDANSETRON HYDROCHLORIDE 4 MG: 2 INJECTION, SOLUTION INTRAVENOUS at 14:55

## 2025-04-04 RX ADMIN — ONDANSETRON 4 MG: 2 INJECTION INTRAMUSCULAR; INTRAVENOUS at 14:55

## 2025-04-04 RX ADMIN — HYDROMORPHONE HYDROCHLORIDE 0.4 MG: 1 INJECTION, SOLUTION INTRAMUSCULAR; INTRAVENOUS; SUBCUTANEOUS at 14:40

## 2025-04-04 RX ADMIN — TACROLIMUS 1 MG: 5 CAPSULE, GELATIN COATED ORAL at 05:39

## 2025-04-04 ASSESSMENT — COGNITIVE AND FUNCTIONAL STATUS - GENERAL
DRESSING REGULAR UPPER BODY CLOTHING: A LITTLE
TOILETING: A LOT
PERSONAL GROOMING: A LITTLE
DAILY ACTIVITIY SCORE: 15
HELP NEEDED FOR BATHING: A LOT
DRESSING REGULAR LOWER BODY CLOTHING: TOTAL

## 2025-04-04 ASSESSMENT — PAIN SCALES - GENERAL
PAINLEVEL_OUTOF10: 6
PAINLEVEL_OUTOF10: 4
PAINLEVEL_OUTOF10: 7
PAINLEVEL_OUTOF10: 4
PAINLEVEL_OUTOF10: 3
PAINLEVEL_OUTOF10: 2

## 2025-04-04 ASSESSMENT — PAIN - FUNCTIONAL ASSESSMENT
PAIN_FUNCTIONAL_ASSESSMENT: 0-10
PAIN_FUNCTIONAL_ASSESSMENT: 0-10
PAIN_FUNCTIONAL_ASSESSMENT: CPOT (CRITICAL CARE PAIN OBSERVATION TOOL)
PAIN_FUNCTIONAL_ASSESSMENT: CPOT (CRITICAL CARE PAIN OBSERVATION TOOL)
PAIN_FUNCTIONAL_ASSESSMENT: 0-10

## 2025-04-04 ASSESSMENT — PAIN DESCRIPTION - ORIENTATION
ORIENTATION: RIGHT;LEFT;MID
ORIENTATION: RIGHT;LEFT;MID

## 2025-04-04 ASSESSMENT — ACTIVITIES OF DAILY LIVING (ADL): BATHING_ASSISTANCE: MODERATE

## 2025-04-04 ASSESSMENT — PAIN DESCRIPTION - LOCATION
LOCATION: ABDOMEN
LOCATION: ABDOMEN

## 2025-04-04 NOTE — PROGRESS NOTES
Occupational Therapy    Evaluation and Treatment    Patient Name: López Lopez  MRN: [id number]  Today's Date: 4/4/2025  Room: 01/01  Time Calculation  Start Time: 1441  Stop Time: 1515  Time Calculation (min): 34 min    Assessment  IP OT Assessment  OT Assessment: Pt is a 65 year old male who demonstrates decreased strength, balance, and activity tolerance, which impedes occupational performance.  Prognosis: Good  Barriers to Discharge Home: Physical needs  Physical Needs: Intermittent mobility assistance needed, Intermittent ADL assistance needed  Evaluation/Treatment Tolerance: Patient tolerated treatment well  Medical Staff Made Aware: Yes  End of Session Communication: Bedside nurse  End of Session Patient Position: Up in chair, Alarm on    Plan:  Inpatient Plan  Treatment Interventions: ADL retraining, Functional transfer training, UE strengthening/ROM, Endurance training, Cognitive reorientation, Patient/family training, Equipment evaluation/education, Neuromuscular reeducation, Compensatory technique education  OT Frequency: 3 times per week  OT Discharge Recommendations: Low intensity level of continued care  Equipment Recommended upon Discharge: Wheeled walker  OT Recommended Transfer Status: Assist of 2  OT - OK to Discharge: Yes  OT Assessment  OT Assessment Results: Decreased ADL status, Decreased endurance, Decreased functional mobility, Decreased IADLs  Prognosis: Good  Evaluation/Treatment Tolerance: Patient tolerated treatment well  Medical Staff Made Aware: Yes    Subjective   Current Problem:  1. Alcoholic cirrhosis of liver with ascites (Multi)  Case Request Operating Room: TRANSPLANT, LIVER, TRANSPLANT, KIDNEY    Cytology Consultation (Non-Gynecologic)    Cytology Consultation (Non-Gynecologic)    Surgical Pathology Exam    Surgical Pathology Exam    CANCELED: Case Request Operating Room: LAPAROTOMY, EXPLORATORY    CANCELED: Case Request Operating Room: LAPAROTOMY, EXPLORATORY      2. Stage  4 chronic kidney disease (Multi)  Case Request Operating Room: TRANSPLANT, LIVER, TRANSPLANT, KIDNEY    Cytology Consultation (Non-Gynecologic)    Cytology Consultation (Non-Gynecologic)    Surgical Pathology Exam    Surgical Pathology Exam      3. CKD (chronic kidney disease) stage 4, GFR 15-29 ml/min (Multi)  Surgical Pathology Exam    Surgical Pathology Exam        General:  Reason for Referral: S/p OLT 4/2-4/3. Returned to OR 4/3 am, also s/p DDKT and re-exploration of hepatic artery  Past Medical History Relevant to Rehab: PMHx of decompensated cirrhosis 2/2 to MASLD (recurrent ascites, portal HTN requires paracentesis every other week), CKD 4, DM2, HTN, CAD s/p PCI to prox LAD 2021, h/o pAFib on Eliquis, h/o ITP dx 5/2024 s/p steroids and IVIG, hyperlipidemia, obesity  Prior to Session Communication: Bedside nurse  Patient Position Received: Bed, 3 rail up, Alarm off, not on at start of session  Family/Caregiver Present: Yes  Caregiver Feedback: Wife at bedside at end of session  General Comment: Pt supine in bed on arrival, agreeable to participate. On RA however satting at 89% requiring 2L NC per RN discretion.     Precautions:  Medical Precautions: Fall precautions  Post-Surgical Precautions: Abdominal surgery precautions  Precautions Comment: low platelets    Vital Signs:   Date/Time Vitals Session Patient Position Pulse Resp SpO2 BP MAP (mmHg)    04/04/25 1400 --  --  74  21  99 %  133/65  85     04/04/25 1440 --  --  78  21  --  --  --     04/04/25 1441 Pre OT  --  78  22  89 %  149/66  --     04/04/25 1515 Post OT  --  81  22  100 %  152/70  --           Pain:  Pain Assessment  Pain Assessment: 0-10  0-10 (Numeric) Pain Score: 3  Pain Type: Surgical pain  Pain Location: Abdomen  Pain Interventions: Distraction, Repositioned, Emotional support, Ambulation/increased activity, Therapeutic presence  Response to Interventions: Content/relaxed    Lines/Tubes/Drains:  CVC 04/02/25 Double lumen Right Internal  "jugular (Active)   Number of days: 1       Introducer 04/02/25 Internal jugular Right (Active)   Number of days: 1       Arterial Line 04/02/25 Left Radial (Active)   Number of days: 1       Pulmonary Artery Catheter Internal jugular (Active)   Number of days: 2       Urethral Catheter Non-latex;Straight-tip 14 Fr. (Active)   Number of days: 2       Closed/Suction Drain 1 RLQ Bulb 19 Fr. (Active)   Number of days: 1       Closed/Suction Drain 2 RLQ Bulb 19 Fr. (Active)   Number of days: 1       Closed/Suction Drain 3 LLQ Bulb 19 Fr. (Active)   Number of days: 1       Closed/Suction Drain LLQ Bulb 19 Fr. (Active)   Number of days: 1       Hemodialysis Cath Right Non-tunneled catheter Jugular (Active)   Number of days: 1         Objective   Cognition:  Overall Cognitive Status:  (Slight delay, endorses feeling off from medication (recently received Dilaudid))  Orientation Level: Disoriented to time (reports month as April, oriented to year. Otherwise oriented)  Processing Speed: Delayed     Confusion Assessment Method (CAM)  Acute Onset and Fluctuating Course (1A): Yes  Acute Onset and Fluctuating Course (1B): Yes  Inattention (2): Yes  Disorganized Thinking (3): No  Rate Patient's Level of Consciousness (4): Alert (Normal), No  Delirium Present: No     Home Living:  Type of Home: House  Lives With: Spouse  Home Adaptive Equipment:  (Rollator)  Home Layout: One level  Home Access:  (1 MARIFER from back entrance, 3 MARIFER from front entrance)  Bathroom Shower/Tub: Tub/shower unit  Bathroom Equipment: Grab bars around toilet (plans to add grab bars in shower)     Prior Function:  Level of [place]: Independent with ADLs and functional transfers  Receives Help From: Family (wife)  ADL Assistance:  (occasional assist socks due to swelling)  Homemaking Assistance: Needs assistance (spouse performs majority)  Ambulatory Assistance: Independent (no AD at home, rollator in community \"For safety\")  Vocational:  (retired, used to " be a captain of a tow boat)  Hand Dominance: Right  Prior Function Comments: (+) drives     ADL:  Eating Assistance: Modified independent (Device)  Grooming Assistance: Minimal  Bathing Assistance: Moderate  UE Dressing Assistance: Minimal  LE Dressing Assistance: Total  LE Dressing Deficit: Don/doff R sock, Don/doff L sock  Toileting Assistance with Device: Maximal    Activity Tolerance:  Endurance: Tolerates 10 - 20 min exercise with multiple rests  Early Mobility/Exercise Safety Screen: Proceed with mobilization - No exclusion criteria met  Activity Tolerance Comments: endorses abd pain and feeling off from pain medication    Bed Mobility/Transfers: Bed Mobility  Bed Mobility: Yes  Bed Mobility 1  Bed Mobility 1: Supine to sitting  Level of Assistance 1: Maximum assistance  Bed Mobility Comments 1: HOB elevated, draw sheet, cues for log roll, increased time   and Transfers  Transfer: Yes  Transfer 1  Transfer From 1: Sit to  Transfer to 1: Stand  Transfer Level of Assistance 1: Minimum assistance, +2, Arm in arm assistance  Transfers 2  Transfer From 2: Bed to  Transfer to 2: Chair with arms  Technique 2: Stand pivot  Transfer Level of Assistance 2: Minimum assistance, +2  Trials/Comments 2: increased time, fearful of falling    Vision: Vision - Basic Assessment  Current Vision: Wears glasses all the time     Sensation:  Light Touch: No apparent deficits    Strength:  Strength Comments: BUE >/=3/5, resistance not applied     Coordination:  Movements are Fluid and Coordinated: Yes     Extremities:   RUE   RUE : Within Functional Limits,   LUE   LUE: Within Functional Limits,          Treatment Completed on Evaluation    Therapy/Activity:     Therapeutic Activity  Therapeutic Activity Performed: Yes  Therapeutic Activity 1: Pt educated on abdominal precautions. Performed log roll to get OOB with cues for sequencing and max A as well as HOB elevated. Additionally, introduced need for compensatory ADLs for adherence  to precautions. Pt unable to perform figure four for LBD, would benefit from AE training in subsquent therapy sessions.  Therapeutic Activity 2: Pt tolerated static standing ~90 seconds with CGA x2. Endorses some fear of falling with transfer to chair with min A x2. Required increased time, reassurance, and verbal cues for sequencing transfer to chair.    Outcome Measures: [facility] Daily Activity  Putting on and taking off regular lower body clothing: Total  Bathing (including washing, rinsing, drying): A lot  Putting on and taking off regular upper body clothing: A little  Toileting, which includes using toilet, bedpan or urinal: A lot  Taking care of personal grooming such as brushing teeth: A little  Eating Meals: None  Daily Activity - Total Score: 15        ICU Mobility Screen  Early Mobility/Exercise Safety Screen: Proceed with mobilization - No exclusion criteria met  ICU Mobility Scale: Transferring bed to chair,          Education Documentation  Body Mechanics, taught by Renetta Hill OT at 4/4/2025  3:47 PM.  Learner: Patient  Readiness: Acceptance  Method: Explanation, Demonstration  Response: Verbalizes Understanding  Comment: OT goals/POC, abdominal precautions, mobility and ADL training    Precautions, taught by Renetta Hill OT at 4/4/2025  3:47 PM.  Learner: Patient  Readiness: Acceptance  Method: Explanation, Demonstration  Response: Verbalizes Understanding  Comment: OT goals/POC, abdominal precautions, mobility and ADL training    ADL Training, taught by Renetta Hill OT at 4/4/2025  3:47 PM.  Learner: Patient  Readiness: Acceptance  Method: Explanation, Demonstration  Response: Verbalizes Understanding  Comment: OT goals/POC, abdominal precautions, mobility and ADL training    Education Comments  No comments found.        Goals:   Encounter Problems       Encounter Problems (Active)       ADLs       Patient with complete upper body dressing with modified  independent level of assistance donning and doffing all UE clothes       Start:  04/04/25    Expected End:  04/18/25            Patient with complete lower body dressing with minimal assist  level of assistance donning and doffing all LE clothes  with PRN adaptive equipment.        Start:  04/04/25    Expected End:  04/18/25            Patient will complete toileting including hygiene clothing management/hygiene with modified independent level of assistance.       Start:  04/04/25    Expected End:  04/18/25               COGNITION/SAFETY       Pt will maintain abdominal precautions with 100% carryover during functional tasks, ADLs, and mobility without cueing.        Start:  04/04/25    Expected End:  04/18/25               MOBILITY       Patient will perform Functional mobility max Household distances with modified independent level of assistance and least restrictive device in order to improve safety and functional mobility.       Start:  04/04/25    Expected End:  04/18/25               TRANSFERS       Patient will perform bed mobility modified independent level of assistance in order to improve safety and independence with mobility       Start:  04/04/25    Expected End:  04/18/25            Patient will complete functional transfer to toilet with least restrictive device with modified independent level of assistance.       Start:  04/04/25    Expected End:  04/18/25 04/04/25 at 3:48 PM   Renetta Hill, OT   Rehab Office: [phone]

## 2025-04-04 NOTE — PROGRESS NOTES
Physical Therapy                 Therapy Communication Note    Patient Name: López Lopez  MRN: [id number]  Department: [facility]  Room: 01/01-A  Today's Date: 4/4/2025     Discipline: Physical Therapy    PT Missed Visit: Yes (0838: Discussed pt in ID rounds this AM with medical team. Working toward extubation, will hold PT eval and re-attempt as able/appropriate.)       Missed Time: Attempt

## 2025-04-04 NOTE — PROGRESS NOTES
RDN is aware of pt's admission post liver/kidney transplant and is in agreement with plan of care. Will continue to follow through length of stay.

## 2025-04-04 NOTE — PROGRESS NOTES
"    Surgical Intensive Care Unit Progress Note        Subjective   Received 1 uRBC overnight  Received a dose of lasix 40 mg Ivx 1  Levo weaned off this morning  Extubated during rounds    Objective     Physical Exam  Constitutional:       Appearance: Normal appearance.   HENT:      Head: Normocephalic and atraumatic.   Neck:      Comments: RIJ MAC w/ PA catheter and RIJ trialysis in place, dressing c/d/i.  Cardiovascular:      Rate and Rhythm: Normal rate and regular rhythm.   Pulmonary:      Effort: Pulmonary effort is normal.      Breath sounds: Normal breath sounds.   Abdominal:      Comments: Midline abdominal incision covered. LISA drains x 4 with serosanguinous drainage    Genitourinary:     Comments: Nelson draining yellow/ pink urine   Neurological:      General: No focal deficit present.      Mental Status: He is alert and oriented to person, place, and time.   Psychiatric:         Mood and Affect: Mood normal.         Last Recorded Vitals  Blood pressure 128/53, pulse 67, temperature 36.9 °C (98.4 °F), resp. rate 17, height 1.93 m (6' 4\"), weight 112 kg (247 lb 11.2 oz), SpO2 93%.  Intake/Output last 3 Shifts:  I/O last 3 completed shifts:  In: 93837.8 (153.4 mL/kg) [I.V.:2314.9 (20.6 mL/kg); Blood:8385.9; IV Piggyback:6534]  Out: 85256 (142.9 mL/kg) [Urine:2655 (0.7 mL/kg/hr); Emesis/NG output:350; Drains:4420; Other:4629; Blood:4000]  Weight: 112.4 kg     Relevant Results     Scheduled medications  albumin human, 25 g, intravenous, q6h  [START ON 4/7/2025] basiliximab, 20 mg, intravenous, Once  fluconazole, 400 mg, intravenous, q24h  insulin lispro, 0-10 Units, subcutaneous, q4h  [START ON 4/6/2025] methylPREDNISolone sodium succinate (PF), 60 mg, intravenous, Daily   Followed by  [START ON 4/7/2025] predniSONE, 20 mg, oral, Daily  mycophenolate, 1,000 mg, intravenous, q12h BRITTANEY  pantoprazole, 40 mg, intravenous, BID  piperacillin-tazobactam, 3.375 g, intravenous, q6h  tacrolimus, 1 mg, oral, q12h " BRITTANEY      Continuous medications  norepinephrine, 0-0.5 mcg/kg/min, Last Rate: 0.02 mcg/kg/min (04/04/25 0700)  PrismaSol BGK 2/3.5, 3,000 mL/hr, Last Rate: Stopped (04/03/25 0156)  propofol, 0-50 mcg/kg/min, Last Rate: 40 mcg/kg/min (04/04/25 0700)  sodium chloride, 60 mL/hr, Last Rate: 60 mL/hr (04/04/25 0700)  sodium chloride 0.9%, 0-1,000 mL/hr, Last Rate: Stopped (04/04/25 0653)      PRN medications  PRN medications: alteplase, calcium chloride, calcium chloride, dextrose, dextrose, glucagon, glucagon, heparin, heparin, HYDROmorphone, HYDROmorphone, magnesium sulfate, magnesium sulfate, oxygen    Results from last 7 days   Lab Units 04/04/25  0616 04/04/25 0215 04/03/25  2212   WBC AUTO x10*3/uL 8.5 6.8 9.4   HEMOGLOBIN g/dL 9.4* 8.7* 9.5*   PLATELETS AUTO x10*3/uL 52* 43* 55*      Results from last 7 days   Lab Units 04/04/25  0616 04/04/25 0215 04/03/25  2212   SODIUM mmol/L 139 140 139   POTASSIUM mmol/L 3.8 4.0 4.1   CHLORIDE mmol/L 104 105 105   CO2 mmol/L 21 22 22   BUN mg/dL 56* 52* 54*   CREATININE mg/dL 3.29* 2.98* 3.05*   GLUCOSE mg/dL 157* 139* 157*   MAGNESIUM mg/dL 2.07 2.05 2.01   PHOSPHORUS mg/dL 5.8* 5.4* 5.0*      Results from last 7 days   Lab Units 04/04/25  0616 04/04/25 0215 04/03/25  2212   INR  1.5* 1.5* 1.5*   PROTIME seconds 16.1* 16.4* 16.7*   APTT seconds 30 32 31        This patient has a central line   Reason for the central line remaining today? Hemodynamic monitoring    This patient has a urinary catheter   Reason for the urinary catheter remaining today? perioperative use for selected surgical procedures    This patient is intubated   Reason for patient to remain intubated today? they are planned for extubation trial later today/tonight      Assessment/Plan   Assessment & Plan  Alcoholic cirrhosis of liver with ascites (Multi)    Chronic renal impairment, stage 4 (severe) (Multi)    Stage 4 chronic kidney disease (Multi)    CKD (chronic kidney disease) stage 4, GFR 15-29 ml/min  (Multi)      López Lopez is a 65 y.o. male with PMHx of decompensated cirrhosis 2/2 to MASLD (recurrent ascites, portal HTN requires paracentesis every other week), CKD 4, DM2, HTN, CAD s/p PCI to prox LAD 2021, h/o pAFib on Eliquis, h/o ITP dx 5/2024 s/p steroids and IVIG, hyperlipidemia, obesity who presents to [facility] for possible simultaneous liver and kidney transplant. He presents to the SICU s/p OLT 4/2-4/3. Returned to OR 4/3 am, now s/p DDKT and re-exploration of hepatic artery.     Plan:  NEURO: Intubated and sedated on propofol infusion at 40 mcg/kg/min.   - ongoing neuro and pain assessments  - PRN hydromorphone for pain control  - PT/OT consult  - Home meds: trazodone 50mg nightly  - Restraints indicated while patient remains intubated, restrain with soft wrist restraints until medical devices are discontinued.      CV: History of HTN, HLD, CAD s/p PCI to prox LAD 2021, h/o pAFib on Eliquis. Baseline /75. Baseline echo (12/10/2024) with EF Left ventricular ejection fraction is hyperdynamic, calculated by Manzanares's biplane at 77%. %, normal RV.  AT2 weaned off . Currentl on  levo at 0.02.  - continuous EKG/abp/cvp/pap monitoring  - titrate levo and AT2 to MAP goal >65  - Goal CVP 8-12, CI >2.5   - Additional 25% albumin q6h x4 doses  - additional volume resuscitation as indicated  - Holding home meds: atorvastatin 10mg daily, apixaban 5mg BID, furosemide 20 mg BID     PULM: Former smoker. Extubated during rounds this morning  - q1h IS while awake  - additional pulm toilet prn  - CXR daily  - ABG prn     GI: Hx of Cirrhosis 2/2 to MASLD (recurrent ascites, portal HTN requires paracentesis every other week). Now s/p OLT on 4/2-4/3. Post-op liver US with atrophied Left hepatic lobe, Right HA not well visualized, Common HA w/ borderline elevated RI, Left HA w/ borderline low RI, and slightly elevated main portal vein velocities. Return to OR 4/3 s/p re-exploration of hepatic artery.  4/4 Liver  US:borderline elevated RI of the common hepatic artery with limited assessment of the left/right branches   - NPO, NG to LIWS  - PPI for GI prophylaxis  - LFTs/GGT q4h  - serial abdominal exams  - monitor drain output  - IS/ppx per Transplant: methylpred taper, MMF and tacro     : Hx of CKD 4. Baseline creatinine 3.89. Intra-op CVVH during OLT. . Now s/p DDKT 4/3.  post-op Kidney US Resistive indices at the upper limits of normal with patent  vasculature and no evidence of retrograde flow   - Check renal function panel  q4h.  - Maintenance IVF -> 1/2NS at 60ml/hr  -Finishing the 1:1 IVF replacement with NS  - Maintain UOP >0.5ml/kg/hr   - Maintain noel for strict I&Os  - Replete electrolytes judiciously  - IS/ppx per Transplant: as above +Simulect induction     HEME: Hx of ITP dx 5/2024 s/p steroids and IVIG. Acute blood loss anemia. Coagulopathy. Hypofibrinogenemia. Thrombocytopenia. OR EBL 4L. Intra-op received 7u PRBC, 8u FFP, 2u cryo, 10pk Plts. Post-op overnight transfused 1u cryo and 5pk Plts. OR 4/3 EBL 200ml, intra-op received 2u PRBC and 1u FFP.  - Check CBC, coags, fibrinogen  q4h  - Goal hgb >9, plt >50, fibrinogen >100, INR <1.8  - SCDs for DVT prophylaxis  - No SQH/ASA at this time  - ongoing monitoring for s/s bleeding  - maintain active T&S (4/2)--> resend tomorrow     ENDO: History of prediabetes. A1c 5.5 (9/2024).   -SSI # 2  - goal -180  - Home meds: metformin 1000mg q12h     ID: Afebrile. No leukocytosis. MRSA swab pending.  - trend q4h WBC, q4h temps  - Post-op Zosyn x72 hrs  - continue prophylactic Fluconazole x30 days  - close monitoring for s/s infection. Low threshold to culture.     Lines:  - L radial arterial line (4/2, OR)  - R IJ MAC with PAC (4/2, OR)  - R IJ trialysis (4/2, OR)  - PIV x3     Dispo: Continue ICU care. Patient seen and discussed with ICU attending Dr. Camilo.    Critical care time: 45 min    Zakia Garcia PA-C  Team phone [phone]

## 2025-04-04 NOTE — OP NOTE
TRANSPLANT, LIVER Operative Note     Date: 2025 - 4/3/2025  OR Location: [facility] OR    Name: López Lopez, : 1960, Age: 65 y.o., MRN: [id number], Sex: male    Diagnosis  Pre-op Diagnosis      * Alcoholic cirrhosis of liver with ascites (Multi) [K70.31]     * Stage 4 chronic kidney disease (Multi) [N18.4] Post-op Diagnosis     * Alcoholic cirrhosis of liver with ascites (Multi) [K70.31]     * Stage 4 chronic kidney disease (Multi) [N18.4]     Procedures  Bench preparation of whole liver allograft      Surgeons   Arnold    Procedure Details:       The liver was checked into the room per protocol. UNOS #, ABO of donor and recipient were verified.  The liver was brought to the back table in sterile technique from [facility]. The suprahepatic vena cava and infrahepatic vena cava were dissected free. The adrenal vein, phrenic veins and additional branches of the vena cava were ligated. The diaphragm was removed from the liver. The portal vein was skeletonized to the left and right bifurcation, branches were tied using 3-0 or 4-0 silk. The arterial anatomy was_normal. The hepatic artery was skeletonized from the celiac trunk to the GDA. Vena cava, artery and portal vein were tested to be without additional branches. Upon completion of the back table reconstruction, the liver was packed in cold preservation solution surrounded by slush.          Eliezer Barrett MD

## 2025-04-04 NOTE — PROGRESS NOTES
TRANSPLANT SURGERY PROGRESS NOTE    López Lopez underwent transplant surgery on 4/2/2025 (Liver / Kidney) and was evaluated on multidisciplinary inpatient rounds.  I specifically evaluated the management of immunosuppression to ensure adequate exposure required to decrease the risk of rejection.  Furthermore, I reviewed potential side effects including tremor, hyperglycemia, leukopenia, infection, and other neurologic changes.  I reviewed and adjusted infectious prophylaxis based on the patients clinical condition and  protocols.     24 EVENTS: s/p kidney txp yesterday and re-exploration of liver to evaluate artery    DIAGNOSIS:  Kidney/Liver replaced by transplant Z94.4    PHYSICAL EXAMINATION:  Last Recorded Vitals  Visit Vitals  /68   Pulse 74   Temp 36.9 °C (98.4 °F)   Resp 17      Intake/Output last 3 Shifts:    Intake/Output Summary (Last 24 hours) at 4/4/2025 1048  Last data filed at 4/4/2025 1019  Gross per 24 hour   Intake 5464 ml   Output 4925 ml   Net 539 ml      Vitals:    04/02/25 1540   Weight: 112 kg (247 lb 11.2 oz)      Gen: Intubated, alert and following commands  HEENT: PERRL, sclera anicteric, MMM, NGT  Cardiac: RRR  Chest: Normal inspiratory effort  Abdomen: Soft, appropriately tender. Incision dressings mild strikethrough.  Ext: No LE edema  Drains: serosanguinous    MEDICATION LIST: REVIEWED  Scheduled medications  albumin human, 25 g, intravenous, q6h  [START ON 4/7/2025] basiliximab, 20 mg, intravenous, Once  [START ON 4/5/2025] fluconazole, 400 mg, intravenous, q24h  insulin lispro, 0-10 Units, subcutaneous, q4h  [START ON 4/6/2025] methylPREDNISolone sodium succinate (PF), 60 mg, intravenous, Daily   Followed by  [START ON 4/7/2025] predniSONE, 20 mg, oral, Daily  mycophenolate, 1,000 mg, intravenous, q12h BRITTANEY  pantoprazole, 40 mg, intravenous, BID  piperacillin-tazobactam, 3.375 g, intravenous, q6h  tacrolimus, 1 mg, oral, q12h BRITTANEY    Continuous  medications  norepinephrine, 0-0.5 mcg/kg/min, Last Rate: 0.02 mcg/kg/min (04/04/25 0700)  PrismaSol BGK 2/3.5, 3,000 mL/hr, Last Rate: Stopped (04/03/25 0156)  propofol, 0-50 mcg/kg/min, Last Rate: 40 mcg/kg/min (04/04/25 0700)  sodium chloride, 60 mL/hr, Last Rate: 60 mL/hr (04/04/25 0700)  sodium chloride 0.9%, 0-1,000 mL/hr, Last Rate: Stopped (04/04/25 0653)    LABS:  Basic Metabolic Panel   Result Value Ref Range    Glucose 151 (H) 74 - 99 mg/dL    Sodium 139 136 - 145 mmol/L    Potassium 3.7 3.5 - 5.3 mmol/L    Chloride 104 98 - 107 mmol/L    Bicarbonate 23 21 - 32 mmol/L    Anion Gap 16 10 - 20 mmol/L    Urea Nitrogen 55 (H) 6 - 23 mg/dL    Creatinine 3.26 (H) 0.50 - 1.30 mg/dL    eGFR 20 (L) >60 mL/min/1.73m*2    Calcium 8.1 (L) 8.6 - 10.6 mg/dL     Hepatic function panel   Result Value Ref Range    Albumin 3.3 (L) 3.4 - 5.0 g/dL    Bilirubin, Total 1.6 (H) 0.0 - 1.2 mg/dL    Bilirubin, Direct 1.2 (H) 0.0 - 0.3 mg/dL    Alkaline Phosphatase 41 33 - 136 U/L     (H) 10 - 52 U/L     (H) 9 - 39 U/L    Total Protein 4.6 (L) 6.4 - 8.2 g/dL     CBC   Result Value Ref Range    WBC 7.3 4.4 - 11.3 x10*3/uL    nRBC 0.0 0.0 - 0.0 /100 WBCs    RBC 3.13 (L) 4.50 - 5.90 x10*6/uL    Hemoglobin 9.5 (L) 13.5 - 17.5 g/dL    Hematocrit 27.2 (L) 41.0 - 52.0 %    MCV 87 80 - 100 fL    MCH 30.4 26.0 - 34.0 pg    MCHC 34.9 32.0 - 36.0 g/dL    RDW 16.6 (H) 11.5 - 14.5 %    Platelets 46 (L) 150 - 450 x10*3/uL   Coagulation Screen   Result Value Ref Range    Protime 15.0 (H) 9.8 - 12.4 seconds    INR 1.4 (H) 0.9 - 1.1    aPTT 29 26 - 36 seconds     ASSESSMENT AND PLAN:    Mr. Lopez is a 65 y.o. male who underwent  transplant surgery on 4/2/2025 (Liver / Kidney).    1. Immunosuppression reviewed and adjusted       Tacrolimus: current dose 1 mg BID      Goal tacrolimus trough level is 8-10 ng/mL      Mycophenolate: Yes - 1 gm bid      Steroid taper    2. Wound/drain/noel and pain management      Noel catheter:   Continue 3 days post-op      Cordis/PA cath: continue    3.  Extubate today, anticipate remove NGT and start CLD/PO meds    4. Prophylaxis      DVT: SCDs/Subcutaneous Heparin: No      GI prophylaxis: Protonix      Fluconazole and Zosyn continue; hold valcyte and bactrim    5. Empty drains q4h, continue albumin 25 gm IV q6h      Reduce labs to q12h    6. PT/OT: OOB to chair    7. Discharge planned for: 10 days    Case was presented at Multi Disciplinary team rounds   Attending physician, consulting physician, , pharmacist, residents and fellow were present at the meeting.    Yaa Cancino MD, PHD, MPH, FACS  Chief Transplant and Hepatobiliary Surgery

## 2025-04-04 NOTE — PROGRESS NOTES
TRANSPLANT SURGERY PROGRESS NOTE    López Lopez underwent transplant surgery on 4/2/2025 (Liver / Kidney) and was evaluated on multidisciplinary inpatient rounds.  I specifically evaluated the management of immunosuppression to ensure adequate exposure required to decrease the risk of rejection.  Furthermore, I reviewed potential side effects including tremor, hyperglycemia, leukopenia, infection, and other neurologic changes.  I reviewed and adjusted infectious prophylaxis based on the patients clinical condition and  protocols.     24 events  POD# 0 after SLK  Re-exploration for decreased arterial signal, patent artery, arterial course adjusted and ed and intraoperative ultrasound reassuring  Kidney output excellent    Patient Active Problem List   Diagnosis    Acute idiopathic thrombocytopenic purpura (Multi)    Anemia    History of CAD (coronary artery disease)    Mixed hyperlipidemia    Paroxysmal atrial fibrillation (Multi)    Type 2 diabetes mellitus with hyperglycemia, without long-term current use of insulin    Liver failure without hepatic coma (Multi)    Metabolic dysfunction-associated steatohepatitis (MASH)    Status post insertion of drug-eluting stent into left anterior descending artery for coronary artery disease    Type 2 diabetes mellitus with diabetic chronic kidney disease    History of percutaneous transluminal coronary angioplasty    Coronary artery disease involving native coronary artery of native heart    Pre-liver transplant, listed    Pre-kidney transplant, listed    Refractory ascites    Alcoholic cirrhosis of liver with ascites (Multi)    Stage 4 chronic kidney disease (Multi)    Chronic renal impairment, stage 4 (severe) (Multi)    CKD (chronic kidney disease) stage 4, GFR 15-29 ml/min (Multi)       PHYCISCAL EXAMINATION:  Vitals:    04/03/25 2000   BP:    Pulse: 86   Resp: 15   Temp: 37.2 °C (99 °F)   SpO2: 100%        04/02 0700 - 04/03 1859  In: 80189.6  [I.V.:1155.7]  Out: 89320 [Urine:2065; Drains:2720]     Weight change:     General Appearance - NAD, Good speech, oriented and alert  Abdomen - soft , not tender, no guarding, no rigidity. No hepatosplenomegaly. Normal bowel sounds. No masses and ascites.   Wound c/d/i   Neuro/Psych - appropriate mood and affect. Motor power V/V all extremities. CN I -XII were grossly intact.  Skin - No visible rash      MEDICATION LIST: REVIEWED  albumin human, 25 g, q6h  [START ON 4/7/2025] basiliximab, 20 mg, Once  calcium chloride, 500 mg, Once  fluconazole, 400 mg, q24h  insulin lispro, 0-10 Units, q4h  [START ON 4/4/2025] methylPREDNISolone sodium succinate (PF), 125 mg, Daily   Followed by  [START ON 4/6/2025] methylPREDNISolone sodium succinate (PF), 60 mg, Daily   Followed by  [START ON 4/7/2025] predniSONE, 20 mg, Daily  mycophenolate, 1,000 mg, q12h BRITTANEY  pantoprazole, 40 mg, BID  piperacillin-tazobactam, 3.375 g, q6h  tacrolimus, 1 mg, q12h BRITTANEY      norepinephrine, Last Rate: 0.01 mcg/kg/min (04/03/25 1904)  PrismaSol BGK 2/3.5, Last Rate: Stopped (04/03/25 0156)  propofol, Last Rate: 30 mcg/kg/min (04/03/25 1800)  sodium chloride, Last Rate: 60 mL/hr (04/03/25 1800)  sodium chloride 0.9%, Last Rate: 60 mL/hr (04/03/25 1903)      alteplase, 2 mg, PRN  calcium chloride, 0.5 g, q8h PRN  calcium chloride, 1 g, q8h PRN  dextrose, 12.5 g, q15 min PRN  dextrose, 25 g, q15 min PRN  glucagon, 1 mg, q15 min PRN  glucagon, 1 mg, q15 min PRN  heparin, 1,000 Units, PRN  heparin, 1,000 Units, PRN  HYDROmorphone, 0.4 mg, q4h PRN  HYDROmorphone, 0.2 mg, q4h PRN  magnesium sulfate, 2 g, q6h PRN  magnesium sulfate, 4 g, q6h PRN  oxygen, , Continuous PRN - O2/gases        ALLERGY:  No Known Allergies    LABS:  Results for orders placed or performed during the hospital encounter of 04/02/25 (from the past 24 hours)   Blood Gas Arterial Full Panel Unsolicited   Result Value Ref Range    POCT pH, Arterial 7.34 (L) 7.38 - 7.42 pH    POCT pCO2,  Arterial 33 (L) 38 - 42 mm Hg    POCT pO2, Arterial 244 (H) 85 - 95 mm Hg    POCT SO2, Arterial 100 94 - 100 %    POCT Oxy Hemoglobin, Arterial 98.0 94.0 - 98.0 %    POCT Hematocrit Calculated, Arterial 24.0 (L) 41.0 - 52.0 %    POCT Sodium, Arterial 131 (L) 136 - 145 mmol/L    POCT Potassium, Arterial 4.5 3.5 - 5.3 mmol/L    POCT Chloride, Arterial 104 98 - 107 mmol/L    POCT Ionized Calcium, Arterial 1.23 1.10 - 1.33 mmol/L    POCT Glucose, Arterial 94 74 - 99 mg/dL    POCT Lactate, Arterial 2.7 (H) 0.4 - 2.0 mmol/L    POCT Base Excess, Arterial -7.2 (L) -2.0 - 3.0 mmol/L    POCT HCO3 Calculated, Arterial 17.8 (L) 22.0 - 26.0 mmol/L    POCT Hemoglobin, Arterial 8.0 (L) 13.5 - 17.5 g/dL    POCT Anion Gap, Arterial 14 10 - 25 mmo/L    Patient Temperature 37.0 degrees Celsius    FiO2 50 %   Blood Gas Arterial Full Panel Unsolicited   Result Value Ref Range    POCT pH, Arterial 7.29 (L) 7.38 - 7.42 pH    POCT pCO2, Arterial 38 38 - 42 mm Hg    POCT pO2, Arterial 213 (H) 85 - 95 mm Hg    POCT SO2, Arterial 100 94 - 100 %    POCT Oxy Hemoglobin, Arterial 98.4 (H) 94.0 - 98.0 %    POCT Hematocrit Calculated, Arterial 23.0 (L) 41.0 - 52.0 %    POCT Sodium, Arterial 131 (L) 136 - 145 mmol/L    POCT Potassium, Arterial 4.7 3.5 - 5.3 mmol/L    POCT Chloride, Arterial 104 98 - 107 mmol/L    POCT Ionized Calcium, Arterial 1.16 1.10 - 1.33 mmol/L    POCT Glucose, Arterial 92 74 - 99 mg/dL    POCT Lactate, Arterial 2.3 (H) 0.4 - 2.0 mmol/L    POCT Base Excess, Arterial -7.6 (L) -2.0 - 3.0 mmol/L    POCT HCO3 Calculated, Arterial 18.3 (L) 22.0 - 26.0 mmol/L    POCT Hemoglobin, Arterial 7.5 (L) 13.5 - 17.5 g/dL    POCT Anion Gap, Arterial 13 10 - 25 mmo/L    Patient Temperature 37.0 degrees Celsius    FiO2 50 %   Blood Gas Arterial Full Panel Unsolicited   Result Value Ref Range    POCT pH, Arterial 7.25 (LL) 7.38 - 7.42 pH    POCT pCO2, Arterial 37 (L) 38 - 42 mm Hg    POCT pO2, Arterial 172 (H) 85 - 95 mm Hg    POCT SO2,  Arterial 100 94 - 100 %    POCT Oxy Hemoglobin, Arterial 98.4 (H) 94.0 - 98.0 %    POCT Hematocrit Calculated, Arterial 23.0 (L) 41.0 - 52.0 %    POCT Sodium, Arterial 132 (L) 136 - 145 mmol/L    POCT Potassium, Arterial 5.0 3.5 - 5.3 mmol/L    POCT Chloride, Arterial 104 98 - 107 mmol/L    POCT Ionized Calcium, Arterial 0.82 (L) 1.10 - 1.33 mmol/L    POCT Glucose, Arterial 122 (H) 74 - 99 mg/dL    POCT Lactate, Arterial 2.9 (H) 0.4 - 2.0 mmol/L    POCT Base Excess, Arterial -10.2 (L) -2.0 - 3.0 mmol/L    POCT HCO3 Calculated, Arterial 16.2 (L) 22.0 - 26.0 mmol/L    POCT Hemoglobin, Arterial 7.8 (L) 13.5 - 17.5 g/dL    POCT Anion Gap, Arterial 17 10 - 25 mmo/L    Patient Temperature 37.0 degrees Celsius    FiO2 40 %   Sterile Fluid Culture/Smear    Specimen: Ascites Fluid   Result Value Ref Range    Gram Stain No polymorphonuclear leukocytes seen     Gram Stain No organisms seen    Blood Gas Arterial Full Panel Unsolicited   Result Value Ref Range    POCT pH, Arterial 7.24 (LL) 7.38 - 7.42 pH    POCT pCO2, Arterial 39 38 - 42 mm Hg    POCT pO2, Arterial 170 (H) 85 - 95 mm Hg    POCT SO2, Arterial 100 94 - 100 %    POCT Oxy Hemoglobin, Arterial 97.9 94.0 - 98.0 %    POCT Hematocrit Calculated, Arterial 28.0 (L) 41.0 - 52.0 %    POCT Sodium, Arterial 131 (L) 136 - 145 mmol/L    POCT Potassium, Arterial 5.4 (H) 3.5 - 5.3 mmol/L    POCT Chloride, Arterial 104 98 - 107 mmol/L    POCT Ionized Calcium, Arterial 1.10 1.10 - 1.33 mmol/L    POCT Glucose, Arterial 133 (H) 74 - 99 mg/dL    POCT Lactate, Arterial 3.1 (H) 0.4 - 2.0 mmol/L    POCT Base Excess, Arterial -10.0 (L) -2.0 - 3.0 mmol/L    POCT HCO3 Calculated, Arterial 16.7 (L) 22.0 - 26.0 mmol/L    POCT Hemoglobin, Arterial 9.3 (L) 13.5 - 17.5 g/dL    POCT Anion Gap, Arterial 16 10 - 25 mmo/L    Patient Temperature 37.0 degrees Celsius    FiO2 50 %   Blood Gas Arterial Full Panel Unsolicited   Result Value Ref Range    POCT pH, Arterial 7.30 (L) 7.38 - 7.42 pH    POCT  pCO2, Arterial 37 (L) 38 - 42 mm Hg    POCT pO2, Arterial 203 (H) 85 - 95 mm Hg    POCT SO2, Arterial 100 94 - 100 %    POCT Oxy Hemoglobin, Arterial 97.7 94.0 - 98.0 %    POCT Hematocrit Calculated, Arterial 29.0 (L) 41.0 - 52.0 %    POCT Sodium, Arterial 131 (L) 136 - 145 mmol/L    POCT Potassium, Arterial 5.4 (H) 3.5 - 5.3 mmol/L    POCT Chloride, Arterial 104 98 - 107 mmol/L    POCT Ionized Calcium, Arterial 1.00 (L) 1.10 - 1.33 mmol/L    POCT Glucose, Arterial 142 (H) 74 - 99 mg/dL    POCT Lactate, Arterial 3.4 (H) 0.4 - 2.0 mmol/L    POCT Base Excess, Arterial -7.6 (L) -2.0 - 3.0 mmol/L    POCT HCO3 Calculated, Arterial 18.2 (L) 22.0 - 26.0 mmol/L    POCT Hemoglobin, Arterial 9.5 (L) 13.5 - 17.5 g/dL    POCT Anion Gap, Arterial 14 10 - 25 mmo/L    Patient Temperature 37.0 degrees Celsius    FiO2 40 %   Blood Gas Arterial Full Panel Unsolicited   Result Value Ref Range    POCT pH, Arterial 7.34 (L) 7.38 - 7.42 pH    POCT pCO2, Arterial 35 (L) 38 - 42 mm Hg    POCT pO2, Arterial 176 (H) 85 - 95 mm Hg    POCT SO2, Arterial 100 94 - 100 %    POCT Oxy Hemoglobin, Arterial 97.8 94.0 - 98.0 %    POCT Hematocrit Calculated, Arterial 28.0 (L) 41.0 - 52.0 %    POCT Sodium, Arterial 133 (L) 136 - 145 mmol/L    POCT Potassium, Arterial 5.4 (H) 3.5 - 5.3 mmol/L    POCT Chloride, Arterial 104 98 - 107 mmol/L    POCT Ionized Calcium, Arterial 1.13 1.10 - 1.33 mmol/L    POCT Glucose, Arterial 135 (H) 74 - 99 mg/dL    POCT Lactate, Arterial 3.6 (H) 0.4 - 2.0 mmol/L    POCT Base Excess, Arterial -6.2 (L) -2.0 - 3.0 mmol/L    POCT HCO3 Calculated, Arterial 18.9 (L) 22.0 - 26.0 mmol/L    POCT Hemoglobin, Arterial 9.3 (L) 13.5 - 17.5 g/dL    POCT Anion Gap, Arterial 16 10 - 25 mmo/L    Patient Temperature 37.0 degrees Celsius    FiO2 40 %   Blood Gas Arterial Full Panel Unsolicited   Result Value Ref Range    POCT pH, Arterial 7.34 (L) 7.38 - 7.42 pH    POCT pCO2, Arterial 33 (L) 38 - 42 mm Hg    POCT pO2, Arterial 187 (H) 85 - 95  mm Hg    POCT SO2, Arterial 100 94 - 100 %    POCT Oxy Hemoglobin, Arterial 97.6 94.0 - 98.0 %    POCT Hematocrit Calculated, Arterial 27.0 (L) 41.0 - 52.0 %    POCT Sodium, Arterial 131 (L) 136 - 145 mmol/L    POCT Potassium, Arterial 5.2 3.5 - 5.3 mmol/L    POCT Chloride, Arterial 103 98 - 107 mmol/L    POCT Ionized Calcium, Arterial 1.08 (L) 1.10 - 1.33 mmol/L    POCT Glucose, Arterial 231 (H) 74 - 99 mg/dL    POCT Lactate, Arterial 3.8 (H) 0.4 - 2.0 mmol/L    POCT Base Excess, Arterial -7.2 (L) -2.0 - 3.0 mmol/L    POCT HCO3 Calculated, Arterial 17.8 (L) 22.0 - 26.0 mmol/L    POCT Hemoglobin, Arterial 9.1 (L) 13.5 - 17.5 g/dL    POCT Anion Gap, Arterial 15 10 - 25 mmo/L    Patient Temperature 37.0 degrees Celsius    FiO2 40 %   Prepare RBC: 10 Units   Result Value Ref Range    PRODUCT CODE C2980I98     Unit Number [id number]     Unit ABO B     Unit RH POS     XM INTEP COMP     Dispense Status RE     Blood Expiration Date 4/28/2025 11:59:00 PM EDT     PRODUCT BLOOD TYPE 7300     UNIT VOLUME 350     PRODUCT CODE M1662Z15     Unit Number [id number]     Unit ABO B     Unit RH POS     XM INTEP COMP     Dispense Status RE     Blood Expiration Date 4/28/2025 11:59:00 PM EDT     PRODUCT BLOOD TYPE 7300     UNIT VOLUME 350     PRODUCT CODE E4279Y04     Unit Number [id number]     Unit ABO B     Unit RH POS     XM INTEP COMP     Dispense Status RE     Blood Expiration Date 4/30/2025 11:59:00 PM EDT     PRODUCT BLOOD TYPE 7300     UNIT VOLUME 350     PRODUCT CODE B4795F51     Unit Number [id number]     Unit ABO B     Unit RH POS     XM INTEP COMP     Dispense Status RE     Blood Expiration Date 4/30/2025 11:59:00 PM EDT     PRODUCT BLOOD TYPE 7300     UNIT VOLUME 350     PRODUCT CODE D2330I00     Unit Number [id number]     Unit ABO B     Unit RH POS     XM INTEP COMP     Dispense Status RE     Blood Expiration Date 4/30/2025 11:59:00 PM EDT     PRODUCT BLOOD TYPE 7300     UNIT VOLUME 350     PRODUCT  CODE M3040I14     Unit Number [id number]     Unit ABO B     Unit RH POS     XM INTEP COMP     Dispense Status TR     Blood Expiration Date 5/9/2025 11:59:00 PM EDT     PRODUCT BLOOD TYPE 7300     UNIT VOLUME 350     PRODUCT CODE P5983U53     Unit Number [id number]     Unit ABO B     Unit RH POS     XM INTEP COMP     Dispense Status TR     Blood Expiration Date 5/9/2025 11:59:00 PM EDT     PRODUCT BLOOD TYPE 7300     UNIT VOLUME 350     PRODUCT CODE K5100H47     Unit Number [id number]     Unit ABO B     Unit RH POS     XM INTEP COMP     Dispense Status RE     Blood Expiration Date 5/9/2025 11:59:00 PM EDT     PRODUCT BLOOD TYPE 7300     UNIT VOLUME 350     PRODUCT CODE V7266S37     Unit Number [id number]     Unit ABO B     Unit RH POS     XM INTEP COMP     Dispense Status RE     Blood Expiration Date 5/9/2025 11:59:00 PM EDT     PRODUCT BLOOD TYPE 7300     UNIT VOLUME 350     PRODUCT CODE I1707A60     Unit Number [id number]     Unit ABO B     Unit RH POS     XM INTEP COMP     Dispense Status RE     Blood Expiration Date 5/9/2025 11:59:00 PM EDT     PRODUCT BLOOD TYPE 7300     UNIT VOLUME 350    Prepare Plasma: 10 Units   Result Value Ref Range    PRODUCT CODE J0359K37     Unit Number [id number]     Unit ABO B     Unit RH POS     Dispense Status RE     Blood Expiration Date 4/7/2025 12:14:00 PM EDT     PRODUCT BLOOD TYPE 7300     UNIT VOLUME 388     PRODUCT CODE B3956P10     Unit Number [id number]     Unit ABO B     Unit RH POS     Dispense Status RE     Blood Expiration Date 4/7/2025 12:14:00 PM EDT     PRODUCT BLOOD TYPE 7300     UNIT VOLUME 239     PRODUCT CODE E1781H25     Unit Number [id number]     Unit ABO B     Unit RH POS     Dispense Status RE     Blood Expiration Date 4/7/2025 12:26:00 PM EDT     PRODUCT BLOOD TYPE 7300     UNIT VOLUME 228     PRODUCT CODE N5901W29     Unit Number [id number]     Unit ABO B     Unit RH POS     Dispense Status RE     Blood  Expiration Date 4/7/2025 12:26:00 PM EDT     PRODUCT BLOOD TYPE 7300     UNIT VOLUME 229     PRODUCT CODE D4007R70     Unit Number [id number]     Unit ABO B     Unit RH POS     Dispense Status RE     Blood Expiration Date 4/7/2025  4:49:00 PM EDT     PRODUCT BLOOD TYPE 7300     UNIT VOLUME 217     PRODUCT CODE F1694D79     Unit Number [id number]     Unit ABO B     Unit RH POS     Dispense Status RE     Blood Expiration Date 4/7/2025 12:14:00 PM EDT     PRODUCT BLOOD TYPE 7300     UNIT VOLUME 306     PRODUCT CODE H1162F99     Unit Number [id number]     Unit ABO B     Unit RH POS     Dispense Status RE     Blood Expiration Date 4/7/2025 12:14:00 PM EDT     PRODUCT BLOOD TYPE 7300     UNIT VOLUME 319     PRODUCT CODE O9927Q84     Unit Number [id number]     Unit ABO AB     Unit RH POS     Dispense Status RE     Blood Expiration Date 4/3/2025  7:45:00 AM EDT     PRODUCT BLOOD TYPE 8400     UNIT VOLUME 208     PRODUCT CODE V9038Y08     Unit Number [id number]     Unit ABO B     Unit RH POS     Dispense Status RE     Blood Expiration Date 4/7/2025 12:14:00 PM EDT     PRODUCT BLOOD TYPE 7300     UNIT VOLUME 221     PRODUCT CODE Z3916W16     Unit Number [id number]     Unit ABO B     Unit RH POS     Dispense Status RE     Blood Expiration Date 4/7/2025  4:42:00 PM EDT     PRODUCT BLOOD TYPE 7300     UNIT VOLUME 295    Blood Gas Arterial Full Panel Unsolicited   Result Value Ref Range    POCT pH, Arterial 7.33 (L) 7.38 - 7.42 pH    POCT pCO2, Arterial 33 (L) 38 - 42 mm Hg    POCT pO2, Arterial 175 (H) 85 - 95 mm Hg    POCT SO2, Arterial 100 94 - 100 %    POCT Oxy Hemoglobin, Arterial 98.1 (H) 94.0 - 98.0 %    POCT Hematocrit Calculated, Arterial 28.0 (L) 41.0 - 52.0 %    POCT Sodium, Arterial 133 (L) 136 - 145 mmol/L    POCT Potassium, Arterial 4.8 3.5 - 5.3 mmol/L    POCT Chloride, Arterial 104 98 - 107 mmol/L    POCT Ionized Calcium, Arterial 1.15 1.10 - 1.33 mmol/L    POCT Glucose, Arterial 188  (H) 74 - 99 mg/dL    POCT Lactate, Arterial 4.3 (HH) 0.4 - 2.0 mmol/L    POCT Base Excess, Arterial -7.7 (L) -2.0 - 3.0 mmol/L    POCT HCO3 Calculated, Arterial 17.4 (L) 22.0 - 26.0 mmol/L    POCT Hemoglobin, Arterial 9.2 (L) 13.5 - 17.5 g/dL    POCT Anion Gap, Arterial 16 10 - 25 mmo/L    Patient Temperature 37.0 degrees Celsius    FiO2 40 %   Blood Gas Arterial Full Panel Unsolicited   Result Value Ref Range    POCT pH, Arterial 7.34 (L) 7.38 - 7.42 pH    POCT pCO2, Arterial 36 (L) 38 - 42 mm Hg    POCT pO2, Arterial 177 (H) 85 - 95 mm Hg    POCT SO2, Arterial 100 94 - 100 %    POCT Oxy Hemoglobin, Arterial 97.7 94.0 - 98.0 %    POCT Hematocrit Calculated, Arterial 27.0 (L) 41.0 - 52.0 %    POCT Sodium, Arterial 134 (L) 136 - 145 mmol/L    POCT Potassium, Arterial 4.5 3.5 - 5.3 mmol/L    POCT Chloride, Arterial 103 98 - 107 mmol/L    POCT Ionized Calcium, Arterial 1.13 1.10 - 1.33 mmol/L    POCT Glucose, Arterial 169 (H) 74 - 99 mg/dL    POCT Lactate, Arterial 4.9 (HH) 0.4 - 2.0 mmol/L    POCT Base Excess, Arterial -5.8 (L) -2.0 - 3.0 mmol/L    POCT HCO3 Calculated, Arterial 19.4 (L) 22.0 - 26.0 mmol/L    POCT Hemoglobin, Arterial 8.9 (L) 13.5 - 17.5 g/dL    POCT Anion Gap, Arterial 16 10 - 25 mmo/L    Patient Temperature 37.0 degrees Celsius    FiO2 40 %   CBC   Result Value Ref Range    WBC 7.5 4.4 - 11.3 x10*3/uL    nRBC 0.0 0.0 - 0.0 /100 WBCs    RBC 2.48 (L) 4.50 - 5.90 x10*6/uL    Hemoglobin 7.6 (L) 13.5 - 17.5 g/dL    Hematocrit 22.5 (L) 41.0 - 52.0 %    MCV 91 80 - 100 fL    MCH 30.6 26.0 - 34.0 pg    MCHC 33.8 32.0 - 36.0 g/dL    RDW 14.9 (H) 11.5 - 14.5 %    Platelets 88 (L) 150 - 450 x10*3/uL   Protime-INR   Result Value Ref Range    Protime 20.3 (H) 9.8 - 12.4 seconds    INR 1.8 (H) 0.9 - 1.1   APTT   Result Value Ref Range    aPTT 47 (H) 26 - 36 seconds   Fibrinogen   Result Value Ref Range    Fibrinogen 118 (L) 200 - 400 mg/dL   Renal Function Panel   Result Value Ref Range    Glucose 204 (H) 74 - 99  mg/dL    Sodium 138 136 - 145 mmol/L    Potassium 4.0 3.5 - 5.3 mmol/L    Chloride 103 98 - 107 mmol/L    Bicarbonate 18 (L) 21 - 32 mmol/L    Anion Gap 21 (H) 10 - 20 mmol/L    Urea Nitrogen 56 (H) 6 - 23 mg/dL    Creatinine 2.64 (H) 0.50 - 1.30 mg/dL    eGFR 26 (L) >60 mL/min/1.73m*2    Calcium 8.2 (L) 8.6 - 10.6 mg/dL    Phosphorus 4.0 2.5 - 4.9 mg/dL    Albumin 3.0 (L) 3.4 - 5.0 g/dL   Hepatic function panel   Result Value Ref Range    Albumin 3.0 (L) 3.4 - 5.0 g/dL    Bilirubin, Total 2.1 (H) 0.0 - 1.2 mg/dL    Bilirubin, Direct 0.9 (H) 0.0 - 0.3 mg/dL    Alkaline Phosphatase 78 33 - 136 U/L     (H) 10 - 52 U/L     (H) 9 - 39 U/L    Total Protein 4.6 (L) 6.4 - 8.2 g/dL   TEG Clot Lysis Profile   Result Value Ref Range    R (Reaction Time) K 7.8 4.6 - 9.1 min    LY30 (Lysis) 0.0 0.0 - 2.6 %    MA (Max Amplitude) RT 45.0 (L) 52.0 - 70.0 mm    MA ( Apolinar Amplitude) FF 13.0 (L) 15.0 - 32.0 mm   Gamma-Glutamyl Transferase   Result Value Ref Range    GGT 30 5 - 64 U/L   Blood Gas Arterial Full Panel Unsolicited   Result Value Ref Range    POCT pH, Arterial 7.28 (L) 7.38 - 7.42 pH    POCT pCO2, Arterial 39 38 - 42 mm Hg    POCT pO2, Arterial 129 (H) 85 - 95 mm Hg    POCT SO2, Arterial 100 94 - 100 %    POCT Oxy Hemoglobin, Arterial 97.3 94.0 - 98.0 %    POCT Hematocrit Calculated, Arterial 24.0 (L) 41.0 - 52.0 %    POCT Sodium, Arterial 134 (L) 136 - 145 mmol/L    POCT Potassium, Arterial 4.2 3.5 - 5.3 mmol/L    POCT Chloride, Arterial 103 98 - 107 mmol/L    POCT Ionized Calcium, Arterial 1.08 (L) 1.10 - 1.33 mmol/L    POCT Glucose, Arterial 197 (H) 74 - 99 mg/dL    POCT Lactate, Arterial 5.4 (HH) 0.4 - 2.0 mmol/L    POCT Base Excess, Arterial -7.8 (L) -2.0 - 3.0 mmol/L    POCT HCO3 Calculated, Arterial 18.3 (L) 22.0 - 26.0 mmol/L    POCT Hemoglobin, Arterial 7.9 (L) 13.5 - 17.5 g/dL    POCT Anion Gap, Arterial 17 10 - 25 mmo/L    Patient Temperature 37.0 degrees Celsius    FiO2 40 %   Prepare Platelets: 2  Units   Result Value Ref Range    PRODUCT CODE D0592B06     Unit Number [id number]     Unit ABO B     Unit RH POS     Dispense Status RE     Blood Expiration Date 4/4/2025 11:59:00 PM EDT     PRODUCT BLOOD TYPE 7300     UNIT VOLUME 278     PRODUCT CODE B9666W66     Unit Number [id number]     Unit ABO B     Unit RH POS     Dispense Status RE     Blood Expiration Date 4/5/2025 11:59:00 PM EDT     PRODUCT BLOOD TYPE 7300     UNIT VOLUME 230    Blood Gas Arterial Full Panel Unsolicited   Result Value Ref Range    POCT pH, Arterial 7.29 (L) 7.38 - 7.42 pH    POCT pCO2, Arterial 42 38 - 42 mm Hg    POCT pO2, Arterial 135 (H) 85 - 95 mm Hg    POCT SO2, Arterial 100 94 - 100 %    POCT Oxy Hemoglobin, Arterial 97.4 94.0 - 98.0 %    POCT Hematocrit Calculated, Arterial 23.0 (L) 41.0 - 52.0 %    POCT Sodium, Arterial 135 (L) 136 - 145 mmol/L    POCT Potassium, Arterial 4.1 3.5 - 5.3 mmol/L    POCT Chloride, Arterial 103 98 - 107 mmol/L    POCT Ionized Calcium, Arterial 1.07 (L) 1.10 - 1.33 mmol/L    POCT Glucose, Arterial 193 (H) 74 - 99 mg/dL    POCT Lactate, Arterial 5.5 (HH) 0.4 - 2.0 mmol/L    POCT Base Excess, Arterial -5.9 (L) -2.0 - 3.0 mmol/L    POCT HCO3 Calculated, Arterial 20.2 (L) 22.0 - 26.0 mmol/L    POCT Hemoglobin, Arterial 7.8 (L) 13.5 - 17.5 g/dL    POCT Anion Gap, Arterial 16 10 - 25 mmo/L    Patient Temperature 37.0 degrees Celsius    FiO2 40 %   Blood Gas Arterial Full Panel Unsolicited   Result Value Ref Range    POCT pH, Arterial 7.34 (L) 7.38 - 7.42 pH    POCT pCO2, Arterial 38 38 - 42 mm Hg    POCT pO2, Arterial 98 (H) 85 - 95 mm Hg    POCT SO2, Arterial 99 94 - 100 %    POCT Oxy Hemoglobin, Arterial 96.8 94.0 - 98.0 %    POCT Hematocrit Calculated, Arterial 26.0 (L) 41.0 - 52.0 %    POCT Sodium, Arterial 134 (L) 136 - 145 mmol/L    POCT Potassium, Arterial 4.2 3.5 - 5.3 mmol/L    POCT Chloride, Arterial 105 98 - 107 mmol/L    POCT Ionized Calcium, Arterial 1.17 1.10 - 1.33 mmol/L    POCT  Glucose, Arterial 193 (H) 74 - 99 mg/dL    POCT Lactate, Arterial 5.0 (HH) 0.4 - 2.0 mmol/L    POCT Base Excess, Arterial -4.8 (L) -2.0 - 3.0 mmol/L    POCT HCO3 Calculated, Arterial 20.5 (L) 22.0 - 26.0 mmol/L    POCT Hemoglobin, Arterial 8.8 (L) 13.5 - 17.5 g/dL    POCT Anion Gap, Arterial 13 10 - 25 mmo/L    Patient Temperature 37.0 degrees Celsius    FiO2 40 %   Blood Gas Arterial   Result Value Ref Range    POCT pH, Arterial 7.36 (L) 7.38 - 7.42 pH    POCT pCO2, Arterial 37 (L) 38 - 42 mm Hg    POCT pO2, Arterial 73 (L) 85 - 95 mm Hg    POCT SO2, Arterial 97 94 - 100 %    POCT Oxy Hemoglobin, Arterial 94.2 94.0 - 98.0 %    POCT Base Excess, Arterial -4.1 (L) -2.0 - 3.0 mmol/L    POCT HCO3 Calculated, Arterial 20.9 (L) 22.0 - 26.0 mmol/L    Patient Temperature      FiO2 60 %   Calcium, Ionized   Result Value Ref Range    POCT Calcium, Ionized 1.14 1.1 - 1.33 mmol/L   CBC   Result Value Ref Range    WBC 5.9 4.4 - 11.3 x10*3/uL    nRBC 0.0 0.0 - 0.0 /100 WBCs    RBC 2.90 (L) 4.50 - 5.90 x10*6/uL    Hemoglobin 8.9 (L) 13.5 - 17.5 g/dL    Hematocrit 25.8 (L) 41.0 - 52.0 %    MCV 89 80 - 100 fL    MCH 30.7 26.0 - 34.0 pg    MCHC 34.5 32.0 - 36.0 g/dL    RDW 15.1 (H) 11.5 - 14.5 %    Platelets 62 (L) 150 - 450 x10*3/uL   Coagulation Screen   Result Value Ref Range    Protime 17.6 (H) 9.8 - 12.4 seconds    INR 1.6 (H) 0.9 - 1.1    aPTT 35 26 - 36 seconds   Magnesium   Result Value Ref Range    Magnesium 2.23 1.60 - 2.40 mg/dL   Renal Function Panel   Result Value Ref Range    Glucose 212 (H) 74 - 99 mg/dL    Sodium 137 136 - 145 mmol/L    Potassium 4.2 3.5 - 5.3 mmol/L    Chloride 106 98 - 107 mmol/L    Bicarbonate 21 21 - 32 mmol/L    Anion Gap 14 10 - 20 mmol/L    Urea Nitrogen 54 (H) 6 - 23 mg/dL    Creatinine 2.69 (H) 0.50 - 1.30 mg/dL    eGFR 26 (L) >60 mL/min/1.73m*2    Calcium 8.5 (L) 8.6 - 10.6 mg/dL    Phosphorus 3.8 2.5 - 4.9 mg/dL    Albumin 2.8 (L) 3.4 - 5.0 g/dL   TEG Clot Global Profile   Result Value Ref  Range    R (Reaction Time) K 6.7 4.6 - 9.1 min    K (Clot Kinetics) 2.4 (H) 0.8 - 2.1 min    ANGLE 64.0 63.0 - 78.0 deg    MA (Max Amplitude) K 45.0 (L) 52.0 - 69.0 mm    R (Reaction Time) KH 7.6 4.3 - 8.3 min    MA (Max Amplitude) RT 44.0 (L) 52.0 - 70.0 mm    MA ( Apolinar Amplitude) FF 12.0 (L) 15.0 - 32.0 mm    FLEV 219 (L) 278 - 581 mg/dL   Prepare Cryoprecipitated AHF (Pooled Units): 1 Pools   Result Value Ref Range    PRODUCT CODE [id number]     Unit Number [id number]     Unit ABO B     Unit RH POS     Dispense Status TR     Blood Expiration Date 4/3/2025 10:30:00 AM EDT     PRODUCT BLOOD TYPE 7300     UNIT VOLUME 94    Prepare Platelets: 1 Units   Result Value Ref Range    PRODUCT CODE [id number]     Unit Number [id number]     Unit ABO B     Unit RH POS     Dispense Status TR     Blood Expiration Date 4/4/2025 11:59:00 PM EDT     PRODUCT BLOOD TYPE 7300     UNIT VOLUME 229    Blood Gas Arterial Full Panel   Result Value Ref Range    POCT pH, Arterial 7.38 7.38 - 7.42 pH    POCT pCO2, Arterial 36 (L) 38 - 42 mm Hg    POCT pO2, Arterial 229 (H) 85 - 95 mm Hg    POCT SO2, Arterial 100 94 - 100 %    POCT Oxy Hemoglobin, Arterial 96.3 94.0 - 98.0 %    POCT Hematocrit Calculated, Arterial 29.0 (L) 41.0 - 52.0 %    POCT Sodium, Arterial 134 (L) 136 - 145 mmol/L    POCT Potassium, Arterial 4.2 3.5 - 5.3 mmol/L    POCT Chloride, Arterial 104 98 - 107 mmol/L    POCT Ionized Calcium, Arterial 1.26 1.10 - 1.33 mmol/L    POCT Glucose, Arterial 208 (H) 74 - 99 mg/dL    POCT Lactate, Arterial 2.8 (H) 0.4 - 2.0 mmol/L    POCT Base Excess, Arterial -3.4 (L) -2.0 - 3.0 mmol/L    POCT HCO3 Calculated, Arterial 21.3 (L) 22.0 - 26.0 mmol/L    POCT Hemoglobin, Arterial 9.5 (L) 13.5 - 17.5 g/dL    POCT Anion Gap, Arterial 13 10 - 25 mmo/L    Patient Temperature 37.0 degrees Celsius    FiO2 60 %   POCT GLUCOSE   Result Value Ref Range    POCT Glucose 191 (H) 74 - 99 mg/dL   TEG Clot Global Profile   Result Value Ref Range    R  (Reaction Time) K 7.4 4.6 - 9.1 min    K (Clot Kinetics) 2.4 (H) 0.8 - 2.1 min    ANGLE 64.0 63.0 - 78.0 deg    MA (Max Amplitude) K 45.0 (L) 52.0 - 69.0 mm    R (Reaction Time) KH 7.7 4.3 - 8.3 min    MA (Max Amplitude) RT 46.0 (L) 52.0 - 70.0 mm    MA ( Apolinar Amplitude) FF 14.0 (L) 15.0 - 32.0 mm    FLEV 257 (L) 278 - 581 mg/dL   Renal Function Panel   Result Value Ref Range    Glucose 182 (H) 74 - 99 mg/dL    Sodium 138 136 - 145 mmol/L    Potassium 4.0 3.5 - 5.3 mmol/L    Chloride 105 98 - 107 mmol/L    Bicarbonate 24 21 - 32 mmol/L    Anion Gap 13 10 - 20 mmol/L    Urea Nitrogen 55 (H) 6 - 23 mg/dL    Creatinine 2.93 (H) 0.50 - 1.30 mg/dL    eGFR 23 (L) >60 mL/min/1.73m*2    Calcium 8.3 (L) 8.6 - 10.6 mg/dL    Phosphorus 2.9 2.5 - 4.9 mg/dL    Albumin 2.7 (L) 3.4 - 5.0 g/dL   Magnesium   Result Value Ref Range    Magnesium 2.19 1.60 - 2.40 mg/dL   Coagulation Screen   Result Value Ref Range    Protime 15.6 (H) 9.8 - 12.4 seconds    INR 1.4 (H) 0.9 - 1.1    aPTT 30 26 - 36 seconds   CBC   Result Value Ref Range    WBC 5.3 4.4 - 11.3 x10*3/uL    nRBC 0.0 0.0 - 0.0 /100 WBCs    RBC 2.94 (L) 4.50 - 5.90 x10*6/uL    Hemoglobin 8.8 (L) 13.5 - 17.5 g/dL    Hematocrit 26.0 (L) 41.0 - 52.0 %    MCV 88 80 - 100 fL    MCH 29.9 26.0 - 34.0 pg    MCHC 33.8 32.0 - 36.0 g/dL    RDW 15.6 (H) 11.5 - 14.5 %    Platelets 60 (L) 150 - 450 x10*3/uL   Calcium, Ionized   Result Value Ref Range    POCT Calcium, Ionized 1.17 1.1 - 1.33 mmol/L   Blood Gas Arterial Full Panel   Result Value Ref Range    POCT pH, Arterial 7.39 7.38 - 7.42 pH    POCT pCO2, Arterial 37 (L) 38 - 42 mm Hg    POCT pO2, Arterial 142 (H) 85 - 95 mm Hg    POCT SO2, Arterial 99 94 - 100 %    POCT Oxy Hemoglobin, Arterial 96.2 94.0 - 98.0 %    POCT Hematocrit Calculated, Arterial 28.0 (L) 41.0 - 52.0 %    POCT Sodium, Arterial 134 (L) 136 - 145 mmol/L    POCT Potassium, Arterial 4.1 3.5 - 5.3 mmol/L    POCT Chloride, Arterial 104 98 - 107 mmol/L    POCT Ionized  Calcium, Arterial 1.21 1.10 - 1.33 mmol/L    POCT Glucose, Arterial 177 (H) 74 - 99 mg/dL    POCT Lactate, Arterial 2.4 (H) 0.4 - 2.0 mmol/L    POCT Base Excess, Arterial -2.3 (L) -2.0 - 3.0 mmol/L    POCT HCO3 Calculated, Arterial 22.4 22.0 - 26.0 mmol/L    POCT Hemoglobin, Arterial 9.3 (L) 13.5 - 17.5 g/dL    POCT Anion Gap, Arterial 12 10 - 25 mmo/L    Patient Temperature 37.0 degrees Celsius    FiO2 40 %   Hepatic Function Panel   Result Value Ref Range    Albumin 2.7 (L) 3.4 - 5.0 g/dL    Bilirubin, Total 4.4 (H) 0.0 - 1.2 mg/dL    Bilirubin, Direct 3.4 (H) 0.0 - 0.3 mg/dL    Alkaline Phosphatase 62 33 - 136 U/L     (H) 10 - 52 U/L     (H) 9 - 39 U/L    Total Protein 4.6 (L) 6.4 - 8.2 g/dL   POCT GLUCOSE   Result Value Ref Range    POCT Glucose 164 (H) 74 - 99 mg/dL   Gamma-Glutamyl Transferase   Result Value Ref Range    GGT 53 5 - 64 U/L   Fibrinogen   Result Value Ref Range    Fibrinogen 197 (L) 200 - 400 mg/dL   Hepatic function panel   Result Value Ref Range    Albumin 2.9 (L) 3.4 - 5.0 g/dL    Bilirubin, Total 4.0 (H) 0.0 - 1.2 mg/dL    Bilirubin, Direct 3.1 (H) 0.0 - 0.3 mg/dL    Alkaline Phosphatase 64 33 - 136 U/L     (H) 10 - 52 U/L     (H) 9 - 39 U/L    Total Protein 4.6 (L) 6.4 - 8.2 g/dL   Blood Gas Arterial Full Panel   Result Value Ref Range    POCT pH, Arterial 7.34 (L) 7.38 - 7.42 pH    POCT pCO2, Arterial 40 38 - 42 mm Hg    POCT pO2, Arterial 162 (H) 85 - 95 mm Hg    POCT SO2, Arterial 99 94 - 100 %    POCT Oxy Hemoglobin, Arterial 96.7 94.0 - 98.0 %    POCT Hematocrit Calculated, Arterial 27.0 (L) 41.0 - 52.0 %    POCT Sodium, Arterial 135 (L) 136 - 145 mmol/L    POCT Potassium, Arterial 4.1 3.5 - 5.3 mmol/L    POCT Chloride, Arterial 105 98 - 107 mmol/L    POCT Ionized Calcium, Arterial 1.13 1.10 - 1.33 mmol/L    POCT Glucose, Arterial 146 (H) 74 - 99 mg/dL    POCT Lactate, Arterial 2.5 (H) 0.4 - 2.0 mmol/L    POCT Base Excess, Arterial -3.9 (L) -2.0 - 3.0 mmol/L     POCT HCO3 Calculated, Arterial 21.6 (L) 22.0 - 26.0 mmol/L    POCT Hemoglobin, Arterial 9.1 (L) 13.5 - 17.5 g/dL    POCT Anion Gap, Arterial 13 10 - 25 mmo/L    Patient Temperature      FiO2 60 %   Blood Gas Arterial Full Panel   Result Value Ref Range    POCT pH, Arterial 7.39 7.38 - 7.42 pH    POCT pCO2, Arterial 36 (L) 38 - 42 mm Hg    POCT pO2, Arterial 183 (H) 85 - 95 mm Hg    POCT SO2, Arterial 100 94 - 100 %    POCT Oxy Hemoglobin, Arterial 96.9 94.0 - 98.0 %    POCT Hematocrit Calculated, Arterial 26.0 (L) 41.0 - 52.0 %    POCT Sodium, Arterial 134 (L) 136 - 145 mmol/L    POCT Potassium, Arterial 4.0 3.5 - 5.3 mmol/L    POCT Chloride, Arterial 107 98 - 107 mmol/L    POCT Ionized Calcium, Arterial 1.12 1.10 - 1.33 mmol/L    POCT Glucose, Arterial 142 (H) 74 - 99 mg/dL    POCT Lactate, Arterial 2.8 (H) 0.4 - 2.0 mmol/L    POCT Base Excess, Arterial -2.8 (L) -2.0 - 3.0 mmol/L    POCT HCO3 Calculated, Arterial 21.8 (L) 22.0 - 26.0 mmol/L    POCT Hemoglobin, Arterial 8.5 (L) 13.5 - 17.5 g/dL    POCT Anion Gap, Arterial 9 (L) 10 - 25 mmo/L    Patient Temperature 37.0 degrees Celsius    FiO2 60 %   Blood Gas Arterial Full Panel   Result Value Ref Range    POCT pH, Arterial 7.38 7.38 - 7.42 pH    POCT pCO2, Arterial 37 (L) 38 - 42 mm Hg    POCT pO2, Arterial 177 (H) 85 - 95 mm Hg    POCT SO2, Arterial 99 94 - 100 %    POCT Oxy Hemoglobin, Arterial 96.9 94.0 - 98.0 %    POCT Hematocrit Calculated, Arterial 29.0 (L) 41.0 - 52.0 %    POCT Sodium, Arterial 134 (L) 136 - 145 mmol/L    POCT Potassium, Arterial 4.3 3.5 - 5.3 mmol/L    POCT Chloride, Arterial 105 98 - 107 mmol/L    POCT Ionized Calcium, Arterial 1.14 1.10 - 1.33 mmol/L    POCT Glucose, Arterial 136 (H) 74 - 99 mg/dL    POCT Lactate, Arterial 2.7 (H) 0.4 - 2.0 mmol/L    POCT Base Excess, Arterial -2.9 (L) -2.0 - 3.0 mmol/L    POCT HCO3 Calculated, Arterial 21.9 (L) 22.0 - 26.0 mmol/L    POCT Hemoglobin, Arterial 9.7 (L) 13.5 - 17.5 g/dL    POCT Anion Gap,  Arterial 11 10 - 25 mmo/L    Patient Temperature 37.0 degrees Celsius    FiO2 60 %   TEG Clot Global Profile   Result Value Ref Range    R (Reaction Time) K 6.1 4.6 - 9.1 min    K (Clot Kinetics) 2.3 (H) 0.8 - 2.1 min    ANGLE 64.0 63.0 - 78.0 deg    MA (Max Amplitude) K 46.0 (L) 52.0 - 69.0 mm    R (Reaction Time) KH 7.7 4.3 - 8.3 min    MA (Max Amplitude) RT 47.0 (L) 52.0 - 70.0 mm    MA ( Apolinar Amplitude) FF 14.0 (L) 15.0 - 32.0 mm    FLEV 252 (L) 278 - 581 mg/dL   Magnesium   Result Value Ref Range    Magnesium 2.04 1.60 - 2.40 mg/dL   Hepatic function panel   Result Value Ref Range    Albumin 3.1 (L) 3.4 - 5.0 g/dL    Bilirubin, Total 2.9 (H) 0.0 - 1.2 mg/dL    Bilirubin, Direct 2.2 (H) 0.0 - 0.3 mg/dL    Alkaline Phosphatase 57 33 - 136 U/L     (H) 10 - 52 U/L     (H) 9 - 39 U/L    Total Protein 4.4 (L) 6.4 - 8.2 g/dL   Gamma-Glutamyl Transferase   Result Value Ref Range    GGT 53 5 - 64 U/L   Fibrinogen   Result Value Ref Range    Fibrinogen 194 (L) 200 - 400 mg/dL   Coagulation Screen   Result Value Ref Range    Protime 15.3 (H) 9.8 - 12.4 seconds    INR 1.4 (H) 0.9 - 1.1    aPTT 30 26 - 36 seconds   CBC   Result Value Ref Range    WBC 10.5 4.4 - 11.3 x10*3/uL    nRBC 0.0 0.0 - 0.0 /100 WBCs    RBC 3.27 (L) 4.50 - 5.90 x10*6/uL    Hemoglobin 9.9 (L) 13.5 - 17.5 g/dL    Hematocrit 28.7 (L) 41.0 - 52.0 %    MCV 88 80 - 100 fL    MCH 30.3 26.0 - 34.0 pg    MCHC 34.5 32.0 - 36.0 g/dL    RDW 15.9 (H) 11.5 - 14.5 %    Platelets 75 (L) 150 - 450 x10*3/uL   Calcium, Ionized   Result Value Ref Range    POCT Calcium, Ionized 1.07 (L) 1.1 - 1.33 mmol/L   Basic Metabolic Panel   Result Value Ref Range    Glucose 133 (H) 74 - 99 mg/dL    Sodium 138 136 - 145 mmol/L    Potassium 4.1 3.5 - 5.3 mmol/L    Chloride 105 98 - 107 mmol/L    Bicarbonate 23 21 - 32 mmol/L    Anion Gap 14 10 - 20 mmol/L    Urea Nitrogen 56 (H) 6 - 23 mg/dL    Creatinine 2.96 (H) 0.50 - 1.30 mg/dL    eGFR 23 (L) >60 mL/min/1.73m*2     Calcium 8.0 (L) 8.6 - 10.6 mg/dL   Phosphorus   Result Value Ref Range    Phosphorus 3.8 2.5 - 4.9 mg/dL   Blood Gas Arterial Full Panel   Result Value Ref Range    POCT pH, Arterial 7.41 7.38 - 7.42 pH    POCT pCO2, Arterial 34 (L) 38 - 42 mm Hg    POCT pO2, Arterial 134 (H) 85 - 95 mm Hg    POCT SO2, Arterial 100 94 - 100 %    POCT Oxy Hemoglobin, Arterial 96.4 94.0 - 98.0 %    POCT Hematocrit Calculated, Arterial 34.0 (L) 41.0 - 52.0 %    POCT Sodium, Arterial 132 (L) 136 - 145 mmol/L    POCT Potassium, Arterial 4.2 3.5 - 5.3 mmol/L    POCT Chloride, Arterial 103 98 - 107 mmol/L    POCT Ionized Calcium, Arterial 1.21 1.10 - 1.33 mmol/L    POCT Glucose, Arterial 122 (H) 74 - 99 mg/dL    POCT Lactate, Arterial 2.3 (H) 0.4 - 2.0 mmol/L    POCT Base Excess, Arterial -2.5 (L) -2.0 - 3.0 mmol/L    POCT HCO3 Calculated, Arterial 21.6 (L) 22.0 - 26.0 mmol/L    POCT Hemoglobin, Arterial 11.2 (L) 13.5 - 17.5 g/dL    POCT Anion Gap, Arterial 12 10 - 25 mmo/L    Patient Temperature 37.0 degrees Celsius    FiO2 40 %   POCT GLUCOSE   Result Value Ref Range    POCT Glucose 131 (H) 74 - 99 mg/dL   POCT GLUCOSE   Result Value Ref Range    POCT Glucose 134 (H) 74 - 99 mg/dL   POCT GLUCOSE   Result Value Ref Range    POCT Glucose 119 (H) 74 - 99 mg/dL   POCT GLUCOSE   Result Value Ref Range    POCT Glucose 130 (H) 74 - 99 mg/dL   Blood Gas Arterial Full Panel   Result Value Ref Range    POCT pH, Arterial 7.40 7.38 - 7.42 pH    POCT pCO2, Arterial 35 (L) 38 - 42 mm Hg    POCT pO2, Arterial 162 (H) 85 - 95 mm Hg    POCT SO2, Arterial 99 94 - 100 %    POCT Oxy Hemoglobin, Arterial 96.7 94.0 - 98.0 %    POCT Hematocrit Calculated, Arterial 29.0 (L) 41.0 - 52.0 %    POCT Sodium, Arterial 135 (L) 136 - 145 mmol/L    POCT Potassium, Arterial 4.2 3.5 - 5.3 mmol/L    POCT Chloride, Arterial 104 98 - 107 mmol/L    POCT Ionized Calcium, Arterial 1.16 1.10 - 1.33 mmol/L    POCT Glucose, Arterial 126 (H) 74 - 99 mg/dL    POCT Lactate, Arterial  1.2 0.4 - 2.0 mmol/L    POCT Base Excess, Arterial -2.7 (L) -2.0 - 3.0 mmol/L    POCT HCO3 Calculated, Arterial 21.7 (L) 22.0 - 26.0 mmol/L    POCT Hemoglobin, Arterial 9.6 (L) 13.5 - 17.5 g/dL    POCT Anion Gap, Arterial 14 10 - 25 mmo/L    Patient Temperature 37.0 degrees Celsius    FiO2 40 %   Magnesium   Result Value Ref Range    Magnesium 2.01 1.60 - 2.40 mg/dL   Calcium, Ionized   Result Value Ref Range    POCT Calcium, Ionized 1.09 (L) 1.1 - 1.33 mmol/L   Coagulation Screen   Result Value Ref Range    Protime 16.5 (H) 9.8 - 12.4 seconds    INR 1.5 (H) 0.9 - 1.1    aPTT 31 26 - 36 seconds   Gamma-Glutamyl Transferase   Result Value Ref Range    GGT 46 5 - 64 U/L   Hepatic function panel   Result Value Ref Range    Albumin 3.3 (L) 3.4 - 5.0 g/dL    Bilirubin, Total 2.2 (H) 0.0 - 1.2 mg/dL    Bilirubin, Direct 1.6 (H) 0.0 - 0.3 mg/dL    Alkaline Phosphatase 42 33 - 136 U/L     (H) 10 - 52 U/L     (H) 9 - 39 U/L    Total Protein 4.6 (L) 6.4 - 8.2 g/dL   Fibrinogen   Result Value Ref Range    Fibrinogen 172 (L) 200 - 400 mg/dL   Basic Metabolic Panel   Result Value Ref Range    Glucose 142 (H) 74 - 99 mg/dL    Sodium 138 136 - 145 mmol/L    Potassium 4.1 3.5 - 5.3 mmol/L    Chloride 104 98 - 107 mmol/L    Bicarbonate 23 21 - 32 mmol/L    Anion Gap 15 10 - 20 mmol/L    Urea Nitrogen 52 (H) 6 - 23 mg/dL    Creatinine 3.01 (H) 0.50 - 1.30 mg/dL    eGFR 23 (L) >60 mL/min/1.73m*2    Calcium 7.8 (L) 8.6 - 10.6 mg/dL   Phosphorus   Result Value Ref Range    Phosphorus 4.5 2.5 - 4.9 mg/dL   CBC   Result Value Ref Range    WBC 6.7 4.4 - 11.3 x10*3/uL    nRBC 0.0 0.0 - 0.0 /100 WBCs    RBC 2.87 (L) 4.50 - 5.90 x10*6/uL    Hemoglobin 8.7 (L) 13.5 - 17.5 g/dL    Hematocrit 24.8 (L) 41.0 - 52.0 %    MCV 86 80 - 100 fL    MCH 30.3 26.0 - 34.0 pg    MCHC 35.1 32.0 - 36.0 g/dL    RDW 16.0 (H) 11.5 - 14.5 %    Platelets 49 (L) 150 - 450 x10*3/uL   POCT GLUCOSE   Result Value Ref Range    POCT Glucose 141 (H) 74 - 99  mg/dL      Lab Results   Component Value Date     (H) 04/03/2025     (H) 04/03/2025    GGT 46 04/03/2025    ALKPHOS 42 04/03/2025    BILITOT 2.2 (H) 04/03/2025         ASSESSMENT AND PLAN:    Mr. Lopez is a 65 y.o. male who underwent  transplant surgery on 4/2/2025 (Liver / Kidney).    1. Immunosuppression reviewed and adjusted. Simulect dose #1 in OR today      Tacrolimus: current dose 2 mg BID. Plan continue      Today's tacrolimus level is not collected, Goal tacrolimus trough level is 8-10      Mycophenolate: Yes - 1000 mg po BID      Prednisone: Yes - routine taper      Belatacept: No     2. IV hydration      Replacement completed: No      Maintenance: Continue    3. Electrolytes       Reviewed renal profile       DGF: No    4. Hypertension medications reviewed         Blood Pressures         4/3/2025  0630 4/3/2025  0645 4/3/2025  0700 4/3/2025  0800 4/3/2025  1400    BP: 115/49 109/46 120/69 127/68 110/68              Continue current management, weaning off pressors      5. Wound/drain/noel and pain management       Noel cathter:  Continue    6.  GI prophylaxis        On PPI     7. DVT Prophylaxis      SCDs      Subcutaneous Heparin: No    8. ID prophylaxis      CMV, PJP and fungal prophylaxis per [facility] Protocol      Valcyte: Not at this time    9. Discharge planned for: 10 days    Case was presented at Multi Disciplinary team rounds     Attending physician, consulting physician, , pharmacist, residents and fellow were present at the meeting.    Jason Snell MD    Transplant Surgery Attending

## 2025-04-04 NOTE — OP NOTE
TRANSPLANT, KIDNEY Operative Note     Date: 4/3/2025  OR Location: [facility] OR    Name: López Lopez, : 1960, Age: 65 y.o., MRN: [id number], Sex: male    Diagnosis  Pre-op Diagnosis      * CKD (chronic kidney disease) stage 4, GFR 15-29 ml/min (Multi) [N18.4] Post-op Diagnosis     * CKD (chronic kidney disease) stage 4, GFR 15-29 ml/min (Multi) [N18.4]     Procedures    Bench preparation of kidney allograft  Kidney transplant from  donor (left kidney into left pelvis)  Insertion of ureteral stent    Surgeons   Panel 1:     * Eliezer Barrett - Primary  Panel 2:     * Jason Snell - Primary    Resident/Fellow/Other Assistant:  Surgeons and Role:  Panel 1:     * Jason Snell MD - Assisting     * Samson Coleman MD - Resident - Assisting    Staff:   Circulator: Jake Parks Person: Pernell Garcia Scrub: Aleena    Anesthesia Staff: Anesthesiologist: Hakan Carter MD  C-AA: DONNA Meyers    Procedure Summary  Anesthesia: General  ASA: III  Estimated Blood Loss: 200 mL  Intra-op Medications:   Administrations occurring from 0737 to 1302 on 25:   Medication Name Total Dose   sodium chloride 0.9 % irrigation solution 3,000 mL   methylene blue (Provayblue) 1 % (10 mg/mL) 3 mL, polymyxin B 500,000 Units in sodium chloride 0.9 % 1,000 mL irrigation Cannot be calculated   alteplase (Cathflo Activase) injection 2 mg Cannot be calculated   calcium chloride 0.5 g in dextrose 5% 50 mL IV Cannot be calculated   calcium chloride 1 g in dextrose 5% 50 mL IV Cannot be calculated   HYDROmorphone (Dilaudid) injection 0.4 mg Cannot be calculated   HYDROmorphone PF (Dilaudid) injection 0.2 mg Cannot be calculated   magnesium sulfate 2 g in sterile water for injection 50 mL Cannot be calculated   magnesium sulfate 4 g in sterile water for injection 100 mL Cannot be calculated   norepinephrine (Levophed) 8 mg in dextrose 5% 250 mL (0.032 mg/mL) infusion (premix) 1.51 mg   oxygen (O2) therapy  12,080 percent   pantoprazole (Protonix) injection 40 mg Cannot be calculated   piperacillin-tazobactam (Zosyn) 3.375 g in dextrose (iso) IV 50 mL 3.375 g   propofol (Diprivan) infusion Cannot be calculated   basiliximab (Simulect) 20 mg in sodium chloride 0.9% 50 mL IV 20 mg   methylPREDNISolone sodium succinate (SOLU-Medrol) 500 mg in dextrose 5% 100 mL IV Cannot be calculated   albumin human 5 % 750 mL   angiotensin II (Giapreza) 2.5 mg in sodium chloride 0.9% 250 mL (0.01 mg/mL) infusion 265,440 ng   insulin regular (HumuLIN, NovoLIN) bolus from bag 2-6 Units Cannot be calculated   mannitol 20% 25 g   rocuronium (ZeMuron) 50 mg/5 mL injection 140 mg   NaCl 0.9 % bolus Cannot be calculated              Anesthesia Record               Intraprocedure I/O Totals          Intake    Transfuse Plasma :30 Minutes 300.00 mL    Transfuse RBC :30 Minutes 700.00 mL    NaCl 0.9 % bolus 1100.00 mL    basiliximab (Simulect) 20 mg in sodium chloride 0.9% 50 mL IV 55.00 mL    Norepinephrine Drip 51.10 mL    The total shown is the total volume documented since Anesthesia Start was filed.    mannitol 20% 125.00 mL    Angiotensin II 43.46 mL    The total shown is the total volume documented since Anesthesia Start was filed.    Insulin Drip 3.00 mL    The total shown is the total volume documented since Anesthesia Start was filed.    Propofol Drip 23.19 mL    The total shown is the total volume documented since Anesthesia Start was filed.    albumin human 5 % 750.00 mL    piperacillin-tazobactam (Zosyn) 3.375 g in dextrose (iso) IV 50 mL 50.00 mL    Total Intake 3200.75 mL       Output    Urine 1060 mL    NG/OG Tube Output 50 mL    Total Output 1110 mL       Net    Net Volume 2090.75 mL          Specimen:   ID Type Source Tests Collected by Time   1 : RIGHT LOBE LIVER BIOPSY Tissue LIVER BIOPSY RIGHT SURGICAL PATHOLOGY EXAM Eliezer Barrett MD 4/3/2025 [id number]                 Drains and/or Catheters:   Closed/Suction Drain 1 RLQ Bulb 19 Fr.  (Active)   Site Description Healing 04/04/25 0400   Dressing Status Clean;Dry;Occlusive 04/04/25 0400   Drainage Appearance Serosanguineous 04/04/25 0400   Status To bulb suction 04/04/25 0400   Output (mL) 50 mL 04/04/25 0600       Closed/Suction Drain 2 RLQ Bulb 19 Fr. (Active)   Site Description Healing 04/04/25 0400   Dressing Status Clean;Dry;Occlusive 04/04/25 0400   Drainage Appearance Serosanguineous 04/04/25 0400   Status To bulb suction 04/04/25 0400   Number of Sutures Removed 90 04/03/25 0600   Output (mL) 25 mL 04/04/25 0600       Closed/Suction Drain 3 LLQ Bulb 19 Fr. (Active)   Site Description Healing 04/04/25 0400   Dressing Status Clean;Dry;Occlusive 04/04/25 0400   Drainage Appearance Serosanguineous 04/04/25 0400   Status To bulb suction 04/04/25 0400   Output (mL) 35 mL 04/04/25 0600       Closed/Suction Drain LLQ Bulb 19 Fr. (Active)   Site Description Healing 04/04/25 0400   Dressing Status Clean;Dry;Occlusive 04/04/25 0400   Drainage Appearance Serosanguineous 04/04/25 0400   Status To bulb suction 04/04/25 0400   Output (mL) 15 mL 04/04/25 0600       NG/OG/Feeding Tube NG - [place] sump 16 Fr Left nostril (Active)   Tube Status Low intermittent suction 04/04/25 0400   Placement Verification Measurements 04/04/25 0400   Distal Tube Measurement 63 cm 04/04/25 0400   Site Assessment Clean;Dry;Intact 04/04/25 0400   Drainage Appearance Bile 04/04/25 0400   NG/OG Interventions Air injected into blue air vent port 04/03/25 2000   Irrigant Tap water 04/03/25 2000   Response To Intervention No resistance met 04/04/25 0400   Tube Securement Taped to nostril center 04/03/25 2000   Output (mL) 0 mL 04/04/25 0000       Urethral Catheter Non-latex;Straight-tip 14 Fr. (Active)   Site Assessment Clean;Skin intact 04/04/25 0400   Collection Container Standard drainage bag 04/04/25 0400   Securement Method Securing device (Describe) 04/04/25 0400   Reason for Continuing Urinary Catheterization accurate hourly  measurement of urine volume in a critically ill patient that cannot be assessed by other volumes and urine collection strategies 04/04/25 0400   Output (mL) 75 mL 04/04/25 0700       Tourniquet Times:         Implants:  Implants       Type Name Action Serial No.      Stent STENT, URETERAL 6.5FR X 12CM - [id number] Implanted               Findings:   2 renal arteries on aortic patch  Final pump flow 120, resistance 0.16    Indications: López Lopez is an 65 y.o. male who is having surgery for kidney transplant. He had liver transplant that completed day prior. He was brought back to the OR from the ICU.    The patient was seen in the preoperative area. The risks, benefits, complications, treatment options, non-operative alternatives, expected recovery and outcomes were discussed with the patient. The possibilities of reaction to medication, pulmonary aspiration, injury to surrounding structures, bleeding, recurrent infection, the need for additional procedures, failure to diagnose a condition, and creating a complication requiring transfusion or operation were discussed with the patient. The patient concurred with the proposed plan, giving informed consent.  The site of surgery was properly noted/marked if necessary per policy. The patient has been actively warmed in preoperative area. Preoperative antibiotics have been ordered and given within 1 hours of incision. Venous thrombosis prophylaxis have been ordered including bilateral sequential compression devices    Procedure Details:     The organ was removed from the Storage using sterile technique and brought up into a basin with ice. The organ was the LEFT kidney. UNOS donor ID: [id number]. The ureter had been tagged and protected. The renal vein was identified, and all branches and tributaries tied off. The artery had been procured with a Carrel patch. The artery was skeletonized. The periarterial tissue was carefully dissected and tied. The aortic patch was  then trimmed to size in preparation for implantation. There were 2 arteries on the common patch. Following this, the remainder of the perinephric fat were tied-off and removed. Once this was done, the organ was replaced in a basin full of slushed ice in preparation for implantation.    The patient was brought to the operating room, placed in a supine position, a huddle was performed. Patient was already intubated from previous surgery. Sequential compression devices were placed. At this point, ABO verification was performed re-confirming correct organ and compatibility. The abdomen was prepped, and draped in the usual sterile fashion. Methylprednisolone and Simulect_ were administered. A Barron incision was made in the __LLQ and carried down to the subcutaneous tissue and the abdominal wall fascia. The Retroperitoneal space was entered. The peritoneum was peeled medially. The epigastric vessels were double ligated and divided using silk tie and surgical clips. The external iliac artery and vein were exposed. The bookwalter retractor was placed and the lymphatics overlying the vessels were serially ligated and divided.     We applied atraumatic vascular clamps on the iliac vein and the donor kidney was brought to the operative field. We made an appropriate size venotomy and the donor  renal vein was anastomosed to the recipient LEFT External Iliac vein in an end-to-side fashion with running 5-0 prolene suture. An arteriotomy was made and the donor renal artery was anastomosed to the recipient LEFT External iliac artery in an end to side fashion with running 6-0 prolene suture. The patient was simultaneously loaded with IV mannitol, Lasix and volume. The clamps were removed and kidney allograft reperfused. Anastomosis time was 39 mins. The total Cold ischemic time was 23 hours and 34 minutes. The kidney had a good reperfusion and was Pink and had Firm turgor. Hemostasis was meticulously achieved.     The bladder was  identified by clamping the noel and filled with irrigation fluids. The donor ureter was shortened to the appropriate length and spatulated. Ureteroneocystostomy was created using 5-0 PDS in running fashion over double J ureteral stent. Lich-Gregoir was performed to prevent reflux using 4-0 PDS. The kidney was making urine.    Hemostasis was checked, the anastomoses inspected, and the kidney placed in the iliac fossa. After placement, the vessel lay was inspected and found to be acceptable. The kidney position was Extra-peritoneal. The field was irrigated thoroughly. The retractor was removed and the abdominal wall fascia re-approximated with running #1 Prolene suture in 2 layers. Subcutaneous tissues were irrigated, and approximated with 3-0 Vicryl stitches.  The skin was closed with staples. All counts were correct.     At this point, Dr. Snell joined in and re-explore the liver incision which will be dictated separately.       Complications:  None; patient tolerated the procedure well.    Disposition: ICU - intubated and hemodynamically stable.  Condition: stable       Attending Attestation: I was present and scrubbed for the entire procedure.    Eliezer Barrett  Phone Number: [phone]

## 2025-04-04 NOTE — CARE PLAN
Problem: Safety - Medical Restraint  Goal: Remains free of injury from restraints (Restraint for Interference with Medical Device)  Outcome: Progressing  Flowsheets (Taken 4/3/2025 1320 by Casi Jeff RN)  Remains free of injury from restraints (restraint for interference with medical device): Determine that other, less restrictive measures have been tried or would not be effective before applying the restraint  Goal: Free from restraint(s) (Restraint for Interference with Medical Device)  Outcome: Progressing  Flowsheets (Taken 4/3/2025 1320 by Casi Jeff RN)  Free from restraint(s) (restraint for interference with medical device): ONCE/SHIFT or MINIMUM Every 12 hours: Assess and document the continuing need for restraints

## 2025-04-04 NOTE — PROGRESS NOTES
"Nutrition Initial Assessment:   Nutrition Assessment    Reason for Assessment: Dietitian discretion (s/p liver transplant)    Patient is a 65 y.o. male presenting with PMHx of decompensated cirrhosis 2/2 to MASLD (recurrent ascites, portal HTN requires paracentesis every other week), CKD 4, DM2, HTN, CAD s/p PCI to prox LAD 2021, h/o pAFib on Eliquis, h/o ITP dx 5/2024 s/p steroids and IVIG, hyperlipidemia, obesity who presents to [facility] for possible simultaneous liver and kidney transplant.  S/p OLT 4/2-4/3. Returned to OR 4/3 am, now s/p DDKT and re-exploration of hepatic artery.    Extubated earlier this AM. Remains NPO.      Nutrition History:  Food and Nutrient History: Pt recently extubated-- interview kept brief and wife assisted with hx. Reports eating well prior to transplant-- was consuming a lot of Ensure and was working with outpatient transplant dietitian. Wife reports pt had been working hard to increase his oral intake as well.       Anthropometrics:  Height: 193 cm (6' 4\")   Weight: 112 kg (247 lb 11.2 oz)   BMI (Calculated): 30.16  IBW/kg (Dietitian Calculated): 91.8 kg    Weight History:   Wt Readings from Last 20 Encounters:   04/02/25 112 kg (247 lb 11.2 oz)   02/26/25 118 kg (260 lb)   02/05/25 117 kg (258 lb 12.8 oz)   01/16/25 126 kg (277 lb 3.2 oz)   01/16/25 126 kg (277 lb 3.2 oz)   01/09/25 120 kg (265 lb)   12/02/24 126 kg (276 lb 14.4 oz)   11/12/24 121 kg (267 lb)   Weight Change %: wt loss of 14 kg from January- present, unsure how much may be true wt loss vs fluid       Nutrition Focused Physical Exam Findings:    Subcutaneous Fat Loss:   Orbital Fat Pads: Well nourished (slightly bulging fat pads)  Buccal Fat Pads: Well nourished (full, rounded cheeks)  Triceps: Mild-Moderate (less than ample fat tissue)  Muscle Wasting:  Temporalis: Well nourished (well-defined muscle)  Pectoralis (Clavicular Region): Mild-Moderate (some protrusion of clavicle)  Deltoid/Trapezius: Mild-Moderate " (slight protrusion of acromion process)  Interosseous: Well nourished (muscle bulges)  Edema:  Edema Location: nonpitting generalized  Physical Findings:  Skin:  (slightly jaundiced, abdominal surgical wound)    Nutrition Significant Labs:  BG POCT trend:   Results from last 7 days   Lab Units 04/04/25  0458 04/04/25  0029 04/03/25  1920 04/03/25  1603 04/03/25  1513   POCT GLUCOSE mg/dL 136* 150* 141* 130* 119*    , Renal Lab Trend:   Results from last 7 days   Lab Units 04/04/25  0816 04/04/25  0616 04/04/25  0215 04/03/25  2212   POTASSIUM mmol/L 3.7 3.8 4.0 4.1   PHOSPHORUS mg/dL 5.9* 5.8* 5.4* 5.0*   SODIUM mmol/L 139 139 140 139   MAGNESIUM mg/dL 2.06 2.07 2.05 2.01   EGFR mL/min/1.73m*2 20* 20* 23* 22*   BUN mg/dL 55* 56* 52* 54*   CREATININE mg/dL 3.26* 3.29* 2.98* 3.05*        Nutrition Specific Medications:  Scheduled medications  insulin lispro, 0-10 Units, subcutaneous, q4h  [START ON 4/6/2025] methylPREDNISolone sodium succinate (PF), 60 mg, intravenous, Daily   Followed by  [START ON 4/7/2025] predniSONE, 20 mg, oral, Daily  mycophenolate, 1,000 mg, intravenous, q12h BRITTANEY  pantoprazole, 40 mg, intravenous, BID  tacrolimus, 1 mg, oral, q12h BRITTANEY      Continuous medications  norepinephrine, 0-0.5 mcg/kg/min, Last Rate: 0.02 mcg/kg/min (04/04/25 0700)  PrismaSol BGK 2/3.5, 3,000 mL/hr, Last Rate: Stopped (04/03/25 0156)  propofol, 0-50 mcg/kg/min, Last Rate: 40 mcg/kg/min (04/04/25 0700)  sodium chloride, 60 mL/hr, Last Rate: 60 mL/hr (04/04/25 0700)  sodium chloride 0.9%, 0-1,000 mL/hr, Last Rate: Stopped (04/04/25 0653)        I/O:   Last BM Date: 04/01/25;      Dietary Orders (From admission, onward)       Start     Ordered    04/03/25 0310  May Participate in Room Service  ( ROOM SERVICE MAY PARTICIPATE)  Once        Question:  .  Answer:  Yes    04/03/25 0309 04/03/25 0259  NPO Diet; Effective now  Diet effective now         04/03/25 0259                     Estimated Needs:   Total Energy  Estimated Needs in 24 hours (kCal):  (7027-9212)  Method for Estimating Needs: 25-30 kcal/kg IBW  Total Protein Estimated Needs in 24 Hours (g):  (120-140)  Method for Estimating 24 Hour Protein Needs: 1.3-1.5 g/kg IBW  Total Fluid Estimated Needs in 24 Hours (mL):  (per team)        Nutrition Diagnosis   Malnutrition Diagnosis  Patient has Malnutrition Diagnosis: No (Pt boderline and at risk for malnutriiton should oral intake not be sufficient to meet increased needs.)    Nutrition Diagnosis  Patient has Nutrition Diagnosis: Yes  Diagnosis Status (1): New  Nutrition Diagnosis 1: Increased nutrient needs  Related to (1): increased metabolic demand  As Evidenced by (1): s/p recent OLT and DDKT.       Nutrition Interventions/Recommendations   Nutrition prescription for oral nutrition    Nutrition Recommendations:  Individualized Nutrition Prescription Provided for :   Advance diet as medically able, recommend a regular liberalized diet.   Once diet advanced add Ensure Plus TID and Gelatein Plus once daily.    Nutrition Interventions/Goals:   Meals and Snacks: General healthful diet  Goal: provide diet to meet needs  Medical Food Supplement: Commercial beverage medical food supplement therapy  Goal: Ensure Plus to provide 350 kcal, 13 g protein each and Gelatein Plus to provide 160 kcal, 20 g protein each.        Nutrition Monitoring and Evaluation   Food/Nutrient Related History Monitoring  Monitoring and Evaluation Plan: Intake / amount of food  Intake / Amount of food: Consumes at least 50% or more of meals/snacks/supplements         Biochemical Data, Medical Tests and Procedures  Monitoring and Evaluation Plan: Electrolyte/renal panel, Glucose/endocrine profile  Electrolyte and Renal Panel: Electrolytes within normal limits  Glucose/Endocrine Profile: Glucose within normal limits ( mg/dL)         Goal Status: New goal(s) identified    Time Spent (min): 60 minutes

## 2025-04-04 NOTE — SIGNIFICANT EVENT
Return to SICU from OR    López Lopez returned to SICU from OR s/p DDKT and re-exploration of hepatic artery. Patient remains intubated and sedated on propofol infusion. On arrival levo at 0.05, AT2 at 10, and insulin at 3. NMB reversed by Anesthesia on arrival.    OR Course:   EBL: 200ml  UOP: 385ml  Crystalloid: 1L  Colloid: 750ml  Cellsaver: none  Products: 2u PRBC, 1u FFP  Intubation: ETT in situ  Lines/Access: unchanged    Physical Exam  Constitutional:       Interventions: He is sedated, intubated and restrained.   HENT:      Head: Normocephalic and atraumatic.      Nose:      Comments: NG tube in place.     Mouth/Throat:      Comments: ETT in place.  Eyes:      Pupils: Pupils are equal, round, and reactive to light.   Neck:      Comments: RIJ MAC w/ PA catheter and RIJ trialysis in place, dressing c/d/i.   Cardiovascular:      Rate and Rhythm: Normal rate and regular rhythm.      Pulses: Normal pulses.      Heart sounds: Normal heart sounds.   Pulmonary:      Effort: Pulmonary effort is normal. He is intubated.      Breath sounds: Normal breath sounds.      Comments: Mechanically ventilated via ETT.  Abdominal:      General: There is distension.      Palpations: Abdomen is soft.      Comments: RUQ hockey stick incision surgical dressing with mild strikethrough. LLQ hockey stick incision surgical dressing with mild strikethrough. LISA drain x4 with sanguinous output.   Genitourinary:     Comments: Nelson in place with yellow/pink tinged UOP.   Skin:     General: Skin is warm and dry.   Neurological:      Comments: Intubated and sedated.   Psychiatric:      Comments: DERIC.       Assessment   López Lopez is a 65 y.o. male with PMHx of decompensated cirrhosis 2/2 to MASLD (recurrent ascites, portal HTN requires paracentesis every other week), CKD 4, DM2, HTN, CAD s/p PCI to prox LAD 2021, h/o pAFib on Eliquis, h/o ITP dx 5/2024 s/p steroids and IVIG, hyperlipidemia, obesity who presents to [facility] for  possible simultaneous liver and kidney transplant. He presents to the SICU s/p OLT 4/2-4/3. Returned to OR 4/3 am, now s/p DDKT and re-exploration of hepatic artery.    Plan:  NEURO: Returned to SICU intubated and sedated on propofol infusion at 40 mcg/kg/min. NMB reversed with sugammadex by Anesthesia on arrival.  - ongoing neuro and pain assessments  - PRN hydromorphone for pain control  - PT/OT consult  - Home meds: trazodone 50mg nightly  - Restraints indicated while patient remains intubated, restrain with soft wrist restraints until medical devices are discontinued.      CV: History of HTN, HLD, CAD s/p PCI to prox LAD 2021, h/o pAFib on Eliquis. Baseline /75. Baseline echo (12/10/2024) with EF Left ventricular ejection fraction is hyperdynamic, calculated by Manzanares's biplane at 77%. %, normal RV. Arrived to SICU s/p OLT on angiotensin II and levophed infusions. Lactic acidosis improving. Returned from OR s/p DDKT on AT2 at 10 and levo at 0.05.  - continuous EKG/abp/cvp/pap monitoring  - titrate levo and AT2 to MAP goal >65  - Goal CVP 8-12, CI >2.5   - 25% albumin q6h x4 doses  - additional volume resuscitation as indicated  - Holding home meds: atorvastatin 10mg daily, apixaban 5mg BID, furosemide 20 mg BID     PULM: Former smoker. Returns to SICU from OR intubated.  - wean vent as tolerated, maintain Spo2 >92%  - SBT when appropriate  - VAP bundle  - q1h IS after extubation  - additional pulm toilet prn  - CXR daily  - ABG prn     GI: Hx of Cirrhosis 2/2 to MASLD (recurrent ascites, portal HTN requires paracentesis every other week). Now s/p OLT on 4/2-4/3. Post-op liver US with atrophied Left hepatic lobe, Right HA not well visualized, Common HA w/ borderline elevated RI, Left HA w/ borderline low RI, and slightly elevated main portal vein velocities. Return to OR 4/3 s/p re-exploration of hepatic artery.  - NPO, NG to LIWS  - PPI for GI prophylaxis  - LFTs/GGT post-op and q4h  - repeat liver US  post-op  - serial abdominal exams  - monitor drain output  - IS/ppx per Transplant: methylpred taper, Transplant starting MMF and tacro this evening     : Hx of CKD 4. Baseline creatinine 3.89. Intra-op CVVH during OLT. Received lasix 80mg IV post-op. Now s/p DDKT 4/3.  - obtain post-op Kidney US  - Check renal function panel post op, then q4h.  - Maintenance IVF -> 1/2NS at 60ml/hr  - Additional 1:1 IVF replacement with NS  - Maintain UOP >0.5ml/kg/hr   - Maintain noel for strict I&Os  - Replete electrolytes judiciously  - IS/ppx per Transplant: as above +Simulect induction     HEME: Hx of ITP dx 5/2024 s/p steroids and IVIG. Acute blood loss anemia. Coagulopathy. Hypofibrinogenemia. Thrombocytopenia. OR EBL 4L. Intra-op received 7u PRBC, 8u FFP, 2u cryo, 10pk Plts. Post-op overnight transfused 1u cryo and 5pk Plts. OR 4/3 EBL 200ml, intra-op received 2u PRBC and 1u FFP.  - Check CBC, coags, fibrinogen post op, then q4h  - f/u post-op TEG  - Goal hgb >9, plt >50, fibrinogen >100, INR <1.8  - SCDs for DVT prophylaxis  - No SQH/ASA at this time  - ongoing monitoring for s/s bleeding  - maintain active T&S (4/2)      ENDO: History of prediabetes. A1c 5.5 (9/2024). Steroid induced hyperglycemia. Arrived to SICU on insulin infusion from OR. Remains on insulin infusion at 3u/hr.    - continue insulin infusion per ICU protocol  - q1h BG while on insulin infusion  - goal -180  - Home meds: metformin 1000mg q12h     ID: Afebrile. No leukocytosis. MRSA swab pending.  - trend q4h WBC, q4h temps  - Post-op Zosyn x72 hrs  - continue prophylactic Fluconazole x30 days  - close monitoring for s/s infection. Low threshold to culture.     Lines:  - L radial arterial line (4/2, OR)  - R IJ MAC with PAC (4/2, OR)  - R IJ trialysis (4/2, OR)  - PIV x3     Dispo: Continue ICU care.     Uriel Camilo MD

## 2025-04-04 NOTE — HOSPITAL COURSE
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 Snell and kidney transplant with Dr. Barrett with a KDPI of 49% and PRA of 0.  HCV -/- and donor has not met risk factors. EBV -/-. 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 Admission weight is 112kg (discharge weight is *** ).  Pt received Simulect induction therapy in conjunction with 500mg IV solumedrol.  Pt was initiated on Tacrolimus & Cellcept immunosuppression in conjunction with IV solumedrol taper.  Pt was started on valcyte (CMV D - / R + ) and bactrim for CMV & PCP prophylaxis per protocol. He was started on fluconazole as antifungal coverage per protocol. Operative course was complicated by brief vtach/afib RVE related to PA cath placement which improved with amio bolus.  Hospital course was uncomplicated. Nelson catheter was removed on POD #3 and the patient is voiding spontaneously.The patient had DGF?  Pt required/did not require dialysis and electrolytes remain stable. Dialysis access via ***and was patent on last use. BP & glucose under good control. Other medical issues. Did the patient have a biopsy____(DSA?). Was the patient taken back to the OR?    Pt is tolerating a regular diet, maintaining adequate hydration with oral intake, ambulating independently and having BMs. Immunosuppression was managed by the transplant team. Patient is in stable condition for discharge home with renal telehealth today and homecare for drain. RX delivered to bedside prior to discharge. The patient will f/u in the transplant institute and have labs drawn in the walk-in clinic.

## 2025-04-05 ENCOUNTER — APPOINTMENT (OUTPATIENT)
Dept: RADIOLOGY | Facility: HOSPITAL | Age: 65
DRG: 005 | End: 2025-04-05
Payer: COMMERCIAL

## 2025-04-05 LAB
ABO GROUP (TYPE) IN BLOOD: NORMAL
ALBUMIN SERPL BCP-MCNC: 3.5 G/DL (ref 3.4–5)
ALBUMIN SERPL BCP-MCNC: 3.5 G/DL (ref 3.4–5)
ALP SERPL-CCNC: 42 U/L (ref 33–136)
ALP SERPL-CCNC: 53 U/L (ref 33–136)
ALT SERPL W P-5'-P-CCNC: 170 U/L (ref 10–52)
ALT SERPL W P-5'-P-CCNC: 203 U/L (ref 10–52)
ANION GAP BLDA CALCULATED.4IONS-SCNC: 12 MMO/L (ref 10–25)
ANION GAP SERPL CALC-SCNC: 17 MMOL/L (ref 10–20)
ANION GAP SERPL CALC-SCNC: 18 MMOL/L (ref 10–20)
ANTIBODY SCREEN: NORMAL
APTT PPP: 25 SECONDS (ref 26–36)
APTT PPP: 27 SECONDS (ref 26–36)
AST SERPL W P-5'-P-CCNC: 47 U/L (ref 9–39)
AST SERPL W P-5'-P-CCNC: 65 U/L (ref 9–39)
BASE EXCESS BLDA CALC-SCNC: -3.5 MMOL/L (ref -2–3)
BILIRUB DIRECT SERPL-MCNC: 1 MG/DL (ref 0–0.3)
BILIRUB DIRECT SERPL-MCNC: 1 MG/DL (ref 0–0.3)
BILIRUB SERPL-MCNC: 1.4 MG/DL (ref 0–1.2)
BILIRUB SERPL-MCNC: 1.5 MG/DL (ref 0–1.2)
BLOOD EXPIRATION DATE: NORMAL
BODY TEMPERATURE: 37 DEGREES CELSIUS
BUN SERPL-MCNC: 63 MG/DL (ref 6–23)
BUN SERPL-MCNC: 69 MG/DL (ref 6–23)
CA-I BLD-SCNC: 1.12 MMOL/L (ref 1.1–1.33)
CA-I BLD-SCNC: 1.14 MMOL/L (ref 1.1–1.33)
CA-I BLDA-SCNC: 1.18 MMOL/L (ref 1.1–1.33)
CALCIUM SERPL-MCNC: 8.3 MG/DL (ref 8.6–10.6)
CALCIUM SERPL-MCNC: 8.3 MG/DL (ref 8.6–10.6)
CHLORIDE BLDA-SCNC: 105 MMOL/L (ref 98–107)
CHLORIDE SERPL-SCNC: 103 MMOL/L (ref 98–107)
CHLORIDE SERPL-SCNC: 104 MMOL/L (ref 98–107)
CO2 SERPL-SCNC: 21 MMOL/L (ref 21–32)
CO2 SERPL-SCNC: 22 MMOL/L (ref 21–32)
CREAT SERPL-MCNC: 3.37 MG/DL (ref 0.5–1.3)
CREAT SERPL-MCNC: 3.76 MG/DL (ref 0.5–1.3)
DISPENSE STATUS: NORMAL
EGFRCR SERPLBLD CKD-EPI 2021: 17 ML/MIN/1.73M*2
EGFRCR SERPLBLD CKD-EPI 2021: 20 ML/MIN/1.73M*2
ERYTHROCYTE [DISTWIDTH] IN BLOOD BY AUTOMATED COUNT: 15.9 % (ref 11.5–14.5)
ERYTHROCYTE [DISTWIDTH] IN BLOOD BY AUTOMATED COUNT: 16.4 % (ref 11.5–14.5)
FIBRINOGEN PPP-MCNC: 164 MG/DL (ref 200–400)
FIBRINOGEN PPP-MCNC: 186 MG/DL (ref 200–400)
GGT SERPL-CCNC: 72 U/L (ref 5–64)
GGT SERPL-CCNC: 99 U/L (ref 5–64)
GLUCOSE BLD MANUAL STRIP-MCNC: 107 MG/DL (ref 74–99)
GLUCOSE BLD MANUAL STRIP-MCNC: 110 MG/DL (ref 74–99)
GLUCOSE BLD MANUAL STRIP-MCNC: 121 MG/DL (ref 74–99)
GLUCOSE BLD MANUAL STRIP-MCNC: 124 MG/DL (ref 74–99)
GLUCOSE BLD MANUAL STRIP-MCNC: 128 MG/DL (ref 74–99)
GLUCOSE BLDA-MCNC: 129 MG/DL (ref 74–99)
GLUCOSE SERPL-MCNC: 115 MG/DL (ref 74–99)
GLUCOSE SERPL-MCNC: 133 MG/DL (ref 74–99)
HCO3 BLDA-SCNC: 21.4 MMOL/L (ref 22–26)
HCT VFR BLD AUTO: 24.6 % (ref 41–52)
HCT VFR BLD AUTO: 27.2 % (ref 41–52)
HCT VFR BLD EST: 28 % (ref 41–52)
HGB BLD-MCNC: 8.7 G/DL (ref 13.5–17.5)
HGB BLD-MCNC: 8.9 G/DL (ref 13.5–17.5)
HGB BLDA-MCNC: 9.2 G/DL (ref 13.5–17.5)
INHALED O2 CONCENTRATION: 28 %
INR PPP: 1.1 (ref 0.9–1.1)
INR PPP: 1.2 (ref 0.9–1.1)
LACTATE BLDA-SCNC: 0.6 MMOL/L (ref 0.4–2)
MAGNESIUM SERPL-MCNC: 2.24 MG/DL (ref 1.6–2.4)
MAGNESIUM SERPL-MCNC: 2.25 MG/DL (ref 1.6–2.4)
MCH RBC QN AUTO: 29.5 PG (ref 26–34)
MCH RBC QN AUTO: 30.2 PG (ref 26–34)
MCHC RBC AUTO-ENTMCNC: 32.7 G/DL (ref 32–36)
MCHC RBC AUTO-ENTMCNC: 35.4 G/DL (ref 32–36)
MCV RBC AUTO: 85 FL (ref 80–100)
MCV RBC AUTO: 90 FL (ref 80–100)
NRBC BLD-RTO: 0 /100 WBCS (ref 0–0)
NRBC BLD-RTO: 0 /100 WBCS (ref 0–0)
OXYHGB MFR BLDA: 96.6 % (ref 94–98)
PCO2 BLDA: 37 MM HG (ref 38–42)
PH BLDA: 7.37 PH (ref 7.38–7.42)
PHOSPHATE SERPL-MCNC: 7.3 MG/DL (ref 2.5–4.9)
PHOSPHATE SERPL-MCNC: 7.5 MG/DL (ref 2.5–4.9)
PLATELET # BLD AUTO: 35 X10*3/UL (ref 150–450)
PLATELET # BLD AUTO: 36 X10*3/UL (ref 150–450)
PO2 BLDA: 98 MM HG (ref 85–95)
POTASSIUM BLDA-SCNC: 4.6 MMOL/L (ref 3.5–5.3)
POTASSIUM SERPL-SCNC: 4 MMOL/L (ref 3.5–5.3)
POTASSIUM SERPL-SCNC: 4.1 MMOL/L (ref 3.5–5.3)
PRODUCT BLOOD TYPE: 7300
PRODUCT CODE: NORMAL
PROT SERPL-MCNC: 4.5 G/DL (ref 6.4–8.2)
PROT SERPL-MCNC: 4.5 G/DL (ref 6.4–8.2)
PROTHROMBIN TIME: 12.7 SECONDS (ref 9.8–12.4)
PROTHROMBIN TIME: 13.2 SECONDS (ref 9.8–12.4)
RBC # BLD AUTO: 2.88 X10*6/UL (ref 4.5–5.9)
RBC # BLD AUTO: 3.02 X10*6/UL (ref 4.5–5.9)
RH FACTOR (ANTIGEN D): NORMAL
SAO2 % BLDA: 99 % (ref 94–100)
SODIUM BLDA-SCNC: 134 MMOL/L (ref 136–145)
SODIUM SERPL-SCNC: 138 MMOL/L (ref 136–145)
SODIUM SERPL-SCNC: 139 MMOL/L (ref 136–145)
TACROLIMUS BLD-MCNC: 3 NG/ML
UNIT ABO: NORMAL
UNIT NUMBER: NORMAL
UNIT RH: NORMAL
UNIT VOLUME: 285
UNIT VOLUME: 290
UNIT VOLUME: 350
WBC # BLD AUTO: 4.4 X10*3/UL (ref 4.4–11.3)
WBC # BLD AUTO: 4.5 X10*3/UL (ref 4.4–11.3)
XM INTEP: NORMAL

## 2025-04-05 PROCEDURE — 99233 SBSQ HOSP IP/OBS HIGH 50: CPT | Performed by: INTERNAL MEDICINE

## 2025-04-05 PROCEDURE — 2500000004 HC RX 250 GENERAL PHARMACY W/ HCPCS (ALT 636 FOR OP/ED)

## 2025-04-05 PROCEDURE — 2500000001 HC RX 250 WO HCPCS SELF ADMINISTERED DRUGS (ALT 637 FOR MEDICARE OP): Performed by: NURSE PRACTITIONER

## 2025-04-05 PROCEDURE — 71045 X-RAY EXAM CHEST 1 VIEW: CPT | Performed by: STUDENT IN AN ORGANIZED HEALTH CARE EDUCATION/TRAINING PROGRAM

## 2025-04-05 PROCEDURE — P9047 ALBUMIN (HUMAN), 25%, 50ML: HCPCS

## 2025-04-05 PROCEDURE — 84100 ASSAY OF PHOSPHORUS: CPT

## 2025-04-05 PROCEDURE — 85610 PROTHROMBIN TIME: CPT

## 2025-04-05 PROCEDURE — 82330 ASSAY OF CALCIUM: CPT

## 2025-04-05 PROCEDURE — 37799 UNLISTED PX VASCULAR SURGERY: CPT

## 2025-04-05 PROCEDURE — 76776 US EXAM K TRANSPL W/DOPPLER: CPT | Performed by: STUDENT IN AN ORGANIZED HEALTH CARE EDUCATION/TRAINING PROGRAM

## 2025-04-05 PROCEDURE — P9047 ALBUMIN (HUMAN), 25%, 50ML: HCPCS | Performed by: NURSE PRACTITIONER

## 2025-04-05 PROCEDURE — 80053 COMPREHEN METABOLIC PANEL: CPT

## 2025-04-05 PROCEDURE — 85384 FIBRINOGEN ACTIVITY: CPT

## 2025-04-05 PROCEDURE — 82977 ASSAY OF GGT: CPT

## 2025-04-05 PROCEDURE — 82248 BILIRUBIN DIRECT: CPT

## 2025-04-05 PROCEDURE — 82435 ASSAY OF BLOOD CHLORIDE: CPT

## 2025-04-05 PROCEDURE — 2500000001 HC RX 250 WO HCPCS SELF ADMINISTERED DRUGS (ALT 637 FOR MEDICARE OP)

## 2025-04-05 PROCEDURE — 99291 CRITICAL CARE FIRST HOUR: CPT | Performed by: STUDENT IN AN ORGANIZED HEALTH CARE EDUCATION/TRAINING PROGRAM

## 2025-04-05 PROCEDURE — P9045 ALBUMIN (HUMAN), 5%, 250 ML: HCPCS | Mod: JZ

## 2025-04-05 PROCEDURE — 99291 CRITICAL CARE FIRST HOUR: CPT

## 2025-04-05 PROCEDURE — 2500000005 HC RX 250 GENERAL PHARMACY W/O HCPCS

## 2025-04-05 PROCEDURE — 82947 ASSAY GLUCOSE BLOOD QUANT: CPT

## 2025-04-05 PROCEDURE — 2500000004 HC RX 250 GENERAL PHARMACY W/ HCPCS (ALT 636 FOR OP/ED): Performed by: PHYSICIAN ASSISTANT

## 2025-04-05 PROCEDURE — 99232 SBSQ HOSP IP/OBS MODERATE 35: CPT | Performed by: SURGERY

## 2025-04-05 PROCEDURE — 2500000004 HC RX 250 GENERAL PHARMACY W/ HCPCS (ALT 636 FOR OP/ED): Performed by: NURSE PRACTITIONER

## 2025-04-05 PROCEDURE — 2500000004 HC RX 250 GENERAL PHARMACY W/ HCPCS (ALT 636 FOR OP/ED): Mod: JZ

## 2025-04-05 PROCEDURE — 2020000001 HC ICU ROOM DAILY

## 2025-04-05 PROCEDURE — 83735 ASSAY OF MAGNESIUM: CPT

## 2025-04-05 PROCEDURE — 71045 X-RAY EXAM CHEST 1 VIEW: CPT

## 2025-04-05 PROCEDURE — 85027 COMPLETE CBC AUTOMATED: CPT

## 2025-04-05 PROCEDURE — 76776 US EXAM K TRANSPL W/DOPPLER: CPT

## 2025-04-05 PROCEDURE — 86901 BLOOD TYPING SEROLOGIC RH(D): CPT | Performed by: PHYSICIAN ASSISTANT

## 2025-04-05 PROCEDURE — 80197 ASSAY OF TACROLIMUS: CPT

## 2025-04-05 RX ORDER — ACETAMINOPHEN 10 MG/ML
1000 INJECTION, SOLUTION INTRAVENOUS EVERY 6 HOURS
Status: DISCONTINUED | OUTPATIENT
Start: 2025-04-06 | End: 2025-04-06

## 2025-04-05 RX ORDER — ALBUMIN HUMAN 250 G/1000ML
25 SOLUTION INTRAVENOUS EVERY 6 HOURS
Status: COMPLETED | OUTPATIENT
Start: 2025-04-06 | End: 2025-04-07

## 2025-04-05 RX ORDER — ACETAMINOPHEN 325 MG/1
650 TABLET ORAL EVERY 6 HOURS
Status: DISCONTINUED | OUTPATIENT
Start: 2025-04-05 | End: 2025-04-05

## 2025-04-05 RX ORDER — HYDROMORPHONE HYDROCHLORIDE 0.2 MG/ML
0.2 INJECTION INTRAMUSCULAR; INTRAVENOUS; SUBCUTANEOUS
Status: DISCONTINUED | OUTPATIENT
Start: 2025-04-05 | End: 2025-04-06

## 2025-04-05 RX ORDER — OXYCODONE HYDROCHLORIDE 5 MG/1
10 TABLET ORAL EVERY 6 HOURS PRN
Status: DISCONTINUED | OUTPATIENT
Start: 2025-04-05 | End: 2025-04-08

## 2025-04-05 RX ORDER — FUROSEMIDE 10 MG/ML
40 INJECTION INTRAMUSCULAR; INTRAVENOUS ONCE
Status: COMPLETED | OUTPATIENT
Start: 2025-04-05 | End: 2025-04-05

## 2025-04-05 RX ORDER — ALBUMIN HUMAN 50 G/1000ML
12.5 SOLUTION INTRAVENOUS ONCE
Status: DISCONTINUED | OUTPATIENT
Start: 2025-04-05 | End: 2025-04-06

## 2025-04-05 RX ORDER — ALBUMIN HUMAN 250 G/1000ML
25 SOLUTION INTRAVENOUS EVERY 6 HOURS
Status: DISCONTINUED | OUTPATIENT
Start: 2025-04-05 | End: 2025-04-05

## 2025-04-05 RX ORDER — TACROLIMUS 1 MG/1
2 CAPSULE ORAL
Status: DISCONTINUED | OUTPATIENT
Start: 2025-04-05 | End: 2025-04-11

## 2025-04-05 RX ORDER — ALBUMIN HUMAN 50 G/1000ML
12.5 SOLUTION INTRAVENOUS ONCE
Status: COMPLETED | OUTPATIENT
Start: 2025-04-05 | End: 2025-04-05

## 2025-04-05 RX ORDER — OXYCODONE HYDROCHLORIDE 5 MG/1
5 TABLET ORAL EVERY 6 HOURS PRN
Status: DISCONTINUED | OUTPATIENT
Start: 2025-04-05 | End: 2025-04-08

## 2025-04-05 RX ORDER — TALC
3 POWDER (GRAM) TOPICAL NIGHTLY
Status: DISCONTINUED | OUTPATIENT
Start: 2025-04-05 | End: 2025-04-08

## 2025-04-05 RX ORDER — ACETAMINOPHEN 10 MG/ML
1000 INJECTION, SOLUTION INTRAVENOUS EVERY 6 HOURS
Status: DISCONTINUED | OUTPATIENT
Start: 2025-04-05 | End: 2025-04-05

## 2025-04-05 RX ADMIN — PANTOPRAZOLE SODIUM 40 MG: 40 INJECTION, POWDER, LYOPHILIZED, FOR SOLUTION INTRAVENOUS at 21:14

## 2025-04-05 RX ADMIN — OXYCODONE HYDROCHLORIDE 10 MG: 5 TABLET ORAL at 21:14

## 2025-04-05 RX ADMIN — TACROLIMUS 2 MG: 1 CAPSULE ORAL at 18:53

## 2025-04-05 RX ADMIN — PIPERACILLIN SODIUM AND TAZOBACTAM SODIUM 3.38 G: 3; .375 INJECTION, SOLUTION INTRAVENOUS at 14:31

## 2025-04-05 RX ADMIN — MYCOPHENOLATE MOFETIL HYDROCHLORIDE 1000 MG: 500 INJECTION, POWDER, LYOPHILIZED, FOR SOLUTION INTRAVENOUS at 18:53

## 2025-04-05 RX ADMIN — PIPERACILLIN SODIUM AND TAZOBACTAM SODIUM 3.38 G: 3; .375 INJECTION, SOLUTION INTRAVENOUS at 03:18

## 2025-04-05 RX ADMIN — HYDROMORPHONE HYDROCHLORIDE 0.2 MG: 0.2 INJECTION, SOLUTION INTRAMUSCULAR; INTRAVENOUS; SUBCUTANEOUS at 23:45

## 2025-04-05 RX ADMIN — PANTOPRAZOLE SODIUM 40 MG: 40 INJECTION, POWDER, LYOPHILIZED, FOR SOLUTION INTRAVENOUS at 08:57

## 2025-04-05 RX ADMIN — ACETAMINOPHEN 650 MG: 325 TABLET, FILM COATED ORAL at 12:00

## 2025-04-05 RX ADMIN — ACETAMINOPHEN 1000 MG: 10 INJECTION INTRAVENOUS at 17:42

## 2025-04-05 RX ADMIN — ALBUMIN HUMAN 12.5 G: 0.05 INJECTION, SOLUTION INTRAVENOUS at 02:18

## 2025-04-05 RX ADMIN — ALBUMIN HUMAN 25 G: 0.25 SOLUTION INTRAVENOUS at 17:42

## 2025-04-05 RX ADMIN — ALBUMIN HUMAN 25 G: 0.25 SOLUTION INTRAVENOUS at 03:18

## 2025-04-05 RX ADMIN — FLUCONAZOLE 400 MG: 2 INJECTION, SOLUTION INTRAVENOUS at 10:31

## 2025-04-05 RX ADMIN — FUROSEMIDE 40 MG: 10 INJECTION, SOLUTION INTRAVENOUS at 10:16

## 2025-04-05 RX ADMIN — Medication 3 MG: at 21:14

## 2025-04-05 RX ADMIN — PIPERACILLIN SODIUM AND TAZOBACTAM SODIUM 3.38 G: 3; .375 INJECTION, SOLUTION INTRAVENOUS at 21:14

## 2025-04-05 RX ADMIN — PIPERACILLIN SODIUM AND TAZOBACTAM SODIUM 3.38 G: 3; .375 INJECTION, SOLUTION INTRAVENOUS at 08:57

## 2025-04-05 RX ADMIN — HYDROMORPHONE HYDROCHLORIDE 0.2 MG: 0.2 INJECTION, SOLUTION INTRAMUSCULAR; INTRAVENOUS; SUBCUTANEOUS at 01:12

## 2025-04-05 RX ADMIN — TACROLIMUS 1 MG: 1 CAPSULE ORAL at 05:45

## 2025-04-05 RX ADMIN — MYCOPHENOLATE MOFETIL HYDROCHLORIDE 1000 MG: 500 INJECTION, POWDER, LYOPHILIZED, FOR SOLUTION INTRAVENOUS at 05:45

## 2025-04-05 RX ADMIN — ALBUMIN HUMAN 25 G: 0.25 SOLUTION INTRAVENOUS at 08:57

## 2025-04-05 RX ADMIN — HYDROMORPHONE HYDROCHLORIDE 0.4 MG: 1 INJECTION, SOLUTION INTRAMUSCULAR; INTRAVENOUS; SUBCUTANEOUS at 12:09

## 2025-04-05 RX ADMIN — OXYCODONE HYDROCHLORIDE 10 MG: 5 TABLET ORAL at 14:23

## 2025-04-05 RX ADMIN — HYDROMORPHONE HYDROCHLORIDE 0.5 MG: 1 INJECTION, SOLUTION INTRAMUSCULAR; INTRAVENOUS; SUBCUTANEOUS at 10:15

## 2025-04-05 ASSESSMENT — PAIN - FUNCTIONAL ASSESSMENT
PAIN_FUNCTIONAL_ASSESSMENT: 0-10

## 2025-04-05 ASSESSMENT — PAIN DESCRIPTION - ORIENTATION
ORIENTATION: RIGHT;LEFT;MID

## 2025-04-05 ASSESSMENT — PAIN SCALES - GENERAL
PAINLEVEL_OUTOF10: 7
PAINLEVEL_OUTOF10: 4
PAINLEVEL_OUTOF10: 8
PAINLEVEL_OUTOF10: 0 - NO PAIN
PAINLEVEL_OUTOF10: 4
PAINLEVEL_OUTOF10: 0 - NO PAIN
PAINLEVEL_OUTOF10: 8
PAINLEVEL_OUTOF10: 0 - NO PAIN
PAINLEVEL_OUTOF10: 8
PAINLEVEL_OUTOF10: 8
PAINLEVEL_OUTOF10: 7
PAINLEVEL_OUTOF10: 0 - NO PAIN
PAINLEVEL_OUTOF10: 0 - NO PAIN
PAINLEVEL_OUTOF10: 7

## 2025-04-05 ASSESSMENT — PAIN DESCRIPTION - LOCATION
LOCATION: ABDOMEN

## 2025-04-05 NOTE — CONSULTS
[facility]   [facility]  INITIAL CONSULT NOTE       Reason For Consult  S/p liver and kidney transplant 4/2/25    SUBJECTIVE     History Of Present Illness  López Lopez is a 65 y.o. male with a past medical history ofCKD 4, DM2, HTN, CAD s/p PCI to prox LAD 2021, h/o pAFib on Eliquis, h/o ITP dx 5/2024 s/p steroids and IVIG, hyperlipidemia, obesity who is now s/p SLK on 4/2 for MASLD/cirrhosis. Hepatology is consulted for co-management.      Family History:  No family history on file.    Surgical History:    Past Surgical History:   Procedure Laterality Date    CARDIAC CATHETERIZATION N/A 02/26/2025    Procedure: Left Heart Cath with Coronary Angiography and LV;  Surgeon: Cesilia Teresa MD;  Location: [facility] Cardiac Cath Lab;  Service: Cardiovascular;  Laterality: N/A;    CORONARY ANGIOPLASTY WITH STENT PLACEMENT         Home Medications  Medications Prior to Admission   Medication Sig Dispense Refill Last Dose/Taking    apixaban (Eliquis) 5 mg tablet Take 1 tablet (5 mg) by mouth 2 times daily (morning and late afternoon).       atorvastatin (Lipitor) 10 mg tablet Take 1 tablet (10 mg) by mouth once daily.       furosemide (Lasix) 20 mg tablet Take 1 tablet (20 mg) by mouth 2 times a day.       lactulose 20 gram/30 mL oral solution Take 30 mL (20 g) by mouth 2 times a day. 1800 mL 2     metFORMIN (Glucophage) 1,000 mg tablet Take 1 tablet (1,000 mg) by mouth every 12 hours.       rifAXIMin (Xifaxan) 550 mg tablet Take 1 tablet (550 mg) by mouth 2 times a day. 60 tablet 11     traZODone (Desyrel) 50 mg tablet Take 1 tablet (50 mg) by mouth once daily at bedtime.          Allergies:  No Known Allergies      EXAM     Vitals:    Vitals:    04/04/25 2000 04/04/25 2100 04/04/25 2200 04/04/25 2300   BP:       Pulse: 79 77 66 62   Resp: 14 17 18 16   Temp: 36.8 °C (98.2 °F) 37.1 °C (98.8 °F) 37 °C (98.6 °F) 36.5 °C (97.7 °F)   TempSrc: Bladder      SpO2: 99% 95% 97%  96%   Weight:       Height:         Failed to redirect to the Timeline version of the [employer] SmartLink.    Intake/Output Summary (Last 24 hours) at 4/4/2025 2337  Last data filed at 4/4/2025 2200  Gross per 24 hour   Intake 1982.66 ml   Output 4205 ml   Net -2222.34 ml       Physical Exam   General: well-developed, well-nourished, no acute distress, AAOx3  HEENT: EOM intact  CV: regular rate and rhythm  Pulm: normal respiratory effort, extubated   Abd: soft, surgical drains present  Extremities: warm, no LE edema  Neuro: moves all extremities equally  Psych: normal mood and affect  Skin: warm, dry, intact    OBJECTIVE                                                                              Medications       Current Facility-Administered Medications:     albumin human 25 % solution 25 g, 25 g, intravenous, q6h, RABIA Nava, Stopped at 04/04/25 2102    alteplase (Cathflo Activase) injection 2 mg, 2 mg, intra-catheter, PRN, XENIA Terrazas-CNP    [START ON 4/7/2025] basiliximab (Simulect) 20 mg in sodium chloride 0.9% 50 mL IV, 20 mg, intravenous, Once, XENIA Terrazas-CNP    calcium chloride 0.5 g in dextrose 5% 50 mL IV, 0.5 g, intravenous, q8h PRN, Mike Yates APRN-CNP, Stopped at 04/03/25 1918    calcium chloride 1 g in dextrose 5% 50 mL IV, 1 g, intravenous, q8h PRN, Mike Yates APRN-CNP    dextrose 50 % injection 12.5 g, 12.5 g, intravenous, q15 min PRN, Mike Yates APRN-CNP    dextrose 50 % injection 25 g, 25 g, intravenous, q15 min PRN, Mike Yates APRN-CNP    [START ON 4/5/2025] fluconazole (Diflucan) 400 mg in sodium chloride (iso)  mL, 400 mg, intravenous, q24h, Zakia Garcia PA-C    glucagon (Glucagen) injection 1 mg, 1 mg, intramuscular, q15 min PRN, Mike Yates APRN-CNP    glucagon (Glucagen) injection 1 mg, 1 mg, intramuscular, q15 min PRN, XENIA Terrazas-CNP    heparin 1,000 unit/mL injection 1,000 Units, 1,000  Units, intra-catheter, PRN, XENIA Terrazas-CNP, 1,000 Units at 04/03/25 0515    heparin 1,000 unit/mL injection 1,000 Units, 1,000 Units, intra-catheter, PRN, XENIA Terrazas-CNP, 1,000 Units at 04/03/25 0515    HYDROmorphone (Dilaudid) injection 0.4 mg, 0.4 mg, intravenous, q4h PRN, XENIA Terrazas-CNP, 0.4 mg at 04/04/25 1440    HYDROmorphone PF (Dilaudid) injection 0.2 mg, 0.2 mg, intravenous, q4h PRN, RABIA Terrazas, 0.2 mg at 04/04/25 2012    insulin lispro injection 0-10 Units, 0-10 Units, subcutaneous, q4h, RABIA Terrazas, 2 Units at 04/04/25 1716    magnesium sulfate 2 g in sterile water for injection 50 mL, 2 g, intravenous, q6h PRN, XENIA Terrazas-CNP    magnesium sulfate 4 g in sterile water for injection 100 mL, 4 g, intravenous, q6h PRN, XENIA Terrazas-LIZ    [COMPLETED] methylPREDNISolone sodium succinate (SOLU-Medrol) 250 mg in dextrose 5% 100 mL IV, 250 mg, intravenous, Daily, Stopped at 04/03/25 0948 **FOLLOWED BY** [COMPLETED] methylPREDNISolone sod succinate (SOLU-Medrol) injection 125 mg, 125 mg, intravenous, Daily, 125 mg at 04/04/25 0830 **FOLLOWED BY** [START ON 4/6/2025] methylPREDNISolone sod succinate (SOLU-Medrol) injection 60 mg, 60 mg, intravenous, Daily **FOLLOWED BY** [START ON 4/7/2025] predniSONE (Deltasone) tablet 20 mg, 20 mg, oral, Daily, XENIA Terrazas-CNP    mycophenolate (Cellcept) 1,000 mg in dextrose 5% 167 mL (5.988 mg/mL) IV, 1,000 mg, intravenous, q12h BRITTANEY, RABIA Terrazas, Stopped at 04/04/25 1949    ondansetron (Zofran) injection 4 mg, 4 mg, intravenous, q6h PRN, Zakia Garcia PA-C, 4 mg at 04/04/25 1455    pantoprazole (Protonix) injection 40 mg, 40 mg, intravenous, BID, RABIA Terrazas, 40 mg at 04/04/25 2012    piperacillin-tazobactam (Zosyn) 3.375 g in dextrose (iso) IV 50 mL, 3.375 g, intravenous, q6h, RABIA Terrazas, Stopped at 04/04/25 2110     "tacrolimus (Prograf) capsule 1 mg, 1 mg, sublingual, q12h BRITTANEY, Freddy Tariq MD, 1 mg at 04/04/25 1838                                                                            Labs     CBC RFP   Lab Results   Component Value Date    WBC 5.6 04/04/2025    HGB 9.6 (L) 04/04/2025    HCT 26.8 (L) 04/04/2025    MCV 85 04/04/2025    PLT 36 (LL) 04/04/2025    NEUTROABS 2.69 04/02/2025    Lab Results   Component Value Date     04/04/2025    K 4.1 04/04/2025     04/04/2025    CO2 21 04/04/2025    BUN 59 (H) 04/04/2025    CREATININE 3.24 (H) 04/04/2025     Lab Results   Component Value Date    MG 2.16 04/04/2025    PHOS 6.5 (H) 04/04/2025    CALCIUM 8.6 04/04/2025    ALBUMIN 3.7 04/04/2025         Hepatic Function ABG/VBG   Lab Results   Component Value Date     (H) 04/04/2025     (H) 04/04/2025    GGT 67 (H) 04/04/2025    ALKPHOS 44 04/04/2025     Lab Results   Component Value Date    BILITOT 1.7 (H) 04/04/2025    BILIDIR 1.2 (H) 04/04/2025     Lab Results   Component Value Date    PROTIME 13.6 (H) 04/04/2025    APTT 27 04/04/2025    INR 1.2 (H) 04/04/2025    No results found for: \"NONUHFIRE\", \"LACTATE\"                                                                                  Imaging           US LIVER WITH DOPPLER; 4/3/2025   IMPRESSION:  1. Status post liver transplant with borderline elevated RI of the  common hepatic artery with limited assessment of the left/right  branches as above. Findings are likely due to a combination of  technical factors and early postoperative status however recommend  continued attention on future follow-up examinations.  2. Visualized hepatic veins demonstrate antegrade flow and are patent.                                                                         GI Procedures         ASSESSMENT / PLAN                  ASSESSMENT/PLAN:  López Lopez is a 65 y.o. male with a past medical history ofCKD 4, DM2, HTN, CAD s/p PCI to prox LAD 2021, h/o pAFib on " Eliquis, h/o ITP dx 5/2024 s/p steroids and IVIG, hyperlipidemia, obesity who is now s/p SLK on 4/2 for MASLD/cirrhosis. Hepatology is consulted for co-management.    Recommendations:  - Drains management per transplant surgery team.  - Immunosuppression, antibiotics and prophylaxis per transplant surgery team.      Patient was seen and discussed with Dr. Jha .    Thank you for the consultation. Hepatology will continue to follow.  During weekday hours of 7am-5pm, please do not hesitate to contact me on [employer] Chat or page [phone], if there are any further questions.   After hours, on weekends, and on holidays, please page the on-call GI fellow at [phone].   Thank you.

## 2025-04-05 NOTE — CARE PLAN
Problem: Skin  Goal: Decreased wound size/increased tissue granulation at next dressing change  Outcome: Progressing  Flowsheets (Taken 4/4/2025 2058)  Decreased wound size/increased tissue granulation at next dressing change:   Promote sleep for wound healing   Protective dressings over bony prominences   Utilize specialty bed per algorithm  Goal: Participates in plan/prevention/treatment measures  Outcome: Progressing  Flowsheets (Taken 4/4/2025 2058)  Participates in plan/prevention/treatment measures:   Discuss with provider PT/OT consult   Elevate heels   Increase activity/out of bed for meals  Goal: Prevent/manage excess moisture  Outcome: Progressing  Flowsheets (Taken 4/3/2025 1317 by Casi Jeff, RN)  Prevent/manage excess moisture:   Cleanse incontinence/protect with barrier cream   Follow provider orders for dressing changes   Monitor for/manage infection if present   Moisturize dry skin  Goal: Prevent/minimize sheer/friction injuries  Flowsheets (Taken 4/4/2025 2058)  Prevent/minimize sheer/friction injuries:   Complete micro-shifts as needed if patient unable. Adjust patient position to relieve pressure points, not a full turn   Increase activity/out of bed for meals   Use pull sheet   Utilize specialty bed per algorithm   Turn/reposition every 2 hours/use positioning/transfer devices   HOB 30 degrees or less  Goal: Promote/optimize nutrition  Outcome: Progressing  Flowsheets (Taken 4/4/2025 2058)  Promote/optimize nutrition: Monitor/record intake including meals  Goal: Promote skin healing  Outcome: Progressing  Flowsheets (Taken 4/4/2025 2058)  Promote skin healing:   Ensure correct size (line/device) and apply per  instructions   Rotate device position/do not position patient on device   Assess skin/pad under line(s)/device(s)   Protective dressings over bony prominences   Turn/reposition every 2 hours/use positioning/transfer devices

## 2025-04-05 NOTE — PROGRESS NOTES
"    Surgical Intensive Care Unit Progress Note        Subjective   Received 250cc 5% albumin for decreased UOP with appropriate response  Did not sleep well last night, poor pain control  NAEON    Objective     Physical Exam  Constitutional:       Appearance: Normal appearance.   HENT:      Head: Normocephalic and atraumatic.   Neck:      Comments: RIJ MAC and RIJ trialysis in place, dressing c/d/i.  Cardiovascular:      Rate and Rhythm: Normal rate and regular rhythm.   Pulmonary:      Effort: Pulmonary effort is normal.      Breath sounds: Normal breath sounds.   Abdominal:      Comments: Midline abdominal incision covered. LISA drains x 4 with serosanguinous drainage    Genitourinary:     Comments: Nelson draining yellow/ pink urine   Neurological:      General: No focal deficit present.      Mental Status: He is alert and oriented to person, place, and time.   Psychiatric:         Mood and Affect: Mood normal.         Last Recorded Vitals  Blood pressure 122/67, pulse 62, temperature 36.3 °C (97.3 °F), resp. rate 10, height 1.93 m (6' 4\"), weight 112 kg (247 lb 11.2 oz), SpO2 93%.  Intake/Output last 3 Shifts:  I/O last 3 completed shifts:  In: 2482.1 (22.1 mL/kg) [I.V.:2282.1 (20.3 mL/kg); Blood:200]  Out: 6390 (56.9 mL/kg) [Urine:2095 (0.5 mL/kg/hr); Emesis/NG output:200; Drains:4095]  Weight: 112.4 kg     Relevant Results     Scheduled medications  acetaminophen, 1,000 mg, intravenous, q6h  albumin human, 25 g, intravenous, q6h  [START ON 4/7/2025] basiliximab, 20 mg, intravenous, Once  fluconazole, 400 mg, intravenous, q24h  insulin lispro, 0-10 Units, subcutaneous, q4h  melatonin, 3 mg, oral, Nightly  [START ON 4/6/2025] methylPREDNISolone sodium succinate (PF), 60 mg, intravenous, Daily   Followed by  [START ON 4/7/2025] predniSONE, 20 mg, oral, Daily  mycophenolate, 1,000 mg, intravenous, q12h BRITTANEY  pantoprazole, 40 mg, intravenous, BID  piperacillin-tazobactam, 3.375 g, intravenous, q6h  tacrolimus, 2 mg, " oral, q12h [facility]      Continuous medications       PRN medications  PRN medications: alteplase, calcium chloride, calcium chloride, dextrose, dextrose, glucagon, glucagon, heparin, heparin, HYDROmorphone, magnesium sulfate, magnesium sulfate, ondansetron, oxyCODONE, oxyCODONE    Results from last 7 days   Lab Units 04/05/25  1430 04/05/25  0312 04/04/25  1651   WBC AUTO x10*3/uL 4.5 4.4 5.6   HEMOGLOBIN g/dL 8.9* 8.7* 9.6*   PLATELETS AUTO x10*3/uL 36* 35* 36*      Results from last 7 days   Lab Units 04/05/25  1430 04/05/25  0312 04/04/25  1651   SODIUM mmol/L 139 138 139   POTASSIUM mmol/L 4.0 4.1 4.1   CHLORIDE mmol/L 104 103 103   CO2 mmol/L 22 21 21   BUN mg/dL 69* 63* 59*   CREATININE mg/dL 3.76* 3.37* 3.24*   GLUCOSE mg/dL 115* 133* 160*   MAGNESIUM mg/dL 2.25 2.24 2.16   PHOSPHORUS mg/dL 7.5* 7.3* 6.5*      Results from last 7 days   Lab Units 04/05/25  1430 04/05/25 0312 04/04/25  1651   INR  1.1 1.2* 1.2*   PROTIME seconds 12.7* 13.2* 13.6*   APTT seconds 25* 27 27        This patient has a central line   Reason for the central line remaining today? Hemodynamic monitoring    This patient has a urinary catheter   Reason for the urinary catheter remaining today? perioperative use for selected surgical procedures    This patient is intubated   Reason for patient to remain intubated today? they are planned for extubation trial later today/tonight      Assessment/Plan   Assessment & Plan  Alcoholic cirrhosis of liver with ascites (Multi)    Chronic renal impairment, stage 4 (severe) (Multi)    Stage 4 chronic kidney disease (Multi)    CKD (chronic kidney disease) stage 4, GFR 15-29 ml/min (Multi)      López Lopez is a 65 y.o. male with PMHx of decompensated cirrhosis 2/2 to MASLD (recurrent ascites, portal HTN requires paracentesis every other week), CKD 4, DM2, HTN, CAD s/p PCI to prox LAD 2021, h/o pAFib on Eliquis, h/o ITP dx 5/2024 s/p steroids and IVIG, hyperlipidemia, obesity who presents to  [facility]  for possible simultaneous liver and kidney transplant. He presents to the SICU s/p OLT 4/2-4/3. Returned to OR 4/3 am, now s/p DDKT and re-exploration of hepatic artery.     Plan:  NEURO: Aox3, drowsy this AM, pain poorly controlled this AM  - ongoing neuro and pain assessments  - PRN hydromorphone for breakthrough pain   - PRN oxycodone for moderate and severe pain  - Carlotta IV Tylenol for better pain control  - PT/OT consult, OOBTC - ambulate as able with assistance  - Start melatonin 3mg nightly to help with insomnia  - Home meds: trazodone 50mg nightly (not ordered)    CV: History of HTN, HLD, CAD s/p PCI to prox LAD 2021, h/o pAFib on Eliquis. Baseline /75. Baseline echo (12/10/2024) with EF Left ventricular ejection fraction is hyperdynamic, calculated by Manzanares's biplane at 77%. %, normal RV. Off all gtts this AM. NSR 55-70s, MAPs 80-90  - continuous EKG/abp monitoring  - maintain MAP goals >65  - Goal CVP 8-12, CI >2.5   - Additional 25% albumin q6h x4 doses  - additional volume resuscitation as indicated  - Holding home meds: atorvastatin 10mg daily, apixaban 5mg BID, furosemide 20 mg BID     PULM: Former smoker. CXR this AM more congested with persistent yet slightly worsened R pleural effusion and atelectasis. On 4L NC this AM - successfully weaned down to 2L NC after gentle diuresis.   - maintain spO2 >94%  - q1h IS while awake  - additional pulm toilet prn  - CXR daily  - ABG prn     GI: Hx of Cirrhosis 2/2 to MASLD (recurrent ascites, portal HTN requires paracentesis every other week). Now s/p OLT on 4/2-4/3. Post-op liver US with atrophied Left hepatic lobe, Right HA not well visualized, Common HA w/ borderline elevated RI, Left HA w/ borderline low RI, and slightly elevated main portal vein velocities. Return to OR 4/3 s/p re-exploration of hepatic artery.  4/4 Liver US:borderline elevated RI of the common hepatic artery with limited assessment of the left/right branches. LFTs down-trending,  Drain output signifcant but serosang and reduced from days prior. Has not passed gas just yet.  - Passed swallow eval, start CLD today and PO meds  - PPI for GI prophylaxis  - LFTs/GGT q12h  - serial abdominal exams  - monitor drain output  - Consider bowel regimen   - IS/ppx per Transplant: methylpred taper, MMF and tacro     : Hx of CKD 4. Baseline creatinine 3.89. Intra-op CVVH during OLT. . Now s/p DDKT 4/3.  post-op Kidney US Resistive indices at the upper limits of normal with patent  vasculature and no evidence of retrograde flow   - Repeat Renal US today  - Check renal function panel  q12h.  - IV Lasix 40mg today  - discontinue Maintenance IVF (1/2NS at 60ml/hr)  - Maintain UOP >0.5ml/kg/hr   - Maintain noel for strict I&Os  - Replete electrolytes judiciously  - IS/ppx per Transplant: as above +Simulect induction     HEME: Hx of ITP dx 5/2024 s/p steroids and IVIG. Acute blood loss anemia. Coagulopathy. Hypofibrinogenemia. Thrombocytopenia. OR EBL 4L. Intra-op received 7u PRBC, 8u FFP, 2u cryo, 10pk Plts. Post-op overnight transfused 1u cryo and 5pk Plts. OR 4/3 EBL 200ml, intra-op received 2u PRBC and 1u FFP.  - Check CBC, coags, fibrinogen  q12h  - Goal hgb >9, plt >50, fibrinogen >100, INR <1.8  - SCDs for DVT prophylaxis  - No SQH/ASA at this time  - ongoing monitoring for s/s bleeding  - maintain active T&S: 4/5     ENDO: History of prediabetes. A1c 5.5 (9/2024). 4u SSI requirement over last 24 hrs  -SSI # 2  - goal -180  - Home meds: metformin 1000mg q12h     ID: Afebrile. No leukocytosis. MRSA swab negative.  - trend q4h WBC, q4h temps  - Post-op Zosyn x72 hrs - finishing today  - continue prophylactic Fluconazole x30 days  - close monitoring for s/s infection. Low threshold to culture.     Lines:  - L radial arterial line (4/2, OR) - remove today  - R IJ MAC with PAC (4/2, OR) - remove today  - R IJ trialysis (4/2, OR) - remove today  - PIV x3     Dispo: Transfer to [facility]. Patient seen and  discussed with ICU attending Dr. Camilo.    [facility] [phone]

## 2025-04-05 NOTE — PROGRESS NOTES
INPATIENT TRANSPLANT NEPHROLOGY PROGRESS NOTE          REASON FOR CONSULT:  Immunosuppressive medication management and nephrology related issues.    SUBJECTIVE:    POD 2 from [facility]  POD 1 from Re-exploration for decreased arterial signal, patent artery, arterial course adjusted   Pt received albumin q 6 hours yesterday  UOP 1.5 L  Drain 2.4 L  Extubated, on 4/4/25  BP Lastest 140/58  Afebrile  Received Lasix this  On dilaudid gtt  Off pressor  Abx : Zosyn     No event over night  Aim to transfer to LT 9 later today  Repeat US    PHYCISCAL EXAMINATION:  Vitals:    04/05/25 0800   BP: 133/73   Pulse: 61   Resp: 15   Temp: 36.5 °C (97.7 °F)   SpO2: 91%        04/03 1900 - 04/05 0659  In: 2482.1 [I.V.:2282.1]  Out: 6390 [Urine:2095; Drains:4095]     Weight change:     Constitutional:       Appearance: Normal appearance.   HENT:      Head: Normocephalic and atraumatic.   Neck:      Comments: RIJ MAC w/ PA catheter and RIJ trialysis in place, dressing c/d/i.  Cardiovascular:      Rate and Rhythm: Normal rate and regular rhythm.   Pulmonary:      Effort: Pulmonary effort is normal.      Breath sounds: Normal breath sounds.   Abdominal:      Comments: Midline abdominal incision covered. LISA drains x 4 with serosanguinous drainage    Genitourinary:     Comments: Nelson draining yellow/ pink urine   Neurological:      General: No focal deficit present.      Mental Status: He is alert and oriented to person, place, and time.   Psychiatric:         Mood and Affect: Mood normal.     MEDICATION LIST: REVIEWED  acetaminophen, 650 mg, q6h  [START ON 4/7/2025] basiliximab, 20 mg, Once  fluconazole, 400 mg, q24h  insulin lispro, 0-10 Units, q4h  [START ON 4/6/2025] methylPREDNISolone sodium succinate (PF), 60 mg, Daily   Followed by  [START ON 4/7/2025] predniSONE, 20 mg, Daily  mycophenolate, 1,000 mg, q12h BRITTANEY  pantoprazole, 40 mg, BID  piperacillin-tazobactam, 3.375 g, q6h  tacrolimus, 1 mg, q12h BRITTANEY         alteplase, 2 mg,  PRN  calcium chloride, 0.5 g, q8h PRN  calcium chloride, 1 g, q8h PRN  dextrose, 12.5 g, q15 min PRN  dextrose, 25 g, q15 min PRN  glucagon, 1 mg, q15 min PRN  glucagon, 1 mg, q15 min PRN  heparin, 1,000 Units, PRN  heparin, 1,000 Units, PRN  HYDROmorphone, 0.4 mg, q4h PRN  HYDROmorphone, 0.2 mg, q4h PRN  magnesium sulfate, 2 g, q6h PRN  magnesium sulfate, 4 g, q6h PRN  ondansetron, 4 mg, q6h PRN        ALLERGY:  No Known Allergies    LABS:  Results for orders placed or performed during the hospital encounter of 04/02/25 (from the past 24 hours)   Blood Gas Arterial Full Panel   Result Value Ref Range    POCT pH, Arterial 7.41 7.38 - 7.42 pH    POCT pCO2, Arterial 34 (L) 38 - 42 mm Hg    POCT pO2, Arterial 86 85 - 95 mm Hg    POCT SO2, Arterial 98 94 - 100 %    POCT Oxy Hemoglobin, Arterial 95.6 94.0 - 98.0 %    POCT Hematocrit Calculated, Arterial 30.0 (L) 41.0 - 52.0 %    POCT Sodium, Arterial 134 (L) 136 - 145 mmol/L    POCT Potassium, Arterial 4.1 3.5 - 5.3 mmol/L    POCT Chloride, Arterial 104 98 - 107 mmol/L    POCT Ionized Calcium, Arterial 1.15 1.10 - 1.33 mmol/L    POCT Glucose, Arterial 127 (H) 74 - 99 mg/dL    POCT Lactate, Arterial 1.0 0.4 - 2.0 mmol/L    POCT Base Excess, Arterial -2.6 (L) -2.0 - 3.0 mmol/L    POCT HCO3 Calculated, Arterial 21.6 (L) 22.0 - 26.0 mmol/L    POCT Hemoglobin, Arterial 9.9 (L) 13.5 - 17.5 g/dL    POCT Anion Gap, Arterial 13 10 - 25 mmo/L    Patient Temperature 37.0 degrees Celsius    FiO2 21 %   Calcium, Ionized   Result Value Ref Range    POCT Calcium, Ionized 1.09 (L) 1.1 - 1.33 mmol/L   CBC   Result Value Ref Range    WBC 5.5 4.4 - 11.3 x10*3/uL    nRBC 0.0 0.0 - 0.0 /100 WBCs    RBC 3.01 (L) 4.50 - 5.90 x10*6/uL    Hemoglobin 9.1 (L) 13.5 - 17.5 g/dL    Hematocrit 26.1 (L) 41.0 - 52.0 %    MCV 87 80 - 100 fL    MCH 30.2 26.0 - 34.0 pg    MCHC 34.9 32.0 - 36.0 g/dL    RDW 16.5 (H) 11.5 - 14.5 %    Platelets 40 (LL) 150 - 450 x10*3/uL   Coagulation Screen   Result Value Ref  Range    Protime 14.9 (H) 9.8 - 12.4 seconds    INR 1.3 (H) 0.9 - 1.1    aPTT 29 26 - 36 seconds   Fibrinogen   Result Value Ref Range    Fibrinogen 178 (L) 200 - 400 mg/dL   Gamma-Glutamyl Transferase   Result Value Ref Range    GGT 57 5 - 64 U/L   Hepatic function panel   Result Value Ref Range    Albumin 3.4 3.4 - 5.0 g/dL    Bilirubin, Total 1.7 (H) 0.0 - 1.2 mg/dL    Bilirubin, Direct 1.2 (H) 0.0 - 0.3 mg/dL    Alkaline Phosphatase 40 33 - 136 U/L     (H) 10 - 52 U/L     (H) 9 - 39 U/L    Total Protein 4.7 (L) 6.4 - 8.2 g/dL   Magnesium   Result Value Ref Range    Magnesium 2.08 1.60 - 2.40 mg/dL   TEG Clot Global Profile   Result Value Ref Range    R (Reaction Time) K 6.3 4.6 - 9.1 min    K (Clot Kinetics) 4.3 (H) 0.8 - 2.1 min    ANGLE 54.0 (L) 63.0 - 78.0 deg    MA (Max Amplitude) K <40.0 (L) 52.0 - 69.0 mm    R (Reaction Time) KH 7.0 4.3 - 8.3 min    MA (Max Amplitude) RT <40.0 (L) 52.0 - 70.0 mm    MA ( Apolinar Amplitude) FF 9.0 (L) 15.0 - 32.0 mm    FLEV 172 (L) 278 - 581 mg/dL   Basic Metabolic Panel   Result Value Ref Range    Glucose 129 (H) 74 - 99 mg/dL    Sodium 138 136 - 145 mmol/L    Potassium 4.0 3.5 - 5.3 mmol/L    Chloride 104 98 - 107 mmol/L    Bicarbonate 22 21 - 32 mmol/L    Anion Gap 16 10 - 20 mmol/L    Urea Nitrogen 56 (H) 6 - 23 mg/dL    Creatinine 3.13 (H) 0.50 - 1.30 mg/dL    eGFR 22 (L) >60 mL/min/1.73m*2    Calcium 8.2 (L) 8.6 - 10.6 mg/dL   Phosphorus   Result Value Ref Range    Phosphorus 6.0 (H) 2.5 - 4.9 mg/dL   Blood Gas Arterial Full Panel   Result Value Ref Range    POCT pH, Arterial 7.42 7.38 - 7.42 pH    POCT pCO2, Arterial 32 (L) 38 - 42 mm Hg    POCT pO2, Arterial 99 (H) 85 - 95 mm Hg    POCT SO2, Arterial 100 94 - 100 %    POCT Oxy Hemoglobin, Arterial 97.1 94.0 - 98.0 %    POCT Hematocrit Calculated, Arterial 37.0 (L) 41.0 - 52.0 %    POCT Sodium, Arterial 133 (L) 136 - 145 mmol/L    POCT Potassium, Arterial 4.4 3.5 - 5.3 mmol/L    POCT Chloride, Arterial 105  98 - 107 mmol/L    POCT Ionized Calcium, Arterial 1.17 1.10 - 1.33 mmol/L    POCT Glucose, Arterial 166 (H) 74 - 99 mg/dL    POCT Lactate, Arterial 1.0 0.4 - 2.0 mmol/L    POCT Base Excess, Arterial -2.9 (L) -2.0 - 3.0 mmol/L    POCT HCO3 Calculated, Arterial 20.8 (L) 22.0 - 26.0 mmol/L    POCT Hemoglobin, Arterial 12.2 (L) 13.5 - 17.5 g/dL    POCT Anion Gap, Arterial 12 10 - 25 mmo/L    Patient Temperature 37.0 degrees Celsius    FiO2 32 %   Calcium, Ionized   Result Value Ref Range    POCT Calcium, Ionized 1.14 1.1 - 1.33 mmol/L   CBC   Result Value Ref Range    WBC 5.6 4.4 - 11.3 x10*3/uL    nRBC 0.0 0.0 - 0.0 /100 WBCs    RBC 3.17 (L) 4.50 - 5.90 x10*6/uL    Hemoglobin 9.6 (L) 13.5 - 17.5 g/dL    Hematocrit 26.8 (L) 41.0 - 52.0 %    MCV 85 80 - 100 fL    MCH 30.3 26.0 - 34.0 pg    MCHC 35.8 32.0 - 36.0 g/dL    RDW 16.0 (H) 11.5 - 14.5 %    Platelets 36 (LL) 150 - 450 x10*3/uL   Coagulation Screen   Result Value Ref Range    Protime 13.6 (H) 9.8 - 12.4 seconds    INR 1.2 (H) 0.9 - 1.1    aPTT 27 26 - 36 seconds   Fibrinogen   Result Value Ref Range    Fibrinogen 203 200 - 400 mg/dL   Gamma-Glutamyl Transferase   Result Value Ref Range    GGT 67 (H) 5 - 64 U/L   Hepatic function panel   Result Value Ref Range    Albumin 3.7 3.4 - 5.0 g/dL    Bilirubin, Total 1.7 (H) 0.0 - 1.2 mg/dL    Bilirubin, Direct 1.2 (H) 0.0 - 0.3 mg/dL    Alkaline Phosphatase 44 33 - 136 U/L     (H) 10 - 52 U/L     (H) 9 - 39 U/L    Total Protein 5.0 (L) 6.4 - 8.2 g/dL   Magnesium   Result Value Ref Range    Magnesium 2.16 1.60 - 2.40 mg/dL   Basic Metabolic Panel   Result Value Ref Range    Glucose 160 (H) 74 - 99 mg/dL    Sodium 139 136 - 145 mmol/L    Potassium 4.1 3.5 - 5.3 mmol/L    Chloride 103 98 - 107 mmol/L    Bicarbonate 21 21 - 32 mmol/L    Anion Gap 19 10 - 20 mmol/L    Urea Nitrogen 59 (H) 6 - 23 mg/dL    Creatinine 3.24 (H) 0.50 - 1.30 mg/dL    eGFR 21 (L) >60 mL/min/1.73m*2    Calcium 8.6 8.6 - 10.6 mg/dL    Phosphorus   Result Value Ref Range    Phosphorus 6.5 (H) 2.5 - 4.9 mg/dL   POCT GLUCOSE   Result Value Ref Range    POCT Glucose 137 (H) 74 - 99 mg/dL   POCT GLUCOSE   Result Value Ref Range    POCT Glucose 128 (H) 74 - 99 mg/dL   Type And Screen   Result Value Ref Range    ABO TYPE B     Rh TYPE POS     ANTIBODY SCREEN NEG    Calcium, Ionized   Result Value Ref Range    POCT Calcium, Ionized 1.12 1.1 - 1.33 mmol/L   CBC   Result Value Ref Range    WBC 4.4 4.4 - 11.3 x10*3/uL    nRBC 0.0 0.0 - 0.0 /100 WBCs    RBC 2.88 (L) 4.50 - 5.90 x10*6/uL    Hemoglobin 8.7 (L) 13.5 - 17.5 g/dL    Hematocrit 24.6 (L) 41.0 - 52.0 %    MCV 85 80 - 100 fL    MCH 30.2 26.0 - 34.0 pg    MCHC 35.4 32.0 - 36.0 g/dL    RDW 15.9 (H) 11.5 - 14.5 %    Platelets 35 (LL) 150 - 450 x10*3/uL   Coagulation Screen   Result Value Ref Range    Protime 13.2 (H) 9.8 - 12.4 seconds    INR 1.2 (H) 0.9 - 1.1    aPTT 27 26 - 36 seconds   Fibrinogen   Result Value Ref Range    Fibrinogen 186 (L) 200 - 400 mg/dL   Gamma-Glutamyl Transferase   Result Value Ref Range    GGT 72 (H) 5 - 64 U/L   Hepatic function panel   Result Value Ref Range    Albumin 3.5 3.4 - 5.0 g/dL    Bilirubin, Total 1.4 (H) 0.0 - 1.2 mg/dL    Bilirubin, Direct 1.0 (H) 0.0 - 0.3 mg/dL    Alkaline Phosphatase 42 33 - 136 U/L     (H) 10 - 52 U/L    AST 65 (H) 9 - 39 U/L    Total Protein 4.5 (L) 6.4 - 8.2 g/dL   Magnesium   Result Value Ref Range    Magnesium 2.24 1.60 - 2.40 mg/dL   Basic Metabolic Panel   Result Value Ref Range    Glucose 133 (H) 74 - 99 mg/dL    Sodium 138 136 - 145 mmol/L    Potassium 4.1 3.5 - 5.3 mmol/L    Chloride 103 98 - 107 mmol/L    Bicarbonate 21 21 - 32 mmol/L    Anion Gap 18 10 - 20 mmol/L    Urea Nitrogen 63 (H) 6 - 23 mg/dL    Creatinine 3.37 (H) 0.50 - 1.30 mg/dL    eGFR 20 (L) >60 mL/min/1.73m*2    Calcium 8.3 (L) 8.6 - 10.6 mg/dL   Phosphorus   Result Value Ref Range    Phosphorus 7.3 (H) 2.5 - 4.9 mg/dL   Blood Gas Arterial Full Panel    Result Value Ref Range    POCT pH, Arterial 7.37 (L) 7.38 - 7.42 pH    POCT pCO2, Arterial 37 (L) 38 - 42 mm Hg    POCT pO2, Arterial 98 (H) 85 - 95 mm Hg    POCT SO2, Arterial 99 94 - 100 %    POCT Oxy Hemoglobin, Arterial 96.6 94.0 - 98.0 %    POCT Hematocrit Calculated, Arterial 28.0 (L) 41.0 - 52.0 %    POCT Sodium, Arterial 134 (L) 136 - 145 mmol/L    POCT Potassium, Arterial 4.6 3.5 - 5.3 mmol/L    POCT Chloride, Arterial 105 98 - 107 mmol/L    POCT Ionized Calcium, Arterial 1.18 1.10 - 1.33 mmol/L    POCT Glucose, Arterial 129 (H) 74 - 99 mg/dL    POCT Lactate, Arterial 0.6 0.4 - 2.0 mmol/L    POCT Base Excess, Arterial -3.5 (L) -2.0 - 3.0 mmol/L    POCT HCO3 Calculated, Arterial 21.4 (L) 22.0 - 26.0 mmol/L    POCT Hemoglobin, Arterial 9.2 (L) 13.5 - 17.5 g/dL    POCT Anion Gap, Arterial 12 10 - 25 mmo/L    Patient Temperature 37.0 degrees Celsius    FiO2 28 %   POCT GLUCOSE   Result Value Ref Range    POCT Glucose 124 (H) 74 - 99 mg/dL        IMAGING RESULT:  Reviewed with surgery.    ASSESSMENT AND PLAN:    Mr. Lopez is a 65 y.o. male  with past medical hx of decompensated cirrhosis 2/2 to MASLD (recurrent ascites, portal HTN requires paracentesis every other week), CKD 4, DM2, HTN, CAD s/p PCI to prox LAD 2021, h/o pAFib on Eliquis, h/o ITP dx 5/2024 s/p steroids and IVIG, hyperlipidemia, obesity who presents to [facility] for possible simultaneous liver and kidney transplant. He presents to the SICU s/p OLT 4/2-4/3. Returned to OR 4/3 am, now s/p DDKT and re-exploration of hepatic artery.     Transplant nephrology is consulted to assist with immunosuppressive medication management, and nephrology related issues.     Principal Problem:    Alcoholic cirrhosis of liver with ascites (Multi)  Active Problems:    Chronic renal impairment, stage 4 (severe) (Multi)    Stage 4 chronic kidney disease (Multi)    CKD (chronic kidney disease) stage 4, GFR 15-29 ml/min (Multi)      1. ESRD S/P Kidney transplant.   -  Renal allograft function:     Lab Results   Component Value Date    CREATININE 3.37 (H) 04/05/2025     Estimated Creatinine Clearance: 30.8 mL/min (A) (by C-G formula based on SCr of 3.37 mg/dL (H)).    Intake/Output Summary (Last 24 hours) at 4/5/2025 1042  Last data filed at 4/5/2025 0600  Gross per 24 hour   Intake 760 ml   Output 2800 ml   Net -2040 ml       - Indication for dialysis: None  - Continue to monitor UOP and Serum creatinine closely.   - Avoid nephrotoxic agents, NSAIDs and IV contrast   - Strict I/O.   - Renally dose all medications by the most recent CrCl from Cockcroft-Gault formula.    2. Immunosuppression   -  Tac level = PENDING from am. Will follow up.      Tacrolimus: current dose 1 mg BID      Goal tacrolimus trough level is 8-10 ng/mL      Mycophenolate: Yes - 1 gm bid      Steroid taper    3. Anemia and WBC   Lab Results   Component Value Date    WBC 4.4 04/05/2025    HGB 8.7 (L) 04/05/2025    HCT 24.6 (L) 04/05/2025    MCV 85 04/05/2025    PLT 35 (LL) 04/05/2025     -Continue to monitor Hgb   -PRBC transfusion prn per surgery    4. Electrolyte   Lab Results   Component Value Date    GLUCOSE 133 (H) 04/05/2025    CALCIUM 8.3 (L) 04/05/2025     04/05/2025    K 4.1 04/05/2025    CO2 21 04/05/2025     04/05/2025    BUN 63 (H) 04/05/2025    CREATININE 3.37 (H) 04/05/2025     - Reviewed renal profile.     6. Hypertension   Blood Pressures         4/4/2025  1515 4/4/2025  1600 4/4/2025  1700 4/4/2025  1800 4/5/2025  0800    BP: 152/70 127/73 119/63 125/67 133/73          -Goal BP < 140/90 mmHg   -continue current management     7. Wound/drain/noel and pain management  -Defer it to surgery    8.  GI prophylaxis   - On PPI     9. DVT Prophylaxis  -Defer to primary team    10. ID prophylaxis  - CMV, PJP and fungal prophylaxis per  Transplant [place] Protocol    * Case was presented at Multi D team round. Attending physician, consulting physician, , pharmacist,  residents and fellow were present at the meeting.    For questions, please contact transplant nephrology page x [phone].      Gus Michael    Transplant Nephrologist

## 2025-04-06 ENCOUNTER — APPOINTMENT (OUTPATIENT)
Dept: RADIOLOGY | Facility: HOSPITAL | Age: 65
DRG: 005 | End: 2025-04-06
Payer: COMMERCIAL

## 2025-04-06 ENCOUNTER — APPOINTMENT (OUTPATIENT)
Dept: RADIOLOGY | Facility: HOSPITAL | Age: 65
End: 2025-04-06
Payer: COMMERCIAL

## 2025-04-06 VITALS
BODY MASS INDEX: 29.19 KG/M2 | TEMPERATURE: 97.5 F | HEART RATE: 70 BPM | OXYGEN SATURATION: 99 % | WEIGHT: 239.7 LBS | DIASTOLIC BLOOD PRESSURE: 75 MMHG | SYSTOLIC BLOOD PRESSURE: 134 MMHG | RESPIRATION RATE: 18 BRPM | HEIGHT: 76 IN

## 2025-04-06 LAB
ALBUMIN SERPL BCP-MCNC: 3.4 G/DL (ref 3.4–5)
ALP SERPL-CCNC: 92 U/L (ref 33–136)
ALT SERPL W P-5'-P-CCNC: 133 U/L (ref 10–52)
ANION GAP SERPL CALC-SCNC: 17 MMOL/L (ref 10–20)
AST SERPL W P-5'-P-CCNC: 31 U/L (ref 9–39)
BACTERIA FLD CULT: NORMAL
BILIRUB DIRECT SERPL-MCNC: 1.3 MG/DL (ref 0–0.3)
BILIRUB SERPL-MCNC: 1.8 MG/DL (ref 0–1.2)
BUN SERPL-MCNC: 74 MG/DL (ref 6–23)
CA-I BLD-SCNC: 1.11 MMOL/L (ref 1.1–1.33)
CALCIUM SERPL-MCNC: 8.3 MG/DL (ref 8.6–10.6)
CHLORIDE SERPL-SCNC: 102 MMOL/L (ref 98–107)
CO2 SERPL-SCNC: 22 MMOL/L (ref 21–32)
CREAT SERPL-MCNC: 3.88 MG/DL (ref 0.5–1.3)
EGFRCR SERPLBLD CKD-EPI 2021: 17 ML/MIN/1.73M*2
ERYTHROCYTE [DISTWIDTH] IN BLOOD BY AUTOMATED COUNT: 15.9 % (ref 11.5–14.5)
GGT SERPL-CCNC: 189 U/L (ref 5–64)
GLUCOSE BLD MANUAL STRIP-MCNC: 108 MG/DL (ref 74–99)
GLUCOSE BLD MANUAL STRIP-MCNC: 155 MG/DL (ref 74–99)
GLUCOSE BLD MANUAL STRIP-MCNC: 159 MG/DL (ref 74–99)
GLUCOSE BLD MANUAL STRIP-MCNC: 162 MG/DL (ref 74–99)
GLUCOSE BLD MANUAL STRIP-MCNC: 199 MG/DL (ref 74–99)
GLUCOSE BLD MANUAL STRIP-MCNC: 99 MG/DL (ref 74–99)
GLUCOSE SERPL-MCNC: 98 MG/DL (ref 74–99)
GRAM STN SPEC: NORMAL
GRAM STN SPEC: NORMAL
HCT VFR BLD AUTO: 28.9 % (ref 41–52)
HGB BLD-MCNC: 9.3 G/DL (ref 13.5–17.5)
MAGNESIUM SERPL-MCNC: 2.22 MG/DL (ref 1.6–2.4)
MCH RBC QN AUTO: 30.3 PG (ref 26–34)
MCHC RBC AUTO-ENTMCNC: 32.2 G/DL (ref 32–36)
MCV RBC AUTO: 94 FL (ref 80–100)
NRBC BLD-RTO: 0 /100 WBCS (ref 0–0)
PHOSPHATE SERPL-MCNC: 7.5 MG/DL (ref 2.5–4.9)
PLATELET # BLD AUTO: 41 X10*3/UL (ref 150–450)
POTASSIUM SERPL-SCNC: 3.8 MMOL/L (ref 3.5–5.3)
PROT SERPL-MCNC: 4.5 G/DL (ref 6.4–8.2)
RBC # BLD AUTO: 3.07 X10*6/UL (ref 4.5–5.9)
SODIUM SERPL-SCNC: 137 MMOL/L (ref 136–145)
TACROLIMUS BLD-MCNC: 5.4 NG/ML
WBC # BLD AUTO: 6.1 X10*3/UL (ref 4.4–11.3)

## 2025-04-06 PROCEDURE — 2500000001 HC RX 250 WO HCPCS SELF ADMINISTERED DRUGS (ALT 637 FOR MEDICARE OP)

## 2025-04-06 PROCEDURE — 2500000004 HC RX 250 GENERAL PHARMACY W/ HCPCS (ALT 636 FOR OP/ED)

## 2025-04-06 PROCEDURE — 82248 BILIRUBIN DIRECT: CPT

## 2025-04-06 PROCEDURE — 84100 ASSAY OF PHOSPHORUS: CPT

## 2025-04-06 PROCEDURE — 71045 X-RAY EXAM CHEST 1 VIEW: CPT

## 2025-04-06 PROCEDURE — 83735 ASSAY OF MAGNESIUM: CPT

## 2025-04-06 PROCEDURE — 93975 VASCULAR STUDY: CPT

## 2025-04-06 PROCEDURE — 2020000001 HC ICU ROOM DAILY

## 2025-04-06 PROCEDURE — 85027 COMPLETE CBC AUTOMATED: CPT

## 2025-04-06 PROCEDURE — 97162 PT EVAL MOD COMPLEX 30 MIN: CPT | Mod: GP

## 2025-04-06 PROCEDURE — 80053 COMPREHEN METABOLIC PANEL: CPT

## 2025-04-06 PROCEDURE — 82947 ASSAY GLUCOSE BLOOD QUANT: CPT

## 2025-04-06 PROCEDURE — 82977 ASSAY OF GGT: CPT

## 2025-04-06 PROCEDURE — 99232 SBSQ HOSP IP/OBS MODERATE 35: CPT | Performed by: SURGERY

## 2025-04-06 PROCEDURE — RXMED WILLOW AMBULATORY MEDICATION CHARGE

## 2025-04-06 PROCEDURE — 80197 ASSAY OF TACROLIMUS: CPT

## 2025-04-06 PROCEDURE — P9047 ALBUMIN (HUMAN), 25%, 50ML: HCPCS

## 2025-04-06 PROCEDURE — 93975 VASCULAR STUDY: CPT | Performed by: STUDENT IN AN ORGANIZED HEALTH CARE EDUCATION/TRAINING PROGRAM

## 2025-04-06 PROCEDURE — 97530 THERAPEUTIC ACTIVITIES: CPT | Mod: GP

## 2025-04-06 PROCEDURE — 2500000002 HC RX 250 W HCPCS SELF ADMINISTERED DRUGS (ALT 637 FOR MEDICARE OP, ALT 636 FOR OP/ED)

## 2025-04-06 PROCEDURE — 71045 X-RAY EXAM CHEST 1 VIEW: CPT | Performed by: STUDENT IN AN ORGANIZED HEALTH CARE EDUCATION/TRAINING PROGRAM

## 2025-04-06 PROCEDURE — 2500000005 HC RX 250 GENERAL PHARMACY W/O HCPCS

## 2025-04-06 PROCEDURE — 36415 COLL VENOUS BLD VENIPUNCTURE: CPT

## 2025-04-06 PROCEDURE — 82330 ASSAY OF CALCIUM: CPT

## 2025-04-06 PROCEDURE — 76705 ECHO EXAM OF ABDOMEN: CPT | Performed by: STUDENT IN AN ORGANIZED HEALTH CARE EDUCATION/TRAINING PROGRAM

## 2025-04-06 RX ORDER — PANTOPRAZOLE SODIUM 40 MG/1
40 TABLET, DELAYED RELEASE ORAL DAILY
Qty: 30 TABLET | Refills: 0 | Status: SHIPPED | OUTPATIENT
Start: 2025-04-06 | End: 2025-04-24 | Stop reason: SDUPTHER

## 2025-04-06 RX ORDER — INSULIN LISPRO 100 [IU]/ML
0-10 INJECTION, SOLUTION INTRAVENOUS; SUBCUTANEOUS
Status: DISCONTINUED | OUTPATIENT
Start: 2025-04-06 | End: 2025-04-07

## 2025-04-06 RX ORDER — VALGANCICLOVIR 450 MG/1
900 TABLET, FILM COATED ORAL DAILY
Qty: 60 TABLET | Refills: 0 | Status: SHIPPED | OUTPATIENT
Start: 2025-04-06 | End: 2025-04-11

## 2025-04-06 RX ORDER — SULFAMETHOXAZOLE AND TRIMETHOPRIM 400; 80 MG/1; MG/1
1 TABLET ORAL DAILY
Qty: 30 TABLET | Refills: 0 | Status: SHIPPED | OUTPATIENT
Start: 2025-04-06 | End: 2025-04-12 | Stop reason: HOSPADM

## 2025-04-06 RX ORDER — PREDNISONE 5 MG/1
20 TABLET ORAL DAILY
Qty: 120 TABLET | Refills: 0 | Status: SHIPPED | OUTPATIENT
Start: 2025-04-07 | End: 2025-04-24 | Stop reason: SDUPTHER

## 2025-04-06 RX ORDER — PANTOPRAZOLE SODIUM 40 MG/1
40 TABLET, DELAYED RELEASE ORAL
Status: DISCONTINUED | OUTPATIENT
Start: 2025-04-06 | End: 2025-04-12 | Stop reason: HOSPADM

## 2025-04-06 RX ORDER — TACROLIMUS 1 MG/1
3 CAPSULE ORAL 2 TIMES DAILY
Qty: 180 CAPSULE | Refills: 0 | Status: SHIPPED | OUTPATIENT
Start: 2025-04-06 | End: 2025-04-11

## 2025-04-06 RX ORDER — TACROLIMUS 0.5 MG/1
0.5 CAPSULE ORAL 2 TIMES DAILY
Qty: 60 CAPSULE | Refills: 0 | Status: SHIPPED | OUTPATIENT
Start: 2025-04-06 | End: 2025-04-24 | Stop reason: SDUPTHER

## 2025-04-06 RX ORDER — ACETAMINOPHEN 325 MG/1
650 TABLET ORAL EVERY 6 HOURS PRN
Qty: 30 TABLET | Refills: 0 | Status: SHIPPED | OUTPATIENT
Start: 2025-04-06 | End: 2025-04-11

## 2025-04-06 RX ORDER — FLUCONAZOLE 200 MG/1
400 TABLET ORAL DAILY
Status: DISCONTINUED | OUTPATIENT
Start: 2025-04-07 | End: 2025-04-12 | Stop reason: HOSPADM

## 2025-04-06 RX ORDER — MYCOPHENOLATE MOFETIL 250 MG/1
1000 CAPSULE ORAL 2 TIMES DAILY
Status: DISCONTINUED | OUTPATIENT
Start: 2025-04-06 | End: 2025-04-12 | Stop reason: HOSPADM

## 2025-04-06 RX ORDER — MYCOPHENOLATE MOFETIL 250 MG/1
1000 CAPSULE ORAL 2 TIMES DAILY
Qty: 240 CAPSULE | Refills: 0 | Status: ON HOLD | OUTPATIENT
Start: 2025-04-06 | End: 2025-04-16

## 2025-04-06 RX ORDER — FLUCONAZOLE 200 MG/1
400 TABLET ORAL DAILY
Qty: 60 TABLET | Refills: 0 | Status: SHIPPED | OUTPATIENT
Start: 2025-04-07 | End: 2025-04-11

## 2025-04-06 RX ADMIN — OXYCODONE HYDROCHLORIDE 10 MG: 5 TABLET ORAL at 03:33

## 2025-04-06 RX ADMIN — ALBUMIN HUMAN 25 G: 0.25 SOLUTION INTRAVENOUS at 05:50

## 2025-04-06 RX ADMIN — ALBUMIN HUMAN 25 G: 0.25 SOLUTION INTRAVENOUS at 19:47

## 2025-04-06 RX ADMIN — INSULIN LISPRO 2 UNITS: 100 INJECTION, SOLUTION INTRAVENOUS; SUBCUTANEOUS at 17:32

## 2025-04-06 RX ADMIN — FLUCONAZOLE 400 MG: 2 INJECTION, SOLUTION INTRAVENOUS at 09:35

## 2025-04-06 RX ADMIN — TACROLIMUS 2 MG: 1 CAPSULE ORAL at 06:14

## 2025-04-06 RX ADMIN — TACROLIMUS 2 MG: 1 CAPSULE ORAL at 18:23

## 2025-04-06 RX ADMIN — PANTOPRAZOLE SODIUM 40 MG: 40 INJECTION, POWDER, LYOPHILIZED, FOR SOLUTION INTRAVENOUS at 09:35

## 2025-04-06 RX ADMIN — OXYCODONE HYDROCHLORIDE 10 MG: 5 TABLET ORAL at 12:07

## 2025-04-06 RX ADMIN — INSULIN LISPRO 2 UNITS: 100 INJECTION, SOLUTION INTRAVENOUS; SUBCUTANEOUS at 21:01

## 2025-04-06 RX ADMIN — METHYLPREDNISOLONE SODIUM SUCCINATE 60 MG: 125 INJECTION, POWDER, FOR SOLUTION INTRAMUSCULAR; INTRAVENOUS at 09:35

## 2025-04-06 RX ADMIN — ACETAMINOPHEN 1000 MG: 10 INJECTION INTRAVENOUS at 05:50

## 2025-04-06 RX ADMIN — Medication 3 MG: at 21:01

## 2025-04-06 RX ADMIN — MYCOPHENOLATE MOFETIL 1000 MG: 250 CAPSULE ORAL at 21:01

## 2025-04-06 RX ADMIN — ALBUMIN HUMAN 25 G: 0.25 SOLUTION INTRAVENOUS at 14:18

## 2025-04-06 RX ADMIN — OXYCODONE HYDROCHLORIDE 10 MG: 5 TABLET ORAL at 19:47

## 2025-04-06 RX ADMIN — MYCOPHENOLATE MOFETIL 1000 MG: 250 CAPSULE ORAL at 11:59

## 2025-04-06 RX ADMIN — PANTOPRAZOLE SODIUM 40 MG: 40 TABLET, DELAYED RELEASE ORAL at 16:16

## 2025-04-06 RX ADMIN — INSULIN LISPRO 2 UNITS: 100 INJECTION, SOLUTION INTRAVENOUS; SUBCUTANEOUS at 14:18

## 2025-04-06 ASSESSMENT — COGNITIVE AND FUNCTIONAL STATUS - GENERAL
MOVING FROM LYING ON BACK TO SITTING ON SIDE OF FLAT BED WITH BEDRAILS: A LOT
TOILETING: A LOT
PERSONAL GROOMING: A LOT
MOBILITY SCORE: 11
WALKING IN HOSPITAL ROOM: A LITTLE
CLIMB 3 TO 5 STEPS WITH RAILING: TOTAL
MOVING FROM LYING ON BACK TO SITTING ON SIDE OF FLAT BED WITH BEDRAILS: A LITTLE
HELP NEEDED FOR BATHING: A LITTLE
MOVING TO AND FROM BED TO CHAIR: A LOT
WALKING IN HOSPITAL ROOM: A LOT
MOVING TO AND FROM BED TO CHAIR: A LOT
STANDING UP FROM CHAIR USING ARMS: A LOT
TURNING FROM BACK TO SIDE WHILE IN FLAT BAD: A LOT
STANDING UP FROM CHAIR USING ARMS: TOTAL
DRESSING REGULAR UPPER BODY CLOTHING: A LOT
DRESSING REGULAR LOWER BODY CLOTHING: A LITTLE
MOBILITY SCORE: 13
EATING MEALS: A LITTLE
TURNING FROM BACK TO SIDE WHILE IN FLAT BAD: A LITTLE
CLIMB 3 TO 5 STEPS WITH RAILING: TOTAL
DAILY ACTIVITIY SCORE: 15

## 2025-04-06 ASSESSMENT — PAIN - FUNCTIONAL ASSESSMENT
PAIN_FUNCTIONAL_ASSESSMENT: 0-10
PAIN_FUNCTIONAL_ASSESSMENT: 0-10

## 2025-04-06 ASSESSMENT — ACTIVITIES OF DAILY LIVING (ADL): ADL_ASSISTANCE: INDEPENDENT

## 2025-04-06 ASSESSMENT — PAIN SCALES - GENERAL
PAINLEVEL_OUTOF10: 7
PAINLEVEL_OUTOF10: 8
PAINLEVEL_OUTOF10: 7
PAINLEVEL_OUTOF10: 0 - NO PAIN
PAINLEVEL_OUTOF10: 8

## 2025-04-06 ASSESSMENT — PAIN DESCRIPTION - ORIENTATION: ORIENTATION: ANTERIOR;LOWER

## 2025-04-06 ASSESSMENT — PAIN DESCRIPTION - LOCATION: LOCATION: ABDOMEN

## 2025-04-06 NOTE — PROGRESS NOTES
Physical Therapy    Physical Therapy Evaluation & Treatment    Patient Name: López Lopez  MRN: [id number]  Department: [facility]  Room: 9068/9068-A  Today's Date: 4/6/2025   Time Calculation  Start Time: 1111  Stop Time: 1153  Time Calculation (min): 42 min    Assessment/Plan   PT Assessment  PT Assessment Results: Decreased strength, Decreased endurance, Impaired balance, Decreased mobility, Pain  Rehab Prognosis: Good  Barriers to Discharge Home: Caregiver assistance, Physical needs  Caregiver Assistance: Caregiver assistance needed per identified barriers - however, level of patient's required assistance exceeds assistance available at home  Physical Needs: Stair navigation into home limited by function/safety, Ambulating household distances limited by function/safety  Evaluation/Treatment Tolerance: Patient limited by fatigue, Patient limited by pain  Medical Staff Made Aware: Yes  End of Session Communication: Bedside nurse  Assessment Comment: 66yo male presents s/p liver/kidney transplant with inc pain, dec strength, balance, & endurance limiting functional mobility.  Pt would benefit from continued skilled therapy to address these deficits and improve safety and indep  End of Session Patient Position: Bed, 3 rail up, Alarm off, not on at start of session, Alarm off, caregiver present   IP OR SWING BED PT PLAN  Inpatient or Swing Bed: Inpatient  PT Plan  Treatment/Interventions: Bed mobility, Transfer training, Gait training, Stair training, Balance training, Strengthening, Endurance training, Therapeutic exercise, Therapeutic activity  PT Plan: Ongoing PT  PT Frequency: 5 times per week  PT Discharge Recommendations: Low intensity level of continued care (anticipate)  Equipment Recommended upon Discharge: Wheeled walker  PT Recommended Transfer Status: Assist x1, Assist x2  PT - OK to Discharge: Yes (PT eval completed, recs made)    Subjective     General Visit Information:  General  Reason for Referral:  "S/p OLT 4/2-4/3. Returned to OR 4/3 am, also s/p DDKT and re-exploration of hepatic artery  Past Medical History Relevant to Rehab: PMHx of decompensated cirrhosis 2/2 to MASLD (recurrent ascites, portal HTN requires paracentesis every other week), CKD 4, DM2, HTN, CAD s/p PCI to prox LAD 2021, h/o pAFib on Eliquis, h/o ITP dx 5/2024 s/p steroids and IVIG, hyperlipidemia, obesity  Family/Caregiver Present: No  Prior to Session Communication: Bedside nurse  Patient Position Received: Up in chair, Alarm off, not on at start of session  Preferred Learning Style: auditory, verbal, visual  General Comment: Pt sitting up in chair; appears fatigued, but willing to participate in session  Home Living:  Home Living  Type of Home: House  Lives With: Spouse  Home Adaptive Equipment:  (rollator)  Home Layout: One level  Home Access: Stairs to enter without rails  Entrance Stairs-Number of Steps: 1  Bathroom Equipment: Grab bars around toilet  Prior Level of Function:  Prior Function Per Pt/Caregiver Report  Level of [place]: Independent with ADLs and functional transfers  Receives Help From: Family  ADL Assistance: Independent  Ambulatory Assistance: Independent (uses a rollator \"for safety\" as needed)  Leisure: music  Hand Dominance: Right  Prior Function Comments: (+) drives  Precautions:  Precautions  Medical Precautions: Fall precautions, Oxygen therapy device and L/min (2L NC)  Post-Surgical Precautions: Abdominal surgery precautions  Precautions Comment: JPx2; tele    Objective   Pain:  Pain Assessment  Pain Assessment: 0-10  0-10 (Numeric) Pain Score: 8  Pain Type: Acute pain, Surgical pain  Pain Location: Abdomen  Pain Interventions: Ambulation/increased activity, Repositioned  Response to Interventions: No change in pain  Cognition:  Cognition  Overall Cognitive Status:  (grossly WFL, though delayed)  Insight: Within function limits  Impulsive: Within functional limits  Processing Speed: Delayed    General " "Assessments:  General Observation  General Observation: L dorsum of hand with bruising (edges marked prior to PT session)     Activity Tolerance  Endurance: Tolerates 30 min exercise with multiple rests    Sensation  Light Touch: No apparent deficits  Sensation Comment: grossly intact to LT, however, reports hands feel \"cold\"    Strength  Strength Comments: generalized deconditioning  Strength  Strength Comments: generalized deconditioning    Perception  Inattention/Neglect: Appears intact  Initiation: Appears intact  Motor Planning: Appears intact  Perseveration: Not present    Coordination  Movements are Fluid and Coordinated: Yes    Postural Control  Postural Control: Impaired    Static Sitting Balance  Static Sitting-Balance Support: Feet supported, Bilateral upper extremity supported  Static Sitting-Level of Assistance: Close supervision    Static Standing Balance  Static Standing-Balance Support: Bilateral upper extremity supported  Static Standing-Level of Assistance: Contact guard  Dynamic Standing Balance  Dynamic Standing-Balance Support: Bilateral upper extremity supported  Dynamic Standing-Level of Assistance: Contact guard  Dynamic Standing-Balance: Turning  Functional Assessments:  Bed Mobility  Bed Mobility: Yes  Bed Mobility 1  Bed Mobility 1: Sitting to supine  Level of Assistance 1: Moderate assistance, +2, Moderate verbal cues, Moderate tactile cues  Bed Mobility Comments 1: assist for LEs & to guide trunk to supine; cueing for logrolling    Transfers  Transfer: Yes  Transfer 1  Technique 1: Sit to stand  Transfer Device 1: Walker  Transfer Level of Assistance 1: Minimum assistance, +2, Moderate verbal cues, Moderate tactile cues  Trials/Comments 1: from low chair; inc time & cueing to complete    Ambulation/Gait Training  Ambulation/Gait Training Performed: Yes  Ambulation/Gait Training 1  Surface 1: Level tile  Device 1: Rolling walker  Assistance 1: Contact guard, Moderate verbal cues, Minimal " tactile cues  Quality of Gait 1: Diminished heel strike, Inconsistent stride length, Decreased step length, Shuffling gait (dec christina)  Comments/Distance (ft) 1: ~5' total from chair to bed    Stairs  Stairs: No  Extremity/Trunk Assessments:  RUE   RUE : Within Functional Limits  LUE   LUE: Within Functional Limits  RLE   RLE : Exceptions to WFL  Strength RLE  RLE Overall Strength: Greater than or equal to 3/5 as evidenced by functional mobility  LLE   LLE : Exceptions to WFL  Strength LLE  LLE Overall Strength: Greater than or equal to 3/5 as evidenced by functional mobility  Treatments:  Therapeutic Exercise  Therapeutic Exercise Performed: Yes  Therapeutic Exercise Activity 1: seated in chair BLE x~10: DF/PF, LAQ    Therapeutic Activity  Therapeutic Activity Performed: Yes  Therapeutic Activity 1: Pt educated on logrolling prior to completing bed mobility with fair carryover - mod cues + modAx2 to complete  Therapeutic Activity 2: Pt educated on abd prec and deep breathing, including splinted coughing with a pillow in session - demo + cough with cueing & use of pillow for splinting with min cueing.  Therapeutic Activity 3: Pt requiring extensive verbal & tactile cueing to sequence STS from low chair with minAx2; inc time, reassurance, and cueing to amb 5' with WW to return to bed.  Static standing EOB x~3 min with CGA at   Outcome Measures:  [facility] Basic Mobility  Turning from your back to your side while in a flat bed without using bedrails: A lot  Moving from lying on your back to sitting on the side of a flat bed without using bedrails: A lot  Moving to and from bed to chair (including a wheelchair): A lot  Standing up from a chair using your arms (e.g. wheelchair or bedside chair): Total  To walk in hospital room: A little  Climbing 3-5 steps with railing: Total  Basic Mobility - Total Score: 11    Encounter Problems       Encounter Problems (Active)       Mobility       STG - Patient will ambulate >125'  with LRD, indep, VSS and without LOB  (Progressing)       Start:  04/06/25    Expected End:  04/20/25            STG - Patient will ambulate up and down a curb/step with CGA to simulate home entry  (Not Progressing)       Start:  04/06/25    Expected End:  04/20/25               PT Transfers       STG - Patient to transfer to and from sit to supine via logrolling in flat bed, no rails indep  (Progressing)       Start:  04/06/25    Expected End:  04/20/25            STG - Patient will transfer sit to and from stand indep with LRD  (Progressing)       Start:  04/06/25    Expected End:  04/20/25                   Education Documentation  Precautions, taught by Bridgette Hairston, PT at 4/6/2025  1:22 PM.  Learner: Patient  Readiness: Acceptance  Method: Explanation  Response: Verbalizes Understanding, Needs Reinforcement  Comment: safety with mobility, logrolling, falls prec, abd prec    Body Mechanics, taught by Bridgette Hairston, PT at 4/6/2025  1:22 PM.  Learner: Patient  Readiness: Acceptance  Method: Explanation  Response: Verbalizes Understanding, Needs Reinforcement  Comment: safety with mobility, logrolling, falls prec, abd prec    Mobility Training, taught by Bridgette Hairston, PT at 4/6/2025  1:22 PM.  Learner: Patient  Readiness: Acceptance  Method: Explanation  Response: Verbalizes Understanding, Needs Reinforcement  Comment: safety with mobility, logrolling, falls prec, abd prec    Education Comments  No comments found.      04/06/25 at 1:31 PM - Bridgette Hairston, PT

## 2025-04-06 NOTE — CARE PLAN
Problem: Diabetes  Goal: Achieve decreasing blood glucose levels by end of shift  Outcome: Progressing  Goal: Increase stability of blood glucose readings by end of shift  Outcome: Progressing  Goal: Decrease in ketones present in urine by end of shift  Outcome: Progressing  Goal: Maintain electrolyte levels within acceptable range throughout shift  Outcome: Progressing  Goal: Maintain glucose levels >70mg/dl to <250mg/dl throughout shift  Outcome: Progressing  Goal: No changes in neurological exam by end of shift  Outcome: Progressing  Goal: Learn about and adhere to nutrition recommendations by end of shift  Outcome: Progressing  Goal: Vital signs within normal range for age by end of shift  Outcome: Progressing  Goal: Increase self care and/or family involovement by end of shift  Outcome: Progressing  Goal: Receive DSME education by end of shift  Outcome: Progressing     Problem: Pain - Adult  Goal: Verbalizes/displays adequate comfort level or baseline comfort level  Outcome: Progressing     Problem: Safety - Adult  Goal: Free from fall injury  Outcome: Progressing     Problem: Discharge Planning  Goal: Discharge to home or other facility with appropriate resources  Outcome: Progressing     Problem: Chronic Conditions and Co-morbidities  Goal: Patient's chronic conditions and co-morbidity symptoms are monitored and maintained or improved  Outcome: Progressing     Problem: Nutrition  Goal: Nutrient intake appropriate for maintaining nutritional needs  Outcome: Progressing     Problem: Skin  Goal: Decreased wound size/increased tissue granulation at next dressing change  Outcome: Progressing  Flowsheets (Taken 4/6/2025 1800)  Decreased wound size/increased tissue granulation at next dressing change:   Promote sleep for wound healing   Protective dressings over bony prominences  Goal: Participates in plan/prevention/treatment measures  Outcome: Progressing  Flowsheets (Taken 4/6/2025 1800)  Participates in  plan/prevention/treatment measures:   Elevate heels   Increase activity/out of bed for meals  Goal: Prevent/manage excess moisture  Outcome: Progressing  Flowsheets (Taken 4/6/2025 1800)  Prevent/manage excess moisture:   Cleanse incontinence/protect with barrier cream   Moisturize dry skin  Goal: Prevent/minimize sheer/friction injuries  Outcome: Progressing  Flowsheets (Taken 4/6/2025 1800)  Prevent/minimize sheer/friction injuries:   Use pull sheet   Increase activity/out of bed for meals  Goal: Promote/optimize nutrition  Outcome: Progressing  Flowsheets (Taken 4/6/2025 1800)  Promote/optimize nutrition:   Monitor/record intake including meals   Consume > 50% meals/supplements  Goal: Promote skin healing  Outcome: Progressing  Flowsheets (Taken 4/6/2025 1800)  Promote skin healing:   Assess skin/pad under line(s)/device(s)   Turn/reposition every 2 hours/use positioning/transfer devices     Problem: Fall/Injury  Goal: Not fall by end of shift  Outcome: Progressing  Goal: Be free from injury by end of the shift  Outcome: Progressing  Goal: Verbalize understanding of personal risk factors for fall in the hospital  Outcome: Progressing  Goal: Verbalize understanding of risk factor reduction measures to prevent injury from fall in the home  Outcome: Progressing  Goal: Use assistive devices by end of the shift  Outcome: Progressing  Goal: Pace activities to prevent fatigue by end of the shift  Outcome: Progressing     Problem: Safety - Medical Restraint  Goal: Remains free of injury from restraints (Restraint for Interference with Medical Device)  Outcome: Progressing  Goal: Free from restraint(s) (Restraint for Interference with Medical Device)  Outcome: Progressing   The patient's goals for the shift include  rest    The clinical goals for the shift include Pt will remain HDS throughout shift    Over the shift, the patient did make progress toward the following goals.

## 2025-04-06 NOTE — PROGRESS NOTES
"    Surgical Intensive Care Unit Progress Note        Subjective   Received 1 uRBC overnight  Received a dose of lasix 40 mg Ivx 1  Levo weaned off this morning  Extubated during rounds    Objective     Physical Exam  Constitutional:       Appearance: Normal appearance.   HENT:      Head: Normocephalic and atraumatic.   Neck:      Comments: RIJ MAC w/ PA catheter and RIJ trialysis in place, dressing c/d/i.  Cardiovascular:      Rate and Rhythm: Normal rate and regular rhythm.   Pulmonary:      Effort: Pulmonary effort is normal.      Breath sounds: Normal breath sounds.   Abdominal:      Comments: Midline abdominal incision covered. LISA drains x 4 with serosanguinous drainage    Genitourinary:     Comments: Nelson draining yellow/ pink urine   Neurological:      General: No focal deficit present.      Mental Status: He is alert and oriented to person, place, and time.   Psychiatric:         Mood and Affect: Mood normal.         Last Recorded Vitals  Blood pressure 118/71, pulse 86, temperature 36.2 °C (97.2 °F), temperature source Temporal, resp. rate 18, height 1.93 m (6' 4\"), weight 109 kg (239 lb 11.2 oz), SpO2 97%.  Intake/Output last 3 Shifts:  I/O last 3 completed shifts:  In: 1050 (9.3 mL/kg) [P.O.:270; I.V.:180 (1.6 mL/kg); Blood:200; IV Piggyback:400]  Out: 5025 (44.7 mL/kg) [Urine:1505 (0.4 mL/kg/hr); Drains:3520]  Weight: 112.4 kg     Relevant Results     Scheduled medications  albumin human, 25 g, intravenous, q6h  [START ON 4/7/2025] basiliximab, 20 mg, intravenous, Once  [START ON 4/7/2025] fluconazole, 400 mg, oral, Daily  insulin lispro, 0-10 Units, subcutaneous, After meals & nightly  melatonin, 3 mg, oral, Nightly  mycophenolate, 1,000 mg, oral, BID  pantoprazole, 40 mg, oral, BID AC  [START ON 4/7/2025] predniSONE, 20 mg, oral, Daily  tacrolimus, 2 mg, oral, q12h BRITTANEY      Continuous medications       PRN medications  PRN medications: dextrose, dextrose, glucagon, ondansetron, oxyCODONE, " oxyCODONE    Results from last 7 days   Lab Units 04/06/25  0532 04/05/25  1430 04/05/25  0312   WBC AUTO x10*3/uL 6.1 4.5 4.4   HEMOGLOBIN g/dL 9.3* 8.9* 8.7*   PLATELETS AUTO x10*3/uL 41* 36* 35*      Results from last 7 days   Lab Units 04/06/25  0532 04/05/25  1430 04/05/25  0312   SODIUM mmol/L 137 139 138   POTASSIUM mmol/L 3.8 4.0 4.1   CHLORIDE mmol/L 102 104 103   CO2 mmol/L 22 22 21   BUN mg/dL 74* 69* 63*   CREATININE mg/dL 3.88* 3.76* 3.37*   GLUCOSE mg/dL 98 115* 133*   MAGNESIUM mg/dL 2.22 2.25 2.24   PHOSPHORUS mg/dL 7.5* 7.5* 7.3*      Results from last 7 days   Lab Units 04/05/25  1430 04/05/25  0312 04/04/25  1651   INR  1.1 1.2* 1.2*   PROTIME seconds 12.7* 13.2* 13.6*   APTT seconds 25* 27 27        This patient has a central line   Reason for the central line remaining today? Hemodynamic monitoring    This patient has a urinary catheter   Reason for the urinary catheter remaining today? perioperative use for selected surgical procedures    This patient is intubated   Reason for patient to remain intubated today? they are planned for extubation trial later today/tonight      Assessment/Plan   Assessment & Plan  Alcoholic cirrhosis of liver with ascites (Multi)    Chronic renal impairment, stage 4 (severe) (Multi)    Stage 4 chronic kidney disease (Multi)    CKD (chronic kidney disease) stage 4, GFR 15-29 ml/min (Multi)      López Lopez is a 65 y.o. male with PMHx of decompensated cirrhosis 2/2 to MASLD (recurrent ascites, portal HTN requires paracentesis every other week), CKD 4, DM2, HTN, CAD s/p PCI to prox LAD 2021, h/o pAFib on Eliquis, h/o ITP dx 5/2024 s/p steroids and IVIG, hyperlipidemia, obesity who presents to [facility] for possible simultaneous liver and kidney transplant. He presents to the SICU s/p OLT 4/2-4/3. Returned to OR 4/3 am, now s/p DDKT and re-exploration of hepatic artery.     Plan:  NEURO: Intubated and sedated on propofol infusion at 40 mcg/kg/min.   - ongoing neuro  and pain assessments  - PRN hydromorphone for pain control  - PT/OT consult  - Home meds: trazodone 50mg nightly  - Restraints indicated while patient remains intubated, restrain with soft wrist restraints until medical devices are discontinued.      CV: History of HTN, HLD, CAD s/p PCI to prox LAD 2021, h/o pAFib on Eliquis. Baseline /75. Baseline echo (12/10/2024) with EF Left ventricular ejection fraction is hyperdynamic, calculated by Manznaares's biplane at 77%. %, normal RV.  AT2 weaned off . Currentl on  levo at 0.02.  - continuous EKG/abp/cvp/pap monitoring  - titrate levo and AT2 to MAP goal >65  - Goal CVP 8-12, CI >2.5   - Additional 25% albumin q6h x4 doses  - additional volume resuscitation as indicated  - Holding home meds: atorvastatin 10mg daily, apixaban 5mg BID, furosemide 20 mg BID     PULM: Former smoker. Extubated during rounds this morning  - q1h IS while awake  - additional pulm toilet prn  - CXR daily  - ABG prn     GI: Hx of Cirrhosis 2/2 to MASLD (recurrent ascites, portal HTN requires paracentesis every other week). Now s/p OLT on 4/2-4/3. Post-op liver US with atrophied Left hepatic lobe, Right HA not well visualized, Common HA w/ borderline elevated RI, Left HA w/ borderline low RI, and slightly elevated main portal vein velocities. Return to OR 4/3 s/p re-exploration of hepatic artery.  4/4 Liver US:borderline elevated RI of the common hepatic artery with limited assessment of the left/right branches   - NPO, NG to LIWS  - PPI for GI prophylaxis  - LFTs/GGT q4h  - serial abdominal exams  - monitor drain output  - IS/ppx per Transplant: methylpred taper, MMF and tacro     : Hx of CKD 4. Baseline creatinine 3.89. Intra-op CVVH during OLT. . Now s/p DDKT 4/3.  post-op Kidney US Resistive indices at the upper limits of normal with patent  vasculature and no evidence of retrograde flow   - Check renal function panel  q4h.  - Maintenance IVF -> 1/2NS at 60ml/hr  -Finishing the 1:1 IVF  replacement with NS  - Maintain UOP >0.5ml/kg/hr   - Maintain noel for strict I&Os  - Replete electrolytes judiciously  - IS/ppx per Transplant: as above +Simulect induction     HEME: Hx of ITP dx 5/2024 s/p steroids and IVIG. Acute blood loss anemia. Coagulopathy. Hypofibrinogenemia. Thrombocytopenia. OR EBL 4L. Intra-op received 7u PRBC, 8u FFP, 2u cryo, 10pk Plts. Post-op overnight transfused 1u cryo and 5pk Plts. OR 4/3 EBL 200ml, intra-op received 2u PRBC and 1u FFP.  - Check CBC, coags, fibrinogen  q4h  - Goal hgb >9, plt >50, fibrinogen >100, INR <1.8  - SCDs for DVT prophylaxis  - No SQH/ASA at this time  - ongoing monitoring for s/s bleeding  - maintain active T&S (4/2)--> resend tomorrow     ENDO: History of prediabetes. A1c 5.5 (9/2024).   -SSI # 2  - goal -180  - Home meds: metformin 1000mg q12h     ID: Afebrile. No leukocytosis. MRSA swab pending.  - trend q4h WBC, q4h temps  - Post-op Zosyn x72 hrs  - continue prophylactic Fluconazole x30 days  - close monitoring for s/s infection. Low threshold to culture.     Lines:  - L radial arterial line (4/2, OR)  - R IJ MAC with PAC (4/2, OR)  - R IJ trialysis (4/2, OR)  - PIV x3     Dispo: Continue ICU care.     Critical care time: 45 min    Uriel Camilo MD  Team phone [phone]

## 2025-04-06 NOTE — CARE PLAN
The patient's goals for the shift include  Sleep    The clinical goals for the shift include pt will remain HDS during the shift    Over the shift, the patient did make progress toward the following goals. No Barriers to progression

## 2025-04-06 NOTE — PROGRESS NOTES
TRANSPLANT SURGERY PROGRESS NOTE    López Lopez underwent transplant surgery on 4/2/2025 (Liver / Kidney) and was evaluated on multidisciplinary inpatient rounds.  I specifically evaluated the management of immunosuppression to ensure adequate exposure required to decrease the risk of rejection.  Furthermore, I reviewed potential side effects including tremor, hyperglycemia, leukopenia, infection, and other neurologic changes.  I reviewed and adjusted infectious prophylaxis based on the patients clinical condition and  protocols.     24 EVENTS: no acute events    DIAGNOSIS: Liver replaced by transplant Z94.4; kidney replaced by transplant Z94.0    PHYSICAL EXAMINATION:  Last Recorded Vitals  Visit Vitals  /73 (BP Location: Right arm, Patient Position: Lying)   Pulse 68   Temp 35.7 °C (96.3 °F) (Temporal)   Resp 16      Intake/Output last 3 Shifts:    Intake/Output Summary (Last 24 hours) at 4/6/2025 0851  Last data filed at 4/6/2025 0828  Gross per 24 hour   Intake 870 ml   Output 3595 ml   Net -2725 ml      Vitals:    04/02/25 1540   Weight: 112 kg (247 lb 11.2 oz)      Gen: A+OX3; NAD  HEENT: PERRL, sclera anicteric, MMM  Cardiac: RRR  Chest: Normal inspiratory effort  Abdomen: S/NT/ND. Incision C/D/I.  Ext: No LE edema  Drains: serosanguinous    MEDICATION LIST: REVIEWED  Scheduled medications  acetaminophen, 1,000 mg, intravenous, q6h  albumin human, 25 g, intravenous, q6h  [START ON 4/7/2025] basiliximab, 20 mg, intravenous, Once  fluconazole, 400 mg, intravenous, q24h  insulin lispro, 0-10 Units, subcutaneous, After meals & nightly  melatonin, 3 mg, oral, Nightly  methylPREDNISolone sodium succinate (PF), 60 mg, intravenous, Daily   Followed by  [START ON 4/7/2025] predniSONE, 20 mg, oral, Daily  mycophenolate, 1,000 mg, intravenous, q12h BRITTANEY  pantoprazole, 40 mg, intravenous, BID  tacrolimus, 2 mg, oral, q12h BRITTANEY    LABS:  CBC   Result Value Ref Range    WBC 4.5 4.4 - 11.3 x10*3/uL    nRBC  0.0 0.0 - 0.0 /100 WBCs    RBC 3.02 (L) 4.50 - 5.90 x10*6/uL    Hemoglobin 8.9 (L) 13.5 - 17.5 g/dL    Hematocrit 27.2 (L) 41.0 - 52.0 %    MCV 90 80 - 100 fL    MCH 29.5 26.0 - 34.0 pg    MCHC 32.7 32.0 - 36.0 g/dL    RDW 16.4 (H) 11.5 - 14.5 %    Platelets 36 (LL) 150 - 450 x10*3/uL   Coagulation Screen   Result Value Ref Range    Protime 12.7 (H) 9.8 - 12.4 seconds    INR 1.1 0.9 - 1.1    aPTT 25 (L) 26 - 36 seconds   Fibrinogen   Result Value Ref Range    Fibrinogen 164 (L) 200 - 400 mg/dL   Gamma-Glutamyl Transferase   Result Value Ref Range    GGT 99 (H) 5 - 64 U/L   Hepatic function panel   Result Value Ref Range    Albumin 3.5 3.4 - 5.0 g/dL    Bilirubin, Total 1.5 (H) 0.0 - 1.2 mg/dL    Bilirubin, Direct 1.0 (H) 0.0 - 0.3 mg/dL    Alkaline Phosphatase 53 33 - 136 U/L     (H) 10 - 52 U/L    AST 47 (H) 9 - 39 U/L    Total Protein 4.5 (L) 6.4 - 8.2 g/dL   Magnesium   Result Value Ref Range    Magnesium 2.25 1.60 - 2.40 mg/dL   Basic Metabolic Panel   Result Value Ref Range    Glucose 115 (H) 74 - 99 mg/dL    Sodium 139 136 - 145 mmol/L    Potassium 4.0 3.5 - 5.3 mmol/L    Chloride 104 98 - 107 mmol/L    Bicarbonate 22 21 - 32 mmol/L    Anion Gap 17 10 - 20 mmol/L    Urea Nitrogen 69 (H) 6 - 23 mg/dL    Creatinine 3.76 (H) 0.50 - 1.30 mg/dL    eGFR 17 (L) >60 mL/min/1.73m*2    Calcium 8.3 (L) 8.6 - 10.6 mg/dL     ASSESSMENT AND PLAN:    Mr. Lopez is a 65 y.o. male who underwent  transplant surgery on 4/2/2025 (Liver / Kidney).    1. Immunosuppression reviewed and adjusted       Tacrolimus: continue 2 mg BID      Goal tacrolimus trough level is 8-10 ng/mL      Mycophenolate: Yes - 1 gm bid      Steroid taper      Simulect dose #2: Monday 4/7     2. Liver/kidney allograft function      LFTs downtrending, no need for ursodiol. Albumin q6h for high drain output.      Kidney: UOP 900mL, renal U/S yesterday unremarkable      Liver U/S to evaluate portal vein given ongoing high drain outputs     3.  Wound/drain/noel and pain management      Noel catheter:  remove today with PVRs      Drains: remove LISA#1 and #3 with sutures      Empty drains q4h     4. Nutrition: advance diet and Nepro supplements     5. Prophylaxis      DVT: SCDs/Subcutaneous Heparin: No for persistent thrombocytopenia      GI prophylaxis: Protonix      Fluconazole; hold valcyte and bactrim     6. PT/OT: OOB to chair     7. Discharge planned for: 8 days    Case was presented at Multi Disciplinary team rounds   Attending physician, consulting physician, , pharmacist, residents and fellow were present at the meeting.    Yaa Cancino MD, PHD, MPH, FACS  Chief, Transplant and Hepatobiliary Surgery

## 2025-04-07 ENCOUNTER — DOCUMENTATION (OUTPATIENT)
Dept: TRANSPLANT | Facility: HOSPITAL | Age: 65
End: 2025-04-07
Payer: COMMERCIAL

## 2025-04-07 ENCOUNTER — PHARMACY VISIT (OUTPATIENT)
Dept: PHARMACY | Facility: CLINIC | Age: 65
End: 2025-04-07
Payer: COMMERCIAL

## 2025-04-07 ENCOUNTER — APPOINTMENT (OUTPATIENT)
Facility: HOSPITAL | Age: 65
End: 2025-04-07
Payer: COMMERCIAL

## 2025-04-07 ENCOUNTER — LAB REQUISITION (OUTPATIENT)
Dept: LAB | Facility: CLINIC | Age: 65
DRG: 005 | End: 2025-04-07
Payer: COMMERCIAL

## 2025-04-07 DIAGNOSIS — N18.6 END STAGE RENAL DISEASE (MULTI): ICD-10-CM

## 2025-04-07 LAB
25(OH)D3 SERPL-MCNC: 14 NG/ML (ref 30–100)
ALBUMIN SERPL BCP-MCNC: 3.7 G/DL (ref 3.4–5)
ALP SERPL-CCNC: 187 U/L (ref 33–136)
ALT SERPL W P-5'-P-CCNC: 86 U/L (ref 10–52)
ANION GAP SERPL CALC-SCNC: 16 MMOL/L (ref 10–20)
AST SERPL W P-5'-P-CCNC: 19 U/L (ref 9–39)
BILIRUB DIRECT SERPL-MCNC: 1 MG/DL (ref 0–0.3)
BILIRUB SERPL-MCNC: 1.4 MG/DL (ref 0–1.2)
BUN SERPL-MCNC: 75 MG/DL (ref 6–23)
CA-I BLD-SCNC: 1.13 MMOL/L (ref 1.1–1.33)
CALCIUM SERPL-MCNC: 8.6 MG/DL (ref 8.6–10.6)
CHLORIDE SERPL-SCNC: 101 MMOL/L (ref 98–107)
CO2 SERPL-SCNC: 23 MMOL/L (ref 21–32)
CREAT SERPL-MCNC: 4.08 MG/DL (ref 0.5–1.3)
EGFRCR SERPLBLD CKD-EPI 2021: 16 ML/MIN/1.73M*2
ERYTHROCYTE [DISTWIDTH] IN BLOOD BY AUTOMATED COUNT: 14.8 % (ref 11.5–14.5)
FLOW ALLOCROSSMATCH: NORMAL
GGT SERPL-CCNC: 337 U/L (ref 5–64)
GLUCOSE BLD MANUAL STRIP-MCNC: 138 MG/DL (ref 74–99)
GLUCOSE BLD MANUAL STRIP-MCNC: 163 MG/DL (ref 74–99)
GLUCOSE BLD MANUAL STRIP-MCNC: 173 MG/DL (ref 74–99)
GLUCOSE BLD MANUAL STRIP-MCNC: 194 MG/DL (ref 74–99)
GLUCOSE SERPL-MCNC: 153 MG/DL (ref 74–99)
HCT VFR BLD AUTO: 26.4 % (ref 41–52)
HGB BLD-MCNC: 9 G/DL (ref 13.5–17.5)
HLA RESULTS: NORMAL
MAGNESIUM SERPL-MCNC: 2.39 MG/DL (ref 1.6–2.4)
MCH RBC QN AUTO: 30.3 PG (ref 26–34)
MCHC RBC AUTO-ENTMCNC: 34.1 G/DL (ref 32–36)
MCV RBC AUTO: 89 FL (ref 80–100)
NRBC BLD-RTO: 0 /100 WBCS (ref 0–0)
PHOSPHATE SERPL-MCNC: 7.9 MG/DL (ref 2.5–4.9)
PLATELET # BLD AUTO: 49 X10*3/UL (ref 150–450)
POTASSIUM SERPL-SCNC: 4 MMOL/L (ref 3.5–5.3)
PROT SERPL-MCNC: 4.8 G/DL (ref 6.4–8.2)
RBC # BLD AUTO: 2.97 X10*6/UL (ref 4.5–5.9)
SODIUM SERPL-SCNC: 136 MMOL/L (ref 136–145)
TACROLIMUS BLD-MCNC: 6.8 NG/ML
WBC # BLD AUTO: 5 X10*3/UL (ref 4.4–11.3)

## 2025-04-07 PROCEDURE — 99232 SBSQ HOSP IP/OBS MODERATE 35: CPT | Performed by: SURGERY

## 2025-04-07 PROCEDURE — P9047 ALBUMIN (HUMAN), 25%, 50ML: HCPCS

## 2025-04-07 PROCEDURE — 2500000004 HC RX 250 GENERAL PHARMACY W/ HCPCS (ALT 636 FOR OP/ED): Mod: JZ

## 2025-04-07 PROCEDURE — 82947 ASSAY GLUCOSE BLOOD QUANT: CPT

## 2025-04-07 PROCEDURE — 2500000002 HC RX 250 W HCPCS SELF ADMINISTERED DRUGS (ALT 637 FOR MEDICARE OP, ALT 636 FOR OP/ED): Performed by: PHYSICIAN ASSISTANT

## 2025-04-07 PROCEDURE — 84100 ASSAY OF PHOSPHORUS: CPT

## 2025-04-07 PROCEDURE — RXMED WILLOW AMBULATORY MEDICATION CHARGE

## 2025-04-07 PROCEDURE — 2020000001 HC ICU ROOM DAILY

## 2025-04-07 PROCEDURE — 2500000004 HC RX 250 GENERAL PHARMACY W/ HCPCS (ALT 636 FOR OP/ED)

## 2025-04-07 PROCEDURE — 2500000001 HC RX 250 WO HCPCS SELF ADMINISTERED DRUGS (ALT 637 FOR MEDICARE OP)

## 2025-04-07 PROCEDURE — 97530 THERAPEUTIC ACTIVITIES: CPT | Mod: GP,CQ

## 2025-04-07 PROCEDURE — 97116 GAIT TRAINING THERAPY: CPT | Mod: GP,CQ

## 2025-04-07 PROCEDURE — 82306 VITAMIN D 25 HYDROXY: CPT | Performed by: PHYSICIAN ASSISTANT

## 2025-04-07 PROCEDURE — 80197 ASSAY OF TACROLIMUS: CPT

## 2025-04-07 PROCEDURE — 99232 SBSQ HOSP IP/OBS MODERATE 35: CPT | Performed by: STUDENT IN AN ORGANIZED HEALTH CARE EDUCATION/TRAINING PROGRAM

## 2025-04-07 PROCEDURE — 82977 ASSAY OF GGT: CPT

## 2025-04-07 PROCEDURE — 94640 AIRWAY INHALATION TREATMENT: CPT

## 2025-04-07 PROCEDURE — 80053 COMPREHEN METABOLIC PANEL: CPT

## 2025-04-07 PROCEDURE — 83735 ASSAY OF MAGNESIUM: CPT

## 2025-04-07 PROCEDURE — 2500000004 HC RX 250 GENERAL PHARMACY W/ HCPCS (ALT 636 FOR OP/ED): Performed by: PHYSICIAN ASSISTANT

## 2025-04-07 PROCEDURE — 2500000005 HC RX 250 GENERAL PHARMACY W/O HCPCS

## 2025-04-07 PROCEDURE — 85027 COMPLETE CBC AUTOMATED: CPT

## 2025-04-07 PROCEDURE — 82248 BILIRUBIN DIRECT: CPT

## 2025-04-07 PROCEDURE — 94642 AEROSOL INHALATION TREATMENT: CPT

## 2025-04-07 PROCEDURE — 82330 ASSAY OF CALCIUM: CPT

## 2025-04-07 PROCEDURE — 36415 COLL VENOUS BLD VENIPUNCTURE: CPT

## 2025-04-07 PROCEDURE — 2500000001 HC RX 250 WO HCPCS SELF ADMINISTERED DRUGS (ALT 637 FOR MEDICARE OP): Performed by: PHYSICIAN ASSISTANT

## 2025-04-07 RX ORDER — ALBUMIN HUMAN 250 G/1000ML
25 SOLUTION INTRAVENOUS 2 TIMES DAILY
Status: DISCONTINUED | OUTPATIENT
Start: 2025-04-07 | End: 2025-04-12 | Stop reason: HOSPADM

## 2025-04-07 RX ORDER — HEPARIN SODIUM 5000 [USP'U]/ML
5000 INJECTION, SOLUTION INTRAVENOUS; SUBCUTANEOUS EVERY 8 HOURS
Status: DISCONTINUED | OUTPATIENT
Start: 2025-04-07 | End: 2025-04-12 | Stop reason: HOSPADM

## 2025-04-07 RX ORDER — CHOLECALCIFEROL (VITAMIN D3) 25 MCG
2000 TABLET ORAL DAILY
Status: DISCONTINUED | OUTPATIENT
Start: 2025-04-07 | End: 2025-04-12 | Stop reason: HOSPADM

## 2025-04-07 RX ORDER — TRAZODONE HYDROCHLORIDE 50 MG/1
25 TABLET ORAL NIGHTLY
Status: DISCONTINUED | OUTPATIENT
Start: 2025-04-07 | End: 2025-04-12 | Stop reason: HOSPADM

## 2025-04-07 RX ORDER — INSULIN LISPRO 100 [IU]/ML
0-10 INJECTION, SOLUTION INTRAVENOUS; SUBCUTANEOUS
Status: DISCONTINUED | OUTPATIENT
Start: 2025-04-07 | End: 2025-04-12 | Stop reason: HOSPADM

## 2025-04-07 RX ORDER — ALBUTEROL SULFATE 0.83 MG/ML
2.5 SOLUTION RESPIRATORY (INHALATION) ONCE
Status: COMPLETED | OUTPATIENT
Start: 2025-04-07 | End: 2025-04-07

## 2025-04-07 RX ORDER — LIDOCAINE HYDROCHLORIDE 10 MG/ML
10 INJECTION, SOLUTION EPIDURAL; INFILTRATION; INTRACAUDAL; PERINEURAL ONCE
Status: DISCONTINUED | OUTPATIENT
Start: 2025-04-07 | End: 2025-04-08

## 2025-04-07 RX ORDER — PENTAMIDINE ISETHIONATE 300 MG/300MG
300 INHALANT RESPIRATORY (INHALATION) ONCE
Status: COMPLETED | OUTPATIENT
Start: 2025-04-07 | End: 2025-04-07

## 2025-04-07 RX ORDER — CHOLECALCIFEROL (VITAMIN D3) 50 MCG
2000 TABLET ORAL DAILY
Qty: 30 TABLET | Refills: 0 | Status: SHIPPED | OUTPATIENT
Start: 2025-04-08 | End: 2025-05-11

## 2025-04-07 RX ADMIN — SODIUM CHLORIDE 20 MG: 9 INJECTION, SOLUTION INTRAVENOUS at 10:51

## 2025-04-07 RX ADMIN — PREDNISONE 20 MG: 20 TABLET ORAL at 08:56

## 2025-04-07 RX ADMIN — Medication 3 MG: at 21:42

## 2025-04-07 RX ADMIN — TRAZODONE HYDROCHLORIDE 25 MG: 50 TABLET ORAL at 21:42

## 2025-04-07 RX ADMIN — HEPARIN SODIUM 5000 UNITS: 5000 INJECTION INTRAVENOUS; SUBCUTANEOUS at 16:39

## 2025-04-07 RX ADMIN — PANTOPRAZOLE SODIUM 40 MG: 40 TABLET, DELAYED RELEASE ORAL at 06:35

## 2025-04-07 RX ADMIN — HEPARIN SODIUM 5000 UNITS: 5000 INJECTION INTRAVENOUS; SUBCUTANEOUS at 10:09

## 2025-04-07 RX ADMIN — ALBUTEROL SULFATE 2.5 MG: 2.5 SOLUTION RESPIRATORY (INHALATION) at 16:47

## 2025-04-07 RX ADMIN — FLUCONAZOLE 400 MG: 200 TABLET ORAL at 08:56

## 2025-04-07 RX ADMIN — INSULIN LISPRO 2 UNITS: 100 INJECTION, SOLUTION INTRAVENOUS; SUBCUTANEOUS at 16:40

## 2025-04-07 RX ADMIN — ALBUMIN HUMAN 25 G: 0.25 SOLUTION INTRAVENOUS at 02:40

## 2025-04-07 RX ADMIN — INSULIN LISPRO 2 UNITS: 100 INJECTION, SOLUTION INTRAVENOUS; SUBCUTANEOUS at 12:38

## 2025-04-07 RX ADMIN — TACROLIMUS 2 MG: 1 CAPSULE ORAL at 18:46

## 2025-04-07 RX ADMIN — PENTAMIDINE ISETHIONATE 300 MG: 300 INHALANT RESPIRATORY (INHALATION) at 16:47

## 2025-04-07 RX ADMIN — Medication 2000 UNITS: at 12:38

## 2025-04-07 RX ADMIN — TACROLIMUS 2 MG: 1 CAPSULE ORAL at 06:35

## 2025-04-07 RX ADMIN — ALBUMIN HUMAN 25 G: 0.25 SOLUTION INTRAVENOUS at 08:10

## 2025-04-07 RX ADMIN — MYCOPHENOLATE MOFETIL 1000 MG: 250 CAPSULE ORAL at 08:55

## 2025-04-07 RX ADMIN — PANTOPRAZOLE SODIUM 40 MG: 40 TABLET, DELAYED RELEASE ORAL at 16:40

## 2025-04-07 RX ADMIN — MYCOPHENOLATE MOFETIL 1000 MG: 250 CAPSULE ORAL at 21:42

## 2025-04-07 RX ADMIN — OXYCODONE HYDROCHLORIDE 10 MG: 5 TABLET ORAL at 12:39

## 2025-04-07 RX ADMIN — OXYCODONE HYDROCHLORIDE 10 MG: 5 TABLET ORAL at 06:35

## 2025-04-07 ASSESSMENT — COGNITIVE AND FUNCTIONAL STATUS - GENERAL
MOVING FROM LYING ON BACK TO SITTING ON SIDE OF FLAT BED WITH BEDRAILS: A LITTLE
PERSONAL GROOMING: A LOT
HELP NEEDED FOR BATHING: A LITTLE
MOVING FROM LYING ON BACK TO SITTING ON SIDE OF FLAT BED WITH BEDRAILS: A LOT
MOVING TO AND FROM BED TO CHAIR: A LITTLE
CLIMB 3 TO 5 STEPS WITH RAILING: TOTAL
MOBILITY SCORE: 13
WALKING IN HOSPITAL ROOM: A LITTLE
DRESSING REGULAR LOWER BODY CLOTHING: A LITTLE
DRESSING REGULAR UPPER BODY CLOTHING: A LOT
MOVING TO AND FROM BED TO CHAIR: A LOT
STANDING UP FROM CHAIR USING ARMS: A LOT
WALKING IN HOSPITAL ROOM: A LOT
EATING MEALS: A LITTLE
TOILETING: A LOT
MOBILITY SCORE: 13
DAILY ACTIVITIY SCORE: 15
TURNING FROM BACK TO SIDE WHILE IN FLAT BAD: A LOT
TURNING FROM BACK TO SIDE WHILE IN FLAT BAD: A LITTLE
STANDING UP FROM CHAIR USING ARMS: A LOT
CLIMB 3 TO 5 STEPS WITH RAILING: TOTAL

## 2025-04-07 ASSESSMENT — PAIN SCALES - GENERAL
PAINLEVEL_OUTOF10: 7
PAINLEVEL_OUTOF10: 8
PAINLEVEL_OUTOF10: 4
PAINLEVEL_OUTOF10: 4

## 2025-04-07 ASSESSMENT — PAIN - FUNCTIONAL ASSESSMENT
PAIN_FUNCTIONAL_ASSESSMENT: 0-10

## 2025-04-07 NOTE — CARE PLAN
The patient's goals for the shift include  get some sleep    The clinical goals for the shift include pt will remain HDS for the shift    Over the shift, the patient did make progress toward the following goals. No Barriers to progression

## 2025-04-07 NOTE — CARE PLAN
The patient's goals for the shift include resting     The clinical goals for the shift include patient will remain HDS throughout this shift.    Over the shift, the patient did  make progress towards his goals, remained stable throughout the shift and was able to obtain rest.

## 2025-04-07 NOTE — PROGRESS NOTES
"[facility]  [facility]  CONSULT FOLLOW-UP     Reason For Consult  S/p liver and kidney transplant 4/2/25, 4/3/25     SUBJECTIVE     No overnight events.  Pt moved out of [facility] over the weekend.    EXAM     Last Recorded Vitals  Blood pressure 155/83, pulse 72, temperature 36.9 °C (98.4 °F), temperature source Temporal, resp. rate 18, height 1.93 m (6' 4\"), weight 110 kg (241 lb 10 oz), SpO2 95%.      Intake/Output Summary (Last 24 hours) at 4/7/2025 1757  Last data filed at 4/7/2025 1230  Gross per 24 hour   Intake 820 ml   Output 1125 ml   Net -305 ml       Physical Exam   General: well-developed, well-nourished, no acute distress, AAOx3  HEENT: EOM intact  CV: regular rate and rhythm  Pulm: normal respiratory effort  Abd: soft, nontender  Extremities: warm, no LE edema  Neuro: moves all extremities equally  Psych: normal mood and affect  Skin: warm, dry, intact      OBJECTIVE                                                                              Medications             Current Facility-Administered Medications:     albumin human 25 % solution 25 g, 25 g, intravenous, BID, SHASHANK Kenney-JACKIE    cholecalciferol (Vitamin D-3) tablet 2,000 Units, 2,000 Units, oral, Daily, SHASHANK Kenney-JACKIE, 2,000 Units at 04/07/25 1238    dextrose 50 % injection 12.5 g, 12.5 g, intravenous, q15 min PRN, XENIA Choudhury-CNP    dextrose 50 % injection 25 g, 25 g, intravenous, q15 min PRN, XENIA Choudhury-CNP    fluconazole (Diflucan) tablet 400 mg, 400 mg, oral, Daily, RABIA Choudhury, 400 mg at 04/07/25 0856    glucagon (Glucagen) injection 1 mg, 1 mg, intramuscular, q15 min PRN, XENIA Choudhury-CNP    glucagon (Glucagen) injection 1 mg, 1 mg, intramuscular, q15 min PRN, Kathia VERONIKA Luna PA-C    glucagon (Glucagen) injection 1 mg, 1 mg, intramuscular, q15 min PRN, Kathia L SHASHANK Luna-JACKIE    heparin (porcine) injection 5,000 Units, " 5,000 Units, subcutaneous, q8h, Yaa Dover PA-C, 5,000 Units at 04/07/25 1639    insulin lispro injection 0-10 Units, 0-10 Units, subcutaneous, TID AC, Kathia Luna PA-C, 2 Units at 04/07/25 1640    [START ON 4/8/2025] insulin NPH (Isophane) (HumuLIN N,NovoLIN N) injection 10 Units, 10 Units, subcutaneous, q AM, Kathia Luna PA-C    lidocaine PF (Xylocaine) 10 mg/mL (1 %) injection 100 mg, 10 mL, infiltration, Once, Yaa Cancino MD    melatonin tablet 3 mg, 3 mg, oral, Nightly, RABIA Choudhury, 3 mg at 04/06/25 2101    mycophenolate (Cellcept) capsule 1,000 mg, 1,000 mg, oral, BID, XENIA Choudhury-CNP, 1,000 mg at 04/07/25 0855    ondansetron (Zofran) injection 4 mg, 4 mg, intravenous, q6h PRN, XENIA Choudhury-CNP, 4 mg at 04/04/25 1455    oxyCODONE (Roxicodone) immediate release tablet 10 mg, 10 mg, oral, q6h PRN, XENIA Choudhury-CNP, 10 mg at 04/07/25 1239    oxyCODONE (Roxicodone) immediate release tablet 5 mg, 5 mg, oral, q6h PRN, XENIA Choudhury-CNP    pantoprazole (ProtoNix) EC tablet 40 mg, 40 mg, oral, BID AC, XENIA Choudhury-CNP, 40 mg at 04/07/25 1640    [COMPLETED] methylPREDNISolone sodium succinate (SOLU-Medrol) 250 mg in dextrose 5% 100 mL IV, 250 mg, intravenous, Daily, Stopped at 04/03/25 0948 **FOLLOWED BY** [COMPLETED] methylPREDNISolone sod succinate (SOLU-Medrol) injection 125 mg, 125 mg, intravenous, Daily, 125 mg at 04/04/25 0830 **FOLLOWED BY** [COMPLETED] methylPREDNISolone sod succinate (SOLU-Medrol) injection 60 mg, 60 mg, intravenous, Daily, 60 mg at 04/06/25 0935 **FOLLOWED BY** predniSONE (Deltasone) tablet 20 mg, 20 mg, oral, Daily, XEINA Choudhury-CNP, 20 mg at 04/07/25 0856    tacrolimus (Prograf) capsule 2 mg, 2 mg, oral, q12h BRITTANEY, Samson Coleman MD, 2 mg at 04/07/25 0635    traZODone (Desyrel) tablet 25 mg, 25 mg, oral, Nightly, Yaa Dover PAItzelC                                                     "                        Labs     CBC RFP   Lab Results   Component Value Date    WBC 5.0 04/07/2025    HGB 9.0 (L) 04/07/2025    HCT 26.4 (L) 04/07/2025    MCV 89 04/07/2025    PLT 49 (L) 04/07/2025    NEUTROABS 2.69 04/02/2025    Lab Results   Component Value Date     04/07/2025    K 4.0 04/07/2025     04/07/2025    CO2 23 04/07/2025    BUN 75 (H) 04/07/2025    CREATININE 4.08 (H) 04/07/2025     Lab Results   Component Value Date    MG 2.39 04/07/2025    PHOS 7.9 (H) 04/07/2025    CALCIUM 8.6 04/07/2025    ALBUMIN 3.7 04/07/2025         Hepatic Function ABG/VBG   Lab Results   Component Value Date    ALT 86 (H) 04/07/2025    AST 19 04/07/2025     (H) 04/07/2025    ALKPHOS 187 (H) 04/07/2025     Lab Results   Component Value Date    BILITOT 1.4 (H) 04/07/2025    BILIDIR 1.0 (H) 04/07/2025     Lab Results   Component Value Date    PROTIME 12.7 (H) 04/05/2025    APTT 25 (L) 04/05/2025    INR 1.1 04/05/2025    No results found for: \"NONUHFIRE\", \"LACTATE\"                                                                               Imaging     US LIVER WITH DOPPLER; 4/3/2025   IMPRESSION:  1. Status post liver transplant with borderline elevated RI of the  common hepatic artery with limited assessment of the left/right  branches as above. Findings are likely due to a combination of  technical factors and early postoperative status however recommend  continued attention on future follow-up examinations.  2. Visualized hepatic veins demonstrate antegrade flow and are patent.                                                                         GI Procedures         ASSESSMENT / PLAN     ASSESSMENT/PLAN:  López Lopez is a 65 y.o. male with a past medical history ofCKD 4, DM2, HTN, CAD s/p PCI to prox LAD 2021, h/o pAFib on Eliquis, h/o ITP dx 5/2024 s/p steroids and IVIG, hyperlipidemia, obesity who is now s/p SLK on 4/2 and 4/3 for MASLD/cirrhosis. Hepatology is consulted for " co-management.    Recommendations:  - Drains management per transplant surgery team.  - Immunosuppression, antibiotics and prophylaxis per transplant surgery team.         Patient was seen and discussed with Dr. Jha .    Thank you for the consultation. Hepatology will continue to follow.  During weekday hours of 7am-5pm, please do not hesitate to contact me on [email or web address] or page [phone], if there are any further questions.  After hours, on weekends, and on holidays, please page the [place] [phone].   Thank you.

## 2025-04-07 NOTE — SIGNIFICANT EVENT
04/07/25 1128   Patient Interaction   Organ Liver/kidney   Type of Interaction Phone conference   Interdisciplinary Rounds   Attendance Surgeon;Physician;TALIA;Pharmacist;Coordinator   Topics Discussed Diet;Home care needs;Medications;Blood test results;Discharge preparation;Activity;Imaging/test results     Transplant Surgery Multidisciplinary Team Note    López Lopez is a 65 y.o. male   POD#5 from a Liver and POD #4 s/p DDKT from a DCD donor. His post operative complications: re-exploration of the hepatic artery     24 Hour Events  1. No acute events     Last Recorded Vitals  Visit Vitals  /78 (BP Location: Left arm, Patient Position: Lying)   Pulse 69   Temp 37 °C (98.6 °F) (Temporal)   Resp 18      Intake/Output last 3 Shifts:    Intake/Output Summary (Last 24 hours) at 4/7/2025 1129  Last data filed at 4/7/2025 0810  Gross per 24 hour   Intake 1320 ml   Output 1785 ml   Net -465 ml      Vitals:    04/07/25 0530   Weight: 110 kg (241 lb 10 oz)        Assessment/Plan     Liver allograft function  -ALP +GGT up, continue to monitor   -ALT, AST down trending   -Liver DUS yesterday ok     Kidney allograft function  -Made 850 ml UOP   -Kidney DUS w/ borderline Ris   -[Albumin] to q12h     Cardiac  -BP acceptable   -Home apixiban (Afib) on hold     FENGI  -Hyperphosphatemia-- low phos diet, no binder, monitor     Endo  -Requiring some SSI  -Endo c/s + diabetes edu     Pysch  -Start 1/2 home trazodone dose     ID  -On fluconazole   -Give dose of Pentamidine today  -No CMV Ppx yet     IS  -Simulect #2 today     Principal Problem:    Management after organ transplant  Active Problems:  Patient Active Problem List   Diagnosis    Acute idiopathic thrombocytopenic purpura (Multi)    Anemia    History of CAD (coronary artery disease)    Mixed hyperlipidemia    Paroxysmal atrial fibrillation (Multi)    Type 2 diabetes mellitus with hyperglycemia, without long-term current use of insulin    Liver failure without  hepatic coma (Multi)    Metabolic dysfunction-associated steatohepatitis (MASH)    Status post insertion of drug-eluting stent into left anterior descending artery for coronary artery disease    Type 2 diabetes mellitus with diabetic chronic kidney disease    History of percutaneous transluminal coronary angioplasty    Coronary artery disease involving native coronary artery of native heart    Pre-liver transplant, listed    Pre-kidney transplant, listed    Refractory ascites    Alcoholic cirrhosis of liver with ascites (Multi)    Stage 4 chronic kidney disease (Multi)    Chronic renal impairment, stage 4 (severe) (Multi)    CKD (chronic kidney disease) stage 4, GFR 15-29 ml/min (Multi)        Immunosuppression reviewed and adjusted       Induction: Simulect and Steroid       Tacrolimus goal 8-10 ng/mL. Current dose 2 mg BID         MMF 1000 mg po BID       Solumedrol taper  DVT prophylaxis SCDS and subcutaneous heparin 5000 TID  PT/OT  Diet: Diabetic diet, low phos   Anticipated discharge Friday/Sat     Yaa Dover PA-C

## 2025-04-07 NOTE — PROGRESS NOTES
Physical Therapy    Physical Therapy Treatment    Patient Name: López Lopez  MRN: [id number]  Department: [facility] [address]  Room: [address] [place] [address]  Today's Date: 4/7/2025  Time Calculation  Start Time: 1126  Stop Time: 1159  Time Calculation (min): 33 min         Assessment/Plan   PT Assessment  Barriers to Discharge Home: Caregiver assistance, Physical needs  Caregiver Assistance: Caregiver assistance needed per identified barriers - however, level of patient's required assistance exceeds assistance available at home  Physical Needs: Stair navigation into home limited by function/safety, Ambulating household distances limited by function/safety  End of Session Communication: Bedside nurse  Assessment Comment: Pt tolerated PT session well, able to complete ambulation in room however d/t LISA drain site leaking limited ambulation trial. Pt continues to remain appropriate for Low intensity PT upon D/C from hospital.  End of Session Patient Position: Bed, 3 rail up, Alarm off, caregiver present (Seated EOB with RN present)  PT Plan  Inpatient/Swing Bed or Outpatient: Inpatient  PT Plan  Treatment/Interventions: Bed mobility, Transfer training, Gait training, Stair training, Balance training, Strengthening, Endurance training, Therapeutic exercise, Therapeutic activity  PT Plan: Ongoing PT  PT Frequency: 5 times per week  PT Discharge Recommendations: Low intensity level of continued care (anticipate)  Equipment Recommended upon Discharge: Wheeled walker  PT Recommended Transfer Status: Assist x1, Assistive device  PT - OK to Discharge: Yes (PT eval completed, recs made)      General Visit Information:   PT  Visit  PT Received On: 04/07/25  General  Missed Visit Reason:  (n/a)  Family/Caregiver Present: No  Prior to Session Communication: Bedside nurse  Patient Position Received: Up in chair, Alarm off, not on at start of session  General Comment: Pt seated up in chair on arrival, agreeable to work with PT.    Subjective    Precautions:  Precautions  Medical Precautions: Fall precautions  Post-Surgical Precautions: Abdominal surgery precautions  Precautions Comment: JPx2    Objective   Pain:  Pain Assessment  Pain Assessment: 0-10  0-10 (Numeric) Pain Score: 8  Pain Type: Surgical pain  Pain Location: Abdomen  Cognition:  Cognition  Overall Cognitive Status: Within Functional Limits  Orientation Level: Oriented X4    Activity Tolerance:  Activity Tolerance  Endurance: Tolerates 30 min exercise with multiple rests  Treatments:  Therapeutic Activity  Therapeutic Activity Performed: Yes  Therapeutic Activity 1: Pt tolerated multiple bouts of static standing with BUE support on FWW and CG/SBA. No LOBs or unsteadiness noted. Occassional LE shakiness however pt reports no fatigue with prolonged standing.  Therapeutic Activity 2: Noted pt with slightly soiled gown on arrival, RN notifed, Ok'd pt to work with PT. Pt with increased leaking R abdomen location, RN notified and assisted to seated position EOB, RN present at end of session.    Bed Mobility  Bed Mobility: No    Ambulation/Gait Training  Ambulation/Gait Training Performed: Yes  Ambulation/Gait Training 1  Surface 1: Level tile  Device 1: Rolling walker  Assistance 1: Contact guard, Minimal verbal cues  Quality of Gait 1: Diminished heel strike, Decreased step length, Shuffling gait (Decreased christina,)  Comments/Distance (ft) 1: x5ft, x7ft, x4ft, cues for posture and sequencing with FWW. Limited ambulation d/t LISA drain site leaking, RN notiifed and present at end of session.    Transfers  Transfer: Yes  Transfer 1  Transfer From 1: Chair with arms to  Transfer to 1: Stand  Technique 1: Sit to stand  Transfer Device 1: Walker  Transfer Level of Assistance 1: Moderate assistance, Minimal verbal cues  Trials/Comments 1: 2 attempts, 1 successful trial, ModA to complete, cues for proper sequencing, hand placement and rocking motion to assist with momentum.  Transfers 2  Transfer From 2:  Stand to  Transfer to 2: Bed  Technique 2: Stand to sit  Transfer Device 2: Walker  Transfer Level of Assistance 2: Minimum assistance, Minimal verbal cues  Trials/Comments 2: elevated bed height, cues for hand placement.    Stairs  Stairs: No    Outcome Measures:  [facility] Basic Mobility  Turning from your back to your side while in a flat bed without using bedrails: A lot  Moving from lying on your back to sitting on the side of a flat bed without using bedrails: A lot  Moving to and from bed to chair (including a wheelchair): A little  Standing up from a chair using your arms (e.g. wheelchair or bedside chair): A lot  To walk in hospital room: A little  Climbing 3-5 steps with railing: Total  Basic Mobility - Total Score: 13    Education Documentation  Precautions, taught by Renee Bonilla PTA at 4/7/2025  1:31 PM.  Learner: Patient  Readiness: Acceptance  Method: Explanation  Response: Verbalizes Understanding, Needs Reinforcement  Comment: Abdominal precautions, increasing OOB activity, activity pacing    Body Mechanics, taught by Renee Bonilla PTA at 4/7/2025  1:31 PM.  Learner: Patient  Readiness: Acceptance  Method: Explanation  Response: Verbalizes Understanding, Needs Reinforcement  Comment: Abdominal precautions, increasing OOB activity, activity pacing    Mobility Training, taught by Renee Bonilla PTA at 4/7/2025  1:31 PM.  Learner: Patient  Readiness: Acceptance  Method: Explanation  Response: Verbalizes Understanding, Needs Reinforcement  Comment: Abdominal precautions, increasing OOB activity, activity pacing    Education Comments  No comments found.        OP EDUCATION:       Encounter Problems       Encounter Problems (Active)       Mobility       STG - Patient will ambulate >125' with LRD, indep, VSS and without LOB  (Progressing)       Start:  04/06/25    Expected End:  04/20/25            STG - Patient will ambulate up and down a curb/step with CGA to simulate home entry  (Not  Progressing)       Start:  04/06/25    Expected End:  04/20/25               PT Transfers       STG - Patient to transfer to and from sit to supine via logrolling in flat bed, no rails indep  (Progressing)       Start:  04/06/25    Expected End:  04/20/25            STG - Patient will transfer sit to and from stand indep with LRD  (Progressing)       Start:  04/06/25    Expected End:  04/20/25 04/07/25 at 1:33 PM   Renee Bonilla PTA   Rehab Office: [phone]

## 2025-04-07 NOTE — PROGRESS NOTES
04/07/25 1047   Discharge Planning   Living Arrangements Spouse/significant other   Support Systems Spouse/significant other   Assistance Needed None   Type of Residence Private residence   Home or Post Acute Services In home services   Type of Home Care Services Home nursing visits;Home OT;Home PT   Expected Discharge Disposition Home H   Does the patient need discharge transport arranged? Yes   RoundTrip coordination needed? Yes   Has discharge transport been arranged? No   Financial Resource Strain   How hard is it for you to pay for the very basics like food, housing, medical care, and heating? Not very   Housing Stability   In the last 12 months, was there a time when you were not able to pay the mortgage or rent on time? N   Transportation Needs   In the past 12 months, has lack of transportation kept you from medical appointments or from getting medications? no       DC Planning:  Went in and met with the pt, confirmed demographics.     Transitional Care Coordination Progress Note:  Patient discussed during interdisciplinary rounds.     Plan per Medical/Surgical team:   Ok to send blanket HC referrals if needed, per pt.  Pt Needs: TBD.   [facility] at Home ordered for RPM.    HC referrals sent.   [facility] accepting.       Discharge disposition: H@H. HC    Potential Barriers: None    ADOD:  4/11    This TCC will continue to follow for home going needs and safe DC plan.      Stephanie Funes. BEBE. TCC.

## 2025-04-07 NOTE — CONSULTS
Inpatient Diabetes Education Consult    Reason for Visit:  López Lopez is a 65 y.o. male who presents for steroid induced hyperglycemia 2' liver/kidney transplant    Consulting Service/Provider: transplant team    Visit Type: Initial visit    Visit Modality: In-person    Discharge Equipment/Supply Needs: may require updated BG meter with supplies; will need insulin pens with pen needles; will need CGM with a reader -- his phone does not support either alfonso       Patient has supplies at home: Blood glucose meter:  , Testing strips:  , and Lancets    Patient History and Assessment:  New diagnosis: Steroid-induced hyperglycemia  Previous diagnosis: Type 2  Patient known to Diabetes Education department: No  Treatment prior to hospital admission: Oral medications Metformin  Blood glucose testing: Does not routinely test  Complications: cardiovascular disease, chronic kidney disease, and FORMAN; end stage liver disease  PTA Medications:    Current Outpatient Medications   Medication Instructions    acetaminophen (TYLENOL) 650 mg, oral, Every 6 hours PRN    apixaban (Eliquis) 5 mg tablet 1 tablet, 2 times daily (morning and late afternoon)    atorvastatin (Lipitor) 10 mg tablet 1 tablet, Daily    [START ON 4/8/2025] cholecalciferol (VITAMIN D-3) 2,000 Units, oral, Daily    fluconazole (Diflucan) 200 mg tablet Take 2 tablets (400 mg) by mouth once daily. Do not start before April 7, 2025.    furosemide (Lasix) 20 mg tablet 1 tablet, 2 times daily    lactulose 20 g, oral, 2 times daily    metFORMIN (Glucophage) 1,000 mg tablet 1 tablet, Every 12 hours    mycophenolate (CELLCEPT) 1,000 mg, oral, 2 times daily    pantoprazole (PROTONIX) 40 mg, oral, Daily, Do not crush, chew, or split.    predniSONE (DELTASONE) 20 mg, oral, Daily    sulfamethoxazole-trimethoprim (Bactrim) 400-80 mg tablet 1 tablet, oral, Daily    tacrolimus (PROGRAF) 0.5 mg, oral, 2 times daily    tacrolimus (PROGRAF) 3 mg, oral, 2 times daily    traZODone  "(Desyrel) 50 mg tablet 1 tablet, Nightly    valGANciclovir (VALCYTE) 900 mg, oral, Daily, Do not crush or chew.    Xifaxan 550 mg, oral, 2 times daily       Glucose   Date/Time Value Ref Range Status   04/07/2025 06:08  (H) 74 - 99 mg/dL Final   04/06/2025 05:32 AM 98 74 - 99 mg/dL Final   04/05/2025 02:30  (H) 74 - 99 mg/dL Final   04/05/2025 03:12  (H) 74 - 99 mg/dL Final   04/04/2025 04:51  (H) 74 - 99 mg/dL Final   04/04/2025 11:41  (H) 74 - 99 mg/dL Final   04/04/2025 08:16  (H) 74 - 99 mg/dL Final   04/04/2025 06:16  (H) 74 - 99 mg/dL Final   04/04/2025 02:15  (H) 74 - 99 mg/dL Final   04/03/2025 10:12  (H) 74 - 99 mg/dL Final     No results found for: \"CPEPTIDE\"  Hemoglobin A1C   Date Value Ref Range Status   09/03/2024 5.5 4.0 - 5.6 % Final   08/13/2024 6.0 (H) 4.0 - 5.6 % Final       Patient Learning/Readiness Assessment:   And Mrs. Lopez both agreeable to diabetes ed visit.    Interventions/Topics Covered:  See After Visit Summary for handouts/information sheets provided to patient.  Education Documentation  Glucagon, taught by Ana M Dawkins RN at 4/7/2025  1:29 PM.  Learner: Significant Other, Patient  Readiness: Acceptance  Method: Explanation, Demonstration, Handout, Teach-back  Response: Demonstrated Understanding, Needs Reinforcement    Insulin Dose Calculation, taught by Ana M Dawkins RN at 4/7/2025  1:29 PM.  Learner: Significant Other, Patient  Readiness: Acceptance  Method: Explanation, Demonstration, Handout, Teach-back  Response: Demonstrated Understanding, Needs Reinforcement    Insulin Administration, taught by Ana M Dawkins RN at 4/7/2025  1:29 PM.  Learner: Significant Other, Patient  Readiness: Acceptance  Method: Explanation, Demonstration, Handout, Teach-back  Response: Demonstrated Understanding, Needs Reinforcement    Insulin Types, taught by Ana M Dawkins RN at 4/7/2025  1:29 PM.  Learner: Significant Other, Patient  Readiness: " "Acceptance  Method: Explanation, Demonstration, Handout, Teach-back  Response: Demonstrated Understanding, Needs Reinforcement    Hypoglycemia, taught by Ana M Dawkins RN at 4/7/2025  1:29 PM.  Learner: Significant Other, Patient  Readiness: Acceptance  Method: Explanation, Handout, Teach-back  Response: Verbalizes Understanding    CGM Apps Available, taught by Ana M Dawkins RN at 4/7/2025  1:28 PM.  Learner: Significant Other, Patient  Readiness: Acceptance  Method: Explanation, Demonstration  Response: Needs Reinforcement  Comment: very interested in CGM.  His phone does not support either alfonso.  Will need a reader. Endo team made aware.    What is Continuous Glucose Monitoring (CGM), taught by Ana M Dawkins RN at 4/7/2025  1:28 PM.  Learner: Significant Other, Patient  Readiness: Acceptance  Method: Explanation, Demonstration  Response: Needs Reinforcement  Comment: very interested in CGM.  His phone does not support either alfonso.  Will need a reader. Endo team made aware.    Self-Care Behaviors, taught by Ana M Dawkins RN at 4/7/2025  1:28 PM.  Learner: Significant Other, Patient  Readiness: Acceptance  Method: Explanation, Teach-back  Response: Verbalizes Understanding          Additional topics covered: Review of his diabetes history of \"a few  years\"  He understands hyperglycemia with steroids -- has had steroids in the past.  He is comfortable with insulin regimen for home understanding it may be for a short time and not permanent.  Mr. Lopez is fatigued and  defers insulin eduction to wife at this time.  Insulin pen teaching done with wife and she is able to do return demo correctly.  Education re the types of insulin and how they work.  Insulin chart written out for SS.  Wife able to determine meal time dose correctly based on BG scenarios.    Both are interested in CGM -- will need a reader (endo made aware).    Additional materials provided: DM handbook    CGM:  see note above    Education Outcome/Recommendations: "        Recommendations for bedside nursing: Allow patient to self-inject insulin (supervised) and allow wife to give insulin injection with supervision    Recommendations for Providers: Follow-up w/ PCP and/or Endocrinology    Additional Comments: Mr. Lopez is agreeable to follow up visit tomorrow.  Time spent:  30 mins.

## 2025-04-08 ENCOUNTER — DOCUMENTATION (OUTPATIENT)
Dept: CARE COORDINATION | Age: 65
End: 2025-04-08
Payer: COMMERCIAL

## 2025-04-08 ENCOUNTER — APPOINTMENT (OUTPATIENT)
Dept: RADIOLOGY | Facility: HOSPITAL | Age: 65
DRG: 005 | End: 2025-04-08
Payer: COMMERCIAL

## 2025-04-08 LAB
ALBUMIN SERPL BCP-MCNC: 3.6 G/DL (ref 3.4–5)
ALP SERPL-CCNC: 365 U/L (ref 33–136)
ALT SERPL W P-5'-P-CCNC: 71 U/L (ref 10–52)
ANION GAP SERPL CALC-SCNC: 17 MMOL/L (ref 10–20)
AST SERPL W P-5'-P-CCNC: 24 U/L (ref 9–39)
BILIRUB DIRECT SERPL-MCNC: 1.3 MG/DL (ref 0–0.3)
BILIRUB SERPL-MCNC: 1.9 MG/DL (ref 0–1.2)
BUN SERPL-MCNC: 75 MG/DL (ref 6–23)
CA-I BLD-SCNC: 1.1 MMOL/L (ref 1.1–1.33)
CALCIUM SERPL-MCNC: 8.7 MG/DL (ref 8.6–10.6)
CHLORIDE SERPL-SCNC: 101 MMOL/L (ref 98–107)
CO2 SERPL-SCNC: 21 MMOL/L (ref 21–32)
CREAT SERPL-MCNC: 4.13 MG/DL (ref 0.5–1.3)
EGFRCR SERPLBLD CKD-EPI 2021: 15 ML/MIN/1.73M*2
ERYTHROCYTE [DISTWIDTH] IN BLOOD BY AUTOMATED COUNT: 15.1 % (ref 11.5–14.5)
GGT SERPL-CCNC: 652 U/L (ref 5–64)
GLUCOSE BLD MANUAL STRIP-MCNC: 110 MG/DL (ref 74–99)
GLUCOSE BLD MANUAL STRIP-MCNC: 135 MG/DL (ref 74–99)
GLUCOSE BLD MANUAL STRIP-MCNC: 149 MG/DL (ref 74–99)
GLUCOSE BLD MANUAL STRIP-MCNC: 169 MG/DL (ref 74–99)
GLUCOSE SERPL-MCNC: 115 MG/DL (ref 74–99)
HCT VFR BLD AUTO: 27.8 % (ref 41–52)
HGB BLD-MCNC: 9.4 G/DL (ref 13.5–17.5)
LABORATORY COMMENT REPORT: NORMAL
MAGNESIUM SERPL-MCNC: 2.52 MG/DL (ref 1.6–2.4)
MCH RBC QN AUTO: 30.6 PG (ref 26–34)
MCHC RBC AUTO-ENTMCNC: 33.8 G/DL (ref 32–36)
MCV RBC AUTO: 91 FL (ref 80–100)
NRBC BLD-RTO: 0 /100 WBCS (ref 0–0)
PATH REPORT.COMMENTS IMP SPEC: NORMAL
PATH REPORT.FINAL DX SPEC: NORMAL
PATH REPORT.GROSS SPEC: NORMAL
PATH REPORT.TOTAL CANCER: NORMAL
PHOSPHATE SERPL-MCNC: 6.8 MG/DL (ref 2.5–4.9)
PLATELET # BLD AUTO: 53 X10*3/UL (ref 150–450)
POTASSIUM SERPL-SCNC: 3.6 MMOL/L (ref 3.5–5.3)
PROT SERPL-MCNC: 4.7 G/DL (ref 6.4–8.2)
RBC # BLD AUTO: 3.07 X10*6/UL (ref 4.5–5.9)
SODIUM SERPL-SCNC: 135 MMOL/L (ref 136–145)
TACROLIMUS BLD-MCNC: 8.6 NG/ML
WBC # BLD AUTO: 6.1 X10*3/UL (ref 4.4–11.3)

## 2025-04-08 PROCEDURE — 2500000001 HC RX 250 WO HCPCS SELF ADMINISTERED DRUGS (ALT 637 FOR MEDICARE OP)

## 2025-04-08 PROCEDURE — 93975 VASCULAR STUDY: CPT | Performed by: RADIOLOGY

## 2025-04-08 PROCEDURE — 2500000001 HC RX 250 WO HCPCS SELF ADMINISTERED DRUGS (ALT 637 FOR MEDICARE OP): Performed by: PHYSICIAN ASSISTANT

## 2025-04-08 PROCEDURE — P9047 ALBUMIN (HUMAN), 25%, 50ML: HCPCS | Performed by: PHYSICIAN ASSISTANT

## 2025-04-08 PROCEDURE — 97110 THERAPEUTIC EXERCISES: CPT | Mod: GP,CQ

## 2025-04-08 PROCEDURE — 82977 ASSAY OF GGT: CPT

## 2025-04-08 PROCEDURE — 2020000001 HC ICU ROOM DAILY

## 2025-04-08 PROCEDURE — 82947 ASSAY GLUCOSE BLOOD QUANT: CPT

## 2025-04-08 PROCEDURE — 76705 ECHO EXAM OF ABDOMEN: CPT | Performed by: RADIOLOGY

## 2025-04-08 PROCEDURE — 85027 COMPLETE CBC AUTOMATED: CPT

## 2025-04-08 PROCEDURE — 84100 ASSAY OF PHOSPHORUS: CPT

## 2025-04-08 PROCEDURE — 80048 BASIC METABOLIC PNL TOTAL CA: CPT

## 2025-04-08 PROCEDURE — 83735 ASSAY OF MAGNESIUM: CPT

## 2025-04-08 PROCEDURE — 36415 COLL VENOUS BLD VENIPUNCTURE: CPT

## 2025-04-08 PROCEDURE — 99232 SBSQ HOSP IP/OBS MODERATE 35: CPT | Performed by: STUDENT IN AN ORGANIZED HEALTH CARE EDUCATION/TRAINING PROGRAM

## 2025-04-08 PROCEDURE — 2500000004 HC RX 250 GENERAL PHARMACY W/ HCPCS (ALT 636 FOR OP/ED): Performed by: PHYSICIAN ASSISTANT

## 2025-04-08 PROCEDURE — 2500000002 HC RX 250 W HCPCS SELF ADMINISTERED DRUGS (ALT 637 FOR MEDICARE OP, ALT 636 FOR OP/ED): Performed by: PHYSICIAN ASSISTANT

## 2025-04-08 PROCEDURE — 2500000004 HC RX 250 GENERAL PHARMACY W/ HCPCS (ALT 636 FOR OP/ED)

## 2025-04-08 PROCEDURE — 82248 BILIRUBIN DIRECT: CPT

## 2025-04-08 PROCEDURE — 97116 GAIT TRAINING THERAPY: CPT | Mod: GP,CQ

## 2025-04-08 PROCEDURE — 99232 SBSQ HOSP IP/OBS MODERATE 35: CPT | Performed by: SURGERY

## 2025-04-08 PROCEDURE — 2500000002 HC RX 250 W HCPCS SELF ADMINISTERED DRUGS (ALT 637 FOR MEDICARE OP, ALT 636 FOR OP/ED)

## 2025-04-08 PROCEDURE — 80197 ASSAY OF TACROLIMUS: CPT

## 2025-04-08 PROCEDURE — 76705 ECHO EXAM OF ABDOMEN: CPT | Mod: 59

## 2025-04-08 PROCEDURE — 82330 ASSAY OF CALCIUM: CPT

## 2025-04-08 RX ORDER — AMOXICILLIN 250 MG
1 CAPSULE ORAL 2 TIMES DAILY
Status: DISCONTINUED | OUTPATIENT
Start: 2025-04-08 | End: 2025-04-12 | Stop reason: HOSPADM

## 2025-04-08 RX ORDER — CYPROHEPTADINE HYDROCHLORIDE 4 MG/1
4 TABLET ORAL DAILY
COMMUNITY
End: 2025-04-12 | Stop reason: HOSPADM

## 2025-04-08 RX ORDER — METHOCARBAMOL 500 MG/1
500 TABLET, FILM COATED ORAL EVERY 6 HOURS PRN
Status: DISCONTINUED | OUTPATIENT
Start: 2025-04-08 | End: 2025-04-12 | Stop reason: HOSPADM

## 2025-04-08 RX ORDER — ACETAMINOPHEN 325 MG/1
650 TABLET ORAL EVERY 6 HOURS PRN
Status: DISCONTINUED | OUTPATIENT
Start: 2025-04-08 | End: 2025-04-12 | Stop reason: HOSPADM

## 2025-04-08 RX ORDER — TRAMADOL HYDROCHLORIDE 50 MG/1
50 TABLET ORAL EVERY 6 HOURS PRN
Status: DISCONTINUED | OUTPATIENT
Start: 2025-04-08 | End: 2025-04-12 | Stop reason: HOSPADM

## 2025-04-08 RX ORDER — TRAMADOL HYDROCHLORIDE 50 MG/1
25 TABLET ORAL EVERY 6 HOURS PRN
Status: DISCONTINUED | OUTPATIENT
Start: 2025-04-08 | End: 2025-04-12 | Stop reason: HOSPADM

## 2025-04-08 RX ORDER — POLYETHYLENE GLYCOL 3350 17 G/17G
17 POWDER, FOR SOLUTION ORAL 2 TIMES DAILY
Status: DISCONTINUED | OUTPATIENT
Start: 2025-04-08 | End: 2025-04-12 | Stop reason: HOSPADM

## 2025-04-08 RX ORDER — URSODIOL 300 MG/1
300 CAPSULE ORAL 3 TIMES DAILY
Status: DISCONTINUED | OUTPATIENT
Start: 2025-04-08 | End: 2025-04-12 | Stop reason: HOSPADM

## 2025-04-08 RX ADMIN — ALBUMIN HUMAN 25 G: 0.25 SOLUTION INTRAVENOUS at 20:20

## 2025-04-08 RX ADMIN — PANTOPRAZOLE SODIUM 40 MG: 40 TABLET, DELAYED RELEASE ORAL at 06:37

## 2025-04-08 RX ADMIN — INSULIN HUMAN 10 UNITS: 100 INJECTION, SUSPENSION SUBCUTANEOUS at 08:38

## 2025-04-08 RX ADMIN — FLUCONAZOLE 400 MG: 200 TABLET ORAL at 08:39

## 2025-04-08 RX ADMIN — SENNOSIDES AND DOCUSATE SODIUM 1 TABLET: 50; 8.6 TABLET ORAL at 08:43

## 2025-04-08 RX ADMIN — SENNOSIDES AND DOCUSATE SODIUM 1 TABLET: 50; 8.6 TABLET ORAL at 20:21

## 2025-04-08 RX ADMIN — HEPARIN SODIUM 5000 UNITS: 5000 INJECTION INTRAVENOUS; SUBCUTANEOUS at 18:29

## 2025-04-08 RX ADMIN — HEPARIN SODIUM 5000 UNITS: 5000 INJECTION INTRAVENOUS; SUBCUTANEOUS at 06:38

## 2025-04-08 RX ADMIN — MYCOPHENOLATE MOFETIL 1000 MG: 250 CAPSULE ORAL at 08:39

## 2025-04-08 RX ADMIN — ALBUMIN HUMAN 25 G: 0.25 SOLUTION INTRAVENOUS at 08:39

## 2025-04-08 RX ADMIN — ALBUMIN HUMAN 25 G: 0.25 SOLUTION INTRAVENOUS at 00:00

## 2025-04-08 RX ADMIN — POLYETHYLENE GLYCOL 3350 17 G: 17 POWDER, FOR SOLUTION ORAL at 20:21

## 2025-04-08 RX ADMIN — HEPARIN SODIUM 5000 UNITS: 5000 INJECTION INTRAVENOUS; SUBCUTANEOUS at 08:38

## 2025-04-08 RX ADMIN — MYCOPHENOLATE MOFETIL 1000 MG: 250 CAPSULE ORAL at 20:20

## 2025-04-08 RX ADMIN — METHOCARBAMOL 500 MG: 500 TABLET ORAL at 16:11

## 2025-04-08 RX ADMIN — URSODIOL 300 MG: 300 CAPSULE ORAL at 14:31

## 2025-04-08 RX ADMIN — PANTOPRAZOLE SODIUM 40 MG: 40 TABLET, DELAYED RELEASE ORAL at 16:11

## 2025-04-08 RX ADMIN — POLYETHYLENE GLYCOL 3350 17 G: 17 POWDER, FOR SOLUTION ORAL at 08:39

## 2025-04-08 RX ADMIN — TACROLIMUS 2 MG: 1 CAPSULE ORAL at 18:29

## 2025-04-08 RX ADMIN — PREDNISONE 20 MG: 20 TABLET ORAL at 08:39

## 2025-04-08 RX ADMIN — TACROLIMUS 2 MG: 1 CAPSULE ORAL at 06:37

## 2025-04-08 RX ADMIN — TRAZODONE HYDROCHLORIDE 25 MG: 50 TABLET ORAL at 20:21

## 2025-04-08 RX ADMIN — Medication 2000 UNITS: at 08:40

## 2025-04-08 RX ADMIN — URSODIOL 300 MG: 300 CAPSULE ORAL at 20:20

## 2025-04-08 ASSESSMENT — COGNITIVE AND FUNCTIONAL STATUS - GENERAL
CLIMB 3 TO 5 STEPS WITH RAILING: TOTAL
HELP NEEDED FOR BATHING: A LOT
STANDING UP FROM CHAIR USING ARMS: A LOT
DRESSING REGULAR LOWER BODY CLOTHING: A LOT
EATING MEALS: A LITTLE
EATING MEALS: A LITTLE
PERSONAL GROOMING: A LITTLE
TURNING FROM BACK TO SIDE WHILE IN FLAT BAD: A LOT
CLIMB 3 TO 5 STEPS WITH RAILING: A LOT
WALKING IN HOSPITAL ROOM: A LOT
MOVING TO AND FROM BED TO CHAIR: A LITTLE
HELP NEEDED FOR BATHING: A LOT
MOVING FROM LYING ON BACK TO SITTING ON SIDE OF FLAT BED WITH BEDRAILS: A LITTLE
MOVING TO AND FROM BED TO CHAIR: A LOT
HELP NEEDED FOR BATHING: A LOT
WALKING IN HOSPITAL ROOM: A LOT
STANDING UP FROM CHAIR USING ARMS: A LOT
EATING MEALS: A LITTLE
HELP NEEDED FOR BATHING: A LOT
TOILETING: A LOT
DRESSING REGULAR LOWER BODY CLOTHING: A LOT
CLIMB 3 TO 5 STEPS WITH RAILING: A LOT
TOILETING: A LOT
DRESSING REGULAR UPPER BODY CLOTHING: A LOT
CLIMB 3 TO 5 STEPS WITH RAILING: TOTAL
EATING MEALS: A LITTLE
TURNING FROM BACK TO SIDE WHILE IN FLAT BAD: A LOT
CLIMB 3 TO 5 STEPS WITH RAILING: A LOT
HELP NEEDED FOR BATHING: A LOT
MOVING TO AND FROM BED TO CHAIR: A LOT
MOVING FROM LYING ON BACK TO SITTING ON SIDE OF FLAT BED WITH BEDRAILS: A LOT
DRESSING REGULAR UPPER BODY CLOTHING: A LOT
DRESSING REGULAR LOWER BODY CLOTHING: A LITTLE
CLIMB 3 TO 5 STEPS WITH RAILING: A LOT
EATING MEALS: A LITTLE
WALKING IN HOSPITAL ROOM: A LOT
TURNING FROM BACK TO SIDE WHILE IN FLAT BAD: A LOT
TURNING FROM BACK TO SIDE WHILE IN FLAT BAD: A LOT
MOVING FROM LYING ON BACK TO SITTING ON SIDE OF FLAT BED WITH BEDRAILS: A LOT
DRESSING REGULAR UPPER BODY CLOTHING: A LOT
WALKING IN HOSPITAL ROOM: A LITTLE
STANDING UP FROM CHAIR USING ARMS: A LOT
PERSONAL GROOMING: A LITTLE
DRESSING REGULAR LOWER BODY CLOTHING: A LOT
TOILETING: A LOT
STANDING UP FROM CHAIR USING ARMS: A LOT
MOVING TO AND FROM BED TO CHAIR: A LOT
EATING MEALS: A LITTLE
STANDING UP FROM CHAIR USING ARMS: A LOT
MOVING FROM LYING ON BACK TO SITTING ON SIDE OF FLAT BED WITH BEDRAILS: A LOT
MOVING TO AND FROM BED TO CHAIR: A LOT
TOILETING: A LOT
DRESSING REGULAR UPPER BODY CLOTHING: A LOT
TOILETING: A LOT
CLIMB 3 TO 5 STEPS WITH RAILING: A LOT
TURNING FROM BACK TO SIDE WHILE IN FLAT BAD: A LOT
HELP NEEDED FOR BATHING: A LITTLE
PERSONAL GROOMING: A LITTLE
EATING MEALS: A LITTLE
MOVING FROM LYING ON BACK TO SITTING ON SIDE OF FLAT BED WITH BEDRAILS: A LOT
WALKING IN HOSPITAL ROOM: A LOT
DRESSING REGULAR UPPER BODY CLOTHING: A LOT
WALKING IN HOSPITAL ROOM: A LOT
DRESSING REGULAR UPPER BODY CLOTHING: A LOT
MOVING FROM LYING ON BACK TO SITTING ON SIDE OF FLAT BED WITH BEDRAILS: A LOT
PERSONAL GROOMING: A LITTLE
STANDING UP FROM CHAIR USING ARMS: A LOT
MOVING FROM LYING ON BACK TO SITTING ON SIDE OF FLAT BED WITH BEDRAILS: A LOT
TOILETING: A LOT
TURNING FROM BACK TO SIDE WHILE IN FLAT BAD: A LOT
DRESSING REGULAR LOWER BODY CLOTHING: A LOT
MOVING TO AND FROM BED TO CHAIR: A LOT
CLIMB 3 TO 5 STEPS WITH RAILING: A LOT
MOVING FROM LYING ON BACK TO SITTING ON SIDE OF FLAT BED WITH BEDRAILS: A LOT
DRESSING REGULAR LOWER BODY CLOTHING: A LOT
DAILY ACTIVITIY SCORE: 15
DRESSING REGULAR UPPER BODY CLOTHING: A LOT
STANDING UP FROM CHAIR USING ARMS: A LOT
MOBILITY SCORE: 13
TURNING FROM BACK TO SIDE WHILE IN FLAT BAD: A LOT
WALKING IN HOSPITAL ROOM: A LOT
HELP NEEDED FOR BATHING: A LOT
PERSONAL GROOMING: A LITTLE
STANDING UP FROM CHAIR USING ARMS: A LOT
DRESSING REGULAR LOWER BODY CLOTHING: A LOT
TOILETING: A LOT
MOVING TO AND FROM BED TO CHAIR: A LOT
WALKING IN HOSPITAL ROOM: A LOT
MOBILITY SCORE: 13
PERSONAL GROOMING: A LITTLE
PERSONAL GROOMING: A LOT
MOVING TO AND FROM BED TO CHAIR: A LOT
TURNING FROM BACK TO SIDE WHILE IN FLAT BAD: A LITTLE

## 2025-04-08 ASSESSMENT — PAIN - FUNCTIONAL ASSESSMENT
PAIN_FUNCTIONAL_ASSESSMENT: 0-10
PAIN_FUNCTIONAL_ASSESSMENT: 0-10

## 2025-04-08 ASSESSMENT — PAIN SCALES - GENERAL
PAINLEVEL_OUTOF10: 8
PAINLEVEL_OUTOF10: 8
PAINLEVEL_OUTOF10: 3
PAINLEVEL_OUTOF10: 8

## 2025-04-08 NOTE — CARE PLAN
The patient's goals for the shift include      The clinical goals for the shift include patient will remain hds      Problem: Diabetes  Goal: Achieve decreasing blood glucose levels by end of shift  Outcome: Progressing  Goal: Increase stability of blood glucose readings by end of shift  Outcome: Progressing  Goal: Decrease in ketones present in urine by end of shift  Outcome: Progressing  Goal: Maintain electrolyte levels within acceptable range throughout shift  Outcome: Progressing  Goal: Maintain glucose levels >70mg/dl to <250mg/dl throughout shift  Outcome: Progressing  Goal: No changes in neurological exam by end of shift  Outcome: Progressing  Goal: Learn about and adhere to nutrition recommendations by end of shift  Outcome: Progressing  Goal: Vital signs within normal range for age by end of shift  Outcome: Progressing  Goal: Increase self care and/or family involovement by end of shift  Outcome: Progressing  Goal: Receive DSME education by end of shift  Outcome: Progressing     Problem: Pain - Adult  Goal: Verbalizes/displays adequate comfort level or baseline comfort level  Outcome: Progressing     Problem: Safety - Adult  Goal: Free from fall injury  Outcome: Progressing     Problem: Discharge Planning  Goal: Discharge to home or other facility with appropriate resources  Outcome: Progressing     Problem: Chronic Conditions and Co-morbidities  Goal: Patient's chronic conditions and co-morbidity symptoms are monitored and maintained or improved  Outcome: Progressing     Problem: Nutrition  Goal: Nutrient intake appropriate for maintaining nutritional needs  Outcome: Progressing     Problem: Skin  Goal: Decreased wound size/increased tissue granulation at next dressing change  Outcome: Progressing  Goal: Participates in plan/prevention/treatment measures  Outcome: Progressing  Goal: Prevent/manage excess moisture  Outcome: Progressing  Goal: Prevent/minimize sheer/friction injuries  Outcome:  Progressing  Goal: Promote/optimize nutrition  Outcome: Progressing  Goal: Promote skin healing  Outcome: Progressing     Problem: Fall/Injury  Goal: Not fall by end of shift  Outcome: Progressing  Goal: Be free from injury by end of the shift  Outcome: Progressing  Goal: Verbalize understanding of personal risk factors for fall in the hospital  Outcome: Progressing  Goal: Verbalize understanding of risk factor reduction measures to prevent injury from fall in the home  Outcome: Progressing  Goal: Use assistive devices by end of the shift  Outcome: Progressing  Goal: Pace activities to prevent fatigue by end of the shift  Outcome: Progressing     Problem: Safety - Medical Restraint  Goal: Remains free of injury from restraints (Restraint for Interference with Medical Device)  Outcome: Progressing  Goal: Free from restraint(s) (Restraint for Interference with Medical Device)  Outcome: Progressing     Problem: Pain  Goal: Takes deep breaths with improved pain control throughout the shift  Outcome: Progressing  Goal: Turns in bed with improved pain control throughout the shift  Outcome: Progressing  Goal: Walks with improved pain control throughout the shift  Outcome: Progressing  Goal: Performs ADL's with improved pain control throughout shift  Outcome: Progressing  Goal: Participates in PT with improved pain control throughout the shift  Outcome: Progressing  Goal: Free from opioid side effects throughout the shift  Outcome: Progressing  Goal: Free from acute confusion related to pain meds throughout the shift  Outcome: Progressing

## 2025-04-08 NOTE — PROGRESS NOTES
TRANSPLANT SURGERY PROGRESS NOTE    López Lopez underwent transplant surgery on 4/2/2025 (Liver / Kidney) and was evaluated on multidisciplinary inpatient rounds.  I specifically evaluated the management of immunosuppression to ensure adequate exposure required to decrease the risk of rejection.  Furthermore, I reviewed potential side effects including tremor, hyperglycemia, leukopenia, infection, and other neurologic changes.  I reviewed and adjusted infectious prophylaxis based on the patients clinical condition and  protocols.     24 EVENTS: no acute events    DIAGNOSIS: Liver replaced by transplant Z94.4, kidney replaced by transplant Z94.4    PHYSICAL EXAMINATION:  Last Recorded Vitals  Visit Vitals  /82 (BP Location: Left arm, Patient Position: Lying)   Pulse 71   Temp 36.1 °C (97 °F) (Temporal)   Resp 14      Intake/Output last 3 Shifts:    Intake/Output Summary (Last 24 hours) at 4/8/2025 1732  Last data filed at 4/8/2025 1615  Gross per 24 hour   Intake 600 ml   Output 735 ml   Net -135 ml      Vitals:    04/08/25 0643   Weight: 111 kg (244 lb 4.3 oz)      Gen: A+OX3; NAD  HEENT: PERRL, sclera anicteric, MMM  Cardiac: RRR  Chest: Normal inspiratory effort  Abdomen: S/NT/ND. Incisions C/D/I.  Ext: No LE edema  Drains: serosanguinous    MEDICATION LIST: REVIEWED  Scheduled medications  albumin human, 25 g, intravenous, BID  cholecalciferol, 2,000 Units, oral, Daily  fluconazole, 400 mg, oral, Daily  heparin, 5,000 Units, subcutaneous, q8h  insulin lispro, 0-10 Units, subcutaneous, TID AC  insulin NPH (Isophane), 10 Units, subcutaneous, q AM  mycophenolate, 1,000 mg, oral, BID  pantoprazole, 40 mg, oral, BID AC  polyethylene glycol, 17 g, oral, BID  predniSONE, 20 mg, oral, Daily  sennosides-docusate sodium, 1 tablet, oral, BID  tacrolimus, 2 mg, oral, q12h BRITTANEY  traZODone, 25 mg, oral, Nightly  ursodiol, 300 mg, oral, TID    LABS:  CBC   Result Value Ref Range    WBC 6.1 4.4 - 11.3 x10*3/uL     nRBC 0.0 0.0 - 0.0 /100 WBCs    RBC 3.07 (L) 4.50 - 5.90 x10*6/uL    Hemoglobin 9.4 (L) 13.5 - 17.5 g/dL    Hematocrit 27.8 (L) 41.0 - 52.0 %    MCV 91 80 - 100 fL    MCH 30.6 26.0 - 34.0 pg    MCHC 33.8 32.0 - 36.0 g/dL    RDW 15.1 (H) 11.5 - 14.5 %    Platelets 53 (L) 150 - 450 x10*3/uL   Tacrolimus   Result Value Ref Range    Tacrolimus  8.6 <=15.0 ng/mL   Gamma-Glutamyl Transferase   Result Value Ref Range     (H) 5 - 64 U/L   Hepatic function panel   Result Value Ref Range    Albumin 3.6 3.4 - 5.0 g/dL    Bilirubin, Total 1.9 (H) 0.0 - 1.2 mg/dL    Bilirubin, Direct 1.3 (H) 0.0 - 0.3 mg/dL    Alkaline Phosphatase 365 (H) 33 - 136 U/L    ALT 71 (H) 10 - 52 U/L    AST 24 9 - 39 U/L    Total Protein 4.7 (L) 6.4 - 8.2 g/dL   Magnesium   Result Value Ref Range    Magnesium 2.52 (H) 1.60 - 2.40 mg/dL   Phosphorus   Result Value Ref Range    Phosphorus 6.8 (H) 2.5 - 4.9 mg/dL   Basic Metabolic Panel   Result Value Ref Range    Glucose 115 (H) 74 - 99 mg/dL    Sodium 135 (L) 136 - 145 mmol/L    Potassium 3.6 3.5 - 5.3 mmol/L    Chloride 101 98 - 107 mmol/L    Bicarbonate 21 21 - 32 mmol/L    Anion Gap 17 10 - 20 mmol/L    Urea Nitrogen 75 (H) 6 - 23 mg/dL    Creatinine 4.13 (H) 0.50 - 1.30 mg/dL    eGFR 15 (L) >60 mL/min/1.73m*2    Calcium 8.7 8.6 - 10.6 mg/dL     ASSESSMENT AND PLAN:    Mr. Lopez is a 65 y.o. male who underwent  transplant surgery on 4/2/2025 (Liver / Kidney).    1. Immunosuppression reviewed and adjusted       Tacrolimus: goal trough level 8-10 ng/mL      Mycophenolate: Yes - 1 gm bid      Steroid taper      Simulect dose #2: today     2. Liver/kidney allograft function      LFTs downtrending, Albumin q12h for high drain output.      Kidney: UOP stable; 2 unremarkable renal U/s     3. Wound/drain/noel and pain management      Drains: keep LISA#2 and #4 with sutures      Empty drains q4h     4. Nutrition: advance diet and Nepro supplements     5. Prophylaxis      DVT: SCDs/Subcutaneous  Heparin: No for persistent thrombocytopenia      GI prophylaxis: Protonix      Fluconazole, pentamidine today; hold valcyte     6. PT/OT: OOB to chair     7. Discharge planned for: 8 days    Case was presented at Multi Disciplinary team rounds   Attending physician, consulting physician, , pharmacist, residents and fellow were present at the meeting.    Yaa Cancino MD, PHD, MPH, FACS  Chief Transplant and Hepatobiliary Surgery

## 2025-04-08 NOTE — PROGRESS NOTES
Physical Therapy    Physical Therapy Treatment    Patient Name: López Lopez  MRN: [id number]  Department: [facility]  Room: 9068/9068-A  Today's Date: 4/8/2025  Time Calculation  Start Time: 1116  Stop Time: 1145  Time Calculation (min): 29 min    Assessment/Plan   PT Assessment  Barriers to Discharge Home: Caregiver assistance, Physical needs  Caregiver Assistance: Caregiver assistance needed per identified barriers - however, level of patient's required assistance exceeds assistance available at home  Physical Needs: Stair navigation into home limited by function/safety, Ambulating household distances limited by function/safety  End of Session Communication: Bedside nurse  Assessment Comment: Pt tolerated PT session well, able to complete LE exercises and ambulation out into hallway however limited ambulation trial d/t transport arrival. Pt continues to remain appropriate for Low intensity PT upon D/C from hospital.  End of Session Patient Position: On cart, Alarm off, not on at start of session (RN present at end of session as pt with transport)  PT Plan  Inpatient/Swing Bed or Outpatient: Inpatient  PT Plan  Treatment/Interventions: Bed mobility, Transfer training, Gait training, Stair training, Balance training, Strengthening, Endurance training, Therapeutic exercise, Therapeutic activity  PT Plan: Ongoing PT  PT Frequency: 5 times per week  PT Discharge Recommendations: Low intensity level of continued care  Equipment Recommended upon Discharge: Wheeled walker  PT Recommended Transfer Status: Assist x1, Assistive device  PT - OK to Discharge: Yes (PT eval completed, recs made)      General Visit Information:   PT  Visit  PT Received On: 04/08/25  General  Missed Visit Reason:  (n/a)  Family/Caregiver Present: Yes  Caregiver Feedback: Wife at bedside however left during PT session.  Prior to Session Communication: Bedside nurse  Patient Position Received: Up in chair, Alarm off, not on at start of  session  General Comment: Pt seated up in chair, reports having just been up to bathroom but agreeable to work with PT.    Subjective   Precautions:  Precautions  Medical Precautions: Fall precautions  Post-Surgical Precautions: Abdominal surgery precautions  Precautions Comment: JPx2    Objective   Pain:  Pain Assessment  Pain Assessment: 0-10  0-10 (Numeric) Pain Score: 8  Pain Type: Surgical pain  Pain Location: Abdomen  Cognition:  Cognition  Overall Cognitive Status: Within Functional Limits  Orientation Level: Oriented X4    Activity Tolerance:  Activity Tolerance  Endurance: Tolerates 30 min exercise with multiple rests  Treatments:  Therapeutic Exercise  Therapeutic Exercise Performed: Yes  Therapeutic Exercise Activity 1: Seated: Heel/Toe Raises, LAQ, Hip Flexion, x10 BLE    Bed Mobility  Bed Mobility: Yes  Bed Mobility 1  Bed Mobility 1: Sitting to supine  Level of Assistance 1: Moderate assistance, Minimal verbal cues  Bed Mobility Comments 1: Assist at LEs to advance into bed.    Ambulation/Gait Training  Ambulation/Gait Training Performed: Yes  Ambulation/Gait Training 1  Surface 1: Level tile  Device 1: Rolling walker  Assistance 1: Contact guard, Minimal verbal cues  Quality of Gait 1: Diminished heel strike, Inconsistent stride length, Decreased step length (Decreased christina, decreased endurance frequent standing rest breaks.)  Comments/Distance (ft) 1: x14ft, cues for upright posture, pt reports mild dizziness that did not worsen during ambulation. Limited ambulation trial d/t transport arrival for US.    Transfers  Transfer: Yes  Transfer 1  Transfer From 1: Chair with arms to  Transfer to 1: Stand  Technique 1: Sit to stand  Transfer Device 1: Walker  Transfer Level of Assistance 1: Moderate assistance, Minimal verbal cues  Trials/Comments 1: x1 trial, cues for sequencing and safe hand placement. Pt required boost to stand from chair.  Transfers 2  Transfer From 2: Stand to  Transfer to 2: Bed  (Transport cart.)  Technique 2: Stand to sit  Transfer Device 2: Walker  Transfer Level of Assistance 2: Minimum assistance, Minimal verbal cues  Trials/Comments 2: x1 trial, cues for sequencing, slow descent to seated position.    Stairs  Stairs: No    Outcome Measures:  [facility] Basic Mobility  Turning from your back to your side while in a flat bed without using bedrails: A lot  Moving from lying on your back to sitting on the side of a flat bed without using bedrails: A lot  Moving to and from bed to chair (including a wheelchair): A little  Standing up from a chair using your arms (e.g. wheelchair or bedside chair): A lot  To walk in hospital room: A little  Climbing 3-5 steps with railing: Total  Basic Mobility - Total Score: 13    Education Documentation  Precautions, taught by Renee Bonilla PTA at 4/8/2025 12:29 PM.  Learner: Patient  Readiness: Acceptance  Method: Explanation  Response: Verbalizes Understanding, Needs Reinforcement  Comment: Abdominal precautions, increasing ambulation    Body Mechanics, taught by Renee Bonilla PTA at 4/8/2025 12:29 PM.  Learner: Patient  Readiness: Acceptance  Method: Explanation  Response: Verbalizes Understanding, Needs Reinforcement  Comment: Abdominal precautions, increasing ambulation    Mobility Training, taught by Renee Bonilla PTA at 4/8/2025 12:29 PM.  Learner: Patient  Readiness: Acceptance  Method: Explanation  Response: Verbalizes Understanding, Needs Reinforcement  Comment: Abdominal precautions, increasing ambulation    Education Comments  No comments found.      Encounter Problems       Encounter Problems (Active)       Mobility       STG - Patient will ambulate >125' with LRD, indep, VSS and without LOB  (Progressing)       Start:  04/06/25    Expected End:  04/20/25            STG - Patient will ambulate up and down a curb/step with CGA to simulate home entry  (Not Progressing)       Start:  04/06/25    Expected End:  04/20/25               PT  Transfers       STG - Patient to transfer to and from sit to supine via logrolling in flat bed, no rails indep  (Progressing)       Start:  04/06/25    Expected End:  04/20/25            STG - Patient will transfer sit to and from stand indep with LRD  (Progressing)       Start:  04/06/25    Expected End:  04/20/25 04/08/25 at 12:35 PM   Renee Bonilla PTA   Rehab Office: [phone]

## 2025-04-08 NOTE — PROGRESS NOTES
"[facility]  [facility] [place]  CONSULT FOLLOW-UP     Reason For Consult  S/p liver and kidney transplant 4/2/25, 4/3/25     SUBJECTIVE     No overnight events.    EXAM     Last Recorded Vitals  Blood pressure 145/82, pulse 71, temperature 36.1 °C (97 °F), temperature source Temporal, resp. rate 14, height 1.93 m (6' 4\"), weight 111 kg (244 lb 4.3 oz), SpO2 98%.      Intake/Output Summary (Last 24 hours) at 4/8/2025 1612  Last data filed at 4/8/2025 1523  Gross per 24 hour   Intake 600 ml   Output 735 ml   Net -135 ml       Physical Exam   General: well-developed, well-nourished, no acute distress, AAOx3  HEENT: EOM intact  CV: regular rate and rhythm  Pulm: normal respiratory effort  Abd: soft, nontender  Extremities: warm, no LE edema  Neuro: moves all extremities equally  Psych: normal mood and affect  Skin: warm, dry, intact      OBJECTIVE                                                                              Medications             Current Facility-Administered Medications:     acetaminophen (Tylenol) tablet 650 mg, 650 mg, oral, q6h PRN, XENIA Jackson-CNP    albumin human 25 % solution 25 g, 25 g, intravenous, BID, Yaa Dover PA-C, Last Rate: 100 mL/hr at 04/08/25 0839, 25 g at 04/08/25 0839    cholecalciferol (Vitamin D-3) tablet 2,000 Units, 2,000 Units, oral, Daily, Yaa Dover PA-C, 2,000 Units at 04/08/25 0840    dextrose 50 % injection 12.5 g, 12.5 g, intravenous, q15 min PRN, RABIA Choudhury    dextrose 50 % injection 25 g, 25 g, intravenous, q15 min PRN, RABIA Choudhury    fluconazole (Diflucan) tablet 400 mg, 400 mg, oral, Daily, RABIA Choudhury, 400 mg at 04/08/25 0839    glucagon (Glucagen) injection 1 mg, 1 mg, intramuscular, q15 min PRN, RABIA Choudhury    heparin (porcine) injection 5,000 Units, 5,000 Units, subcutaneous, q8h, Yaa Dover PA-C, 5,000 Units at 04/08/25 0838    " insulin lispro injection 0-10 Units, 0-10 Units, subcutaneous, TID AC, MARSHAL FrankC, 2 Units at 04/07/25 1640    insulin NPH (Isophane) (HumuLIN N,NovoLIN N) injection 10 Units, 10 Units, subcutaneous, q AM, MARSHAL FrankC, 10 Units at 04/08/25 0838    methocarbamol (Robaxin) tablet 500 mg, 500 mg, oral, q6h PRN, XENIA Jackson-CNP, 500 mg at 04/08/25 1611    mycophenolate (Cellcept) capsule 1,000 mg, 1,000 mg, oral, BID, EXNIA Choudhury-CNP, 1,000 mg at 04/08/25 0839    ondansetron (Zofran) injection 4 mg, 4 mg, intravenous, q6h PRN, Imelda Em APRN-CNP, 4 mg at 04/04/25 1455    pantoprazole (ProtoNix) EC tablet 40 mg, 40 mg, oral, BID AC, Imelda Em APRN-CNP, 40 mg at 04/08/25 1611    polyethylene glycol (Glycolax, Miralax) packet 17 g, 17 g, oral, BID, Moriah Gutierres APRN-CNP, 17 g at 04/08/25 0839    [COMPLETED] methylPREDNISolone sodium succinate (SOLU-Medrol) 250 mg in dextrose 5% 100 mL IV, 250 mg, intravenous, Daily, Stopped at 04/03/25 0948 **FOLLOWED BY** [COMPLETED] methylPREDNISolone sod succinate (SOLU-Medrol) injection 125 mg, 125 mg, intravenous, Daily, 125 mg at 04/04/25 0830 **FOLLOWED BY** [COMPLETED] methylPREDNISolone sod succinate (SOLU-Medrol) injection 60 mg, 60 mg, intravenous, Daily, 60 mg at 04/06/25 0935 **FOLLOWED BY** predniSONE (Deltasone) tablet 20 mg, 20 mg, oral, Daily, Imelda XENIA Diaz-CNP, 20 mg at 04/08/25 0839    sennosides-docusate sodium (Rachel-Colace) 8.6-50 mg per tablet 1 tablet, 1 tablet, oral, BID, Moriah Gutierres APRN-CNP, 1 tablet at 04/08/25 0843    tacrolimus (Prograf) capsule 2 mg, 2 mg, oral, q12h BRITTANEY, Samson Coleman MD, 2 mg at 04/08/25 0637    traMADol (Ultram) tablet 25 mg, 25 mg, oral, q6h PRN, Moriah L Lyles, APRN-CNP    traMADol (Ultram) tablet 50 mg, 50 mg, oral, q6h PRN, Moriah L Lyles, APRN-CNP    traZODone (Desyrel) tablet 25 mg, 25 mg, oral, Nightly, Yaa Dover PA-C, 25 mg at 04/07/25 0386    ursodiol  "(Actigall) capsule 300 mg, 300 mg, oral, TID, Moriah Varelaz, APRN-CNP, 300 mg at 04/08/25 1431                                                                            Labs     CBC RFP   Lab Results   Component Value Date    WBC 6.1 04/08/2025    HGB 9.4 (L) 04/08/2025    HCT 27.8 (L) 04/08/2025    MCV 91 04/08/2025    PLT 53 (L) 04/08/2025    NEUTROABS 2.69 04/02/2025    Lab Results   Component Value Date     (L) 04/08/2025    K 3.6 04/08/2025     04/08/2025    CO2 21 04/08/2025    BUN 75 (H) 04/08/2025    CREATININE 4.13 (H) 04/08/2025     Lab Results   Component Value Date    MG 2.52 (H) 04/08/2025    PHOS 6.8 (H) 04/08/2025    CALCIUM 8.7 04/08/2025    ALBUMIN 3.6 04/08/2025         Hepatic Function ABG/VBG   Lab Results   Component Value Date    ALT 71 (H) 04/08/2025    AST 24 04/08/2025     (H) 04/08/2025    ALKPHOS 365 (H) 04/08/2025     Lab Results   Component Value Date    BILITOT 1.9 (H) 04/08/2025    BILIDIR 1.3 (H) 04/08/2025     Lab Results   Component Value Date    PROTIME 12.7 (H) 04/05/2025    APTT 25 (L) 04/05/2025    INR 1.1 04/05/2025    No results found for: \"NONUHFIRE\", \"LACTATE\"                                                                               Imaging     US LIVER WITH DOPPLER; 4/3/2025   IMPRESSION:  1. Status post liver transplant with borderline elevated RI of the  common hepatic artery with limited assessment of the left/right  branches as above. Findings are likely due to a combination of  technical factors and early postoperative status however recommend  continued attention on future follow-up examinations.  2. Visualized hepatic veins demonstrate antegrade flow and are patent.                                                                         GI Procedures         ASSESSMENT / PLAN     ASSESSMENT/PLAN:  López Lopez is a 65 y.o. male with a past medical history ofCKD 4, DM2, HTN, CAD s/p PCI to prox LAD 2021, h/o pAFib on Eliquis, h/o ITP dx " 5/2024 s/p steroids and IVIG, hyperlipidemia, obesity who is now s/p SLK on 4/2 and 4/3 for MASLD/cirrhosis. Hepatology is consulted for co-management.    Recommendations:  - Drains management per transplant surgery team.  - Immunosuppression, antibiotics and prophylaxis per transplant surgery team.       Patient was seen and discussed with Dr. Jha .    Thank you for the consultation. Hepatology will continue to follow.  During weekday hours of 7am-5pm, please do not hesitate to contact me on [employer] Chat or page [phone], if there are any further questions.  After hours, on weekends, and on holidays, please page the on-call GI fellow at [phone].   Thank you.

## 2025-04-08 NOTE — SIGNIFICANT EVENT
04/07/25 1128   Patient Interaction   Organ Liver/kidney   Type of Interaction Phone conference   Interdisciplinary Rounds   Attendance Surgeon;Physician;TALIA;Pharmacist;Coordinator   Topics Discussed Diet;Home care needs;Medications;Blood test results;Discharge preparation;Activity;Imaging/test results     Transplant Surgery Multidisciplinary Team Note    López Lopez is a 65 y.o. male   POD#6 from a Liver and POD #5 s/p DDKT from a DCD donor. His post operative complications: re-exploration of the hepatic artery     24 Hour Events  1. No acute events     Last Recorded Vitals  Visit Vitals  /78 (BP Location: Right arm, Patient Position: Lying)   Pulse 74   Temp 36.2 °C (97.2 °F) (Temporal)   Resp 16      Intake/Output last 3 Shifts:    Intake/Output Summary (Last 24 hours) at 4/8/2025 1136  Last data filed at 4/8/2025 0711  Gross per 24 hour   Intake 600 ml   Output 860 ml   Net -260 ml      Vitals:    04/08/25 0643   Weight: 111 kg (244 lb 4.3 oz)        Assessment/Plan     Liver allograft function  -ALP +GGT up again this morning   -ALT, AST down trending   -repeat Liver DUS today  -starting ursodial    Kidney allograft function  -Made 500 ml UOP   -Kidney DUS w/ borderline Ris   -[Albumin] to q12h     Cardiac  -BP acceptable   -Home apixiban (Afib) on hold     FENGI  -Hyperphosphatemia-- low phos diet, no binder, monitor     Endo  -Requiring some SSI  -Diabetes education complete    Pysch  -Start 1/2 home trazodone dose     ID  -On fluconazole   -received dose of Pentamidine 4/7  -No CMV Ppx yet       Principal Problem:    Management after organ transplant  Active Problems:  Patient Active Problem List   Diagnosis    Acute idiopathic thrombocytopenic purpura (Multi)    Anemia    History of CAD (coronary artery disease)    Mixed hyperlipidemia    Paroxysmal atrial fibrillation (Multi)    Type 2 diabetes mellitus with hyperglycemia, without long-term current use of insulin    Liver failure without  hepatic coma (Multi)    Metabolic dysfunction-associated steatohepatitis (MASH)    Status post insertion of drug-eluting stent into left anterior descending artery for coronary artery disease    Type 2 diabetes mellitus with diabetic chronic kidney disease    History of percutaneous transluminal coronary angioplasty    Coronary artery disease involving native coronary artery of native heart    Pre-liver transplant, listed    Pre-kidney transplant, listed    Refractory ascites    Alcoholic cirrhosis of liver with ascites (Multi)    Stage 4 chronic kidney disease (Multi)    Chronic renal impairment, stage 4 (severe) (Multi)    CKD (chronic kidney disease) stage 4, GFR 15-29 ml/min (Multi)        Immunosuppression reviewed and adjusted       Induction: Simulect and Steroid       Tacrolimus goal 8-10 ng/mL. Current dose 2 mg BID         MMF 1000 mg po BID       Prednisone 20 mg daily  DVT prophylaxis SCDS and subcutaneous heparin 5000 TID  PT/OT  Diet: Diabetic diet, low phos   Anticipated discharge Friday/Sat     Moriah Gutierres, APRN-CNP

## 2025-04-08 NOTE — NURSING NOTE
Mr. Lopez is out of his room in radiology.  Mrs. Lopez present.  Review of insulin regimen for home.  She states she understands the insulin pen and comfortable with using.  She has not yet had an opportunity to give Mr. Lopez an insulin injection -- RN made aware to provide opportunity.    Per wife, waiting to see if pharmacy has a reader for him prior to discharge.  Will follow up tomorrow to review diabetes care plan.  Time spent:  20 mins.

## 2025-04-09 ENCOUNTER — DOCUMENTATION (OUTPATIENT)
Dept: TRANSPLANT | Facility: HOSPITAL | Age: 65
End: 2025-04-09
Payer: COMMERCIAL

## 2025-04-09 ENCOUNTER — TELEPHONE (OUTPATIENT)
Facility: HOSPITAL | Age: 65
End: 2025-04-09

## 2025-04-09 LAB
ALBUMIN SERPL BCP-MCNC: 3.6 G/DL (ref 3.4–5)
ALP SERPL-CCNC: 309 U/L (ref 33–136)
ALT SERPL W P-5'-P-CCNC: 55 U/L (ref 10–52)
ANION GAP SERPL CALC-SCNC: 19 MMOL/L (ref 10–20)
AST SERPL W P-5'-P-CCNC: 15 U/L (ref 9–39)
BILIRUB DIRECT SERPL-MCNC: 0.9 MG/DL (ref 0–0.3)
BILIRUB SERPL-MCNC: 1.6 MG/DL (ref 0–1.2)
BUN SERPL-MCNC: 75 MG/DL (ref 6–23)
CA-I BLD-SCNC: 1.2 MMOL/L (ref 1.1–1.33)
CALCIUM SERPL-MCNC: 8.6 MG/DL (ref 8.6–10.6)
CHLORIDE SERPL-SCNC: 98 MMOL/L (ref 98–107)
CO2 SERPL-SCNC: 21 MMOL/L (ref 21–32)
CREAT SERPL-MCNC: 3.91 MG/DL (ref 0.5–1.3)
EGFRCR SERPLBLD CKD-EPI 2021: 16 ML/MIN/1.73M*2
ERYTHROCYTE [DISTWIDTH] IN BLOOD BY AUTOMATED COUNT: 15.2 % (ref 11.5–14.5)
GGT SERPL-CCNC: 530 U/L (ref 5–64)
GLUCOSE BLD MANUAL STRIP-MCNC: 127 MG/DL (ref 74–99)
GLUCOSE BLD MANUAL STRIP-MCNC: 159 MG/DL (ref 74–99)
GLUCOSE BLD MANUAL STRIP-MCNC: 188 MG/DL (ref 74–99)
GLUCOSE BLD MANUAL STRIP-MCNC: 216 MG/DL (ref 74–99)
GLUCOSE SERPL-MCNC: 120 MG/DL (ref 74–99)
HCT VFR BLD AUTO: 27.5 % (ref 41–52)
HGB BLD-MCNC: 9.3 G/DL (ref 13.5–17.5)
MAGNESIUM SERPL-MCNC: 2.44 MG/DL (ref 1.6–2.4)
MCH RBC QN AUTO: 29.7 PG (ref 26–34)
MCHC RBC AUTO-ENTMCNC: 33.8 G/DL (ref 32–36)
MCV RBC AUTO: 88 FL (ref 80–100)
NRBC BLD-RTO: 0 /100 WBCS (ref 0–0)
PHOSPHATE SERPL-MCNC: 5.6 MG/DL (ref 2.5–4.9)
PLATELET # BLD AUTO: 65 X10*3/UL (ref 150–450)
POTASSIUM SERPL-SCNC: 3.8 MMOL/L (ref 3.5–5.3)
PROT SERPL-MCNC: 5 G/DL (ref 6.4–8.2)
RBC # BLD AUTO: 3.13 X10*6/UL (ref 4.5–5.9)
SODIUM SERPL-SCNC: 134 MMOL/L (ref 136–145)
TACROLIMUS BLD-MCNC: 9.1 NG/ML
WBC # BLD AUTO: 8.3 X10*3/UL (ref 4.4–11.3)

## 2025-04-09 PROCEDURE — 2500000004 HC RX 250 GENERAL PHARMACY W/ HCPCS (ALT 636 FOR OP/ED): Performed by: PHYSICIAN ASSISTANT

## 2025-04-09 PROCEDURE — P9047 ALBUMIN (HUMAN), 25%, 50ML: HCPCS | Performed by: PHYSICIAN ASSISTANT

## 2025-04-09 PROCEDURE — 2500000001 HC RX 250 WO HCPCS SELF ADMINISTERED DRUGS (ALT 637 FOR MEDICARE OP): Performed by: PHYSICIAN ASSISTANT

## 2025-04-09 PROCEDURE — 82947 ASSAY GLUCOSE BLOOD QUANT: CPT

## 2025-04-09 PROCEDURE — 2500000002 HC RX 250 W HCPCS SELF ADMINISTERED DRUGS (ALT 637 FOR MEDICARE OP, ALT 636 FOR OP/ED)

## 2025-04-09 PROCEDURE — 84100 ASSAY OF PHOSPHORUS: CPT

## 2025-04-09 PROCEDURE — 2020000001 HC ICU ROOM DAILY

## 2025-04-09 PROCEDURE — 2500000004 HC RX 250 GENERAL PHARMACY W/ HCPCS (ALT 636 FOR OP/ED)

## 2025-04-09 PROCEDURE — 80197 ASSAY OF TACROLIMUS: CPT

## 2025-04-09 PROCEDURE — 2500000001 HC RX 250 WO HCPCS SELF ADMINISTERED DRUGS (ALT 637 FOR MEDICARE OP)

## 2025-04-09 PROCEDURE — 85027 COMPLETE CBC AUTOMATED: CPT

## 2025-04-09 PROCEDURE — 2500000002 HC RX 250 W HCPCS SELF ADMINISTERED DRUGS (ALT 637 FOR MEDICARE OP, ALT 636 FOR OP/ED): Performed by: PHYSICIAN ASSISTANT

## 2025-04-09 PROCEDURE — 97116 GAIT TRAINING THERAPY: CPT | Mod: GP,CQ

## 2025-04-09 PROCEDURE — 82977 ASSAY OF GGT: CPT

## 2025-04-09 PROCEDURE — 82330 ASSAY OF CALCIUM: CPT

## 2025-04-09 PROCEDURE — 97530 THERAPEUTIC ACTIVITIES: CPT | Mod: GP,CQ

## 2025-04-09 PROCEDURE — 83735 ASSAY OF MAGNESIUM: CPT

## 2025-04-09 PROCEDURE — 99232 SBSQ HOSP IP/OBS MODERATE 35: CPT | Performed by: STUDENT IN AN ORGANIZED HEALTH CARE EDUCATION/TRAINING PROGRAM

## 2025-04-09 PROCEDURE — 36415 COLL VENOUS BLD VENIPUNCTURE: CPT

## 2025-04-09 PROCEDURE — 80076 HEPATIC FUNCTION PANEL: CPT

## 2025-04-09 PROCEDURE — RXMED WILLOW AMBULATORY MEDICATION CHARGE

## 2025-04-09 PROCEDURE — 99232 SBSQ HOSP IP/OBS MODERATE 35: CPT | Performed by: SURGERY

## 2025-04-09 PROCEDURE — 80053 COMPREHEN METABOLIC PANEL: CPT

## 2025-04-09 RX ORDER — IBUPROFEN 200 MG
CAPSULE ORAL
Qty: 100 EACH | Refills: 0 | Status: SHIPPED | OUTPATIENT
Start: 2025-04-09

## 2025-04-09 RX ORDER — ISOPROPYL ALCOHOL 70 ML/100ML
SWAB TOPICAL
Qty: 400 EACH | Refills: 3 | Status: SHIPPED | OUTPATIENT
Start: 2025-04-09

## 2025-04-09 RX ORDER — ALOGLIPTIN 6.25 MG/1
6.25 TABLET, FILM COATED ORAL DAILY
Qty: 30 TABLET | Refills: 0 | Status: SHIPPED | OUTPATIENT
Start: 2025-04-09 | End: 2025-04-10

## 2025-04-09 RX ORDER — VALGANCICLOVIR 450 MG/1
450 TABLET, FILM COATED ORAL
Status: DISCONTINUED | OUTPATIENT
Start: 2025-04-09 | End: 2025-04-12 | Stop reason: HOSPADM

## 2025-04-09 RX ORDER — ASPIRIN 81 MG/1
81 TABLET ORAL DAILY
Status: DISCONTINUED | OUTPATIENT
Start: 2025-04-09 | End: 2025-04-12 | Stop reason: HOSPADM

## 2025-04-09 RX ORDER — ASPIRIN 81 MG/1
81 TABLET ORAL DAILY
Qty: 30 TABLET | Refills: 0 | Status: SHIPPED | OUTPATIENT
Start: 2025-04-10 | End: 2025-05-11

## 2025-04-09 RX ORDER — BISACODYL 10 MG/1
10 SUPPOSITORY RECTAL DAILY
Status: DISCONTINUED | OUTPATIENT
Start: 2025-04-10 | End: 2025-04-12 | Stop reason: HOSPADM

## 2025-04-09 RX ORDER — URSODIOL 300 MG/1
300 CAPSULE ORAL 3 TIMES DAILY
Qty: 90 CAPSULE | Refills: 0 | Status: SHIPPED | OUTPATIENT
Start: 2025-04-09 | End: 2025-04-17 | Stop reason: HOSPADM

## 2025-04-09 RX ORDER — LANCETS 33 GAUGE
1 EACH MISCELLANEOUS 3 TIMES DAILY
Qty: 100 EACH | Refills: 0 | Status: SHIPPED | OUTPATIENT
Start: 2025-04-09

## 2025-04-09 RX ORDER — DEXTROSE 4 G
TABLET,CHEWABLE ORAL
Qty: 1 EACH | Refills: 0 | Status: SHIPPED | OUTPATIENT
Start: 2025-04-09

## 2025-04-09 RX ORDER — BISACODYL 10 MG/1
10 SUPPOSITORY RECTAL ONCE
Status: COMPLETED | OUTPATIENT
Start: 2025-04-09 | End: 2025-04-09

## 2025-04-09 RX ORDER — POLYETHYLENE GLYCOL 3350 17 G/17G
17 POWDER, FOR SOLUTION ORAL 2 TIMES DAILY
Qty: 238 G | Refills: 0 | Status: SHIPPED | OUTPATIENT
Start: 2025-04-09 | End: 2025-04-11

## 2025-04-09 RX ORDER — FUROSEMIDE 10 MG/ML
40 INJECTION INTRAMUSCULAR; INTRAVENOUS ONCE
Status: COMPLETED | OUTPATIENT
Start: 2025-04-09 | End: 2025-04-09

## 2025-04-09 RX ORDER — BLOOD-GLUCOSE,RECEIVER,CONT
EACH MISCELLANEOUS
Qty: 1 EACH | Refills: 0 | Status: SHIPPED | OUTPATIENT
Start: 2025-04-09

## 2025-04-09 RX ORDER — DOCUSATE SODIUM 100 MG/1
100 CAPSULE, LIQUID FILLED ORAL 2 TIMES DAILY PRN
Qty: 28 CAPSULE | Refills: 0 | Status: SHIPPED | OUTPATIENT
Start: 2025-04-09 | End: 2025-04-17 | Stop reason: HOSPADM

## 2025-04-09 RX ORDER — TRAMADOL HYDROCHLORIDE 50 MG/1
50 TABLET ORAL EVERY 6 HOURS PRN
Qty: 15 TABLET | Refills: 0 | Status: SHIPPED | OUTPATIENT
Start: 2025-04-09 | End: 2025-04-16

## 2025-04-09 RX ORDER — BLOOD-GLUCOSE SENSOR
EACH MISCELLANEOUS
Qty: 3 EACH | Refills: 11 | Status: SHIPPED | OUTPATIENT
Start: 2025-04-09

## 2025-04-09 RX ADMIN — FUROSEMIDE 40 MG: 10 INJECTION INTRAMUSCULAR; INTRAVENOUS at 10:50

## 2025-04-09 RX ADMIN — BISACODYL 10 MG: 10 SUPPOSITORY RECTAL at 09:13

## 2025-04-09 RX ADMIN — MYCOPHENOLATE MOFETIL 1000 MG: 250 CAPSULE ORAL at 21:44

## 2025-04-09 RX ADMIN — Medication 2000 UNITS: at 09:14

## 2025-04-09 RX ADMIN — PANTOPRAZOLE SODIUM 40 MG: 40 TABLET, DELAYED RELEASE ORAL at 06:11

## 2025-04-09 RX ADMIN — HEPARIN SODIUM 5000 UNITS: 5000 INJECTION INTRAVENOUS; SUBCUTANEOUS at 15:39

## 2025-04-09 RX ADMIN — POLYETHYLENE GLYCOL 3350 17 G: 17 POWDER, FOR SOLUTION ORAL at 21:44

## 2025-04-09 RX ADMIN — POLYETHYLENE GLYCOL 3350 17 G: 17 POWDER, FOR SOLUTION ORAL at 09:13

## 2025-04-09 RX ADMIN — INSULIN LISPRO 2 UNITS: 100 INJECTION, SOLUTION INTRAVENOUS; SUBCUTANEOUS at 17:37

## 2025-04-09 RX ADMIN — INSULIN HUMAN 10 UNITS: 100 INJECTION, SUSPENSION SUBCUTANEOUS at 09:14

## 2025-04-09 RX ADMIN — URSODIOL 300 MG: 300 CAPSULE ORAL at 15:39

## 2025-04-09 RX ADMIN — ALBUMIN HUMAN 25 G: 0.25 SOLUTION INTRAVENOUS at 09:13

## 2025-04-09 RX ADMIN — TACROLIMUS 2 MG: 1 CAPSULE ORAL at 06:11

## 2025-04-09 RX ADMIN — TRAZODONE HYDROCHLORIDE 25 MG: 50 TABLET ORAL at 21:44

## 2025-04-09 RX ADMIN — TACROLIMUS 2 MG: 1 CAPSULE ORAL at 18:30

## 2025-04-09 RX ADMIN — URSODIOL 300 MG: 300 CAPSULE ORAL at 21:44

## 2025-04-09 RX ADMIN — PANTOPRAZOLE SODIUM 40 MG: 40 TABLET, DELAYED RELEASE ORAL at 15:39

## 2025-04-09 RX ADMIN — INSULIN LISPRO 2 UNITS: 100 INJECTION, SOLUTION INTRAVENOUS; SUBCUTANEOUS at 12:08

## 2025-04-09 RX ADMIN — SENNOSIDES AND DOCUSATE SODIUM 1 TABLET: 50; 8.6 TABLET ORAL at 09:14

## 2025-04-09 RX ADMIN — MYCOPHENOLATE MOFETIL 1000 MG: 250 CAPSULE ORAL at 09:14

## 2025-04-09 RX ADMIN — PREDNISONE 20 MG: 20 TABLET ORAL at 09:14

## 2025-04-09 RX ADMIN — ASPIRIN 81 MG: 81 TABLET, COATED ORAL at 10:50

## 2025-04-09 RX ADMIN — VALGANCICLOVIR HYDROCHLORIDE 450 MG: 450 TABLET ORAL at 10:50

## 2025-04-09 RX ADMIN — SENNOSIDES AND DOCUSATE SODIUM 1 TABLET: 50; 8.6 TABLET ORAL at 21:44

## 2025-04-09 RX ADMIN — URSODIOL 300 MG: 300 CAPSULE ORAL at 09:14

## 2025-04-09 RX ADMIN — FLUCONAZOLE 400 MG: 200 TABLET ORAL at 09:14

## 2025-04-09 RX ADMIN — HEPARIN SODIUM 5000 UNITS: 5000 INJECTION INTRAVENOUS; SUBCUTANEOUS at 06:17

## 2025-04-09 RX ADMIN — ALBUMIN HUMAN 25 G: 0.25 SOLUTION INTRAVENOUS at 21:44

## 2025-04-09 ASSESSMENT — PAIN SCALES - GENERAL
PAINLEVEL_OUTOF10: 0 - NO PAIN
PAINLEVEL_OUTOF10: 0 - NO PAIN

## 2025-04-09 ASSESSMENT — COGNITIVE AND FUNCTIONAL STATUS - GENERAL
STANDING UP FROM CHAIR USING ARMS: A LITTLE
HELP NEEDED FOR BATHING: A LITTLE
DRESSING REGULAR UPPER BODY CLOTHING: A LOT
TURNING FROM BACK TO SIDE WHILE IN FLAT BAD: A LITTLE
DRESSING REGULAR LOWER BODY CLOTHING: A LITTLE
MOBILITY SCORE: 13
TURNING FROM BACK TO SIDE WHILE IN FLAT BAD: A LITTLE
TOILETING: A LITTLE
MOVING TO AND FROM BED TO CHAIR: A LOT
MOBILITY SCORE: 16
DAILY ACTIVITIY SCORE: 18
PERSONAL GROOMING: A LITTLE
CLIMB 3 TO 5 STEPS WITH RAILING: TOTAL
MOVING TO AND FROM BED TO CHAIR: A LITTLE
STANDING UP FROM CHAIR USING ARMS: A LOT
WALKING IN HOSPITAL ROOM: A LOT
MOVING FROM LYING ON BACK TO SITTING ON SIDE OF FLAT BED WITH BEDRAILS: A LITTLE
STANDING UP FROM CHAIR USING ARMS: A LOT
TOILETING: A LITTLE
CLIMB 3 TO 5 STEPS WITH RAILING: TOTAL
MOVING FROM LYING ON BACK TO SITTING ON SIDE OF FLAT BED WITH BEDRAILS: A LITTLE
DRESSING REGULAR UPPER BODY CLOTHING: A LOT
TURNING FROM BACK TO SIDE WHILE IN FLAT BAD: A LOT
PERSONAL GROOMING: A LITTLE
DAILY ACTIVITIY SCORE: 18
MOVING FROM LYING ON BACK TO SITTING ON SIDE OF FLAT BED WITH BEDRAILS: A LITTLE
MOBILITY SCORE: 13
CLIMB 3 TO 5 STEPS WITH RAILING: A LOT
WALKING IN HOSPITAL ROOM: A LOT
WALKING IN HOSPITAL ROOM: A LITTLE
HELP NEEDED FOR BATHING: A LITTLE
DRESSING REGULAR LOWER BODY CLOTHING: A LITTLE
MOVING TO AND FROM BED TO CHAIR: A LOT

## 2025-04-09 ASSESSMENT — PAIN - FUNCTIONAL ASSESSMENT: PAIN_FUNCTIONAL_ASSESSMENT: 0-10

## 2025-04-09 NOTE — NURSING NOTE
"Mr. Lopez is resting in bed.  Wife at bedside.  She has done insulin injection for him today with RN -- felt \"it went well\"  She is comfortable with the insulin pen.  CGM with reader has been ordered and to be delivered to bedside.  Mr. Lopez and wife are agreeable to follow up tomorrow for CGM placement (will educate wife to place).  Time spent:  20 mins.  "

## 2025-04-09 NOTE — PROGRESS NOTES
TRANSPLANT SURGERY PROGRESS NOTE    López Lopez underwent transplant surgery on 4/2/2025 (Liver / Kidney) and was evaluated on multidisciplinary inpatient rounds.  I specifically evaluated the management of immunosuppression to ensure adequate exposure required to decrease the risk of rejection.  Furthermore, I reviewed potential side effects including tremor, hyperglycemia, leukopenia, infection, and other neurologic changes.  I reviewed and adjusted infectious prophylaxis based on the patients clinical condition and  protocols.     24 EVENTS: no acute events, eating better    DIAGNOSIS: Liver replaced by transplant Z94.4, kidney replaced by transplant Z94.4    PHYSICAL EXAMINATION:  Last Recorded Vitals  Visit Vitals  /80 (BP Location: Left arm, Patient Position: Lying)   Pulse 81   Temp 37 °C (98.6 °F) (Temporal)   Resp 18      Intake/Output last 3 Shifts:    Intake/Output Summary (Last 24 hours) at 4/9/2025 1142  Last data filed at 4/9/2025 0914  Gross per 24 hour   Intake 2860 ml   Output 1485 ml   Net 1375 ml      Vitals:    04/09/25 0551   Weight: 111 kg (244 lb 7.8 oz)      Gen: A+OX3; NAD  HEENT: PERRL, sclera anicteric, MMM  Cardiac: RRR  Chest: Normal inspiratory effort  Abdomen: S/NT/ND. Incision C/D/I.  Ext: No LE edema  Drains: serosanguinous    MEDICATION LIST: REVIEWED  Scheduled medications  albumin human, 25 g, intravenous, BID  aspirin, 81 mg, oral, Daily  cholecalciferol, 2,000 Units, oral, Daily  fluconazole, 400 mg, oral, Daily  heparin, 5,000 Units, subcutaneous, q8h  insulin lispro, 0-10 Units, subcutaneous, TID AC  insulin NPH (Isophane), 10 Units, subcutaneous, q AM  mycophenolate, 1,000 mg, oral, BID  pantoprazole, 40 mg, oral, BID AC  polyethylene glycol, 17 g, oral, BID  predniSONE, 20 mg, oral, Daily  sennosides-docusate sodium, 1 tablet, oral, BID  tacrolimus, 2 mg, oral, q12h BRITTANEY  traZODone, 25 mg, oral, Nightly  ursodiol, 300 mg, oral, TID  valGANciclovir, 450 mg,  oral, q48h    LABS:  CBC   Result Value Ref Range     WBC 8.3 4.4 - 11.3 x10*3/uL     nRBC 0.0 0.0 - 0.0 /100 WBCs     RBC 3.13 (L) 4.50 - 5.90 x10*6/uL     Hemoglobin 9.3 (L) 13.5 - 17.5 g/dL     Hematocrit 27.5 (L) 41.0 - 52.0 %     MCV 88 80 - 100 fL     MCH 29.7 26.0 - 34.0 pg     MCHC 33.8 32.0 - 36.0 g/dL     RDW 15.2 (H) 11.5 - 14.5 %     Platelets 65 (L) 150 - 450 x10*3/uL   Gamma-Glutamyl Transferase   Result Value Ref Range      (H) 5 - 64 U/L   Hepatic function panel   Result Value Ref Range     Albumin 3.6 3.4 - 5.0 g/dL     Bilirubin, Total 1.6 (H) 0.0 - 1.2 mg/dL     Bilirubin, Direct 0.9 (H) 0.0 - 0.3 mg/dL     Alkaline Phosphatase 309 (H) 33 - 136 U/L     ALT 55 (H) 10 - 52 U/L     AST 15 9 - 39 U/L     Total Protein 5.0 (L) 6.4 - 8.2 g/dL   Magnesium   Result Value Ref Range     Magnesium 2.44 (H) 1.60 - 2.40 mg/dL   Phosphorus   Result Value Ref Range     Phosphorus 5.6 (H) 2.5 - 4.9 mg/dL   Basic Metabolic Panel   Result Value Ref Range     Glucose 120 (H) 74 - 99 mg/dL     Sodium 134 (L) 136 - 145 mmol/L     Potassium 3.8 3.5 - 5.3 mmol/L     Chloride 98 98 - 107 mmol/L     Bicarbonate 21 21 - 32 mmol/L     Anion Gap 19 10 - 20 mmol/L     Urea Nitrogen 75 (H) 6 - 23 mg/dL     Creatinine 3.91 (H) 0.50 - 1.30 mg/dL     eGFR 16 (L) >60 mL/min/1.73m*2     Calcium 8.6 8.6 - 10.6 mg/dL      ASSESSMENT AND PLAN:     Mr. Lopez is a 65 y.o. male who underwent  transplant surgery on 4/2/2025 (Liver / Kidney).     1. Immunosuppression reviewed and adjusted       Tacrolimus: goal trough level 8-10 ng/mL      Mycophenolate: Yes - 1 gm bid      Steroid taper      Simulect dose #2: completed     2. Liver/kidney allograft function      LFTs downtrending, Albumin q12h for high drain output.      Kidney: UOP stable; lasix 40 mg IV today     3. Wound/drain/noel and pain management      Drains: keep LISA#2 and #4 with sutures      Empty drains q4h     4. Nutrition: advance diet and Nepro supplements     5.  Prophylaxis      DVT: SCDs/Subcutaneous Heparin: No for persistent thrombocytopenia      GI prophylaxis: Protonix      Fluconazole, pentamidine; start ASA and valcyte     6. PT/OT: OOB to chair     7. Discharge planned for: 2 days    Case was presented at Multi Disciplinary team rounds   Attending physician, consulting physician, , pharmacist, residents and fellow were present at the meeting.    Yaa Cancino MD, PHD, MPH, FACS  Chief Transplant and Hepatobiliary Surgery

## 2025-04-09 NOTE — SIGNIFICANT EVENT
04/07/25 1128   Patient Interaction   Organ Liver/kidney   Type of Interaction Phone conference   Interdisciplinary Rounds   Attendance Surgeon;Physician;TALIA;Pharmacist;Coordinator   Topics Discussed Diet;Home care needs;Medications;Blood test results;Discharge preparation;Activity;Imaging/test results     Transplant Surgery Multidisciplinary Team Note    López Lopez is a 65 y.o. male   POD#7 from a Liver and POD #5 s/p DDKT from a DCD donor. His post operative complications: re-exploration of the hepatic artery     24 Hour Events  1. No acute events     Last Recorded Vitals  Visit Vitals  /80 (BP Location: Left arm, Patient Position: Lying)   Pulse 81   Temp 37 °C (98.6 °F) (Temporal)   Resp 18      Intake/Output last 3 Shifts:    Intake/Output Summary (Last 24 hours) at 4/9/2025 1133  Last data filed at 4/9/2025 0914  Gross per 24 hour   Intake 2860 ml   Output 1485 ml   Net 1375 ml      Vitals:    04/09/25 0551   Weight: 111 kg (244 lb 7.8 oz)        Assessment/Plan     Liver allograft function  -liver enzymes down trending   -repeat Liver DUS yesterday, unremarkable  -continue ursodial  -starting ASA 81 mg QD    Kidney allograft function  -Made 600 ml UOP  - 40 mg lasix IV today   -Kidney DUS w/ borderline Ris   -[Albumin] to q12h     Cardiac  -BP acceptable   -Home apixiban (Afib) on hold     FENGI  -Hyperphosphatemia-- low phos diet, no binder, monitor     Endo  -Requiring some SSI  -Diabetes education complete    Pysch  -continue 1/2 home trazodone dose     ID  -On fluconazole   -received dose of Pentamidine 4/7  -starting valcyte today    GI  - await post op BM      Principal Problem:    Management after organ transplant  Active Problems:  Patient Active Problem List   Diagnosis    Acute idiopathic thrombocytopenic purpura (Multi)    Anemia    History of CAD (coronary artery disease)    Mixed hyperlipidemia    Paroxysmal atrial fibrillation (Multi)    Type 2 diabetes mellitus with  hyperglycemia, without long-term current use of insulin    Liver failure without hepatic coma (Multi)    Metabolic dysfunction-associated steatohepatitis (MASH)    Status post insertion of drug-eluting stent into left anterior descending artery for coronary artery disease    Type 2 diabetes mellitus with diabetic chronic kidney disease    History of percutaneous transluminal coronary angioplasty    Coronary artery disease involving native coronary artery of native heart    Pre-liver transplant, listed    Pre-kidney transplant, listed    Refractory ascites    Alcoholic cirrhosis of liver with ascites (Multi)    Stage 4 chronic kidney disease (Multi)    Chronic renal impairment, stage 4 (severe) (Multi)    CKD (chronic kidney disease) stage 4, GFR 15-29 ml/min (Multi)        Immunosuppression reviewed and adjusted       Induction: Simulect and Steroid       Tacrolimus goal 8-10 ng/mL. Current dose 2 mg BID         MMF 1000 mg po BID       Prednisone 20 mg daily  DVT prophylaxis SCDS and subcutaneous heparin 5000 TID  PT/OT  Diet: Diabetic diet, low phos   Anticipated discharge Friday/Sat     Moriah Gutierres, APRN-CNP

## 2025-04-09 NOTE — PROGRESS NOTES
"[facility]  [facility]  CONSULT FOLLOW-UP     Reason For Consult  S/p liver and kidney transplant 4/2/25, 4/3/25     SUBJECTIVE     No overnight events.    EXAM     Last Recorded Vitals  Blood pressure 128/85, pulse 90, temperature 36.9 °C (98.4 °F), temperature source Temporal, resp. rate 18, height 1.93 m (6' 4\"), weight 111 kg (244 lb 7.8 oz), SpO2 97%.      Intake/Output Summary (Last 24 hours) at 4/9/2025 1444  Last data filed at 4/9/2025 1311  Gross per 24 hour   Intake 3220 ml   Output 2035 ml   Net 1185 ml       Physical Exam   General: well-developed, well-nourished, no acute distress, AAOx3  HEENT: EOM intact  CV: regular rate and rhythm  Pulm: normal respiratory effort  Abd: soft, nontender  Extremities: warm, no LE edema  Neuro: moves all extremities equally  Psych: normal mood and affect  Skin: warm, dry, intact      OBJECTIVE                                                                              Medications             Current Facility-Administered Medications:     acetaminophen (Tylenol) tablet 650 mg, 650 mg, oral, q6h PRN, Moriah Gutierres APRN-CNP    albumin human 25 % solution 25 g, 25 g, intravenous, BID, Yaa Dover PA-C, Stopped at 04/09/25 1048    aspirin EC tablet 81 mg, 81 mg, oral, Daily, XENIA Jackson-CNP, 81 mg at 04/09/25 1050    [START ON 4/10/2025] bisacodyl (Dulcolax) suppository 10 mg, 10 mg, rectal, Daily, Moriah Gutierres APRN-CNP    cholecalciferol (Vitamin D-3) tablet 2,000 Units, 2,000 Units, oral, Daily, Yaa Dover PA-C, 2,000 Units at 04/09/25 0914    dextrose 50 % injection 12.5 g, 12.5 g, intravenous, q15 min PRN, XENIA Choudhury-CNP    dextrose 50 % injection 25 g, 25 g, intravenous, q15 min PRN, XENIA Choudhury-CNP    fluconazole (Diflucan) tablet 400 mg, 400 mg, oral, Daily, XENIA Choudhury-CNP, 400 mg at 04/09/25 0914    glucagon (Glucagen) injection 1 mg, 1 mg, intramuscular, q15 " min PRN, RABIA Choudhury    heparin (porcine) injection 5,000 Units, 5,000 Units, subcutaneous, q8h, Yaa Dover PA-C, 5,000 Units at 04/09/25 0617    insulin lispro injection 0-10 Units, 0-10 Units, subcutaneous, TID AC, MARSHAL FarnkC, 2 Units at 04/09/25 1208    insulin NPH (Isophane) (HumuLIN N,NovoLIN N) injection 10 Units, 10 Units, subcutaneous, q AM, Kathia Luna PA-C, 10 Units at 04/09/25 0914    methocarbamol (Robaxin) tablet 500 mg, 500 mg, oral, q6h PRN, RABIA Jackson, 500 mg at 04/08/25 1611    mycophenolate (Cellcept) capsule 1,000 mg, 1,000 mg, oral, BID, RABIA Choudhury, 1,000 mg at 04/09/25 0914    ondansetron (Zofran) injection 4 mg, 4 mg, intravenous, q6h PRN, XENIA Choudhury-CNP, 4 mg at 04/04/25 1455    pantoprazole (ProtoNix) EC tablet 40 mg, 40 mg, oral, BID AC, RABIA Choudhury, 40 mg at 04/09/25 0611    polyethylene glycol (Glycolax, Miralax) packet 17 g, 17 g, oral, BID, XENIA Jackson-CNP, 17 g at 04/09/25 0913    [COMPLETED] methylPREDNISolone sodium succinate (SOLU-Medrol) 250 mg in dextrose 5% 100 mL IV, 250 mg, intravenous, Daily, Stopped at 04/03/25 0948 **FOLLOWED BY** [COMPLETED] methylPREDNISolone sod succinate (SOLU-Medrol) injection 125 mg, 125 mg, intravenous, Daily, 125 mg at 04/04/25 0830 **FOLLOWED BY** [COMPLETED] methylPREDNISolone sod succinate (SOLU-Medrol) injection 60 mg, 60 mg, intravenous, Daily, 60 mg at 04/06/25 0935 **FOLLOWED BY** predniSONE (Deltasone) tablet 20 mg, 20 mg, oral, Daily, Imelda C Em, XENIA-CNP, 20 mg at 04/09/25 0914    sennosides-docusate sodium (Rachel-Colace) 8.6-50 mg per tablet 1 tablet, 1 tablet, oral, BID, XENIA Jackson-CNP, 1 tablet at 04/09/25 0914    tacrolimus (Prograf) capsule 2 mg, 2 mg, oral, q12h BRITTANEY, Samson Coleman MD, 2 mg at 04/09/25 0611    traMADol (Ultram) tablet 25 mg, 25 mg, oral, q6h PRN, Moriah Gutierres APRN-CNP    traMADol (Ultram) tablet 50  "mg, 50 mg, oral, q6h PRN, Moriah L Nenita, APRN-CNP    traZODone (Desyrel) tablet 25 mg, 25 mg, oral, Nightly, SHASHANK Kenney-C, 25 mg at 04/08/25 2021    ursodiol (Actigall) capsule 300 mg, 300 mg, oral, TID, Moriah L [place], APRN-CNP, 300 mg at 04/09/25 0914    valGANciclovir (Valcyte) tablet 450 mg, 450 mg, oral, q48h, Moriah L [place], APRN-CNP, 450 mg at 04/09/25 1050                                                                            Labs     CBC RFP   Lab Results   Component Value Date    WBC 8.3 04/09/2025    HGB 9.3 (L) 04/09/2025    HCT 27.5 (L) 04/09/2025    MCV 88 04/09/2025    PLT 65 (L) 04/09/2025    NEUTROABS 2.69 04/02/2025    Lab Results   Component Value Date     (L) 04/09/2025    K 3.8 04/09/2025    CL 98 04/09/2025    CO2 21 04/09/2025    BUN 75 (H) 04/09/2025    CREATININE 3.91 (H) 04/09/2025     Lab Results   Component Value Date    MG 2.44 (H) 04/09/2025    PHOS 5.6 (H) 04/09/2025    CALCIUM 8.6 04/09/2025    ALBUMIN 3.6 04/09/2025         Hepatic Function ABG/VBG   Lab Results   Component Value Date    ALT 55 (H) 04/09/2025    AST 15 04/09/2025     (H) 04/09/2025    ALKPHOS 309 (H) 04/09/2025     Lab Results   Component Value Date    BILITOT 1.6 (H) 04/09/2025    BILIDIR 0.9 (H) 04/09/2025     Lab Results   Component Value Date    PROTIME 12.7 (H) 04/05/2025    APTT 25 (L) 04/05/2025    INR 1.1 04/05/2025    No results found for: \"NONUHFIRE\", \"LACTATE\"                                                                               Imaging     US LIVER WITH DOPPLER; 4/3/2025   IMPRESSION:  1. Status post liver transplant with borderline elevated RI of the  common hepatic artery with limited assessment of the left/right  branches as above. Findings are likely due to a combination of  technical factors and early postoperative status however recommend  continued attention on future follow-up examinations.  2. Visualized hepatic veins demonstrate antegrade flow and are patent.       "                                                                   GI Procedures         ASSESSMENT / PLAN     ASSESSMENT/PLAN:  López Lopez is a 65 y.o. male with a past medical history ofCKD 4, DM2, HTN, CAD s/p PCI to prox LAD 2021, h/o pAFib on Eliquis, h/o ITP dx 5/2024 s/p steroids and IVIG, hyperlipidemia, obesity who is now s/p SLK on 4/2 and 4/3 for MASLD/cirrhosis. Hepatology is consulted for co-management.    Recommendations:  - Drains management per transplant surgery team.  - Immunosuppression, antibiotics and prophylaxis per transplant surgery team.       Patient was seen and discussed with Dr. Jha .    Thank you for the consultation. Hepatology will continue to follow.  During weekday hours of 7am-5pm, please do not hesitate to contact me on [employer] Chat or page [phone], if there are any further questions.  After hours, on weekends, and on holidays, please page the [place] GI fellow at [phone].   Thank you.

## 2025-04-09 NOTE — DOCUMENTATION CLARIFICATION NOTE
"    PATIENT:               CHEMA MACIAS  ACCT #:                  [id number]  MRN:                       [id number]  :                       1960  ADMIT DATE:       2025 3:25 PM  DISCH DATE:  RESPONDING PROVIDER #:        [id number]          PROVIDER RESPONSE TEXT:    ATN without Hepatorenal syndrome    CDI QUERY TEXT:    Clarification        Instruction:    Based on your assessment of the patient and the clinical information, please provide the requested documentation by clicking on the appropriate radio button and enter any additional information if prompted.    Question: Please further clarify if there is a diagnosis related to the clinical information    When answering this query, please exercise your independent professional judgment. The fact that a question is being asked, does not imply that any particular answer is desired or expected.    The patient's clinical indicators include:  Clinical Information: Pt is a 64yo male with PMHx cirrhosis 2/2 MASLD, portal HTN, CKD4, DM2, HTN, CAD, HLD, obesity presenting to [facility] for possible simultaneous liver and kidney transplant. OLT . Returned to OR 4/3 for DDKT and re-exploration of hepatic artery.    Clinical Indicators:   PN-Transplant: \"Liver/kidney allograft function  LFTs downtrending, no need for ursodiol. Albumin q6h for high drain output.  Kidney: UOP 900mL, renal U/S yesterday unremarkable  Liver U/S to evaluate portal vein given ongoing high drain outputs\"     PN-Transplant Nephrology: \"ESRD S/P Kidney transplant\"    4/3 HP-SICU: PMHx includes \"recurrent ascites, portal HTN requires paracentesis every other week\" \"CKD 4 Baseline creatinine 3.89. Got 60 IV lasix in the OR along with intra-op CVVH. RTOR later this morning for kidney transplant.\"     Sig Event Note: \"Discussed indications/risks / benefits of dialysis with patient\" \"verbally understood and want to proceed with dialysis\"     1614 BUN / Creat / eGFR: 67 / 3.89 / 17  4/3 " 0038 BUN / Creat / eGFR: 56 / 2.64 / 26  4/3 2212 BUN / Creat / eGFR: 54 / 3.05 / 22  4/4 0616 BUN / Creat / eGFR: 56 / 3.29 / 20 4/5 1430 BUN / Creat / eGFR: 69 / 3.76 / 17  4/8 0537 BUN / Creat / eGFR: 75 / 4.13 / 15    Treatment: CVVH, 4/2 Liver transplant, 4/3 kidney transplant, monitor IO, 4/3-4/4 RFP q4h, 4/3 albumin 5% infusion 25 g x3, 4/3-4/7 albumin 25% solution 25g q6h, 4/3 angiotensin II infusion, 4/3-4/4 Levophed infusion, 4/4 albumin 5% infusion 12.5g x1, 4/5 albumin 5% infusion 12.5g x1, renal US    Risk Factors: Alcoholic cirrhosis of liver, recurrent ascites  Options provided:  -- Hepatorenal syndrome  -- ATN without Hepatorenal syndrome  -- Other - I will add my own diagnosis  -- Refer to Clinical Documentation Reviewer    Query created by: Pallavi Vieyra on 4/8/2025 3:23 PM      Electronically signed by:  TAYLOR SEHR MD 4/9/2025 2:32 PM

## 2025-04-09 NOTE — PROGRESS NOTES
Physical Therapy    Physical Therapy Treatment    Patient Name: López Lopez  MRN: [id number]  Department: [place] [address]  Room: [facility]  Today's Date: 4/9/2025  Time Calculation  Start Time: 1413  Stop Time: 1441  Time Calculation (min): 28 min    Assessment/Plan   PT Assessment  Barriers to Discharge Home: Caregiver assistance, Physical needs  Caregiver Assistance: Caregiver assistance needed per identified barriers - however, level of patient's required assistance exceeds assistance available at home  Physical Needs: Stair navigation into home limited by function/safety, Ambulating household distances limited by function/safety  End of Session Communication: Bedside nurse  Assessment Comment: Pt tolerated PT session well, increased ambulation with FWW in hallway. Continues to remain appropriate for Low intensity PT upon D/c from hospital.  End of Session Patient Position: Up in chair, Alarm off, not on at start of session  PT Plan  Inpatient/Swing Bed or Outpatient: Inpatient  PT Plan  Treatment/Interventions: Bed mobility, Transfer training, Gait training, Stair training, Balance training, Strengthening, Endurance training, Therapeutic exercise, Therapeutic activity  PT Plan: Ongoing PT  PT Frequency: 5 times per week  PT Discharge Recommendations: Low intensity level of continued care  Equipment Recommended upon Discharge: Wheeled walker  PT Recommended Transfer Status: Assist x1, Assistive device  PT - OK to Discharge: Yes (PT eval completed, recs made)      General Visit Information:   PT  Visit  PT Received On: 04/09/25  General  Missed Visit Reason:  (n/a)  Family/Caregiver Present: No  Prior to Session Communication: Bedside nurse  Patient Position Received: Bed, 3 rail up, Alarm off, not on at start of session  General Comment: Pt supine in bed, agreeable to work with PT.    Subjective   Precautions:  Precautions  Medical Precautions: Fall precautions  Post-Surgical Precautions: Abdominal surgery  precautions  Precautions Comment: JPx2, Reviewed abdominal precautions,    Objective   Pain:  Pain Assessment  Pain Assessment: 0-10  0-10 (Numeric) Pain Score:  (unrated however reports increased pain during ambulation, RN notified.)  Cognition:  Cognition  Overall Cognitive Status: Within Functional Limits  Orientation Level: Oriented X4    Activity Tolerance:  Activity Tolerance  Endurance: Tolerates 30 min exercise with multiple rests  Treatments:  Bed Mobility  Bed Mobility: Yes  Bed Mobility 1  Bed Mobility 1: Supine to sitting  Level of Assistance 1: Moderate assistance, Minimal verbal cues  Bed Mobility Comments 1: Assist at trunk, cues for log roll, HOB elevated    Ambulation/Gait Training  Ambulation/Gait Training Performed: Yes  Ambulation/Gait Training 1  Surface 1: Level tile  Device 1: Rolling walker  Assistance 1: Contact guard, Minimal verbal cues  Quality of Gait 1: Diminished heel strike, Decreased step length (Decreased christina, slightly FF posture,)  Comments/Distance (ft) 1: 55ft x2, x10ft cues for upright posture and pacing,    Transfers  Transfer: Yes  Transfer 1  Transfer From 1: Bed to  Transfer to 1: Stand  Technique 1: Sit to stand  Transfer Device 1: Walker  Transfer Level of Assistance 1: Contact guard, Minimal verbal cues  Trials/Comments 1: Elevated bed height, cues for sequencing and safe hand placement. slow to rise.  Transfers 2  Transfer From 2: Stand to  Transfer to 2: Chair with arms  Technique 2: Stand to sit  Transfer Device 2: Walker  Transfer Level of Assistance 2: Minimum assistance, Minimal verbal cues  Trials/Comments 2: Slow to descend, assist for slow and controlled descent.    Stairs  Stairs: No    Outcome Measures:  [facility] Basic Mobility  Turning from your back to your side while in a flat bed without using bedrails: A little  Moving from lying on your back to sitting on the side of a flat bed without using bedrails: A lot  Moving to and from bed to chair (including a  wheelchair): A little  Standing up from a chair using your arms (e.g. wheelchair or bedside chair): A little  To walk in hospital room: A little  Climbing 3-5 steps with railing: A lot  Basic Mobility - Total Score: 16    Education Documentation  Precautions, taught by Renee Bonilla PTA at 4/9/2025  3:25 PM.  Learner: Patient  Readiness: Acceptance  Method: Explanation  Response: Verbalizes Understanding  Comment: Abdominal precautions, progression of ambulation.    Body Mechanics, taught by Renee Bonilla PTA at 4/9/2025  3:25 PM.  Learner: Patient  Readiness: Acceptance  Method: Explanation  Response: Verbalizes Understanding  Comment: Abdominal precautions, progression of ambulation.    Mobility Training, taught by Renee Bonilla PTA at 4/9/2025  3:25 PM.  Learner: Patient  Readiness: Acceptance  Method: Explanation  Response: Verbalizes Understanding  Comment: Abdominal precautions, progression of ambulation.    Education Comments  No comments found.      Encounter Problems       Encounter Problems (Active)       Mobility       STG - Patient will ambulate >125' with LRD, indep, VSS and without LOB  (Progressing)       Start:  04/06/25    Expected End:  04/20/25            STG - Patient will ambulate up and down a curb/step with CGA to simulate home entry  (Progressing)       Start:  04/06/25    Expected End:  04/20/25               PT Transfers       STG - Patient to transfer to and from sit to supine via logrolling in flat bed, no rails indep  (Progressing)       Start:  04/06/25    Expected End:  04/20/25            STG - Patient will transfer sit to and from stand indep with LRD  (Progressing)       Start:  04/06/25    Expected End:  04/20/25 04/09/25 at 3:27 PM   Renee Bonilla PTA   Rehab Office: [phone]

## 2025-04-09 NOTE — DOCUMENTATION CLARIFICATION NOTE
"    PATIENT:               CHEMA MACIAS  ACCT #:                  [id number]  MRN:                       [id number]  :                       1960  ADMIT DATE:       2025 3:25 PM  DISCH DATE:  RESPONDING PROVIDER #:        [id number]          PROVIDER RESPONSE TEXT:    ESRD    CDI QUERY TEXT:    Clarification        Instruction:    Based on your assessment of the patient and the clinical information, please provide the requested documentation by clicking on the appropriate radio button and enter any additional information if prompted.    Question: Please further clarify the diagnosis of chronic kidney disease as    When answering this query, please exercise your independent professional judgment. The fact that a question is being asked, does not imply that any particular answer is desired or expected.    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 OLT . Returned to OR 4/3 for DDKT and re-exploration of hepatic artery.    Clinical Indicators: There is conflicting documentation in the medical record which requires clarification. CKD 4 and ESRD both noted in documentation.     PN- Transplant Nephrology \"ESRD S/P Kidney transplant\"    4/3 HP-SICU \"CKD 4 Baseline creatinine 3.89. Got 60 IV lasix in the OR along with intra-op CVVH. RTOR later this morning for kidney transplant.\"    Sig Event Note  \"Discussed indications/risks / benefits of dialysis with patient\" \"verbally understood and want to proceed with dialysis\"     1614 BUN / Creat / eGFR: 67 / 3.89 / 17  4/3 0038 BUN / Creat / eGFR: 56 / 2.64 / 26  4/3 2212 BUN / Creat / eGFR: 54 / 3.05 / 22   0616 BUN / Creat / eGFR: 56 / 3.29  20   1430 BUN / Creat / eGFR: 69 / 3.76 / 17   0537 BUN / Creat / eGFR: 75 / 4. / 15    Treatment: CVVH, 4/3 kidney " transplant, monitor io, 4/3-4/4 RFP q4h    Risk Factors: CKD, HTN, DM, alcoholic cirrhosis of liver s/p liver transplant 4/2  Options provided:  -- ESRD  -- CKD stage 4 progressed to ESRD  -- CKD stage 4  -- Other - I will add my own diagnosis  -- Refer to Clinical Documentation Reviewer    Query created by: Pallavi Vieyra on 4/8/2025 3:22 PM      Electronically signed by:  BRITT OWEN MD 4/9/2025 9:29 AM

## 2025-04-09 NOTE — TELEPHONE ENCOUNTER
[facility] KIDNEY ENDOCINROLOGY VISIT at 10:30 AM (30 min)  Thursday May 15, 2025  Appointment Provider:Kathia Luna PA-C in [address]

## 2025-04-09 NOTE — PROGRESS NOTES
Nutrition Note:   Met with pt at bedside. He says his appetite is poor because he feels full 2/2 constipation. He is still drinking 2 Ensure shakes/day. Says he ate a good amount of breakfast this morning. Per flowsheets, he ate 75% of his meal.     Discussed diet for discharge.     Education Documentation  Nutrition Related Education, taught by Marisela Carpenter RDN, LD at 4/9/2025  3:42 PM.  Learner: Patient  Readiness: Acceptance  Method: Explanation, Handout  Response: Verbalizes Understanding  Comment: Advised the patient on proper meat preparation, re-heat deli meats and hot dogs, no raw fish, no unpasteurized dairy products, wash all fruits and vegetables, no grapefruit/pomegranate/starfruit/raw sprouts, handwashing, cross-contamination, etc    Nutrition Care Manual, taught by Marisela Carpenter RDN, LD at 4/9/2025  3:41 PM.  Learner: Patient  Readiness: Acceptance  Method: Explanation, Handout  Response: Verbalizes Understanding  Comment: Discussed food safety following transplant. Advised the patient on proper meat preparation, re-heat deli meats and hot dogs, no raw fish, no unpasteurized dairy products, wash all fruits and vegetables, no grapefruit/pomegranate/starfruit/raw sprouts, handwashing, cross-contamination, etc. Pt denies any potential struggles following this diet. Advised pt on the importance of cooking all meats, fish and eggs to the proper temperatures. Provided patient with the food safety handout packet.    Pt has food thermometer at home. Contact information left with pt. Encouraged him to reach out with any questions or concerns.

## 2025-04-09 NOTE — CARE PLAN
Pt was seen and examined on 4/7/25 by this provider. Advised simple insulin regimen with emphasis on qAM NPH 10u and oral DPP4i start with follow up in PTDM clinic. CGM to be provided for ease of home glucose monitoring.     Kathia Luna PA-C   Endocrinology  Inpatient Diabetes Management Team

## 2025-04-09 NOTE — PROGRESS NOTES
TRANSPLANT SURGERY PROGRESS NOTE    López Lopez underwent transplant surgery on 4/2/2025 (Liver / Kidney) and was evaluated on multidisciplinary inpatient rounds.  I specifically evaluated the management of immunosuppression to ensure adequate exposure required to decrease the risk of rejection.  Furthermore, I reviewed potential side effects including tremor, hyperglycemia, leukopenia, infection, and other neurologic changes.  I reviewed and adjusted infectious prophylaxis based on the patients clinical condition and  protocols.     24 EVENTS: no acute events    DIAGNOSIS: Liver replaced by transplant Z94.4, kidney replaced by transplant Z94.0    PHYSICAL EXAMINATION:  Last Recorded Vitals  Visit Vitals  /80 (BP Location: Left arm, Patient Position: Lying)   Pulse 81   Temp 37 °C (98.6 °F) (Temporal)   Resp 18      Intake/Output last 3 Shifts:    Intake/Output Summary (Last 24 hours) at 4/9/2025 1139  Last data filed at 4/9/2025 0914  Gross per 24 hour   Intake 2860 ml   Output 1485 ml   Net 1375 ml      Vitals:    04/09/25 0551   Weight: 111 kg (244 lb 7.8 oz)      Gen: A+OX3; NAD  HEENT: PERRL, sclera anicteric, MMM  Cardiac: RRR  Chest: Normal inspiratory effort  Abdomen: S/NT/ND. Incision C/D/I.  Ext: No LE edema  Drains: serosanguinous    MEDICATION LIST: REVIEWED  Scheduled medications  albumin human, 25 g, intravenous, BID  aspirin, 81 mg, oral, Daily  cholecalciferol, 2,000 Units, oral, Daily  fluconazole, 400 mg, oral, Daily  heparin, 5,000 Units, subcutaneous, q8h  insulin lispro, 0-10 Units, subcutaneous, TID AC  insulin NPH (Isophane), 10 Units, subcutaneous, q AM  mycophenolate, 1,000 mg, oral, BID  pantoprazole, 40 mg, oral, BID AC  polyethylene glycol, 17 g, oral, BID  predniSONE, 20 mg, oral, Daily  sennosides-docusate sodium, 1 tablet, oral, BID  tacrolimus, 2 mg, oral, q12h BRITTANEY  traZODone, 25 mg, oral, Nightly  ursodiol, 300 mg, oral, TID  valGANciclovir, 450 mg, oral,  q48h    LABS:  CBC   Result Value Ref Range    WBC 8.3 4.4 - 11.3 x10*3/uL    nRBC 0.0 0.0 - 0.0 /100 WBCs    RBC 3.13 (L) 4.50 - 5.90 x10*6/uL    Hemoglobin 9.3 (L) 13.5 - 17.5 g/dL    Hematocrit 27.5 (L) 41.0 - 52.0 %    MCV 88 80 - 100 fL    MCH 29.7 26.0 - 34.0 pg    MCHC 33.8 32.0 - 36.0 g/dL    RDW 15.2 (H) 11.5 - 14.5 %    Platelets 65 (L) 150 - 450 x10*3/uL   Gamma-Glutamyl Transferase   Result Value Ref Range     (H) 5 - 64 U/L   Hepatic function panel   Result Value Ref Range    Albumin 3.6 3.4 - 5.0 g/dL    Bilirubin, Total 1.6 (H) 0.0 - 1.2 mg/dL    Bilirubin, Direct 0.9 (H) 0.0 - 0.3 mg/dL    Alkaline Phosphatase 309 (H) 33 - 136 U/L    ALT 55 (H) 10 - 52 U/L    AST 15 9 - 39 U/L    Total Protein 5.0 (L) 6.4 - 8.2 g/dL   Magnesium   Result Value Ref Range    Magnesium 2.44 (H) 1.60 - 2.40 mg/dL   Phosphorus   Result Value Ref Range    Phosphorus 5.6 (H) 2.5 - 4.9 mg/dL   Basic Metabolic Panel   Result Value Ref Range    Glucose 120 (H) 74 - 99 mg/dL    Sodium 134 (L) 136 - 145 mmol/L    Potassium 3.8 3.5 - 5.3 mmol/L    Chloride 98 98 - 107 mmol/L    Bicarbonate 21 21 - 32 mmol/L    Anion Gap 19 10 - 20 mmol/L    Urea Nitrogen 75 (H) 6 - 23 mg/dL    Creatinine 3.91 (H) 0.50 - 1.30 mg/dL    eGFR 16 (L) >60 mL/min/1.73m*2    Calcium 8.6 8.6 - 10.6 mg/dL     ASSESSMENT AND PLAN:    Mr. Lopez is a 65 y.o. male who underwent  transplant surgery on 4/2/2025 (Liver / Kidney).    1. Immunosuppression reviewed and adjusted       Tacrolimus: goal trough level 8-10 ng/mL      Mycophenolate: Yes - 1 gm bid      Steroid taper      Simulect dose #2: completed     2. Liver/kidney allograft function      LFTs downtrending, Albumin q12h for high drain output.      Kidney: UOP stable; 2 unremarkable renal U/s     3. Wound/drain/noel and pain management      Drains: keep LISA#2 and #4 with sutures      Empty drains q4h     4. Nutrition: advance diet and Nepro supplements     5. Prophylaxis      DVT:  SCDs/Subcutaneous Heparin: No for persistent thrombocytopenia      GI prophylaxis: Protonix      Fluconazole, pentamidine; hold valcyte     6. PT/OT: OOB to chair     7. Discharge planned for: 3 days    Case was presented at Multi Disciplinary team rounds   Attending physician, consulting physician, , pharmacist, residents and fellow were present at the meeting.    Yaa Cancino MD, PHD, MPH, FACS  Chief Transplant and Hepatobiliary Surgery

## 2025-04-09 NOTE — PROGRESS NOTES
Lpóez Lopez is a 65 y.o. male on day 7 of admission presenting with Alcoholic cirrhosis of liver with ascites (Multi).      Transitional Care Coordination Progress Note:  Patient discussed during interdisciplinary rounds.      Plan per Medical/Surgical team:   Ok to send blanket HC referrals if needed, per pt.  Pt Needs: TBD.   [facility] at Home ordered for RPM.    HC referrals sent.   [facility] accepting.   4/9: Updates sent to [facility].         Discharge disposition: H@H. HC: [facility]     Potential Barriers: None     ADOD:  4/11     This TCC will continue to follow for home going needs and safe DC plan.      PAOLO ANGLIN

## 2025-04-09 NOTE — PROGRESS NOTES
Spoke to Chrissy Thompson of [employer] [phone] and she stated both L/K were approved and are on the approvl. She does not know why the CPT code 23965 for kidney is not showing up.

## 2025-04-10 ENCOUNTER — APPOINTMENT (OUTPATIENT)
Dept: RADIOLOGY | Facility: HOSPITAL | Age: 65
DRG: 005 | End: 2025-04-10
Payer: COMMERCIAL

## 2025-04-10 LAB
ALBUMIN SERPL BCP-MCNC: 3.5 G/DL (ref 3.4–5)
ALBUMIN SERPL BCP-MCNC: 3.7 G/DL (ref 3.4–5)
ALP SERPL-CCNC: 249 U/L (ref 33–136)
ALT SERPL W P-5'-P-CCNC: 40 U/L (ref 10–52)
ANION GAP SERPL CALC-SCNC: 15 MMOL/L
ANION GAP SERPL CALC-SCNC: 16 MMOL/L (ref 10–20)
AST SERPL W P-5'-P-CCNC: 13 U/L (ref 9–39)
BILIRUB DIRECT SERPL-MCNC: 0.7 MG/DL (ref 0–0.3)
BILIRUB SERPL-MCNC: 1.3 MG/DL (ref 0–1.2)
BUN SERPL-MCNC: 77 MG/DL (ref 6–23)
BUN SERPL-MCNC: 77 MG/DL (ref 6–23)
CA-I BLD-SCNC: 1.2 MMOL/L (ref 1.1–1.33)
CALCIUM SERPL-MCNC: 8.6 MG/DL (ref 8.6–10.6)
CALCIUM SERPL-MCNC: 9 MG/DL (ref 8.6–10.6)
CHLORIDE SERPL-SCNC: 95 MMOL/L (ref 98–107)
CHLORIDE SERPL-SCNC: 96 MMOL/L (ref 98–107)
CO2 SERPL-SCNC: 23 MMOL/L (ref 21–32)
CO2 SERPL-SCNC: 24 MMOL/L (ref 21–32)
CREAT FLD-MCNC: 3.79 MG/DL
CREAT FLD-MCNC: 3.88 MG/DL
CREAT SERPL-MCNC: 3.75 MG/DL (ref 0.5–1.3)
CREAT SERPL-MCNC: 3.83 MG/DL (ref 0.5–1.3)
EGFRCR SERPLBLD CKD-EPI 2021: 17 ML/MIN/1.73M*2
EGFRCR SERPLBLD CKD-EPI 2021: 17 ML/MIN/1.73M*2
ERYTHROCYTE [DISTWIDTH] IN BLOOD BY AUTOMATED COUNT: 15 % (ref 11.5–14.5)
GGT SERPL-CCNC: 396 U/L (ref 5–64)
GLUCOSE BLD MANUAL STRIP-MCNC: 109 MG/DL (ref 74–99)
GLUCOSE BLD MANUAL STRIP-MCNC: 123 MG/DL (ref 74–99)
GLUCOSE BLD MANUAL STRIP-MCNC: 129 MG/DL (ref 74–99)
GLUCOSE BLD MANUAL STRIP-MCNC: 160 MG/DL (ref 74–99)
GLUCOSE BLD MANUAL STRIP-MCNC: 165 MG/DL (ref 74–99)
GLUCOSE BLD MANUAL STRIP-MCNC: 176 MG/DL (ref 74–99)
GLUCOSE SERPL-MCNC: 113 MG/DL (ref 74–99)
GLUCOSE SERPL-MCNC: 175 MG/DL (ref 74–99)
HCT VFR BLD AUTO: 24 % (ref 41–52)
HGB BLD-MCNC: 8.3 G/DL (ref 13.5–17.5)
MAGNESIUM SERPL-MCNC: 2.31 MG/DL (ref 1.6–2.4)
MCH RBC QN AUTO: 30.4 PG (ref 26–34)
MCHC RBC AUTO-ENTMCNC: 34.6 G/DL (ref 32–36)
MCV RBC AUTO: 88 FL (ref 80–100)
NRBC BLD-RTO: 0 /100 WBCS (ref 0–0)
PHOSPHATE SERPL-MCNC: 5.3 MG/DL (ref 2.5–4.9)
PHOSPHATE SERPL-MCNC: 5.3 MG/DL (ref 2.5–4.9)
PLATELET # BLD AUTO: 74 X10*3/UL (ref 150–450)
POTASSIUM SERPL-SCNC: 3.8 MMOL/L (ref 3.5–5.3)
POTASSIUM SERPL-SCNC: 3.8 MMOL/L (ref 3.5–5.3)
PROT SERPL-MCNC: 4.7 G/DL (ref 6.4–8.2)
RBC # BLD AUTO: 2.73 X10*6/UL (ref 4.5–5.9)
SODIUM SERPL-SCNC: 130 MMOL/L (ref 136–145)
SODIUM SERPL-SCNC: 131 MMOL/L (ref 136–145)
TACROLIMUS BLD-MCNC: 8.9 NG/ML
WBC # BLD AUTO: 6.2 X10*3/UL (ref 4.4–11.3)

## 2025-04-10 PROCEDURE — 76776 US EXAM K TRANSPL W/DOPPLER: CPT | Mod: CCI

## 2025-04-10 PROCEDURE — 2500000001 HC RX 250 WO HCPCS SELF ADMINISTERED DRUGS (ALT 637 FOR MEDICARE OP)

## 2025-04-10 PROCEDURE — 2500000004 HC RX 250 GENERAL PHARMACY W/ HCPCS (ALT 636 FOR OP/ED)

## 2025-04-10 PROCEDURE — 80197 ASSAY OF TACROLIMUS: CPT

## 2025-04-10 PROCEDURE — 80053 COMPREHEN METABOLIC PANEL: CPT

## 2025-04-10 PROCEDURE — 80069 RENAL FUNCTION PANEL: CPT | Mod: CCI

## 2025-04-10 PROCEDURE — 2500000002 HC RX 250 W HCPCS SELF ADMINISTERED DRUGS (ALT 637 FOR MEDICARE OP, ALT 636 FOR OP/ED): Performed by: PHYSICIAN ASSISTANT

## 2025-04-10 PROCEDURE — 84075 ASSAY ALKALINE PHOSPHATASE: CPT

## 2025-04-10 PROCEDURE — 82947 ASSAY GLUCOSE BLOOD QUANT: CPT

## 2025-04-10 PROCEDURE — 36415 COLL VENOUS BLD VENIPUNCTURE: CPT

## 2025-04-10 PROCEDURE — 99232 SBSQ HOSP IP/OBS MODERATE 35: CPT | Performed by: SURGERY

## 2025-04-10 PROCEDURE — P9047 ALBUMIN (HUMAN), 25%, 50ML: HCPCS | Performed by: PHYSICIAN ASSISTANT

## 2025-04-10 PROCEDURE — 2020000001 HC ICU ROOM DAILY

## 2025-04-10 PROCEDURE — 99232 SBSQ HOSP IP/OBS MODERATE 35: CPT | Performed by: STUDENT IN AN ORGANIZED HEALTH CARE EDUCATION/TRAINING PROGRAM

## 2025-04-10 PROCEDURE — RXMED WILLOW AMBULATORY MEDICATION CHARGE

## 2025-04-10 PROCEDURE — 93975 VASCULAR STUDY: CPT

## 2025-04-10 PROCEDURE — 84100 ASSAY OF PHOSPHORUS: CPT

## 2025-04-10 PROCEDURE — 83735 ASSAY OF MAGNESIUM: CPT

## 2025-04-10 PROCEDURE — 85027 COMPLETE CBC AUTOMATED: CPT

## 2025-04-10 PROCEDURE — 82570 ASSAY OF URINE CREATININE: CPT

## 2025-04-10 PROCEDURE — 2500000002 HC RX 250 W HCPCS SELF ADMINISTERED DRUGS (ALT 637 FOR MEDICARE OP, ALT 636 FOR OP/ED)

## 2025-04-10 PROCEDURE — 82977 ASSAY OF GGT: CPT

## 2025-04-10 PROCEDURE — 2500000001 HC RX 250 WO HCPCS SELF ADMINISTERED DRUGS (ALT 637 FOR MEDICARE OP): Performed by: PHYSICIAN ASSISTANT

## 2025-04-10 PROCEDURE — 76705 ECHO EXAM OF ABDOMEN: CPT | Performed by: RADIOLOGY

## 2025-04-10 PROCEDURE — 82330 ASSAY OF CALCIUM: CPT

## 2025-04-10 PROCEDURE — 93975 VASCULAR STUDY: CPT | Performed by: RADIOLOGY

## 2025-04-10 PROCEDURE — 2500000004 HC RX 250 GENERAL PHARMACY W/ HCPCS (ALT 636 FOR OP/ED): Performed by: PHYSICIAN ASSISTANT

## 2025-04-10 RX ORDER — FUROSEMIDE 10 MG/ML
60 INJECTION INTRAMUSCULAR; INTRAVENOUS ONCE
Status: COMPLETED | OUTPATIENT
Start: 2025-04-10 | End: 2025-04-10

## 2025-04-10 RX ORDER — ALOGLIPTIN 6.25 MG/1
12.5 TABLET, FILM COATED ORAL DAILY
Qty: 60 TABLET | Refills: 0 | Status: SHIPPED | OUTPATIENT
Start: 2025-04-10 | End: 2025-04-10 | Stop reason: HOSPADM

## 2025-04-10 RX ORDER — ALOGLIPTIN 12.5 MG/1
12.5 TABLET, FILM COATED ORAL DAILY
Qty: 30 TABLET | Refills: 0 | Status: SHIPPED | OUTPATIENT
Start: 2025-04-10 | End: 2025-05-11

## 2025-04-10 RX ORDER — PEN NEEDLE, DIABETIC 30 GX3/16"
1 NEEDLE, DISPOSABLE MISCELLANEOUS DAILY
Qty: 100 EACH | Refills: 0 | Status: SHIPPED | OUTPATIENT
Start: 2025-04-10 | End: 2025-07-10

## 2025-04-10 RX ADMIN — SENNOSIDES AND DOCUSATE SODIUM 1 TABLET: 50; 8.6 TABLET ORAL at 09:06

## 2025-04-10 RX ADMIN — TRAZODONE HYDROCHLORIDE 25 MG: 50 TABLET ORAL at 21:16

## 2025-04-10 RX ADMIN — PANTOPRAZOLE SODIUM 40 MG: 40 TABLET, DELAYED RELEASE ORAL at 06:06

## 2025-04-10 RX ADMIN — POLYETHYLENE GLYCOL 3350 17 G: 17 POWDER, FOR SOLUTION ORAL at 21:15

## 2025-04-10 RX ADMIN — HEPARIN SODIUM 5000 UNITS: 5000 INJECTION INTRAVENOUS; SUBCUTANEOUS at 01:39

## 2025-04-10 RX ADMIN — MYCOPHENOLATE MOFETIL 1000 MG: 250 CAPSULE ORAL at 09:06

## 2025-04-10 RX ADMIN — INSULIN LISPRO 2 UNITS: 100 INJECTION, SOLUTION INTRAVENOUS; SUBCUTANEOUS at 16:31

## 2025-04-10 RX ADMIN — TACROLIMUS 2 MG: 1 CAPSULE ORAL at 18:36

## 2025-04-10 RX ADMIN — FUROSEMIDE 60 MG: 10 INJECTION, SOLUTION INTRAVENOUS at 17:19

## 2025-04-10 RX ADMIN — Medication 2000 UNITS: at 09:07

## 2025-04-10 RX ADMIN — TRAMADOL HYDROCHLORIDE 25 MG: 50 TABLET, COATED ORAL at 09:07

## 2025-04-10 RX ADMIN — PANTOPRAZOLE SODIUM 40 MG: 40 TABLET, DELAYED RELEASE ORAL at 16:31

## 2025-04-10 RX ADMIN — MYCOPHENOLATE MOFETIL 1000 MG: 250 CAPSULE ORAL at 21:15

## 2025-04-10 RX ADMIN — HEPARIN SODIUM 5000 UNITS: 5000 INJECTION INTRAVENOUS; SUBCUTANEOUS at 09:05

## 2025-04-10 RX ADMIN — FLUCONAZOLE 400 MG: 200 TABLET ORAL at 09:07

## 2025-04-10 RX ADMIN — ALBUMIN HUMAN 25 G: 0.25 SOLUTION INTRAVENOUS at 21:16

## 2025-04-10 RX ADMIN — INSULIN HUMAN 10 UNITS: 100 INJECTION, SUSPENSION SUBCUTANEOUS at 09:02

## 2025-04-10 RX ADMIN — PREDNISONE 20 MG: 20 TABLET ORAL at 09:06

## 2025-04-10 RX ADMIN — FUROSEMIDE 60 MG: 10 INJECTION, SOLUTION INTRAVENOUS at 11:48

## 2025-04-10 RX ADMIN — SENNOSIDES AND DOCUSATE SODIUM 1 TABLET: 50; 8.6 TABLET ORAL at 21:16

## 2025-04-10 RX ADMIN — URSODIOL 300 MG: 300 CAPSULE ORAL at 09:06

## 2025-04-10 RX ADMIN — TACROLIMUS 2 MG: 1 CAPSULE ORAL at 06:06

## 2025-04-10 RX ADMIN — URSODIOL 300 MG: 300 CAPSULE ORAL at 15:04

## 2025-04-10 RX ADMIN — ASPIRIN 81 MG: 81 TABLET, COATED ORAL at 09:07

## 2025-04-10 RX ADMIN — HEPARIN SODIUM 5000 UNITS: 5000 INJECTION INTRAVENOUS; SUBCUTANEOUS at 17:18

## 2025-04-10 RX ADMIN — POLYETHYLENE GLYCOL 3350 17 G: 17 POWDER, FOR SOLUTION ORAL at 09:06

## 2025-04-10 RX ADMIN — ALBUMIN HUMAN 25 G: 0.25 SOLUTION INTRAVENOUS at 09:06

## 2025-04-10 RX ADMIN — URSODIOL 300 MG: 300 CAPSULE ORAL at 21:16

## 2025-04-10 ASSESSMENT — COGNITIVE AND FUNCTIONAL STATUS - GENERAL
MOVING TO AND FROM BED TO CHAIR: A LITTLE
PERSONAL GROOMING: A LITTLE
CLIMB 3 TO 5 STEPS WITH RAILING: TOTAL
MOBILITY SCORE: 16
PERSONAL GROOMING: A LITTLE
TURNING FROM BACK TO SIDE WHILE IN FLAT BAD: A LITTLE
MOBILITY SCORE: 17
STANDING UP FROM CHAIR USING ARMS: A LITTLE
HELP NEEDED FOR BATHING: A LITTLE
WALKING IN HOSPITAL ROOM: A LITTLE
WALKING IN HOSPITAL ROOM: A LITTLE
STANDING UP FROM CHAIR USING ARMS: A LITTLE
MOVING FROM LYING ON BACK TO SITTING ON SIDE OF FLAT BED WITH BEDRAILS: A LITTLE
TURNING FROM BACK TO SIDE WHILE IN FLAT BAD: A LITTLE
HELP NEEDED FOR BATHING: A LITTLE
DRESSING REGULAR UPPER BODY CLOTHING: A LOT
DRESSING REGULAR LOWER BODY CLOTHING: A LITTLE
DAILY ACTIVITIY SCORE: 19
DAILY ACTIVITIY SCORE: 18
CLIMB 3 TO 5 STEPS WITH RAILING: A LOT
MOVING TO AND FROM BED TO CHAIR: A LITTLE
DRESSING REGULAR LOWER BODY CLOTHING: A LITTLE
MOVING FROM LYING ON BACK TO SITTING ON SIDE OF FLAT BED WITH BEDRAILS: A LITTLE
TOILETING: A LITTLE
TOILETING: A LITTLE
DRESSING REGULAR UPPER BODY CLOTHING: A LITTLE

## 2025-04-10 ASSESSMENT — PAIN SCALES - GENERAL
PAINLEVEL_OUTOF10: 5 - MODERATE PAIN

## 2025-04-10 NOTE — PROGRESS NOTES
Pharmacist Transplant Education Note    Pharmacy education attempted twice today. The first time the patient was unavailable. The second time the caregiver was not present. Requested that the caregiver come at 10am 4/10 for pharmacy education.     Cuca Saab, PharmD, BCTXP  Clinical Pharmacy Specialist - Solid Organ Transplant

## 2025-04-10 NOTE — PROGRESS NOTES
Physical Therapy                 Therapy Communication Note    Patient Name: López Lopez  MRN: [id number]  Department: [facility]  Room: 9068/9068-A  Today's Date: 4/10/2025     Discipline: Physical Therapy    PT Missed Visit: Yes     Missed Visit Reason: Missed Visit Reason:  (Pt off unit at , will re-attempt as time permits.)    Missed Time: Attempt    04/10/25 at 12:48 PM   Renee Bonilla PTA   Rehab Office: [phone]

## 2025-04-10 NOTE — PROGRESS NOTES
Pharmacist Transplant Education Note    The medications for López Lopez were reviewed in conjunction with the multidisciplinary transplant team. The pharmacist is in agreement with the plan of care for medications at this time.     Approximately 30 minutes were spent with this patient providing medication education and reviewing new post-transplant medications. Also participating in the education session was/were the patient's spouse.     An outpatient dosing schedule was created for all oral medications. This schedule was discussed in detail with the patient, including drug name, use, dose, and the appropriate timing of self-administration (i.e. with or without meals, with or without other medications). Also discussed side effects, drug interactions, and the importance of adherence. Both verbal and written instructions were given to the patient outlining the appropriate use of all current oral medications.     The patient demonstrated an acceptable understanding of his current medication list. All questions were answered to the patient's satisfaction, and the patient and his spouse confirmed understanding.     Follow-up education will take place prior to discharge where a finalized list of discharge medications will be reviewed with the patient. Further educational reinforcement should be provided in the outpatient clinic.    Cuca Saab, PharmD, BCTXP  Clinical Pharmacy Specialist - Solid Organ Transplant

## 2025-04-10 NOTE — SIGNIFICANT EVENT
04/07/25 1128   Patient Interaction   Organ Liver/kidney   Type of Interaction Phone conference   Interdisciplinary Rounds   Attendance Surgeon;Physician;TALIA;Coordinator   Topics Discussed Diet;Home care needs;Medications;Blood test results;Discharge preparation;Activity;Imaging/test results     Transplant Surgery Multidisciplinary Team Note    López Lopez is a 65 y.o. male   POD#8 from a Liver and POD #7 s/p DDKT from a DCD donor. His post operative complications: re-exploration of the hepatic artery     24 Hour Events  1. Hyponatremia. Pt intake about 4L yesterday.      Last Recorded Vitals  Visit Vitals  /73   Pulse 72   Temp 36.3 °C (97.3 °F)   Resp 17      Intake/Output last 3 Shifts:    Intake/Output Summary (Last 24 hours) at 4/10/2025 1243  Last data filed at 4/10/2025 0941  Gross per 24 hour   Intake 1720 ml   Output 1950 ml   Net -230 ml      Vitals:    04/10/25 0600   Weight: 112 kg (246 lb 0.5 oz)        Assessment/Plan     Liver allograft function  -liver enzymes down trending   -repeat Liver & kidney DUS today  -Liver drain output increased >1L. Sending drain creatinine.   -continue ursodial  -starting ASA 81 mg QD    Kidney allograft function  -Made 1.3L UOP  - 60 mg lasix IV BID today   -Na+131. Fluid restruction 1.5L  - Will repeat BMP this evening    Cardiac  -BP acceptable   -Home apixiban (Afib) on hold     FENGI  -Hyperphosphatemia-- low phos diet, no binder, monitor     Endo  -Requiring some SSI  -Diabetes education complete    Pysch  -continue 1/2 home trazodone dose     ID  -On fluconazole   -received dose of Pentamidine 4/7    GI  -Having BMs      Principal Problem:    Management after organ transplant  Active Problems:  Patient Active Problem List   Diagnosis    Acute idiopathic thrombocytopenic purpura (Multi)    Anemia    History of CAD (coronary artery disease)    Mixed hyperlipidemia    Paroxysmal atrial fibrillation (Multi)    Type 2 diabetes mellitus with hyperglycemia,  without long-term current use of insulin    Liver failure without hepatic coma (Multi)    Metabolic dysfunction-associated steatohepatitis (MASH)    Status post insertion of drug-eluting stent into left anterior descending artery for coronary artery disease    Type 2 diabetes mellitus with diabetic chronic kidney disease    History of percutaneous transluminal coronary angioplasty    Coronary artery disease involving native coronary artery of native heart    Pre-liver transplant, listed    Pre-kidney transplant, listed    Refractory ascites    Alcoholic cirrhosis of liver with ascites (Multi)    Stage 4 chronic kidney disease (Multi)    Chronic renal impairment, stage 4 (severe) (Multi)    CKD (chronic kidney disease) stage 4, GFR 15-29 ml/min (Multi)        Immunosuppression reviewed and adjusted       Induction: Simulect and Steroid       Tacrolimus goal 8-10 ng/mL. Current dose 2 mg BID         MMF 1000 mg po BID       Prednisone 20 mg daily  DVT prophylaxis SCDS and subcutaneous heparin 5000 TID  PT/OT  Diet: Diabetic diet, low phos/ 1.5 L fluid restriction  Anticipated discharge Monday    Andreia Jessica, APRN-CNP

## 2025-04-10 NOTE — PROGRESS NOTES
"[facility]  [facility] [place]  CONSULT FOLLOW-UP     Reason For Consult  S/p liver and kidney transplant 4/2/25, 4/3/25     SUBJECTIVE     -No overnight events.  -Large amount of drain output noted overnight, possibly thought to be related to pt's intake of liquids yesterday (had 4L of PO liquid in take)     EXAM     Last Recorded Vitals  Blood pressure 142/83, pulse 74, temperature 36.1 °C (97 °F), temperature source Temporal, resp. rate 18, height 1.93 m (6' 4\"), weight 112 kg (246 lb 0.5 oz), SpO2 97%.      Intake/Output Summary (Last 24 hours) at 4/10/2025 1611  Last data filed at 4/10/2025 1505  Gross per 24 hour   Intake 1060 ml   Output 2010 ml   Net -950 ml       Physical Exam   General: well-developed, well-nourished, no acute distress, AAOx3  HEENT: EOM intact  CV: regular rate and rhythm  Pulm: normal respiratory effort  Abd: soft, nontender  Extremities: warm, no LE edema  Neuro: moves all extremities equally  Psych: normal mood and affect  Skin: warm, dry, intact      OBJECTIVE                                                                              Medications             Current Facility-Administered Medications:     acetaminophen (Tylenol) tablet 650 mg, 650 mg, oral, q6h PRN, Moriah Gutierres APRN-CNP    albumin human 25 % solution 25 g, 25 g, intravenous, BID, Yaa Dover PA-C, Stopped at 04/10/25 1051    aspirin EC tablet 81 mg, 81 mg, oral, Daily, Moriah Gutierres APRN-LIZ, 81 mg at 04/10/25 0907    bisacodyl (Dulcolax) suppository 10 mg, 10 mg, rectal, Daily, Moriah Gutierres APRN-CNP    cholecalciferol (Vitamin D-3) tablet 2,000 Units, 2,000 Units, oral, Daily, Yaa Dover PA-C, 2,000 Units at 04/10/25 0907    dextrose 50 % injection 12.5 g, 12.5 g, intravenous, q15 min PRN, XENIA Choudhury-CNP    dextrose 50 % injection 25 g, 25 g, intravenous, q15 min PRN, Imelda C Em, APRN-CNP    fluconazole (Diflucan) tablet 400 mg, 400 mg, " oral, Daily, RABIA Choudhury, 400 mg at 04/10/25 0907    furosemide (Lasix) injection 60 mg, 60 mg, intravenous, Once, RABIA Crews    glucagon (Glucagen) injection 1 mg, 1 mg, intramuscular, q15 min PRN, RABIA Choudhury    heparin (porcine) injection 5,000 Units, 5,000 Units, subcutaneous, q8h, SHASHANK Kenney-C, 5,000 Units at 04/10/25 0905    insulin lispro injection 0-10 Units, 0-10 Units, subcutaneous, TID AC, SHASHANK Frank-C, 2 Units at 04/09/25 1737    insulin NPH (Isophane) (HumuLIN N,NovoLIN N) injection 10 Units, 10 Units, subcutaneous, q AM, SHASHANK Frank-C, 10 Units at 04/10/25 0902    methocarbamol (Robaxin) tablet 500 mg, 500 mg, oral, q6h PRN, XENIA Jackson-CNP, 500 mg at 04/08/25 1611    mycophenolate (Cellcept) capsule 1,000 mg, 1,000 mg, oral, BID, RABIA Choudhury, 1,000 mg at 04/10/25 0906    ondansetron (Zofran) injection 4 mg, 4 mg, intravenous, q6h PRN, RABIA Choudhury, 4 mg at 04/04/25 1455    pantoprazole (ProtoNix) EC tablet 40 mg, 40 mg, oral, BID AC, RABIA Choudhury, 40 mg at 04/10/25 0606    polyethylene glycol (Glycolax, Miralax) packet 17 g, 17 g, oral, BID, XENIA Jackson-CNP, 17 g at 04/10/25 0906    [COMPLETED] methylPREDNISolone sodium succinate (SOLU-Medrol) 250 mg in dextrose 5% 100 mL IV, 250 mg, intravenous, Daily, Stopped at 04/03/25 0948 **FOLLOWED BY** [COMPLETED] methylPREDNISolone sod succinate (SOLU-Medrol) injection 125 mg, 125 mg, intravenous, Daily, 125 mg at 04/04/25 0830 **FOLLOWED BY** [COMPLETED] methylPREDNISolone sod succinate (SOLU-Medrol) injection 60 mg, 60 mg, intravenous, Daily, 60 mg at 04/06/25 0935 **FOLLOWED BY** predniSONE (Deltasone) tablet 20 mg, 20 mg, oral, Daily, XENIA Choudhury-CNP, 20 mg at 04/10/25 0906    sennosides-docusate sodium (Rachel-Colace) 8.6-50 mg per tablet 1 tablet, 1 tablet, oral, BID, XENIA Jackson-CNP, 1 tablet at  "04/10/25 0906    tacrolimus (Prograf) capsule 2 mg, 2 mg, oral, q12h BRITTANEY, Samson Coleman MD, 2 mg at 04/10/25 0606    traMADol (Ultram) tablet 25 mg, 25 mg, oral, q6h PRN, Moriah L Nenita, APRN-CNP, 25 mg at 04/10/25 0907    traMADol (Ultram) tablet 50 mg, 50 mg, oral, q6h PRN, Moriah L Nenita, APRN-CNP    traZODone (Desyrel) tablet 25 mg, 25 mg, oral, Nightly, Yaa C Stanislaw, PA-C, 25 mg at 04/09/25 2144    ursodiol (Actigall) capsule 300 mg, 300 mg, oral, TID, Moriah L Nenita, APRN-CNP, 300 mg at 04/10/25 1504    valGANciclovir (Valcyte) tablet 450 mg, 450 mg, oral, q48h, Moriah L Neches, APRN-CNP, 450 mg at 04/09/25 1050                                                                            Labs     CBC RFP   Lab Results   Component Value Date    WBC 6.2 04/10/2025    HGB 8.3 (L) 04/10/2025    HCT 24.0 (L) 04/10/2025    MCV 88 04/10/2025    PLT 74 (L) 04/10/2025    NEUTROABS 2.69 04/02/2025    Lab Results   Component Value Date     (L) 04/10/2025    K 3.8 04/10/2025    CL 96 (L) 04/10/2025    CO2 23 04/10/2025    BUN 77 (H) 04/10/2025    CREATININE 3.75 (H) 04/10/2025     Lab Results   Component Value Date    MG 2.31 04/10/2025    PHOS 5.3 (H) 04/10/2025    CALCIUM 8.6 04/10/2025    ALBUMIN 3.5 04/10/2025         Hepatic Function ABG/VBG   Lab Results   Component Value Date    ALT 40 04/10/2025    AST 13 04/10/2025     (H) 04/10/2025    ALKPHOS 249 (H) 04/10/2025     Lab Results   Component Value Date    BILITOT 1.3 (H) 04/10/2025    BILIDIR 0.7 (H) 04/10/2025     Lab Results   Component Value Date    PROTIME 12.7 (H) 04/05/2025    APTT 25 (L) 04/05/2025    INR 1.1 04/05/2025    No results found for: \"NONUHFIRE\", \"LACTATE\"                                                                               Imaging     US LIVER WITH DOPPLER; 4/3/2025   IMPRESSION:  1. Status post liver transplant with borderline elevated RI of the  common hepatic artery with limited assessment of the left/right  branches as " above. Findings are likely due to a combination of  technical factors and early postoperative status however recommend  continued attention on future follow-up examinations.  2. Visualized hepatic veins demonstrate antegrade flow and are patent.                                                                         GI Procedures         ASSESSMENT / PLAN     ASSESSMENT/PLAN:  López Lopez is a 65 y.o. male with a past medical history ofCKD 4, DM2, HTN, CAD s/p PCI to prox LAD 2021, h/o pAFib on Eliquis, h/o ITP dx 5/2024 s/p steroids and IVIG, hyperlipidemia, obesity who is now s/p SLK on 4/2 and 4/3 for MASLD/cirrhosis. Hepatology is consulted for co-management.    Recommendations:  - Drains management per transplant surgery team.  - Immunosuppression, antibiotics and prophylaxis per transplant surgery team.       Patient was seen and discussed with Dr. Jha .    Thank you for the consultation. Hepatology will continue to follow.  During weekday hours of 7am-5pm, please do not hesitate to contact me on [employer] Chat or page [phone], if there are any further questions.  After hours, on weekends, and on holidays, please page the on-call GI fellow at [phone].   Thank you.

## 2025-04-10 NOTE — CARE PLAN
The patient's goals for the shift include resting.     The clinical goals for the shift include patient will remain HDS throughout this shift.    Over the shift, the patient did make progress towards his goals, remained stable throughout the shift and was able to obtain rest.

## 2025-04-10 NOTE — NURSING NOTE
Mr. Lopez is off the unit at this time.  His wife is present and comfortable with the insulin pen and insulin regimen for home.  Not all meds/supplies have been delivered to bedside.  Would like wife to apply the CGM as she will be assisting with this at home.  Will follow up in am.  Addendum @ 1722:  spoke with pharmacy and they will deliver the DexCom on first run [id number] in am.  Total time spent:  20 mins.

## 2025-04-10 NOTE — CARE PLAN
The patient's goals for the shift include      The clinical goals for the shift include Pt will remain HDS for the shift    Over the shift, the patient did make progress toward the following goals. No Barriers to progression

## 2025-04-10 NOTE — PROGRESS NOTES
TRANSPLANT SURGERY PROGRESS NOTE    López Lopez underwent transplant surgery on 4/2/2025 (Liver / Kidney) and was evaluated on multidisciplinary inpatient rounds.  I specifically evaluated the management of immunosuppression to ensure adequate exposure required to decrease the risk of rejection.  Furthermore, I reviewed potential side effects including tremor, hyperglycemia, leukopenia, infection, and other neurologic changes.  I reviewed and adjusted infectious prophylaxis based on the patients clinical condition and  protocols.     24 EVENTS: drank nearly 4L yesterday; resulted in high output in ascites drain    DIAGNOSIS: Liver replaced by transplant Z94.4, kidney replaced by transplant Z94.0    PHYSICAL EXAMINATION:  Last Recorded Vitals  Visit Vitals  /78   Pulse 68   Temp 35.2 °C (95.4 °F)   Resp 17      Intake/Output last 3 Shifts:    Intake/Output Summary (Last 24 hours) at 4/10/2025 0941  Last data filed at 4/10/2025 0940  Gross per 24 hour   Intake 1040 ml   Output 2375 ml   Net -1335 ml      Vitals:    04/10/25 0600   Weight: 112 kg (246 lb 0.5 oz)      Gen: A+OX3; NAD  HEENT: PERRL, sclera anicteric, MMM  Cardiac: RRR  Chest: Normal inspiratory effort  Abdomen: S/NT/ND. Incisions C/D/I.  Ext: Trace ankle edema  Drains: serosanguinous    MEDICATION LIST: REVIEWED  Scheduled medications  albumin human, 25 g, intravenous, BID  aspirin, 81 mg, oral, Daily  bisacodyl, 10 mg, rectal, Daily  cholecalciferol, 2,000 Units, oral, Daily  fluconazole, 400 mg, oral, Daily  heparin, 5,000 Units, subcutaneous, q8h  insulin lispro, 0-10 Units, subcutaneous, TID AC  insulin NPH (Isophane), 10 Units, subcutaneous, q AM  mycophenolate, 1,000 mg, oral, BID  pantoprazole, 40 mg, oral, BID AC  polyethylene glycol, 17 g, oral, BID  predniSONE, 20 mg, oral, Daily  sennosides-docusate sodium, 1 tablet, oral, BID  tacrolimus, 2 mg, oral, q12h BRITTANEY  traZODone, 25 mg, oral, Nightly  ursodiol, 300 mg, oral,  TID  valGANciclovir, 450 mg, oral, q48h    LABS:  CBC   Result Value Ref Range    WBC 6.2 4.4 - 11.3 x10*3/uL    nRBC 0.0 0.0 - 0.0 /100 WBCs    RBC 2.73 (L) 4.50 - 5.90 x10*6/uL    Hemoglobin 8.3 (L) 13.5 - 17.5 g/dL    Hematocrit 24.0 (L) 41.0 - 52.0 %    MCV 88 80 - 100 fL    MCH 30.4 26.0 - 34.0 pg    MCHC 34.6 32.0 - 36.0 g/dL    RDW 15.0 (H) 11.5 - 14.5 %    Platelets 74 (L) 150 - 450 x10*3/uL   Gamma-Glutamyl Transferase   Result Value Ref Range     (H) 5 - 64 U/L   Hepatic function panel   Result Value Ref Range    Albumin 3.5 3.4 - 5.0 g/dL    Bilirubin, Total 1.3 (H) 0.0 - 1.2 mg/dL    Bilirubin, Direct 0.7 (H) 0.0 - 0.3 mg/dL    Alkaline Phosphatase 249 (H) 33 - 136 U/L    ALT 40 10 - 52 U/L    AST 13 9 - 39 U/L    Total Protein 4.7 (L) 6.4 - 8.2 g/dL   Magnesium   Result Value Ref Range    Magnesium 2.31 1.60 - 2.40 mg/dL   Phosphorus   Result Value Ref Range    Phosphorus 5.3 (H) 2.5 - 4.9 mg/dL   Basic Metabolic Panel   Result Value Ref Range    Glucose 113 (H) 74 - 99 mg/dL    Sodium 131 (L) 136 - 145 mmol/L    Potassium 3.8 3.5 - 5.3 mmol/L    Chloride 96 (L) 98 - 107 mmol/L    Bicarbonate 23 21 - 32 mmol/L    Anion Gap 16 10 - 20 mmol/L    Urea Nitrogen 77 (H) 6 - 23 mg/dL    Creatinine 3.75 (H) 0.50 - 1.30 mg/dL    eGFR 17 (L) >60 mL/min/1.73m*2    Calcium 8.6 8.6 - 10.6 mg/dL     ASSESSMENT AND PLAN:  Mr. Lopez is a 65 y.o. male who underwent  transplant surgery on 4/2/2025 (Liver / Kidney).    1. Immunosuppression reviewed and adjusted       Tacrolimus: goal trough level 8-10 ng/mL      Mycophenolate: Yes - 1 gm bid      Prednisone 20 mg      Simulect dose #2: completed     2. Liver/kidney allograft function      LFTs downtrending, Albumin q12h for high drain output.      Kidney: UOP stable; lasix 60 mg IV bid, fluid restrict 1.5L     3. Wound/drain/noel and pain management      Drains: keep LISA#2 and #4       Empty drains q4h     4. Nutrition: advance diet and Nepro supplements     5.  Prophylaxis      DVT: SCDs/Subcutaneous Heparin: No for persistent thrombocytopenia      GI prophylaxis: Protonix      Fluconazole, pentamidine, ASA and valcyte     6. PT/OT: OOB to chair     7. Discharge planned for: 3 days given fluid shifts    Case was presented at Multi Disciplinary team rounds   Attending physician, consulting physician, , pharmacist, residents and fellow were present at the meeting.    Yaa Cancino MD, PHD, MPH, FACS  Chief, Transplant and Hepatobiliary Surgery

## 2025-04-11 ENCOUNTER — PHARMACY VISIT (OUTPATIENT)
Dept: PHARMACY | Facility: CLINIC | Age: 65
End: 2025-04-11
Payer: COMMERCIAL

## 2025-04-11 ENCOUNTER — DOCUMENTATION (OUTPATIENT)
Dept: TRANSPLANT | Facility: HOSPITAL | Age: 65
End: 2025-04-11
Payer: COMMERCIAL

## 2025-04-11 DIAGNOSIS — K75.81 METABOLIC DYSFUNCTION-ASSOCIATED STEATOHEPATITIS (MASH): ICD-10-CM

## 2025-04-11 DIAGNOSIS — Z76.82 PRE-KIDNEY TRANSPLANT, LISTED: ICD-10-CM

## 2025-04-11 DIAGNOSIS — Z94.4 LIVER REPLACED BY TRANSPLANT (MULTI): ICD-10-CM

## 2025-04-11 DIAGNOSIS — Z94.0 KIDNEY REPLACED BY TRANSPLANT (HHS-HCC): ICD-10-CM

## 2025-04-11 DIAGNOSIS — Z76.82 PRE-LIVER TRANSPLANT, LISTED: ICD-10-CM

## 2025-04-11 DIAGNOSIS — D64.9 ANEMIA, UNSPECIFIED TYPE: ICD-10-CM

## 2025-04-11 DIAGNOSIS — N18.4 STAGE 4 CHRONIC KIDNEY DISEASE (MULTI): ICD-10-CM

## 2025-04-11 LAB
ALBUMIN SERPL BCP-MCNC: 3.5 G/DL (ref 3.4–5)
ALP SERPL-CCNC: 208 U/L (ref 33–136)
ALT SERPL W P-5'-P-CCNC: 31 U/L (ref 10–52)
ANION GAP SERPL CALC-SCNC: 19 MMOL/L (ref 10–20)
AST SERPL W P-5'-P-CCNC: 12 U/L (ref 9–39)
BILIRUB DIRECT SERPL-MCNC: 0.7 MG/DL (ref 0–0.3)
BILIRUB SERPL-MCNC: 1.2 MG/DL (ref 0–1.2)
BUN SERPL-MCNC: 80 MG/DL (ref 6–23)
CA-I BLD-SCNC: 1.21 MMOL/L (ref 1.1–1.33)
CALCIUM SERPL-MCNC: 9 MG/DL (ref 8.6–10.6)
CHLORIDE SERPL-SCNC: 96 MMOL/L (ref 98–107)
CO2 SERPL-SCNC: 22 MMOL/L (ref 21–32)
CREAT SERPL-MCNC: 3.77 MG/DL (ref 0.5–1.3)
CREAT UR-MCNC: 93.8 MG/DL (ref 20–370)
CREAT UR-MCNC: 94.6 MG/DL (ref 20–370)
EGFRCR SERPLBLD CKD-EPI 2021: 17 ML/MIN/1.73M*2
ERYTHROCYTE [DISTWIDTH] IN BLOOD BY AUTOMATED COUNT: 15.1 % (ref 11.5–14.5)
GGT SERPL-CCNC: 326 U/L (ref 5–64)
GLUCOSE BLD MANUAL STRIP-MCNC: 150 MG/DL (ref 74–99)
GLUCOSE BLD MANUAL STRIP-MCNC: 151 MG/DL (ref 74–99)
GLUCOSE BLD MANUAL STRIP-MCNC: 153 MG/DL (ref 74–99)
GLUCOSE SERPL-MCNC: 106 MG/DL (ref 74–99)
HCT VFR BLD AUTO: 25.8 % (ref 41–52)
HGB BLD-MCNC: 8.6 G/DL (ref 13.5–17.5)
LABORATORY COMMENT REPORT: NORMAL
MAGNESIUM SERPL-MCNC: 2.17 MG/DL (ref 1.6–2.4)
MCH RBC QN AUTO: 29.8 PG (ref 26–34)
MCHC RBC AUTO-ENTMCNC: 33.3 G/DL (ref 32–36)
MCV RBC AUTO: 89 FL (ref 80–100)
NRBC BLD-RTO: 0 /100 WBCS (ref 0–0)
OSMOLALITY SERPL: 294 MOSM/KG (ref 280–300)
OSMOLALITY UR: 270 MOSM/KG (ref 200–1200)
PATH REPORT.COMMENTS IMP SPEC: NORMAL
PATH REPORT.FINAL DX SPEC: NORMAL
PATH REPORT.GROSS SPEC: NORMAL
PATH REPORT.RELEVANT HX SPEC: NORMAL
PATH REPORT.TOTAL CANCER: NORMAL
PHOSPHATE SERPL-MCNC: 5.6 MG/DL (ref 2.5–4.9)
PLATELET # BLD AUTO: 105 X10*3/UL (ref 150–450)
POTASSIUM SERPL-SCNC: 3.8 MMOL/L (ref 3.5–5.3)
PROT SERPL-MCNC: 5 G/DL (ref 6.4–8.2)
RBC # BLD AUTO: 2.89 X10*6/UL (ref 4.5–5.9)
SODIUM SERPL-SCNC: 133 MMOL/L (ref 136–145)
SODIUM UR-SCNC: 15 MMOL/L
SODIUM/CREAT UR-RTO: 16 MMOL/G CREAT
TACROLIMUS BLD-MCNC: 10.9 NG/ML
UREA/CREAT UR-SRTO: 4.5 G/G CREAT
UUN UR-MCNC: 428 MG/DL
WBC # BLD AUTO: 6.3 X10*3/UL (ref 4.4–11.3)

## 2025-04-11 PROCEDURE — 82330 ASSAY OF CALCIUM: CPT

## 2025-04-11 PROCEDURE — 82947 ASSAY GLUCOSE BLOOD QUANT: CPT

## 2025-04-11 PROCEDURE — RXMED WILLOW AMBULATORY MEDICATION CHARGE

## 2025-04-11 PROCEDURE — 85027 COMPLETE CBC AUTOMATED: CPT

## 2025-04-11 PROCEDURE — P9047 ALBUMIN (HUMAN), 25%, 50ML: HCPCS | Performed by: PHYSICIAN ASSISTANT

## 2025-04-11 PROCEDURE — 36415 COLL VENOUS BLD VENIPUNCTURE: CPT

## 2025-04-11 PROCEDURE — 80048 BASIC METABOLIC PNL TOTAL CA: CPT

## 2025-04-11 PROCEDURE — 2500000001 HC RX 250 WO HCPCS SELF ADMINISTERED DRUGS (ALT 637 FOR MEDICARE OP): Performed by: PHYSICIAN ASSISTANT

## 2025-04-11 PROCEDURE — 2500000004 HC RX 250 GENERAL PHARMACY W/ HCPCS (ALT 636 FOR OP/ED): Performed by: PHYSICIAN ASSISTANT

## 2025-04-11 PROCEDURE — 82570 ASSAY OF URINE CREATININE: CPT | Performed by: PHYSICIAN ASSISTANT

## 2025-04-11 PROCEDURE — 2500000004 HC RX 250 GENERAL PHARMACY W/ HCPCS (ALT 636 FOR OP/ED)

## 2025-04-11 PROCEDURE — 2500000002 HC RX 250 W HCPCS SELF ADMINISTERED DRUGS (ALT 637 FOR MEDICARE OP, ALT 636 FOR OP/ED): Performed by: PHYSICIAN ASSISTANT

## 2025-04-11 PROCEDURE — 83930 ASSAY OF BLOOD OSMOLALITY: CPT | Performed by: PHYSICIAN ASSISTANT

## 2025-04-11 PROCEDURE — 83935 ASSAY OF URINE OSMOLALITY: CPT | Performed by: PHYSICIAN ASSISTANT

## 2025-04-11 PROCEDURE — 97530 THERAPEUTIC ACTIVITIES: CPT | Mod: GO

## 2025-04-11 PROCEDURE — 97535 SELF CARE MNGMENT TRAINING: CPT | Mod: GO

## 2025-04-11 PROCEDURE — 80197 ASSAY OF TACROLIMUS: CPT

## 2025-04-11 PROCEDURE — 99232 SBSQ HOSP IP/OBS MODERATE 35: CPT | Performed by: SURGERY

## 2025-04-11 PROCEDURE — 2500000001 HC RX 250 WO HCPCS SELF ADMINISTERED DRUGS (ALT 637 FOR MEDICARE OP)

## 2025-04-11 PROCEDURE — 2500000002 HC RX 250 W HCPCS SELF ADMINISTERED DRUGS (ALT 637 FOR MEDICARE OP, ALT 636 FOR OP/ED)

## 2025-04-11 PROCEDURE — 84075 ASSAY ALKALINE PHOSPHATASE: CPT

## 2025-04-11 PROCEDURE — 84100 ASSAY OF PHOSPHORUS: CPT

## 2025-04-11 PROCEDURE — 84540 ASSAY OF URINE/UREA-N: CPT | Performed by: PHYSICIAN ASSISTANT

## 2025-04-11 PROCEDURE — 83735 ASSAY OF MAGNESIUM: CPT

## 2025-04-11 PROCEDURE — 99233 SBSQ HOSP IP/OBS HIGH 50: CPT | Performed by: STUDENT IN AN ORGANIZED HEALTH CARE EDUCATION/TRAINING PROGRAM

## 2025-04-11 PROCEDURE — 82977 ASSAY OF GGT: CPT

## 2025-04-11 PROCEDURE — 2020000001 HC ICU ROOM DAILY

## 2025-04-11 RX ORDER — ACETAMINOPHEN 325 MG/1
650 TABLET ORAL EVERY 6 HOURS PRN
Start: 2025-04-11 | End: 2025-04-21

## 2025-04-11 RX ORDER — TRAZODONE HYDROCHLORIDE 50 MG/1
25 TABLET ORAL NIGHTLY
Qty: 15 TABLET | Refills: 0 | Status: ON HOLD | OUTPATIENT
Start: 2025-04-11 | End: 2025-05-04

## 2025-04-11 RX ORDER — FUROSEMIDE 10 MG/ML
60 INJECTION INTRAMUSCULAR; INTRAVENOUS ONCE
Status: COMPLETED | OUTPATIENT
Start: 2025-04-11 | End: 2025-04-11

## 2025-04-11 RX ORDER — TACROLIMUS 1 MG/1
1 CAPSULE ORAL 2 TIMES DAILY
Start: 2025-04-11 | End: 2025-04-24 | Stop reason: SDUPTHER

## 2025-04-11 RX ORDER — TACROLIMUS 0.5 MG/1
0.5 CAPSULE ORAL ONCE
Status: COMPLETED | OUTPATIENT
Start: 2025-04-11 | End: 2025-04-11

## 2025-04-11 RX ORDER — AMOXICILLIN 250 MG
1 CAPSULE ORAL 2 TIMES DAILY PRN
Qty: 60 TABLET | Refills: 11 | Status: SHIPPED | OUTPATIENT
Start: 2025-04-11 | End: 2025-04-11 | Stop reason: HOSPADM

## 2025-04-11 RX ORDER — VALGANCICLOVIR 450 MG/1
450 TABLET, FILM COATED ORAL
Start: 2025-04-11 | End: 2025-04-24 | Stop reason: SDUPTHER

## 2025-04-11 RX ORDER — FLUCONAZOLE 200 MG/1
200 TABLET ORAL DAILY
Start: 2025-04-11 | End: 2025-05-04 | Stop reason: HOSPADM

## 2025-04-11 RX ORDER — FUROSEMIDE 20 MG/1
40 TABLET ORAL
Qty: 120 TABLET | Refills: 0 | Status: SHIPPED | OUTPATIENT
Start: 2025-04-11 | End: 2025-04-17 | Stop reason: HOSPADM

## 2025-04-11 RX ORDER — TACROLIMUS 0.5 MG/1
0.5 CAPSULE ORAL
Status: DISCONTINUED | OUTPATIENT
Start: 2025-04-11 | End: 2025-04-11

## 2025-04-11 RX ORDER — POLYETHYLENE GLYCOL 3350 17 G/17G
17 POWDER, FOR SOLUTION ORAL 2 TIMES DAILY PRN
Qty: 238 G | Refills: 0 | Status: SHIPPED | OUTPATIENT
Start: 2025-04-11 | End: 2025-04-11

## 2025-04-11 RX ORDER — POLYETHYLENE GLYCOL 3350 17 G/17G
17 POWDER, FOR SOLUTION ORAL DAILY PRN
Start: 2025-04-11 | End: 2025-04-17 | Stop reason: HOSPADM

## 2025-04-11 RX ORDER — PENTAMIDINE ISETHIONATE 300 MG/300MG
300 INHALANT RESPIRATORY (INHALATION)
Start: 2025-04-11 | End: 2025-05-04 | Stop reason: HOSPADM

## 2025-04-11 RX ADMIN — MYCOPHENOLATE MOFETIL 1000 MG: 250 CAPSULE ORAL at 08:20

## 2025-04-11 RX ADMIN — URSODIOL 300 MG: 300 CAPSULE ORAL at 08:20

## 2025-04-11 RX ADMIN — PANTOPRAZOLE SODIUM 40 MG: 40 TABLET, DELAYED RELEASE ORAL at 15:27

## 2025-04-11 RX ADMIN — HEPARIN SODIUM 5000 UNITS: 5000 INJECTION INTRAVENOUS; SUBCUTANEOUS at 08:37

## 2025-04-11 RX ADMIN — HEPARIN SODIUM 5000 UNITS: 5000 INJECTION INTRAVENOUS; SUBCUTANEOUS at 02:00

## 2025-04-11 RX ADMIN — INSULIN LISPRO 2 UNITS: 100 INJECTION, SOLUTION INTRAVENOUS; SUBCUTANEOUS at 12:05

## 2025-04-11 RX ADMIN — SENNOSIDES AND DOCUSATE SODIUM 1 TABLET: 50; 8.6 TABLET ORAL at 20:13

## 2025-04-11 RX ADMIN — TACROLIMUS 2 MG: 1 CAPSULE ORAL at 06:08

## 2025-04-11 RX ADMIN — PANTOPRAZOLE SODIUM 40 MG: 40 TABLET, DELAYED RELEASE ORAL at 06:08

## 2025-04-11 RX ADMIN — URSODIOL 300 MG: 300 CAPSULE ORAL at 15:28

## 2025-04-11 RX ADMIN — MYCOPHENOLATE MOFETIL 1000 MG: 250 CAPSULE ORAL at 20:14

## 2025-04-11 RX ADMIN — HEPARIN SODIUM 5000 UNITS: 5000 INJECTION INTRAVENOUS; SUBCUTANEOUS at 17:03

## 2025-04-11 RX ADMIN — FUROSEMIDE 60 MG: 10 INJECTION, SOLUTION INTRAVENOUS at 12:04

## 2025-04-11 RX ADMIN — SENNOSIDES AND DOCUSATE SODIUM 1 TABLET: 50; 8.6 TABLET ORAL at 08:20

## 2025-04-11 RX ADMIN — ALBUMIN HUMAN 25 G: 0.25 SOLUTION INTRAVENOUS at 20:13

## 2025-04-11 RX ADMIN — ALBUMIN HUMAN 25 G: 0.25 SOLUTION INTRAVENOUS at 08:22

## 2025-04-11 RX ADMIN — TACROLIMUS 0.5 MG: 0.5 CAPSULE ORAL at 17:48

## 2025-04-11 RX ADMIN — INSULIN LISPRO 2 UNITS: 100 INJECTION, SOLUTION INTRAVENOUS; SUBCUTANEOUS at 17:03

## 2025-04-11 RX ADMIN — FUROSEMIDE 60 MG: 40 TABLET ORAL at 17:48

## 2025-04-11 RX ADMIN — Medication 2000 UNITS: at 08:20

## 2025-04-11 RX ADMIN — ASPIRIN 81 MG: 81 TABLET, COATED ORAL at 08:20

## 2025-04-11 RX ADMIN — FLUCONAZOLE 400 MG: 200 TABLET ORAL at 08:20

## 2025-04-11 RX ADMIN — PREDNISONE 20 MG: 20 TABLET ORAL at 08:20

## 2025-04-11 RX ADMIN — INSULIN HUMAN 10 UNITS: 100 INJECTION, SUSPENSION SUBCUTANEOUS at 08:21

## 2025-04-11 RX ADMIN — TRAZODONE HYDROCHLORIDE 25 MG: 50 TABLET ORAL at 20:14

## 2025-04-11 RX ADMIN — URSODIOL 300 MG: 300 CAPSULE ORAL at 20:14

## 2025-04-11 RX ADMIN — VALGANCICLOVIR HYDROCHLORIDE 450 MG: 450 TABLET ORAL at 09:32

## 2025-04-11 ASSESSMENT — COGNITIVE AND FUNCTIONAL STATUS - GENERAL
MOVING TO AND FROM BED TO CHAIR: A LITTLE
STANDING UP FROM CHAIR USING ARMS: A LITTLE
MOBILITY SCORE: 17
MOVING FROM LYING ON BACK TO SITTING ON SIDE OF FLAT BED WITH BEDRAILS: A LITTLE
TOILETING: A LITTLE
TOILETING: A LITTLE
HELP NEEDED FOR BATHING: A LITTLE
DRESSING REGULAR UPPER BODY CLOTHING: A LITTLE
DRESSING REGULAR UPPER BODY CLOTHING: A LITTLE
TURNING FROM BACK TO SIDE WHILE IN FLAT BAD: A LITTLE
DAILY ACTIVITIY SCORE: 19
PERSONAL GROOMING: A LITTLE
HELP NEEDED FOR BATHING: A LITTLE
DRESSING REGULAR LOWER BODY CLOTHING: A LOT
DRESSING REGULAR LOWER BODY CLOTHING: A LITTLE
WALKING IN HOSPITAL ROOM: A LITTLE
CLIMB 3 TO 5 STEPS WITH RAILING: A LOT
DAILY ACTIVITIY SCORE: 19

## 2025-04-11 ASSESSMENT — PAIN - FUNCTIONAL ASSESSMENT
PAIN_FUNCTIONAL_ASSESSMENT: 0-10

## 2025-04-11 ASSESSMENT — PAIN SCALES - GENERAL
PAINLEVEL_OUTOF10: 0 - NO PAIN
PAINLEVEL_OUTOF10: 5 - MODERATE PAIN
PAINLEVEL_OUTOF10: 4

## 2025-04-11 ASSESSMENT — PAIN DESCRIPTION - DESCRIPTORS: DESCRIPTORS: DULL

## 2025-04-11 ASSESSMENT — ACTIVITIES OF DAILY LIVING (ADL): HOME_MANAGEMENT_TIME_ENTRY: 13

## 2025-04-11 NOTE — PROGRESS NOTES
TRANSPLANT SURGERY PROGRESS NOTE    López Lopez underwent transplant surgery on 4/2/2025 (Liver / Kidney) and was evaluated on multidisciplinary inpatient rounds.  I specifically evaluated the management of immunosuppression to ensure adequate exposure required to decrease the risk of rejection.  Furthermore, I reviewed potential side effects including tremor, hyperglycemia, leukopenia, infection, and other neurologic changes.  I reviewed and adjusted infectious prophylaxis based on the patients clinical condition and  protocols.     24 EVENTS: drain output significantly down with fluid restriction    DIAGNOSIS: Liver replaced by transplant Z94.4, kidney replaced by transplant Z94.0    PHYSICAL EXAMINATION:  Last Recorded Vitals  Visit Vitals  /78 (Patient Position: Sitting)   Pulse 86   Temp 35.4 °C (95.7 °F) (Temporal)   Resp 18      Intake/Output last 3 Shifts:    Intake/Output Summary (Last 24 hours) at 4/11/2025 1110  Last data filed at 4/11/2025 0900  Gross per 24 hour   Intake 480 ml   Output 2700 ml   Net -2220 ml      Vitals:    04/11/25 0758   Weight: 109 kg (240 lb 4.8 oz)      Gen: A+OX3; NAD  HEENT: PERRL, sclera anicteric, MMM  Cardiac: RRR  Chest: Normal inspiratory effort  Abdomen: S/NT/ND. Incisions C/D/I.  Ext: No LE edema  Drains: serosanguinous    MEDICATION LIST: REVIEWED  Scheduled medications  albumin human, 25 g, intravenous, BID  aspirin, 81 mg, oral, Daily  bisacodyl, 10 mg, rectal, Daily  cholecalciferol, 2,000 Units, oral, Daily  fluconazole, 400 mg, oral, Daily  heparin, 5,000 Units, subcutaneous, q8h  insulin lispro, 0-10 Units, subcutaneous, TID AC  insulin NPH (Isophane), 10 Units, subcutaneous, q AM  mycophenolate, 1,000 mg, oral, BID  pantoprazole, 40 mg, oral, BID AC  polyethylene glycol, 17 g, oral, BID  predniSONE, 20 mg, oral, Daily  sennosides-docusate sodium, 1 tablet, oral, BID  tacrolimus, 2 mg, oral, q12h BRITTANEY  traZODone, 25 mg, oral, Nightly  ursodiol, 300  mg, oral, TID  valGANciclovir, 450 mg, oral, q48h    LABS:  CBC   Result Value Ref Range    WBC 6.3 4.4 - 11.3 x10*3/uL    nRBC 0.0 0.0 - 0.0 /100 WBCs    RBC 2.89 (L) 4.50 - 5.90 x10*6/uL    Hemoglobin 8.6 (L) 13.5 - 17.5 g/dL    Hematocrit 25.8 (L) 41.0 - 52.0 %    MCV 89 80 - 100 fL    MCH 29.8 26.0 - 34.0 pg    MCHC 33.3 32.0 - 36.0 g/dL    RDW 15.1 (H) 11.5 - 14.5 %    Platelets 105 (L) 150 - 450 x10*3/uL   Gamma-Glutamyl Transferase   Result Value Ref Range     (H) 5 - 64 U/L   Hepatic function panel   Result Value Ref Range    Albumin 3.5 3.4 - 5.0 g/dL    Bilirubin, Total 1.2 0.0 - 1.2 mg/dL    Bilirubin, Direct 0.7 (H) 0.0 - 0.3 mg/dL    Alkaline Phosphatase 208 (H) 33 - 136 U/L    ALT 31 10 - 52 U/L    AST 12 9 - 39 U/L    Total Protein 5.0 (L) 6.4 - 8.2 g/dL   Magnesium   Result Value Ref Range    Magnesium 2.17 1.60 - 2.40 mg/dL   Tacrolimus   Result Value Ref Range    Tacrolimus  10.9 <=15.0 ng/mL   Phosphorus   Result Value Ref Range    Phosphorus 5.6 (H) 2.5 - 4.9 mg/dL   Basic Metabolic Panel   Result Value Ref Range    Glucose 106 (H) 74 - 99 mg/dL    Sodium 133 (L) 136 - 145 mmol/L    Potassium 3.8 3.5 - 5.3 mmol/L    Chloride 96 (L) 98 - 107 mmol/L    Bicarbonate 22 21 - 32 mmol/L    Anion Gap 19 10 - 20 mmol/L    Urea Nitrogen 80 (H) 6 - 23 mg/dL    Creatinine 3.77 (H) 0.50 - 1.30 mg/dL    eGFR 17 (L) >60 mL/min/1.73m*2    Calcium 9.0 8.6 - 10.6 mg/dL     ASSESSMENT AND PLAN:    Mr. Lopez is a 65 y.o. male who underwent  transplant surgery on 4/2/2025 (Liver / Kidney).    1. Immunosuppression reviewed and adjusted       Tacrolimus: goal trough level 8-10 ng/mL, 0.5 mg tonight and start 1.5 mg bid tomorrow AM      Mycophenolate: Yes - 1 gm bid      Prednisone 20 mg      Simulect dose #2: completed     2. Liver/kidney allograft function      LFTs downtrending, Albumin q12h for high drain output.      Kidney: UOP stable; lasix 60 mg IV once in AM then 60 mg PO in PM, fluid restrict 1.5L.  Plan discharge on lasix 40 mg PO bid     3. Wound/drain/noel and pain management      Drains: remove LISA#2 and suture site. Keep LISA#4 with drain teaching     4. Nutrition: regular diet and Nepro supplements     5. Prophylaxis      DVT: SCDs/Subcutaneous Heparin: No for persistent thrombocytopenia      GI prophylaxis: Protonix      Fluconazole, pentamidine, ASA and valcyte     6. PT/OT: OOB to chair     7. Discharge planned for: 1 day    Case was presented at Multi Disciplinary team rounds   Attending physician, consulting physician, , pharmacist, residents and fellow were present at the meeting.    Yaa Cancino MD, PHD, MPH, FACS  Chief, Transplant and Hepatobiliary Surgery

## 2025-04-11 NOTE — PROGRESS NOTES
"[facility]  [facility] [place]  CONSULT FOLLOW-UP     Reason For Consult  S/p liver and kidney transplant 4/2/25, 4/3/25     SUBJECTIVE     -No overnight events.      EXAM     Last Recorded Vitals  Blood pressure 146/83, pulse 75, temperature 35.3 °C (95.5 °F), temperature source Temporal, resp. rate 18, height 1.93 m (6' 4\"), weight 109 kg (240 lb 4.8 oz), SpO2 99%.      Intake/Output Summary (Last 24 hours) at 4/11/2025 1356  Last data filed at 4/11/2025 1100  Gross per 24 hour   Intake 480 ml   Output 2800 ml   Net -2320 ml       Physical Exam   General: well-developed, well-nourished, no acute distress, AAOx3  HEENT: EOM intact  CV: regular rate and rhythm  Pulm: normal respiratory effort  Abd: soft, nontender  Extremities: warm, no LE edema  Neuro: moves all extremities equally  Psych: normal mood and affect  Skin: warm, dry, intact      OBJECTIVE                                                                              Medications             Current Facility-Administered Medications:     acetaminophen (Tylenol) tablet 650 mg, 650 mg, oral, q6h PRN, Moriah Gutierres, APRN-CNP    albumin human 25 % solution 25 g, 25 g, intravenous, BID, Yaa Dover PA-C, Stopped at 04/11/25 0922    aspirin EC tablet 81 mg, 81 mg, oral, Daily, Moriah L Nenita, APRN-CNP, 81 mg at 04/11/25 0820    bisacodyl (Dulcolax) suppository 10 mg, 10 mg, rectal, Daily, Moriah VERONIKA Gutierres, APRN-CNP    cholecalciferol (Vitamin D-3) tablet 2,000 Units, 2,000 Units, oral, Daily, SHASHANK Kenney-C, 2,000 Units at 04/11/25 0820    dextrose 50 % injection 12.5 g, 12.5 g, intravenous, q15 min PRN, Imelda Em APRN-CNP    dextrose 50 % injection 25 g, 25 g, intravenous, q15 min PRN, Imelda Em APRN-CNP    fluconazole (Diflucan) tablet 400 mg, 400 mg, oral, Daily, Imelda Em APRN-CNP, 400 mg at 04/11/25 0820    furosemide (Lasix) tablet 60 mg, 60 mg, oral, q12h Yaa QUISPE, " [place]    glucagon (Glucagen) injection 1 mg, 1 mg, intramuscular, q15 min PRN, Imelda Em APRN-CNP    heparin (porcine) injection 5,000 Units, 5,000 Units, subcutaneous, q8h, Yaa Dover PA-C, 5,000 Units at 04/11/25 0837    insulin lispro injection 0-10 Units, 0-10 Units, subcutaneous, TID AC, Kathia Luna PA-C, 2 Units at 04/11/25 1205    insulin NPH (Isophane) (HumuLIN N,NovoLIN N) injection 10 Units, 10 Units, subcutaneous, q AM, Kathia Luna PA-C, 10 Units at 04/11/25 0821    methocarbamol (Robaxin) tablet 500 mg, 500 mg, oral, q6h PRN, Moriah Gutierres APRN-CNP, 500 mg at 04/08/25 1611    mycophenolate (Cellcept) capsule 1,000 mg, 1,000 mg, oral, BID, XENIA Choudhury-CNP, 1,000 mg at 04/11/25 0820    ondansetron (Zofran) injection 4 mg, 4 mg, intravenous, q6h PRN, XENIA Choudhury-CNP, 4 mg at 04/04/25 1455    pantoprazole (ProtoNix) EC tablet 40 mg, 40 mg, oral, BID AC, Imelda Em APRN-CNP, 40 mg at 04/11/25 0608    polyethylene glycol (Glycolax, Miralax) packet 17 g, 17 g, oral, BID, Moriah Gutierres APRN-CNP, 17 g at 04/10/25 2115    [COMPLETED] methylPREDNISolone sodium succinate (SOLU-Medrol) 250 mg in dextrose 5% 100 mL IV, 250 mg, intravenous, Daily, Stopped at 04/03/25 0948 **FOLLOWED BY** [COMPLETED] methylPREDNISolone sod succinate (SOLU-Medrol) injection 125 mg, 125 mg, intravenous, Daily, 125 mg at 04/04/25 0830 **FOLLOWED BY** [COMPLETED] methylPREDNISolone sod succinate (SOLU-Medrol) injection 60 mg, 60 mg, intravenous, Daily, 60 mg at 04/06/25 0935 **FOLLOWED BY** predniSONE (Deltasone) tablet 20 mg, 20 mg, oral, Daily, Imelda Em, XENIA-CNP, 20 mg at 04/11/25 0820    sennosides-docusate sodium (Rachel-Colace) 8.6-50 mg per tablet 1 tablet, 1 tablet, oral, BID, Moriah Gutierres, XENIA-CNP, 1 tablet at 04/11/25 0820    tacrolimus (Prograf) capsule 0.5 mg, 0.5 mg, oral, Once, Yaa Dover PA-C    [START ON 4/12/2025] tacrolimus (Prograf) capsule 1.5 mg,  "1.5 mg, oral, q12h BRITTANEY, Yaa Badillostubb, PA-C    traMADol (Ultram) tablet 25 mg, 25 mg, oral, q6h PRN, Moriah L Nenita, APRN-CNP, 25 mg at 04/10/25 0907    traMADol (Ultram) tablet 50 mg, 50 mg, oral, q6h PRN, Moriah L [place], APRN-CNP    traZODone (Desyrel) tablet 25 mg, 25 mg, oral, Nightly, Yaa Badillostubb, PA-C, 25 mg at 04/10/25 2116    ursodiol (Actigall) capsule 300 mg, 300 mg, oral, TID, Moriah L Nenita, APRN-CNP, 300 mg at 04/11/25 0820    valGANciclovir (Valcyte) tablet 450 mg, 450 mg, oral, q48h, Moriah L [place], APRN-CNP, 450 mg at 04/11/25 0932                                                                            Labs     CBC RFP   Lab Results   Component Value Date    WBC 6.3 04/11/2025    HGB 8.6 (L) 04/11/2025    HCT 25.8 (L) 04/11/2025    MCV 89 04/11/2025     (L) 04/11/2025    NEUTROABS 2.69 04/02/2025    Lab Results   Component Value Date     (L) 04/11/2025    K 3.8 04/11/2025    CL 96 (L) 04/11/2025    CO2 22 04/11/2025    BUN 80 (H) 04/11/2025    CREATININE 3.77 (H) 04/11/2025     Lab Results   Component Value Date    MG 2.17 04/11/2025    PHOS 5.6 (H) 04/11/2025    CALCIUM 9.0 04/11/2025    ALBUMIN 3.5 04/11/2025         Hepatic Function ABG/VBG   Lab Results   Component Value Date    ALT 31 04/11/2025    AST 12 04/11/2025     (H) 04/11/2025    ALKPHOS 208 (H) 04/11/2025     Lab Results   Component Value Date    BILITOT 1.2 04/11/2025    BILIDIR 0.7 (H) 04/11/2025     Lab Results   Component Value Date    PROTIME 12.7 (H) 04/05/2025    APTT 25 (L) 04/05/2025    INR 1.1 04/05/2025    No results found for: \"NONUHFIRE\", \"LACTATE\"                                                                               Imaging     US LIVER WITH DOPPLER; 4/3/2025   IMPRESSION:  1. Status post liver transplant with borderline elevated RI of the  common hepatic artery with limited assessment of the left/right  branches as above. Findings are likely due to a combination of  technical factors and " early postoperative status however recommend  continued attention on future follow-up examinations.  2. Visualized hepatic veins demonstrate antegrade flow and are patent.                                                                         GI Procedures         ASSESSMENT / PLAN     ASSESSMENT/PLAN:  López Lopez is a 65 y.o. male with a past medical history ofCKD 4, DM2, HTN, CAD s/p PCI to prox LAD 2021, h/o pAFib on Eliquis, h/o ITP dx 5/2024 s/p steroids and IVIG, hyperlipidemia, obesity who is now s/p SLK on 4/2 and 4/3 for MASLD/cirrhosis. Hepatology is consulted for co-management.    Recommendations:  - Drains management per transplant surgery team.  - Immunosuppression, antibiotics and prophylaxis per transplant surgery team.       Patient was seen and discussed with Dr. Mckeon .    Thank you for the consultation. Hepatology will continue to follow.  During weekday hours of 7am-5pm, please do not hesitate to contact me on [employer] Chat or page [phone], if there are any further questions.  After hours, on weekends, and on holidays, please page the on-call GI fellow at [phone].   Thank you.

## 2025-04-11 NOTE — PROGRESS NOTES
Pharmacist Transplant Discharge Note    Medications for López Lopez were reviewed in conjunction with the multidisciplinary transplant team. The pharmacist is in agreement with the plan of care for medications at this time.     Approximately 10 minutes were spent with this patient providing follow-up education and reviewing his finalized list of discharge medications. An updated outpatient dosing schedule was provided to the patient. All questions were answered to the patient's satisfaction, and the patient confirmed understanding.    The patient had his medications filled at [facility] and delivered prior to discharge. Further educational reinforcement should be provided in the outpatient clinic.    Cuca Saab, PharmD, BCTXP  Clinical Pharmacy Specialist - Solid Organ Transplant

## 2025-04-11 NOTE — PROGRESS NOTES
Nutrition Note:     Calorie count order acknowledged. Of note, he had large amount of drain output noted overnight which was possibly thought to be related to his intake of 4L of fluid. Team had put pt on a 1.5L fluid restriction.     Met with pt this morning. He says his appetite has improved. He ate an English muffin, a blueberry muffin, and some applesauce. Pt is upset that he is getting Magic cup and not Nepro. Kitchen had switched RDNs supplement order d/t fluid restriction.     Explained to the pt that Nepro will be counted towards his fluid restriction and is currently taking up ~ 50% of his fluid allowance. He understands and wants to continue with Nepro BID. This will use up 480/1500ml (~30%).     Estimated Needs:   Total Energy Estimated Needs in 24 hours (kCal):  (4243-9317)  Method for Estimating Needs: 25-30 kcal/kg IBW  Total Protein Estimated Needs in 24 Hours (g):  (120-140)  Method for Estimating 24 Hour Protein Needs: 1.3-1.5 g/kg IBW  Total Fluid Estimated Needs in 24 Hours (mL):  (per team)    Recommendations:   Nepro BID   Continue current diet

## 2025-04-11 NOTE — PROGRESS NOTES
Occupational Therapy    OT Treatment    Patient Name: López Lopez  MRN: [id number]  Department: [facility] 9  Room: [facility]9068-A  Today's Date: 4/11/2025  Time Calculation  Start Time: 0911  Stop Time: 0934  Time Calculation (min): 23 min        Assessment:  Barriers to Discharge Home: Physical needs  Physical Needs: Intermittent mobility assistance needed, Intermittent ADL assistance needed  Evaluation/Treatment Tolerance: Patient tolerated treatment well  Medical Staff Made Aware: Yes  End of Session Communication: Bedside nurse  End of Session Patient Position: Up in chair, Alarm off, not on at start of session  Evaluation/Treatment Tolerance: Patient tolerated treatment well  Medical Staff Made Aware: Yes  Plan:  Treatment Interventions: ADL retraining, Functional transfer training, UE strengthening/ROM, Endurance training, Cognitive reorientation, Patient/family training, Equipment evaluation/education, Neuromuscular reeducation, Compensatory technique education  OT Frequency: 3 times per week  OT Discharge Recommendations: Low intensity level of continued care  Equipment Recommended upon Discharge: Wheeled walker  OT Recommended Transfer Status: Assist of 1  OT - OK to Discharge: Yes  Treatment Interventions: ADL retraining, Functional transfer training, UE strengthening/ROM, Endurance training, Cognitive reorientation, Patient/family training, Equipment evaluation/education, Neuromuscular reeducation, Compensatory technique education    Subjective   Previous Visit Info:  OT Last Visit  OT Received On: 04/11/25  General:  General  Family/Caregiver Present: No  Prior to Session Communication: Bedside nurse  Patient Position Received: Up in chair, Alarm off, not on at start of session  General Comment: Pt agreeable to OT. (JPx2)  Precautions:  Medical Precautions: Fall precautions  Post-Surgical Precautions: Abdominal surgery precautions      Pain:  Pain Assessment  Pain Assessment: 0-10  0-10 (Numeric) Pain  "Score:  (did not rate)  Pain Type: Surgical pain  Pain Location: Abdomen    Objective    Cognition:  Cognition  Overall Cognitive Status: Within Functional Limits  Orientation Level: Oriented X4  Attention: Within Functional Limits  Insight: Within function limits  Impulsive: Within functional limits  Processing Speed: Delayed (at times, mild)    Activities of Daily Living: Feeding  Feeding Level of Assistance: Independent    Grooming  Grooming Level of Assistance: Close supervision  Grooming Where Assessed: Standing sinkside    LE Bathing  LE Bathing Comments: Edu on safe bathing strategies including use of DME and body mechanics, initial assistance.    UE Dressing  UE Dressing Level of Assistance: Minimum assistance  UE Dressing Where Assessed: Chair  UE Dressing Comments: don gown in reverse as \"jacket\"    LE Dressing  LE Dressing: Yes  LE Dressing Where Assessed: Chair  LE Dressing Comments: edu on compensatory strategies for compliance to abdominal precautions. limitations in modified figure-4 position. edu on spouse assist for EC for other funcitonal tasks    Toileting  Toileting Level of Assistance: Close supervision  Where Assessed: Toilet  Toileting Comments: simulated hygiene d/t no need to void, no clothing management    Bed Mobility/Transfers: Bed Mobility  Bed Mobility: No    Transfers  Transfer: Yes  Transfer 1  Transfer From 1: Chair with arms to, Sit to  Transfer to 1: Stand  Technique 1: Sit to stand  Transfer Device 1: Walker  Transfer Level of Assistance 1: Close supervision  Trials/Comments 1: cues for body mechanics  Transfers 2  Transfer From 2: Toilet to  Transfer to 2: Stand  Technique 2: Sit to stand  Transfer Device 2: Walker  Transfer Level of Assistance 2: Contact guard  Trials/Comments 2: R UE use of grab bar, increased time      Functional Mobility:  Functional Mobility  Functional Mobility Performed: Yes  Functional Mobility 1  Surface 1: Level tile  Device 1: Rolling " walker  Assistance 1: Close supervision  Comments 1: in room navigation for progressing household distances      RUE   RUE : Within Functional Limits and LUE   LUE: Within Functional Limits      Outcome Measures:[facility] Daily Activity  Putting on and taking off regular lower body clothing: A lot  Bathing (including washing, rinsing, drying): A little  Putting on and taking off regular upper body clothing: A little  Toileting, which includes using toilet, bedpan or urinal: A little  Taking care of personal grooming such as brushing teeth: None  Eating Meals: None  Daily Activity - Total Score: 19    RPE 6- toilet transfers/mobility    Education Documentation  Body Mechanics, taught by Ary Yi, OT at 4/11/2025  9:46 AM.  Learner: Patient  Readiness: Acceptance  Method: Explanation  Response: Verbalizes Understanding    Precautions, taught by Ary Yi OT at 4/11/2025  9:46 AM.  Learner: Patient  Readiness: Acceptance  Method: Explanation  Response: Verbalizes Understanding    ADL Training, taught by Ary Yi, OT at 4/11/2025  9:46 AM.  Learner: Patient  Readiness: Acceptance  Method: Explanation  Response: Verbalizes Understanding    Education Comments  No comments found.      Goals:  Encounter Problems       Encounter Problems (Active)       ADLs       Patient with complete upper body dressing with modified independent level of assistance donning and doffing all UE clothes (Progressing)       Start:  04/04/25    Expected End:  04/18/25            Patient with complete lower body dressing with minimal assist  level of assistance donning and doffing all LE clothes  with PRN adaptive equipment.  (Progressing)       Start:  04/04/25    Expected End:  04/18/25            Patient will complete toileting including hygiene clothing management/hygiene with modified independent level of assistance. (Progressing)       Start:  04/04/25    Expected End:  04/18/25               COGNITION/SAFETY        Pt will maintain abdominal precautions with 100% carryover during functional tasks, ADLs, and mobility without cueing.  (Progressing)       Start:  04/04/25    Expected End:  04/18/25               MOBILITY       Patient will perform Functional mobility max Household distances with modified independent level of assistance and least restrictive device in order to improve safety and functional mobility. (Progressing)       Start:  04/04/25    Expected End:  04/18/25               TRANSFERS       Patient will perform bed mobility modified independent level of assistance in order to improve safety and independence with mobility (Progressing)       Start:  04/04/25    Expected End:  04/18/25            Patient will complete functional transfer to toilet with least restrictive device with modified independent level of assistance. (Progressing)       Start:  04/04/25    Expected End:  04/18/25                   FRANCO MARISCAL/VERONIKA (PRN)

## 2025-04-11 NOTE — CARE PLAN
The patient's goals for the shift include  rest    The clinical goals for the shift include pt will remain HDS for the shift    Over the shift, the patient did make progress toward the following goals. No Barriers to progression

## 2025-04-11 NOTE — PROGRESS NOTES
López Lopez is a 65 y.o. male on day 9 of admission presenting with Alcoholic cirrhosis of liver with ascites (Multi).      Transitional Care Coordination Progress Note:  Patient discussed during interdisciplinary rounds.      Plan per Medical/Surgical team:   Ok to send blanket HC referrals if needed, per pt.  Pt Needs: TBD.   [facility] at Home ordered for RPM.    HC referrals sent.   [facility] accepting.   4/9: Updates sent to [facility].   4/11: Updates sent to [facility].         Discharge disposition: H@H. HC: [facility]     Potential Barriers: None     ADOD:  4/12     This TCC will continue to follow for home going needs and safe DC plan.      PAOLO ANGLIN

## 2025-04-11 NOTE — SIGNIFICANT EVENT
04/11/25 1558   Patient Interaction   Organ Liver/kidney   Type of Interaction Phone conference   Interdisciplinary Rounds   Attendance Physician;Surgeon;TALIA;Pharmacist;Coordinator   Topics Discussed Diet;Home care needs;Medications;Discharge preparation;Blood test results;Activity     Transplant Surgery Multidisciplinary Team Note      López Lopez is a 65 y.o. 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 His post operative complications: re-exploration of the hepatic artery     24 Hour Events  1. No acute events     Last Recorded Vitals  Visit Vitals  /83 (BP Location: Left arm, Patient Position: Lying)   Pulse 73   Temp 36.3 °C (97.3 °F) (Temporal)   Resp 18      Intake/Output last 3 Shifts:    Intake/Output Summary (Last 24 hours) at 4/11/2025 1559  Last data filed at 4/11/2025 1539  Gross per 24 hour   Intake 660 ml   Output 2325 ml   Net -1665 ml      Vitals:    04/11/25 0758   Weight: 109 kg (240 lb 4.8 oz)        Assessment/Plan     Liver allograft function  -liver enzymes down trending   -repeat Liver & kidney DUS ok  -Liver drain output improved, drain creatinine c/w serum creatinine    -continue ursodial  -cont ASA 81 mg QD     Kidney allograft function  -Made 2.4 L UOP  - Give 60 mg IV Lasix now, then 60 mg PO BID Lasix starting this PM   -Na+133. Fluid restruction 1.5L     Cardiac  -BP acceptable   -Home apixiban (Afib) on hold      FENGI  -Hyperphosphatemia-- low phos diet, no binder, monitor      Endo  -Requiring some SSI  -Diabetes education complete     Pysch  -continue 1/2 home trazodone dose      ID  -On fluconazole   -received dose of Pentamidine 4/7     GI  -Having Bms       Principal Problem:    Management after organ transplant  Active Problems:  Patient Active Problem List   Diagnosis    Acute idiopathic thrombocytopenic purpura (Multi)    Anemia    History of CAD (coronary artery disease)    Mixed hyperlipidemia    Paroxysmal atrial fibrillation (Multi)     Type 2 diabetes mellitus with hyperglycemia, without long-term current use of insulin    Liver failure without hepatic coma (Multi)    Metabolic dysfunction-associated steatohepatitis (MASH)    Status post insertion of drug-eluting stent into left anterior descending artery for coronary artery disease    Type 2 diabetes mellitus with diabetic chronic kidney disease    History of percutaneous transluminal coronary angioplasty    Coronary artery disease involving native coronary artery of native heart    Pre-liver transplant, listed    Pre-kidney transplant, listed    Refractory ascites    Alcoholic cirrhosis of liver with ascites (Multi)    Stage 4 chronic kidney disease (Multi)    Chronic renal impairment, stage 4 (severe) (Multi)    CKD (chronic kidney disease) stage 4, GFR 15-29 ml/min (Multi)        Immunosuppression reviewed and adjusted              Induction: Simulect and Steroid              Tacrolimus goal 8-10 ng/mL. Give 0.5 mg tonight, then start 1.5 mg BID tomorrow               MMF 1000 mg po BID              Prednisone 20 mg daily  DVT prophylaxis SCDS and subcutaneous heparin 5000 TID  PT/OT  Diet: Diabetic diet, low phos/ 1.5 L fluid restriction  Anticipated discharge Monday     Yaa Dover PA-C

## 2025-04-11 NOTE — CARE PLAN
Problem: Diabetes  Goal: Achieve decreasing blood glucose levels by end of shift  Outcome: Progressing  Goal: Increase stability of blood glucose readings by end of shift  Outcome: Progressing  Goal: Decrease in ketones present in urine by end of shift  Outcome: Progressing  Goal: Maintain electrolyte levels within acceptable range throughout shift  Outcome: Progressing  Goal: Maintain glucose levels >70mg/dl to <250mg/dl throughout shift  Outcome: Progressing  Goal: No changes in neurological exam by end of shift  Outcome: Progressing  Goal: Learn about and adhere to nutrition recommendations by end of shift  Outcome: Progressing  Goal: Vital signs within normal range for age by end of shift  Outcome: Progressing  Goal: Increase self care and/or family involovement by end of shift  Outcome: Progressing  Goal: Receive DSME education by end of shift  Outcome: Progressing     Problem: Pain - Adult  Goal: Verbalizes/displays adequate comfort level or baseline comfort level  Outcome: Progressing     Problem: Safety - Adult  Goal: Free from fall injury  Outcome: Progressing     Problem: Discharge Planning  Goal: Discharge to home or other facility with appropriate resources  Outcome: Progressing     Problem: Chronic Conditions and Co-morbidities  Goal: Patient's chronic conditions and co-morbidity symptoms are monitored and maintained or improved  Outcome: Progressing     Problem: Nutrition  Goal: Nutrient intake appropriate for maintaining nutritional needs  Outcome: Progressing     Problem: Skin  Goal: Decreased wound size/increased tissue granulation at next dressing change  Outcome: Progressing  Goal: Participates in plan/prevention/treatment measures  Outcome: Progressing  Goal: Prevent/manage excess moisture  Outcome: Progressing  Flowsheets (Taken 4/11/2025 1110)  Prevent/manage excess moisture: Moisturize dry skin  Goal: Prevent/minimize sheer/friction injuries  Outcome: Progressing  Goal: Promote/optimize  nutrition  Outcome: Progressing  Goal: Promote skin healing  Outcome: Progressing     Problem: Fall/Injury  Goal: Not fall by end of shift  Outcome: Progressing  Goal: Be free from injury by end of the shift  Outcome: Progressing  Goal: Verbalize understanding of personal risk factors for fall in the hospital  Outcome: Progressing  Goal: Verbalize understanding of risk factor reduction measures to prevent injury from fall in the home  Outcome: Progressing  Goal: Use assistive devices by end of the shift  Outcome: Progressing  Goal: Pace activities to prevent fatigue by end of the shift  Outcome: Progressing     Problem: Safety - Medical Restraint  Goal: Remains free of injury from restraints (Restraint for Interference with Medical Device)  Outcome: Progressing  Goal: Free from restraint(s) (Restraint for Interference with Medical Device)  Outcome: Progressing     Problem: Pain  Goal: Takes deep breaths with improved pain control throughout the shift  Outcome: Progressing  Goal: Turns in bed with improved pain control throughout the shift  Outcome: Progressing  Goal: Walks with improved pain control throughout the shift  Outcome: Progressing  Goal: Performs ADL's with improved pain control throughout shift  Outcome: Progressing  Goal: Participates in PT with improved pain control throughout the shift  Outcome: Progressing  Goal: Free from opioid side effects throughout the shift  Outcome: Progressing  Goal: Free from acute confusion related to pain meds throughout the shift  Outcome: Progressing   The patient's goals for the shift include  safety     The clinical goals for the shift include patient will remain HDS

## 2025-04-11 NOTE — NURSING NOTE
Mr. Lopez is up in chair and feeling well today.  Wife at bedside and with coaching, able to place DexCom CGM to his rt upper arm.  Done correctly.  He is going to have the reader for the CGM.  Education about the CGM reviewed with wife.    Comfortable with insulin pen and home insulin regimen.  Will follow up 4/14 as he is to be discharged early next week.  Time spent:  35 mins.

## 2025-04-11 NOTE — PROGRESS NOTES
04/11/25    Met with patient and wife.  Med box filled by wife with coordinator supervision.      Inpatient Discharge Education Provided              Reviewed signs and symptoms of rejection with patient             Reviewed signs and symptoms of infection with patient             Reviewed transplant medication indications with patient             Reviewed transplant medication administration with patient             Reviewed transplant medication side effects with patient             Patient verbalized good understanding of transplant medication indication, administration, and side effects             Patient demonstrated ability to fill pillbox without error or difficulty             Discussed with the patient the importance of following post-transplant lab and appointment schedules             Patient successfully completed discharge education test             Provided to patient all Transplant [place] contact information for both during and after hours      Daina Garcia RN

## 2025-04-12 VITALS
DIASTOLIC BLOOD PRESSURE: 81 MMHG | HEIGHT: 76 IN | RESPIRATION RATE: 18 BRPM | HEART RATE: 82 BPM | TEMPERATURE: 97.9 F | BODY MASS INDEX: 28.73 KG/M2 | WEIGHT: 235.89 LBS | OXYGEN SATURATION: 94 % | SYSTOLIC BLOOD PRESSURE: 139 MMHG

## 2025-04-12 LAB
GLUCOSE BLD MANUAL STRIP-MCNC: 134 MG/DL (ref 74–99)
TACROLIMUS BLD-MCNC: 8.1 NG/ML

## 2025-04-12 PROCEDURE — 2500000004 HC RX 250 GENERAL PHARMACY W/ HCPCS (ALT 636 FOR OP/ED)

## 2025-04-12 PROCEDURE — 2500000004 HC RX 250 GENERAL PHARMACY W/ HCPCS (ALT 636 FOR OP/ED): Performed by: PHYSICIAN ASSISTANT

## 2025-04-12 PROCEDURE — 2500000001 HC RX 250 WO HCPCS SELF ADMINISTERED DRUGS (ALT 637 FOR MEDICARE OP)

## 2025-04-12 PROCEDURE — 2500000001 HC RX 250 WO HCPCS SELF ADMINISTERED DRUGS (ALT 637 FOR MEDICARE OP): Performed by: PHYSICIAN ASSISTANT

## 2025-04-12 PROCEDURE — 2500000002 HC RX 250 W HCPCS SELF ADMINISTERED DRUGS (ALT 637 FOR MEDICARE OP, ALT 636 FOR OP/ED)

## 2025-04-12 PROCEDURE — 80197 ASSAY OF TACROLIMUS: CPT

## 2025-04-12 PROCEDURE — 36415 COLL VENOUS BLD VENIPUNCTURE: CPT

## 2025-04-12 PROCEDURE — P9047 ALBUMIN (HUMAN), 25%, 50ML: HCPCS | Performed by: PHYSICIAN ASSISTANT

## 2025-04-12 PROCEDURE — 99239 HOSP IP/OBS DSCHRG MGMT >30: CPT

## 2025-04-12 PROCEDURE — 99232 SBSQ HOSP IP/OBS MODERATE 35: CPT | Performed by: TRANSPLANT SURGERY

## 2025-04-12 PROCEDURE — 82947 ASSAY GLUCOSE BLOOD QUANT: CPT

## 2025-04-12 RX ADMIN — ALBUMIN HUMAN 25 G: 0.25 SOLUTION INTRAVENOUS at 08:42

## 2025-04-12 RX ADMIN — PREDNISONE 20 MG: 20 TABLET ORAL at 08:43

## 2025-04-12 RX ADMIN — TRAMADOL HYDROCHLORIDE 50 MG: 50 TABLET, COATED ORAL at 11:30

## 2025-04-12 RX ADMIN — Medication 2000 UNITS: at 08:43

## 2025-04-12 RX ADMIN — HEPARIN SODIUM 5000 UNITS: 5000 INJECTION INTRAVENOUS; SUBCUTANEOUS at 01:05

## 2025-04-12 RX ADMIN — HEPARIN SODIUM 5000 UNITS: 5000 INJECTION INTRAVENOUS; SUBCUTANEOUS at 08:42

## 2025-04-12 RX ADMIN — URSODIOL 300 MG: 300 CAPSULE ORAL at 08:42

## 2025-04-12 RX ADMIN — TACROLIMUS 1.5 MG: 1 CAPSULE ORAL at 06:04

## 2025-04-12 RX ADMIN — SENNOSIDES AND DOCUSATE SODIUM 1 TABLET: 50; 8.6 TABLET ORAL at 08:42

## 2025-04-12 RX ADMIN — INSULIN HUMAN 10 UNITS: 100 INJECTION, SUSPENSION SUBCUTANEOUS at 08:43

## 2025-04-12 RX ADMIN — FUROSEMIDE 60 MG: 40 TABLET ORAL at 06:03

## 2025-04-12 RX ADMIN — ASPIRIN 81 MG: 81 TABLET, COATED ORAL at 08:42

## 2025-04-12 RX ADMIN — MYCOPHENOLATE MOFETIL 1000 MG: 250 CAPSULE ORAL at 08:42

## 2025-04-12 RX ADMIN — FLUCONAZOLE 400 MG: 200 TABLET ORAL at 08:43

## 2025-04-12 RX ADMIN — PANTOPRAZOLE SODIUM 40 MG: 40 TABLET, DELAYED RELEASE ORAL at 06:04

## 2025-04-12 ASSESSMENT — PAIN DESCRIPTION - LOCATION: LOCATION: ABDOMEN

## 2025-04-12 ASSESSMENT — COGNITIVE AND FUNCTIONAL STATUS - GENERAL
MOBILITY SCORE: 17
STANDING UP FROM CHAIR USING ARMS: A LITTLE
DRESSING REGULAR LOWER BODY CLOTHING: A LITTLE
DAILY ACTIVITIY SCORE: 19
CLIMB 3 TO 5 STEPS WITH RAILING: A LOT
TOILETING: A LITTLE
MOVING TO AND FROM BED TO CHAIR: A LITTLE
MOVING FROM LYING ON BACK TO SITTING ON SIDE OF FLAT BED WITH BEDRAILS: A LITTLE
WALKING IN HOSPITAL ROOM: A LITTLE
TURNING FROM BACK TO SIDE WHILE IN FLAT BAD: A LITTLE
HELP NEEDED FOR BATHING: A LITTLE
PERSONAL GROOMING: A LITTLE
DRESSING REGULAR UPPER BODY CLOTHING: A LITTLE

## 2025-04-12 ASSESSMENT — PAIN SCALES - GENERAL: PAINLEVEL_OUTOF10: 7

## 2025-04-12 NOTE — DISCHARGE SUMMARY
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 Snell and kidney transplant with Dr. Barrett with a KDPI of 49% and PRA of 0.  HCV -/- and donor has not met risk factors. EBV -/-. 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 Admission weight is 112 kg (discharge weight is 107 kg ).  Pt received Simulect induction therapy in conjunction with  IV solumedrol taper.  Pt was initiated on Tacrolimus & Cellcept immunosuppression. Pt was started on valcyte (CMV D - / R + ) and pentamidine (given on 4/7/25) for CMV & PCP prophylaxis per protocol. He was started on fluconazole as antifungal coverage per protocol. Operative course was complicated by brief vtach/afib RVE related to PA cath placement which improved with amio bolus and his hepatic artery required a second look on POD#1 when he returned to the OR for his DDKT.  Hospital course was uncomplicated, he underwent transplanted liver and kidney doppler ultrasounds x 3 without any acute findings . Nelson catheter was removed on POD #3 and the patient is voiding spontaneously. The patient did not have DGF, did not require dialysis, and electrolytes remain stable.  BP was under good control. He was noted to have borderline blood glucoses and Endocrinology gave recommendations for home going insulin and made outpatient follow-up appointment. He also received diabetes education as he previously did not have insulin requirements.     Pt is tolerating a phos restricted, carb controlled diet, maintaining adequate hydration with oral intake, working with PT/OT, and having BMs. Immunosuppression was  "managed by the transplant team. Patient is in stable condition for discharge home with renal/liver telehealth today and homecare for PT/OT, drain. RX delivered to bedside prior to discharge. The patient will f/u in the transplant institute and have labs drawn in the walk-in clinic.     Pertinent Physical Exam At Time of Discharge  Physical Exam  Vitals reviewed.   Constitutional:       General: He is not in acute distress.  HENT:      Head: Normocephalic.      Mouth/Throat:      Mouth: Mucous membranes are moist.   Eyes:      Extraocular Movements: Extraocular movements intact.   Cardiovascular:      Rate and Rhythm: Normal rate and regular rhythm.      Heart sounds: Normal heart sounds.   Pulmonary:      Effort: Pulmonary effort is normal.      Breath sounds: Normal breath sounds.   Abdominal:      Palpations: Abdomen is soft.   Musculoskeletal:      Right lower leg: Edema present.      Left lower leg: Edema present.      Comments: Trace bilaterally   Skin:     General: Skin is warm and dry.      Comments: Left lower quadrant incision intact with staples, MARIANA, well approximated, no s/s of infection. Right transverse abdominal incision intact with staples, MARIANA, well approximated, no s/s of infection   Neurological:      General: No focal deficit present.      Mental Status: He is alert and oriented to person, place, and time.   Psychiatric:         Behavior: Behavior normal.         Thought Content: Thought content normal.         Home Medications     Medication List      START taking these medications     acetaminophen 325 mg tablet; Commonly known as: TylenoL; Take 2 tablets   (650 mg) by mouth every 6 hours if needed for mild pain (1 - 3) for up to   10 days. Do not exceed 3000 mg in 24 hours   alogliptin 12.5 mg tablet; Commonly known as: Nesina; Take 1 tablet   (12.5 mg) by mouth once daily.   aspirin 81 mg EC tablet; Take 1 tablet (81 mg) by mouth once daily.   BD Ultra-Fine Mini Pen Needle 31 gauge x 3/16\" " needle; Generic drug: pen   needle, diabetic; 1 each once daily.   Dexcom G7  misc; Generic drug: blood-glucose,,cont; Use   as instructed   Dexcom G7 Sensor device; Generic drug: blood-glucose sensor; Use to   check glucose, change every 10 days   docusate sodium 100 mg capsule; Commonly known as: Colace; Take 1   capsule (100 mg) by mouth 2 times a day as needed for constipation for up   to 14 days.   Easy Touch Alcohol Prep Pads; Generic drug: alcohol swabs; Use 4-8 per   day to check blood glucose and for injectable medications   fluconazole 200 mg tablet; Commonly known as: Diflucan; Take 1 tablet   (200 mg) by mouth once daily.   HumuLIN N NPH Insulin KwikPen 100 unit/mL (3 mL) pen; Generic drug:   insulin NPH (Isophane); Inject 10 Units under the skin once daily in the   morning. Take as directed per insulin instructions.   mycophenolate 250 mg capsule; Commonly known as: Cellcept; Take 4   capsules (1,000 mg) by mouth 2 times a day.   pantoprazole 40 mg EC tablet; Commonly known as: Protonix; Take 1 tablet   (40 mg) by mouth once daily. Do not crush, chew, or split.   Pentamidine 300 mg inhalation solution; Commonly known as: Nebupent;   Inhale 300 mg every 30 (thirty) days. Dose give 4/7/2025   polyethylene glycol 17 gram/dose powder; Commonly known as: Glycolax,   Miralax; Mix 17 g of powder and drink once daily as needed for   constipation for up to 5 days.   predniSONE 5 mg tablet; Commonly known as: Deltasone; Take 4 tablets (20   mg) by mouth once daily.   * tacrolimus 0.5 mg capsule; Commonly known as: Prograf; Take 1 capsule   (0.5 mg) by mouth 2 times a day.   * tacrolimus 1 mg capsule; Commonly known as: Prograf; Take 1 capsule (1   mg) by mouth 2 times a day.   traMADol 50 mg tablet; Commonly known as: Ultram; Take 1 tablet (50 mg)   by mouth every 6 hours if needed for severe pain (7 - 10) for up to 5   days.   True Metrix Glucose Meter misc; Generic drug: blood-glucose meter; Use    to check blood glucose   True Metrix Glucose Test Strip; Generic drug: blood sugar diagnostic;   Check blood glucose 3 time per day   TRUEplus Lancets 33 gauge misc; Generic drug: lancets; 1 each 3 times a   day.   ursodiol 300 mg capsule; Commonly known as: Actigall; Take 1 capsule   (300 mg) by mouth 3 times a day.   valGANciclovir 450 mg tablet; Commonly known as: Valcyte; Take 1 tablet   (450 mg) by mouth every other day. Do not crush or chew.   Vitamin D3 50 mcg (2,000 unit) tablet; Generic drug: cholecalciferol;   Take 1 tablet (2,000 Units) by mouth once daily.  * This list has 2 medication(s) that are the same as other medications   prescribed for you. Read the directions carefully, and ask your doctor or   other care provider to review them with you.     CHANGE how you take these medications     apixaban 5 mg tablet; Commonly known as: Eliquis; Take 1 tablet (5 mg)   by mouth 2 times daily (morning and late afternoon). HOLD UNTIL INSTRUCTED   TO RESUME; What changed: additional instructions   furosemide 20 mg tablet; Commonly known as: Lasix; Take 2 tablets (40   mg) by mouth 2 times daily (morning and late afternoon).; What changed:   how much to take, when to take this   traZODone 50 mg tablet; Commonly known as: Desyrel; Take 0.5 tablets (25   mg) by mouth once daily at bedtime.; What changed: how much to take     CONTINUE taking these medications     atorvastatin 10 mg tablet; Commonly known as: Lipitor     STOP taking these medications     cyproheptadine 4 mg tablet; Commonly known as: Periactin   lactulose 20 gram/30 mL oral solution   metFORMIN 1,000 mg tablet; Commonly known as: Glucophage   Xifaxan 550 mg tablet; Generic drug: rifAXIMin       Outpatient Follow-Up  Future Appointments   Date Time Provider Department [place]   4/17/2025 11:00 AM TXP ABDOMINAL SURGEON [id number]   4/24/2025 10:30 AM TXP ABDOMINAL SURGEON [id number]   4/24/2025 11:00 AM Lynn Luna RDN, [facility]  [id number]   4/29/2025  2:00 PM Cirilo Dobson MD [facility]   5/1/2025 10:30 AM TXP ABDOMINAL SURGEON [id number]   5/8/2025 10:30 AM TXP ABDOMINAL SURGEON [id number]   5/15/2025 10:00 AM TXP ABDOMINAL SURGEON [id number]   5/15/2025 10:30 AM Kathia Luna PA-C [id number]   5/22/2025 10:30 AM TXP ABDOMINAL SURGEON [id number]       Moriah Gutierres, APRN-CNP

## 2025-04-12 NOTE — PROGRESS NOTES
TRANSPLANT SURGERY PROGRESS NOTE    López Lopez underwent transplant surgery on 4/2/2025 (Liver / Kidney) and was evaluated on multidisciplinary inpatient rounds.  I specifically evaluated the management of immunosuppression to ensure adequate exposure required to decrease the risk of rejection.  Furthermore, I reviewed potential side effects including tremor, hyperglycemia, leukopenia, infection, and other neurologic changes.  I reviewed and adjusted infectious prophylaxis based on the patients clinical condition and  protocols.     24 EVENTS: no acute event    DIAGNOSIS: Liver replaced by transplant Z94.4, kidney replaced by transplant Z94.0    PHYSICAL EXAMINATION:  Last Recorded Vitals  Visit Vitals  /75 (BP Location: Left arm, Patient Position: Lying)   Pulse 83   Temp 36 °C (96.8 °F) (Temporal)   Resp 18      Intake/Output last 3 Shifts:    Intake/Output Summary (Last 24 hours) at 4/12/2025 0844  Last data filed at 4/12/2025 0742  Gross per 24 hour   Intake 530 ml   Output 1498 ml   Net -968 ml      Vitals:    04/12/25 0600   Weight: 107 kg (235 lb 14.3 oz)      Gen: A+OX3; NAD  HEENT: PERRL, sclera anicteric, MMM  Cardiac: RRR  Chest: Normal inspiratory effort  Abdomen: S/NT/ND. Incisions C/D/I.  Ext: No LE edema  Drains: serosanguinous    MEDICATION LIST: REVIEWED  Scheduled medications  albumin human, 25 g, intravenous, BID  aspirin, 81 mg, oral, Daily  bisacodyl, 10 mg, rectal, Daily  cholecalciferol, 2,000 Units, oral, Daily  fluconazole, 400 mg, oral, Daily  furosemide, 60 mg, oral, q12h BRITTANEY  heparin, 5,000 Units, subcutaneous, q8h  insulin lispro, 0-10 Units, subcutaneous, TID AC  insulin NPH (Isophane), 10 Units, subcutaneous, q AM  mycophenolate, 1,000 mg, oral, BID  pantoprazole, 40 mg, oral, BID AC  polyethylene glycol, 17 g, oral, BID  predniSONE, 20 mg, oral, Daily  sennosides-docusate sodium, 1 tablet, oral, BID  tacrolimus, 1.5 mg, oral, q12h BRITTANEY  traZODone, 25 mg, oral,  Nightly  ursodiol, 300 mg, oral, TID  valGANciclovir, 450 mg, oral, q48h      Lab Results   Component Value Date    WBC 6.3 04/11/2025    HGB 8.6 (L) 04/11/2025    HCT 25.8 (L) 04/11/2025    MCV 89 04/11/2025     (L) 04/11/2025     Lab Results   Component Value Date    GLUCOSE 106 (H) 04/11/2025    CALCIUM 9.0 04/11/2025     (L) 04/11/2025    K 3.8 04/11/2025    CO2 22 04/11/2025    CL 96 (L) 04/11/2025    BUN 80 (H) 04/11/2025    CREATININE 3.77 (H) 04/11/2025     Lab Results   Component Value Date    ALT 31 04/11/2025    AST 12 04/11/2025     (H) 04/11/2025    ALKPHOS 208 (H) 04/11/2025    BILITOT 1.2 04/11/2025         ASSESSMENT AND PLAN:    Mr. Lopez is a 65 y.o. male who underwent  transplant surgery on 4/2/2025 (Liver / Kidney).    1. Immunosuppression reviewed and adjusted       Tacrolimus: goal trough level 8-10 ng/mL, continue 1.5/1.5      Mycophenolate: Yes - 1 gm bid      Prednisone 20 mg      Simulect dose: completed     2. Liver/kidney allograft function      Good function      Kidney: UOP stable; fluid restriction     3. Wound/drain/noel and pain management      Drains: Keep LISA#4 with drain teaching     4. Nutrition: regular diet and Nepro supplements     5. Prophylaxis      DVT: SCDs/Subcutaneous Heparin: No for persistent thrombocytopenia      GI prophylaxis: Protonix      Fluconazole, pentamidine, ASA and valcyte     6. PT/OT: OOB to chair     7. Discharge planned for: today    Case was presented at Multi Disciplinary team rounds   Attending physician, consulting physician, , pharmacist, residents and fellow were present at the meeting.    Eliezer Barrett MD

## 2025-04-12 NOTE — CARE PLAN
Problem: Diabetes  Goal: Achieve decreasing blood glucose levels by end of shift  Outcome: Progressing  Goal: Increase stability of blood glucose readings by end of shift  Outcome: Progressing  Goal: Decrease in ketones present in urine by end of shift  Outcome: Progressing  Goal: Maintain electrolyte levels within acceptable range throughout shift  Outcome: Progressing  Goal: Maintain glucose levels >70mg/dl to <250mg/dl throughout shift  Outcome: Progressing  Goal: No changes in neurological exam by end of shift  Outcome: Progressing  Goal: Learn about and adhere to nutrition recommendations by end of shift  Outcome: Progressing  Goal: Vital signs within normal range for age by end of shift  Outcome: Progressing  Goal: Increase self care and/or family involovement by end of shift  Outcome: Progressing  Goal: Receive DSME education by end of shift  Outcome: Progressing     Problem: Pain - Adult  Goal: Verbalizes/displays adequate comfort level or baseline comfort level  Outcome: Progressing     Problem: Safety - Adult  Goal: Free from fall injury  Outcome: Progressing     Problem: Discharge Planning  Goal: Discharge to home or other facility with appropriate resources  Outcome: Progressing     Problem: Chronic Conditions and Co-morbidities  Goal: Patient's chronic conditions and co-morbidity symptoms are monitored and maintained or improved  Outcome: Progressing     Problem: Nutrition  Goal: Nutrient intake appropriate for maintaining nutritional needs  Outcome: Progressing     Problem: Skin  Goal: Decreased wound size/increased tissue granulation at next dressing change  Outcome: Progressing  Flowsheets (Taken 4/12/2025 0935)  Decreased wound size/increased tissue granulation at next dressing change: Promote sleep for wound healing  Goal: Participates in plan/prevention/treatment measures  Outcome: Progressing  Flowsheets (Taken 4/12/2025 0935)  Participates in plan/prevention/treatment measures: Discuss with  provider PT/OT consult  Goal: Prevent/manage excess moisture  Outcome: Progressing  Flowsheets (Taken 4/12/2025 0935)  Prevent/manage excess moisture: Moisturize dry skin  Goal: Prevent/minimize sheer/friction injuries  Outcome: Progressing  Flowsheets (Taken 4/12/2025 0935)  Prevent/minimize sheer/friction injuries: Increase activity/out of bed for meals  Goal: Promote/optimize nutrition  Outcome: Progressing  Flowsheets (Taken 4/12/2025 0935)  Promote/optimize nutrition: Monitor/record intake including meals  Goal: Promote skin healing  Outcome: Progressing  Flowsheets (Taken 4/12/2025 0935)  Promote skin healing: Protective dressings over bony prominences     Problem: Fall/Injury  Goal: Not fall by end of shift  Outcome: Progressing  Goal: Be free from injury by end of the shift  Outcome: Progressing  Goal: Verbalize understanding of personal risk factors for fall in the hospital  Outcome: Progressing  Goal: Verbalize understanding of risk factor reduction measures to prevent injury from fall in the home  Outcome: Progressing  Goal: Use assistive devices by end of the shift  Outcome: Progressing  Goal: Pace activities to prevent fatigue by end of the shift  Outcome: Progressing     Problem: Safety - Medical Restraint  Goal: Remains free of injury from restraints (Restraint for Interference with Medical Device)  Outcome: Progressing  Goal: Free from restraint(s) (Restraint for Interference with Medical Device)  Outcome: Progressing     Problem: Pain  Goal: Takes deep breaths with improved pain control throughout the shift  Outcome: Progressing  Goal: Turns in bed with improved pain control throughout the shift  Outcome: Progressing  Goal: Walks with improved pain control throughout the shift  Outcome: Progressing  Goal: Performs ADL's with improved pain control throughout shift  Outcome: Progressing  Goal: Participates in PT with improved pain control throughout the shift  Outcome: Progressing  Goal: Free from  opioid side effects throughout the shift  Outcome: Progressing  Goal: Free from acute confusion related to pain meds throughout the shift  Outcome: Progressing   The patient's goals for the shift include      The clinical goals for the shift include patient will remain HDS

## 2025-04-14 ENCOUNTER — NURSE ONLY (OUTPATIENT)
Facility: HOSPITAL | Age: 65
DRG: 699 | End: 2025-04-14
Payer: COMMERCIAL

## 2025-04-14 ENCOUNTER — APPOINTMENT (OUTPATIENT)
Dept: RADIOLOGY | Facility: HOSPITAL | Age: 65
DRG: 699 | End: 2025-04-14
Payer: COMMERCIAL

## 2025-04-14 ENCOUNTER — HOSPITAL ENCOUNTER (INPATIENT)
Facility: HOSPITAL | Age: 65
LOS: 3 days | Discharge: HOME | DRG: 699 | End: 2025-04-17
Attending: SURGERY | Admitting: SURGERY
Payer: COMMERCIAL

## 2025-04-14 DIAGNOSIS — Z94.4 LIVER TRANSPLANT RECIPIENT (MULTI): ICD-10-CM

## 2025-04-14 DIAGNOSIS — I48.0 PAROXYSMAL ATRIAL FIBRILLATION (MULTI): ICD-10-CM

## 2025-04-14 DIAGNOSIS — Z94.4 LIVER REPLACED BY TRANSPLANT (MULTI): ICD-10-CM

## 2025-04-14 DIAGNOSIS — N17.9 ACUTE KIDNEY INJURY: Primary | ICD-10-CM

## 2025-04-14 DIAGNOSIS — Z94.0 KIDNEY REPLACED BY TRANSPLANT (HHS-HCC): ICD-10-CM

## 2025-04-14 LAB
ABO GROUP (TYPE) IN BLOOD: NORMAL
ALBUMIN SERPL BCP-MCNC: 3.5 G/DL (ref 3.4–5)
ALBUMIN SERPL BCP-MCNC: 4 G/DL (ref 3.4–5)
ALP SERPL-CCNC: 141 U/L (ref 33–136)
ALP SERPL-CCNC: 167 U/L (ref 33–136)
ALT SERPL W P-5'-P-CCNC: 14 U/L (ref 10–52)
ALT SERPL W P-5'-P-CCNC: 18 U/L (ref 10–52)
ANION GAP SERPL CALC-SCNC: 17 MMOL/L (ref 10–20)
ANION GAP SERPL CALC-SCNC: 20 MMOL/L (ref 10–20)
ANTIBODY SCREEN: NORMAL
AST SERPL W P-5'-P-CCNC: 7 U/L (ref 9–39)
AST SERPL W P-5'-P-CCNC: 8 U/L (ref 9–39)
BASOPHILS # BLD AUTO: 0.05 X10*3/UL (ref 0–0.1)
BASOPHILS NFR BLD AUTO: 0.3 %
BILIRUB DIRECT SERPL-MCNC: 0.5 MG/DL (ref 0–0.3)
BILIRUB DIRECT SERPL-MCNC: 0.6 MG/DL (ref 0–0.3)
BILIRUB SERPL-MCNC: 0.9 MG/DL (ref 0–1.2)
BILIRUB SERPL-MCNC: 1.2 MG/DL (ref 0–1.2)
BUN SERPL-MCNC: 94 MG/DL (ref 6–23)
BUN SERPL-MCNC: 98 MG/DL (ref 6–23)
CALCIUM SERPL-MCNC: 8.5 MG/DL (ref 8.6–10.6)
CALCIUM SERPL-MCNC: 9.2 MG/DL (ref 8.6–10.6)
CHLORIDE SERPL-SCNC: 95 MMOL/L (ref 98–107)
CHLORIDE SERPL-SCNC: 95 MMOL/L (ref 98–107)
CO2 SERPL-SCNC: 23 MMOL/L (ref 21–32)
CO2 SERPL-SCNC: 24 MMOL/L (ref 21–32)
CREAT SERPL-MCNC: 4.21 MG/DL (ref 0.5–1.3)
CREAT SERPL-MCNC: 4.24 MG/DL (ref 0.5–1.3)
CREAT UR-MCNC: 81.9 MG/DL (ref 20–370)
EGFRCR SERPLBLD CKD-EPI 2021: 15 ML/MIN/1.73M*2
EGFRCR SERPLBLD CKD-EPI 2021: 15 ML/MIN/1.73M*2
EOSINOPHIL # BLD AUTO: 0.32 X10*3/UL (ref 0–0.7)
EOSINOPHIL NFR BLD AUTO: 2.1 %
ERYTHROCYTE [DISTWIDTH] IN BLOOD BY AUTOMATED COUNT: 15.5 % (ref 11.5–14.5)
ERYTHROCYTE [DISTWIDTH] IN BLOOD BY AUTOMATED COUNT: 15.5 % (ref 11.5–14.5)
GGT SERPL-CCNC: 223 U/L (ref 5–64)
GLUCOSE BLD MANUAL STRIP-MCNC: 152 MG/DL (ref 74–99)
GLUCOSE BLD MANUAL STRIP-MCNC: 200 MG/DL (ref 74–99)
GLUCOSE SERPL-MCNC: 115 MG/DL (ref 74–99)
GLUCOSE SERPL-MCNC: 161 MG/DL (ref 74–99)
HCT VFR BLD AUTO: 21.6 % (ref 41–52)
HCT VFR BLD AUTO: 27.4 % (ref 41–52)
HGB BLD-MCNC: 7.4 G/DL (ref 13.5–17.5)
HGB BLD-MCNC: 9.3 G/DL (ref 13.5–17.5)
IMM GRANULOCYTES # BLD AUTO: 0.23 X10*3/UL (ref 0–0.7)
IMM GRANULOCYTES NFR BLD AUTO: 1.5 % (ref 0–0.9)
LYMPHOCYTES # BLD AUTO: 0.94 X10*3/UL (ref 1.2–4.8)
LYMPHOCYTES NFR BLD AUTO: 6.2 %
MAGNESIUM SERPL-MCNC: 1.89 MG/DL (ref 1.6–2.4)
MAGNESIUM SERPL-MCNC: 2.03 MG/DL (ref 1.6–2.4)
MCH RBC QN AUTO: 29.9 PG (ref 26–34)
MCH RBC QN AUTO: 30 PG (ref 26–34)
MCHC RBC AUTO-ENTMCNC: 33.9 G/DL (ref 32–36)
MCHC RBC AUTO-ENTMCNC: 34.3 G/DL (ref 32–36)
MCV RBC AUTO: 87 FL (ref 80–100)
MCV RBC AUTO: 88 FL (ref 80–100)
MONOCYTES # BLD AUTO: 1.14 X10*3/UL (ref 0.1–1)
MONOCYTES NFR BLD AUTO: 7.6 %
NEUTROPHILS # BLD AUTO: 12.41 X10*3/UL (ref 1.2–7.7)
NEUTROPHILS NFR BLD AUTO: 82.3 %
NRBC BLD-RTO: 0 /100 WBCS (ref 0–0)
NRBC BLD-RTO: 0 /100 WBCS (ref 0–0)
PHOSPHATE SERPL-MCNC: 5.7 MG/DL (ref 2.5–4.9)
PHOSPHATE SERPL-MCNC: 5.9 MG/DL (ref 2.5–4.9)
PLATELET # BLD AUTO: 235 X10*3/UL (ref 150–450)
PLATELET # BLD AUTO: 293 X10*3/UL (ref 150–450)
POTASSIUM SERPL-SCNC: 3.8 MMOL/L (ref 3.5–5.3)
POTASSIUM SERPL-SCNC: 4 MMOL/L (ref 3.5–5.3)
PROT SERPL-MCNC: 5.3 G/DL (ref 6.4–8.2)
PROT SERPL-MCNC: 5.8 G/DL (ref 6.4–8.2)
PROT UR-ACNC: 53 MG/DL (ref 5–25)
PROT/CREAT UR: 0.65 MG/MG CREAT (ref 0–0.17)
RBC # BLD AUTO: 2.47 X10*6/UL (ref 4.5–5.9)
RBC # BLD AUTO: 3.11 X10*6/UL (ref 4.5–5.9)
RH FACTOR (ANTIGEN D): NORMAL
SODIUM SERPL-SCNC: 131 MMOL/L (ref 136–145)
SODIUM SERPL-SCNC: 135 MMOL/L (ref 136–145)
TACROLIMUS BLD-MCNC: 10.4 NG/ML
WBC # BLD AUTO: 11.3 X10*3/UL (ref 4.4–11.3)
WBC # BLD AUTO: 15.1 X10*3/UL (ref 4.4–11.3)

## 2025-04-14 PROCEDURE — 87799 DETECT AGENT NOS DNA QUANT: CPT

## 2025-04-14 PROCEDURE — 83735 ASSAY OF MAGNESIUM: CPT

## 2025-04-14 PROCEDURE — 85025 COMPLETE CBC W/AUTO DIFF WBC: CPT

## 2025-04-14 PROCEDURE — 82947 ASSAY GLUCOSE BLOOD QUANT: CPT

## 2025-04-14 PROCEDURE — 82977 ASSAY OF GGT: CPT

## 2025-04-14 PROCEDURE — 1100000001 HC PRIVATE ROOM DAILY

## 2025-04-14 PROCEDURE — 36415 COLL VENOUS BLD VENIPUNCTURE: CPT

## 2025-04-14 PROCEDURE — 84100 ASSAY OF PHOSPHORUS: CPT

## 2025-04-14 PROCEDURE — 87529 HSV DNA AMP PROBE: CPT

## 2025-04-14 PROCEDURE — 2500000001 HC RX 250 WO HCPCS SELF ADMINISTERED DRUGS (ALT 637 FOR MEDICARE OP)

## 2025-04-14 PROCEDURE — 84134 ASSAY OF PREALBUMIN: CPT

## 2025-04-14 PROCEDURE — 82248 BILIRUBIN DIRECT: CPT

## 2025-04-14 PROCEDURE — 80048 BASIC METABOLIC PNL TOTAL CA: CPT

## 2025-04-14 PROCEDURE — 71045 X-RAY EXAM CHEST 1 VIEW: CPT | Performed by: RADIOLOGY

## 2025-04-14 PROCEDURE — 86901 BLOOD TYPING SEROLOGIC RH(D): CPT

## 2025-04-14 PROCEDURE — 84075 ASSAY ALKALINE PHOSPHATASE: CPT

## 2025-04-14 PROCEDURE — 86923 COMPATIBILITY TEST ELECTRIC: CPT

## 2025-04-14 PROCEDURE — 71045 X-RAY EXAM CHEST 1 VIEW: CPT

## 2025-04-14 PROCEDURE — 82570 ASSAY OF URINE CREATININE: CPT

## 2025-04-14 PROCEDURE — 80197 ASSAY OF TACROLIMUS: CPT

## 2025-04-14 PROCEDURE — 2500000004 HC RX 250 GENERAL PHARMACY W/ HCPCS (ALT 636 FOR OP/ED)

## 2025-04-14 PROCEDURE — 2500000002 HC RX 250 W HCPCS SELF ADMINISTERED DRUGS (ALT 637 FOR MEDICARE OP, ALT 636 FOR OP/ED)

## 2025-04-14 PROCEDURE — 85027 COMPLETE CBC AUTOMATED: CPT

## 2025-04-14 RX ORDER — FUROSEMIDE 40 MG/1
40 TABLET ORAL
Status: DISCONTINUED | OUTPATIENT
Start: 2025-04-15 | End: 2025-04-17 | Stop reason: HOSPADM

## 2025-04-14 RX ORDER — MYCOPHENOLATE MOFETIL 250 MG/1
1000 CAPSULE ORAL EVERY 12 HOURS
Status: DISCONTINUED | OUTPATIENT
Start: 2025-04-14 | End: 2025-04-14

## 2025-04-14 RX ORDER — URSODIOL 300 MG/1
300 CAPSULE ORAL 3 TIMES DAILY
Status: DISCONTINUED | OUTPATIENT
Start: 2025-04-14 | End: 2025-04-17 | Stop reason: HOSPADM

## 2025-04-14 RX ORDER — VALGANCICLOVIR 450 MG/1
450 TABLET, FILM COATED ORAL
Status: DISCONTINUED | OUTPATIENT
Start: 2025-04-14 | End: 2025-04-17 | Stop reason: HOSPADM

## 2025-04-14 RX ORDER — PREDNISONE 20 MG/1
20 TABLET ORAL DAILY
Status: DISCONTINUED | OUTPATIENT
Start: 2025-04-15 | End: 2025-04-14

## 2025-04-14 RX ORDER — TACROLIMUS 0.5 MG/1
0.5 CAPSULE ORAL 2 TIMES DAILY
Status: DISCONTINUED | OUTPATIENT
Start: 2025-04-14 | End: 2025-04-14

## 2025-04-14 RX ORDER — DEXTROSE 50 % IN WATER (D50W) INTRAVENOUS SYRINGE
12.5
Status: DISCONTINUED | OUTPATIENT
Start: 2025-04-14 | End: 2025-04-17 | Stop reason: HOSPADM

## 2025-04-14 RX ORDER — ASPIRIN 81 MG/1
81 TABLET ORAL DAILY
Status: DISCONTINUED | OUTPATIENT
Start: 2025-04-15 | End: 2025-04-17 | Stop reason: HOSPADM

## 2025-04-14 RX ORDER — DEXTROSE 50 % IN WATER (D50W) INTRAVENOUS SYRINGE
25
Status: DISCONTINUED | OUTPATIENT
Start: 2025-04-14 | End: 2025-04-17 | Stop reason: HOSPADM

## 2025-04-14 RX ORDER — POLYETHYLENE GLYCOL 3350 17 G/17G
17 POWDER, FOR SOLUTION ORAL DAILY PRN
Status: DISCONTINUED | OUTPATIENT
Start: 2025-04-14 | End: 2025-04-17 | Stop reason: HOSPADM

## 2025-04-14 RX ORDER — PENTAMIDINE ISETHIONATE 300 MG/300MG
300 INHALANT RESPIRATORY (INHALATION)
Status: DISCONTINUED | OUTPATIENT
Start: 2025-05-07 | End: 2025-04-17 | Stop reason: HOSPADM

## 2025-04-14 RX ORDER — TRAZODONE HYDROCHLORIDE 50 MG/1
25 TABLET ORAL NIGHTLY
Status: DISCONTINUED | OUTPATIENT
Start: 2025-04-14 | End: 2025-04-17 | Stop reason: HOSPADM

## 2025-04-14 RX ORDER — MYCOPHENOLATE MOFETIL 250 MG/1
1000 CAPSULE ORAL 2 TIMES DAILY
Status: DISCONTINUED | OUTPATIENT
Start: 2025-04-14 | End: 2025-04-14

## 2025-04-14 RX ORDER — INSULIN LISPRO 100 [IU]/ML
0-10 INJECTION, SOLUTION INTRAVENOUS; SUBCUTANEOUS EVERY 4 HOURS
Status: DISCONTINUED | OUTPATIENT
Start: 2025-04-14 | End: 2025-04-17 | Stop reason: HOSPADM

## 2025-04-14 RX ORDER — TACROLIMUS 1 MG/1
1 CAPSULE ORAL
Status: DISCONTINUED | OUTPATIENT
Start: 2025-04-14 | End: 2025-04-15

## 2025-04-14 RX ORDER — MYCOPHENOLATE MOFETIL 250 MG/1
1000 CAPSULE ORAL 2 TIMES DAILY
Status: DISCONTINUED | OUTPATIENT
Start: 2025-04-14 | End: 2025-04-15

## 2025-04-14 RX ORDER — PANTOPRAZOLE SODIUM 40 MG/1
40 TABLET, DELAYED RELEASE ORAL DAILY
Status: DISCONTINUED | OUTPATIENT
Start: 2025-04-15 | End: 2025-04-17 | Stop reason: HOSPADM

## 2025-04-14 RX ORDER — SODIUM CHLORIDE 9 MG/ML
60 INJECTION, SOLUTION INTRAVENOUS CONTINUOUS
Status: ACTIVE | OUTPATIENT
Start: 2025-04-14 | End: 2025-04-15

## 2025-04-14 RX ORDER — TACROLIMUS 0.5 MG/1
0.5 CAPSULE ORAL
Status: DISCONTINUED | OUTPATIENT
Start: 2025-04-14 | End: 2025-04-15

## 2025-04-14 RX ORDER — FLUCONAZOLE 200 MG/1
200 TABLET ORAL DAILY
Status: DISCONTINUED | OUTPATIENT
Start: 2025-04-15 | End: 2025-04-17 | Stop reason: HOSPADM

## 2025-04-14 RX ORDER — PREDNISONE 20 MG/1
20 TABLET ORAL EVERY 24 HOURS
Status: DISCONTINUED | OUTPATIENT
Start: 2025-04-14 | End: 2025-04-17 | Stop reason: HOSPADM

## 2025-04-14 RX ORDER — TACROLIMUS 1 MG/1
1 CAPSULE ORAL 2 TIMES DAILY
Status: DISCONTINUED | OUTPATIENT
Start: 2025-04-14 | End: 2025-04-14

## 2025-04-14 RX ADMIN — TRAZODONE HYDROCHLORIDE 25 MG: 50 TABLET ORAL at 23:00

## 2025-04-14 RX ADMIN — SODIUM CHLORIDE 60 ML/HR: 0.9 INJECTION, SOLUTION INTRAVENOUS at 22:53

## 2025-04-14 RX ADMIN — TACROLIMUS 0.5 MG: 0.5 CAPSULE ORAL at 21:54

## 2025-04-14 RX ADMIN — URSODIOL 300 MG: 300 CAPSULE ORAL at 21:54

## 2025-04-14 RX ADMIN — TACROLIMUS 1 MG: 1 CAPSULE ORAL at 21:54

## 2025-04-14 RX ADMIN — MYCOPHENOLATE MOFETIL 1000 MG: 250 CAPSULE ORAL at 21:53

## 2025-04-14 RX ADMIN — INSULIN LISPRO 2 UNITS: 100 INJECTION, SOLUTION INTRAVENOUS; SUBCUTANEOUS at 22:58

## 2025-04-14 SDOH — SOCIAL STABILITY: SOCIAL INSECURITY: DO YOU FEEL ANYONE HAS EXPLOITED OR TAKEN ADVANTAGE OF YOU FINANCIALLY OR OF YOUR PERSONAL PROPERTY?: NO

## 2025-04-14 SDOH — SOCIAL STABILITY: SOCIAL INSECURITY: WITHIN THE LAST YEAR, HAVE YOU BEEN AFRAID OF YOUR PARTNER OR EX-PARTNER?: NO

## 2025-04-14 SDOH — SOCIAL STABILITY: SOCIAL INSECURITY: DOES ANYONE TRY TO KEEP YOU FROM HAVING/CONTACTING OTHER FRIENDS OR DOING THINGS OUTSIDE YOUR HOME?: NO

## 2025-04-14 SDOH — ECONOMIC STABILITY: HOUSING INSECURITY: IN THE PAST 12 MONTHS, HOW MANY TIMES HAVE YOU MOVED WHERE YOU WERE LIVING?: 0

## 2025-04-14 SDOH — SOCIAL STABILITY: SOCIAL INSECURITY: ABUSE: ADULT

## 2025-04-14 SDOH — ECONOMIC STABILITY: TRANSPORTATION INSECURITY: IN THE PAST 12 MONTHS, HAS LACK OF TRANSPORTATION KEPT YOU FROM MEDICAL APPOINTMENTS OR FROM GETTING MEDICATIONS?: NO

## 2025-04-14 SDOH — SOCIAL STABILITY: SOCIAL INSECURITY: DO YOU FEEL UNSAFE GOING BACK TO THE PLACE WHERE YOU ARE LIVING?: NO

## 2025-04-14 SDOH — ECONOMIC STABILITY: FOOD INSECURITY: WITHIN THE PAST 12 MONTHS, THE FOOD YOU BOUGHT JUST DIDN'T LAST AND YOU DIDN'T HAVE MONEY TO GET MORE.: NEVER TRUE

## 2025-04-14 SDOH — ECONOMIC STABILITY: HOUSING INSECURITY: AT ANY TIME IN THE PAST 12 MONTHS, WERE YOU HOMELESS OR LIVING IN A SHELTER (INCLUDING NOW)?: NO

## 2025-04-14 SDOH — ECONOMIC STABILITY: HOUSING INSECURITY: IN THE LAST 12 MONTHS, WAS THERE A TIME WHEN YOU WERE NOT ABLE TO PAY THE MORTGAGE OR RENT ON TIME?: NO

## 2025-04-14 SDOH — SOCIAL STABILITY: SOCIAL INSECURITY: WITHIN THE LAST YEAR, HAVE YOU BEEN HUMILIATED OR EMOTIONALLY ABUSED IN OTHER WAYS BY YOUR PARTNER OR EX-PARTNER?: NO

## 2025-04-14 SDOH — ECONOMIC STABILITY: FOOD INSECURITY: WITHIN THE PAST 12 MONTHS, YOU WORRIED THAT YOUR FOOD WOULD RUN OUT BEFORE YOU GOT THE MONEY TO BUY MORE.: NEVER TRUE

## 2025-04-14 SDOH — ECONOMIC STABILITY: INCOME INSECURITY: IN THE PAST 12 MONTHS HAS THE ELECTRIC, GAS, OIL, OR WATER COMPANY THREATENED TO SHUT OFF SERVICES IN YOUR HOME?: NO

## 2025-04-14 SDOH — SOCIAL STABILITY: SOCIAL INSECURITY: HAS ANYONE EVER THREATENED TO HURT YOUR FAMILY OR YOUR PETS?: NO

## 2025-04-14 SDOH — SOCIAL STABILITY: SOCIAL INSECURITY: HAVE YOU HAD ANY THOUGHTS OF HARMING ANYONE ELSE?: NO

## 2025-04-14 SDOH — SOCIAL STABILITY: SOCIAL INSECURITY: HAVE YOU HAD THOUGHTS OF HARMING ANYONE ELSE?: NO

## 2025-04-14 SDOH — ECONOMIC STABILITY: FOOD INSECURITY: HOW HARD IS IT FOR YOU TO PAY FOR THE VERY BASICS LIKE FOOD, HOUSING, MEDICAL CARE, AND HEATING?: NOT HARD AT ALL

## 2025-04-14 SDOH — SOCIAL STABILITY: SOCIAL INSECURITY: ARE YOU OR HAVE YOU BEEN THREATENED OR ABUSED PHYSICALLY, EMOTIONALLY, OR SEXUALLY BY ANYONE?: NO

## 2025-04-14 SDOH — ECONOMIC STABILITY: HOUSING INSECURITY: DO YOU FEEL UNSAFE GOING BACK TO THE PLACE WHERE YOU LIVE?: NO

## 2025-04-14 SDOH — SOCIAL STABILITY: SOCIAL INSECURITY: ARE THERE ANY APPARENT SIGNS OF INJURIES/BEHAVIORS THAT COULD BE RELATED TO ABUSE/NEGLECT?: NO

## 2025-04-14 SDOH — SOCIAL STABILITY: SOCIAL INSECURITY: WERE YOU ABLE TO COMPLETE ALL THE BEHAVIORAL HEALTH SCREENINGS?: YES

## 2025-04-14 ASSESSMENT — ACTIVITIES OF DAILY LIVING (ADL)
LACK_OF_TRANSPORTATION: NO
PATIENT'S MEMORY ADEQUATE TO SAFELY COMPLETE DAILY ACTIVITIES?: YES
JUDGMENT_ADEQUATE_SAFELY_COMPLETE_DAILY_ACTIVITIES: YES
HEARING - RIGHT EAR: FUNCTIONAL
FEEDING YOURSELF: INDEPENDENT
GROOMING: INDEPENDENT
WALKS IN HOME: INDEPENDENT
ADEQUATE_TO_COMPLETE_ADL: YES
TOILETING: INDEPENDENT
LACK_OF_TRANSPORTATION: NO
HEARING - LEFT EAR: FUNCTIONAL
BATHING: INDEPENDENT
DRESSING YOURSELF: INDEPENDENT

## 2025-04-14 ASSESSMENT — COGNITIVE AND FUNCTIONAL STATUS - GENERAL
DRESSING REGULAR LOWER BODY CLOTHING: A LITTLE
CLIMB 3 TO 5 STEPS WITH RAILING: A LITTLE
PATIENT BASELINE BEDBOUND: NO
DRESSING REGULAR UPPER BODY CLOTHING: A LITTLE
DAILY ACTIVITIY SCORE: 18
HELP NEEDED FOR BATHING: A LITTLE
MOBILITY SCORE: 18
MOVING FROM LYING ON BACK TO SITTING ON SIDE OF FLAT BED WITH BEDRAILS: A LITTLE
TOILETING: A LITTLE
STANDING UP FROM CHAIR USING ARMS: A LITTLE
TURNING FROM BACK TO SIDE WHILE IN FLAT BAD: A LITTLE
PERSONAL GROOMING: A LITTLE
EATING MEALS: A LITTLE
MOVING TO AND FROM BED TO CHAIR: A LITTLE
WALKING IN HOSPITAL ROOM: A LITTLE

## 2025-04-14 ASSESSMENT — PAIN - FUNCTIONAL ASSESSMENT
PAIN_FUNCTIONAL_ASSESSMENT: 0-10
PAIN_FUNCTIONAL_ASSESSMENT: 0-10

## 2025-04-14 ASSESSMENT — LIFESTYLE VARIABLES
PRESCIPTION_ABUSE_PAST_12_MONTHS: NO
AUDIT-C TOTAL SCORE: 0
HOW OFTEN DO YOU HAVE A DRINK CONTAINING ALCOHOL: NEVER
AUDIT-C TOTAL SCORE: 0
SUBSTANCE_ABUSE_PAST_12_MONTHS: NO
HOW OFTEN DO YOU HAVE 6 OR MORE DRINKS ON ONE OCCASION: NEVER
HOW MANY STANDARD DRINKS CONTAINING ALCOHOL DO YOU HAVE ON A TYPICAL DAY: PATIENT DOES NOT DRINK
SKIP TO QUESTIONS 9-10: 1

## 2025-04-14 ASSESSMENT — COLUMBIA-SUICIDE SEVERITY RATING SCALE - C-SSRS
2. HAVE YOU ACTUALLY HAD ANY THOUGHTS OF KILLING YOURSELF?: NO
1. IN THE PAST MONTH, HAVE YOU WISHED YOU WERE DEAD OR WISHED YOU COULD GO TO SLEEP AND NOT WAKE UP?: NO
6. HAVE YOU EVER DONE ANYTHING, STARTED TO DO ANYTHING, OR PREPARED TO DO ANYTHING TO END YOUR LIFE?: NO

## 2025-04-14 ASSESSMENT — PATIENT HEALTH QUESTIONNAIRE - PHQ9
1. LITTLE INTEREST OR PLEASURE IN DOING THINGS: NOT AT ALL
SUM OF ALL RESPONSES TO PHQ9 QUESTIONS 1 & 2: 0
2. FEELING DOWN, DEPRESSED OR HOPELESS: NOT AT ALL

## 2025-04-14 ASSESSMENT — PAIN SCALES - GENERAL
PAINLEVEL_OUTOF10: 2
PAINLEVEL_OUTOF10: 6

## 2025-04-14 NOTE — TELEPHONE ENCOUNTER
Labs reviewed by Dr. Steve Cancino, admit to the hospital for high creatinine/BUN.  Spoke with Bobbi about plan, she said she would await the call

## 2025-04-15 ENCOUNTER — APPOINTMENT (OUTPATIENT)
Dept: RADIOLOGY | Facility: HOSPITAL | Age: 65
DRG: 699 | End: 2025-04-15
Payer: COMMERCIAL

## 2025-04-15 DIAGNOSIS — Z94.4 LIVER REPLACED BY TRANSPLANT (MULTI): ICD-10-CM

## 2025-04-15 LAB
ALBUMIN SERPL BCP-MCNC: 3.7 G/DL (ref 3.4–5)
ALP SERPL-CCNC: 139 U/L (ref 33–136)
ALT SERPL W P-5'-P-CCNC: 13 U/L (ref 10–52)
ANION GAP SERPL CALC-SCNC: 19 MMOL/L (ref 10–20)
APPEARANCE UR: CLEAR
AST SERPL W P-5'-P-CCNC: 7 U/L (ref 9–39)
BILIRUB DIRECT SERPL-MCNC: 0.4 MG/DL (ref 0–0.3)
BILIRUB SERPL-MCNC: 0.9 MG/DL (ref 0–1.2)
BILIRUB UR STRIP.AUTO-MCNC: NEGATIVE MG/DL
BKV DNA SERPL NAA+PROBE-LOG#: NORMAL {LOG_COPIES}/ML
BKV DNA SERPL NAA+PROBE-LOG#: NORMAL {LOG_COPIES}/ML
BUN SERPL-MCNC: 96 MG/DL (ref 6–23)
CALCIUM SERPL-MCNC: 9.2 MG/DL (ref 8.6–10.6)
CHLORIDE SERPL-SCNC: 96 MMOL/L (ref 98–107)
CMV DNA SERPL NAA+PROBE-LOG IU: NORMAL {LOG_IU}/ML
CO2 SERPL-SCNC: 22 MMOL/L (ref 21–32)
COLOR UR: NORMAL
CREAT SERPL-MCNC: 3.74 MG/DL (ref 0.5–1.3)
EGFRCR SERPLBLD CKD-EPI 2021: 17 ML/MIN/1.73M*2
ERYTHROCYTE [DISTWIDTH] IN BLOOD BY AUTOMATED COUNT: 15.7 % (ref 11.5–14.5)
GLUCOSE BLD MANUAL STRIP-MCNC: 102 MG/DL (ref 74–99)
GLUCOSE BLD MANUAL STRIP-MCNC: 102 MG/DL (ref 74–99)
GLUCOSE BLD MANUAL STRIP-MCNC: 113 MG/DL (ref 74–99)
GLUCOSE BLD MANUAL STRIP-MCNC: 120 MG/DL (ref 74–99)
GLUCOSE BLD MANUAL STRIP-MCNC: 135 MG/DL (ref 74–99)
GLUCOSE BLD MANUAL STRIP-MCNC: 199 MG/DL (ref 74–99)
GLUCOSE BLD MANUAL STRIP-MCNC: 206 MG/DL (ref 74–99)
GLUCOSE BLD MANUAL STRIP-MCNC: 243 MG/DL (ref 74–99)
GLUCOSE SERPL-MCNC: 103 MG/DL (ref 74–99)
GLUCOSE UR STRIP.AUTO-MCNC: NORMAL MG/DL
HCT VFR BLD AUTO: 24.2 % (ref 41–52)
HGB BLD-MCNC: 8 G/DL (ref 13.5–17.5)
HOLD SPECIMEN: NORMAL
HSV1 DNA BLD QL NAA+PROBE: NOT DETECTED
HSV2 DNA BLD QL NAA+PROBE: NOT DETECTED
KETONES UR STRIP.AUTO-MCNC: NEGATIVE MG/DL
LABORATORY COMMENT REPORT: NOT DETECTED
LEUKOCYTE ESTERASE UR QL STRIP.AUTO: NEGATIVE
MAGNESIUM SERPL-MCNC: 1.87 MG/DL (ref 1.6–2.4)
MCH RBC QN AUTO: 29.5 PG (ref 26–34)
MCHC RBC AUTO-ENTMCNC: 33.1 G/DL (ref 32–36)
MCV RBC AUTO: 89 FL (ref 80–100)
MUCOUS THREADS #/AREA URNS AUTO: NORMAL /LPF
NITRITE UR QL STRIP.AUTO: NEGATIVE
NRBC BLD-RTO: 0 /100 WBCS (ref 0–0)
PH UR STRIP.AUTO: 5.5 [PH]
PHOSPHATE SERPL-MCNC: 6 MG/DL (ref 2.5–4.9)
PLATELET # BLD AUTO: 285 X10*3/UL (ref 150–450)
POTASSIUM SERPL-SCNC: 3.6 MMOL/L (ref 3.5–5.3)
PREALB SERPL-MCNC: 19.2 MG/DL (ref 18–40)
PROT SERPL-MCNC: 5.8 G/DL (ref 6.4–8.2)
PROT UR STRIP.AUTO-MCNC: NORMAL MG/DL
RBC # BLD AUTO: 2.71 X10*6/UL (ref 4.5–5.9)
RBC # UR STRIP.AUTO: NEGATIVE MG/DL
RBC #/AREA URNS AUTO: NORMAL /HPF
SODIUM SERPL-SCNC: 133 MMOL/L (ref 136–145)
SP GR UR STRIP.AUTO: 1.01
TACROLIMUS BLD-MCNC: 12.2 NG/ML
UROBILINOGEN UR STRIP.AUTO-MCNC: NORMAL MG/DL
WBC # BLD AUTO: 10.8 X10*3/UL (ref 4.4–11.3)
WBC #/AREA URNS AUTO: NORMAL /HPF

## 2025-04-15 PROCEDURE — 83735 ASSAY OF MAGNESIUM: CPT

## 2025-04-15 PROCEDURE — 93975 VASCULAR STUDY: CPT | Performed by: RADIOLOGY

## 2025-04-15 PROCEDURE — 36415 COLL VENOUS BLD VENIPUNCTURE: CPT

## 2025-04-15 PROCEDURE — 2500000004 HC RX 250 GENERAL PHARMACY W/ HCPCS (ALT 636 FOR OP/ED)

## 2025-04-15 PROCEDURE — 82947 ASSAY GLUCOSE BLOOD QUANT: CPT

## 2025-04-15 PROCEDURE — 76776 US EXAM K TRANSPL W/DOPPLER: CPT

## 2025-04-15 PROCEDURE — 82248 BILIRUBIN DIRECT: CPT

## 2025-04-15 PROCEDURE — 76705 ECHO EXAM OF ABDOMEN: CPT | Performed by: RADIOLOGY

## 2025-04-15 PROCEDURE — 84100 ASSAY OF PHOSPHORUS: CPT

## 2025-04-15 PROCEDURE — 85027 COMPLETE CBC AUTOMATED: CPT

## 2025-04-15 PROCEDURE — 1100000001 HC PRIVATE ROOM DAILY

## 2025-04-15 PROCEDURE — 80197 ASSAY OF TACROLIMUS: CPT

## 2025-04-15 PROCEDURE — 81001 URINALYSIS AUTO W/SCOPE: CPT

## 2025-04-15 PROCEDURE — 93975 VASCULAR STUDY: CPT

## 2025-04-15 PROCEDURE — 2500000002 HC RX 250 W HCPCS SELF ADMINISTERED DRUGS (ALT 637 FOR MEDICARE OP, ALT 636 FOR OP/ED)

## 2025-04-15 PROCEDURE — 2500000001 HC RX 250 WO HCPCS SELF ADMINISTERED DRUGS (ALT 637 FOR MEDICARE OP)

## 2025-04-15 RX ORDER — TACROLIMUS 1 MG/1
1 CAPSULE ORAL
Status: DISCONTINUED | OUTPATIENT
Start: 2025-04-15 | End: 2025-04-17 | Stop reason: HOSPADM

## 2025-04-15 RX ORDER — MYCOPHENOLATE MOFETIL 250 MG/1
750 CAPSULE ORAL 2 TIMES DAILY
Status: DISCONTINUED | OUTPATIENT
Start: 2025-04-15 | End: 2025-04-17 | Stop reason: HOSPADM

## 2025-04-15 RX ORDER — LIDOCAINE 560 MG/1
2 PATCH PERCUTANEOUS; TOPICAL; TRANSDERMAL DAILY PRN
Status: DISCONTINUED | OUTPATIENT
Start: 2025-04-15 | End: 2025-04-17 | Stop reason: HOSPADM

## 2025-04-15 RX ORDER — MAGNESIUM SULFATE HEPTAHYDRATE 40 MG/ML
2 INJECTION, SOLUTION INTRAVENOUS ONCE
Status: COMPLETED | OUTPATIENT
Start: 2025-04-15 | End: 2025-04-15

## 2025-04-15 RX ADMIN — MYCOPHENOLATE MOFETIL 1000 MG: 250 CAPSULE ORAL at 06:09

## 2025-04-15 RX ADMIN — INSULIN LISPRO 4 UNITS: 100 INJECTION, SOLUTION INTRAVENOUS; SUBCUTANEOUS at 20:23

## 2025-04-15 RX ADMIN — MAGNESIUM SULFATE HEPTAHYDRATE 2 G: 40 INJECTION, SOLUTION INTRAVENOUS at 10:51

## 2025-04-15 RX ADMIN — INSULIN LISPRO 2 UNITS: 100 INJECTION, SOLUTION INTRAVENOUS; SUBCUTANEOUS at 16:09

## 2025-04-15 RX ADMIN — URSODIOL 300 MG: 300 CAPSULE ORAL at 16:10

## 2025-04-15 RX ADMIN — INSULIN HUMAN 10 UNITS: 100 INJECTION, SUSPENSION SUBCUTANEOUS at 10:58

## 2025-04-15 RX ADMIN — TACROLIMUS 0.5 MG: 0.5 CAPSULE ORAL at 06:10

## 2025-04-15 RX ADMIN — TACROLIMUS 1 MG: 1 CAPSULE ORAL at 18:15

## 2025-04-15 RX ADMIN — FLUCONAZOLE 200 MG: 200 TABLET ORAL at 10:51

## 2025-04-15 RX ADMIN — TRAZODONE HYDROCHLORIDE 25 MG: 50 TABLET ORAL at 20:17

## 2025-04-15 RX ADMIN — MYCOPHENOLATE MOFETIL 750 MG: 250 CAPSULE ORAL at 18:15

## 2025-04-15 RX ADMIN — URSODIOL 300 MG: 300 CAPSULE ORAL at 20:18

## 2025-04-15 RX ADMIN — TACROLIMUS 1 MG: 1 CAPSULE ORAL at 06:10

## 2025-04-15 RX ADMIN — URSODIOL 300 MG: 300 CAPSULE ORAL at 10:51

## 2025-04-15 RX ADMIN — PREDNISONE 20 MG: 20 TABLET ORAL at 06:09

## 2025-04-15 RX ADMIN — PANTOPRAZOLE SODIUM 40 MG: 40 TABLET, DELAYED RELEASE ORAL at 10:51

## 2025-04-15 ASSESSMENT — COGNITIVE AND FUNCTIONAL STATUS - GENERAL
MOVING TO AND FROM BED TO CHAIR: A LITTLE
WALKING IN HOSPITAL ROOM: A LITTLE
STANDING UP FROM CHAIR USING ARMS: A LITTLE
CLIMB 3 TO 5 STEPS WITH RAILING: A LITTLE
TURNING FROM BACK TO SIDE WHILE IN FLAT BAD: A LITTLE
MOBILITY SCORE: 19

## 2025-04-15 ASSESSMENT — PAIN SCALES - GENERAL
PAINLEVEL_OUTOF10: 0 - NO PAIN
PAINLEVEL_OUTOF10: 3

## 2025-04-15 ASSESSMENT — PAIN - FUNCTIONAL ASSESSMENT: PAIN_FUNCTIONAL_ASSESSMENT: 0-10

## 2025-04-15 NOTE — CARE PLAN
The patient's goals for the shift include pt will remain comfortable throughout shif      The clinical goals for the shift include patient will remain HDS this shift      Problem: Pain - Adult  Goal: Verbalizes/displays adequate comfort level or baseline comfort level  Outcome: Progressing     Problem: Safety - Adult  Goal: Free from fall injury  Outcome: Progressing     Problem: Discharge Planning  Goal: Discharge to home or other facility with appropriate resources  Outcome: Progressing     Problem: Nutrition  Goal: Nutrient intake appropriate for maintaining nutritional needs  Outcome: Progressing

## 2025-04-15 NOTE — PROGRESS NOTES
04/15/25 1104   Discharge Planning   Living Arrangements Spouse/significant other   Support Systems Spouse/significant other   Assistance Needed None   Type of Residence Private residence   Home or Post Acute Services In home services   Type of Home Care Services Home OT;Home PT   Expected Discharge Disposition Home Health   Does the patient need discharge transport arranged? Yes   RoundTrip coordination needed? Yes   Has discharge transport been arranged? No   Financial Resource Strain   How hard is it for you to pay for the very basics like food, housing, medical care, and heating? Not hard   Housing Stability   In the last 12 months, was there a time when you were not able to pay the mortgage or rent on time? N   Transportation Needs   In the past 12 months, has lack of transportation kept you from medical appointments or from getting medications? no       DC Planning:  Went in and met with the pt, confirmed demographics.     Transitional Care Coordination Progress Note:  Patient discussed during interdisciplinary rounds.     Plan per Medical/Surgical team:   Pt previously using [facility]. Referral sent.       Discharge disposition: HC: [facility]    Potential Barriers: None    ADOD:  4/16    This TCC will continue to follow for home going needs and safe DC plan.      Stephanie Funes. BEBE. TCC.

## 2025-04-15 NOTE — H&P
Transplant Surgery History and Physical    Subjective   Chief Complaint/Reason for Admission: Increasing BUN and Cr    HPI:  Pt is a 64 y/o male with a hx of ESRD secondary to MASLD whom received a simultaneous liver/kidney transplant on 4/2/25. Comes in as direct admit for increasing BUN and Cr.     Pt was discharged 4/12/25 from his surgery. His postop coarse during his prior admission was c/b brief vtach/afib related to PA cath placement after his liver txp, which resolved with amio. He returned to OR for DDKT POD1. Otherwise his postop course thus after was uncomplicated. He was discharge with f/up and [facility] for PT/OT and drain care.     Today he only complains about some loose bowel movements over the past day. Otherwise denies f/c/cp/n/v/sob. He has been drinking his 32oz water bottle. Says he is urinating a lot. Has had some reduction in appetite due to bloating but otherwise is eating and keeping it down. Denies any other complaints or concerns at this time.      A 12-point ROS was performed and was unremarkable except as above.    PMH:  Past Medical History:   Diagnosis Date    Alcoholic cirrhosis (Multi)     Anticoagulated     Ascites     CKD (chronic kidney disease) stage 4, GFR 15-29 ml/min (Multi)     Coronary artery disease     Diabetes mellitus (Multi)     Hyperlipidemia     FORMAN (nonalcoholic steatohepatitis)     Pancytopenia      PSH:  Past Surgical History:   Procedure Laterality Date    CARDIAC CATHETERIZATION N/A 02/26/2025    Procedure: Left Heart Cath with Coronary Angiography and LV;  Surgeon: Cesilia Teresa MD;  Location: [facility];  Service: Cardiovascular;  Laterality: N/A;    CORONARY ANGIOPLASTY WITH STENT PLACEMENT       Soc Hx:  Social History     Socioeconomic History    Marital status:      Spouse name: Not on file    Number of children: Not on file    Years of education: Not on file    Highest education level: Not on file   Occupational History    Not on file    Tobacco Use    Smoking status: Former     Types: Cigarettes    Smokeless tobacco: Never   Substance and Sexual Activity    Alcohol use: Not Currently     Comment: occ    Drug use: Not Currently    Sexual activity: Not on file   Other Topics Concern    Not on file   Social History Narrative    Not on file     Social Drivers of Health     Financial Resource Strain: Low Risk  (4/14/2025)    Overall Financial Resource Strain (CARDIA)     Difficulty of Paying Living Expenses: Not hard at all   Food Insecurity: No Food Insecurity (4/14/2025)    Hunger Vital Sign     Worried About Running Out of Food in the Last Year: Never true     Ran Out of Food in the Last Year: Never true   Transportation Needs: No Transportation Needs (4/14/2025)    PRAPARE - Transportation     Lack of Transportation (Medical): No     Lack of Transportation (Non-Medical): No   Physical Activity: Not on file   Stress: Not on file   Social Connections: Not on file   Intimate Partner Violence: Not At Risk (4/14/2025)    Humiliation, Afraid, Rape, and Kick questionnaire     Fear of Current or Ex-Partner: No     Emotionally Abused: No     Physically Abused: No     Sexually Abused: No   Housing Stability: Low Risk  (4/14/2025)    Housing Stability Vital Sign     Unable to Pay for Housing in the Last Year: No     Number of Times Moved in the Last Year: 0     Homeless in the Last Year: No     Fam Hx:  No family history on file.   Allergies:  No Known Allergies  Current Medications:  No current facility-administered medications on file prior to encounter.     Current Outpatient Medications on File Prior to Encounter   Medication Sig Dispense Refill    acetaminophen (TylenoL) 325 mg tablet Take 2 tablets (650 mg) by mouth every 6 hours if needed for mild pain (1 - 3) for up to 10 days. Do not exceed 3000 mg in 24 hours      alcohol swabs (Alcohol Pads) Use 4-8 per day to check blood glucose and for injectable medications 400 each 3    alogliptin (Nesina)  "12.5 mg tablet Take 1 tablet (12.5 mg) by mouth once daily. 30 tablet 0    apixaban (Eliquis) 5 mg tablet Take 1 tablet (5 mg) by mouth 2 times daily (morning and late afternoon). HOLD UNTIL INSTRUCTED TO RESUME      aspirin 81 mg EC tablet Take 1 tablet (81 mg) by mouth once daily. 30 tablet 0    atorvastatin (Lipitor) 10 mg tablet Take 1 tablet (10 mg) by mouth once daily.      blood sugar diagnostic (Blood Glucose Test) Check blood glucose 3 time per day 100 each 0    blood-glucose meter misc Use to check blood glucose 1 each 0    blood-glucose sensor (Dexcom G7 Sensor) device Use to check glucose, change every 10 days 3 each 11    blood-glucose,,cont (Dexcom G7 ) misc Use as instructed 1 each 0    cholecalciferol (Vitamin D-3) 50 MCG (2000 UT) tablet Take 1 tablet (2,000 Units) by mouth once daily. 30 tablet 0    docusate sodium (Colace) 100 mg capsule Take 1 capsule (100 mg) by mouth 2 times a day as needed for constipation for up to 14 days. 28 capsule 0    fluconazole (Diflucan) 200 mg tablet Take 1 tablet (200 mg) by mouth once daily.      furosemide (Lasix) 20 mg tablet Take 2 tablets (40 mg) by mouth 2 times daily (morning and late afternoon). 120 tablet 0    insulin NPH, Isophane, (HumuLIN N,NovoLIN N) 100 unit/mL (3 mL) pen Inject 10 Units under the skin once daily in the morning. Take as directed per insulin instructions. 15 mL 0    lancets 33 gauge misc 1 each 3 times a day. 100 each 0    mycophenolate (Cellcept) 250 mg capsule Take 4 capsules (1,000 mg) by mouth 2 times a day. 240 capsule 0    pantoprazole (Protonix) 40 mg EC tablet Take 1 tablet (40 mg) by mouth once daily. Do not crush, chew, or split. 30 tablet 0    pen needle, diabetic 31 gauge x 3/16\" needle 1 each once daily. 100 each 0    Pentamidine (Nebupent) 300 mg inhalation solution Inhale 300 mg every 30 (thirty) days. Dose give 4/7/2025      polyethylene glycol (Glycolax, Miralax) 17 gram/dose powder Mix 17 g of powder " and drink once daily as needed for constipation for up to 5 days.      predniSONE (Deltasone) 5 mg tablet Take 4 tablets (20 mg) by mouth once daily. 120 tablet 0    tacrolimus (Prograf) 0.5 mg capsule Take 1 capsule (0.5 mg) by mouth 2 times a day. 60 capsule 0    tacrolimus (Prograf) 1 mg capsule Take 1 capsule (1 mg) by mouth 2 times a day.      traMADol (Ultram) 50 mg tablet Take 1 tablet (50 mg) by mouth every 6 hours if needed for severe pain (7 - 10) for up to 5 days. 15 tablet 0    traZODone (Desyrel) 50 mg tablet Take 0.5 tablets (25 mg) by mouth once daily at bedtime. 15 tablet 0    ursodiol (Actigall) 300 mg capsule Take 1 capsule (300 mg) by mouth 3 times a day. 90 capsule 0    valGANciclovir (Valcyte) 450 mg tablet Take 1 tablet (450 mg) by mouth every other day. Do not crush or chew.      [DISCONTINUED] acetaminophen (TylenoL) 325 mg tablet Take 2 tablets (650 mg) by mouth every 6 hours if needed for mild pain (1 - 3) for up to 10 days. 30 tablet 0    [DISCONTINUED] alogliptin (Nesina) 6.25 mg tablet tablet Take 1 tablet (6.25 mg) by mouth once daily. 30 tablet 0    [DISCONTINUED] alogliptin (Nesina) 6.25 mg tablet tablet Take 2 tablets (12.5 mg) by mouth once daily. 60 tablet 0    [DISCONTINUED] apixaban (Eliquis) 5 mg tablet Take 1 tablet (5 mg) by mouth 2 times daily (morning and late afternoon).      [DISCONTINUED] cyproheptadine (Periactin) 4 mg tablet Take 1 tablet (4 mg) by mouth once daily.      [DISCONTINUED] fluconazole (Diflucan) 200 mg tablet Take 2 tablets (400 mg) by mouth once daily. Do not start before April 7, 2025. 60 tablet 0    [DISCONTINUED] furosemide (Lasix) 20 mg tablet Take 1 tablet (20 mg) by mouth once daily in the morning.      [DISCONTINUED] lactulose 20 gram/30 mL oral solution Take 30 mL (20 g) by mouth 2 times a day. 1800 mL 2    [DISCONTINUED] metFORMIN (Glucophage) 1,000 mg tablet Take 1 tablet (1,000 mg) by mouth every 12 hours.      [DISCONTINUED] polyethylene glycol  (Glycolax, Miralax) 17 gram/dose powder Mix 17 g of powder and drink 2 times a day. 238 g 0    [DISCONTINUED] polyethylene glycol (Glycolax, Miralax) 17 gram/dose powder Mix 17 g of powder and drink 2 times a day as needed for constipation for up to 5 days. 238 g 0    [DISCONTINUED] rifAXIMin (Xifaxan) 550 mg tablet Take 1 tablet (550 mg) by mouth 2 times a day. 60 tablet 11    [DISCONTINUED] sennosides-docusate sodium (Rachel-Colace) 8.6-50 mg tablet Take 1 tablet by mouth 2 times a day as needed for constipation. 60 tablet 11    [DISCONTINUED] sulfamethoxazole-trimethoprim (Bactrim) 400-80 mg tablet Take 1 tablet by mouth once daily. 30 tablet 0    [DISCONTINUED] tacrolimus (Prograf) 1 mg capsule Take 3 capsules (3 mg) by mouth 2 times a day. 180 capsule 0    [DISCONTINUED] traZODone (Desyrel) 50 mg tablet Take 1 tablet (50 mg) by mouth once daily at bedtime. (Patient not taking: Reported on 4/8/2025)      [DISCONTINUED] valGANciclovir (Valcyte) 450 mg tablet Take 2 tablets (900 mg) by mouth once daily. Do not crush or chew. 60 tablet 0         Objective   Vitals:  Visit Vitals  /68   Pulse 106   Temp 36.6 °C (97.9 °F) (Temporal)   Resp 18       Physical Exam:  Gen: NAD  CV: rr  Pulm: nml work of breathing  Abd: RUQ and LLQ incisions C/D/I closed with staples. No drainage or erythema. Soft, NT/NT    Labs within past 24h:  Results for orders placed or performed during the hospital encounter of 04/14/25 (from the past 24 hours)   POCT GLUCOSE   Result Value Ref Range    POCT Glucose 200 (H) 74 - 99 mg/dL   CBC   Result Value Ref Range    WBC 11.3 4.4 - 11.3 x10*3/uL    nRBC 0.0 0.0 - 0.0 /100 WBCs    RBC 2.47 (L) 4.50 - 5.90 x10*6/uL    Hemoglobin 7.4 (L) 13.5 - 17.5 g/dL    Hematocrit 21.6 (L) 41.0 - 52.0 %    MCV 87 80 - 100 fL    MCH 30.0 26.0 - 34.0 pg    MCHC 34.3 32.0 - 36.0 g/dL    RDW 15.5 (H) 11.5 - 14.5 %    Platelets 235 150 - 450 x10*3/uL   Magnesium   Result Value Ref Range    Magnesium 1.89 1.60 -  2.40 mg/dL   Hepatic Function Panel   Result Value Ref Range    Albumin 3.5 3.4 - 5.0 g/dL    Bilirubin, Total 0.9 0.0 - 1.2 mg/dL    Bilirubin, Direct 0.5 (H) 0.0 - 0.3 mg/dL    Alkaline Phosphatase 141 (H) 33 - 136 U/L    ALT 14 10 - 52 U/L    AST 7 (L) 9 - 39 U/L    Total Protein 5.3 (L) 6.4 - 8.2 g/dL   Phosphorus   Result Value Ref Range    Phosphorus 5.9 (H) 2.5 - 4.9 mg/dL   Basic Metabolic Panel   Result Value Ref Range    Glucose 161 (H) 74 - 99 mg/dL    Sodium 131 (L) 136 - 145 mmol/L    Potassium 3.8 3.5 - 5.3 mmol/L    Chloride 95 (L) 98 - 107 mmol/L    Bicarbonate 23 21 - 32 mmol/L    Anion Gap 17 10 - 20 mmol/L    Urea Nitrogen 98 (HH) 6 - 23 mg/dL    Creatinine 4.24 (H) 0.50 - 1.30 mg/dL    eGFR 15 (L) >60 mL/min/1.73m*2    Calcium 8.5 (L) 8.6 - 10.6 mg/dL       Imaging within past 24h:  Imaging  No results found.    Cardiology, Vascular, and Other Imaging  No other imaging results found for the past 2 days      I have reviewed the imaging above as it pertains to the patient's surgical concerns and agree with the radiologist's interpretation.     ASSESSMENT  Pt is a 66 y/o male with a hx of ESRD secondary to MASLD whom received a simultaneous liver/kidney transplant on 4/2/25. Comes in as direct admit for increasing BUN and Cr.     PLAN:  - Holding home eliquis, ASA, statin to be reassessed in AM  - Reg diet  - mIVF  - home lasix  - Immunosuppresion   Tacro 1000/1000   Cellcept 1.5/1.5   Pred 20  -AM tacro  -Liver and Kidney Txp US  -BK, CMV, HSV labs  -Cxr  -Ucx/reflex    Discussed with Dr. Noyola.    Vero Amaro MD  PGY-1 General Surgery  Transplant Surgery [id number]

## 2025-04-15 NOTE — PROGRESS NOTES
Pharmacy Medication History Review    López Lopez is a 65 y.o. male admitted for Acute kidney injury. Pharmacy reviewed the patient's [id number]-ao-nixnprlah medications and allergies for accuracy.    The list below reflects the updated PTA list.   Prior to Admission Medications   Prescriptions Last Dose Informant   Pentamidine (Nebupent) 300 mg inhalation solution  Self, Spouse/Significant Other   Sig: Inhale 300 mg every 30 (thirty) days. Dose give 4/7/2025   acetaminophen (TylenoL) 325 mg tablet  Self, Spouse/Significant Other   Sig: Take 2 tablets (650 mg) by mouth every 6 hours if needed for mild pain (1 - 3) for up to 10 days. Do not exceed 3000 mg in 24 hours   alcohol swabs (Alcohol Pads)  Self, Spouse/Significant Other   Sig: Use 4-8 per day to check blood glucose and for injectable medications   alogliptin (Nesina) 12.5 mg tablet  Self, Spouse/Significant Other   Sig: Take 1 tablet (12.5 mg) by mouth once daily.   apixaban (Eliquis) 5 mg tablet  Self, Spouse/Significant Other   Sig: Take 1 tablet (5 mg) by mouth 2 times daily (morning and late afternoon). HOLD UNTIL INSTRUCTED TO RESUME   aspirin 81 mg EC tablet  Self, Spouse/Significant Other   Sig: Take 1 tablet (81 mg) by mouth once daily.   atorvastatin (Lipitor) 10 mg tablet  Self, Spouse/Significant Other   Sig: Take 1 tablet (10 mg) by mouth once daily.   blood sugar diagnostic (Blood Glucose Test)  Self, Spouse/Significant Other   Sig: Check blood glucose 3 time per day   blood-glucose meter misc  Self, Spouse/Significant Other   Sig: Use to check blood glucose   blood-glucose sensor (Dexcom G7 Sensor) device  Self, Spouse/Significant Other   Sig: Use to check glucose, change every 10 days   blood-glucose,,cont (Dexcom G7 ) misc  Self, Spouse/Significant Other   Sig: Use as instructed   cholecalciferol (Vitamin D-3) 50 MCG (2000 UT) tablet  Self, Spouse/Significant Other   Sig: Take 1 tablet (2,000 Units) by mouth once daily.  "  docusate sodium (Colace) 100 mg capsule  Self, Spouse/Significant Other   Sig: Take 1 capsule (100 mg) by mouth 2 times a day as needed for constipation for up to 14 days.   fluconazole (Diflucan) 200 mg tablet  Self, Spouse/Significant Other   Sig: Take 1 tablet (200 mg) by mouth once daily.   furosemide (Lasix) 20 mg tablet  Self, Spouse/Significant Other   Sig: Take 2 tablets (40 mg) by mouth 2 times daily (morning and late afternoon).   insulin NPH, Isophane, (HumuLIN N,NovoLIN N) 100 unit/mL (3 mL) pen  Self, Spouse/Significant Other   Sig: Inject 10 Units under the skin once daily in the morning. Take as directed per insulin instructions.   lancets 33 gauge misc  Self, Spouse/Significant Other   Si each 3 times a day.   mycophenolate (Cellcept) 250 mg capsule  Self, Spouse/Significant Other   Sig: Take 4 capsules (1,000 mg) by mouth 2 times a day.   pantoprazole (Protonix) 40 mg EC tablet  Self, Spouse/Significant Other   Sig: Take 1 tablet (40 mg) by mouth once daily. Do not crush, chew, or split.   pen needle, diabetic 31 gauge x 3/16\" needle  Self, Spouse/Significant Other   Si each once daily.   polyethylene glycol (Glycolax, Miralax) 17 gram/dose powder  Self, Spouse/Significant Other   Sig: Mix 17 g of powder and drink once daily as needed for constipation for up to 5 days.   predniSONE (Deltasone) 5 mg tablet  Self, Spouse/Significant Other   Sig: Take 4 tablets (20 mg) by mouth once daily.   tacrolimus (Prograf) 0.5 mg capsule  Self, Spouse/Significant Other   Sig: Take 1 capsule (0.5 mg) by mouth 2 times a day.   tacrolimus (Prograf) 1 mg capsule  Self, Spouse/Significant Other   Sig: Take 1 capsule (1 mg) by mouth 2 times a day.   traMADol (Ultram) 50 mg tablet  Self, Spouse/Significant Other   Sig: Take 1 tablet (50 mg) by mouth every 6 hours if needed for severe pain (7 - 10) for up to 5 days.   traZODone (Desyrel) 50 mg tablet  Self, Spouse/Significant Other   Sig: Take 0.5 tablets (25 " mg) by mouth once daily at bedtime.   ursodiol (Actigall) 300 mg capsule  Self, Spouse/Significant Other   Sig: Take 1 capsule (300 mg) by mouth 3 times a day.   valGANciclovir (Valcyte) 450 mg tablet  Self, Spouse/Significant Other   Sig: Take 1 tablet (450 mg) by mouth every other day. Do not crush or chew.      Facility-Administered Medications: None        The list below reflects the updated allergy list. Please review each documented allergy for additional clarification and justification.  Allergies  Reviewed by Geoff Medina PharmD on 4/15/2025   No Known Allergies         Patient accepts M2B at discharge.     Sources used to complete the med history include:    [facility]  Pharmacy dispense history  Patient Interview Moderate historian  Chart Review  Care Everywhere  Patient discharge notes on 4/12/25      Below are additional concerns with the patient's PTA list.  The patient is a moderate historian and is able to recall some medications from memory. When asked about specific drug names or their indications, the patient can identify whether or not they are taking them.   The patient's spouse reports that there is a sufficient supply of Atorvastatin 10 mg at home, with the most recent pharmacy fill on 11/25 for a 90-day supply.    Medications ADDED:  none  Medications CHANGED:  none  Medications REMOVED:   none    Geoff Medina PharmD  Transitions of Care Pharmacist  [facility]  Please reach out via Secure Chat for questions, or if no response call [facility] (f[employer]) or [facility] [place]     None

## 2025-04-15 NOTE — SIGNIFICANT EVENT
04/07/25 1128   Patient Interaction   Organ Liver/kidney   Type of Interaction Phone conference   Interdisciplinary Rounds   Attendance Surgeon;Physician;TALIA;Coordinator   Topics Discussed Diet;Home care needs;Medications;Blood test results;Discharge preparation;Activity;Imaging/test results     Transplant Surgery Multidisciplinary Team Note    López Lopez is a 65 y.o. male   POD#8 from a Liver and POD #7 s/p DDKT from a DCD donor. His post operative complications: re-exploration of the hepatic artery     24 Hour Events  1. LALO     Last Recorded Vitals  Visit Vitals  /76   Pulse 88   Temp 36 °C (96.8 °F) (Temporal)   Resp 18      Intake/Output last 3 Shifts:    Intake/Output Summary (Last 24 hours) at 4/15/2025 1406  Last data filed at 4/15/2025 0810  Gross per 24 hour   Intake 0 ml   Output 870 ml   Net -870 ml      Vitals:    04/15/25 0606   Weight: 105 kg (231 lb 14.8 oz)        Assessment/Plan     Liver allograft function  -liver enzymes stable  -continue ursodial  -continue ASA 81 mg QD    Kidney allograft function  -Made 1.3L UOP  - 60 mg lasix IV BID today   -Na+131. Fluid restruction 1.5L  - Will repeat BMP this evening    Cardiac  -BP acceptable   -Home apixiban (Afib) on hold     FENGI  -Hyperphosphatemia-- low phos diet, no binder, monitor     Endo  -Requiring some SSI    Pysch  -continue 1/2 home trazodone dose     ID  -On fluconazole   -received dose of Pentamidine 4/7    GI  -Having BMs      Principal Problem:    Management after organ transplant  Active Problems:  Patient Active Problem List   Diagnosis    Acute idiopathic thrombocytopenic purpura (Multi)    Anemia    History of CAD (coronary artery disease)    Mixed hyperlipidemia    Paroxysmal atrial fibrillation (Multi)    Type 2 diabetes mellitus with hyperglycemia, without long-term current use of insulin    Liver failure without hepatic coma (Multi)    Metabolic dysfunction-associated steatohepatitis (MASH)    Status post  insertion of drug-eluting stent into left anterior descending artery for coronary artery disease    Type 2 diabetes mellitus with diabetic chronic kidney disease    History of percutaneous transluminal coronary angioplasty    Coronary artery disease involving native coronary artery of native heart    Pre-liver transplant, listed    Pre-kidney transplant, listed    Refractory ascites    Alcoholic cirrhosis of liver with ascites (Multi)    Stage 4 chronic kidney disease (Multi)    Chronic renal impairment, stage 4 (severe) (Multi)    CKD (chronic kidney disease) stage 4, GFR 15-29 ml/min (Multi)    Acute kidney injury        Immunosuppression reviewed and adjusted       Induction: Simulect and Steroid       Tacrolimus goal 8-10 ng/mL. Current dose 1 mg BID          mg po BID       Prednisone 20 mg daily  DVT prophylaxis SCDS and subcutaneous heparin 5000 TID  PT/OT  Diet: Diabetic diet, low phos  Anticipated discharge tomorrow    Moriah Gutierres, APRN-CNP

## 2025-04-15 NOTE — CARE PLAN
The patient's goals for the shift include      The clinical goals for the shift include patient will remain HDS this shift

## 2025-04-16 ENCOUNTER — LAB REQUISITION (OUTPATIENT)
Dept: LAB | Facility: CLINIC | Age: 65
DRG: 699 | End: 2025-04-16
Payer: COMMERCIAL

## 2025-04-16 DIAGNOSIS — N18.6 END STAGE RENAL DISEASE (MULTI): ICD-10-CM

## 2025-04-16 LAB
ALBUMIN SERPL BCP-MCNC: 3.4 G/DL (ref 3.4–5)
ALBUMIN SERPL BCP-MCNC: 3.4 G/DL (ref 3.4–5)
ALP SERPL-CCNC: 121 U/L (ref 33–136)
ALT SERPL W P-5'-P-CCNC: 10 U/L (ref 10–52)
ANION GAP SERPL CALC-SCNC: 17 MMOL/L (ref 10–20)
AST SERPL W P-5'-P-CCNC: 7 U/L (ref 9–39)
BILIRUB DIRECT SERPL-MCNC: 0.4 MG/DL (ref 0–0.3)
BILIRUB SERPL-MCNC: 0.7 MG/DL (ref 0–1.2)
BUN SERPL-MCNC: 94 MG/DL (ref 6–23)
CALCIUM SERPL-MCNC: 8.7 MG/DL (ref 8.6–10.6)
CHLORIDE SERPL-SCNC: 99 MMOL/L (ref 98–107)
CO2 SERPL-SCNC: 22 MMOL/L (ref 21–32)
CREAT SERPL-MCNC: 3.5 MG/DL (ref 0.5–1.3)
DIAGNOSIS-VIRTUAL CROSSMATCH: NORMAL
EGFRCR SERPLBLD CKD-EPI 2021: 19 ML/MIN/1.73M*2
ERYTHROCYTE [DISTWIDTH] IN BLOOD BY AUTOMATED COUNT: 15.6 % (ref 11.5–14.5)
GLUCOSE BLD MANUAL STRIP-MCNC: 114 MG/DL (ref 74–99)
GLUCOSE BLD MANUAL STRIP-MCNC: 122 MG/DL (ref 74–99)
GLUCOSE BLD MANUAL STRIP-MCNC: 122 MG/DL (ref 74–99)
GLUCOSE BLD MANUAL STRIP-MCNC: 163 MG/DL (ref 74–99)
GLUCOSE BLD MANUAL STRIP-MCNC: 165 MG/DL (ref 74–99)
GLUCOSE BLD MANUAL STRIP-MCNC: 209 MG/DL (ref 74–99)
GLUCOSE SERPL-MCNC: 102 MG/DL (ref 74–99)
HCT VFR BLD AUTO: 21.7 % (ref 41–52)
HGB BLD-MCNC: 7.3 G/DL (ref 13.5–17.5)
MAGNESIUM SERPL-MCNC: 2.15 MG/DL (ref 1.6–2.4)
MCH RBC QN AUTO: 29.7 PG (ref 26–34)
MCHC RBC AUTO-ENTMCNC: 33.6 G/DL (ref 32–36)
MCV RBC AUTO: 88 FL (ref 80–100)
NRBC BLD-RTO: 0 /100 WBCS (ref 0–0)
PATH REVIEW-VIRTUAL CROSSMATCH: NORMAL
PATHOLOGIST ID-VIRTUAL CROSSMATCH: NORMAL
PHOSPHATE SERPL-MCNC: 5.5 MG/DL (ref 2.5–4.9)
PLATELET # BLD AUTO: 283 X10*3/UL (ref 150–450)
POTASSIUM SERPL-SCNC: 3.2 MMOL/L (ref 3.5–5.3)
PROT SERPL-MCNC: 5.3 G/DL (ref 6.4–8.2)
RBC # BLD AUTO: 2.46 X10*6/UL (ref 4.5–5.9)
SODIUM SERPL-SCNC: 135 MMOL/L (ref 136–145)
TACROLIMUS BLD-MCNC: 9.8 NG/ML
WBC # BLD AUTO: 7.8 X10*3/UL (ref 4.4–11.3)

## 2025-04-16 PROCEDURE — 85027 COMPLETE CBC AUTOMATED: CPT

## 2025-04-16 PROCEDURE — 2500000004 HC RX 250 GENERAL PHARMACY W/ HCPCS (ALT 636 FOR OP/ED)

## 2025-04-16 PROCEDURE — P9016 RBC LEUKOCYTES REDUCED: HCPCS

## 2025-04-16 PROCEDURE — 2500000002 HC RX 250 W HCPCS SELF ADMINISTERED DRUGS (ALT 637 FOR MEDICARE OP, ALT 636 FOR OP/ED)

## 2025-04-16 PROCEDURE — 83735 ASSAY OF MAGNESIUM: CPT

## 2025-04-16 PROCEDURE — 82040 ASSAY OF SERUM ALBUMIN: CPT

## 2025-04-16 PROCEDURE — 87506 IADNA-DNA/RNA PROBE TQ 6-11: CPT

## 2025-04-16 PROCEDURE — 36415 COLL VENOUS BLD VENIPUNCTURE: CPT

## 2025-04-16 PROCEDURE — 2500000001 HC RX 250 WO HCPCS SELF ADMINISTERED DRUGS (ALT 637 FOR MEDICARE OP)

## 2025-04-16 PROCEDURE — 80069 RENAL FUNCTION PANEL: CPT

## 2025-04-16 PROCEDURE — 82947 ASSAY GLUCOSE BLOOD QUANT: CPT

## 2025-04-16 PROCEDURE — 36430 TRANSFUSION BLD/BLD COMPNT: CPT

## 2025-04-16 PROCEDURE — 89240 UNLISTED MISC PATH TEST: CPT | Performed by: STUDENT IN AN ORGANIZED HEALTH CARE EDUCATION/TRAINING PROGRAM

## 2025-04-16 PROCEDURE — 80197 ASSAY OF TACROLIMUS: CPT

## 2025-04-16 PROCEDURE — 1100000001 HC PRIVATE ROOM DAILY

## 2025-04-16 RX ORDER — MYCOPHENOLATE MOFETIL 250 MG/1
750 CAPSULE ORAL 2 TIMES DAILY
Start: 2025-04-16 | End: 2025-04-24 | Stop reason: SDUPTHER

## 2025-04-16 RX ORDER — POTASSIUM CHLORIDE 20 MEQ/1
40 TABLET, EXTENDED RELEASE ORAL ONCE
Status: COMPLETED | OUTPATIENT
Start: 2025-04-16 | End: 2025-04-16

## 2025-04-16 RX ORDER — SIMETHICONE 80 MG
80 TABLET,CHEWABLE ORAL 4 TIMES DAILY PRN
Status: DISCONTINUED | OUTPATIENT
Start: 2025-04-16 | End: 2025-04-17 | Stop reason: HOSPADM

## 2025-04-16 RX ADMIN — INSULIN LISPRO 2 UNITS: 100 INJECTION, SOLUTION INTRAVENOUS; SUBCUTANEOUS at 18:21

## 2025-04-16 RX ADMIN — SODIUM CHLORIDE 1000 ML: 0.9 INJECTION, SOLUTION INTRAVENOUS at 12:10

## 2025-04-16 RX ADMIN — MYCOPHENOLATE MOFETIL 750 MG: 250 CAPSULE ORAL at 06:36

## 2025-04-16 RX ADMIN — URSODIOL 300 MG: 300 CAPSULE ORAL at 16:19

## 2025-04-16 RX ADMIN — URSODIOL 300 MG: 300 CAPSULE ORAL at 08:51

## 2025-04-16 RX ADMIN — POTASSIUM CHLORIDE 40 MEQ: 1500 TABLET, EXTENDED RELEASE ORAL at 08:49

## 2025-04-16 RX ADMIN — FLUCONAZOLE 200 MG: 200 TABLET ORAL at 08:49

## 2025-04-16 RX ADMIN — INSULIN LISPRO 4 UNITS: 100 INJECTION, SOLUTION INTRAVENOUS; SUBCUTANEOUS at 12:10

## 2025-04-16 RX ADMIN — TACROLIMUS 1 MG: 1 CAPSULE ORAL at 06:36

## 2025-04-16 RX ADMIN — INSULIN LISPRO 2 UNITS: 100 INJECTION, SOLUTION INTRAVENOUS; SUBCUTANEOUS at 21:29

## 2025-04-16 RX ADMIN — PANTOPRAZOLE SODIUM 40 MG: 40 TABLET, DELAYED RELEASE ORAL at 08:49

## 2025-04-16 RX ADMIN — TRAZODONE HYDROCHLORIDE 25 MG: 50 TABLET ORAL at 21:31

## 2025-04-16 RX ADMIN — INSULIN HUMAN 10 UNITS: 100 INJECTION, SUSPENSION SUBCUTANEOUS at 08:49

## 2025-04-16 RX ADMIN — PREDNISONE 20 MG: 20 TABLET ORAL at 06:36

## 2025-04-16 RX ADMIN — ASPIRIN 81 MG: 81 TABLET, COATED ORAL at 08:49

## 2025-04-16 RX ADMIN — VALGANCICLOVIR HYDROCHLORIDE 450 MG: 450 TABLET ORAL at 21:31

## 2025-04-16 RX ADMIN — MYCOPHENOLATE MOFETIL 750 MG: 250 CAPSULE ORAL at 18:21

## 2025-04-16 RX ADMIN — URSODIOL 300 MG: 300 CAPSULE ORAL at 21:31

## 2025-04-16 RX ADMIN — TACROLIMUS 1 MG: 1 CAPSULE ORAL at 18:21

## 2025-04-16 ASSESSMENT — PAIN SCALES - GENERAL: PAINLEVEL_OUTOF10: 0 - NO PAIN

## 2025-04-16 NOTE — CARE PLAN
The patient's goals for the shift include      The clinical goals for the shift include pt kelly danielson x3 by end of shift  Problem: Pain - Adult  Goal: Verbalizes/displays adequate comfort level or baseline comfort level  Outcome: Progressing     Problem: Safety - Adult  Goal: Free from fall injury  Outcome: Progressing     Problem: Discharge Planning  Goal: Discharge to home or other facility with appropriate resources  Outcome: Progressing

## 2025-04-16 NOTE — SIGNIFICANT EVENT
04/07/25 1128   Patient Interaction   Organ Liver/kidney   Type of Interaction Phone conference   Interdisciplinary Rounds   Attendance Surgeon;Physician;TALIA;Coordinator   Topics Discussed Diet;Home care needs;Medications;Blood test results;Discharge preparation;Activity;Imaging/test results     Transplant Surgery Multidisciplinary Team Note    López Lopez is a 65 y.o. male   POD#13 from a Liver and POD #12 s/p DDKT from a DCD donor. His post operative complications: re-exploration of the hepatic artery     24 Hour Events  1. LALO     Last Recorded Vitals  Visit Vitals  /76 (BP Location: Left arm, Patient Position: Lying)   Pulse 73   Temp 35.9 °C (96.6 °F) (Temporal)   Resp 18      Intake/Output last 3 Shifts:    Intake/Output Summary (Last 24 hours) at 4/16/2025 1304  Last data filed at 4/16/2025 1152  Gross per 24 hour   Intake 1040 ml   Output 1100 ml   Net -60 ml      Vitals:    04/16/25 0648   Weight: 105 kg (231 lb 4.2 oz)        Assessment/Plan     Liver allograft function  -liver enzymes stable  -continue ursodial  -continue ASA 81 mg QD    Kidney allograft function  -Creatinine down trending  -Made 950 ml UOP  - seems dry, giving 1L NS bolus  -holding lasix     Cardiac  -BP acceptable   -Home apixiban (Afib) on hold     Heme  - hgb 7.3, getting 1 U PRBC today    Endo  -Requiring some SSI    ID  -On fluconazole   -received dose of Pentamidine 4/7    GI  -Having some diarrhea, stool studies ordered  -complaining of gas, simethicone ordered PRN      Principal Problem:    Management after organ transplant  Active Problems:  Patient Active Problem List   Diagnosis    Acute idiopathic thrombocytopenic purpura (Multi)    Anemia    History of CAD (coronary artery disease)    Mixed hyperlipidemia    Paroxysmal atrial fibrillation (Multi)    Type 2 diabetes mellitus with hyperglycemia, without long-term current use of insulin    Liver failure without hepatic coma (Multi)    Metabolic  dysfunction-associated steatohepatitis (MASH)    Status post insertion of drug-eluting stent into left anterior descending artery for coronary artery disease    Type 2 diabetes mellitus with diabetic chronic kidney disease    History of percutaneous transluminal coronary angioplasty    Coronary artery disease involving native coronary artery of native heart    Pre-liver transplant, listed    Pre-kidney transplant, listed    Refractory ascites    Alcoholic cirrhosis of liver with ascites (Multi)    Stage 4 chronic kidney disease (Multi)    Chronic renal impairment, stage 4 (severe) (Multi)    CKD (chronic kidney disease) stage 4, GFR 15-29 ml/min (Multi)    Acute kidney injury        Immunosuppression reviewed and adjusted       Induction: Simulect and Steroid       Tacrolimus goal 8-10 ng/mL. Current dose 1 mg BID          mg po BID       Prednisone 20 mg daily  DVT prophylaxis SCDS and subcutaneous heparin 5000 TID  PT/OT  Diet: Diabetic diet, low phos  Anticipated discharge tomorrow    Moriah Gutierres, APRN-CNP

## 2025-04-16 NOTE — CARE PLAN
The patient's goals for the shift include      The clinical goals for the shift include patient will remain safe and free from falls throughout shift      Problem: Pain - Adult  Goal: Verbalizes/displays adequate comfort level or baseline comfort level  Outcome: Progressing     Problem: Safety - Adult  Goal: Free from fall injury  Outcome: Progressing     Problem: Discharge Planning  Goal: Discharge to home or other facility with appropriate resources  Outcome: Progressing     Problem: Chronic Conditions and Co-morbidities  Goal: Patient's chronic conditions and co-morbidity symptoms are monitored and maintained or improved  Outcome: Progressing     Problem: Nutrition  Goal: Nutrient intake appropriate for maintaining nutritional needs  Outcome: Progressing

## 2025-04-16 NOTE — HOSPITAL COURSE
Pt is a 64 y/o male with a hx of ESRD secondary to MASLD whom received a simultaneous liver/kidney transplant on 4/2/25.   He was readmitted as a direct admit for increasing BUN and Cr. He also c/o diarrhea and was following a 1.5 L fluid restriction.     Pt was discharged 4/12/25 from his surgery. His postop coarse during his prior admission was c/b brief vtach/afib related to PA cath placement after his liver txp, which resolved with amio. He returned to OR for DDKT POD1. Otherwise his postop course thus after was uncomplicated. He was discharge with f/up and [facility] for PT/OT and drain care.     He was rehydrated with IVF and received a unit of packed red blood cells for hgb 7.3. Stool studies were ordered and ###.     He is tolerating a low phosphorus, diabetic diet. His diarrhea has improved and he is stable for discharge home today. He will  follow up in transplant clinic as scheduled.

## 2025-04-17 ENCOUNTER — TELEPHONE (OUTPATIENT)
Dept: TRANSPLANT | Facility: HOSPITAL | Age: 65
End: 2025-04-17
Payer: COMMERCIAL

## 2025-04-17 ENCOUNTER — APPOINTMENT (OUTPATIENT)
Facility: HOSPITAL | Age: 65
End: 2025-04-17
Payer: COMMERCIAL

## 2025-04-17 VITALS
RESPIRATION RATE: 18 BRPM | HEART RATE: 77 BPM | BODY MASS INDEX: 28.71 KG/M2 | OXYGEN SATURATION: 98 % | TEMPERATURE: 97.5 F | WEIGHT: 235.78 LBS | SYSTOLIC BLOOD PRESSURE: 152 MMHG | DIASTOLIC BLOOD PRESSURE: 84 MMHG | HEIGHT: 76 IN

## 2025-04-17 LAB
ALBUMIN SERPL BCP-MCNC: 3.9 G/DL (ref 3.4–5)
ALP SERPL-CCNC: 127 U/L (ref 33–136)
ALT SERPL W P-5'-P-CCNC: 10 U/L (ref 10–52)
ANION GAP SERPL CALC-SCNC: 17 MMOL/L (ref 10–20)
AST SERPL W P-5'-P-CCNC: 8 U/L (ref 9–39)
BILIRUB DIRECT SERPL-MCNC: 0.4 MG/DL (ref 0–0.3)
BILIRUB SERPL-MCNC: 0.8 MG/DL (ref 0–1.2)
BLOOD EXPIRATION DATE: NORMAL
BUN SERPL-MCNC: 77 MG/DL (ref 6–23)
CALCIUM SERPL-MCNC: 9.3 MG/DL (ref 8.6–10.6)
CHLORIDE SERPL-SCNC: 101 MMOL/L (ref 98–107)
CO2 SERPL-SCNC: 23 MMOL/L (ref 21–32)
CREAT SERPL-MCNC: 3.06 MG/DL (ref 0.5–1.3)
DISPENSE STATUS: NORMAL
EGFRCR SERPLBLD CKD-EPI 2021: 22 ML/MIN/1.73M*2
ERYTHROCYTE [DISTWIDTH] IN BLOOD BY AUTOMATED COUNT: 15.5 % (ref 11.5–14.5)
GLUCOSE BLD MANUAL STRIP-MCNC: 101 MG/DL (ref 74–99)
GLUCOSE BLD MANUAL STRIP-MCNC: 122 MG/DL (ref 74–99)
GLUCOSE BLD MANUAL STRIP-MCNC: 180 MG/DL (ref 74–99)
GLUCOSE SERPL-MCNC: 97 MG/DL (ref 74–99)
HCT VFR BLD AUTO: 28.6 % (ref 41–52)
HGB BLD-MCNC: 9.6 G/DL (ref 13.5–17.5)
MAGNESIUM SERPL-MCNC: 2.1 MG/DL (ref 1.6–2.4)
MCH RBC QN AUTO: 29.9 PG (ref 26–34)
MCHC RBC AUTO-ENTMCNC: 33.6 G/DL (ref 32–36)
MCV RBC AUTO: 89 FL (ref 80–100)
NRBC BLD-RTO: 0 /100 WBCS (ref 0–0)
PHOSPHATE SERPL-MCNC: 4.8 MG/DL (ref 2.5–4.9)
PLATELET # BLD AUTO: 423 X10*3/UL (ref 150–450)
POTASSIUM SERPL-SCNC: 4 MMOL/L (ref 3.5–5.3)
PRODUCT BLOOD TYPE: 7300
PRODUCT CODE: NORMAL
PROT SERPL-MCNC: 6.2 G/DL (ref 6.4–8.2)
RBC # BLD AUTO: 3.21 X10*6/UL (ref 4.5–5.9)
SODIUM SERPL-SCNC: 137 MMOL/L (ref 136–145)
TACROLIMUS BLD-MCNC: 11.1 NG/ML
UNIT ABO: NORMAL
UNIT NUMBER: NORMAL
UNIT RH: NORMAL
UNIT VOLUME: 350
WBC # BLD AUTO: 9.4 X10*3/UL (ref 4.4–11.3)
XM INTEP: NORMAL

## 2025-04-17 PROCEDURE — 2500000004 HC RX 250 GENERAL PHARMACY W/ HCPCS (ALT 636 FOR OP/ED)

## 2025-04-17 PROCEDURE — 85027 COMPLETE CBC AUTOMATED: CPT

## 2025-04-17 PROCEDURE — 2500000002 HC RX 250 W HCPCS SELF ADMINISTERED DRUGS (ALT 637 FOR MEDICARE OP, ALT 636 FOR OP/ED)

## 2025-04-17 PROCEDURE — 80069 RENAL FUNCTION PANEL: CPT

## 2025-04-17 PROCEDURE — 2500000005 HC RX 250 GENERAL PHARMACY W/O HCPCS: Performed by: STUDENT IN AN ORGANIZED HEALTH CARE EDUCATION/TRAINING PROGRAM

## 2025-04-17 PROCEDURE — 2500000001 HC RX 250 WO HCPCS SELF ADMINISTERED DRUGS (ALT 637 FOR MEDICARE OP)

## 2025-04-17 PROCEDURE — 82947 ASSAY GLUCOSE BLOOD QUANT: CPT

## 2025-04-17 PROCEDURE — 2500000004 HC RX 250 GENERAL PHARMACY W/ HCPCS (ALT 636 FOR OP/ED): Mod: JZ | Performed by: STUDENT IN AN ORGANIZED HEALTH CARE EDUCATION/TRAINING PROGRAM

## 2025-04-17 PROCEDURE — 84100 ASSAY OF PHOSPHORUS: CPT

## 2025-04-17 PROCEDURE — 36415 COLL VENOUS BLD VENIPUNCTURE: CPT

## 2025-04-17 PROCEDURE — 80197 ASSAY OF TACROLIMUS: CPT

## 2025-04-17 PROCEDURE — 83735 ASSAY OF MAGNESIUM: CPT

## 2025-04-17 PROCEDURE — 84075 ASSAY ALKALINE PHOSPHATASE: CPT

## 2025-04-17 RX ORDER — LIDOCAINE 560 MG/1
1 PATCH PERCUTANEOUS; TOPICAL; TRANSDERMAL DAILY
Status: DISCONTINUED | OUTPATIENT
Start: 2025-04-17 | End: 2025-04-17 | Stop reason: HOSPADM

## 2025-04-17 RX ADMIN — PANTOPRAZOLE SODIUM 40 MG: 40 TABLET, DELAYED RELEASE ORAL at 09:23

## 2025-04-17 RX ADMIN — MYCOPHENOLATE MOFETIL 750 MG: 250 CAPSULE ORAL at 06:15

## 2025-04-17 RX ADMIN — SIMETHICONE 80 MG: 80 TABLET, CHEWABLE ORAL at 06:15

## 2025-04-17 RX ADMIN — FLUCONAZOLE 200 MG: 200 TABLET ORAL at 09:23

## 2025-04-17 RX ADMIN — LIDOCAINE 4% 1 PATCH: 40 PATCH TOPICAL at 09:23

## 2025-04-17 RX ADMIN — PREDNISONE 20 MG: 20 TABLET ORAL at 06:15

## 2025-04-17 RX ADMIN — TACROLIMUS 1 MG: 1 CAPSULE ORAL at 06:16

## 2025-04-17 RX ADMIN — INSULIN HUMAN 10 UNITS: 100 INJECTION, SUSPENSION SUBCUTANEOUS at 09:24

## 2025-04-17 RX ADMIN — URSODIOL 300 MG: 300 CAPSULE ORAL at 09:23

## 2025-04-17 RX ADMIN — ASPIRIN 81 MG: 81 TABLET, COATED ORAL at 09:23

## 2025-04-17 RX ADMIN — INSULIN LISPRO 2 UNITS: 100 INJECTION, SOLUTION INTRAVENOUS; SUBCUTANEOUS at 12:35

## 2025-04-17 RX ADMIN — SODIUM CHLORIDE, SODIUM LACTATE, POTASSIUM CHLORIDE, AND CALCIUM CHLORIDE 500 ML: .6; .31; .03; .02 INJECTION, SOLUTION INTRAVENOUS at 10:56

## 2025-04-17 ASSESSMENT — PAIN SCALES - GENERAL
PAINLEVEL_OUTOF10: 0 - NO PAIN
PAINLEVEL_OUTOF10: 7

## 2025-04-17 ASSESSMENT — PAIN - FUNCTIONAL ASSESSMENT: PAIN_FUNCTIONAL_ASSESSMENT: 0-10

## 2025-04-17 ASSESSMENT — PAIN DESCRIPTION - LOCATION: LOCATION: BACK

## 2025-04-17 ASSESSMENT — PAIN SCALES - PAIN ASSESSMENT IN ADVANCED DEMENTIA (PAINAD): TOTALSCORE: MEDICATION (SEE MAR)

## 2025-04-17 NOTE — CARE PLAN
The patient's goals for the shift include pt pain will be managed throughout     The clinical goals for the shift include pt will remain HDS throughout shift     Problem: Pain - Adult  Goal: Verbalizes/displays adequate comfort level or baseline comfort level  Outcome: Progressing     Problem: Safety - Adult  Goal: Free from fall injury  Outcome: Progressing     Problem: Chronic Conditions and Co-morbidities  Goal: Patient's chronic conditions and co-morbidity symptoms are monitored and maintained or improved  Outcome: Progressing

## 2025-04-17 NOTE — CARE PLAN
The patient's goals for the shift include      The clinical goals for the shift include pt kelly danielson x3 by end of shift      Problem: Pain - Adult  Goal: Verbalizes/displays adequate comfort level or baseline comfort level  Outcome: Progressing     Problem: Safety - Adult  Goal: Free from fall injury  Outcome: Progressing     Problem: Discharge Planning  Goal: Discharge to home or other facility with appropriate resources  Outcome: Progressing     Problem: Nutrition  Goal: Nutrient intake appropriate for maintaining nutritional needs  Outcome: Progressing

## 2025-04-17 NOTE — PROGRESS NOTES
López Lopez is a 65 y.o. male on day 3 of admission presenting with Acute kidney injury.      Transitional Care Coordination Progress Note:  Patient discussed during interdisciplinary rounds.      Plan per Medical/Surgical team:   Pt previously using [facility]. Referral sent.   4/16: Updates sent to [facility].   4/17: Updates sent to [facility].         Discharge disposition: HC: [facility]     Potential Barriers: None     ADOD:  4/17     This TCC will continue to follow for home going needs and safe DC plan.      PAOLO ANGLIN

## 2025-04-17 NOTE — DISCHARGE SUMMARY
Discharge Diagnosis  Acute kidney injury    Issues Requiring Follow-Up  Immunosuppression  Liver/kidney allograft function  Nutrition    Test Results Pending At Discharge  Pending Labs       Order Current Status    GASTROINTESTINAL PATHOGEN PANEL, REAL-TIME PCR; Maninder Morse; 90756 - Miscellaneous Test In process    Stool Pathogen Panel, PCR In process            Hospital Course  Pt is a 64 y/o male with a hx of ESRD secondary to MASLD whom received a simultaneous liver/kidney transplant on 4/2/25.   He was readmitted as a direct admit for increasing BUN and Cr. He also c/o diarrhea and was following a 1.5 L fluid restriction.     Pt was discharged 4/12/25 from his surgery. His postop coarse during his prior admission was c/b brief vtach/afib related to PA cath placement after his liver txp, which resolved with amio. He returned to OR for DDKT POD1. Otherwise his postop course thus after was uncomplicated. He was discharge with f/up and [facility] for PT/OT and drain care.     He was rehydrated with IVF and received a unit of packed red blood cells for hgb 7.3.    He is tolerating a low phosphorus, diabetic diet. His diarrhea has improved and he is stable for discharge home today. He will  follow up in transplant clinic as scheduled.      Ordered 500cc LR bolus prior to DC and instructed on fluid intake at home.     Pertinent Physical Exam At Time of Discharge  Physical Exam  Vitals and nursing note reviewed.   Constitutional:       General: He is not in acute distress.     Appearance: Normal appearance. He is not toxic-appearing.   HENT:      Head: Normocephalic and atraumatic.      Right Ear: External ear normal.      Left Ear: External ear normal.      Nose: Nose normal. No rhinorrhea.      Mouth/Throat:      Mouth: Mucous membranes are moist.      Pharynx: No posterior oropharyngeal erythema.   Eyes:      General:         Right eye: No discharge.         Left eye: No discharge.   Cardiovascular:      Rate and  Rhythm: Normal rate.   Pulmonary:      Effort: Pulmonary effort is normal. No respiratory distress.      Breath sounds: No stridor.   Abdominal:      General: There is no distension.      Palpations: Abdomen is soft.      Tenderness: There is no abdominal tenderness. There is no guarding or rebound.      Comments: Left lower quadrant incision intact with staples, MARIANA, well approximated, no s/s of infection. Right transverse abdominal incision intact with staples, MARIANA, well approximated, no s/s of infection.    Musculoskeletal:         General: Normal range of motion.      Cervical back: Normal range of motion.      Right lower leg: Edema present.      Left lower leg: Edema present.   Skin:     General: Skin is warm.      Capillary Refill: Capillary refill takes less than 2 seconds.   Neurological:      General: No focal deficit present.      Mental Status: He is alert and oriented to person, place, and time. Mental status is at baseline.   Psychiatric:         Mood and Affect: Mood normal.         Behavior: Behavior normal.         Home Medications     Medication List      PAUSE taking these medications     * tacrolimus 0.5 mg capsule; Wait to take this until your doctor or   other care provider tells you to start again.; Commonly known as: Prograf;   Take 1 capsule (0.5 mg) by mouth 2 times a day.; You also have another   medication with the same name that you may need to continue taking.  * This list has 1 medication(s) that are the same as other medications   prescribed for you. Read the directions carefully, and ask your doctor or   other care provider to review them with you.     CHANGE how you take these medications     apixaban 5 mg tablet; Commonly known as: Eliquis; Take 1 tablet (5 mg)   by mouth 2 times daily (morning and late afternoon).; What changed:   additional instructions   mycophenolate 250 mg capsule; Commonly known as: Cellcept; Take 3   capsules (750 mg) by mouth 2 times a day.; What changed:  "how much to take   * tacrolimus 1 mg capsule; Commonly known as: Prograf; Take 1 capsule (1   mg) by mouth 2 times a day.; What changed: Another medication with the   same name was paused. Ask your nurse or doctor if you should take this   medication.  * This list has 1 medication(s) that are the same as other medications   prescribed for you. Read the directions carefully, and ask your doctor or   other care provider to review them with you.     CONTINUE taking these medications     acetaminophen 325 mg tablet; Commonly known as: TylenoL; Take 2 tablets   (650 mg) by mouth every 6 hours if needed for mild pain (1 - 3) for up to   10 days. Do not exceed 3000 mg in 24 hours   alogliptin 12.5 mg tablet; Commonly known as: Nesina; Take 1 tablet   (12.5 mg) by mouth once daily.   aspirin 81 mg EC tablet; Take 1 tablet (81 mg) by mouth once daily.   atorvastatin 10 mg tablet; Commonly known as: Lipitor   BD Ultra-Fine Mini Pen Needle 31 gauge x 3/16\" needle; Generic drug: pen   needle, diabetic; 1 each once daily.   Dexcom G7  misc; Generic drug: blood-glucose,,cont; Use   as instructed   Dexcom G7 Sensor device; Generic drug: blood-glucose sensor; Use to   check glucose, change every 10 days   Easy Touch Alcohol Prep Pads; Generic drug: alcohol swabs; Use 4-8 per   day to check blood glucose and for injectable medications   fluconazole 200 mg tablet; Commonly known as: Diflucan; Take 1 tablet   (200 mg) by mouth once daily.   HumuLIN N NPH Insulin KwikPen 100 unit/mL (3 mL) pen; Generic drug:   insulin NPH (Isophane); Inject 10 Units under the skin once daily in the   morning. Take as directed per insulin instructions.   pantoprazole 40 mg EC tablet; Commonly known as: Protonix; Take 1 tablet   (40 mg) by mouth once daily. Do not crush, chew, or split.   Pentamidine 300 mg inhalation solution; Commonly known as: Nebupent;   Inhale 300 mg every 30 (thirty) days. Dose give 4/7/2025   predniSONE 5 mg " tablet; Commonly known as: Deltasone; Take 4 tablets (20   mg) by mouth once daily.   traZODone 50 mg tablet; Commonly known as: Desyrel; Take 0.5 tablets (25   mg) by mouth once daily at bedtime.   True Metrix Glucose Meter misc; Generic drug: blood-glucose meter; Use   to check blood glucose   True Metrix Glucose Test Strip; Generic drug: blood sugar diagnostic;   Check blood glucose 3 time per day   TRUEplus Lancets 33 gauge misc; Generic drug: lancets; 1 each 3 times a   day.   valGANciclovir 450 mg tablet; Commonly known as: Valcyte; Take 1 tablet   (450 mg) by mouth every other day. Do not crush or chew.   Vitamin D3 50 mcg (2,000 unit) tablet; Generic drug: cholecalciferol;   Take 1 tablet (2,000 Units) by mouth once daily.     STOP taking these medications     docusate sodium 100 mg capsule; Commonly known as: Colace   furosemide 20 mg tablet; Commonly known as: Lasix   polyethylene glycol 17 gram/dose powder; Commonly known as: Glycolax,   Miralax   ursodiol 300 mg capsule; Commonly known as: Actigall     ASK your doctor about these medications     traMADol 50 mg tablet; Commonly known as: Ultram; Take 1 tablet (50 mg)   by mouth every 6 hours if needed for severe pain (7 - 10) for up to 5   days.; Ask about: Should I take this medication?       Outpatient Follow-Up  Future Appointments   Date Time Provider Department [place]   4/24/2025 10:30 AM TXP ABDOMINAL SURGEON [id number]   4/24/2025 11:00 AM Lynn Luna RDN, A[facility]A [id number]   4/29/2025  2:00 [facility]   5/1/2025 10:30 AM TXP ABDOMINAL SURGEON [id number]   5/8/2025 10:30 AM TXP ABDOMINAL SURGEON [facility]   5/15/2025 10:00 AM TXP ABDOMINAL SURGEON [id number]   5/15/2025 10:30 AM Kathia Luna PA-C [id number]   5/22/2025 10:30 AM TXP ABDOMINAL SURGEON [id number]       Jake Gupta

## 2025-04-18 NOTE — TELEPHONE ENCOUNTER
On Call Note:  Received call from patient's wife Loreto.  Patient accidentally took his PM meds twice.  No BP medications, took extra tacrolimus, mycophenolate, and trazadone.  Let them know he will likely be extra sleepy since he doubled Trazadone.  To skip morning Tacrolimus.

## 2025-04-21 ENCOUNTER — PATIENT OUTREACH (OUTPATIENT)
Dept: CARE COORDINATION | Age: 65
End: 2025-04-21
Payer: COMMERCIAL

## 2025-04-21 ENCOUNTER — LAB (OUTPATIENT)
Dept: LAB | Facility: HOSPITAL | Age: 65
End: 2025-04-21
Payer: COMMERCIAL

## 2025-04-21 DIAGNOSIS — Z94.0 KIDNEY TRANSPLANT STATUS: ICD-10-CM

## 2025-04-21 DIAGNOSIS — Z94.0 KIDNEY REPLACED BY TRANSPLANT (HHS-HCC): ICD-10-CM

## 2025-04-21 DIAGNOSIS — Z94.4 LIVER TRANSPLANT STATUS: Primary | ICD-10-CM

## 2025-04-21 LAB
ALBUMIN SERPL BCP-MCNC: 3.8 G/DL (ref 3.4–5)
ALP SERPL-CCNC: 104 U/L (ref 33–136)
ALT SERPL W P-5'-P-CCNC: 9 U/L (ref 10–52)
ANION GAP SERPL CALC-SCNC: 15 MMOL/L (ref 10–20)
AST SERPL W P-5'-P-CCNC: 10 U/L (ref 9–39)
BASOPHILS # BLD AUTO: 0.09 X10*3/UL (ref 0–0.1)
BASOPHILS NFR BLD AUTO: 1 %
BILIRUB DIRECT SERPL-MCNC: 0.4 MG/DL (ref 0–0.3)
BILIRUB SERPL-MCNC: 1 MG/DL (ref 0–1.2)
BUN SERPL-MCNC: 46 MG/DL (ref 6–23)
C COLI+JEJ+UPSA DNA STL QL NAA+PROBE: NOT DETECTED
CALCIUM SERPL-MCNC: 8.9 MG/DL (ref 8.6–10.3)
CHLORIDE SERPL-SCNC: 109 MMOL/L (ref 98–107)
CO2 SERPL-SCNC: 24 MMOL/L (ref 21–32)
CREAT SERPL-MCNC: 2.03 MG/DL (ref 0.5–1.3)
CREAT UR-MCNC: 69.5 MG/DL (ref 20–370)
EC STX1 GENE STL QL NAA+PROBE: NOT DETECTED
EC STX2 GENE STL QL NAA+PROBE: NOT DETECTED
EGFRCR SERPLBLD CKD-EPI 2021: 36 ML/MIN/1.73M*2
EOSINOPHIL # BLD AUTO: 0.34 X10*3/UL (ref 0–0.7)
EOSINOPHIL NFR BLD AUTO: 3.6 %
ERYTHROCYTE [DISTWIDTH] IN BLOOD BY AUTOMATED COUNT: 15.9 % (ref 11.5–14.5)
GLUCOSE SERPL-MCNC: 97 MG/DL (ref 74–99)
HCT VFR BLD AUTO: 30.1 % (ref 41–52)
HGB BLD-MCNC: 9.5 G/DL (ref 13.5–17.5)
IMM GRANULOCYTES # BLD AUTO: 0.1 X10*3/UL (ref 0–0.7)
IMM GRANULOCYTES NFR BLD AUTO: 1.1 % (ref 0–0.9)
LYMPHOCYTES # BLD AUTO: 0.91 X10*3/UL (ref 1.2–4.8)
LYMPHOCYTES NFR BLD AUTO: 9.7 %
MAGNESIUM SERPL-MCNC: 1.35 MG/DL (ref 1.6–2.4)
MCH RBC QN AUTO: 29.1 PG (ref 26–34)
MCHC RBC AUTO-ENTMCNC: 31.6 G/DL (ref 32–36)
MCV RBC AUTO: 92 FL (ref 80–100)
MONOCYTES # BLD AUTO: 0.55 X10*3/UL (ref 0.1–1)
MONOCYTES NFR BLD AUTO: 5.8 %
NEUTROPHILS # BLD AUTO: 7.43 X10*3/UL (ref 1.2–7.7)
NEUTROPHILS NFR BLD AUTO: 78.8 %
NOROVIRUS GI + GII RNA STL NAA+PROBE: NOT DETECTED
NRBC BLD-RTO: 0 /100 WBCS (ref 0–0)
PHOSPHATE SERPL-MCNC: 4.4 MG/DL (ref 2.5–4.9)
PLATELET # BLD AUTO: 368 X10*3/UL (ref 150–450)
POTASSIUM SERPL-SCNC: 4.4 MMOL/L (ref 3.5–5.3)
PROT SERPL-MCNC: 5.8 G/DL (ref 6.4–8.2)
PROT UR-ACNC: 38 MG/DL (ref 5–25)
PROT/CREAT UR: 0.55 MG/MG CREAT (ref 0–0.17)
RBC # BLD AUTO: 3.26 X10*6/UL (ref 4.5–5.9)
RV RNA STL NAA+PROBE: NOT DETECTED
SALMONELLA DNA STL QL NAA+PROBE: NOT DETECTED
SCAN RESULT: NORMAL
SHIGELLA DNA SPEC QL NAA+PROBE: NOT DETECTED
SODIUM SERPL-SCNC: 144 MMOL/L (ref 136–145)
V CHOLERAE DNA STL QL NAA+PROBE: NOT DETECTED
WBC # BLD AUTO: 9.4 X10*3/UL (ref 4.4–11.3)
Y ENTEROCOL DNA STL QL NAA+PROBE: NOT DETECTED

## 2025-04-21 PROCEDURE — 82977 ASSAY OF GGT: CPT

## 2025-04-21 PROCEDURE — 82570 ASSAY OF URINE CREATININE: CPT

## 2025-04-21 PROCEDURE — 87799 DETECT AGENT NOS DNA QUANT: CPT

## 2025-04-21 PROCEDURE — 36415 COLL VENOUS BLD VENIPUNCTURE: CPT

## 2025-04-21 PROCEDURE — 85025 COMPLETE CBC W/AUTO DIFF WBC: CPT

## 2025-04-21 PROCEDURE — 80197 ASSAY OF TACROLIMUS: CPT

## 2025-04-21 PROCEDURE — 84156 ASSAY OF PROTEIN URINE: CPT

## 2025-04-21 PROCEDURE — 84100 ASSAY OF PHOSPHORUS: CPT

## 2025-04-21 PROCEDURE — 82248 BILIRUBIN DIRECT: CPT

## 2025-04-21 PROCEDURE — 80053 COMPREHEN METABOLIC PANEL: CPT

## 2025-04-21 PROCEDURE — 83735 ASSAY OF MAGNESIUM: CPT

## 2025-04-22 DIAGNOSIS — Z94.4 LIVER REPLACED BY TRANSPLANT (MULTI): ICD-10-CM

## 2025-04-22 LAB
BKV DNA SERPL NAA+PROBE-LOG#: NORMAL {LOG_COPIES}/ML
GGT SERPL-CCNC: 113 U/L (ref 5–64)
LABORATORY COMMENT REPORT: NOT DETECTED
TACROLIMUS BLD-MCNC: 5.7 NG/ML

## 2025-04-23 ENCOUNTER — TELEPHONE (OUTPATIENT)
Dept: TRANSPLANT | Facility: HOSPITAL | Age: 65
End: 2025-04-23
Payer: COMMERCIAL

## 2025-04-23 NOTE — TELEPHONE ENCOUNTER
Labs reviewed by Dr. Jha, increase tacrolimus to 1.5mg every 12 hours.  Spoke with López and Bobbi about dose change.

## 2025-04-24 ENCOUNTER — NUTRITION (OUTPATIENT)
Facility: HOSPITAL | Age: 65
End: 2025-04-24
Payer: COMMERCIAL

## 2025-04-24 ENCOUNTER — OFFICE VISIT (OUTPATIENT)
Facility: HOSPITAL | Age: 65
End: 2025-04-24
Payer: COMMERCIAL

## 2025-04-24 ENCOUNTER — APPOINTMENT (OUTPATIENT)
Facility: HOSPITAL | Age: 65
End: 2025-04-24
Payer: COMMERCIAL

## 2025-04-24 ENCOUNTER — LAB REQUISITION (OUTPATIENT)
Dept: LAB | Facility: CLINIC | Age: 65
DRG: 005 | End: 2025-04-24
Payer: COMMERCIAL

## 2025-04-24 ENCOUNTER — NURSE ONLY (OUTPATIENT)
Facility: HOSPITAL | Age: 65
End: 2025-04-24
Payer: COMMERCIAL

## 2025-04-24 ENCOUNTER — TELEPHONE (OUTPATIENT)
Dept: TRANSPLANT | Facility: HOSPITAL | Age: 65
End: 2025-04-24

## 2025-04-24 VITALS
TEMPERATURE: 97.5 F | SYSTOLIC BLOOD PRESSURE: 148 MMHG | DIASTOLIC BLOOD PRESSURE: 79 MMHG | OXYGEN SATURATION: 95 % | WEIGHT: 232.6 LBS | HEART RATE: 83 BPM | BODY MASS INDEX: 28.32 KG/M2

## 2025-04-24 DIAGNOSIS — Z94.4 LIVER TRANSPLANT RECIPIENT (MULTI): ICD-10-CM

## 2025-04-24 DIAGNOSIS — Z94.0 KIDNEY REPLACED BY TRANSPLANT (HHS-HCC): ICD-10-CM

## 2025-04-24 DIAGNOSIS — Z94.4 IMMUNOSUPPRESSIVE MANAGEMENT ENCOUNTER FOLLOWING LIVER TRANSPLANT: Primary | ICD-10-CM

## 2025-04-24 DIAGNOSIS — Z94.4 LIVER TRANSPLANT STATUS: ICD-10-CM

## 2025-04-24 DIAGNOSIS — Z94.4 LIVER REPLACED BY TRANSPLANT (MULTI): ICD-10-CM

## 2025-04-24 DIAGNOSIS — E11.65 TYPE 2 DIABETES MELLITUS WITH HYPERGLYCEMIA, WITHOUT LONG-TERM CURRENT USE OF INSULIN: ICD-10-CM

## 2025-04-24 DIAGNOSIS — Z79.60 IMMUNOSUPPRESSIVE MANAGEMENT ENCOUNTER FOLLOWING LIVER TRANSPLANT: Primary | ICD-10-CM

## 2025-04-24 DIAGNOSIS — I48.0 PAROXYSMAL ATRIAL FIBRILLATION (MULTI): ICD-10-CM

## 2025-04-24 LAB
ALBUMIN SERPL BCP-MCNC: 3.9 G/DL (ref 3.4–5)
ALP SERPL-CCNC: 116 U/L (ref 33–136)
ALT SERPL W P-5'-P-CCNC: 9 U/L (ref 10–52)
ANION GAP SERPL CALC-SCNC: 16 MMOL/L (ref 10–20)
AST SERPL W P-5'-P-CCNC: 10 U/L (ref 9–39)
BASOPHILS # BLD AUTO: 0.08 X10*3/UL (ref 0–0.1)
BASOPHILS NFR BLD AUTO: 0.7 %
BILIRUB DIRECT SERPL-MCNC: 0.4 MG/DL (ref 0–0.3)
BILIRUB SERPL-MCNC: 1.1 MG/DL (ref 0–1.2)
BUN SERPL-MCNC: 30 MG/DL (ref 6–23)
CALCIUM SERPL-MCNC: 8.9 MG/DL (ref 8.6–10.6)
CHLORIDE SERPL-SCNC: 106 MMOL/L (ref 98–107)
CO2 SERPL-SCNC: 26 MMOL/L (ref 21–32)
CREAT SERPL-MCNC: 1.68 MG/DL (ref 0.5–1.3)
CREAT UR-MCNC: 116 MG/DL (ref 20–370)
EGFRCR SERPLBLD CKD-EPI 2021: 45 ML/MIN/1.73M*2
EOSINOPHIL # BLD AUTO: 0.32 X10*3/UL (ref 0–0.7)
EOSINOPHIL NFR BLD AUTO: 3 %
ERYTHROCYTE [DISTWIDTH] IN BLOOD BY AUTOMATED COUNT: 15.9 % (ref 11.5–14.5)
GGT SERPL-CCNC: 102 U/L (ref 5–64)
GLUCOSE SERPL-MCNC: 95 MG/DL (ref 74–99)
HCT VFR BLD AUTO: 28.8 % (ref 41–52)
HGB BLD-MCNC: 9.1 G/DL (ref 13.5–17.5)
HLA CLS I TYP PNL BLD/T DONR HIGH RES: NORMAL
HLA RESULTS: NORMAL
HLA-DP2 QL: NORMAL
HLA-DQB1 HIGH RES: NORMAL
HLA-DRB1 HIGH RES: NORMAL
IMM GRANULOCYTES # BLD AUTO: 0.07 X10*3/UL (ref 0–0.7)
IMM GRANULOCYTES NFR BLD AUTO: 0.7 % (ref 0–0.9)
LYMPHOCYTES # BLD AUTO: 0.98 X10*3/UL (ref 1.2–4.8)
LYMPHOCYTES NFR BLD AUTO: 9.1 %
MAGNESIUM SERPL-MCNC: 1.34 MG/DL (ref 1.6–2.4)
MCH RBC QN AUTO: 29.3 PG (ref 26–34)
MCHC RBC AUTO-ENTMCNC: 31.6 G/DL (ref 32–36)
MCV RBC AUTO: 93 FL (ref 80–100)
MONOCYTES # BLD AUTO: 0.63 X10*3/UL (ref 0.1–1)
MONOCYTES NFR BLD AUTO: 5.9 %
NEUTROPHILS # BLD AUTO: 8.64 X10*3/UL (ref 1.2–7.7)
NEUTROPHILS NFR BLD AUTO: 80.6 %
NRBC BLD-RTO: 0 /100 WBCS (ref 0–0)
PHOSPHATE SERPL-MCNC: 4.3 MG/DL (ref 2.5–4.9)
PLATELET # BLD AUTO: 226 X10*3/UL (ref 150–450)
POTASSIUM SERPL-SCNC: 4 MMOL/L (ref 3.5–5.3)
PROT SERPL-MCNC: 6 G/DL (ref 6.4–8.2)
PROT UR-ACNC: 66 MG/DL (ref 5–25)
PROT/CREAT UR: 0.57 MG/MG CREAT (ref 0–0.17)
RBC # BLD AUTO: 3.11 X10*6/UL (ref 4.5–5.9)
SODIUM SERPL-SCNC: 144 MMOL/L (ref 136–145)
TACROLIMUS BLD-MCNC: 5.4 NG/ML
WBC # BLD AUTO: 10.7 X10*3/UL (ref 4.4–11.3)

## 2025-04-24 PROCEDURE — 3077F SYST BP >= 140 MM HG: CPT | Performed by: STUDENT IN AN ORGANIZED HEALTH CARE EDUCATION/TRAINING PROGRAM

## 2025-04-24 PROCEDURE — 82977 ASSAY OF GGT: CPT

## 2025-04-24 PROCEDURE — 87799 DETECT AGENT NOS DNA QUANT: CPT

## 2025-04-24 PROCEDURE — 99214 OFFICE O/P EST MOD 30 MIN: CPT | Mod: 24 | Performed by: STUDENT IN AN ORGANIZED HEALTH CARE EDUCATION/TRAINING PROGRAM

## 2025-04-24 PROCEDURE — 80048 BASIC METABOLIC PNL TOTAL CA: CPT

## 2025-04-24 PROCEDURE — 80197 ASSAY OF TACROLIMUS: CPT

## 2025-04-24 PROCEDURE — 84156 ASSAY OF PROTEIN URINE: CPT | Performed by: STUDENT IN AN ORGANIZED HEALTH CARE EDUCATION/TRAINING PROGRAM

## 2025-04-24 PROCEDURE — 36415 COLL VENOUS BLD VENIPUNCTURE: CPT

## 2025-04-24 PROCEDURE — 85025 COMPLETE CBC W/AUTO DIFF WBC: CPT

## 2025-04-24 PROCEDURE — 84075 ASSAY ALKALINE PHOSPHATASE: CPT

## 2025-04-24 PROCEDURE — 83735 ASSAY OF MAGNESIUM: CPT

## 2025-04-24 PROCEDURE — 3078F DIAST BP <80 MM HG: CPT | Performed by: STUDENT IN AN ORGANIZED HEALTH CARE EDUCATION/TRAINING PROGRAM

## 2025-04-24 PROCEDURE — 84100 ASSAY OF PHOSPHORUS: CPT

## 2025-04-24 PROCEDURE — 99214 OFFICE O/P EST MOD 30 MIN: CPT | Performed by: STUDENT IN AN ORGANIZED HEALTH CARE EDUCATION/TRAINING PROGRAM

## 2025-04-24 PROCEDURE — 3060F POS MICROALBUMINURIA REV: CPT | Performed by: STUDENT IN AN ORGANIZED HEALTH CARE EDUCATION/TRAINING PROGRAM

## 2025-04-24 RX ORDER — PREDNISONE 5 MG/1
15 TABLET ORAL DAILY
Qty: 90 TABLET | Refills: 3 | Status: SHIPPED | OUTPATIENT
Start: 2025-04-24 | End: 2025-08-22

## 2025-04-24 RX ORDER — CALCIUM CARBONATE 300MG(750)
800 TABLET,CHEWABLE ORAL DAILY
Qty: 60 TABLET | Refills: 2 | Status: SHIPPED | OUTPATIENT
Start: 2025-04-24

## 2025-04-24 RX ORDER — TACROLIMUS 0.5 MG/1
0.5 CAPSULE ORAL 2 TIMES DAILY
Qty: 60 CAPSULE | Refills: 11 | Status: SHIPPED | OUTPATIENT
Start: 2025-04-24 | End: 2025-04-24 | Stop reason: ALTCHOICE

## 2025-04-24 RX ORDER — POLYETHYLENE GLYCOL 3350 17 G/17G
17 POWDER, FOR SOLUTION ORAL DAILY
COMMUNITY

## 2025-04-24 RX ORDER — TAMSULOSIN HYDROCHLORIDE 0.4 MG/1
0.4 CAPSULE ORAL DAILY
Qty: 90 CAPSULE | Refills: 3 | Status: SHIPPED | OUTPATIENT
Start: 2025-04-24 | End: 2026-04-24

## 2025-04-24 RX ORDER — MYCOPHENOLATE MOFETIL 250 MG/1
750 CAPSULE ORAL 2 TIMES DAILY
Qty: 180 CAPSULE | Refills: 11 | Status: SHIPPED | OUTPATIENT
Start: 2025-04-24 | End: 2026-04-19

## 2025-04-24 RX ORDER — PANTOPRAZOLE SODIUM 40 MG/1
40 TABLET, DELAYED RELEASE ORAL DAILY
Qty: 30 TABLET | Refills: 11 | Status: SHIPPED | OUTPATIENT
Start: 2025-04-24 | End: 2026-04-19

## 2025-04-24 RX ORDER — VALGANCICLOVIR 450 MG/1
450 TABLET, FILM COATED ORAL DAILY
Qty: 30 TABLET | Refills: 1 | Status: SHIPPED | OUTPATIENT
Start: 2025-04-24 | End: 2025-06-23

## 2025-04-24 RX ORDER — TACROLIMUS 1 MG/1
1 CAPSULE ORAL 2 TIMES DAILY
Qty: 60 CAPSULE | Refills: 11 | Status: SHIPPED | OUTPATIENT
Start: 2025-04-24 | End: 2025-04-24 | Stop reason: SDUPTHER

## 2025-04-24 SDOH — HEALTH STABILITY: PHYSICAL HEALTH: ON AVERAGE, HOW MANY MINUTES DO YOU ENGAGE IN EXERCISE AT THIS LEVEL?: 10 MIN

## 2025-04-24 SDOH — HEALTH STABILITY: PHYSICAL HEALTH: ON AVERAGE, HOW MANY DAYS PER WEEK DO YOU ENGAGE IN MODERATE TO STRENUOUS EXERCISE (LIKE A BRISK WALK)?: 1 DAY

## 2025-04-24 SDOH — ECONOMIC STABILITY: GENERAL
WHICH OF THE FOLLOWING WOULD YOU LIKE TO GET CONNECTED TO IN ORDER TO RECEIVE A DISCOUNT OR FOR FREE? (CHOOSE ALL THAT APPLY): NO ASSISTANCE NEEDED

## 2025-04-24 SDOH — ECONOMIC STABILITY: GENERAL
WHICH OF THE FOLLOWING DO YOU KNOW HOW TO USE AND HAVE ACCESS TO EVERY DAY? (CHOOSE ALL THAT APPLY): DESKTOP COMPUTER, LAPTOP COMPUTER, OR TABLET WITH BROADBAND INTERNET CONNECTION

## 2025-04-24 ASSESSMENT — SOCIAL DETERMINANTS OF HEALTH (SDOH)
HOW OFTEN DO YOU ATTEND CHURCH OR RELIGIOUS SERVICES?: NEVER
DO YOU BELONG TO ANY CLUBS OR ORGANIZATIONS SUCH AS CHURCH GROUPS UNIONS, FRATERNAL OR ATHLETIC GROUPS, OR SCHOOL GROUPS?: NO
IN A TYPICAL WEEK, HOW MANY TIMES DO YOU TALK ON THE PHONE WITH FAMILY, FRIENDS, OR NEIGHBORS?: MORE THAN THREE TIMES A WEEK
HOW OFTEN DO YOU GET TOGETHER WITH FRIENDS OR RELATIVES?: TWICE A WEEK
HOW OFTEN DO YOU ATTENT MEETINGS OF THE CLUB OR ORGANIZATION YOU BELONG TO?: NEVER

## 2025-04-24 ASSESSMENT — PAIN SCALES - GENERAL: PAINLEVEL_OUTOF10: 0-NO PAIN

## 2025-04-24 NOTE — PROGRESS NOTES
"Reason for Nutrition Visit:  Pt is a 65 y.o. male referred for food safety education s/p kidney transplant on 4/2/2025.     Transplant Coordinator: Daina Garcia RN    Past Medical Hx:  Problem List[1]     Lab Results   Component Value Date    HGBA1C 5.5 09/03/2024    HGBA1C 6.0 (H) 08/13/2024     04/21/2025    K 4.4 04/21/2025    PHOS 4.4 04/21/2025     (H) 04/21/2025    CO2 24 04/21/2025    BUN 46 (H) 04/21/2025    CREATININE 2.03 (H) 04/21/2025    CALCIUM 8.9 04/21/2025    ALBUMIN 3.8 04/21/2025    PROT 5.8 (L) 04/21/2025    BILITOT 1.0 04/21/2025    ALKPHOS 104 04/21/2025    ALT 9 (L) 04/21/2025    AST 10 04/21/2025    GLUCOSE 97 04/21/2025       Lab Results   Component Value Date    HGB 9.5 (L) 04/21/2025     Iron Panel + Serum Ferritin Trend:   Recent Labs     12/02/24  1429   IRON 177*   UIBC 87*   TIBC 264   IRONSAT 67*   FERRITIN 85   , Vitamin B12: No results found for: \"SIULQAEF39\" , Folate: No results found for: \"FOLATE\" , and Vitamin D:   Lab Results   Component Value Date    VITD25 14 (L) 04/07/2025      Food History and Nutrition Assessment    Appetite: Poor    Intake: ~75%  GI Symptoms : None   Swallowing Difficulty: describes ability to chew food appropriate but dislike texture and does not want to swallow foods. Denies difficulty with swallowing beverages.   Dentition : poor condition  Allergies: None  Intolerance: None    Types of Activities: Walking  Duration: <30 minutes daily    Sleep duration/quality : disrupted sleep and 4-5 hours . Naps in afternoon.   Sleep disorders: none    Food History:  Patient describes eating very small meals throughout the day, grazing on foods. Endorses dysguesia and altered smell that is impacting intake. Notes he is trying to eat as much as he can but appetite is low. States that he eats breakfast but is not always hungry for lunch. Currently drinks 2-3 protein shakes per day at mealtimes. Continues to avoid adding salt to meals.     24 Hour Diet " Recall:  Meal 1: Protein shake yogurt or cereal or oatmeal   AM Snack: Wheat creackers with cheese or fruit  Meal 2: Jello and Protein shake and bean soup with ham  PM Snack: Homemade guacamole  Meal 3: Protein shake and bean soup with ham with cornbread    Late Snack: Sugar-free fudgsicle or jello or strawberries with whipped cream   Beverages: Water, occassional diet 7 Up with lemonade     Food Preparation: Partner/Spouse  Cooking Skills/Barriers: None reported    Supplements: Vitamin D and Magnesium daily    Weight History:  CBW: 106 kg (232 lbs)   BMI: 28.3 kg/m2  IBW 89.1 kg     Wt Readings from Last 10 Encounters:   04/24/25 106 kg (232 lb 9.6 oz)   04/17/25 107 kg (235 lb 12.5 oz)   04/12/25 107 kg (235 lb 14.3 oz)   02/26/25 118 kg (260 lb)   02/05/25 117 kg (258 lb 12.8 oz)   01/16/25 126 kg (277 lb 3.2 oz)   01/16/25 126 kg (277 lb 3.2 oz)   01/09/25 120 kg (265 lb)   12/02/24 126 kg (276 lb 14.4 oz)   11/12/24 121 kg (267 lb)   Other weights in EMR  04/02/25 -- 112 kg (247 lb) - day of txp     Weight change:  Significant weight loss of 5.4% in 3 weeks. Also note significant weight loss of 15.8% in 3 months.   Patient stated weight: Patient states down a little bit. Weight this morning at home was 230.5 lb. States UBW prior to txp was 235 lbs. Prior to that patient states they would be around 245-255.     Nutrition Focused Physical Exam:    Performed/Deferred: Performed    Muscle Wasting:  Temporal: Moderate  Shoulder: None  Clavicle: Moderate  Interosseous Muscle: Mild    Loss of Subcutaneous Fat:  Eyes: Mild  Perioral: None  Triceps: Mild    Other Physical Findings:  Edema: Trace    Malnutrition Present: Moderate Malnutrition    Nutrition Diagnosis    Diagnosis Status: New   Diagnosis: Malnutrition; moderate chronic disease or condition related related to changes in taste, appetite and/or preferences as evidence by weight loss of 5.4% in 3 weeks, areas of mild-moderate loss of muscle mass, and areas of  "mild loss of subcutaneous fat    Additional Nutrition-Related Diagnoses: Food and nutrition related knowledge deficit related to recent change in health status ([facility] transplant 22 days ago) as evidence by limited experience applying food safety information.    Nutrition Education, Intervention, and Goals    Reviewed \"Food Safety: A Need-to-Know Guide for Those at Risk\" education booklet with patient. The food safety guide highlights include:   Cook meat, poultry and seafood to a safe internal temperature. Internal temperature guide can be found on pg 22 of Food Safety Booklet. Do not eat any meat, poultry or seafood that is raw or undercooked. (No pink meats, no sushi) Using a meat thermometer for accuracy is recommended. Has meat thermeometer.  Do not eat deli meats or hot dogs cold. Reheat them in the microwave until they are steaming hot. This typically takes 30 seconds.  Eggs need to have a firm yolk. No runny eggs.   Do not consume any milk or milk products including cheese that is not pasteurized. Pasteurized milk is ok. Hard cheese or soft cheese from pasteurized milk is also ok.  Do not eat unwashed fruits and vegetables.   Avoid cross-contamination of foods while cooking by having a cutting board for meats and a separate cutting board for produce.  Eat leftovers within 2 days of being cooked.   It is not safe for you to eat from buffets.  Do not eat raw sprouts. If you want to eat bean sprouts or alfalfa sprouts for example, they will need to be cooked.   Please never consume grapefruit, pomegranate or star fruit as they are not compatible with your medications.  Avoid turmeric in all forms due to the interactions with the immunosuppressives.    Discussed healthy tips for adding calories/protein to the diet when appetite/intake is low. This includes:   Consider adding extra butter or olive oil on vegetables, rice, pasta, and bread.  Snack on nuts and seeds throughout the day. Add to salads and oatmeal.  Add " avocado or guacamole to toast, tacos, and salad.  Add peanut butter or other nut butters to snacks like crackers, bread, apples, or bananas.    Educational Handouts: Body of Knowledge High Protein High Calorie    Nutrition Goals  Nutrition Goals: Maintain stable weight  Oral intake greater than 75%  Dairy/Calcium Foods: Increase  Meat/Protein Foods: Increase  Consume meal or snack every 3-4 hours  Compliance with food safety guidance    Nutrition Monitoring and Evaluation    Additional Recommendations/Referrals: N/A    Follow up: It was a pleasure speaking with you today, Mr. Lopez! Let's schedule a follow-up in 6 week(s) to check in on your progress. I'll have the 's set you up with a follow-up appointment.          [1]   Patient Active Problem List  Diagnosis    Acute idiopathic thrombocytopenic purpura (Multi)    Anemia    History of CAD (coronary artery disease)    Mixed hyperlipidemia    Paroxysmal atrial fibrillation (Multi)    Type 2 diabetes mellitus with hyperglycemia, without long-term current use of insulin    Liver failure without hepatic coma (Multi)    Metabolic dysfunction-associated steatohepatitis (MASH)    Status post insertion of drug-eluting stent into left anterior descending artery for coronary artery disease    Type 2 diabetes mellitus with diabetic chronic kidney disease    History of percutaneous transluminal coronary angioplasty    Coronary artery disease involving native coronary artery of native heart    Pre-liver transplant, listed    Pre-kidney transplant, listed    Refractory ascites    Alcoholic cirrhosis of liver with ascites (Multi)    Stage 4 chronic kidney disease (Multi)    Chronic renal impairment, stage 4 (severe) (Multi)    CKD (chronic kidney disease) stage 4, GFR 15-29 ml/min (Multi)    Acute kidney injury

## 2025-04-24 NOTE — PATIENT INSTRUCTIONS
Increase Valgancyclovir to 450mg daily  Decrease prednisone to 15mg daily  Add Magnesium 800mg daily at noon  Add Tamsulosin 0.4mg at night  Restart Aspirin 81mg daily  Keep toe clean for hangnail, if it worsens talk to your PCP about seeing a Podiatrist near you.  Follow up with your cardiologist about your afib and what to do with the Eliquis and Asprin.    Return to clinic next week,  Make sure to go for your cystoscopy  Labs Mon/Thurs

## 2025-04-24 NOTE — PROGRESS NOTES
TRANSPLANT SURGERY FOLLOW UP    Reason for visit:    López Lopez underwent transplant surgery,  4/2/2025 (Liver / Kidney), and returns to clinic for follow up evaluation focusing on their current immunosuppression. Specifically, López Lopez's regiment was evaluated to ensure adequate levels to prevent life threatening or organ function impairing rejection while not increasing risk of adverse effects including malignancy, infection, bone health, or accelerated cardiovascular disease.  I reviewed common side effects including tremor, hair loss, GI toxicity, and agitation.  The patient reported that they were experiencing: none.    The long-term management of maintenance immunosuppression in organ transplant recipients remains complex given differences in clinical characteristics, comorbid conditions, and prior rejection episodes.  These factors were evaluated at this visit and used in formulating this plan of care. Immunosuppression following the transplant is medically necessary to closely monitor and to reduce the risk of post-operative complications distinct from the post operative management of the transplant surgical procedure.     Interval history  Doing well  Having some urinary symptoms - BPH like symptoms    Problem List[1]    Medications:    Current Outpatient Medications   Medication Instructions    alcohol swabs (Alcohol Pads) Use 4-8 per day to check blood glucose and for injectable medications    alogliptin (NESINA) 12.5 mg, oral, Daily    apixaban (ELIQUIS) 5 mg, oral, 2 times daily (morning and late afternoon)    aspirin 81 mg, oral, Daily    atorvastatin (Lipitor) 10 mg tablet 1 tablet, oral, Daily    blood sugar diagnostic (Blood Glucose Test) Check blood glucose 3 time per day    blood-glucose meter misc Use to check blood glucose    blood-glucose sensor (Dexcom G7 Sensor) device Use to check glucose, change every 10 days    blood-glucose,,cont (Dexcom G7 ) misc Use  "as instructed    fluconazole (DIFLUCAN) 200 mg, oral, Daily    HumuLIN N NPH Insulin KwikPen 10 Units, subcutaneous, Every morning, Take as directed per insulin instructions.    lancets 33 gauge misc 1 each, miscellaneous, 3 times daily    magnesium oxide (MAG-OX) 800 mg, oral, Daily, Take at noon    mycophenolate (CELLCEPT) 750 mg, oral, 2 times daily    pantoprazole (PROTONIX) 40 mg, oral, Daily, Do not crush, chew, or split.    pen needle, diabetic 31 gauge x 3/16\" needle 1 each, miscellaneous, Daily    Pentamidine (NEBUPENT) 300 mg, inhalation, Every 30 days, Dose give 4/7/2025    polyethylene glycol (GLYCOLAX, MIRALAX) 17 g, Daily    predniSONE (DELTASONE) 15 mg, oral, Daily    tacrolimus (PROGRAF) 0.5 mg, oral, 2 times daily    tacrolimus (PROGRAF) 1 mg, oral, 2 times daily    tamsulosin (FLOMAX) 0.4 mg, oral, Daily    traZODone (DESYREL) 25 mg, oral, Nightly    valGANciclovir (VALCYTE) 450 mg, oral, Daily, Do not crush or chew.    Vitamin D3 2,000 Units, oral, Daily       Physical Exam  Vitals:    04/24/25 1006   BP: 148/79   Pulse: 83   Temp: 36.4 °C (97.5 °F)   SpO2: 95%      General: Alert and oriented to time, place and person. Not in distress  ENT: unremarkable  Cardiovascular: Regular rate, normotensive  Respiratory: no signs of labored breathing on room air  Abdomen/ GI: Liver and Kidney transplant incision well healed, staples in place   Extremity: BLE trace edema -  Skin: no rash      ALLERGY:  Allergies[2]    LABS:  No results found for this or any previous visit (from the past 24 hours).   Lab Results   Component Value Date    ALT 9 (L) 04/21/2025    AST 10 04/21/2025     (H) 04/21/2025    ALKPHOS 104 04/21/2025    BILITOT 1.0 04/21/2025       ASSESSMENT AND PLAN:    Mr. Lopez is a 65 y.o. male who underwent  transplant surgery on 4/2/2025 (Liver / Kidney).    SLK  DCD-NMP  PRA 0  KDPI 49    Good SLK function  Start Flomax    1. Immunosuppression reviewed and adjusted       Tacrolimus: " Yes - 1 mg bid goal 8-10        Mycophenolate: Yes - 750 mg bid (GI)      Prednisone: Yes - drop to 15 mg daily      2. Prophylaxis adherence protocol to prevent immunosuppression related infection      Valcyte: Yes -        Diflucan      Pentamidine        3. Cardiovascular    A Fib  On Eliquis 5 bid  Arrange follow-up with his Cardiologist    Hypertension         Blood Pressures         4/24/2025  1006             BP: 148/79          ASA 81 resume  Statin continue        4. Wound/LISA      Alisson ready for removal: Yes -        Stent removal scheduled for next week    5. GI prophylaxis       On PPI     6.  Follow up:       Labs  2 times per week       RTC: 1 week(s)    I spent a total of 32 min providing care for this patient including record review, examination, face to face counseling, and documentation.     Franky Cross MD           [1]   Patient Active Problem List  Diagnosis    Acute idiopathic thrombocytopenic purpura (Multi)    Anemia    History of CAD (coronary artery disease)    Mixed hyperlipidemia    Paroxysmal atrial fibrillation (Multi)    Type 2 diabetes mellitus with hyperglycemia, without long-term current use of insulin    Liver failure without hepatic coma (Multi)    Metabolic dysfunction-associated steatohepatitis (MASH)    Status post insertion of drug-eluting stent into left anterior descending artery for coronary artery disease    Type 2 diabetes mellitus with diabetic chronic kidney disease    History of percutaneous transluminal coronary angioplasty    Coronary artery disease involving native coronary artery of native heart    Pre-liver transplant, listed    Pre-kidney transplant, listed    Refractory ascites    Alcoholic cirrhosis of liver with ascites (Multi)    Stage 4 chronic kidney disease (Multi)    Chronic renal impairment, stage 4 (severe) (Multi)    CKD (chronic kidney disease) stage 4, GFR 15-29 ml/min (Multi)    Acute kidney injury   [2]   Allergies  Allergen Reactions     Adhesive Tape-Silicones Rash

## 2025-04-24 NOTE — TELEPHONE ENCOUNTER
Labs reviewed with Dr. Noyola, increase tacrolimus to 2mg every 12 hours.  Left voicemail for López about dose change.  Asked him to call back to confirm dose adjustment.

## 2025-04-25 ENCOUNTER — TELEPHONE (OUTPATIENT)
Facility: HOSPITAL | Age: 65
End: 2025-04-25
Payer: COMMERCIAL

## 2025-04-25 DIAGNOSIS — Z94.4 LIVER REPLACED BY TRANSPLANT (MULTI): ICD-10-CM

## 2025-04-25 LAB
BKV DNA SERPL NAA+PROBE-LOG#: NORMAL {LOG_COPIES}/ML
LABORATORY COMMENT REPORT: NOT DETECTED

## 2025-04-25 RX ORDER — TACROLIMUS 1 MG/1
2 CAPSULE ORAL 2 TIMES DAILY
Qty: 120 CAPSULE | Refills: 11 | Status: ON HOLD | OUTPATIENT
Start: 2025-04-25 | End: 2025-05-04

## 2025-04-28 ENCOUNTER — SPECIALTY PHARMACY (OUTPATIENT)
Dept: PHARMACY | Facility: CLINIC | Age: 65
End: 2025-04-28

## 2025-04-28 ENCOUNTER — LAB (OUTPATIENT)
Dept: LAB | Facility: HOSPITAL | Age: 65
End: 2025-04-28
Payer: COMMERCIAL

## 2025-04-28 ENCOUNTER — TELEPHONE (OUTPATIENT)
Facility: HOSPITAL | Age: 65
End: 2025-04-28
Payer: COMMERCIAL

## 2025-04-28 DIAGNOSIS — Z94.4 LIVER REPLACED BY TRANSPLANT (MULTI): ICD-10-CM

## 2025-04-28 DIAGNOSIS — Z94.0 KIDNEY REPLACED BY TRANSPLANT (HHS-HCC): ICD-10-CM

## 2025-04-28 DIAGNOSIS — Z94.4 LIVER TRANSPLANT STATUS: Primary | ICD-10-CM

## 2025-04-28 DIAGNOSIS — Z94.0 KIDNEY TRANSPLANT STATUS: ICD-10-CM

## 2025-04-28 LAB
ALBUMIN SERPL BCP-MCNC: 3.9 G/DL (ref 3.4–5)
ALP SERPL-CCNC: 143 U/L (ref 33–136)
ALT SERPL W P-5'-P-CCNC: 13 U/L (ref 10–52)
ANION GAP SERPL CALC-SCNC: 11 MMOL/L (ref 10–20)
AST SERPL W P-5'-P-CCNC: 12 U/L (ref 9–39)
BASOPHILS # BLD AUTO: 0.09 X10*3/UL (ref 0–0.1)
BASOPHILS NFR BLD AUTO: 1.1 %
BILIRUB DIRECT SERPL-MCNC: 0.3 MG/DL (ref 0–0.3)
BILIRUB SERPL-MCNC: 0.9 MG/DL (ref 0–1.2)
BUN SERPL-MCNC: 25 MG/DL (ref 6–23)
CALCIUM SERPL-MCNC: 8.8 MG/DL (ref 8.6–10.3)
CHLORIDE SERPL-SCNC: 107 MMOL/L (ref 98–107)
CO2 SERPL-SCNC: 29 MMOL/L (ref 21–32)
CREAT SERPL-MCNC: 1.51 MG/DL (ref 0.5–1.3)
CREAT UR-MCNC: 76.5 MG/DL (ref 20–370)
EGFRCR SERPLBLD CKD-EPI 2021: 52 ML/MIN/1.73M*2
EOSINOPHIL # BLD AUTO: 0.35 X10*3/UL (ref 0–0.7)
EOSINOPHIL NFR BLD AUTO: 4.3 %
ERYTHROCYTE [DISTWIDTH] IN BLOOD BY AUTOMATED COUNT: 16.2 % (ref 11.5–14.5)
GLUCOSE SERPL-MCNC: 98 MG/DL (ref 74–99)
HCT VFR BLD AUTO: 29.3 % (ref 41–52)
HGB BLD-MCNC: 9.3 G/DL (ref 13.5–17.5)
IMM GRANULOCYTES # BLD AUTO: 0.06 X10*3/UL (ref 0–0.7)
IMM GRANULOCYTES NFR BLD AUTO: 0.7 % (ref 0–0.9)
LYMPHOCYTES # BLD AUTO: 1 X10*3/UL (ref 1.2–4.8)
LYMPHOCYTES NFR BLD AUTO: 12.4 %
MAGNESIUM SERPL-MCNC: 1.28 MG/DL (ref 1.6–2.4)
MCH RBC QN AUTO: 29.9 PG (ref 26–34)
MCHC RBC AUTO-ENTMCNC: 31.7 G/DL (ref 32–36)
MCV RBC AUTO: 94 FL (ref 80–100)
MONOCYTES # BLD AUTO: 0.55 X10*3/UL (ref 0.1–1)
MONOCYTES NFR BLD AUTO: 6.8 %
NEUTROPHILS # BLD AUTO: 6.02 X10*3/UL (ref 1.2–7.7)
NEUTROPHILS NFR BLD AUTO: 74.7 %
NRBC BLD-RTO: 0 /100 WBCS (ref 0–0)
PHOSPHATE SERPL-MCNC: 3.7 MG/DL (ref 2.5–4.9)
PLATELET # BLD AUTO: 203 X10*3/UL (ref 150–450)
POTASSIUM SERPL-SCNC: 4.3 MMOL/L (ref 3.5–5.3)
PROT SERPL-MCNC: 5.9 G/DL (ref 6.4–8.2)
PROT UR-ACNC: 35 MG/DL (ref 5–25)
PROT/CREAT UR: 0.46 MG/MG CREAT (ref 0–0.17)
RBC # BLD AUTO: 3.11 X10*6/UL (ref 4.5–5.9)
SODIUM SERPL-SCNC: 143 MMOL/L (ref 136–145)
WBC # BLD AUTO: 8.1 X10*3/UL (ref 4.4–11.3)

## 2025-04-28 PROCEDURE — 82570 ASSAY OF URINE CREATININE: CPT

## 2025-04-28 PROCEDURE — 83735 ASSAY OF MAGNESIUM: CPT

## 2025-04-28 PROCEDURE — 84100 ASSAY OF PHOSPHORUS: CPT

## 2025-04-28 PROCEDURE — 82977 ASSAY OF GGT: CPT

## 2025-04-28 PROCEDURE — 82248 BILIRUBIN DIRECT: CPT

## 2025-04-28 PROCEDURE — 80197 ASSAY OF TACROLIMUS: CPT

## 2025-04-28 PROCEDURE — 80053 COMPREHEN METABOLIC PANEL: CPT

## 2025-04-28 PROCEDURE — 85025 COMPLETE CBC W/AUTO DIFF WBC: CPT

## 2025-04-28 PROCEDURE — 84156 ASSAY OF PROTEIN URINE: CPT

## 2025-04-28 PROCEDURE — 87799 DETECT AGENT NOS DNA QUANT: CPT

## 2025-04-28 PROCEDURE — 36415 COLL VENOUS BLD VENIPUNCTURE: CPT

## 2025-04-28 NOTE — TELEPHONE ENCOUNTER
Patient called stating he did receive at [facility] any medication except Magnesium.  Please call

## 2025-04-29 ENCOUNTER — PROCEDURE VISIT (OUTPATIENT)
Dept: UROLOGY | Facility: HOSPITAL | Age: 65
End: 2025-04-29
Payer: COMMERCIAL

## 2025-04-29 VITALS — DIASTOLIC BLOOD PRESSURE: 86 MMHG | SYSTOLIC BLOOD PRESSURE: 149 MMHG | HEART RATE: 85 BPM

## 2025-04-29 DIAGNOSIS — Z96.0 RETAINED URETERAL STENT: Primary | ICD-10-CM

## 2025-04-29 DIAGNOSIS — N18.4 STAGE 4 CHRONIC KIDNEY DISEASE (MULTI): ICD-10-CM

## 2025-04-29 LAB
BKV DNA SERPL NAA+PROBE-LOG#: NORMAL {LOG_COPIES}/ML
GGT SERPL-CCNC: 145 U/L (ref 5–64)
LABORATORY COMMENT REPORT: NOT DETECTED
POC APPEARANCE, URINE: CLEAR
POC BILIRUBIN, URINE: NEGATIVE
POC BLOOD, URINE: NEGATIVE
POC COLOR, URINE: YELLOW
POC GLUCOSE, URINE: NEGATIVE MG/DL
POC KETONES, URINE: NEGATIVE MG/DL
POC LEUKOCYTES, URINE: NEGATIVE
POC NITRITE,URINE: NEGATIVE
POC PH, URINE: 6 PH
POC PROTEIN, URINE: ABNORMAL MG/DL
POC SPECIFIC GRAVITY, URINE: 1.01
POC UROBILINOGEN, URINE: 0.2 EU/DL
TACROLIMUS BLD-MCNC: 7.6 NG/ML

## 2025-04-29 PROCEDURE — G8433 SCR FOR DEP NOT CPT DOC RSN: HCPCS | Performed by: UROLOGY

## 2025-04-29 PROCEDURE — 81003 URINALYSIS AUTO W/O SCOPE: CPT | Performed by: UROLOGY

## 2025-04-29 PROCEDURE — 52310 CYSTOSCOPY AND TREATMENT: CPT | Performed by: UROLOGY

## 2025-04-29 NOTE — PROGRESS NOTES
"NPV - Transplant Cysto, stent removal    HISTORY OF PRESENT ILLNESS:   López Lopez is a 65 y.o. male who is being seen today for cysto stent removal    PAST MEDICAL HISTORY:  Medical History[1]    PAST SURGICAL HISTORY:  Surgical History[2]     ALLERGIES:   Allergies[3]     MEDICATIONS:   Current Outpatient Medications   Medication Instructions    alcohol swabs (Alcohol Pads) Use 4-8 per day to check blood glucose and for injectable medications    alogliptin (NESINA) 12.5 mg, oral, Daily    aspirin 81 mg, oral, Daily    atorvastatin (Lipitor) 10 mg tablet 1 tablet, oral, Daily    blood sugar diagnostic (Blood Glucose Test) Check blood glucose 3 time per day    blood-glucose meter misc Use to check blood glucose    blood-glucose sensor (Dexcom G7 Sensor) device Use to check glucose, change every 10 days    blood-glucose,,cont (Dexcom G7 ) misc Use as instructed    Eliquis 5 mg, oral, 2 times daily (morning and late afternoon)    fluconazole (DIFLUCAN) 200 mg, oral, Daily    HumuLIN N NPH Insulin KwikPen 10 Units, subcutaneous, Every morning, Take as directed per insulin instructions.    lancets 33 gauge misc 1 each, miscellaneous, 3 times daily    magnesium oxide (MAG-OX) 800 mg, oral, Daily, Take at noon    mycophenolate (CELLCEPT) 750 mg, oral, 2 times daily    pantoprazole (PROTONIX) 40 mg, oral, Daily, Do not crush, chew, or split.    pen needle, diabetic 31 gauge x 3/16\" needle 1 each, miscellaneous, Daily    Pentamidine (NEBUPENT) 300 mg, inhalation, Every 30 days, Dose give 4/7/2025    polyethylene glycol (GLYCOLAX, MIRALAX) 17 g, Daily    predniSONE (DELTASONE) 15 mg, oral, Daily    tacrolimus (PROGRAF) 2 mg, oral, 2 times daily    tamsulosin (FLOMAX) 0.4 mg, oral, Daily    traZODone (DESYREL) 25 mg, oral, Nightly    valGANciclovir (VALCYTE) 450 mg, oral, Daily, Do not crush or chew.    Vitamin D3 2,000 Units, oral, Daily        PHYSICAL EXAM:  There were no vitals taken for this " "visit.  Constitutional: Patient appears well-developed and well-nourished. No distress.    Pulmonary/Chest: Effort normal. No respiratory distress.   Musculoskeletal: Normal range of motion.    Neurological: Alert and oriented to person, place, and time.  Psychiatric: Normal mood and affect. Behavior is normal. Thought content normal.      Labs  No results found for: \"GFRMALE\"  Lab Results   Component Value Date    CREATININE 1.51 (H) 04/28/2025       Procedures  Cystoscopy   After informed consent was obtained, the patient was taken to the procedure room for cystoscopy for ureteral stent removal.    Procedure Note:   A sterile prep and drape was performed in standard fashion. Lidocaine jelly was injected into the urethra. A flexible cystoscope was inserted into the bladder without difficulty.    The ureteral stent was seen and grasped using forceps.  The stent was completely removed.      Post-Procedure:  The cystoscope was removed. The vital signs were stable. The patient tolerated the procedure well. There were no complications.     Assessment:      1. Retained ureteral stent        2. Stage 4 chronic kidney disease (Multi)            Plan:   1)  FU with Transplant  2) Post cysto, stent removal instructions given, warning signs discussed        [1]   Past Medical History:  Diagnosis Date    Alcoholic cirrhosis (Multi)     Anticoagulated     Ascites     CKD (chronic kidney disease) stage 4, GFR 15-29 ml/min (Multi)     Coronary artery disease     Diabetes mellitus (Multi)     Hyperlipidemia     FORMAN (nonalcoholic steatohepatitis)     Pancytopenia    [2]   Past Surgical History:  Procedure Laterality Date    CARDIAC CATHETERIZATION N/A 02/26/2025    Procedure: Left Heart Cath with Coronary Angiography and LV;  Surgeon: Cesilia Teresa MD;  Location: [facility] Cardiac Cath Lab;  Service: Cardiovascular;  Laterality: N/A;    CORONARY ANGIOPLASTY WITH STENT PLACEMENT     [3]   Allergies  Allergen Reactions    Adhesive " Tape-Silicones Rash

## 2025-05-01 ENCOUNTER — APPOINTMENT (OUTPATIENT)
Dept: CT IMAGING | Age: 65
End: 2025-05-01
Payer: COMMERCIAL

## 2025-05-01 ENCOUNTER — HOSPITAL ENCOUNTER (EMERGENCY)
Age: 65
Discharge: ANOTHER HOSPITAL | End: 2025-05-01
Attending: EMERGENCY MEDICINE
Payer: COMMERCIAL

## 2025-05-01 ENCOUNTER — APPOINTMENT (OUTPATIENT)
Facility: HOSPITAL | Age: 65
End: 2025-05-01
Payer: COMMERCIAL

## 2025-05-01 ENCOUNTER — APPOINTMENT (OUTPATIENT)
Dept: GENERAL RADIOLOGY | Age: 65
End: 2025-05-01
Payer: COMMERCIAL

## 2025-05-01 ENCOUNTER — TELEPHONE (OUTPATIENT)
Facility: HOSPITAL | Age: 65
End: 2025-05-01
Payer: COMMERCIAL

## 2025-05-01 ENCOUNTER — HOSPITAL ENCOUNTER (INPATIENT)
Facility: HOSPITAL | Age: 65
LOS: 3 days | Discharge: HOME | End: 2025-05-04
Attending: TRANSPLANT SURGERY | Admitting: TRANSPLANT SURGERY
Payer: COMMERCIAL

## 2025-05-01 VITALS
RESPIRATION RATE: 22 BRPM | SYSTOLIC BLOOD PRESSURE: 155 MMHG | TEMPERATURE: 102.8 F | OXYGEN SATURATION: 95 % | HEIGHT: 76 IN | DIASTOLIC BLOOD PRESSURE: 69 MMHG | HEART RATE: 99 BPM | WEIGHT: 225 LBS | BODY MASS INDEX: 27.4 KG/M2

## 2025-05-01 DIAGNOSIS — I10 HYPERTENSION, UNSPECIFIED TYPE: ICD-10-CM

## 2025-05-01 DIAGNOSIS — Z94.4 LIVER TRANSPLANT RECIPIENT (MULTI): ICD-10-CM

## 2025-05-01 DIAGNOSIS — K75.0: ICD-10-CM

## 2025-05-01 DIAGNOSIS — B96.20 E COLI BACTEREMIA: ICD-10-CM

## 2025-05-01 DIAGNOSIS — K75.0 LIVER ABSCESS (HHS-HCC): Primary | ICD-10-CM

## 2025-05-01 DIAGNOSIS — K75.0 LIVER ABSCESS: ICD-10-CM

## 2025-05-01 DIAGNOSIS — R78.81 E COLI BACTEREMIA: ICD-10-CM

## 2025-05-01 DIAGNOSIS — N18.9 CHRONIC KIDNEY DISEASE, UNSPECIFIED CKD STAGE: ICD-10-CM

## 2025-05-01 DIAGNOSIS — T86.43: ICD-10-CM

## 2025-05-01 DIAGNOSIS — E78.2 MIXED HYPERLIPIDEMIA: ICD-10-CM

## 2025-05-01 DIAGNOSIS — J90 PLEURAL EFFUSION: ICD-10-CM

## 2025-05-01 DIAGNOSIS — A41.9 SEPSIS, DUE TO UNSPECIFIED ORGANISM, UNSPECIFIED WHETHER ACUTE ORGAN DYSFUNCTION PRESENT (HCC): Primary | ICD-10-CM

## 2025-05-01 LAB
ALBUMIN SERPL-MCNC: 3.8 G/DL (ref 3.5–5.2)
ALP SERPL-CCNC: 164 U/L (ref 40–129)
ALT SERPL-CCNC: 15 U/L (ref 0–40)
AMMONIA PLAS-SCNC: 13 UMOL/L (ref 16–60)
ANION GAP SERPL CALCULATED.3IONS-SCNC: 13 MMOL/L (ref 7–16)
AST SERPL-CCNC: 12 U/L (ref 0–39)
B PARAP IS1001 DNA NPH QL NAA+NON-PROBE: NOT DETECTED
B PERT DNA SPEC QL NAA+PROBE: NOT DETECTED
BACTERIA URNS QL MICRO: ABNORMAL
BASOPHILS # BLD: 0 K/UL (ref 0–0.2)
BASOPHILS NFR BLD: 0 % (ref 0–2)
BILIRUB DIRECT SERPL-MCNC: 0.3 MG/DL (ref 0–0.3)
BILIRUB INDIRECT SERPL-MCNC: 0.5 MG/DL (ref 0–1)
BILIRUB SERPL-MCNC: 0.8 MG/DL (ref 0–1.2)
BILIRUB UR QL STRIP: NEGATIVE
BUN SERPL-MCNC: 30 MG/DL (ref 6–23)
C PNEUM DNA NPH QL NAA+NON-PROBE: NOT DETECTED
CALCIUM SERPL-MCNC: 8.9 MG/DL (ref 8.6–10.2)
CASTS #/AREA URNS LPF: ABNORMAL /LPF
CHLORIDE SERPL-SCNC: 103 MMOL/L (ref 98–107)
CLARITY UR: ABNORMAL
CO2 SERPL-SCNC: 25 MMOL/L (ref 22–29)
COLOR UR: YELLOW
CREAT SERPL-MCNC: 1.7 MG/DL (ref 0.7–1.2)
EKG ATRIAL RATE: 103 BPM
EKG P AXIS: 35 DEGREES
EKG P-R INTERVAL: 130 MS
EKG Q-T INTERVAL: 330 MS
EKG QRS DURATION: 78 MS
EKG QTC CALCULATION (BAZETT): 432 MS
EKG R AXIS: 152 DEGREES
EKG T AXIS: -19 DEGREES
EKG VENTRICULAR RATE: 103 BPM
EOSINOPHIL # BLD: 0 K/UL (ref 0.05–0.5)
EOSINOPHILS RELATIVE PERCENT: 0 % (ref 0–6)
ERYTHROCYTE [DISTWIDTH] IN BLOOD BY AUTOMATED COUNT: 16.7 % (ref 11.5–15)
FLUAV RNA NPH QL NAA+NON-PROBE: NOT DETECTED
FLUBV RNA NPH QL NAA+NON-PROBE: NOT DETECTED
GFR, ESTIMATED: 45 ML/MIN/1.73M2
GLUCOSE SERPL-MCNC: 103 MG/DL (ref 74–99)
GLUCOSE UR STRIP-MCNC: NEGATIVE MG/DL
HADV DNA NPH QL NAA+NON-PROBE: NOT DETECTED
HCOV 229E RNA NPH QL NAA+NON-PROBE: NOT DETECTED
HCOV HKU1 RNA NPH QL NAA+NON-PROBE: NOT DETECTED
HCOV NL63 RNA NPH QL NAA+NON-PROBE: NOT DETECTED
HCOV OC43 RNA NPH QL NAA+NON-PROBE: NOT DETECTED
HCT VFR BLD AUTO: 28.4 % (ref 37–54)
HGB BLD-MCNC: 9.2 G/DL (ref 12.5–16.5)
HGB UR QL STRIP.AUTO: ABNORMAL
HMPV RNA NPH QL NAA+NON-PROBE: NOT DETECTED
HPIV1 RNA NPH QL NAA+NON-PROBE: NOT DETECTED
HPIV2 RNA NPH QL NAA+NON-PROBE: NOT DETECTED
HPIV3 RNA NPH QL NAA+NON-PROBE: NOT DETECTED
HPIV4 RNA NPH QL NAA+NON-PROBE: NOT DETECTED
INR PPP: 1.6
KETONES UR STRIP-MCNC: NEGATIVE MG/DL
LACTATE BLDV-SCNC: 1 MMOL/L (ref 0.5–1.9)
LEUKOCYTE ESTERASE UR QL STRIP: ABNORMAL
LIPASE SERPL-CCNC: 24 U/L (ref 13–60)
LYMPHOCYTES NFR BLD: 0.37 K/UL (ref 1.5–4)
LYMPHOCYTES RELATIVE PERCENT: 5 % (ref 20–42)
M PNEUMO DNA NPH QL NAA+NON-PROBE: NOT DETECTED
MAGNESIUM SERPL-MCNC: 1.1 MG/DL (ref 1.6–2.6)
MCH RBC QN AUTO: 30.4 PG (ref 26–35)
MCHC RBC AUTO-ENTMCNC: 32.4 G/DL (ref 32–34.5)
MCV RBC AUTO: 93.7 FL (ref 80–99.9)
MONOCYTES NFR BLD: 0.06 K/UL (ref 0.1–0.95)
MONOCYTES NFR BLD: 1 % (ref 2–12)
NEUTROPHILS NFR BLD: 94 % (ref 43–80)
NEUTS SEG NFR BLD: 6.67 K/UL (ref 1.8–7.3)
NITRITE UR QL STRIP: NEGATIVE
PARTIAL THROMBOPLASTIN TIME: 23.4 SEC (ref 24.5–35.1)
PH UR STRIP: 5.5 [PH] (ref 5–8)
PLATELET # BLD AUTO: 134 K/UL (ref 130–450)
PMV BLD AUTO: 9.6 FL (ref 7–12)
POTASSIUM SERPL-SCNC: 4 MMOL/L (ref 3.5–5)
PROT SERPL-MCNC: 6.1 G/DL (ref 6.4–8.3)
PROT UR STRIP-MCNC: 100 MG/DL
PROTHROMBIN TIME: 16.7 SEC (ref 9.3–12.4)
RBC # BLD AUTO: 3.03 M/UL (ref 3.8–5.8)
RBC # BLD: ABNORMAL 10*6/UL
RBC # BLD: ABNORMAL 10*6/UL
RBC #/AREA URNS HPF: ABNORMAL /HPF
RSV RNA NPH QL NAA+NON-PROBE: NOT DETECTED
RV+EV RNA NPH QL NAA+NON-PROBE: NOT DETECTED
SARS-COV-2 RNA NPH QL NAA+NON-PROBE: NOT DETECTED
SODIUM SERPL-SCNC: 141 MMOL/L (ref 132–146)
SP GR UR STRIP: 1.01 (ref 1–1.03)
SPECIMEN DESCRIPTION: NORMAL
UROBILINOGEN UR STRIP-ACNC: 0.2 EU/DL (ref 0–1)
WBC #/AREA URNS HPF: ABNORMAL /HPF
WBC OTHER # BLD: 7.1 K/UL (ref 4.5–11.5)

## 2025-05-01 PROCEDURE — 87150 DNA/RNA AMPLIFIED PROBE: CPT

## 2025-05-01 PROCEDURE — 87088 URINE BACTERIA CULTURE: CPT

## 2025-05-01 PROCEDURE — 6370000000 HC RX 637 (ALT 250 FOR IP): Performed by: EMERGENCY MEDICINE

## 2025-05-01 PROCEDURE — 93010 ELECTROCARDIOGRAM REPORT: CPT | Performed by: INTERNAL MEDICINE

## 2025-05-01 PROCEDURE — 96365 THER/PROPH/DIAG IV INF INIT: CPT

## 2025-05-01 PROCEDURE — 0202U NFCT DS 22 TRGT SARS-COV-2: CPT

## 2025-05-01 PROCEDURE — 87086 URINE CULTURE/COLONY COUNT: CPT

## 2025-05-01 PROCEDURE — 6360000004 HC RX CONTRAST MEDICATION: Performed by: RADIOLOGY

## 2025-05-01 PROCEDURE — 96375 TX/PRO/DX INJ NEW DRUG ADDON: CPT

## 2025-05-01 PROCEDURE — 6360000002 HC RX W HCPCS: Performed by: EMERGENCY MEDICINE

## 2025-05-01 PROCEDURE — 71046 X-RAY EXAM CHEST 2 VIEWS: CPT

## 2025-05-01 PROCEDURE — 99285 EMERGENCY DEPT VISIT HI MDM: CPT

## 2025-05-01 PROCEDURE — 2580000003 HC RX 258: Performed by: EMERGENCY MEDICINE

## 2025-05-01 PROCEDURE — 87040 BLOOD CULTURE FOR BACTERIA: CPT

## 2025-05-01 PROCEDURE — 87077 CULTURE AEROBIC IDENTIFY: CPT

## 2025-05-01 PROCEDURE — 96367 TX/PROPH/DG ADDL SEQ IV INF: CPT

## 2025-05-01 PROCEDURE — 81001 URINALYSIS AUTO W/SCOPE: CPT

## 2025-05-01 PROCEDURE — 85610 PROTHROMBIN TIME: CPT

## 2025-05-01 PROCEDURE — 80053 COMPREHEN METABOLIC PANEL: CPT

## 2025-05-01 PROCEDURE — 83605 ASSAY OF LACTIC ACID: CPT

## 2025-05-01 PROCEDURE — 96368 THER/DIAG CONCURRENT INF: CPT

## 2025-05-01 PROCEDURE — 82140 ASSAY OF AMMONIA: CPT

## 2025-05-01 PROCEDURE — 71260 CT THORAX DX C+: CPT

## 2025-05-01 PROCEDURE — 1200000002 HC GENERAL ROOM WITH TELEMETRY DAILY

## 2025-05-01 PROCEDURE — 85730 THROMBOPLASTIN TIME PARTIAL: CPT

## 2025-05-01 PROCEDURE — 82248 BILIRUBIN DIRECT: CPT

## 2025-05-01 PROCEDURE — 83690 ASSAY OF LIPASE: CPT

## 2025-05-01 PROCEDURE — 93005 ELECTROCARDIOGRAM TRACING: CPT | Performed by: EMERGENCY MEDICINE

## 2025-05-01 PROCEDURE — 96366 THER/PROPH/DIAG IV INF ADDON: CPT

## 2025-05-01 PROCEDURE — 83735 ASSAY OF MAGNESIUM: CPT

## 2025-05-01 PROCEDURE — 99222 1ST HOSP IP/OBS MODERATE 55: CPT | Performed by: TRANSPLANT SURGERY

## 2025-05-01 PROCEDURE — 74177 CT ABD & PELVIS W/CONTRAST: CPT

## 2025-05-01 PROCEDURE — 85025 COMPLETE CBC W/AUTO DIFF WBC: CPT

## 2025-05-01 PROCEDURE — 80197 ASSAY OF TACROLIMUS: CPT

## 2025-05-01 RX ORDER — FLUCONAZOLE 200 MG/1
200 TABLET ORAL DAILY
Status: DISCONTINUED | OUTPATIENT
Start: 2025-05-02 | End: 2025-05-02

## 2025-05-01 RX ORDER — TRAMADOL HYDROCHLORIDE 50 MG/1
50 TABLET ORAL EVERY 8 HOURS PRN
COMMUNITY

## 2025-05-01 RX ORDER — TACROLIMUS 1 MG/1
2 CAPSULE ORAL 2 TIMES DAILY
Status: CANCELLED | OUTPATIENT
Start: 2025-05-01

## 2025-05-01 RX ORDER — ASPIRIN 81 MG/1
81 TABLET ORAL EVERY MORNING
COMMUNITY

## 2025-05-01 RX ORDER — MAGNESIUM OXIDE 400 MG/1
800 TABLET ORAL
COMMUNITY

## 2025-05-01 RX ORDER — PENTAMIDINE ISETHIONATE 300 MG/300MG
300 INHALANT RESPIRATORY (INHALATION)
Status: CANCELLED | OUTPATIENT
Start: 2025-05-02

## 2025-05-01 RX ORDER — PANTOPRAZOLE SODIUM 40 MG/1
40 TABLET, DELAYED RELEASE ORAL DAILY
Status: DISCONTINUED | OUTPATIENT
Start: 2025-05-01 | End: 2025-05-01 | Stop reason: HOSPADM

## 2025-05-01 RX ORDER — MYCOPHENOLATE MOFETIL 250 MG/1
750 CAPSULE ORAL 2 TIMES DAILY
COMMUNITY

## 2025-05-01 RX ORDER — TRAMADOL HYDROCHLORIDE 50 MG/1
50 TABLET ORAL 4 TIMES DAILY PRN
Status: DISCONTINUED | OUTPATIENT
Start: 2025-05-01 | End: 2025-05-01 | Stop reason: HOSPADM

## 2025-05-01 RX ORDER — VALGANCICLOVIR 450 MG/1
450 TABLET, FILM COATED ORAL DAILY
Status: DISCONTINUED | OUTPATIENT
Start: 2025-05-02 | End: 2025-05-04 | Stop reason: HOSPADM

## 2025-05-01 RX ORDER — TAMSULOSIN HYDROCHLORIDE 0.4 MG/1
0.4 CAPSULE ORAL DAILY
Status: CANCELLED | OUTPATIENT
Start: 2025-05-01

## 2025-05-01 RX ORDER — TRAZODONE HYDROCHLORIDE 50 MG/1
25 TABLET ORAL NIGHTLY
Status: CANCELLED | OUTPATIENT
Start: 2025-05-01

## 2025-05-01 RX ORDER — POLYETHYLENE GLYCOL 3350 17 G/17G
17 POWDER, FOR SOLUTION ORAL DAILY PRN
Status: DISCONTINUED | OUTPATIENT
Start: 2025-05-01 | End: 2025-05-03

## 2025-05-01 RX ORDER — TACROLIMUS 1 MG/1
1 CAPSULE ORAL 2 TIMES DAILY
Status: DISCONTINUED | OUTPATIENT
Start: 2025-05-01 | End: 2025-05-01 | Stop reason: HOSPADM

## 2025-05-01 RX ORDER — FLUCONAZOLE 200 MG/1
200 TABLET ORAL DAILY
COMMUNITY

## 2025-05-01 RX ORDER — ASPIRIN 81 MG/1
81 TABLET ORAL DAILY
Status: CANCELLED | OUTPATIENT
Start: 2025-05-01

## 2025-05-01 RX ORDER — FLUCONAZOLE 100 MG/1
200 TABLET ORAL DAILY
Status: DISCONTINUED | OUTPATIENT
Start: 2025-05-02 | End: 2025-05-01 | Stop reason: HOSPADM

## 2025-05-01 RX ORDER — PREDNISONE 5 MG/1
15 TABLET ORAL EVERY MORNING
COMMUNITY

## 2025-05-01 RX ORDER — PANTOPRAZOLE SODIUM 40 MG/1
40 TABLET, DELAYED RELEASE ORAL DAILY
Status: CANCELLED | OUTPATIENT
Start: 2025-05-01

## 2025-05-01 RX ORDER — LANOLIN ALCOHOL/MO/W.PET/CERES
800 CREAM (GRAM) TOPICAL DAILY
Status: DISCONTINUED | OUTPATIENT
Start: 2025-05-02 | End: 2025-05-04 | Stop reason: HOSPADM

## 2025-05-01 RX ORDER — SODIUM CHLORIDE 9 MG/ML
100 INJECTION, SOLUTION INTRAVENOUS CONTINUOUS
Status: DISCONTINUED | OUTPATIENT
Start: 2025-05-02 | End: 2025-05-02

## 2025-05-01 RX ORDER — ONDANSETRON 4 MG/1
4 TABLET, FILM COATED ORAL EVERY 8 HOURS PRN
Status: CANCELLED | OUTPATIENT
Start: 2025-05-01

## 2025-05-01 RX ORDER — ACETAMINOPHEN 500 MG
1000 TABLET ORAL ONCE
Status: COMPLETED | OUTPATIENT
Start: 2025-05-01 | End: 2025-05-01

## 2025-05-01 RX ORDER — ASPIRIN 81 MG/1
81 TABLET ORAL DAILY
Status: DISCONTINUED | OUTPATIENT
Start: 2025-05-02 | End: 2025-05-04 | Stop reason: HOSPADM

## 2025-05-01 RX ORDER — PENTAMIDINE ISETHIONATE 300 MG/300MG
300 INHALANT RESPIRATORY (INHALATION)
Status: DISCONTINUED | OUTPATIENT
Start: 2025-05-02 | End: 2025-05-02

## 2025-05-01 RX ORDER — FLUCONAZOLE 200 MG/1
200 TABLET ORAL DAILY
Status: CANCELLED | OUTPATIENT
Start: 2025-05-01

## 2025-05-01 RX ORDER — FENTANYL CITRATE 50 UG/ML
50 INJECTION, SOLUTION INTRAMUSCULAR; INTRAVENOUS ONCE
Status: COMPLETED | OUTPATIENT
Start: 2025-05-01 | End: 2025-05-01

## 2025-05-01 RX ORDER — LANOLIN ALCOHOL/MO/W.PET/CERES
800 CREAM (GRAM) TOPICAL DAILY
Status: CANCELLED | OUTPATIENT
Start: 2025-05-01

## 2025-05-01 RX ORDER — SENNOSIDES 8.6 MG/1
2 TABLET ORAL 2 TIMES DAILY
Status: CANCELLED | OUTPATIENT
Start: 2025-05-01

## 2025-05-01 RX ORDER — MYCOPHENOLATE MOFETIL 250 MG/1
750 CAPSULE ORAL 2 TIMES DAILY
Status: CANCELLED | OUTPATIENT
Start: 2025-05-01

## 2025-05-01 RX ORDER — 0.9 % SODIUM CHLORIDE 0.9 %
1000 INTRAVENOUS SOLUTION INTRAVENOUS ONCE
Status: COMPLETED | OUTPATIENT
Start: 2025-05-01 | End: 2025-05-01

## 2025-05-01 RX ORDER — GLYCERIN, PETROLATUM, PHENYLEPHRINE HCL, PRAMOXINE HCL 144; 2.5; 10; 15 MG/G; MG/G; MG/G; MG/G
CREAM TOPICAL 3 TIMES DAILY PRN
COMMUNITY

## 2025-05-01 RX ORDER — PENTAMIDINE ISETHIONATE 300 MG/300MG
300 INHALANT RESPIRATORY (INHALATION)
COMMUNITY

## 2025-05-01 RX ORDER — POLYETHYLENE GLYCOL 3350 17 G/17G
17 POWDER, FOR SOLUTION ORAL DAILY
Status: CANCELLED | OUTPATIENT
Start: 2025-05-01

## 2025-05-01 RX ORDER — VALGANCICLOVIR 450 MG/1
450 TABLET, FILM COATED ORAL DAILY
Status: CANCELLED | OUTPATIENT
Start: 2025-05-01

## 2025-05-01 RX ORDER — ALOGLIPTIN 12.5 MG/1
12.5 TABLET, FILM COATED ORAL DAILY
Status: DISCONTINUED | OUTPATIENT
Start: 2025-05-02 | End: 2025-05-01 | Stop reason: HOSPADM

## 2025-05-01 RX ORDER — ATORVASTATIN CALCIUM 20 MG/1
10 TABLET, FILM COATED ORAL NIGHTLY
Status: DISCONTINUED | OUTPATIENT
Start: 2025-05-01 | End: 2025-05-01 | Stop reason: HOSPADM

## 2025-05-01 RX ORDER — PREDNISONE 10 MG/1
15 TABLET ORAL DAILY
Status: DISCONTINUED | OUTPATIENT
Start: 2025-05-02 | End: 2025-05-02

## 2025-05-01 RX ORDER — DOCUSATE SODIUM 100 MG/1
100 CAPSULE, LIQUID FILLED ORAL 2 TIMES DAILY PRN
COMMUNITY

## 2025-05-01 RX ORDER — URSODIOL 300 MG/1
300 CAPSULE ORAL
COMMUNITY
Start: 2025-04-09 | End: 2025-05-01 | Stop reason: ALTCHOICE

## 2025-05-01 RX ORDER — TACROLIMUS 1 MG/1
2 CAPSULE ORAL 2 TIMES DAILY
COMMUNITY

## 2025-05-01 RX ORDER — ACETAMINOPHEN 650 MG/1
650 SUPPOSITORY RECTAL EVERY 4 HOURS PRN
Status: CANCELLED | OUTPATIENT
Start: 2025-05-01

## 2025-05-01 RX ORDER — TRAZODONE HYDROCHLORIDE 50 MG/1
50 TABLET ORAL NIGHTLY
Status: DISCONTINUED | OUTPATIENT
Start: 2025-05-01 | End: 2025-05-01 | Stop reason: HOSPADM

## 2025-05-01 RX ORDER — HEPARIN SODIUM 5000 [USP'U]/ML
5000 INJECTION, SOLUTION INTRAVENOUS; SUBCUTANEOUS EVERY 8 HOURS
Status: CANCELLED | OUTPATIENT
Start: 2025-05-01

## 2025-05-01 RX ORDER — MYCOPHENOLATE MOFETIL 250 MG/1
750 CAPSULE ORAL 2 TIMES DAILY
Status: DISCONTINUED | OUTPATIENT
Start: 2025-05-01 | End: 2025-05-01 | Stop reason: HOSPADM

## 2025-05-01 RX ORDER — IOPAMIDOL 755 MG/ML
75 INJECTION, SOLUTION INTRAVASCULAR
Status: COMPLETED | OUTPATIENT
Start: 2025-05-01 | End: 2025-05-01

## 2025-05-01 RX ORDER — VALGANCICLOVIR 450 MG/1
450 TABLET, FILM COATED ORAL EVERY MORNING
COMMUNITY

## 2025-05-01 RX ORDER — TACROLIMUS 1 MG/1
2 CAPSULE ORAL
Status: DISCONTINUED | OUTPATIENT
Start: 2025-05-01 | End: 2025-05-02

## 2025-05-01 RX ORDER — PANTOPRAZOLE SODIUM 40 MG/1
40 TABLET, DELAYED RELEASE ORAL DAILY
Status: DISCONTINUED | OUTPATIENT
Start: 2025-05-02 | End: 2025-05-04 | Stop reason: HOSPADM

## 2025-05-01 RX ORDER — POLYETHYLENE GLYCOL 3350 17 G/17G
17 POWDER, FOR SOLUTION ORAL DAILY
COMMUNITY

## 2025-05-01 RX ORDER — TAMSULOSIN HYDROCHLORIDE 0.4 MG/1
0.4 CAPSULE ORAL DAILY
Status: DISCONTINUED | OUTPATIENT
Start: 2025-05-02 | End: 2025-05-04 | Stop reason: HOSPADM

## 2025-05-01 RX ORDER — TRAZODONE HYDROCHLORIDE 50 MG/1
25 TABLET ORAL NIGHTLY
Status: DISCONTINUED | OUTPATIENT
Start: 2025-05-01 | End: 2025-05-04 | Stop reason: HOSPADM

## 2025-05-01 RX ORDER — ATORVASTATIN CALCIUM 10 MG/1
10 TABLET, FILM COATED ORAL DAILY
Status: DISCONTINUED | OUTPATIENT
Start: 2025-05-02 | End: 2025-05-04 | Stop reason: HOSPADM

## 2025-05-01 RX ORDER — PREDNISONE 10 MG/1
15 TABLET ORAL DAILY
Status: CANCELLED | OUTPATIENT
Start: 2025-05-01

## 2025-05-01 RX ORDER — SULFAMETHOXAZOLE AND TRIMETHOPRIM 400; 80 MG/1; MG/1
1 TABLET ORAL DAILY
COMMUNITY
Start: 2025-04-06 | End: 2025-05-01 | Stop reason: ALTCHOICE

## 2025-05-01 RX ORDER — TAMSULOSIN HYDROCHLORIDE 0.4 MG/1
0.4 CAPSULE ORAL NIGHTLY
COMMUNITY

## 2025-05-01 RX ORDER — CHOLECALCIFEROL (VITAMIN D3) 25 MCG
50 TABLET ORAL DAILY
Status: DISCONTINUED | OUTPATIENT
Start: 2025-05-02 | End: 2025-05-04 | Stop reason: HOSPADM

## 2025-05-01 RX ORDER — ALOGLIPTIN 12.5 MG/1
12.5 TABLET, FILM COATED ORAL EVERY MORNING
COMMUNITY

## 2025-05-01 RX ORDER — CHOLECALCIFEROL (VITAMIN D3) 25 MCG
50 TABLET ORAL DAILY
Status: CANCELLED | OUTPATIENT
Start: 2025-05-01

## 2025-05-01 RX ORDER — MYCOPHENOLATE MOFETIL 250 MG/1
750 CAPSULE ORAL 2 TIMES DAILY
Status: DISCONTINUED | OUTPATIENT
Start: 2025-05-01 | End: 2025-05-02

## 2025-05-01 RX ORDER — PANTOPRAZOLE SODIUM 40 MG/1
40 TABLET, DELAYED RELEASE ORAL EVERY MORNING
COMMUNITY

## 2025-05-01 RX ORDER — ASPIRIN 81 MG/1
81 TABLET, CHEWABLE ORAL DAILY
Status: DISCONTINUED | OUTPATIENT
Start: 2025-05-02 | End: 2025-05-01 | Stop reason: HOSPADM

## 2025-05-01 RX ORDER — VANCOMYCIN HYDROCHLORIDE 1 G/20ML
INJECTION, POWDER, LYOPHILIZED, FOR SOLUTION INTRAVENOUS DAILY PRN
Status: DISCONTINUED | OUTPATIENT
Start: 2025-05-01 | End: 2025-05-02

## 2025-05-01 RX ORDER — ATORVASTATIN CALCIUM 10 MG/1
10 TABLET, FILM COATED ORAL DAILY
Status: CANCELLED | OUTPATIENT
Start: 2025-05-01

## 2025-05-01 RX ORDER — ONDANSETRON HYDROCHLORIDE 2 MG/ML
4 INJECTION, SOLUTION INTRAVENOUS EVERY 8 HOURS PRN
Status: CANCELLED | OUTPATIENT
Start: 2025-05-01

## 2025-05-01 RX ORDER — MAGNESIUM SULFATE IN WATER 40 MG/ML
2000 INJECTION, SOLUTION INTRAVENOUS ONCE
Status: COMPLETED | OUTPATIENT
Start: 2025-05-01 | End: 2025-05-01

## 2025-05-01 RX ORDER — ACETAMINOPHEN 160 MG/5ML
650 SOLUTION ORAL EVERY 4 HOURS PRN
Status: CANCELLED | OUTPATIENT
Start: 2025-05-01

## 2025-05-01 RX ORDER — ACETAMINOPHEN 325 MG/1
650 TABLET ORAL EVERY 4 HOURS PRN
Status: CANCELLED | OUTPATIENT
Start: 2025-05-01

## 2025-05-01 RX ADMIN — TRAMADOL HYDROCHLORIDE 50 MG: 50 TABLET, COATED ORAL at 17:17

## 2025-05-01 RX ADMIN — MAGNESIUM SULFATE HEPTAHYDRATE 2000 MG: 40 INJECTION, SOLUTION INTRAVENOUS at 11:13

## 2025-05-01 RX ADMIN — FENTANYL CITRATE 50 MCG: 50 INJECTION INTRAMUSCULAR; INTRAVENOUS at 19:47

## 2025-05-01 RX ADMIN — IOPAMIDOL 75 ML: 755 INJECTION, SOLUTION INTRAVENOUS at 10:47

## 2025-05-01 RX ADMIN — ACETAMINOPHEN 1000 MG: 500 TABLET ORAL at 08:29

## 2025-05-01 RX ADMIN — PANTOPRAZOLE SODIUM 40 MG: 40 TABLET, DELAYED RELEASE ORAL at 17:17

## 2025-05-01 RX ADMIN — VANCOMYCIN HYDROCHLORIDE 2000 MG: 10 INJECTION, POWDER, LYOPHILIZED, FOR SOLUTION INTRAVENOUS at 12:07

## 2025-05-01 RX ADMIN — SODIUM CHLORIDE 1000 ML: 0.9 INJECTION, SOLUTION INTRAVENOUS at 11:43

## 2025-05-01 RX ADMIN — SODIUM CHLORIDE 1000 ML: 0.9 INJECTION, SOLUTION INTRAVENOUS at 08:29

## 2025-05-01 RX ADMIN — PIPERACILLIN AND TAZOBACTAM 4500 MG: 4; .5 INJECTION, POWDER, FOR SOLUTION INTRAVENOUS at 11:55

## 2025-05-01 RX ADMIN — PIPERACILLIN AND TAZOBACTAM 3375 MG: 3; .375 INJECTION, POWDER, LYOPHILIZED, FOR SOLUTION INTRAVENOUS at 20:00

## 2025-05-01 RX ADMIN — ACETAMINOPHEN 1000 MG: 500 TABLET ORAL at 19:46

## 2025-05-01 ASSESSMENT — COGNITIVE AND FUNCTIONAL STATUS - GENERAL
WALKING IN HOSPITAL ROOM: A LITTLE
PERSONAL GROOMING: A LITTLE
MOVING TO AND FROM BED TO CHAIR: A LITTLE
CLIMB 3 TO 5 STEPS WITH RAILING: A LITTLE
DAILY ACTIVITIY SCORE: 20
TURNING FROM BACK TO SIDE WHILE IN FLAT BAD: A LITTLE
HELP NEEDED FOR BATHING: A LITTLE
TOILETING: A LITTLE
DRESSING REGULAR LOWER BODY CLOTHING: A LITTLE
STANDING UP FROM CHAIR USING ARMS: A LITTLE
MOBILITY SCORE: 19

## 2025-05-01 ASSESSMENT — PAIN DESCRIPTION - LOCATION
LOCATION: BACK

## 2025-05-01 ASSESSMENT — LIFESTYLE VARIABLES
HOW MANY STANDARD DRINKS CONTAINING ALCOHOL DO YOU HAVE ON A TYPICAL DAY: PATIENT DOES NOT DRINK
HOW OFTEN DO YOU HAVE A DRINK CONTAINING ALCOHOL: NEVER

## 2025-05-01 ASSESSMENT — PAIN - FUNCTIONAL ASSESSMENT
PAIN_FUNCTIONAL_ASSESSMENT: PREVENTS OR INTERFERES SOME ACTIVE ACTIVITIES AND ADLS
PAIN_FUNCTIONAL_ASSESSMENT: PREVENTS OR INTERFERES SOME ACTIVE ACTIVITIES AND ADLS
PAIN_FUNCTIONAL_ASSESSMENT: 0-10
PAIN_FUNCTIONAL_ASSESSMENT: PREVENTS OR INTERFERES SOME ACTIVE ACTIVITIES AND ADLS
PAIN_FUNCTIONAL_ASSESSMENT: 0-10
PAIN_FUNCTIONAL_ASSESSMENT: PREVENTS OR INTERFERES SOME ACTIVE ACTIVITIES AND ADLS
PAIN_FUNCTIONAL_ASSESSMENT: 0-10
PAIN_FUNCTIONAL_ASSESSMENT: 0-10
PAIN_FUNCTIONAL_ASSESSMENT: PREVENTS OR INTERFERES SOME ACTIVE ACTIVITIES AND ADLS

## 2025-05-01 ASSESSMENT — PAIN DESCRIPTION - ORIENTATION
ORIENTATION: LEFT;LOWER
ORIENTATION: RIGHT;LOWER
ORIENTATION: LEFT

## 2025-05-01 ASSESSMENT — PAIN DESCRIPTION - DESCRIPTORS
DESCRIPTORS: ACHING;SHARP;SHOOTING
DESCRIPTORS: ACHING;SHARP
DESCRIPTORS: ACHING;SHARP;SHOOTING

## 2025-05-01 ASSESSMENT — PAIN SCALES - GENERAL
PAINLEVEL_OUTOF10: 7
PAINLEVEL_OUTOF10: 8
PAINLEVEL_OUTOF10: 8
PAINLEVEL_OUTOF10: 7
PAINLEVEL_OUTOF10: 7
PAINLEVEL_OUTOF10: 8
PAINLEVEL_OUTOF10: 5

## 2025-05-01 NOTE — TELEPHONE ENCOUNTER
"Call from Pt wife, reports Pt woke up with severe tremors, \"even to his legs and lips\", baseline has mild tremors and pain to R flank area.  Last Tramadol given yesterday afternoon. Increased Tac to 2 and 2 last Thursday, has Surgeon appt this am [id number] with blood work due this am also.      Pt is aAAox3, follows commands, answers questions appropriately, denies CP/SOB/AMS or high fevers, no redness or abnormal drainage from surgical incisions, scabbed and healing per wife    Temp 99.7, HR 88, /87    Pt wife Bobbi, advised to give Pt Tramadol for pain, monitor symptoms, if continues to worsen, Bobbi will call Coordinator back and will send to ED, other wise, Pt will get labs this am with Tac level and will see Surgeon at scheduled time.    "

## 2025-05-01 NOTE — ED PROVIDER NOTES
no focal deficits, symmetric strength 5/5 in the upper and lower extremities bilaterally  Psychiatric: Normal Affect            DIAGNOSTIC RESULTS   LABS:    Labs Reviewed   CBC WITH AUTO DIFFERENTIAL - Abnormal; Notable for the following components:       Result Value    RBC 3.03 (*)     Hemoglobin 9.2 (*)     Hematocrit 28.4 (*)     RDW 16.7 (*)     Neutrophils % 94 (*)     Lymphocytes % 5 (*)     Monocytes % 1 (*)     Lymphocytes Absolute 0.37 (*)     Monocytes Absolute 0.06 (*)     Eosinophils Absolute 0.00 (*)     All other components within normal limits   BASIC METABOLIC PANEL - Abnormal; Notable for the following components:    Glucose 103 (*)     BUN 30 (*)     Creatinine 1.7 (*)     Est, Glom Filt Rate 45 (*)     All other components within normal limits   HEPATIC FUNCTION PANEL - Abnormal; Notable for the following components:    Alkaline Phosphatase 164 (*)     Total Protein 6.1 (*)     All other components within normal limits   MAGNESIUM - Abnormal; Notable for the following components:    Magnesium 1.1 (*)     All other components within normal limits   PROTIME-INR - Abnormal; Notable for the following components:    Protime 16.7 (*)     All other components within normal limits   APTT - Abnormal; Notable for the following components:    APTT 23.4 (*)     All other components within normal limits   AMMONIA - Abnormal; Notable for the following components:    Ammonia 13 (*)     All other components within normal limits   URINALYSIS WITH MICROSCOPIC - Abnormal; Notable for the following components:    Turbidity UA SLIGHTLY CLOUDY (*)     Urine Hgb MODERATE (*)     Protein,  (*)     Leukocyte Esterase, Urine MODERATE (*)     WBC, UA 51  (*)     RBC, UA 3 to 5 (*)     Casts UA 10 TO 20 HYALINE (*)     Bacteria, UA 2+ (*)     All other components within normal limits   CULTURE, BLOOD 1   CULTURE, BLOOD 2   RESPIRATORY PANEL, MOLECULAR, WITH COVID-19   CULTURE, URINE   LIPASE   LACTATE, SEPSIS

## 2025-05-01 NOTE — ASSESSMENT & PLAN NOTE
65 y.o. male underwent transplant surgery,  4/2/2025 (Liver / Kidney). Coming from an OSH for concern of UTI and liver abscess. UA with cloudy urine, moderate leukocyte esterase, and 2+ bacteria in urine. Labs showed increased levels of Scr 1.7 from 1.51, BUN 30 from 25.  from 143.

## 2025-05-01 NOTE — TELEPHONE ENCOUNTER
"Call from Pt wife,  reports Pt vomiting, \"it's a lot\", brown colored (ate a pudding)    Pt Aaox3, c/o dizziness, continued pain to R flank, denies CP/SOB, Pt wants to go to [facility] [place], [place] [facility] [place]      Temp 101, , /88    Pt wife advised to call squad to take to ED at [facility], to take his medications with her so that he has his anti-rejection meds, request Tac level to be drawn with blood work and have ED [place] [facility] [place]    Dr Tilley to be notified of Pt condition.  "

## 2025-05-02 ENCOUNTER — APPOINTMENT (OUTPATIENT)
Dept: RADIOLOGY | Facility: HOSPITAL | Age: 65
DRG: 441 | End: 2025-05-02
Payer: COMMERCIAL

## 2025-05-02 ENCOUNTER — RESULTS FOLLOW-UP (OUTPATIENT)
Dept: EMERGENCY DEPT | Age: 65
End: 2025-05-02

## 2025-05-02 DIAGNOSIS — Z94.4 LIVER REPLACED BY TRANSPLANT (MULTI): ICD-10-CM

## 2025-05-02 LAB
ABO GROUP (TYPE) IN BLOOD: NORMAL
ALBUMIN SERPL BCP-MCNC: 2.9 G/DL (ref 3.4–5)
ALP SERPL-CCNC: 94 U/L (ref 33–136)
ALT SERPL W P-5'-P-CCNC: 8 U/L (ref 10–52)
ANION GAP SERPL CALC-SCNC: 19 MMOL/L (ref 10–20)
ANTIBODY SCREEN: NORMAL
APPEARANCE UR: ABNORMAL
AST SERPL W P-5'-P-CCNC: 8 U/L (ref 9–39)
BASOPHILS # BLD AUTO: 0.06 X10*3/UL (ref 0–0.1)
BASOPHILS NFR BLD AUTO: 0.9 %
BILIRUB DIRECT SERPL-MCNC: 0.5 MG/DL (ref 0–0.3)
BILIRUB SERPL-MCNC: 1 MG/DL (ref 0–1.2)
BILIRUB UR STRIP.AUTO-MCNC: NEGATIVE MG/DL
BLOOD EXPIRATION DATE: NORMAL
BUN SERPL-MCNC: 33 MG/DL (ref 6–23)
CALCIUM SERPL-MCNC: 7.5 MG/DL (ref 8.6–10.6)
CHLORIDE SERPL-SCNC: 102 MMOL/L (ref 98–107)
CO2 SERPL-SCNC: 24 MMOL/L (ref 21–32)
COLOR UR: YELLOW
CREAT SERPL-MCNC: 2.14 MG/DL (ref 0.5–1.3)
DISPENSE STATUS: NORMAL
EGFRCR SERPLBLD CKD-EPI 2021: 34 ML/MIN/1.73M*2
EOSINOPHIL # BLD AUTO: 0.09 X10*3/UL (ref 0–0.7)
EOSINOPHIL NFR BLD AUTO: 1.3 %
ERYTHROCYTE [DISTWIDTH] IN BLOOD BY AUTOMATED COUNT: 17.2 % (ref 11.5–14.5)
ERYTHROCYTE [DISTWIDTH] IN BLOOD BY AUTOMATED COUNT: 17.2 % (ref 11.5–14.5)
GLUCOSE BLD MANUAL STRIP-MCNC: 195 MG/DL (ref 74–99)
GLUCOSE BLD MANUAL STRIP-MCNC: 93 MG/DL (ref 74–99)
GLUCOSE SERPL-MCNC: 126 MG/DL (ref 74–99)
GLUCOSE UR STRIP.AUTO-MCNC: NORMAL MG/DL
HCT VFR BLD AUTO: 21.7 % (ref 41–52)
HCT VFR BLD AUTO: 24.4 % (ref 41–52)
HGB BLD-MCNC: 6.9 G/DL (ref 13.5–17.5)
HGB BLD-MCNC: 7.4 G/DL (ref 13.5–17.5)
HOLD SPECIMEN: 293
HOLD SPECIMEN: 293
IMM GRANULOCYTES # BLD AUTO: 0.06 X10*3/UL (ref 0–0.7)
IMM GRANULOCYTES NFR BLD AUTO: 0.9 % (ref 0–0.9)
KETONES UR STRIP.AUTO-MCNC: NEGATIVE MG/DL
LEUKOCYTE ESTERASE UR QL STRIP.AUTO: ABNORMAL
LYMPHOCYTES # BLD AUTO: 0.6 X10*3/UL (ref 1.2–4.8)
LYMPHOCYTES NFR BLD AUTO: 8.5 %
MAGNESIUM SERPL-MCNC: 1.49 MG/DL (ref 1.6–2.4)
MCH RBC QN AUTO: 29.1 PG (ref 26–34)
MCH RBC QN AUTO: 29.6 PG (ref 26–34)
MCHC RBC AUTO-ENTMCNC: 30.3 G/DL (ref 32–36)
MCHC RBC AUTO-ENTMCNC: 31.8 G/DL (ref 32–36)
MCV RBC AUTO: 93 FL (ref 80–100)
MCV RBC AUTO: 96 FL (ref 80–100)
MONOCYTES # BLD AUTO: 0.44 X10*3/UL (ref 0.1–1)
MONOCYTES NFR BLD AUTO: 6.3 %
NEUTROPHILS # BLD AUTO: 5.79 X10*3/UL (ref 1.2–7.7)
NEUTROPHILS NFR BLD AUTO: 82.1 %
NITRITE UR QL STRIP.AUTO: NEGATIVE
NRBC BLD-RTO: 0 /100 WBCS (ref 0–0)
NRBC BLD-RTO: 0 /100 WBCS (ref 0–0)
PH UR STRIP.AUTO: 6 [PH]
PHOSPHATE SERPL-MCNC: 4.3 MG/DL (ref 2.5–4.9)
PLATELET # BLD AUTO: 86 X10*3/UL (ref 150–450)
PLATELET # BLD AUTO: 89 X10*3/UL (ref 150–450)
POTASSIUM SERPL-SCNC: 4.1 MMOL/L (ref 3.5–5.3)
PRODUCT BLOOD TYPE: 7300
PRODUCT CODE: NORMAL
PROT SERPL-MCNC: 5 G/DL (ref 6.4–8.2)
PROT UR STRIP.AUTO-MCNC: ABNORMAL MG/DL
RBC # BLD AUTO: 2.33 X10*6/UL (ref 4.5–5.9)
RBC # BLD AUTO: 2.54 X10*6/UL (ref 4.5–5.9)
RBC # UR STRIP.AUTO: ABNORMAL MG/DL
RBC #/AREA URNS AUTO: ABNORMAL /HPF
RBC MORPH BLD: NORMAL
RH FACTOR (ANTIGEN D): NORMAL
SODIUM SERPL-SCNC: 141 MMOL/L (ref 136–145)
SP GR UR STRIP.AUTO: 1.03
TACROLIMUS BLD-MCNC: 18.2 NG/ML
UNIT ABO: NORMAL
UNIT NUMBER: NORMAL
UNIT RH: NORMAL
UNIT VOLUME: 350
UROBILINOGEN UR STRIP.AUTO-MCNC: NORMAL MG/DL
WBC # BLD AUTO: 7 X10*3/UL (ref 4.4–11.3)
WBC # BLD AUTO: 7.1 X10*3/UL (ref 4.4–11.3)
WBC #/AREA URNS AUTO: ABNORMAL /HPF
XM INTEP: NORMAL

## 2025-05-02 PROCEDURE — 80076 HEPATIC FUNCTION PANEL: CPT | Performed by: PHYSICIAN ASSISTANT

## 2025-05-02 PROCEDURE — 2500000001 HC RX 250 WO HCPCS SELF ADMINISTERED DRUGS (ALT 637 FOR MEDICARE OP): Performed by: PHYSICIAN ASSISTANT

## 2025-05-02 PROCEDURE — 2500000002 HC RX 250 W HCPCS SELF ADMINISTERED DRUGS (ALT 637 FOR MEDICARE OP, ALT 636 FOR OP/ED)

## 2025-05-02 PROCEDURE — 86901 BLOOD TYPING SEROLOGIC RH(D): CPT

## 2025-05-02 PROCEDURE — 2500000001 HC RX 250 WO HCPCS SELF ADMINISTERED DRUGS (ALT 637 FOR MEDICARE OP)

## 2025-05-02 PROCEDURE — 76776 US EXAM K TRANSPL W/DOPPLER: CPT | Performed by: STUDENT IN AN ORGANIZED HEALTH CARE EDUCATION/TRAINING PROGRAM

## 2025-05-02 PROCEDURE — 81001 URINALYSIS AUTO W/SCOPE: CPT | Performed by: PHYSICIAN ASSISTANT

## 2025-05-02 PROCEDURE — 76776 US EXAM K TRANSPL W/DOPPLER: CPT

## 2025-05-02 PROCEDURE — 85025 COMPLETE CBC W/AUTO DIFF WBC: CPT | Performed by: PHYSICIAN ASSISTANT

## 2025-05-02 PROCEDURE — 2500000004 HC RX 250 GENERAL PHARMACY W/ HCPCS (ALT 636 FOR OP/ED)

## 2025-05-02 PROCEDURE — 86923 COMPATIBILITY TEST ELECTRIC: CPT

## 2025-05-02 PROCEDURE — 36415 COLL VENOUS BLD VENIPUNCTURE: CPT | Performed by: PHYSICIAN ASSISTANT

## 2025-05-02 PROCEDURE — P9016 RBC LEUKOCYTES REDUCED: HCPCS

## 2025-05-02 PROCEDURE — 2500000004 HC RX 250 GENERAL PHARMACY W/ HCPCS (ALT 636 FOR OP/ED): Performed by: PHYSICIAN ASSISTANT

## 2025-05-02 PROCEDURE — 87040 BLOOD CULTURE FOR BACTERIA: CPT | Performed by: PHYSICIAN ASSISTANT

## 2025-05-02 PROCEDURE — 80053 COMPREHEN METABOLIC PANEL: CPT | Performed by: PHYSICIAN ASSISTANT

## 2025-05-02 PROCEDURE — 82248 BILIRUBIN DIRECT: CPT | Performed by: PHYSICIAN ASSISTANT

## 2025-05-02 PROCEDURE — 81003 URINALYSIS AUTO W/O SCOPE: CPT | Performed by: PHYSICIAN ASSISTANT

## 2025-05-02 PROCEDURE — 99232 SBSQ HOSP IP/OBS MODERATE 35: CPT | Performed by: TRANSPLANT SURGERY

## 2025-05-02 PROCEDURE — 83735 ASSAY OF MAGNESIUM: CPT | Performed by: PHYSICIAN ASSISTANT

## 2025-05-02 PROCEDURE — 84100 ASSAY OF PHOSPHORUS: CPT | Performed by: PHYSICIAN ASSISTANT

## 2025-05-02 PROCEDURE — 87086 URINE CULTURE/COLONY COUNT: CPT | Performed by: PHYSICIAN ASSISTANT

## 2025-05-02 PROCEDURE — 80197 ASSAY OF TACROLIMUS: CPT | Performed by: PHYSICIAN ASSISTANT

## 2025-05-02 PROCEDURE — 2500000004 HC RX 250 GENERAL PHARMACY W/ HCPCS (ALT 636 FOR OP/ED): Mod: JZ

## 2025-05-02 PROCEDURE — 36430 TRANSFUSION BLD/BLD COMPNT: CPT

## 2025-05-02 PROCEDURE — 2500000002 HC RX 250 W HCPCS SELF ADMINISTERED DRUGS (ALT 637 FOR MEDICARE OP, ALT 636 FOR OP/ED): Performed by: PHYSICIAN ASSISTANT

## 2025-05-02 PROCEDURE — 85027 COMPLETE CBC AUTOMATED: CPT

## 2025-05-02 PROCEDURE — 1200000002 HC GENERAL ROOM WITH TELEMETRY DAILY

## 2025-05-02 PROCEDURE — 82947 ASSAY GLUCOSE BLOOD QUANT: CPT

## 2025-05-02 RX ORDER — PREDNISONE 10 MG/1
10 TABLET ORAL DAILY
Status: DISCONTINUED | OUTPATIENT
Start: 2025-05-03 | End: 2025-05-04 | Stop reason: HOSPADM

## 2025-05-02 RX ORDER — MYCOPHENOLATE MOFETIL 250 MG/1
250 CAPSULE ORAL 2 TIMES DAILY
Status: DISCONTINUED | OUTPATIENT
Start: 2025-05-02 | End: 2025-05-04

## 2025-05-02 RX ORDER — FUROSEMIDE 10 MG/ML
40 INJECTION INTRAMUSCULAR; INTRAVENOUS ONCE
Status: COMPLETED | OUTPATIENT
Start: 2025-05-02 | End: 2025-05-02

## 2025-05-02 RX ORDER — VANCOMYCIN HYDROCHLORIDE 1 G/200ML
1000 INJECTION, SOLUTION INTRAVENOUS ONCE
Status: DISCONTINUED | OUTPATIENT
Start: 2025-05-02 | End: 2025-05-02

## 2025-05-02 RX ORDER — OXYCODONE HYDROCHLORIDE 5 MG/1
5 TABLET ORAL EVERY 4 HOURS PRN
Refills: 0 | Status: DISCONTINUED | OUTPATIENT
Start: 2025-05-02 | End: 2025-05-04 | Stop reason: HOSPADM

## 2025-05-02 RX ORDER — TACROLIMUS 1 MG/1
2 CAPSULE ORAL
Status: DISCONTINUED | OUTPATIENT
Start: 2025-05-03 | End: 2025-05-03

## 2025-05-02 RX ORDER — OXYCODONE HYDROCHLORIDE 5 MG/1
10 TABLET ORAL EVERY 4 HOURS PRN
Refills: 0 | Status: DISCONTINUED | OUTPATIENT
Start: 2025-05-02 | End: 2025-05-04 | Stop reason: HOSPADM

## 2025-05-02 RX ORDER — FUROSEMIDE 10 MG/ML
40 INJECTION INTRAMUSCULAR; INTRAVENOUS ONCE
Status: DISCONTINUED | OUTPATIENT
Start: 2025-05-02 | End: 2025-05-02

## 2025-05-02 RX ORDER — SULFAMETHOXAZOLE AND TRIMETHOPRIM 400; 80 MG/1; MG/1
1 TABLET ORAL DAILY
Status: DISCONTINUED | OUTPATIENT
Start: 2025-05-02 | End: 2025-05-04 | Stop reason: HOSPADM

## 2025-05-02 RX ORDER — VANCOMYCIN 2 GRAM/500 ML IN 0.9 % SODIUM CHLORIDE INTRAVENOUS
2000 ONCE
Status: COMPLETED | OUTPATIENT
Start: 2025-05-02 | End: 2025-05-02

## 2025-05-02 RX ORDER — MAGNESIUM SULFATE HEPTAHYDRATE 40 MG/ML
2 INJECTION, SOLUTION INTRAVENOUS ONCE
Status: COMPLETED | OUTPATIENT
Start: 2025-05-02 | End: 2025-05-02

## 2025-05-02 RX ADMIN — PIPERACILLIN SODIUM AND TAZOBACTAM SODIUM 3.38 G: 3; .375 INJECTION, SOLUTION INTRAVENOUS at 23:34

## 2025-05-02 RX ADMIN — SULFAMETHOXAZOLE AND TRIMETHOPRIM 1 TABLET: 400; 80 TABLET ORAL at 09:58

## 2025-05-02 RX ADMIN — MAGNESIUM OXIDE TAB 400 MG (241.3 MG ELEMENTAL MG) 800 MG: 400 (241.3 MG) TAB at 12:50

## 2025-05-02 RX ADMIN — TACROLIMUS 2 MG: 1 CAPSULE ORAL at 06:10

## 2025-05-02 RX ADMIN — VALGANCICLOVIR HYDROCHLORIDE 450 MG: 450 TABLET ORAL at 08:29

## 2025-05-02 RX ADMIN — SODIUM CHLORIDE 100 ML/HR: 9 INJECTION, SOLUTION INTRAVENOUS at 00:05

## 2025-05-02 RX ADMIN — PANTOPRAZOLE SODIUM 40 MG: 40 TABLET, DELAYED RELEASE ORAL at 08:29

## 2025-05-02 RX ADMIN — POLYETHYLENE GLYCOL 3350 17 G: 17 POWDER, FOR SOLUTION ORAL at 13:14

## 2025-05-02 RX ADMIN — FLUCONAZOLE 200 MG: 200 TABLET ORAL at 08:29

## 2025-05-02 RX ADMIN — PIPERACILLIN SODIUM AND TAZOBACTAM SODIUM 3.38 G: 3; .375 INJECTION, SOLUTION INTRAVENOUS at 12:50

## 2025-05-02 RX ADMIN — INSULIN HUMAN 10 UNITS: 100 INJECTION, SUSPENSION SUBCUTANEOUS at 08:29

## 2025-05-02 RX ADMIN — PIPERACILLIN SODIUM AND TAZOBACTAM SODIUM 3.38 G: 3; .375 INJECTION, SOLUTION INTRAVENOUS at 05:50

## 2025-05-02 RX ADMIN — MAGNESIUM SULFATE HEPTAHYDRATE 2 G: 40 INJECTION, SOLUTION INTRAVENOUS at 08:29

## 2025-05-02 RX ADMIN — MYCOPHENOLATE MOFETIL 250 MG: 250 CAPSULE ORAL at 21:08

## 2025-05-02 RX ADMIN — APIXABAN 5 MG: 5 TABLET, FILM COATED ORAL at 08:29

## 2025-05-02 RX ADMIN — PREDNISONE 15 MG: 10 TABLET ORAL at 08:29

## 2025-05-02 RX ADMIN — Medication 2000 MG: at 02:33

## 2025-05-02 RX ADMIN — TAMSULOSIN HYDROCHLORIDE 0.4 MG: 0.4 CAPSULE ORAL at 08:29

## 2025-05-02 RX ADMIN — FUROSEMIDE 40 MG: 10 INJECTION, SOLUTION INTRAVENOUS at 17:50

## 2025-05-02 RX ADMIN — Medication 50 MCG: at 08:29

## 2025-05-02 RX ADMIN — ATORVASTATIN CALCIUM 10 MG: 10 TABLET, FILM COATED ORAL at 08:29

## 2025-05-02 RX ADMIN — TRAZODONE HYDROCHLORIDE 25 MG: 50 TABLET ORAL at 21:08

## 2025-05-02 RX ADMIN — PIPERACILLIN SODIUM AND TAZOBACTAM SODIUM 3.38 G: 3; .375 INJECTION, SOLUTION INTRAVENOUS at 17:28

## 2025-05-02 RX ADMIN — TACROLIMUS 2 MG: 1 CAPSULE ORAL at 00:01

## 2025-05-02 RX ADMIN — OXYCODONE 10 MG: 5 TABLET ORAL at 21:59

## 2025-05-02 RX ADMIN — PIPERACILLIN SODIUM AND TAZOBACTAM SODIUM 3.38 G: 3; .375 INJECTION, SOLUTION INTRAVENOUS at 00:01

## 2025-05-02 RX ADMIN — ASPIRIN 81 MG: 81 TABLET, COATED ORAL at 08:29

## 2025-05-02 SDOH — ECONOMIC STABILITY: FOOD INSECURITY: WITHIN THE PAST 12 MONTHS, THE FOOD YOU BOUGHT JUST DIDN'T LAST AND YOU DIDN'T HAVE MONEY TO GET MORE.: NEVER TRUE

## 2025-05-02 SDOH — SOCIAL STABILITY: SOCIAL INSECURITY: WITHIN THE LAST YEAR, HAVE YOU BEEN AFRAID OF YOUR PARTNER OR EX-PARTNER?: NO

## 2025-05-02 SDOH — SOCIAL STABILITY: SOCIAL NETWORK
IN A TYPICAL WEEK, HOW MANY TIMES DO YOU TALK ON THE PHONE WITH FAMILY, FRIENDS, OR NEIGHBORS?: MORE THAN THREE TIMES A WEEK

## 2025-05-02 SDOH — HEALTH STABILITY: PHYSICAL HEALTH
HOW OFTEN DO YOU NEED TO HAVE SOMEONE HELP YOU WHEN YOU READ INSTRUCTIONS, PAMPHLETS, OR OTHER WRITTEN MATERIAL FROM YOUR DOCTOR OR PHARMACY?: NEVER

## 2025-05-02 SDOH — SOCIAL STABILITY: SOCIAL INSECURITY: WITHIN THE LAST YEAR, HAVE YOU BEEN HUMILIATED OR EMOTIONALLY ABUSED IN OTHER WAYS BY YOUR PARTNER OR EX-PARTNER?: NO

## 2025-05-02 SDOH — HEALTH STABILITY: MENTAL HEALTH
DO YOU FEEL STRESS - TENSE, RESTLESS, NERVOUS, OR ANXIOUS, OR UNABLE TO SLEEP AT NIGHT BECAUSE YOUR MIND IS TROUBLED ALL THE TIME - THESE DAYS?: ONLY A LITTLE

## 2025-05-02 SDOH — SOCIAL STABILITY: SOCIAL INSECURITY: ARE YOU MARRIED, WIDOWED, DIVORCED, SEPARATED, NEVER MARRIED, OR LIVING WITH A PARTNER?: MARRIED

## 2025-05-02 SDOH — SOCIAL STABILITY: SOCIAL NETWORK: HOW OFTEN DO YOU GET TOGETHER WITH FRIENDS OR RELATIVES?: TWICE A WEEK

## 2025-05-02 SDOH — SOCIAL STABILITY: SOCIAL NETWORK: HOW OFTEN DO YOU ATTEND MEETINGS OF THE CLUBS OR ORGANIZATIONS YOU BELONG TO?: NEVER

## 2025-05-02 SDOH — SOCIAL STABILITY: SOCIAL INSECURITY: DOES ANYONE TRY TO KEEP YOU FROM HAVING/CONTACTING OTHER FRIENDS OR DOING THINGS OUTSIDE YOUR HOME?: NO

## 2025-05-02 SDOH — SOCIAL STABILITY: SOCIAL NETWORK
DO YOU BELONG TO ANY CLUBS OR ORGANIZATIONS SUCH AS CHURCH GROUPS, UNIONS, FRATERNAL OR ATHLETIC GROUPS, OR SCHOOL GROUPS?: NO

## 2025-05-02 SDOH — HEALTH STABILITY: PHYSICAL HEALTH: ON AVERAGE, HOW MANY MINUTES DO YOU ENGAGE IN EXERCISE AT THIS LEVEL?: PATIENT UNABLE TO ANSWER

## 2025-05-02 SDOH — ECONOMIC STABILITY: FOOD INSECURITY: WITHIN THE PAST 12 MONTHS, YOU WORRIED THAT YOUR FOOD WOULD RUN OUT BEFORE YOU GOT THE MONEY TO BUY MORE.: NEVER TRUE

## 2025-05-02 SDOH — SOCIAL STABILITY: SOCIAL INSECURITY: HAVE YOU HAD ANY THOUGHTS OF HARMING ANYONE ELSE?: NO

## 2025-05-02 SDOH — SOCIAL STABILITY: SOCIAL INSECURITY: HAVE YOU HAD THOUGHTS OF HARMING ANYONE ELSE?: NO

## 2025-05-02 SDOH — SOCIAL STABILITY: SOCIAL INSECURITY: ARE THERE ANY APPARENT SIGNS OF INJURIES/BEHAVIORS THAT COULD BE RELATED TO ABUSE/NEGLECT?: NO

## 2025-05-02 SDOH — SOCIAL STABILITY: SOCIAL INSECURITY: DO YOU FEEL UNSAFE GOING BACK TO THE PLACE WHERE YOU ARE LIVING?: NO

## 2025-05-02 SDOH — SOCIAL STABILITY: SOCIAL NETWORK: HOW OFTEN DO YOU ATTEND CHURCH OR RELIGIOUS SERVICES?: NEVER

## 2025-05-02 SDOH — ECONOMIC STABILITY: INCOME INSECURITY: IN THE PAST 12 MONTHS HAS THE ELECTRIC, GAS, OIL, OR WATER COMPANY THREATENED TO SHUT OFF SERVICES IN YOUR HOME?: NO

## 2025-05-02 SDOH — SOCIAL STABILITY: SOCIAL INSECURITY: HAS ANYONE EVER THREATENED TO HURT YOUR FAMILY OR YOUR PETS?: NO

## 2025-05-02 SDOH — HEALTH STABILITY: PHYSICAL HEALTH: ON AVERAGE, HOW MANY DAYS PER WEEK DO YOU ENGAGE IN MODERATE TO STRENUOUS EXERCISE (LIKE A BRISK WALK)?: 0 DAYS

## 2025-05-02 SDOH — ECONOMIC STABILITY: FOOD INSECURITY: HOW HARD IS IT FOR YOU TO PAY FOR THE VERY BASICS LIKE FOOD, HOUSING, MEDICAL CARE, AND HEATING?: NOT HARD AT ALL

## 2025-05-02 SDOH — SOCIAL STABILITY: SOCIAL INSECURITY: ABUSE: ADULT

## 2025-05-02 SDOH — SOCIAL STABILITY: SOCIAL INSECURITY: WERE YOU ABLE TO COMPLETE ALL THE BEHAVIORAL HEALTH SCREENINGS?: YES

## 2025-05-02 SDOH — SOCIAL STABILITY: SOCIAL INSECURITY: ARE YOU OR HAVE YOU BEEN THREATENED OR ABUSED PHYSICALLY, EMOTIONALLY, OR SEXUALLY BY ANYONE?: NO

## 2025-05-02 SDOH — SOCIAL STABILITY: SOCIAL INSECURITY: DO YOU FEEL ANYONE HAS EXPLOITED OR TAKEN ADVANTAGE OF YOU FINANCIALLY OR OF YOUR PERSONAL PROPERTY?: NO

## 2025-05-02 SDOH — ECONOMIC STABILITY: HOUSING INSECURITY: DO YOU FEEL UNSAFE GOING BACK TO THE PLACE WHERE YOU LIVE?: NO

## 2025-05-02 ASSESSMENT — COGNITIVE AND FUNCTIONAL STATUS - GENERAL
MOBILITY SCORE: 20
STANDING UP FROM CHAIR USING ARMS: A LITTLE
DAILY ACTIVITIY SCORE: 19
DRESSING REGULAR LOWER BODY CLOTHING: A LITTLE
MOVING TO AND FROM BED TO CHAIR: A LITTLE
CLIMB 3 TO 5 STEPS WITH RAILING: A LITTLE
CLIMB 3 TO 5 STEPS WITH RAILING: A LITTLE
TOILETING: A LITTLE
DAILY ACTIVITIY SCORE: 19
DRESSING REGULAR LOWER BODY CLOTHING: A LITTLE
MOVING TO AND FROM BED TO CHAIR: A LITTLE
DRESSING REGULAR UPPER BODY CLOTHING: A LITTLE
PATIENT BASELINE BEDBOUND: NO
HELP NEEDED FOR BATHING: A LITTLE
PERSONAL GROOMING: A LITTLE
PERSONAL GROOMING: A LITTLE
WALKING IN HOSPITAL ROOM: A LITTLE
MOBILITY SCORE: 20
DRESSING REGULAR UPPER BODY CLOTHING: A LITTLE
TOILETING: A LITTLE
HELP NEEDED FOR BATHING: A LITTLE
WALKING IN HOSPITAL ROOM: A LITTLE
STANDING UP FROM CHAIR USING ARMS: A LITTLE

## 2025-05-02 ASSESSMENT — ACTIVITIES OF DAILY LIVING (ADL)
WALKS IN HOME: INDEPENDENT
DRESSING YOURSELF: INDEPENDENT
TOILETING: INDEPENDENT
LACK_OF_TRANSPORTATION: NO
HEARING - LEFT EAR: FUNCTIONAL
BATHING: INDEPENDENT
JUDGMENT_ADEQUATE_SAFELY_COMPLETE_DAILY_ACTIVITIES: YES
ADEQUATE_TO_COMPLETE_ADL: YES
PATIENT'S MEMORY ADEQUATE TO SAFELY COMPLETE DAILY ACTIVITIES?: YES
FEEDING YOURSELF: INDEPENDENT
GROOMING: INDEPENDENT
HEARING - RIGHT EAR: FUNCTIONAL

## 2025-05-02 ASSESSMENT — PAIN - FUNCTIONAL ASSESSMENT
PAIN_FUNCTIONAL_ASSESSMENT: 0-10
PAIN_FUNCTIONAL_ASSESSMENT: 0-10

## 2025-05-02 ASSESSMENT — LIFESTYLE VARIABLES
SKIP TO QUESTIONS 9-10: 1
SUBSTANCE_ABUSE_PAST_12_MONTHS: NO
HOW OFTEN DO YOU HAVE 6 OR MORE DRINKS ON ONE OCCASION: NEVER
AUDIT-C TOTAL SCORE: 0
HOW MANY STANDARD DRINKS CONTAINING ALCOHOL DO YOU HAVE ON A TYPICAL DAY: PATIENT DOES NOT DRINK
HOW OFTEN DO YOU HAVE A DRINK CONTAINING ALCOHOL: NEVER
AUDIT-C TOTAL SCORE: 0
PRESCIPTION_ABUSE_PAST_12_MONTHS: NO

## 2025-05-02 ASSESSMENT — PAIN SCALES - GENERAL
PAINLEVEL_OUTOF10: 8
PAINLEVEL_OUTOF10: 2
PAINLEVEL_OUTOF10: 7
PAINLEVEL_OUTOF10: 2

## 2025-05-02 ASSESSMENT — PAIN DESCRIPTION - DESCRIPTORS
DESCRIPTORS: DISCOMFORT
DESCRIPTORS: SORE

## 2025-05-02 NOTE — H&P
History Of Present Illness  López Lopez is a 65 y.o. male with a hx of ESRD secondary to MASLD whom received a simultaneous liver/kidney transplant on 4/2/25. Pt was discharged 4/12/25 from his surgery. His postop coarse c/b brief vtach/afib related to PA cath placement after his liver txp, which resolved with amio. He returned to OR for DDKT POD1. Otherwise his postop course thus after was uncomplicated.     He was recently readmitted 4/14 for increasing BUN and Cr. He was rehydrated with IVF and received a unit of packed red blood cells for hgb 7.3 and was discharged on 4/17.    Patient was seen in the clinic 04/24 for a follow up and was doing well at that time. Stent removal by urology was performed on 04/29.     Today, patient is coming from an OSH for concern of UTI and liver abscess. UA with cloudy urine, moderate leukocyte esterase, and 2+ bacteria in urine. Labs showed increased levels of Scr 1.7 from 1.51, BUN 30 from 25.  from 143.    Upon evaluation, he says he has had an intermittent stabbing pain in his right back/flank. It has gotten worse over the the past few days. Otherwise nothing was bothering him until 5/1 morning when he felt fevers and chills. Went to outside hospital where he got scans and tests listed above. He now feels better, and pain is manageable. Denies any HA/CP/SOB. Says he did have some N/V at the outside hospital but could not characterize it.       Past Medical History  Medical History[1]    Surgical History  Surgical History[2]     Social History  He reports that he has quit smoking. His smoking use included cigarettes. He has never used smokeless tobacco. He reports that he does not currently use alcohol. He reports that he does not currently use drugs.    Family History  Family History[3]     Allergies  Adhesive tape-silicones    Review of Systems   All other systems reviewed and are negative.       Physical Exam  NAD  RR  NC, Nml work of breathing  Incision c/d/I  "with steristrips. Abd is soft,ND, minimally tender at right back/flank. No overlying skin changes    Last Recorded Vitals  Blood pressure 105/68, pulse 103, temperature 36.4 °C (97.5 °F), height 1.93 m (6' 4\"), weight 104 kg (230 lb 1.6 oz), SpO2 97%.    Relevant Results        CT Scan from OSH showing pleural effusions and a perihepatic fluid collection     Assessment & Plan  Liver abscess (HHS-HCC)    López Lopez is a 65 y.o. male with a hx of ESRD secondary to MASLD whom received a simultaneous liver/kidney transplant on 4/2/25. Presents for UTI and marbin-hepatic fluid collection    Plan:  - IVF  - Hold MMF  - check UA/culture  - blood cultures   - vanc/zosyn    Immunosuppression       Tacrolimus: 1 mg bid goal 8-10       Mycophenolate: hold      Prednisone: 15 mg daily     Prophylaxis       Valcyte        Diflucan      Pentamidine         Cardiovascular  A Fib On Eliquis 5 bid    Endocrine:       SSI     Plans discussed with Dr. Arnold Amaro MD         [1]   Past Medical History:  Diagnosis Date    Alcoholic cirrhosis (Multi)     Anticoagulated     Ascites     CKD (chronic kidney disease) stage 4, GFR 15-29 ml/min (Multi)     Coronary artery disease     Diabetes mellitus (Multi)     Hyperlipidemia     FORMAN (nonalcoholic steatohepatitis)     Pancytopenia    [2]   Past Surgical History:  Procedure Laterality Date    CARDIAC CATHETERIZATION N/A 02/26/2025    Procedure: Left Heart Cath with Coronary Angiography and LV;  Surgeon: Cesilia Teresa MD;  Location: [facility] Cardiac Cath Lab;  Service: Cardiovascular;  Laterality: N/A;    CORONARY ANGIOPLASTY WITH STENT PLACEMENT     [3] No family history on file.    "

## 2025-05-02 NOTE — PROGRESS NOTES
Vancomycin Dosing by Pharmacy- Cessation of Therapy    Consult to pharmacy for vancomycin dosing has been discontinued by the prescriber, pharmacy will sign off at this time.    Please call pharmacy if there are further questions or re-enter a consult if vancomycin is resumed.     Ehsan Allan, MellissaD

## 2025-05-02 NOTE — SIGNIFICANT EVENT
Transplant Surgery Multidisciplinary Team Note    López Lopez is a 65 y.o. male   POD#30 from a Kidney and Liver transplant . His post operative complications: None and Other infection: UTI    24 Hour Events  1. UA+  2.  Hbg 7.4. Receiving 1 unit PRBC + 40mg IV lasix    Last Recorded Vitals  Visit Vitals  /77 (BP Location: Right arm, Patient Position: Lying)   Pulse 69   Temp 36.3 °C (97.3 °F) (Temporal)   Resp 16      Intake/Output last 3 Shifts:    Intake/Output Summary (Last 24 hours) at 5/2/2025 1351  Last data filed at 5/2/2025 1300  Gross per 24 hour   Intake 1660 ml   Output 550 ml   Net 1110 ml      Vitals:    05/01/25 2300   Weight: 104 kg (230 lb 1.6 oz)        Assessment/Plan   Principal Problem:    Management after organ transplant  Active Problems:  Problem List[1]     Immunosuppression reviewed and adjusted       Tacrolimus goal 8-10 ng/mL. Current dose 2 mg BID         MMF 1000 mg po BID       Solumedrol taper  DVT prophylaxis SCDS  PT/OT  Diet: general  Anticipated discharge 2-3 days     Andreia Jessica, APRN-CNP         05/02/25 1349   Patient Interaction   Organ Liver/kidney   Type of Interaction Morning rounds   Interdisciplinary Rounds   Attendance Surgeon;Physician;TALIA;Coordinator;Pharmacist   Topics Discussed Medications;Patient/family concerns;Blood test results            [1]   Patient Active Problem List  Diagnosis    Acute idiopathic thrombocytopenic purpura (Multi)    Anemia    History of CAD (coronary artery disease)    Mixed hyperlipidemia    Paroxysmal atrial fibrillation (Multi)    Type 2 diabetes mellitus with hyperglycemia, without long-term current use of insulin    Liver failure without hepatic coma (Multi)    Metabolic dysfunction-associated steatohepatitis (MASH)    Status post insertion of drug-eluting stent into left anterior descending artery for coronary artery disease    Type 2 diabetes mellitus with diabetic chronic kidney disease    History of percutaneous  transluminal coronary angioplasty    Coronary artery disease involving native coronary artery of native heart    Pre-liver transplant, listed    Pre-kidney transplant, listed    Refractory ascites    Alcoholic cirrhosis of liver with ascites (Multi)    Stage 4 chronic kidney disease (Multi)    Chronic renal impairment, stage 4 (severe) (Multi)    CKD (chronic kidney disease) stage 4, GFR 15-29 ml/min (Multi)    Acute kidney injury    Liver abscess (HHS-HCC)

## 2025-05-02 NOTE — PROGRESS NOTES
05/02/25 1151   Discharge Planning   Living Arrangements Spouse/significant other   Support Systems Spouse/significant other   Assistance Needed None   Type of Residence Private residence   Home or Post Acute Services None   Expected Discharge Disposition Home   Does the patient need discharge transport arranged? Yes   RoundTrip coordination needed? Yes   Has discharge transport been arranged? No   Financial Resource Strain   How hard is it for you to pay for the very basics like food, housing, medical care, and heating? Not very   Housing Stability   In the last 12 months, was there a time when you were not able to pay the mortgage or rent on time? N   Transportation Needs   In the past 12 months, has lack of transportation kept you from medical appointments or from getting medications? no       DC Planning:  Went in and met with the pt, confirmed demographics.     Transitional Care Coordination Progress Note:  Patient discussed during interdisciplinary rounds.     Plan per Medical/Surgical team:   Pt previously used [facility]. Ok to use again if needed.   Home. No home going needs anticipated for this pt at this time.        Discharge disposition: Home. No home going needs anticipated for this pt at this time.      Potential Barriers: None    ADOD:  5/5    This TCC will continue to follow for home going needs and safe DC plan.      Stephanie Funes. BEBE. TCC.

## 2025-05-02 NOTE — CARE PLAN
The patient's goals for the shift include      The clinical goals for the shift include pt will remain hemodynamically stable      Problem: Pain - Adult  Goal: Verbalizes/displays adequate comfort level or baseline comfort level  Outcome: Progressing     Problem: Safety - Adult  Goal: Free from fall injury  Outcome: Progressing     Problem: Discharge Planning  Goal: Discharge to home or other facility with appropriate resources  Outcome: Progressing     Problem: Chronic Conditions and Co-morbidities  Goal: Patient's chronic conditions and co-morbidity symptoms are monitored and maintained or improved  Outcome: Progressing     Problem: Nutrition  Goal: Nutrient intake appropriate for maintaining nutritional needs  Outcome: Progressing

## 2025-05-02 NOTE — PROGRESS NOTES
"López Lopez is a 65 y.o. male on day 1 of admission presenting with Liver abscess ([facility]).    Subjective   Admitted for UTI and fever       Objective   Vitals:    05/02/25 0741   BP: 132/77   Pulse: 69   Resp: 16   Temp: 36.3 °C (97.3 °F)   SpO2: 97%       Physical Exam  Constitutional:       Appearance: Normal appearance.   Eyes:      Pupils: Pupils are equal, round, and reactive to light.   Cardiovascular:      Rate and Rhythm: Normal rate.   Pulmonary:      Effort: Pulmonary effort is normal. No respiratory distress.   Abdominal:      General: There is no distension.      Palpations: Abdomen is soft.      Comments: Incision clean, dry, intact   Musculoskeletal:         General: Normal range of motion.      Right lower leg: No edema.      Left lower leg: No edema.   Skin:     General: Skin is warm and dry.   Neurological:      General: No focal deficit present.      Mental Status: He is alert and oriented to person, place, and time.   Psychiatric:         Mood and Affect: Mood normal.         Behavior: Behavior normal.         Last Recorded Vitals  Blood pressure 132/77, pulse 69, temperature 36.3 °C (97.3 °F), temperature source Temporal, resp. rate 16, height 1.93 m (6' 4\"), weight 104 kg (230 lb 1.6 oz), SpO2 97%.  Intake/Output last 3 Shifts:  I/O last 3 completed shifts:  In: 700 (6.7 mL/kg) [P.O.:700]  Out: 350 (3.4 mL/kg) [Urine:350 (0.1 mL/kg/hr)]  Weight: 104.4 kg     Relevant Results  Lab Results   Component Value Date    WBC 7.0 05/02/2025    HGB 6.9 (L) 05/02/2025    HCT 21.7 (L) 05/02/2025    MCV 93 05/02/2025    PLT 86 (L) 05/02/2025     Lab Results   Component Value Date    GLUCOSE 126 (H) 05/02/2025    CALCIUM 7.5 (L) 05/02/2025     05/02/2025    K 4.1 05/02/2025    CO2 24 05/02/2025     05/02/2025    BUN 33 (H) 05/02/2025    CREATININE 2.14 (H) 05/02/2025     Lab Results   Component Value Date    ALT 8 (L) 05/02/2025    AST 8 (L) 05/02/2025     (H) 04/28/2025    ALKPHOS " 94 05/02/2025    BILITOT 1.0 05/02/2025     apixaban, 5 mg, oral, BID  aspirin, 81 mg, oral, Daily  atorvastatin, 10 mg, oral, Daily  cholecalciferol, 50 mcg, oral, Daily  fluconazole, 200 mg, oral, Daily  insulin NPH (Isophane), 10 Units, subcutaneous, q24h BRITTANEY  magnesium oxide, 800 mg, oral, Daily  magnesium sulfate, 2 g, intravenous, Once  [Held by provider] mycophenolate, 750 mg, oral, BID  pantoprazole, 40 mg, oral, Daily  [Held by provider] Pentamidine, 300 mg, inhalation, q30 days  piperacillin-tazobactam, 3.375 g, intravenous, q6h  predniSONE, 15 mg, oral, Daily  tacrolimus, 2 mg, oral, q12h BRITTANEY  tamsulosin, 0.4 mg, oral, Daily  traZODone, 25 mg, oral, Nightly  valGANciclovir, 450 mg, oral, Daily                 Assessment & Plan  Liver abscess ([facility]-HCC)      SLK 4/2/2025     Liver allograft function   Good function    Kidney allograft function   JUNE with UTI - continue antibiotics   Stent removed 3 days ago   Continue vanco/zosyn, stop vanco, stop fluc    Culture pending   Get kidney ultrasound today    Immunosuppression   Tac 2/2   Decrease /250 (infection)   Pred decrease to 10    PPX   ASA, Valcyte   Last pentamindine 4/7, start bactrim     Pleural effusion    No SOB   Will  push lasix when kidney better    Afib   Hold eliquis    I spent 35  minutes in the professional and overall care of this patient.      Eliezer Barrett MD

## 2025-05-02 NOTE — CARE PLAN
The clinical goals for the shift include patient will remain HDS throughout shift.    Problem: Safety - Adult  Goal: Free from fall injury  5/2/2025 0654 by Leanne Vu RN  Outcome: Progressing  5/2/2025 0040 by Leanne Vu RN  Outcome: Progressing     Problem: Chronic Conditions and Co-morbidities  Goal: Patient's chronic conditions and co-morbidity symptoms are monitored and maintained or improved  5/2/2025 0654 by Leanne Vu RN  Outcome: Progressing  5/2/2025 0040 by Leanne Vu RN  Outcome: Progressing     Problem: Fall/Injury  Goal: Not fall by end of shift  5/2/2025 0654 by Leanne Vu RN  Outcome: Progressing  5/2/2025 0040 by Leanne Vu RN  Outcome: Progressing  Goal: Be free from injury by end of the shift  5/2/2025 0654 by Leanne Vu RN  Outcome: Progressing  5/2/2025 0040 by Leanne Vu RN  Outcome: Progressing  Goal: Verbalize understanding of personal risk factors for fall in the hospital  5/2/2025 0654 by Leanne Vu RN  Outcome: Progressing  5/2/2025 0040 by Leanne Vu RN  Outcome: Progressing  Goal: Verbalize understanding of risk factor reduction measures to prevent injury from fall in the home  5/2/2025 0654 by Leanne Vu RN  Outcome: Progressing  5/2/2025 0040 by Leanne Vu RN  Outcome: Progressing  Goal: Use assistive devices by end of the shift  5/2/2025 0654 by Leanne Vu RN  Outcome: Progressing  5/2/2025 0040 by Leanne Vu RN  Outcome: Progressing  Goal: Pace activities to prevent fatigue by end of the shift  5/2/2025 0654 by Leanne Vu RN  Outcome: Progressing  5/2/2025 0040 by Leanne Vu RN  Outcome: Progressing

## 2025-05-02 NOTE — CONSULTS
Vancomycin Dosing by Pharmacy- INITIAL    López Lopez is a 65 y.o. year old male who Pharmacy has been consulted for vancomycin dosing for other abdominal infection. Based on the patient's indication and renal status this patient will be dosed based on a goal trough/random level of 15-20.     Renal function is currently undetermined, renal lab was collected from couple days ago.     Visit Vitals  /68 (BP Location: Right arm, Patient Position: Lying)   Pulse 103   Temp 36.4 °C (97.5 °F)        Lab Results   Component Value Date    CREATININE 1.51 (H) 2025    CREATININE 1.68 (H) 2025    CREATININE 2.03 (H) 2025    CREATININE 3.06 (H) 2025        Patient weight is as follows:   Vitals:    25 2300   Weight: 104 kg (230 lb 1.6 oz)       Cultures:  No results found for the encounter in last 14 days.        No intake/output data recorded.  I/O during current shift:  No intake/output data recorded.    Temp (24hrs), Av.4 °C (97.5 °F), Min:36.4 °C (97.5 °F), Max:36.4 °C (97.5 °F)         Assessment/Plan     Patient will not be given a loading dose.  Will initiate vancomycin maintenance, a one time dose of 2000 mg.  Follow-up level will be ordered on 5/3 at 0500 unless clinically indicated sooner.  Will continue to monitor renal function daily while on vancomycin and order serum creatinine at least every 48 hours if not already ordered.  Follow for continued vancomycin needs, clinical response, and signs/symptoms of toxicity.       Cassandra Ruiz, PharmD

## 2025-05-02 NOTE — NURSING NOTE
Pt complaining of bilateral great toe tenderness. Pt stated toes were infected prior to admission because of hang nail. Upon assessment of bilateral great toes, both appear clean, dry, and intact with tenderness upon touch. Toes slightly reddened and there is visible yellow puss in the corners of both. Pt also listed complains of numbness and tingling in bilateral feet. Pt stated he could feel my touch upon assessment of bilateral feet. Pt stated this has been happening for a few days now. Pt complained of neck numbness starting this morning as well. All complaints brought to transplant Andreia THOMPSON CNP. NP stated neck numbness could be from tacro level, but team is aware of all complaints at this time. No new orders at this time

## 2025-05-02 NOTE — PROGRESS NOTES
Pharmacy Medication History Review    López Lopez is a 65 y.o. male admitted for Liver abscess ([facility]). Pharmacy reviewed the patient's [id number] medications and allergies for accuracy.    The list below reflects the updated PTA list.   Prior to Admission Medications   Prescriptions Last Dose Informant   Pentamidine (Nebupent) 300 mg inhalation solution  Spouse/Significant Other   Sig: Inhale 300 mg every 30 (thirty) days. Dose give 4/7/2025   alcohol swabs (Alcohol Pads)  Spouse/Significant Other   Sig: Use 4-8 per day to check blood glucose and for injectable medications   alogliptin (Nesina) 12.5 mg tablet  Spouse/Significant Other   Sig: Take 1 tablet (12.5 mg) by mouth once daily. No viewable pharmacy fill history available for this medication.    apixaban (Eliquis) 5 mg tablet  Spouse/Significant Other   Sig: Take 1 tablet (5 mg) by mouth 2 times daily (morning and late afternoon).   aspirin 81 mg EC tablet  Spouse/Significant Other   Sig: Take 1 tablet (81 mg) by mouth once daily.   atorvastatin (Lipitor) 10 mg tablet  Patient is requesting a refill   Spouse/Significant Other   Sig: Take 1 tablet (10 mg) by mouth once daily.   blood sugar diagnostic (Blood Glucose Test)  Spouse/Significant Other   Sig: Check blood glucose 3 time per day   blood-glucose meter misc  Spouse/Significant Other   Sig: Use to check blood glucose   blood-glucose sensor (Dexcom G7 Sensor) device  Spouse/Significant Other   Sig: Use to check glucose, change every 10 days   blood-glucose,,cont (Dexcom G7 ) misc  Spouse/Significant Other   Sig: Use as instructed   cholecalciferol (Vitamin D-3) 50 MCG (2000 UT) tablet  Spouse/Significant Other   Sig: Take 1 tablet (2,000 Units) by mouth once daily.   fluconazole (Diflucan) 200 mg tablet  Spouse/Significant Other   Sig: Take 1 tablet (200 mg) by mouth once daily.   insulin NPH, Isophane, (HumuLIN N,NovoLIN N) 100 unit/mL (3 mL) pen  Spouse/Significant  "Other   Sig: Inject 10 Units under the skin once daily in the morning. Take as directed per insulin instructions.   lancets 33 gauge misc  Spouse/Significant Other   Si each 3 times a day.   magnesium oxide (Mag-Ox) 400 mg tablet  Spouse/Significant Other   Sig: Take 2 tablets (800 mg) by mouth once daily. Take at noon   mycophenolate (Cellcept) 250 mg capsule  Spouse/Significant Other   Sig: Take 3 capsules (750 mg) by mouth 2 times a day.   pantoprazole (Protonix) 40 mg EC tablet  Spouse/Significant Other   Sig: Take 1 tablet (40 mg) by mouth once daily. Do not crush, chew, or split.   pen needle, diabetic 31 gauge x 3/16\" needle  Spouse/Significant Other   Si each once daily.   polyethylene glycol (Glycolax, Miralax) 17 gram packet  Spouse/Significant Other   Sig: Take 17 g by mouth once daily.   predniSONE (Deltasone) 5 mg tablet  Spouse/Significant Other   Sig: Take 3 tablets (15 mg) by mouth once daily.   tacrolimus (Prograf) 1 mg capsule  Spouse/Significant Other   Sig: Take 2 capsules (2 mg) by mouth 2 times a day.   tamsulosin (Flomax) 0.4 mg 24 hr capsule  Spouse/Significant Other   Sig: Take 1 capsule (0.4 mg) by mouth once daily.   traZODone (Desyrel) 50 mg tablet  Patient is requesting a refill   Spouse/Significant Other   Sig: Take 0.5 tablets (25 mg) by mouth once daily at bedtime.   valGANciclovir (Valcyte) 450 mg tablet  Spouse/Significant Other   Sig: Take 1 tablet (450 mg) by mouth once daily. Do not crush or chew.      Facility-Administered Medications: None        The list below reflects the updated allergy list. Please review each documented allergy for additional clarification and justification.  Allergies  Reviewed by Geoff Medina PharmD on 2025        Severity Reactions Comments    Adhesive Tape-silicones Low Rash             Patient accepts M2B at discharge.     Sources used to complete the med history include:    [facility]  Pharmacy dispense history  Spouse   Good historian  Chart " Review  Care Everywhere     Below are additional concerns with the patient's PTA list.  During the interview, the patient indicated that it is best to speak with his wife, as she manages all of his home medications. I contacted the wife, who is very knowledgeable and able to provide a detailed review of the patient’s current home medications and dosages.  The wife mentioned that the patient is in need of refills for Atorvastatin and Trazodone, as he has recently run out of both medications.  Patient receives Pentamidine at the hospital.     Medications ADDED:  none  Medications CHANGED:  none  Medications REMOVED:   none    Geoff Medina PharmD  Transitions of Care Pharmacist  [facility]  Please reach out via Secure Chat for questions, or if no response call [employer] or [facility]

## 2025-05-02 NOTE — ED NOTES
Lab call for 2/4 blood cultures positive for gram negative rods. Provider made aware, patient on appropriate antibiotics. Transport at bedside.

## 2025-05-03 LAB
ALBUMIN SERPL BCP-MCNC: 3 G/DL (ref 3.4–5)
ALBUMIN SERPL BCP-MCNC: 3 G/DL (ref 3.4–5)
ALP SERPL-CCNC: 109 U/L (ref 33–136)
ALT SERPL W P-5'-P-CCNC: 8 U/L (ref 10–52)
ANION GAP SERPL CALC-SCNC: 13 MMOL/L (ref 10–20)
AST SERPL W P-5'-P-CCNC: 9 U/L (ref 9–39)
BACTERIA UR CULT: NO GROWTH
BASOPHILS # BLD AUTO: 0.02 X10*3/UL (ref 0–0.1)
BASOPHILS NFR BLD AUTO: 0.4 %
BILIRUB DIRECT SERPL-MCNC: 0.4 MG/DL (ref 0–0.3)
BILIRUB SERPL-MCNC: 0.9 MG/DL (ref 0–1.2)
BUN SERPL-MCNC: 32 MG/DL (ref 6–23)
CALCIUM SERPL-MCNC: 8.1 MG/DL (ref 8.6–10.6)
CHLORIDE SERPL-SCNC: 105 MMOL/L (ref 98–107)
CO2 SERPL-SCNC: 24 MMOL/L (ref 21–32)
CREAT SERPL-MCNC: 2.19 MG/DL (ref 0.5–1.3)
EGFRCR SERPLBLD CKD-EPI 2021: 33 ML/MIN/1.73M*2
EOSINOPHIL # BLD AUTO: 0.16 X10*3/UL (ref 0–0.7)
EOSINOPHIL NFR BLD AUTO: 3.1 %
ERYTHROCYTE [DISTWIDTH] IN BLOOD BY AUTOMATED COUNT: 16.2 % (ref 11.5–14.5)
GLUCOSE BLD MANUAL STRIP-MCNC: 130 MG/DL (ref 74–99)
GLUCOSE BLD MANUAL STRIP-MCNC: 148 MG/DL (ref 74–99)
GLUCOSE BLD MANUAL STRIP-MCNC: 157 MG/DL (ref 74–99)
GLUCOSE BLD MANUAL STRIP-MCNC: 186 MG/DL (ref 74–99)
GLUCOSE SERPL-MCNC: 99 MG/DL (ref 74–99)
HCT VFR BLD AUTO: 26 % (ref 41–52)
HGB BLD-MCNC: 8.2 G/DL (ref 13.5–17.5)
IMM GRANULOCYTES # BLD AUTO: 0.03 X10*3/UL (ref 0–0.7)
IMM GRANULOCYTES NFR BLD AUTO: 0.6 % (ref 0–0.9)
LYMPHOCYTES # BLD AUTO: 0.31 X10*3/UL (ref 1.2–4.8)
LYMPHOCYTES NFR BLD AUTO: 6.1 %
MAGNESIUM SERPL-MCNC: 1.8 MG/DL (ref 1.6–2.4)
MCH RBC QN AUTO: 29.6 PG (ref 26–34)
MCHC RBC AUTO-ENTMCNC: 31.5 G/DL (ref 32–36)
MCV RBC AUTO: 94 FL (ref 80–100)
MONOCYTES # BLD AUTO: 0.29 X10*3/UL (ref 0.1–1)
MONOCYTES NFR BLD AUTO: 5.7 %
NEUTROPHILS # BLD AUTO: 4.27 X10*3/UL (ref 1.2–7.7)
NEUTROPHILS NFR BLD AUTO: 84.1 %
NRBC BLD-RTO: 0 /100 WBCS (ref 0–0)
PHOSPHATE SERPL-MCNC: 3.8 MG/DL (ref 2.5–4.9)
PLATELET # BLD AUTO: 108 X10*3/UL (ref 150–450)
POTASSIUM SERPL-SCNC: 4.2 MMOL/L (ref 3.5–5.3)
PROT SERPL-MCNC: 5.1 G/DL (ref 6.4–8.2)
RBC # BLD AUTO: 2.77 X10*6/UL (ref 4.5–5.9)
SODIUM SERPL-SCNC: 138 MMOL/L (ref 136–145)
TACROLIMUS BLD-MCNC: 12.7 NG/ML
WBC # BLD AUTO: 5.1 X10*3/UL (ref 4.4–11.3)

## 2025-05-03 PROCEDURE — 2500000004 HC RX 250 GENERAL PHARMACY W/ HCPCS (ALT 636 FOR OP/ED)

## 2025-05-03 PROCEDURE — 2500000004 HC RX 250 GENERAL PHARMACY W/ HCPCS (ALT 636 FOR OP/ED): Mod: JZ

## 2025-05-03 PROCEDURE — 2500000001 HC RX 250 WO HCPCS SELF ADMINISTERED DRUGS (ALT 637 FOR MEDICARE OP): Performed by: PHYSICIAN ASSISTANT

## 2025-05-03 PROCEDURE — 80053 COMPREHEN METABOLIC PANEL: CPT | Performed by: PHYSICIAN ASSISTANT

## 2025-05-03 PROCEDURE — 82248 BILIRUBIN DIRECT: CPT | Performed by: PHYSICIAN ASSISTANT

## 2025-05-03 PROCEDURE — 82947 ASSAY GLUCOSE BLOOD QUANT: CPT

## 2025-05-03 PROCEDURE — 80069 RENAL FUNCTION PANEL: CPT | Performed by: PHYSICIAN ASSISTANT

## 2025-05-03 PROCEDURE — 1200000002 HC GENERAL ROOM WITH TELEMETRY DAILY

## 2025-05-03 PROCEDURE — 80197 ASSAY OF TACROLIMUS: CPT | Performed by: PHYSICIAN ASSISTANT

## 2025-05-03 PROCEDURE — 2500000004 HC RX 250 GENERAL PHARMACY W/ HCPCS (ALT 636 FOR OP/ED): Mod: JZ | Performed by: PHYSICIAN ASSISTANT

## 2025-05-03 PROCEDURE — 85025 COMPLETE CBC W/AUTO DIFF WBC: CPT | Performed by: PHYSICIAN ASSISTANT

## 2025-05-03 PROCEDURE — 83735 ASSAY OF MAGNESIUM: CPT | Performed by: PHYSICIAN ASSISTANT

## 2025-05-03 PROCEDURE — 2500000002 HC RX 250 W HCPCS SELF ADMINISTERED DRUGS (ALT 637 FOR MEDICARE OP, ALT 636 FOR OP/ED)

## 2025-05-03 PROCEDURE — 2500000002 HC RX 250 W HCPCS SELF ADMINISTERED DRUGS (ALT 637 FOR MEDICARE OP, ALT 636 FOR OP/ED): Performed by: PHYSICIAN ASSISTANT

## 2025-05-03 PROCEDURE — 36415 COLL VENOUS BLD VENIPUNCTURE: CPT | Performed by: PHYSICIAN ASSISTANT

## 2025-05-03 PROCEDURE — 82040 ASSAY OF SERUM ALBUMIN: CPT | Performed by: PHYSICIAN ASSISTANT

## 2025-05-03 PROCEDURE — 99232 SBSQ HOSP IP/OBS MODERATE 35: CPT | Performed by: SURGERY

## 2025-05-03 RX ORDER — POLYETHYLENE GLYCOL 3350 17 G/17G
17 POWDER, FOR SOLUTION ORAL DAILY
Status: DISCONTINUED | OUTPATIENT
Start: 2025-05-03 | End: 2025-05-04 | Stop reason: HOSPADM

## 2025-05-03 RX ORDER — HYDRALAZINE HYDROCHLORIDE 20 MG/ML
10 INJECTION INTRAMUSCULAR; INTRAVENOUS EVERY 6 HOURS PRN
Status: DISCONTINUED | OUTPATIENT
Start: 2025-05-03 | End: 2025-05-04 | Stop reason: HOSPADM

## 2025-05-03 RX ADMIN — PREDNISONE 10 MG: 10 TABLET ORAL at 10:15

## 2025-05-03 RX ADMIN — ATORVASTATIN CALCIUM 10 MG: 10 TABLET, FILM COATED ORAL at 10:13

## 2025-05-03 RX ADMIN — Medication 50 MCG: at 10:13

## 2025-05-03 RX ADMIN — SULFAMETHOXAZOLE AND TRIMETHOPRIM 1 TABLET: 400; 80 TABLET ORAL at 10:13

## 2025-05-03 RX ADMIN — MYCOPHENOLATE MOFETIL 250 MG: 250 CAPSULE ORAL at 10:13

## 2025-05-03 RX ADMIN — PIPERACILLIN SODIUM AND TAZOBACTAM SODIUM 3.38 G: 3; .375 INJECTION, SOLUTION INTRAVENOUS at 17:40

## 2025-05-03 RX ADMIN — PIPERACILLIN SODIUM AND TAZOBACTAM SODIUM 3.38 G: 3; .375 INJECTION, SOLUTION INTRAVENOUS at 06:06

## 2025-05-03 RX ADMIN — ASPIRIN 81 MG: 81 TABLET, COATED ORAL at 10:13

## 2025-05-03 RX ADMIN — POLYETHYLENE GLYCOL 3350 17 G: 17 POWDER, FOR SOLUTION ORAL at 12:34

## 2025-05-03 RX ADMIN — PIPERACILLIN SODIUM AND TAZOBACTAM SODIUM 3.38 G: 3; .375 INJECTION, SOLUTION INTRAVENOUS at 12:34

## 2025-05-03 RX ADMIN — TAMSULOSIN HYDROCHLORIDE 0.4 MG: 0.4 CAPSULE ORAL at 10:13

## 2025-05-03 RX ADMIN — MAGNESIUM OXIDE TAB 400 MG (241.3 MG ELEMENTAL MG) 800 MG: 400 (241.3 MG) TAB at 12:34

## 2025-05-03 RX ADMIN — PANTOPRAZOLE SODIUM 40 MG: 40 TABLET, DELAYED RELEASE ORAL at 10:14

## 2025-05-03 RX ADMIN — TACROLIMUS 2 MG: 1 CAPSULE ORAL at 06:06

## 2025-05-03 RX ADMIN — VALGANCICLOVIR HYDROCHLORIDE 450 MG: 450 TABLET ORAL at 10:13

## 2025-05-03 RX ADMIN — HYDRALAZINE HYDROCHLORIDE 10 MG: 20 INJECTION INTRAMUSCULAR; INTRAVENOUS at 13:01

## 2025-05-03 RX ADMIN — MYCOPHENOLATE MOFETIL 250 MG: 250 CAPSULE ORAL at 20:21

## 2025-05-03 RX ADMIN — TRAZODONE HYDROCHLORIDE 25 MG: 50 TABLET ORAL at 20:21

## 2025-05-03 RX ADMIN — INSULIN HUMAN 10 UNITS: 100 INJECTION, SUSPENSION SUBCUTANEOUS at 10:14

## 2025-05-03 ASSESSMENT — COGNITIVE AND FUNCTIONAL STATUS - GENERAL
STANDING UP FROM CHAIR USING ARMS: A LITTLE
DRESSING REGULAR LOWER BODY CLOTHING: A LITTLE
DRESSING REGULAR UPPER BODY CLOTHING: A LITTLE
CLIMB 3 TO 5 STEPS WITH RAILING: A LITTLE
DAILY ACTIVITIY SCORE: 21
WALKING IN HOSPITAL ROOM: A LITTLE
MOBILITY SCORE: 20
MOVING TO AND FROM BED TO CHAIR: A LITTLE
HELP NEEDED FOR BATHING: A LITTLE

## 2025-05-03 ASSESSMENT — PAIN - FUNCTIONAL ASSESSMENT: PAIN_FUNCTIONAL_ASSESSMENT: 0-10

## 2025-05-03 ASSESSMENT — PAIN SCALES - GENERAL: PAINLEVEL_OUTOF10: 0 - NO PAIN

## 2025-05-03 NOTE — PROGRESS NOTES
TRANSPLANT SURGERY PROGRESS NOTE    López Lopez underwent transplant surgery on 4/2/2025 (Liver / Kidney) and was evaluated on multidisciplinary inpatient rounds.  I specifically evaluated the management of immunosuppression to ensure adequate exposure required to decrease the risk of rejection.  Furthermore, I reviewed potential side effects including tremor, hyperglycemia, leukopenia, infection, and other neurologic changes.  I reviewed and adjusted infectious prophylaxis based on the patients clinical condition and  protocols.     24 EVENTS: readmit with UTI/bacteremia after ureteral stent pull    DIAGNOSIS:  Liver replaced by transplant Z94.4    PHYSICAL EXAMINATION:  Last Recorded Vitals  Visit Vitals  /81   Pulse 80   Temp 36.3 °C (97.3 °F)   Resp 18      Intake/Output last 3 Shifts:    Intake/Output Summary (Last 24 hours) at 5/3/2025 1258  Last data filed at 5/3/2025 1000  Gross per 24 hour   Intake 1785.42 ml   Output 1350 ml   Net 435.42 ml      Vitals:    05/01/25 2300   Weight: 104 kg (230 lb 1.6 oz)      Gen: A+OX3; NAD  HEENT: PERRL, sclera anicteric, MMM  Cardiac: RRR  Chest: Normal inspiratory effort  Abdomen: S/NT/ND. Incision well healed.  Ext: No LE edema    LABS:  Tacrolimus   Result Value Ref Range    Tacrolimus  12.7 <=15.0 ng/mL   CBC and Auto Differential   Result Value Ref Range    WBC 5.1 4.4 - 11.3 x10*3/uL    nRBC 0.0 0.0 - 0.0 /100 WBCs    RBC 2.77 (L) 4.50 - 5.90 x10*6/uL    Hemoglobin 8.2 (L) 13.5 - 17.5 g/dL    Hematocrit 26.0 (L) 41.0 - 52.0 %    MCV 94 80 - 100 fL    MCH 29.6 26.0 - 34.0 pg    MCHC 31.5 (L) 32.0 - 36.0 g/dL    RDW 16.2 (H) 11.5 - 14.5 %    Platelets 108 (L) 150 - 450 x10*3/uL     Renal Function Panel   Result Value Ref Range    Glucose 99 74 - 99 mg/dL    Sodium 138 136 - 145 mmol/L    Potassium 4.2 3.5 - 5.3 mmol/L    Chloride 105 98 - 107 mmol/L    Bicarbonate 24 21 - 32 mmol/L    Anion Gap 13 10 - 20 mmol/L    Urea Nitrogen 32 (H) 6 - 23 mg/dL     Creatinine 2.19 (H) 0.50 - 1.30 mg/dL    eGFR 33 (L) >60 mL/min/1.73m*2    Calcium 8.1 (L) 8.6 - 10.6 mg/dL    Phosphorus 3.8 2.5 - 4.9 mg/dL    Albumin 3.0 (L) 3.4 - 5.0 g/dL   Magnesium   Result Value Ref Range    Magnesium 1.80 1.60 - 2.40 mg/dL   Hepatic function panel   Result Value Ref Range    Albumin 3.0 (L) 3.4 - 5.0 g/dL    Bilirubin, Total 0.9 0.0 - 1.2 mg/dL    Bilirubin, Direct 0.4 (H) 0.0 - 0.3 mg/dL    Alkaline Phosphatase 109 33 - 136 U/L    ALT 8 (L) 10 - 52 U/L    AST 9 9 - 39 U/L    Total Protein 5.1 (L) 6.4 - 8.2 g/dL     ASSESSMENT AND PLAN:    SLK 4/2/2025      Liver allograft function              Good function     Kidney allograft function              JUNE with UTI - continue antibiotics              Culture speciation/sensitivities pending     Immunosuppression              Tacrolimus, hold PM dose and start 1.5 mg bid tomorrow               mg bid (reduced for bacteremia)              Prednisone 10 mg     PPX              ASA, Valcyte, Bactrim     Afib              Hold eliquis in case line needed for home IV abx    Case was presented at Multi Disciplinary team rounds   Attending physician, consulting physician, , pharmacist, residents and fellow were present at the meeting.    Yaa Cancino MD, PHD, MPH, FACS  Chief, Transplant and Hepatobiliary Surgery

## 2025-05-03 NOTE — CARE PLAN
The patient's goals for the shift include pt will remain comfortable throughout shift     The clinical goals for the shift include pt will remain HDS throughout shift       Problem: Pain - Adult  Goal: Verbalizes/displays adequate comfort level or baseline comfort level  Outcome: Progressing     Problem: Safety - Adult  Goal: Free from fall injury  Outcome: Progressing     Problem: Chronic Conditions and Co-morbidities  Goal: Patient's chronic conditions and co-morbidity symptoms are monitored and maintained or improved  Outcome: Progressing     Problem: Fall/Injury  Goal: Be free from injury by end of the shift  Outcome: Progressing

## 2025-05-04 ENCOUNTER — PHARMACY VISIT (OUTPATIENT)
Dept: PHARMACY | Facility: CLINIC | Age: 65
End: 2025-05-04
Payer: COMMERCIAL

## 2025-05-04 VITALS
DIASTOLIC BLOOD PRESSURE: 82 MMHG | TEMPERATURE: 96.8 F | HEIGHT: 76 IN | RESPIRATION RATE: 18 BRPM | SYSTOLIC BLOOD PRESSURE: 154 MMHG | BODY MASS INDEX: 28.38 KG/M2 | OXYGEN SATURATION: 98 % | WEIGHT: 233.1 LBS | HEART RATE: 69 BPM

## 2025-05-04 LAB
ACB COMPLEX DNA BLD POS QL NAA+NON-PROBE: NOT DETECTED
ALBUMIN SERPL BCP-MCNC: 3.2 G/DL (ref 3.4–5)
ALP SERPL-CCNC: 104 U/L (ref 33–136)
ALT SERPL W P-5'-P-CCNC: 7 U/L (ref 10–52)
ANION GAP SERPL CALC-SCNC: 14 MMOL/L (ref 10–20)
AST SERPL W P-5'-P-CCNC: 8 U/L (ref 9–39)
B FRAGILIS DNA BLD POS QL NAA+NON-PROBE: NOT DETECTED
BACTERIA BLD AEROBE CULT: ABNORMAL
BACTERIA BLD CULT: ABNORMAL
BACTERIA BLD CULT: NORMAL
BASOPHILS # BLD AUTO: 0.02 X10*3/UL (ref 0–0.1)
BASOPHILS NFR BLD AUTO: 0.5 %
BILIRUB DIRECT SERPL-MCNC: 0.4 MG/DL (ref 0–0.3)
BILIRUB SERPL-MCNC: 0.9 MG/DL (ref 0–1.2)
BIOFIRE TEST COMMENT: ABNORMAL
BLACTX-M ISLT/SPM QL: NOT DETECTED
BLAIMP ISLT/SPM QL: NOT DETECTED
BLAKPC ISLT/SPM QL: NOT DETECTED
BLAOXA-48-LIKE ISLT/SPM QL: NOT DETECTED
BLAVIM ISLT/SPM QL: NOT DETECTED
BUN SERPL-MCNC: 26 MG/DL (ref 6–23)
C ALBICANS DNA BLD POS QL NAA+NON-PROBE: NOT DETECTED
C AURIS DNA BLD POS QL NAA+NON-PROBE: NOT DETECTED
C GATTII+NEOFOR DNA BLD POS QL NAA+N-PRB: NOT DETECTED
C GLABRATA DNA BLD POS QL NAA+NON-PROBE: NOT DETECTED
C KRUSEI DNA BLD POS QL NAA+NON-PROBE: NOT DETECTED
C PARAP DNA BLD POS QL NAA+NON-PROBE: NOT DETECTED
C TROPICLS DNA BLD POS QL NAA+NON-PROBE: NOT DETECTED
CA-I BLD-SCNC: 1.22 MMOL/L (ref 1.1–1.33)
CALCIUM SERPL-MCNC: 8.5 MG/DL (ref 8.6–10.6)
CHLORIDE SERPL-SCNC: 107 MMOL/L (ref 98–107)
CO2 SERPL-SCNC: 23 MMOL/L (ref 21–32)
COLISTIN RES MCR-1 ISLT/SPM QL: NOT DETECTED
CREAT SERPL-MCNC: 2.14 MG/DL (ref 0.5–1.3)
E CLOAC COMP DNA BLD POS NAA+NON-PROBE: NOT DETECTED
E COLI DNA BLD POS QL NAA+NON-PROBE: DETECTED
E FAECALIS DNA BLD POS QL NAA+NON-PROBE: NOT DETECTED
E FAECIUM DNA BLD POS QL NAA+NON-PROBE: NOT DETECTED
EGFRCR SERPLBLD CKD-EPI 2021: 34 ML/MIN/1.73M*2
ENTEROBACTERALES DNA BLD POS NAA+N-PRB: DETECTED
EOSINOPHIL # BLD AUTO: 0.1 X10*3/UL (ref 0–0.7)
EOSINOPHIL NFR BLD AUTO: 2.6 %
ERYTHROCYTE [DISTWIDTH] IN BLOOD BY AUTOMATED COUNT: 16 % (ref 11.5–14.5)
GLUCOSE BLD MANUAL STRIP-MCNC: 145 MG/DL (ref 74–99)
GLUCOSE BLD MANUAL STRIP-MCNC: 158 MG/DL (ref 74–99)
GLUCOSE BLD MANUAL STRIP-MCNC: 96 MG/DL (ref 74–99)
GLUCOSE SERPL-MCNC: 105 MG/DL (ref 74–99)
GP B STREP DNA BLD POS QL NAA+NON-PROBE: NOT DETECTED
GRAM STN SPEC: ABNORMAL
HAEM INFLU DNA BLD POS QL NAA+NON-PROBE: NOT DETECTED
HCT VFR BLD AUTO: 24.9 % (ref 41–52)
HGB BLD-MCNC: 8.1 G/DL (ref 13.5–17.5)
IMM GRANULOCYTES # BLD AUTO: 0.02 X10*3/UL (ref 0–0.7)
IMM GRANULOCYTES NFR BLD AUTO: 0.5 % (ref 0–0.9)
K OXYTOCA DNA BLD POS QL NAA+NON-PROBE: NOT DETECTED
KLEBSIELLA SP DNA BLD POS QL NAA+NON-PRB: NOT DETECTED
KLEBSIELLA SP DNA BLD POS QL NAA+NON-PRB: NOT DETECTED
L MONOCYTOG DNA BLD POS QL NAA+NON-PROBE: NOT DETECTED
LYMPHOCYTES # BLD AUTO: 0.37 X10*3/UL (ref 1.2–4.8)
LYMPHOCYTES NFR BLD AUTO: 9.7 %
MAGNESIUM SERPL-MCNC: 1.61 MG/DL (ref 1.6–2.4)
MCH RBC QN AUTO: 29.7 PG (ref 26–34)
MCHC RBC AUTO-ENTMCNC: 32.5 G/DL (ref 32–36)
MCV RBC AUTO: 91 FL (ref 80–100)
MICROORGANISM SPEC CULT: ABNORMAL
MICROORGANISM/AGENT SPEC: ABNORMAL
MICROORGANISM/AGENT SPEC: ABNORMAL
MONOCYTES # BLD AUTO: 0.27 X10*3/UL (ref 0.1–1)
MONOCYTES NFR BLD AUTO: 7.1 %
N MEN DNA BLD POS QL NAA+NON-PROBE: NOT DETECTED
NEUTROPHILS # BLD AUTO: 3.03 X10*3/UL (ref 1.2–7.7)
NEUTROPHILS NFR BLD AUTO: 79.6 %
NRBC BLD-RTO: 0 /100 WBCS (ref 0–0)
P AERUGINOSA DNA BLD POS NAA+NON-PROBE: NOT DETECTED
PHOSPHATE SERPL-MCNC: 3.2 MG/DL (ref 2.5–4.9)
PLATELET # BLD AUTO: 114 X10*3/UL (ref 150–450)
POTASSIUM SERPL-SCNC: 3.8 MMOL/L (ref 3.5–5.3)
PROT SERPL-MCNC: 5.2 G/DL (ref 6.4–8.2)
PROTEUS SP DNA BLD POS QL NAA+NON-PROBE: NOT DETECTED
RBC # BLD AUTO: 2.73 X10*6/UL (ref 4.5–5.9)
RESISTANT GENE NDM BY PCR: NOT DETECTED
S AUREUS DNA BLD POS QL NAA+NON-PROBE: NOT DETECTED
S AUREUS+CONS DNA BLD POS NAA+NON-PROBE: NOT DETECTED
S EPIDERMIDIS DNA BLD POS QL NAA+NON-PRB: NOT DETECTED
S LUGDUNENSIS DNA BLD POS QL NAA+NON-PRB: NOT DETECTED
S MALTOPHILIA DNA BLD POS QL NAA+NON-PRB: NOT DETECTED
S MARCESCENS DNA BLD POS NAA+NON-PROBE: NOT DETECTED
S PNEUM DNA BLD POS QL NAA+NON-PROBE: NOT DETECTED
S PYO DNA BLD POS QL NAA+NON-PROBE: NOT DETECTED
SALMONELLA DNA BLD POS QL NAA+NON-PROBE: NOT DETECTED
SERVICE CMNT-IMP: ABNORMAL
SODIUM SERPL-SCNC: 140 MMOL/L (ref 136–145)
SPECIMEN DESCRIPTION: ABNORMAL
STREPTOCOCCUS DNA BLD POS NAA+NON-PROBE: NOT DETECTED
TACROLIMUS BLD-MCNC: 12.8 NG/ML
WBC # BLD AUTO: 3.8 X10*3/UL (ref 4.4–11.3)

## 2025-05-04 PROCEDURE — 2500000004 HC RX 250 GENERAL PHARMACY W/ HCPCS (ALT 636 FOR OP/ED)

## 2025-05-04 PROCEDURE — 82330 ASSAY OF CALCIUM: CPT | Performed by: PHYSICIAN ASSISTANT

## 2025-05-04 PROCEDURE — 82947 ASSAY GLUCOSE BLOOD QUANT: CPT

## 2025-05-04 PROCEDURE — 2500000004 HC RX 250 GENERAL PHARMACY W/ HCPCS (ALT 636 FOR OP/ED): Mod: JZ | Performed by: PHYSICIAN ASSISTANT

## 2025-05-04 PROCEDURE — 2500000001 HC RX 250 WO HCPCS SELF ADMINISTERED DRUGS (ALT 637 FOR MEDICARE OP): Performed by: PHYSICIAN ASSISTANT

## 2025-05-04 PROCEDURE — 80197 ASSAY OF TACROLIMUS: CPT | Performed by: PHYSICIAN ASSISTANT

## 2025-05-04 PROCEDURE — 82248 BILIRUBIN DIRECT: CPT | Performed by: PHYSICIAN ASSISTANT

## 2025-05-04 PROCEDURE — 99239 HOSP IP/OBS DSCHRG MGMT >30: CPT | Performed by: PHYSICIAN ASSISTANT

## 2025-05-04 PROCEDURE — 2500000002 HC RX 250 W HCPCS SELF ADMINISTERED DRUGS (ALT 637 FOR MEDICARE OP, ALT 636 FOR OP/ED)

## 2025-05-04 PROCEDURE — 80053 COMPREHEN METABOLIC PANEL: CPT | Performed by: PHYSICIAN ASSISTANT

## 2025-05-04 PROCEDURE — 99232 SBSQ HOSP IP/OBS MODERATE 35: CPT | Performed by: SURGERY

## 2025-05-04 PROCEDURE — 84100 ASSAY OF PHOSPHORUS: CPT | Performed by: PHYSICIAN ASSISTANT

## 2025-05-04 PROCEDURE — 2500000002 HC RX 250 W HCPCS SELF ADMINISTERED DRUGS (ALT 637 FOR MEDICARE OP, ALT 636 FOR OP/ED): Performed by: PHYSICIAN ASSISTANT

## 2025-05-04 PROCEDURE — 83735 ASSAY OF MAGNESIUM: CPT | Performed by: PHYSICIAN ASSISTANT

## 2025-05-04 PROCEDURE — RXMED WILLOW AMBULATORY MEDICATION CHARGE

## 2025-05-04 PROCEDURE — 36415 COLL VENOUS BLD VENIPUNCTURE: CPT | Performed by: PHYSICIAN ASSISTANT

## 2025-05-04 PROCEDURE — 85025 COMPLETE CBC W/AUTO DIFF WBC: CPT | Performed by: PHYSICIAN ASSISTANT

## 2025-05-04 PROCEDURE — 2500000004 HC RX 250 GENERAL PHARMACY W/ HCPCS (ALT 636 FOR OP/ED): Mod: JZ

## 2025-05-04 RX ORDER — SULFAMETHOXAZOLE AND TRIMETHOPRIM 400; 80 MG/1; MG/1
1 TABLET ORAL DAILY
Qty: 30 TABLET | Refills: 0 | Status: SHIPPED | OUTPATIENT
Start: 2025-05-05 | End: 2025-06-04

## 2025-05-04 RX ORDER — MYCOPHENOLATE MOFETIL 250 MG/1
500 CAPSULE ORAL 2 TIMES DAILY
Start: 2025-05-04 | End: 2026-04-29

## 2025-05-04 RX ORDER — POTASSIUM CHLORIDE 20 MEQ/1
20 TABLET, EXTENDED RELEASE ORAL ONCE
Status: COMPLETED | OUTPATIENT
Start: 2025-05-04 | End: 2025-05-04

## 2025-05-04 RX ORDER — CIPROFLOXACIN 250 MG/1
250 TABLET, FILM COATED ORAL 2 TIMES DAILY
Qty: 24 TABLET | Refills: 0 | Status: SHIPPED | OUTPATIENT
Start: 2025-05-04 | End: 2025-05-16

## 2025-05-04 RX ORDER — ATORVASTATIN CALCIUM 10 MG/1
10 TABLET, FILM COATED ORAL DAILY
Qty: 30 TABLET | Refills: 0 | Status: SHIPPED | OUTPATIENT
Start: 2025-05-04

## 2025-05-04 RX ORDER — MAGNESIUM SULFATE HEPTAHYDRATE 40 MG/ML
4 INJECTION, SOLUTION INTRAVENOUS ONCE
Status: COMPLETED | OUTPATIENT
Start: 2025-05-04 | End: 2025-05-04

## 2025-05-04 RX ORDER — TACROLIMUS 0.5 MG/1
0.5 CAPSULE, GELATIN COATED ORAL 2 TIMES DAILY
Qty: 60 CAPSULE | Refills: 0 | Status: SHIPPED | OUTPATIENT
Start: 2025-05-04 | End: 2025-06-03

## 2025-05-04 RX ORDER — MYCOPHENOLATE MOFETIL 250 MG/1
500 CAPSULE ORAL 2 TIMES DAILY
Status: DISCONTINUED | OUTPATIENT
Start: 2025-05-04 | End: 2025-05-04 | Stop reason: HOSPADM

## 2025-05-04 RX ORDER — TACROLIMUS 1 MG/1
1 CAPSULE ORAL 2 TIMES DAILY
Start: 2025-05-04 | End: 2026-04-29

## 2025-05-04 RX ORDER — AMLODIPINE BESYLATE 5 MG/1
5 TABLET ORAL DAILY
Start: 2025-05-04 | End: 2025-06-03

## 2025-05-04 RX ORDER — PREDNISONE 5 MG/1
5 TABLET ORAL DAILY
Start: 2025-05-04 | End: 2025-09-01

## 2025-05-04 RX ORDER — AMLODIPINE BESYLATE 5 MG/1
5 TABLET ORAL DAILY
Status: DISCONTINUED | OUTPATIENT
Start: 2025-05-04 | End: 2025-05-04

## 2025-05-04 RX ORDER — TRAZODONE HYDROCHLORIDE 50 MG/1
25 TABLET ORAL NIGHTLY
Qty: 15 TABLET | Refills: 0 | Status: SHIPPED | OUTPATIENT
Start: 2025-05-04 | End: 2025-06-03

## 2025-05-04 RX ORDER — AMLODIPINE BESYLATE 5 MG/1
5 TABLET ORAL DAILY
Status: DISCONTINUED | OUTPATIENT
Start: 2025-05-04 | End: 2025-05-04 | Stop reason: HOSPADM

## 2025-05-04 RX ORDER — AMLODIPINE BESYLATE 5 MG/1
5 TABLET ORAL DAILY
Qty: 30 TABLET | Refills: 0 | Status: SHIPPED | OUTPATIENT
Start: 2025-05-04 | End: 2025-05-04

## 2025-05-04 RX ORDER — PREDNISONE 5 MG/1
10 TABLET ORAL DAILY
Start: 2025-05-04 | End: 2025-05-04

## 2025-05-04 RX ADMIN — POTASSIUM CHLORIDE 20 MEQ: 1500 TABLET, EXTENDED RELEASE ORAL at 08:23

## 2025-05-04 RX ADMIN — TACROLIMUS 1.5 MG: 1 CAPSULE ORAL at 06:07

## 2025-05-04 RX ADMIN — MYCOPHENOLATE MOFETIL 250 MG: 250 CAPSULE ORAL at 08:22

## 2025-05-04 RX ADMIN — MAGNESIUM SULFATE HEPTAHYDRATE 4 G: 40 INJECTION, SOLUTION INTRAVENOUS at 09:44

## 2025-05-04 RX ADMIN — PREDNISONE 10 MG: 10 TABLET ORAL at 08:23

## 2025-05-04 RX ADMIN — SULFAMETHOXAZOLE AND TRIMETHOPRIM 1 TABLET: 400; 80 TABLET ORAL at 08:22

## 2025-05-04 RX ADMIN — VALGANCICLOVIR HYDROCHLORIDE 450 MG: 450 TABLET ORAL at 08:22

## 2025-05-04 RX ADMIN — Medication 50 MCG: at 08:22

## 2025-05-04 RX ADMIN — AMLODIPINE BESYLATE 5 MG: 5 TABLET ORAL at 11:39

## 2025-05-04 RX ADMIN — MAGNESIUM OXIDE TAB 400 MG (241.3 MG ELEMENTAL MG) 800 MG: 400 (241.3 MG) TAB at 08:22

## 2025-05-04 RX ADMIN — ASPIRIN 81 MG: 81 TABLET, COATED ORAL at 08:23

## 2025-05-04 RX ADMIN — PANTOPRAZOLE SODIUM 40 MG: 40 TABLET, DELAYED RELEASE ORAL at 08:23

## 2025-05-04 RX ADMIN — PIPERACILLIN SODIUM AND TAZOBACTAM SODIUM 3.38 G: 3; .375 INJECTION, SOLUTION INTRAVENOUS at 06:07

## 2025-05-04 RX ADMIN — HYDRALAZINE HYDROCHLORIDE 10 MG: 20 INJECTION INTRAMUSCULAR; INTRAVENOUS at 01:13

## 2025-05-04 RX ADMIN — INSULIN HUMAN 10 UNITS: 100 INJECTION, SUSPENSION SUBCUTANEOUS at 08:24

## 2025-05-04 RX ADMIN — ATORVASTATIN CALCIUM 10 MG: 10 TABLET, FILM COATED ORAL at 08:23

## 2025-05-04 RX ADMIN — PIPERACILLIN SODIUM AND TAZOBACTAM SODIUM 3.38 G: 3; .375 INJECTION, SOLUTION INTRAVENOUS at 11:39

## 2025-05-04 RX ADMIN — PIPERACILLIN SODIUM AND TAZOBACTAM SODIUM 3.38 G: 3; .375 INJECTION, SOLUTION INTRAVENOUS at 00:49

## 2025-05-04 RX ADMIN — TAMSULOSIN HYDROCHLORIDE 0.4 MG: 0.4 CAPSULE ORAL at 08:22

## 2025-05-04 ASSESSMENT — PAIN SCALES - GENERAL: PAINLEVEL_OUTOF10: 2

## 2025-05-04 NOTE — HOSPITAL COURSE
López Lopez is a 65 y.o. male with a hx of ESRD secondary to MASLD whom received a simultaneous liver/kidney transplant on 4/2/25. Pt was discharged 4/12/25 from his surgery. His postop coarse c/b brief vtach/afib related to PA cath placement after his liver txp, which resolved with amio. He returned to OR for DDKT POD1. Otherwise his postop course thus after was uncomplicated.      He was recently readmitted 4/14 for increasing BUN and Cr. He was rehydrated with IVF and received a unit of packed red blood cells for hgb 7.3 and was discharged on 4/17.     Patient was seen in the clinic 04/24 for a follow up and was doing well at that time. Stent removal by urology was performed on 04/29.      Today, patient is coming from an OSH for concern of UTI and liver abscess. UA with cloudy urine, moderate leukocyte esterase, and 2+ bacteria in urine. Labs showed increased levels of Scr 1.7 from 1.51, BUN 30 from 25.  from 143.    Hospital course was uneventful. He was initially started on empiric Zosyn/Vanco after pan cultures were sent off. One of the two blood cultures grew a relatively pan-sensitive E. Coli and plan was made to send him home on renally dosed PO Cipro to complete a total of 14 days of antibiotic treatment (which included 2 days of Zosyn inpatient). The other blood culture remained negative x 2 days at the time of discharge. Urine culture was negative. Other work-up this admission included a transplanted kidney DUS for an elevated creatinine that was unremarkable. His Scr remained stable, around 2.16 (from a jovita x 1 of 1.5 a week prior to admission). Urine output remained steady and he was tolerating a regular diet, maintaining adequate hydration with oral intake, ambulating independently and having BMs. His liver function was stable, his ALP normalized to 104 (from a peak of 143). Immunosuppression was managed by the transplant team. Patient is in stable condition for discharge home with no  needs. RX delivered to bedside prior to discharge. The patient will f/u in the transplant institute and have labs drawn in the walk-in clinic.

## 2025-05-04 NOTE — DISCHARGE SUMMARY
Discharge Diagnosis  Liver abscess ([facility])    Issues Requiring Follow-Up  Home on FK 1.5/1.5,  mg BID (decreased from home dose of 750 mg BID due to infection), and Prednisone 5 (decreased beginning 5/5)     BP was running high during the admission, he was started on amlodipine 5 mg with instructions to keep a log of his blood pressures to see if he really needs this med long term    Home with renally dose Cipro for a total of 2 weeks (so to take Cipro 250 mg BID x 12 days as he received two full days of Zosyn in patient)     Follow-up final blood culture results  (one remained no growth x 2 days at time of discharge)     Test Results Pending At Discharge  Pending Labs       Order Current Status    Blood Culture Preliminary result    Blood Culture Preliminary result            Hospital Course  López Lopez is a 65 y.o. male with a hx of ESRD secondary to MASLD whom received a simultaneous liver/kidney transplant on 4/2/25. Pt was discharged 4/12/25 from his surgery. His postop coarse c/b brief vtach/afib related to PA cath placement after his liver txp, which resolved with amio. He returned to OR for DDKT POD1. Otherwise his postop course thus after was uncomplicated.      He was recently readmitted 4/14 for increasing BUN and Cr. He was rehydrated with IVF and received a unit of packed red blood cells for hgb 7.3 and was discharged on 4/17.     Patient was seen in the clinic 04/24 for a follow up and was doing well at that time. Stent removal by urology was performed on 04/29.      Today, patient is coming from an OSH for concern of UTI and liver abscess. UA with cloudy urine, moderate leukocyte esterase, and 2+ bacteria in urine. Labs showed increased levels of Scr 1.7 from 1.51, BUN 30 from 25.  from 143.    Hospital course was uneventful. He was initially started on empiric Zosyn/Vanco after pan cultures were sent off. One of the two blood cultures grew a relatively pan-sensitive E. Coli and  plan was made to send him home on renally dosed PO Cipro to complete a total of 14 days of antibiotic treatment (which included 2 days of Zosyn inpatient). The other blood culture remained negative x 2 days at the time of discharge. Urine culture was negative. Other work-up this admission included a transplanted kidney DUS for an elevated creatinine that was unremarkable. His Scr remained stable, around 2.16 (from a jovita x 1 of 1.5 a week prior to admission). Urine output remained steady and he was tolerating a regular diet, maintaining adequate hydration with oral intake, ambulating independently and having BMs. His liver function was stable, his ALP normalized to 104 (from a peak of 143). Immunosuppression was managed by the transplant team. Patient is in stable condition for discharge home with no needs. RX delivered to bedside prior to discharge. The patient will f/u in the transplant institute and have labs drawn in the walk-in clinic.      Pertinent Physical Exam At Time of Discharge  Physical Exam  Constitutional:       Appearance: Normal appearance.   HENT:      Head: Normocephalic.      Mouth/Throat:      Mouth: Mucous membranes are dry.   Cardiovascular:      Rate and Rhythm: Normal rate.      Pulses: Normal pulses.   Pulmonary:      Effort: Pulmonary effort is normal.      Breath sounds: Normal breath sounds.   Abdominal:      General: Bowel sounds are normal.      Palpations: Abdomen is soft.   Musculoskeletal:         General: Normal range of motion.      Cervical back: Normal range of motion.   Neurological:      General: No focal deficit present.      Mental Status: He is alert.   Psychiatric:         Mood and Affect: Mood normal.         Home Medications     Medication List      START taking these medications     amLODIPine 5 mg tablet; Commonly known as: Norvasc; Take 1 tablet (5 mg)   by mouth once daily. Hold for a systolic blood pressure less than 140   ciprofloxacin 250 mg tablet; Commonly  "known as: Cipro; Take 1 tablet   (250 mg) by mouth 2 times a day for 12 days.   sulfamethoxazole-trimethoprim 400-80 mg tablet; Commonly known as:   Bactrim; Take 1 tablet by mouth once daily.; Start taking on: May 5, 2025     CHANGE how you take these medications     mycophenolate 250 mg capsule; Commonly known as: Cellcept; Take 2   capsules (500 mg) by mouth 2 times a day. TAKE 2 CAPSULES (500 MG) TWICE A   DAY UNTIL INSTRUCTED TO INCREASE; What changed: how much to take,   additional instructions   predniSONE 5 mg tablet; Commonly known as: Deltasone; Take 1 tablet (5   mg) by mouth once daily.; What changed: how much to take   * tacrolimus 1 mg capsule; Commonly known as: Prograf; Take 1 capsule (1   mg) by mouth 2 times a day.; What changed: how much to take   * tacrolimus 0.5 mg capsule; Commonly known as: Prograf; Take 0.5 mg by   mouth 2 times a day.; What changed: You were already taking a medication   with the same name, and this prescription was added. Make sure you   understand how and when to take each.  * This list has 2 medication(s) that are the same as other medications   prescribed for you. Read the directions carefully, and ask your doctor or   other care provider to review them with you.     CONTINUE taking these medications     alogliptin 12.5 mg tablet; Commonly known as: Nesina; Take 1 tablet   (12.5 mg) by mouth once daily.   aspirin 81 mg EC tablet; Take 1 tablet (81 mg) by mouth once daily.   atorvastatin 10 mg tablet; Commonly known as: Lipitor; Take 1 tablet (10   mg) by mouth once daily.   BD Ultra-Fine Mini Pen Needle 31 gauge x 3/16\" needle; Generic drug: pen   needle, diabetic; 1 each once daily.   Dexcom G7  misc; Generic drug: blood-glucose,,cont; Use   as instructed   Dexcom G7 Sensor device; Generic drug: blood-glucose sensor; Use to   check glucose, change every 10 days   Easy Touch Alcohol Prep Pads; Generic drug: alcohol swabs; Use 4-8 per   day to check blood " glucose and for injectable medications   Eliquis 5 mg tablet; Generic drug: apixaban; Take 1 tablet (5 mg) by   mouth 2 times daily (morning and late afternoon).   HumuLIN N NPH Insulin KwikPen 100 unit/mL (3 mL) pen; Generic drug:   insulin NPH (Isophane); Inject 10 Units under the skin once daily in the   morning. Take as directed per insulin instructions.   magnesium oxide 400 mg tablet; Commonly known as: Mag-Ox; Take 2 tablets   (800 mg) by mouth once daily. Take at noon   pantoprazole 40 mg EC tablet; Commonly known as: Protonix; Take 1 tablet   (40 mg) by mouth once daily. Do not crush, chew, or split.   polyethylene glycol 17 gram packet; Commonly known as: Glycolax, Miralax   tamsulosin 0.4 mg 24 hr capsule; Commonly known as: Flomax; Take 1   capsule (0.4 mg) by mouth once daily.   traZODone 50 mg tablet; Commonly known as: Desyrel; Take 0.5 tablets (25   mg) by mouth once daily at bedtime.   True Metrix Glucose Meter misc; Generic drug: blood-glucose meter; Use   to check blood glucose   True Metrix Glucose Test Strip; Generic drug: blood sugar diagnostic;   Check blood glucose 3 time per day   TRUEplus Lancets 33 gauge misc; Generic drug: lancets; 1 each 3 times a   day.   valGANciclovir 450 mg tablet; Commonly known as: Valcyte; Take 1 tablet   (450 mg) by mouth once daily. Do not crush or chew.   Vitamin D3 50 mcg (2,000 unit) tablet; Generic drug: cholecalciferol;   Take 1 tablet (2,000 Units) by mouth once daily.     STOP taking these medications     fluconazole 200 mg tablet; Commonly known as: Diflucan   Pentamidine 300 mg inhalation solution; Commonly known as: Nebupent       Outpatient Follow-Up  Future Appointments   Date Time Provider Department [place]   5/8/2025 10:30 AM TXP ABDOMINAL SURGEON [id number]   5/15/2025 10:00 AM TXP ABDOMINAL SURGEON [id number]   5/15/2025 10:30 AM Kathia Luna PA-C [id number]   5/22/2025 10:30 AM TXP ABDOMINAL SURGEON  [id number]   5/22/2025 11:00 AM Lynn Luna RDN, LD [id number]     I spent >30 minutes in the professional care and discharge planning of this patient.    Yaa Dover PA-C

## 2025-05-04 NOTE — PROGRESS NOTES
TRANSPLANT SURGERY PROGRESS NOTE    López Lopez underwent transplant surgery on 4/2/2025 (Liver / Kidney) and was evaluated on multidisciplinary inpatient rounds.  I specifically evaluated the management of immunosuppression to ensure adequate exposure required to decrease the risk of rejection.  Furthermore, I reviewed potential side effects including tremor, hyperglycemia, leukopenia, infection, and other neurologic changes.  I reviewed and adjusted infectious prophylaxis based on the patients clinical condition and  protocols.     24 EVENTS: no acute events    DIAGNOSIS:   Liver replaced by transplant Z94.4   Kidney replaced by transplant Z94.0     PHYSICAL EXAMINATION:  Last Recorded Vitals  Visit Vitals  BP (!) 161/91 (BP Location: Left arm, Patient Position: Lying)   Pulse 71   Temp 36.6 °C (97.9 °F) (Temporal)   Resp 18      Intake/Output last 3 Shifts:    Intake/Output Summary (Last 24 hours) at 5/4/2025 0839  Last data filed at 5/4/2025 0808  Gross per 24 hour   Intake 1370 ml   Output 1000 ml   Net 370 ml      Vitals:    05/04/25 0817   Weight: 106 kg (233 lb 1.6 oz)      Gen: A+OX3; NAD  HEENT: PERRL, sclera anicteric, MMM  Cardiac: RRR  Chest: Normal inspiratory effort  Abdomen: S/NT/ND. Incision C/D/I.  Ext: No LE edema    LABS:  CBC and Auto Differential   Result Value Ref Range    WBC 3.8 (L) 4.4 - 11.3 x10*3/uL    nRBC 0.0 0.0 - 0.0 /100 WBCs    RBC 2.73 (L) 4.50 - 5.90 x10*6/uL    Hemoglobin 8.1 (L) 13.5 - 17.5 g/dL    Hematocrit 24.9 (L) 41.0 - 52.0 %    MCV 91 80 - 100 fL    MCH 29.7 26.0 - 34.0 pg    MCHC 32.5 32.0 - 36.0 g/dL    RDW 16.0 (H) 11.5 - 14.5 %    Platelets 114 (L) 150 - 450 x10*3/uL     Hepatic function panel   Result Value Ref Range    Albumin 3.2 (L) 3.4 - 5.0 g/dL    Bilirubin, Total 0.9 0.0 - 1.2 mg/dL    Bilirubin, Direct 0.4 (H) 0.0 - 0.3 mg/dL    Alkaline Phosphatase 104 33 - 136 U/L    ALT 7 (L) 10 - 52 U/L    AST 8 (L) 9 - 39 U/L    Total Protein 5.2 (L) 6.4 -  8.2 g/dL   Phosphorus   Result Value Ref Range    Phosphorus 3.2 2.5 - 4.9 mg/dL   Basic Metabolic Panel   Result Value Ref Range    Glucose 105 (H) 74 - 99 mg/dL    Sodium 140 136 - 145 mmol/L    Potassium 3.8 3.5 - 5.3 mmol/L    Chloride 107 98 - 107 mmol/L    Bicarbonate 23 21 - 32 mmol/L    Anion Gap 14 10 - 20 mmol/L    Urea Nitrogen 26 (H) 6 - 23 mg/dL    Creatinine 2.14 (H) 0.50 - 1.30 mg/dL    eGFR 34 (L) >60 mL/min/1.73m*2    Calcium 8.5 (L) 8.6 - 10.6 mg/dL     ASSESSMENT AND PLAN:    SLK 4/2/2025      Liver allograft function              Good function     Kidney allograft function              JUNE with UTI - E. coli sensitive to cipro  Convert to PO cipro to complete 14 days total                Immunosuppression              Tacrolimus 1.5 mg bid                mg bid on discharge (reduced for bacteremia)              Reduce to Prednisone 5 mg     PPX              ASA, Valcyte, Bactrim     Afib              Resume eliquis    Dispo   Discharge today   RTC 5/8 with labs    Case was presented at Multi Disciplinary team rounds   Attending physician, consulting physician, , pharmacist, residents and fellow were present at the meeting.    Yaa Cancino MD, PHD, MPH, FACS  Chief, Transplant and Hepatobiliary Surgery

## 2025-05-04 NOTE — DISCHARGE INSTRUCTIONS
You have an infection in your blood and so your Cellcept (mycophenolate) dose was decreased to 500 mg. You should take two Cellcept (mycophenolate) tablets twice a day now (instead of 3 like you were before). You will be instructed by the transplant team when to increase the dose again.    During your hospital stay, your blood pressure was high. You were started on a small dose of amlodipine (Norvasc). You should check your blood pressure every morning BEFORE you take the amlodipine (Norvasc). If your systolic blood pressure is less than 140, you should NOT take your amlodipine (Norvasc). Please keep a log of your blood pressures at home and bring the log with you to your follow-up appointments in the transplant clinic. This will help determine if you need to stay on amlodipine (Norvasc) long term.

## 2025-05-04 NOTE — CARE PLAN
The patient's goals for the shift include  Rest    The clinical goals for the shift include Pt will remain free from falls      Problem: Pain - Adult  Goal: Verbalizes/displays adequate comfort level or baseline comfort level  Outcome: Progressing     Problem: Safety - Adult  Goal: Free from fall injury  Outcome: Progressing     Problem: Discharge Planning  Goal: Discharge to home or other facility with appropriate resources  Outcome: Progressing     Problem: Chronic Conditions and Co-morbidities  Goal: Patient's chronic conditions and co-morbidity symptoms are monitored and maintained or improved  Outcome: Progressing     Problem: Nutrition  Goal: Nutrient intake appropriate for maintaining nutritional needs  Outcome: Progressing     Problem: Fall/Injury  Goal: Not fall by end of shift  Outcome: Progressing  Goal: Be free from injury by end of the shift  Outcome: Progressing  Goal: Verbalize understanding of personal risk factors for fall in the hospital  Outcome: Progressing  Goal: Verbalize understanding of risk factor reduction measures to prevent injury from fall in the home  Outcome: Progressing  Goal: Use assistive devices by end of the shift  Outcome: Progressing  Goal: Pace activities to prevent fatigue by end of the shift  Outcome: Progressing     Problem: Diabetes  Goal: Achieve decreasing blood glucose levels by end of shift  Outcome: Progressing  Goal: Increase stability of blood glucose readings by end of shift  Outcome: Progressing  Goal: Decrease in ketones present in urine by end of shift  Outcome: Progressing  Goal: Maintain electrolyte levels within acceptable range throughout shift  Outcome: Progressing  Goal: Maintain glucose levels >70mg/dl to <250mg/dl throughout shift  Outcome: Progressing  Goal: No changes in neurological exam by end of shift  Outcome: Progressing  Goal: Learn about and adhere to nutrition recommendations by end of shift  Outcome: Progressing  Goal: Vital signs within normal  range for age by end of shift  Outcome: Progressing  Goal: Increase self care and/or family involovement by end of shift  Outcome: Progressing  Goal: Receive DSME education by end of shift  Outcome: Progressing

## 2025-05-05 DIAGNOSIS — Z94.4 LIVER REPLACED BY TRANSPLANT (MULTI): ICD-10-CM

## 2025-05-05 LAB — TACROLIMUS BLD-MCNC: 6 NG/ML

## 2025-05-06 DIAGNOSIS — Z94.4 LIVER REPLACED BY TRANSPLANT (MULTI): ICD-10-CM

## 2025-05-06 LAB
BACTERIA BLD AEROBE CULT: ABNORMAL
BACTERIA BLD CULT: ABNORMAL
BACTERIA BLD CULT: NORMAL
GRAM STN SPEC: ABNORMAL

## 2025-05-07 ENCOUNTER — TELEPHONE (OUTPATIENT)
Facility: HOSPITAL | Age: 65
End: 2025-05-07
Payer: COMMERCIAL

## 2025-05-07 ENCOUNTER — APPOINTMENT (OUTPATIENT)
Facility: HOSPITAL | Age: 65
End: 2025-05-07
Payer: COMMERCIAL

## 2025-05-08 ENCOUNTER — OFFICE VISIT (OUTPATIENT)
Facility: HOSPITAL | Age: 65
End: 2025-05-08
Payer: COMMERCIAL

## 2025-05-08 ENCOUNTER — TELEPHONE (OUTPATIENT)
Facility: HOSPITAL | Age: 65
End: 2025-05-08

## 2025-05-08 ENCOUNTER — APPOINTMENT (OUTPATIENT)
Facility: HOSPITAL | Age: 65
End: 2025-05-08
Payer: COMMERCIAL

## 2025-05-08 ENCOUNTER — NURSE ONLY (OUTPATIENT)
Facility: HOSPITAL | Age: 65
End: 2025-05-08
Payer: COMMERCIAL

## 2025-05-08 VITALS
SYSTOLIC BLOOD PRESSURE: 151 MMHG | WEIGHT: 225.3 LBS | DIASTOLIC BLOOD PRESSURE: 81 MMHG | HEART RATE: 93 BPM | TEMPERATURE: 97.6 F | BODY MASS INDEX: 27.42 KG/M2 | OXYGEN SATURATION: 97 %

## 2025-05-08 DIAGNOSIS — I10 HYPERTENSION, UNSPECIFIED TYPE: ICD-10-CM

## 2025-05-08 DIAGNOSIS — E78.2 MIXED HYPERLIPIDEMIA: ICD-10-CM

## 2025-05-08 DIAGNOSIS — B96.20 E COLI BACTEREMIA: ICD-10-CM

## 2025-05-08 DIAGNOSIS — Z94.4 LIVER REPLACED BY TRANSPLANT (MULTI): ICD-10-CM

## 2025-05-08 DIAGNOSIS — R78.81 E COLI BACTEREMIA: ICD-10-CM

## 2025-05-08 DIAGNOSIS — Z94.4 LIVER TRANSPLANT RECIPIENT (MULTI): ICD-10-CM

## 2025-05-08 LAB
ALBUMIN SERPL BCP-MCNC: 3.8 G/DL (ref 3.4–5)
ALP SERPL-CCNC: 126 U/L (ref 33–136)
ALT SERPL W P-5'-P-CCNC: 12 U/L (ref 10–52)
ANION GAP SERPL CALC-SCNC: 14 MMOL/L (ref 10–20)
AST SERPL W P-5'-P-CCNC: 13 U/L (ref 9–39)
BASOPHILS # BLD AUTO: 0.06 X10*3/UL (ref 0–0.1)
BASOPHILS NFR BLD AUTO: 1.1 %
BILIRUB DIRECT SERPL-MCNC: 0.3 MG/DL (ref 0–0.3)
BILIRUB SERPL-MCNC: 0.9 MG/DL (ref 0–1.2)
BUN SERPL-MCNC: 17 MG/DL (ref 6–23)
CALCIUM SERPL-MCNC: 9 MG/DL (ref 8.6–10.6)
CHLORIDE SERPL-SCNC: 107 MMOL/L (ref 98–107)
CO2 SERPL-SCNC: 26 MMOL/L (ref 21–32)
CREAT SERPL-MCNC: 1.93 MG/DL (ref 0.5–1.3)
EGFRCR SERPLBLD CKD-EPI 2021: 38 ML/MIN/1.73M*2
EOSINOPHIL # BLD AUTO: 0.17 X10*3/UL (ref 0–0.7)
EOSINOPHIL NFR BLD AUTO: 3 %
ERYTHROCYTE [DISTWIDTH] IN BLOOD BY AUTOMATED COUNT: 16.2 % (ref 11.5–14.5)
GGT SERPL-CCNC: 96 U/L (ref 5–64)
GLUCOSE SERPL-MCNC: 109 MG/DL (ref 74–99)
HCT VFR BLD AUTO: 28.6 % (ref 41–52)
HGB BLD-MCNC: 9 G/DL (ref 13.5–17.5)
IMM GRANULOCYTES # BLD AUTO: 0.15 X10*3/UL (ref 0–0.7)
IMM GRANULOCYTES NFR BLD AUTO: 2.7 % (ref 0–0.9)
LYMPHOCYTES # BLD AUTO: 1.01 X10*3/UL (ref 1.2–4.8)
LYMPHOCYTES NFR BLD AUTO: 17.8 %
MAGNESIUM SERPL-MCNC: 1.53 MG/DL (ref 1.6–2.4)
MCH RBC QN AUTO: 29 PG (ref 26–34)
MCHC RBC AUTO-ENTMCNC: 31.5 G/DL (ref 32–36)
MCV RBC AUTO: 92 FL (ref 80–100)
MONOCYTES # BLD AUTO: 0.35 X10*3/UL (ref 0.1–1)
MONOCYTES NFR BLD AUTO: 6.2 %
NEUTROPHILS # BLD AUTO: 3.92 X10*3/UL (ref 1.2–7.7)
NEUTROPHILS NFR BLD AUTO: 69.2 %
NRBC BLD-RTO: 0 /100 WBCS (ref 0–0)
PHOSPHATE SERPL-MCNC: 3.8 MG/DL (ref 2.5–4.9)
PLATELET # BLD AUTO: 188 X10*3/UL (ref 150–450)
POTASSIUM SERPL-SCNC: 4.7 MMOL/L (ref 3.5–5.3)
PROT SERPL-MCNC: 6.2 G/DL (ref 6.4–8.2)
RBC # BLD AUTO: 3.1 X10*6/UL (ref 4.5–5.9)
SODIUM SERPL-SCNC: 142 MMOL/L (ref 136–145)
TACROLIMUS BLD-MCNC: 10.3 NG/ML
WBC # BLD AUTO: 5.7 X10*3/UL (ref 4.4–11.3)

## 2025-05-08 PROCEDURE — 80048 BASIC METABOLIC PNL TOTAL CA: CPT | Performed by: TRANSPLANT SURGERY

## 2025-05-08 PROCEDURE — 82977 ASSAY OF GGT: CPT | Performed by: TRANSPLANT SURGERY

## 2025-05-08 PROCEDURE — 99214 OFFICE O/P EST MOD 30 MIN: CPT | Mod: 24

## 2025-05-08 PROCEDURE — 36415 COLL VENOUS BLD VENIPUNCTURE: CPT

## 2025-05-08 PROCEDURE — 80197 ASSAY OF TACROLIMUS: CPT | Performed by: TRANSPLANT SURGERY

## 2025-05-08 PROCEDURE — 85025 COMPLETE CBC W/AUTO DIFF WBC: CPT | Performed by: TRANSPLANT SURGERY

## 2025-05-08 PROCEDURE — 87040 BLOOD CULTURE FOR BACTERIA: CPT | Performed by: SURGERY

## 2025-05-08 PROCEDURE — 87801 DETECT AGNT MULT DNA AMPLI: CPT | Performed by: TRANSPLANT SURGERY

## 2025-05-08 PROCEDURE — 83735 ASSAY OF MAGNESIUM: CPT | Performed by: TRANSPLANT SURGERY

## 2025-05-08 PROCEDURE — 82248 BILIRUBIN DIRECT: CPT | Performed by: TRANSPLANT SURGERY

## 2025-05-08 PROCEDURE — 84100 ASSAY OF PHOSPHORUS: CPT | Performed by: TRANSPLANT SURGERY

## 2025-05-08 RX ORDER — AMLODIPINE BESYLATE 5 MG/1
5 TABLET ORAL DAILY
Qty: 30 TABLET | Refills: 11 | Status: SHIPPED | OUTPATIENT
Start: 2025-05-08 | End: 2026-05-03

## 2025-05-08 RX ORDER — VALGANCICLOVIR 450 MG/1
450 TABLET, FILM COATED ORAL DAILY
Qty: 30 TABLET | Refills: 2 | Status: SHIPPED | OUTPATIENT
Start: 2025-05-08 | End: 2025-08-06

## 2025-05-08 RX ORDER — ATORVASTATIN CALCIUM 10 MG/1
10 TABLET, FILM COATED ORAL DAILY
Qty: 30 TABLET | Refills: 3 | Status: SHIPPED | OUTPATIENT
Start: 2025-05-08

## 2025-05-08 RX ORDER — CIPROFLOXACIN 250 MG/1
500 TABLET, FILM COATED ORAL 2 TIMES DAILY
Qty: 20 TABLET | Refills: 0 | Status: CANCELLED | OUTPATIENT
Start: 2025-05-08 | End: 2025-05-13

## 2025-05-08 RX ORDER — SULFAMETHOXAZOLE AND TRIMETHOPRIM 400; 80 MG/1; MG/1
1 TABLET ORAL DAILY
Qty: 30 TABLET | Refills: 1 | Status: SHIPPED | OUTPATIENT
Start: 2025-05-08 | End: 2025-07-07

## 2025-05-08 RX ORDER — CHOLECALCIFEROL (VITAMIN D3) 50 MCG
50 TABLET ORAL DAILY
Qty: 30 TABLET | Refills: 11 | Status: SHIPPED | OUTPATIENT
Start: 2025-05-08 | End: 2026-05-03

## 2025-05-08 ASSESSMENT — PAIN SCALES - GENERAL: PAINLEVEL_OUTOF10: 0-NO PAIN

## 2025-05-08 NOTE — TELEPHONE ENCOUNTER
RN called to speak to pt, per Dr Snell kidney function good please increase cipro to 500mg BID    Pt verbalized understanding of all instructions and repeated change back to RN

## 2025-05-08 NOTE — PATIENT INSTRUCTIONS
Return to clinic in two weeks  Labs twice per week on Monday and Thursday  Increase fluid intake  Please schedule an appointment with podiatry  Please begin working with a physical therapist

## 2025-05-08 NOTE — PROGRESS NOTES
TRANSPLANT SURGERY FOLLOW UP    Reason for visit:    López Lopez underwent transplant surgery,  4/2/2025 (Liver / Kidney), and returns to clinic for follow up evaluation focusing on their current immunosuppression. Specifically, López Lopez's regiment was evaluated to ensure adequate levels to prevent life threatening or organ function impairing rejection while not increasing risk of adverse effects including malignancy, infection, bone health, or accelerated cardiovascular disease.  I reviewed common side effects including tremor, hair loss, GI toxicity, and agitation.  The patient reported that they were experiencing: numbness and tremor.    The long-term management of maintenance immunosuppression in organ transplant recipients remains complex given differences in clinical characteristics, comorbid conditions, and prior rejection episodes.  These factors were evaluated at this visit and used in formulating this plan of care. Immunosuppression following the transplant is medically necessary to closely monitor and to reduce the risk of post-operative complications distinct from the post operative management of the transplant surgical procedure.     Interval history  Admitted for bacteremia. Improved and d/cd'd on cipro. Also had tac toxicity. Liver function excellent.     Problem List[1]    Medications:      Current Outpatient Medications   Medication Instructions    alcohol swabs (Alcohol Pads) Use 4-8 per day to check blood glucose and for injectable medications    alogliptin (NESINA) 12.5 mg, oral, Daily    amLODIPine (NORVASC) 5 mg, oral, Daily, Hold for a systolic blood pressure less than 140    aspirin 81 mg, oral, Daily    atorvastatin (LIPITOR) 10 mg, oral, Daily    blood sugar diagnostic (Blood Glucose Test) Check blood glucose 3 time per day    blood-glucose meter misc Use to check blood glucose    blood-glucose sensor (Dexcom G7 Sensor) device Use to check glucose, change every 10 days  "   blood-glucose,,cont (Dexcom G7 ) misc Use as instructed    cholecalciferol (VITAMIN D-3) 50 mcg, oral, Daily    ciprofloxacin (CIPRO) 250 mg, oral, 2 times daily    Eliquis 5 mg, oral, 2 times daily (morning and late afternoon)    HumuLIN N NPH Insulin KwikPen 10 Units, subcutaneous, Every morning, Take as directed per insulin instructions.    lancets 33 gauge misc 1 each, miscellaneous, 3 times daily    magnesium oxide (MAG-OX) 800 mg, oral, Daily, Take at noon    mycophenolate (CELLCEPT) 500 mg, oral, 2 times daily, TAKE 2 CAPSULES (500 MG) TWICE A DAY UNTIL INSTRUCTED TO INCREASE    pantoprazole (PROTONIX) 40 mg, oral, Daily, Do not crush, chew, or split.    pen needle, diabetic 31 gauge x 3/16\" needle 1 each, miscellaneous, Daily    polyethylene glycol (GLYCOLAX, MIRALAX) 17 g, Daily    predniSONE (DELTASONE) 5 mg, oral, Daily    sulfamethoxazole-trimethoprim (Bactrim) 400-80 mg tablet 1 tablet, oral, Daily    tacrolimus (PROGRAF) 1 mg, oral, 2 times daily    tacrolimus (PROGRAF) 0.5 mg, oral, 2 times daily    tamsulosin (FLOMAX) 0.4 mg, oral, Daily    traZODone (DESYREL) 25 mg, oral, Nightly    valGANciclovir (VALCYTE) 450 mg, oral, Daily, Do not crush or chew.       Physical Exam  Vitals:    05/08/25 1030   BP: 151/81   Pulse: 93   Temp: 36.4 °C (97.6 °F)   SpO2: 97%          Physical Exam  HENT:      Head: Normocephalic.   Cardiovascular:      Rate and Rhythm: Normal rate.      Pulses: Normal pulses.   Pulmonary:      Effort: Pulmonary effort is normal.      Breath sounds: Normal breath sounds.   Abdominal:      Palpations: Abdomen is soft.      Comments: Right upper quadrant incision well.  LLQ kidney incision well healed.   Musculoskeletal:      Comments: RLE weakness   Skin:     General: Skin is warm.      Capillary Refill: Capillary refill takes less than 2 seconds.   Neurological:      General: No focal deficit present.      Mental Status: He is alert.      Comments: + bilateral hand " tremor   Psychiatric:         Mood and Affect: Mood normal.         ALLERGY:  Allergies[2]    LABS:  Results for orders placed or performed in visit on 05/06/25 (from the past 24 hours)   Basic Metabolic Panel   Result Value Ref Range    Glucose 109 (H) 74 - 99 mg/dL    Sodium 142 136 - 145 mmol/L    Potassium 4.7 3.5 - 5.3 mmol/L    Chloride 107 98 - 107 mmol/L    Bicarbonate 26 21 - 32 mmol/L    Anion Gap 14 10 - 20 mmol/L    Urea Nitrogen 17 6 - 23 mg/dL    Creatinine 1.93 (H) 0.50 - 1.30 mg/dL    eGFR 38 (L) >60 mL/min/1.73m*2    Calcium 9.0 8.6 - 10.6 mg/dL   CBC and Auto Differential   Result Value Ref Range    WBC 5.7 4.4 - 11.3 x10*3/uL    nRBC 0.0 0.0 - 0.0 /100 WBCs    RBC 3.10 (L) 4.50 - 5.90 x10*6/uL    Hemoglobin 9.0 (L) 13.5 - 17.5 g/dL    Hematocrit 28.6 (L) 41.0 - 52.0 %    MCV 92 80 - 100 fL    MCH 29.0 26.0 - 34.0 pg    MCHC 31.5 (L) 32.0 - 36.0 g/dL    RDW 16.2 (H) 11.5 - 14.5 %    Platelets 188 150 - 450 x10*3/uL    Neutrophils % 69.2 40.0 - 80.0 %    Immature Granulocytes %, Automated 2.7 (H) 0.0 - 0.9 %    Lymphocytes % 17.8 13.0 - 44.0 %    Monocytes % 6.2 2.0 - 10.0 %    Eosinophils % 3.0 0.0 - 6.0 %    Basophils % 1.1 0.0 - 2.0 %    Neutrophils Absolute 3.92 1.20 - 7.70 x10*3/uL    Immature Granulocytes Absolute, Automated 0.15 0.00 - 0.70 x10*3/uL    Lymphocytes Absolute 1.01 (L) 1.20 - 4.80 x10*3/uL    Monocytes Absolute 0.35 0.10 - 1.00 x10*3/uL    Eosinophils Absolute 0.17 0.00 - 0.70 x10*3/uL    Basophils Absolute 0.06 0.00 - 0.10 x10*3/uL   Magnesium   Result Value Ref Range    Magnesium 1.53 (L) 1.60 - 2.40 mg/dL   Hepatic Function Panel   Result Value Ref Range    Albumin      Bilirubin, Total      Bilirubin, Direct      Alkaline Phosphatase      ALT      AST      Total Protein     Gamma-Glutamyl Transferase   Result Value Ref Range    GGT     Phosphorus   Result Value Ref Range    Phosphorus 3.8 2.5 - 4.9 mg/dL      Lab Results   Component Value Date    ALT 7 (L) 05/04/2025    AST 8  (L) 05/04/2025     (H) 04/28/2025    ALKPHOS 104 05/04/2025    BILITOT 0.9 05/04/2025         ASSESSMENT AND PLAN:    Mr. Loepz is a 65 y.o. male who underwent  transplant surgery on 4/2/2025 (Liver / Kidney).    1. Immunosuppression reviewed and adjusted       Tacrolimus: Yes - 1.5 mg po BID.  Likely still high. Level pending       Mycophenolate: Yes - 500 mg po BID      Prednisone: Yes - 5 mg       Belatacept: No     2. Prophylaxis adherence protocol to prevent immunosuppression related infection      Valcyte: Yes - 450 mg po daily      PCP prophylaxis: Transplant Prophylactics: Bactrim    4. Hypertension         Blood Pressures         5/8/2025  1030             BP: 151/81              Current antihypertensives were evaluated        5. Wound/LISA      Ridgely ready for removal: N/A      LISA:  N/A    6.  GI prophylaxis        On PPI     7.  Follow up:       Labs  2 times per week       RTC: 2 week(s)    I spent a total of 40 min providing care for this patient including record review, examination, face to face counseling, and documentation.     Jason Snell MD    Transplant Surgery Attending         [1]   Patient Active Problem List  Diagnosis    Acute idiopathic thrombocytopenic purpura (Multi)    Anemia    History of CAD (coronary artery disease)    Mixed hyperlipidemia    Paroxysmal atrial fibrillation (Multi)    Type 2 diabetes mellitus with hyperglycemia, without long-term current use of insulin    Liver failure without hepatic coma (Multi)    Metabolic dysfunction-associated steatohepatitis (MASH)    Status post insertion of drug-eluting stent into left anterior descending artery for coronary artery disease    Type 2 diabetes mellitus with diabetic chronic kidney disease    History of percutaneous transluminal coronary angioplasty    Coronary artery disease involving native coronary artery of native heart    Pre-liver transplant, listed    Pre-kidney transplant, listed    Refractory ascites     Alcoholic cirrhosis of liver with ascites (Multi)    Stage 4 chronic kidney disease (Multi)    Chronic renal impairment, stage 4 (severe) (Multi)    CKD (chronic kidney disease) stage 4, GFR 15-29 ml/min (Multi)    Acute kidney injury    Liver abscess ([facility]CC)   [2]   Allergies  Allergen Reactions    Adhesive Tape-Silicones Rash

## 2025-05-09 DIAGNOSIS — Z94.4 LIVER REPLACED BY TRANSPLANT (MULTI): ICD-10-CM

## 2025-05-11 LAB — HIV1 RNA SERPL DONR QL PROBE AMP: NORMAL

## 2025-05-12 LAB
BACTERIA BLD CULT: NORMAL
BACTERIA BLD CULT: NORMAL

## 2025-05-12 PROCEDURE — 36415 COLL VENOUS BLD VENIPUNCTURE: CPT

## 2025-05-12 PROCEDURE — 80197 ASSAY OF TACROLIMUS: CPT

## 2025-05-12 PROCEDURE — 82248 BILIRUBIN DIRECT: CPT

## 2025-05-12 PROCEDURE — 80053 COMPREHEN METABOLIC PANEL: CPT

## 2025-05-12 PROCEDURE — 85025 COMPLETE CBC W/AUTO DIFF WBC: CPT

## 2025-05-12 PROCEDURE — 83735 ASSAY OF MAGNESIUM: CPT

## 2025-05-12 PROCEDURE — 82977 ASSAY OF GGT: CPT

## 2025-05-12 PROCEDURE — 84100 ASSAY OF PHOSPHORUS: CPT

## 2025-05-13 ENCOUNTER — LAB (OUTPATIENT)
Dept: LAB | Facility: HOSPITAL | Age: 65
End: 2025-05-13
Payer: COMMERCIAL

## 2025-05-13 DIAGNOSIS — Z94.4 LIVER TRANSPLANT STATUS: Primary | ICD-10-CM

## 2025-05-13 DIAGNOSIS — Z94.4 LIVER REPLACED BY TRANSPLANT (MULTI): ICD-10-CM

## 2025-05-13 LAB
ALBUMIN SERPL BCP-MCNC: 3.9 G/DL (ref 3.4–5)
ALP SERPL-CCNC: 124 U/L (ref 33–136)
ALT SERPL W P-5'-P-CCNC: 10 U/L (ref 10–52)
ANION GAP SERPL CALC-SCNC: 13 MMOL/L (ref 10–20)
AST SERPL W P-5'-P-CCNC: 12 U/L (ref 9–39)
BASOPHILS # BLD AUTO: 0.1 X10*3/UL (ref 0–0.1)
BASOPHILS NFR BLD AUTO: 1.6 %
BILIRUB DIRECT SERPL-MCNC: 0.2 MG/DL (ref 0–0.3)
BILIRUB SERPL-MCNC: 0.7 MG/DL (ref 0–1.2)
BUN SERPL-MCNC: 20 MG/DL (ref 6–23)
CALCIUM SERPL-MCNC: 9.3 MG/DL (ref 8.6–10.3)
CHLORIDE SERPL-SCNC: 108 MMOL/L (ref 98–107)
CO2 SERPL-SCNC: 25 MMOL/L (ref 21–32)
CREAT SERPL-MCNC: 2.18 MG/DL (ref 0.5–1.3)
EGFRCR SERPLBLD CKD-EPI 2021: 33 ML/MIN/1.73M*2
EOSINOPHIL # BLD AUTO: 0.09 X10*3/UL (ref 0–0.7)
EOSINOPHIL NFR BLD AUTO: 1.4 %
ERYTHROCYTE [DISTWIDTH] IN BLOOD BY AUTOMATED COUNT: 17.8 % (ref 11.5–14.5)
GGT SERPL-CCNC: 93 U/L (ref 5–64)
GLUCOSE SERPL-MCNC: 91 MG/DL (ref 74–99)
HCT VFR BLD AUTO: 30 % (ref 41–52)
HGB BLD-MCNC: 9.3 G/DL (ref 13.5–17.5)
IMM GRANULOCYTES # BLD AUTO: 0.05 X10*3/UL (ref 0–0.7)
IMM GRANULOCYTES NFR BLD AUTO: 0.8 % (ref 0–0.9)
LYMPHOCYTES # BLD AUTO: 1.09 X10*3/UL (ref 1.2–4.8)
LYMPHOCYTES NFR BLD AUTO: 17 %
MAGNESIUM SERPL-MCNC: 1.61 MG/DL (ref 1.6–2.4)
MCH RBC QN AUTO: 29.7 PG (ref 26–34)
MCHC RBC AUTO-ENTMCNC: 31 G/DL (ref 32–36)
MCV RBC AUTO: 96 FL (ref 80–100)
MONOCYTES # BLD AUTO: 0.27 X10*3/UL (ref 0.1–1)
MONOCYTES NFR BLD AUTO: 4.2 %
NEUTROPHILS # BLD AUTO: 4.82 X10*3/UL (ref 1.2–7.7)
NEUTROPHILS NFR BLD AUTO: 75 %
NRBC BLD-RTO: 0 /100 WBCS (ref 0–0)
PHOSPHATE SERPL-MCNC: 4.2 MG/DL (ref 2.5–4.9)
PLATELET # BLD AUTO: 235 X10*3/UL (ref 150–450)
POTASSIUM SERPL-SCNC: 4.8 MMOL/L (ref 3.5–5.3)
PROT SERPL-MCNC: 6 G/DL (ref 6.4–8.2)
RBC # BLD AUTO: 3.13 X10*6/UL (ref 4.5–5.9)
SODIUM SERPL-SCNC: 141 MMOL/L (ref 136–145)
TACROLIMUS BLD-MCNC: 10.9 NG/ML
WBC # BLD AUTO: 6.4 X10*3/UL (ref 4.4–11.3)

## 2025-05-15 ENCOUNTER — OFFICE VISIT (OUTPATIENT)
Facility: HOSPITAL | Age: 65
End: 2025-05-15
Payer: COMMERCIAL

## 2025-05-15 ENCOUNTER — APPOINTMENT (OUTPATIENT)
Facility: HOSPITAL | Age: 65
End: 2025-05-15
Payer: COMMERCIAL

## 2025-05-15 ENCOUNTER — NURSE ONLY (OUTPATIENT)
Facility: HOSPITAL | Age: 65
End: 2025-05-15
Payer: COMMERCIAL

## 2025-05-15 VITALS
HEART RATE: 81 BPM | SYSTOLIC BLOOD PRESSURE: 155 MMHG | DIASTOLIC BLOOD PRESSURE: 75 MMHG | OXYGEN SATURATION: 98 % | WEIGHT: 219.5 LBS | BODY MASS INDEX: 26.72 KG/M2 | TEMPERATURE: 97.9 F

## 2025-05-15 DIAGNOSIS — Z79.4 TYPE 2 DIABETES MELLITUS WITH CHRONIC KIDNEY DISEASE, WITH LONG-TERM CURRENT USE OF INSULIN, UNSPECIFIED CKD STAGE (MULTI): Primary | ICD-10-CM

## 2025-05-15 DIAGNOSIS — Z94.0 KIDNEY TRANSPLANT RECIPIENT (HHS-HCC): ICD-10-CM

## 2025-05-15 DIAGNOSIS — Z94.4 LIVER TRANSPLANT RECIPIENT (MULTI): ICD-10-CM

## 2025-05-15 DIAGNOSIS — E11.22 TYPE 2 DIABETES MELLITUS WITH CHRONIC KIDNEY DISEASE, WITH LONG-TERM CURRENT USE OF INSULIN, UNSPECIFIED CKD STAGE (MULTI): Primary | ICD-10-CM

## 2025-05-15 DIAGNOSIS — Z94.0 KIDNEY REPLACED BY TRANSPLANT (HHS-HCC): ICD-10-CM

## 2025-05-15 DIAGNOSIS — E11.65 TYPE 2 DIABETES MELLITUS WITH HYPERGLYCEMIA, WITHOUT LONG-TERM CURRENT USE OF INSULIN: ICD-10-CM

## 2025-05-15 DIAGNOSIS — Z94.4 LIVER REPLACED BY TRANSPLANT (MULTI): ICD-10-CM

## 2025-05-15 LAB
ALBUMIN SERPL BCP-MCNC: 4.3 G/DL (ref 3.4–5)
ALP SERPL-CCNC: 158 U/L (ref 33–136)
ALT SERPL W P-5'-P-CCNC: 11 U/L (ref 10–52)
ANION GAP SERPL CALC-SCNC: 13 MMOL/L (ref 10–20)
AST SERPL W P-5'-P-CCNC: 12 U/L (ref 9–39)
BASOPHILS # BLD AUTO: 0.07 X10*3/UL (ref 0–0.1)
BASOPHILS NFR BLD AUTO: 0.9 %
BILIRUB DIRECT SERPL-MCNC: 0.2 MG/DL (ref 0–0.3)
BILIRUB SERPL-MCNC: 0.6 MG/DL (ref 0–1.2)
BUN SERPL-MCNC: 29 MG/DL (ref 6–23)
CALCIUM SERPL-MCNC: 9.6 MG/DL (ref 8.6–10.6)
CHLORIDE SERPL-SCNC: 106 MMOL/L (ref 98–107)
CO2 SERPL-SCNC: 27 MMOL/L (ref 21–32)
CREAT SERPL-MCNC: 1.96 MG/DL (ref 0.5–1.3)
CREAT UR-MCNC: 62.2 MG/DL (ref 20–370)
EGFRCR SERPLBLD CKD-EPI 2021: 38 ML/MIN/1.73M*2
EOSINOPHIL # BLD AUTO: 0.08 X10*3/UL (ref 0–0.7)
EOSINOPHIL NFR BLD AUTO: 1 %
ERYTHROCYTE [DISTWIDTH] IN BLOOD BY AUTOMATED COUNT: 17.2 % (ref 11.5–14.5)
EST. AVERAGE GLUCOSE BLD GHB EST-MCNC: 80 MG/DL
GGT SERPL-CCNC: 101 U/L (ref 5–64)
GLUCOSE SERPL-MCNC: 100 MG/DL (ref 74–99)
HBA1C MFR BLD: 4.4 % (ref ?–5.7)
HCT VFR BLD AUTO: 31 % (ref 41–52)
HGB BLD-MCNC: 9.6 G/DL (ref 13.5–17.5)
IMM GRANULOCYTES # BLD AUTO: 0.05 X10*3/UL (ref 0–0.7)
IMM GRANULOCYTES NFR BLD AUTO: 0.7 % (ref 0–0.9)
LYMPHOCYTES # BLD AUTO: 1.03 X10*3/UL (ref 1.2–4.8)
LYMPHOCYTES NFR BLD AUTO: 13.4 %
MAGNESIUM SERPL-MCNC: 1.6 MG/DL (ref 1.6–2.4)
MCH RBC QN AUTO: 29.4 PG (ref 26–34)
MCHC RBC AUTO-ENTMCNC: 31 G/DL (ref 32–36)
MCV RBC AUTO: 95 FL (ref 80–100)
MONOCYTES # BLD AUTO: 0.43 X10*3/UL (ref 0.1–1)
MONOCYTES NFR BLD AUTO: 5.6 %
NEUTROPHILS # BLD AUTO: 6 X10*3/UL (ref 1.2–7.7)
NEUTROPHILS NFR BLD AUTO: 78.4 %
NRBC BLD-RTO: 0 /100 WBCS (ref 0–0)
PHOSPHATE SERPL-MCNC: 4 MG/DL (ref 2.5–4.9)
PLATELET # BLD AUTO: 224 X10*3/UL (ref 150–450)
POTASSIUM SERPL-SCNC: 4.7 MMOL/L (ref 3.5–5.3)
PROT SERPL-MCNC: 6.7 G/DL (ref 6.4–8.2)
PROT UR-ACNC: 22 MG/DL (ref 5–25)
PROT/CREAT UR: 0.35 MG/MG CREAT (ref 0–0.17)
RBC # BLD AUTO: 3.26 X10*6/UL (ref 4.5–5.9)
SODIUM SERPL-SCNC: 141 MMOL/L (ref 136–145)
TACROLIMUS BLD-MCNC: 6.9 NG/ML
WBC # BLD AUTO: 7.7 X10*3/UL (ref 4.4–11.3)

## 2025-05-15 PROCEDURE — 82570 ASSAY OF URINE CREATININE: CPT

## 2025-05-15 PROCEDURE — 3044F HG A1C LEVEL LT 7.0%: CPT | Performed by: PHYSICIAN ASSISTANT

## 2025-05-15 PROCEDURE — 84100 ASSAY OF PHOSPHORUS: CPT | Performed by: TRANSPLANT SURGERY

## 2025-05-15 PROCEDURE — 3078F DIAST BP <80 MM HG: CPT | Performed by: PHYSICIAN ASSISTANT

## 2025-05-15 PROCEDURE — 82248 BILIRUBIN DIRECT: CPT | Performed by: TRANSPLANT SURGERY

## 2025-05-15 PROCEDURE — 85025 COMPLETE CBC W/AUTO DIFF WBC: CPT | Performed by: TRANSPLANT SURGERY

## 2025-05-15 PROCEDURE — 83036 HEMOGLOBIN GLYCOSYLATED A1C: CPT | Performed by: PHYSICIAN ASSISTANT

## 2025-05-15 PROCEDURE — 36415 COLL VENOUS BLD VENIPUNCTURE: CPT

## 2025-05-15 PROCEDURE — 83735 ASSAY OF MAGNESIUM: CPT | Performed by: TRANSPLANT SURGERY

## 2025-05-15 PROCEDURE — 3061F NEG MICROALBUMINURIA REV: CPT | Performed by: PHYSICIAN ASSISTANT

## 2025-05-15 PROCEDURE — 82977 ASSAY OF GGT: CPT | Performed by: TRANSPLANT SURGERY

## 2025-05-15 PROCEDURE — 99214 OFFICE O/P EST MOD 30 MIN: CPT | Performed by: PHYSICIAN ASSISTANT

## 2025-05-15 PROCEDURE — 87801 DETECT AGNT MULT DNA AMPLI: CPT

## 2025-05-15 PROCEDURE — 80197 ASSAY OF TACROLIMUS: CPT | Performed by: TRANSPLANT SURGERY

## 2025-05-15 PROCEDURE — 80048 BASIC METABOLIC PNL TOTAL CA: CPT | Performed by: TRANSPLANT SURGERY

## 2025-05-15 PROCEDURE — 3077F SYST BP >= 140 MM HG: CPT | Performed by: PHYSICIAN ASSISTANT

## 2025-05-15 RX ORDER — ALOGLIPTIN 12.5 MG/1
12.5 TABLET, FILM COATED ORAL DAILY
Qty: 90 TABLET | Refills: 3 | Status: SHIPPED | OUTPATIENT
Start: 2025-05-15 | End: 2026-05-15

## 2025-05-15 RX ORDER — PEN NEEDLE, DIABETIC 30 GX3/16"
1 NEEDLE, DISPOSABLE MISCELLANEOUS EVERY MORNING
Qty: 100 EACH | Refills: 2 | Status: SHIPPED | OUTPATIENT
Start: 2025-05-15

## 2025-05-15 RX ORDER — BLOOD-GLUCOSE SENSOR
EACH MISCELLANEOUS
Qty: 6 EACH | Refills: 3 | Status: SHIPPED | OUTPATIENT
Start: 2025-05-15

## 2025-05-15 ASSESSMENT — ENCOUNTER SYMPTOMS
BACK PAIN: 1
MYALGIAS: 1

## 2025-05-15 ASSESSMENT — PAIN SCALES - GENERAL: PAINLEVEL_OUTOF10: 0-NO PAIN

## 2025-05-15 NOTE — PROGRESS NOTES
Patient is sent at the request of Dr. Steve Cancino for my opinion regarding Type 2 diabetes.  My final recommendations will be communicated back to the requesting provider by way of shared medical record.    Subjective   López Lopez is a 65 y.o. male who presents for initial visit for evaluation of Type 2 diabetes mellitus. The initial diagnosis of diabetes was made several years ago. The patient does not have a known family history of diabetes.    Known complications due to diabetes included peripheral neuropathy, cardiovascular disease, and chronic kidney disease    Cardiovascular risk factors include advanced age (older than 55 for men, 65 for women), diabetes mellitus, hypertension, and male gender. The patient is not on an ACE inhibitor or angiotensin II receptor blocker.  The patient has not been previously hospitalized due to diabetic ketoacidosis.     Current symptoms/problems include none. His clinical course has been stable.     Current diabetes regimen is as follows: Humulin NPH as per 2u:50>150mg/dL ssi before meals     The patient is currently checking the blood glucose 5+ times per day.    Patient is using: continuous glucose monitor - dexcom g7 with , written glucose log reviewed    Hypoglycemia frequency: none  Hypoglycemia awareness: Yes     Exercise: never - limited by recent liver and kidney transplant with residual RLE weakness  - started PT  Meal panning: He is using avoidance of concentrated sweets.    Review of Systems   Musculoskeletal:  Positive for back pain and myalgias.   All other systems reviewed and are negative.      Objective   /75 (BP Location: Right arm, Patient Position: Sitting, BP Cuff Size: Adult)   Pulse 81   Temp 36.6 °C (97.9 °F) (Temporal)   Wt 99.6 kg (219 lb 8 oz)   SpO2 98%   BMI 26.72 kg/m²   Physical Exam  Constitutional:       Appearance: Normal appearance. He is normal weight.   HENT:      Head: Normocephalic and atraumatic.      Nose: Nose  "normal.      Mouth/Throat:      Mouth: Mucous membranes are dry.      Pharynx: Oropharynx is clear.   Eyes:      Extraocular Movements: Extraocular movements intact.      Conjunctiva/sclera: Conjunctivae normal.   Cardiovascular:      Rate and Rhythm: Normal rate and regular rhythm.      Pulses: Normal pulses.      Heart sounds: Normal heart sounds.   Pulmonary:      Effort: Pulmonary effort is normal.      Breath sounds: Normal breath sounds.   Skin:     General: Skin is warm and dry.   Neurological:      General: No focal deficit present.      Mental Status: He is alert and oriented to person, place, and time. Mental status is at baseline.   Psychiatric:         Mood and Affect: Mood normal.         Behavior: Behavior normal.         Thought Content: Thought content normal.         Judgment: Judgment normal.         Lab Review  Glucose (mg/dL)   Date Value   05/15/2025 100 (H)   05/12/2025 91   05/08/2025 109 (H)     Hemoglobin A1C (%)   Date Value   05/15/2025 4.4   09/03/2024 5.5   08/13/2024 6.0 (H)     Bicarbonate (mmol/L)   Date Value   05/15/2025 27   05/12/2025 25   05/08/2025 26     Urea Nitrogen (mg/dL)   Date Value   05/15/2025 29 (H)   05/12/2025 20   05/08/2025 17     Creatinine (mg/dL)   Date Value   05/15/2025 1.96 (H)   05/12/2025 2.18 (H)   05/08/2025 1.93 (H)       Health Maintenance:   Foot Exam: due for update at future appointment    Eye Exam: due for update  Lipid Panel: due for update, ordered for 6 months after transplant  Urine Albumin: due for update, ordered for 6 months after transplant    Assessment/Plan   Diagnoses and all orders for this visit:  Type 2 diabetes mellitus with chronic kidney disease, with long-term current use of insulin, unspecified CKD stage (Multi)  -     pen needle, diabetic 32 gauge x 5/32\" needle; 1 each once daily in the morning.  -     Hemoglobin A1c; Future  -     Lipid panel; Future  -     Albumin-Creatinine Ratio, Urine Random; Future  Kidney transplant " "recipient ([facility])  -     pen needle, diabetic 32 gauge x 5/32\" needle; 1 each once daily in the morning.  -     Hemoglobin A1c; Future  -     Lipid panel; Future  -     Albumin-Creatinine Ratio, Urine Random; Future  Liver transplant recipient (Multi)  -     pen needle, diabetic 32 gauge x 5/32\" needle; 1 each once daily in the morning.  -     Hemoglobin A1c; Future  -     Lipid panel; Future  -     Albumin-Creatinine Ratio, Urine Random; Future  Type 2 diabetes mellitus with hyperglycemia, without long-term current use of insulin  -     blood-glucose sensor (Dexcom G7 Sensor) device; Use to check glucose, change every 10 days  -     alogliptin (Nesina) 12.5 mg tablet; Take 1 tablet (12.5 mg) by mouth once daily.      Type 2 diabetes mellitus, is at goal. A1C due for update, ordered today    RX changes: see updated insulin plan below, continue nesina 12.5mg daily   Education:  interpretation of lab results, blood sugar goals, complications of diabetes mellitus, and use of insulin pen  Follow up: I recommend diabetes care be in 6 weeks.    INSULIN INSTRUCTIONS    HUMULIN NPH = 10 units once every morning      "

## 2025-05-15 NOTE — PATIENT INSTRUCTIONS
"Type 2 diabetes mellitus with chronic kidney disease, with long-term current use of insulin, unspecified CKD stage (Multi)  -     pen needle, diabetic 32 gauge x 5/32\" needle; 1 each once daily in the morning.  -     Hemoglobin A1c; Future  Kidney transplant recipient ([facility])  -     pen needle, diabetic 32 gauge x 5/32\" needle; 1 each once daily in the morning.  -     Hemoglobin A1c; Future  Liver transplant recipient (Multi)  -     pen needle, diabetic 32 gauge x 5/32\" needle; 1 each once daily in the morning.  -     Hemoglobin A1c; Future  Type 2 diabetes mellitus with hyperglycemia, without long-term current use of insulin  -     blood-glucose sensor (Dexcom G7 Sensor) device; Use to check glucose, change every 10 days  -     alogliptin (Nesina) 12.5 mg tablet; Take 1 tablet (12.5 mg) by mouth once daily.      Type 2 diabetes mellitus, is at goal. A1C due for update, ordered today    RX changes: see updated insulin plan below, continue nesina 12.5mg daily   Education:  interpretation of lab results, blood sugar goals, complications of diabetes mellitus, and use of insulin pen  Follow up: I recommend diabetes care be in 6 weeks.    INSULIN INSTRUCTIONS    HUMULIN NPH = 10 units once every morning  "

## 2025-05-16 ENCOUNTER — TELEPHONE (OUTPATIENT)
Facility: HOSPITAL | Age: 65
End: 2025-05-16
Payer: COMMERCIAL

## 2025-05-16 DIAGNOSIS — Z94.4 LIVER REPLACED BY TRANSPLANT (MULTI): ICD-10-CM

## 2025-05-16 PROCEDURE — RXMED WILLOW AMBULATORY MEDICATION CHARGE

## 2025-05-16 RX ORDER — CIPROFLOXACIN 500 MG/1
500 TABLET ORAL 2 TIMES DAILY
Qty: 10 TABLET | Refills: 0 | Status: SHIPPED | OUTPATIENT
Start: 2025-05-16 | End: 2025-05-21

## 2025-05-16 RX ORDER — TACROLIMUS 1 MG/1
2 CAPSULE ORAL 2 TIMES DAILY
Qty: 360 CAPSULE | Refills: 3 | Status: SHIPPED | OUTPATIENT
Start: 2025-05-16 | End: 2026-05-11

## 2025-05-16 NOTE — TELEPHONE ENCOUNTER
RN called and spoke to pt reviewed need to change tac dosing to 2/2    Pt verbalized understanding and repeated change back to RN

## 2025-05-19 ENCOUNTER — LAB (OUTPATIENT)
Dept: LAB | Facility: HOSPITAL | Age: 65
End: 2025-05-19
Payer: COMMERCIAL

## 2025-05-19 ENCOUNTER — TELEPHONE (OUTPATIENT)
Facility: HOSPITAL | Age: 65
End: 2025-05-19

## 2025-05-19 ENCOUNTER — SPECIALTY PHARMACY (OUTPATIENT)
Dept: PHARMACY | Facility: CLINIC | Age: 65
End: 2025-05-19

## 2025-05-19 DIAGNOSIS — Z94.4 LIVER TRANSPLANT STATUS: Primary | ICD-10-CM

## 2025-05-19 DIAGNOSIS — Z94.0 KIDNEY REPLACED BY TRANSPLANT (HHS-HCC): ICD-10-CM

## 2025-05-19 LAB
BASOPHILS # BLD AUTO: 0.06 X10*3/UL (ref 0–0.1)
BASOPHILS NFR BLD AUTO: 1.3 %
EOSINOPHIL # BLD AUTO: 0.07 X10*3/UL (ref 0–0.7)
EOSINOPHIL NFR BLD AUTO: 1.5 %
ERYTHROCYTE [DISTWIDTH] IN BLOOD BY AUTOMATED COUNT: 17.1 % (ref 11.5–14.5)
HCT VFR BLD AUTO: 30 % (ref 41–52)
HGB BLD-MCNC: 9.7 G/DL (ref 13.5–17.5)
HIV1 RNA SERPL DONR QL PROBE AMP: NORMAL
IMM GRANULOCYTES # BLD AUTO: 0.02 X10*3/UL (ref 0–0.7)
IMM GRANULOCYTES NFR BLD AUTO: 0.4 % (ref 0–0.9)
LYMPHOCYTES # BLD AUTO: 0.9 X10*3/UL (ref 1.2–4.8)
LYMPHOCYTES NFR BLD AUTO: 19.6 %
MCH RBC QN AUTO: 29.8 PG (ref 26–34)
MCHC RBC AUTO-ENTMCNC: 32.3 G/DL (ref 32–36)
MCV RBC AUTO: 92 FL (ref 80–100)
MONOCYTES # BLD AUTO: 0.23 X10*3/UL (ref 0.1–1)
MONOCYTES NFR BLD AUTO: 5 %
NEUTROPHILS # BLD AUTO: 3.31 X10*3/UL (ref 1.2–7.7)
NEUTROPHILS NFR BLD AUTO: 72.2 %
NRBC BLD-RTO: 0 /100 WBCS (ref 0–0)
PLATELET # BLD AUTO: 207 X10*3/UL (ref 150–450)
RBC # BLD AUTO: 3.25 X10*6/UL (ref 4.5–5.9)
WBC # BLD AUTO: 4.6 X10*3/UL (ref 4.4–11.3)

## 2025-05-19 PROCEDURE — 84100 ASSAY OF PHOSPHORUS: CPT

## 2025-05-19 PROCEDURE — 82977 ASSAY OF GGT: CPT

## 2025-05-19 PROCEDURE — 84156 ASSAY OF PROTEIN URINE: CPT

## 2025-05-19 PROCEDURE — 82570 ASSAY OF URINE CREATININE: CPT

## 2025-05-19 PROCEDURE — 83735 ASSAY OF MAGNESIUM: CPT

## 2025-05-19 PROCEDURE — 82248 BILIRUBIN DIRECT: CPT

## 2025-05-19 PROCEDURE — 85025 COMPLETE CBC W/AUTO DIFF WBC: CPT

## 2025-05-19 PROCEDURE — 36415 COLL VENOUS BLD VENIPUNCTURE: CPT

## 2025-05-19 PROCEDURE — 80197 ASSAY OF TACROLIMUS: CPT

## 2025-05-19 PROCEDURE — 80053 COMPREHEN METABOLIC PANEL: CPT

## 2025-05-19 NOTE — TELEPHONE ENCOUNTER
Wife os patient is calling asking about Alogliptin, dexcom sensor and protonics to be sent to their address.  The wife states they have not received anything in the mail.

## 2025-05-20 ENCOUNTER — EPISODE CHANGES (OUTPATIENT)
Dept: TRANSPLANT | Facility: HOSPITAL | Age: 65
End: 2025-05-20

## 2025-05-20 ENCOUNTER — PHARMACY VISIT (OUTPATIENT)
Dept: PHARMACY | Facility: CLINIC | Age: 65
End: 2025-05-20
Payer: COMMERCIAL

## 2025-05-20 ENCOUNTER — TELEPHONE (OUTPATIENT)
Dept: TRANSPLANT | Facility: HOSPITAL | Age: 65
End: 2025-05-20
Payer: COMMERCIAL

## 2025-05-20 DIAGNOSIS — Z94.4 LIVER REPLACED BY TRANSPLANT (MULTI): ICD-10-CM

## 2025-05-20 LAB
ALBUMIN SERPL BCP-MCNC: 4 G/DL (ref 3.4–5)
ALP SERPL-CCNC: 134 U/L (ref 33–136)
ALT SERPL W P-5'-P-CCNC: 10 U/L (ref 10–52)
ANION GAP SERPL CALC-SCNC: 11 MMOL/L (ref 10–20)
AST SERPL W P-5'-P-CCNC: 12 U/L (ref 9–39)
BILIRUB DIRECT SERPL-MCNC: 0.2 MG/DL (ref 0–0.3)
BILIRUB SERPL-MCNC: 0.8 MG/DL (ref 0–1.2)
BUN SERPL-MCNC: 27 MG/DL (ref 6–23)
CALCIUM SERPL-MCNC: 9.1 MG/DL (ref 8.6–10.3)
CHLORIDE SERPL-SCNC: 106 MMOL/L (ref 98–107)
CO2 SERPL-SCNC: 27 MMOL/L (ref 21–32)
CREAT SERPL-MCNC: 1.53 MG/DL (ref 0.5–1.3)
CREAT UR-MCNC: 78.3 MG/DL (ref 20–370)
EGFRCR SERPLBLD CKD-EPI 2021: 51 ML/MIN/1.73M*2
GGT SERPL-CCNC: 93 U/L (ref 5–64)
GLUCOSE SERPL-MCNC: 86 MG/DL (ref 74–99)
MAGNESIUM SERPL-MCNC: 1.54 MG/DL (ref 1.6–2.4)
PHOSPHATE SERPL-MCNC: 3.5 MG/DL (ref 2.5–4.9)
POTASSIUM SERPL-SCNC: 4.2 MMOL/L (ref 3.5–5.3)
PROT SERPL-MCNC: 6.3 G/DL (ref 6.4–8.2)
PROT UR-ACNC: 26 MG/DL (ref 5–25)
PROT/CREAT UR: 0.33 MG/MG CREAT (ref 0–0.17)
SODIUM SERPL-SCNC: 140 MMOL/L (ref 136–145)
TACROLIMUS BLD-MCNC: 5.8 NG/ML

## 2025-05-20 RX ORDER — TACROLIMUS 0.5 MG/1
0.5 CAPSULE ORAL 2 TIMES DAILY
Qty: 60 CAPSULE | Refills: 11 | Status: SHIPPED | OUTPATIENT
Start: 2025-05-20 | End: 2026-05-20

## 2025-05-20 NOTE — TELEPHONE ENCOUNTER
Called and spoke to pt and wife  Reviewed change tacrolimus to 2.5mg BID, verbalized understanding  Pts wife reports they were able to refill all medications

## 2025-05-21 NOTE — PROGRESS NOTES
"Reason for Nutrition Visit:  Pt is a 65 y.o. male referred for a nutrition follow-up.     Transplant Coordinator: Thais Alexis RN    Past Medical Hx:  Problem List[1]     Lab Results   Component Value Date    HGBA1C 4.4 05/15/2025    HGBA1C 5.5 09/03/2024    HGBA1C 6.0 (H) 08/13/2024     05/19/2025    K 4.2 05/19/2025    PHOS 3.5 05/19/2025     05/19/2025    CO2 27 05/19/2025    BUN 27 (H) 05/19/2025    CREATININE 1.53 (H) 05/19/2025    CALCIUM 9.1 05/19/2025    ALBUMIN 4.0 05/19/2025    PROT 6.3 (L) 05/19/2025    BILITOT 0.8 05/19/2025    ALKPHOS 134 05/19/2025    ALT 10 05/19/2025    AST 12 05/19/2025    GLUCOSE 86 05/19/2025       Lab Results   Component Value Date    HGB 9.7 (L) 05/19/2025     Iron Panel + Serum Ferritin Trend:   Recent Labs     12/02/24  1429   IRON 177*   UIBC 87*   TIBC 264   IRONSAT 67*   FERRITIN 85   , Vitamin B12: No results found for: \"SVVZGRBV19\" , Folate: No results found for: \"FOLATE\" , and Vitamin D:   Lab Results   Component Value Date    VITD25 14 (L) 04/07/2025      Food History and Nutrition Assessment     Appetite: Good  Intake: >75%  GI Symptoms : None   Swallowing Difficulty: No problems with swallowing  Dentition : poor condition  Allergies: None  Intolerance: None    Types of Activities: PT  Duration: 30-60 minutes once weekly    Sleep duration/quality : 7+ hours. Notes sleep quality is better. Takes naps throughout day.  Sleep disorders: none    Diet History:  Patient presents today with his wife. Patient notes that since last visit his taste buds have come back and is eating a lot better. Also notes his sense of smell has returned back to normal. Patient has ~3 meals per day with snacks, notes lunch is often small portion of leftovers from dinner night before. Continues to have 1-2 protein shakes per day.     24 Hour Diet Recall:  Meal 1: Cereal and protein shake and applesauce and or special K protein   AM Snack (sometimes): Jello or pudding (homemade " low-sugar) or sugar-free Fudge pop  Meal 2: Leftovers  PM Snack: Jello   Meal 3: Tortillini alfreado with chicken and broccoli or BBQ chicken (4-5 oz) and broccoli cheese rice or Tacos with beans and cheese   Late Snack: Jello or pudding (homemade low-sugar) or sugar-free Fudge pop  Beverages: Water, gaterade zero, 7up zero occassionally     Supplements: Vitamin D and Magnesium daily    Weight History:  CBW: 102 kg (225 lbs)   BMI: 27.4 kg/m2  IBW: 89.1 kg    Wt Readings from Last 15 Encounters:   05/22/25 102 kg (225 lb)   05/15/25 99.6 kg (219 lb 8 oz)   05/08/25 102 kg (225 lb 4.8 oz)   05/04/25 106 kg (233 lb 1.6 oz)   04/24/25 106 kg (232 lb 9.6 oz)   04/17/25 107 kg (235 lb 12.5 oz)   04/12/25 107 kg (235 lb 14.3 oz)   02/26/25 118 kg (260 lb)   02/05/25 117 kg (258 lb 12.8 oz)   01/16/25 126 kg (277 lb 3.2 oz)   01/16/25 126 kg (277 lb 3.2 oz)   01/09/25 120 kg (265 lb)   12/02/24 126 kg (276 lb 14.4 oz)   11/12/24 121 kg (267 lb)     Weight change:  Note a weight loss of 3.7% in 1 month is not considered clinically significant. Weight loss of 13.6% in 3 months and 15.7% in 6 months is considered clinically significant.  Patient stated weight: Patient and patient's wife states he has gained a couple pounds in short term. He states the lowest weight measured was 213 lbs about 1 week ago.     Nutrition Focused Physical Exam:    Performed/Deferred: Performed    Muscle Wasting:  Temporal: Moderate  Shoulder: None  Clavicle: Mild  Interosseous Muscle: Mild    Loss of Subcutaneous Fat:  Eyes: Mild  Perioral: None  Triceps: None    Malnutrition Present: Moderate Malnutrition    Nutrition Diagnosis    Diagnosis Status: Ongoing   Diagnosis: Malnutrition; moderate chronic disease or condition related related to changes in taste, appetite and/or preferences as evidence by weight loss of 13.6% in 3 months and areas of mild-moderate loss of muscle mass.    Diagnosis Status: Resolved  4/24/25: Food and nutrition related  knowledge deficit related to recent change in health status ([facility] transplant 22 days ago) as evidence by limited experience applying food safety information.     Nutrition Education, Intervention, and Goals    Eat consistent meals and snacks to provide your body with a regular supply of energy and protein. Attempt to eat every 3-4 hours with an emphasis on calorie- and protein- rich foods. Discussed protein sources including chicken, beef, fish, eggs, dairy products, beans, and nuts. Encourage continued intake of protein-calorie oral nutrition supplement (Ensure, Boost, etc.) each day to provide additional nutrients and support weight stabilization.    Educational Handouts: N/A    Nutrition Goals  Nutrition Goals: Maintain stable weight  Dairy/Calcium Foods: Increase  Meat/Protein Foods: Increase  Consume meal or snack every 3-4 hours    Nutrition Monitoring and Evaluation    Additional Recommendations/Referrals: N/A    Follow up: It was a pleasure speaking with you today, Mr. Lopez! Let's schedule a follow-up in 3 month(s) to check in on your progress. I'll have the 's set up a phone appointment. Take care!          [1]   Patient Active Problem List  Diagnosis    Acute idiopathic thrombocytopenic purpura (Multi)    Anemia    History of CAD (coronary artery disease)    Mixed hyperlipidemia    Paroxysmal atrial fibrillation (Multi)    Type 2 diabetes mellitus with hyperglycemia, without long-term current use of insulin    Liver failure without hepatic coma (Multi)    Metabolic dysfunction-associated steatohepatitis (MASH)    Status post insertion of drug-eluting stent into left anterior descending artery for coronary artery disease    Type 2 diabetes mellitus with diabetic chronic kidney disease    History of percutaneous transluminal coronary angioplasty    Coronary artery disease involving native coronary artery of native heart    Pre-liver transplant, listed    Pre-kidney transplant, listed     Refractory ascites    Alcoholic cirrhosis of liver with ascites (Multi)    Stage 4 chronic kidney disease (Multi)    Chronic renal impairment, stage 4 (severe) (Multi)    CKD (chronic kidney disease) stage 4, GFR 15-29 ml/min (Multi)    Acute kidney injury    Liver abscess ([facility])    Kidney transplant recipient ([facility])    Liver transplant recipient (Multi)

## 2025-05-22 ENCOUNTER — NUTRITION (OUTPATIENT)
Facility: HOSPITAL | Age: 65
End: 2025-05-22
Payer: COMMERCIAL

## 2025-05-22 ENCOUNTER — NURSE ONLY (OUTPATIENT)
Facility: HOSPITAL | Age: 65
End: 2025-05-22
Payer: COMMERCIAL

## 2025-05-22 ENCOUNTER — DOCUMENTATION (OUTPATIENT)
Facility: HOSPITAL | Age: 65
End: 2025-05-22

## 2025-05-22 ENCOUNTER — OFFICE VISIT (OUTPATIENT)
Facility: HOSPITAL | Age: 65
End: 2025-05-22
Payer: COMMERCIAL

## 2025-05-22 VITALS
SYSTOLIC BLOOD PRESSURE: 149 MMHG | WEIGHT: 225 LBS | BODY MASS INDEX: 27.39 KG/M2 | OXYGEN SATURATION: 98 % | DIASTOLIC BLOOD PRESSURE: 80 MMHG | TEMPERATURE: 97 F | HEART RATE: 84 BPM

## 2025-05-22 DIAGNOSIS — Z79.60 IMMUNOSUPPRESSIVE MANAGEMENT ENCOUNTER FOLLOWING LIVER TRANSPLANT: Primary | ICD-10-CM

## 2025-05-22 DIAGNOSIS — Z79.4 TYPE 2 DIABETES MELLITUS WITH CHRONIC KIDNEY DISEASE, WITH LONG-TERM CURRENT USE OF INSULIN, UNSPECIFIED CKD STAGE (MULTI): ICD-10-CM

## 2025-05-22 DIAGNOSIS — Z94.4 LIVER TRANSPLANT RECIPIENT (MULTI): ICD-10-CM

## 2025-05-22 DIAGNOSIS — Z94.4 IMMUNOSUPPRESSIVE MANAGEMENT ENCOUNTER FOLLOWING LIVER TRANSPLANT: Primary | ICD-10-CM

## 2025-05-22 DIAGNOSIS — E11.22 TYPE 2 DIABETES MELLITUS WITH CHRONIC KIDNEY DISEASE, WITH LONG-TERM CURRENT USE OF INSULIN, UNSPECIFIED CKD STAGE (MULTI): ICD-10-CM

## 2025-05-22 LAB
ALBUMIN SERPL BCP-MCNC: 4 G/DL (ref 3.4–5)
ALP SERPL-CCNC: 148 U/L (ref 33–136)
ALT SERPL W P-5'-P-CCNC: 16 U/L (ref 10–52)
ANION GAP SERPL CALC-SCNC: 12 MMOL/L (ref 10–20)
AST SERPL W P-5'-P-CCNC: 14 U/L (ref 9–39)
BASOPHILS # BLD AUTO: 0.07 X10*3/UL (ref 0–0.1)
BASOPHILS NFR BLD AUTO: 1.4 %
BILIRUB DIRECT SERPL-MCNC: 0.2 MG/DL (ref 0–0.3)
BILIRUB SERPL-MCNC: 0.6 MG/DL (ref 0–1.2)
BUN SERPL-MCNC: 36 MG/DL (ref 6–23)
CALCIUM SERPL-MCNC: 9.3 MG/DL (ref 8.6–10.6)
CHLORIDE SERPL-SCNC: 106 MMOL/L (ref 98–107)
CO2 SERPL-SCNC: 28 MMOL/L (ref 21–32)
CREAT SERPL-MCNC: 1.62 MG/DL (ref 0.5–1.3)
EGFRCR SERPLBLD CKD-EPI 2021: 47 ML/MIN/1.73M*2
EOSINOPHIL # BLD AUTO: 0.07 X10*3/UL (ref 0–0.7)
EOSINOPHIL NFR BLD AUTO: 1.4 %
ERYTHROCYTE [DISTWIDTH] IN BLOOD BY AUTOMATED COUNT: 16.9 % (ref 11.5–14.5)
GGT SERPL-CCNC: 91 U/L (ref 5–64)
GLUCOSE SERPL-MCNC: 90 MG/DL (ref 74–99)
HCT VFR BLD AUTO: 29.9 % (ref 41–52)
HGB BLD-MCNC: 9.5 G/DL (ref 13.5–17.5)
IMM GRANULOCYTES # BLD AUTO: 0.02 X10*3/UL (ref 0–0.7)
IMM GRANULOCYTES NFR BLD AUTO: 0.4 % (ref 0–0.9)
LYMPHOCYTES # BLD AUTO: 0.95 X10*3/UL (ref 1.2–4.8)
LYMPHOCYTES NFR BLD AUTO: 19.4 %
MAGNESIUM SERPL-MCNC: 1.67 MG/DL (ref 1.6–2.4)
MCH RBC QN AUTO: 30.4 PG (ref 26–34)
MCHC RBC AUTO-ENTMCNC: 31.8 G/DL (ref 32–36)
MCV RBC AUTO: 96 FL (ref 80–100)
MONOCYTES # BLD AUTO: 0.33 X10*3/UL (ref 0.1–1)
MONOCYTES NFR BLD AUTO: 6.7 %
NEUTROPHILS # BLD AUTO: 3.45 X10*3/UL (ref 1.2–7.7)
NEUTROPHILS NFR BLD AUTO: 70.7 %
NRBC BLD-RTO: 0 /100 WBCS (ref 0–0)
PHOSPHATE SERPL-MCNC: 3.6 MG/DL (ref 2.5–4.9)
PLATELET # BLD AUTO: 198 X10*3/UL (ref 150–450)
POTASSIUM SERPL-SCNC: 4.3 MMOL/L (ref 3.5–5.3)
PROT SERPL-MCNC: 6.4 G/DL (ref 6.4–8.2)
RBC # BLD AUTO: 3.13 X10*6/UL (ref 4.5–5.9)
SODIUM SERPL-SCNC: 142 MMOL/L (ref 136–145)
TACROLIMUS BLD-MCNC: 5.3 NG/ML
WBC # BLD AUTO: 4.9 X10*3/UL (ref 4.4–11.3)

## 2025-05-22 PROCEDURE — 3044F HG A1C LEVEL LT 7.0%: CPT | Performed by: TRANSPLANT SURGERY

## 2025-05-22 PROCEDURE — 36415 COLL VENOUS BLD VENIPUNCTURE: CPT

## 2025-05-22 PROCEDURE — 99213 OFFICE O/P EST LOW 20 MIN: CPT

## 2025-05-22 PROCEDURE — 82977 ASSAY OF GGT: CPT | Performed by: TRANSPLANT SURGERY

## 2025-05-22 PROCEDURE — 82248 BILIRUBIN DIRECT: CPT | Performed by: TRANSPLANT SURGERY

## 2025-05-22 PROCEDURE — 84100 ASSAY OF PHOSPHORUS: CPT | Performed by: TRANSPLANT SURGERY

## 2025-05-22 PROCEDURE — 99213 OFFICE O/P EST LOW 20 MIN: CPT | Mod: 24

## 2025-05-22 PROCEDURE — 80048 BASIC METABOLIC PNL TOTAL CA: CPT | Performed by: TRANSPLANT SURGERY

## 2025-05-22 PROCEDURE — 83735 ASSAY OF MAGNESIUM: CPT | Performed by: TRANSPLANT SURGERY

## 2025-05-22 PROCEDURE — 85025 COMPLETE CBC W/AUTO DIFF WBC: CPT | Performed by: TRANSPLANT SURGERY

## 2025-05-22 PROCEDURE — 3077F SYST BP >= 140 MM HG: CPT | Performed by: TRANSPLANT SURGERY

## 2025-05-22 PROCEDURE — 3079F DIAST BP 80-89 MM HG: CPT | Performed by: TRANSPLANT SURGERY

## 2025-05-22 PROCEDURE — 3061F NEG MICROALBUMINURIA REV: CPT | Performed by: TRANSPLANT SURGERY

## 2025-05-22 PROCEDURE — 80197 ASSAY OF TACROLIMUS: CPT | Performed by: TRANSPLANT SURGERY

## 2025-05-22 RX ORDER — PREDNISONE 5 MG/1
5 TABLET ORAL DAILY
Qty: 30 TABLET | Refills: 3 | Status: SHIPPED | OUTPATIENT
Start: 2025-05-22 | End: 2025-09-19

## 2025-05-22 RX ORDER — SULFAMETHOXAZOLE AND TRIMETHOPRIM 400; 80 MG/1; MG/1
1 TABLET ORAL DAILY
Qty: 90 TABLET | Refills: 1 | Status: SHIPPED | OUTPATIENT
Start: 2025-05-22 | End: 2025-11-18

## 2025-05-22 RX ORDER — PANTOPRAZOLE SODIUM 40 MG/1
40 TABLET, DELAYED RELEASE ORAL DAILY
Qty: 90 TABLET | Refills: 3 | Status: SHIPPED | OUTPATIENT
Start: 2025-05-22 | End: 2026-05-17

## 2025-05-22 ASSESSMENT — PAIN SCALES - GENERAL: PAINLEVEL_OUTOF10: 0-NO PAIN

## 2025-05-22 NOTE — PROGRESS NOTES
TRANSPLANT SURGERY FOLLOW UP    Reason for visit:    López Lopez underwent transplant surgery,  4/2/2025 (Liver / Kidney), and returns to clinic for follow up evaluation focusing on their current immunosuppression. Specifically, López Lopez's regiment was evaluated to ensure adequate levels to prevent life threatening or organ function impairing rejection while not increasing risk of adverse effects including malignancy, infection, bone health, or accelerated cardiovascular disease.  I reviewed common side effects including tremor, hair loss, GI toxicity, and agitation.  The patient reported that they were experiencing: back pain.    The long-term management of maintenance immunosuppression in organ transplant recipients remains complex given differences in clinical characteristics, comorbid conditions, and prior rejection episodes.  These factors were evaluated at this visit and used in formulating this plan of care. Immunosuppression following the transplant is medically necessary to closely monitor and to reduce the risk of post-operative complications distinct from the post operative management of the transplant surgical procedure.     Interval history  Bacteremia resolved.  Ingrown toe nail treated by podiatry.  Working with physical therapy     Problem List[1]    Medications:      Current Outpatient Medications   Medication Instructions    alcohol swabs (Alcohol Pads) Use 4-8 per day to check blood glucose and for injectable medications    alogliptin (NESINA) 12.5 mg, oral, Daily    amLODIPine (NORVASC) 5 mg, oral, Daily, Hold for a systolic blood pressure less than 140    atorvastatin (LIPITOR) 10 mg, oral, Daily    blood sugar diagnostic (Blood Glucose Test) Check blood glucose 3 time per day    blood-glucose meter misc Use to check blood glucose    blood-glucose sensor (Dexcom G7 Sensor) device Use to check glucose, change every 10 days    blood-glucose,,cont (Dexcom G7 )  "misc Use as instructed    cholecalciferol (VITAMIN D-3) 50 mcg, oral, Daily    Eliquis 5 mg, oral, 2 times daily (morning and late afternoon)    HumuLIN N NPH Insulin KwikPen 10 Units, subcutaneous, Every morning, Take as directed per insulin instructions.    lancets 33 gauge misc 1 each, miscellaneous, 3 times daily    magnesium oxide (MAG-OX) 800 mg, oral, Daily, Take at noon    mycophenolate (CELLCEPT) 500 mg, oral, 2 times daily, TAKE 2 CAPSULES (500 MG) TWICE A DAY UNTIL INSTRUCTED TO INCREASE    pantoprazole (PROTONIX) 40 mg, oral, Daily, Do not crush, chew, or split.    pen needle, diabetic 32 gauge x 5/32\" needle 1 each, miscellaneous, Every morning    polyethylene glycol (GLYCOLAX, MIRALAX) 17 g, Daily    predniSONE (DELTASONE) 5 mg, oral, Daily    sulfamethoxazole-trimethoprim (Bactrim) 400-80 mg tablet 1 tablet, oral, Daily    tacrolimus (PROGRAF) 2 mg, oral, 2 times daily    tacrolimus (PROGRAF) 0.5 mg, oral, 2 times daily    tamsulosin (FLOMAX) 0.4 mg, oral, Daily    traZODone (DESYREL) 25 mg, oral, Nightly    valGANciclovir (VALCYTE) 450 mg, oral, Daily, Do not crush or chew.       Physical Exam  Vitals:    05/22/25 0955   BP: 149/80   Pulse: 84   Temp: 36.1 °C (97 °F)   SpO2: 98%          Physical Exam  HENT:      Head: Normocephalic.   Cardiovascular:      Rate and Rhythm: Normal rate.      Pulses: Normal pulses.   Pulmonary:      Effort: Pulmonary effort is normal.      Breath sounds: Normal breath sounds.   Abdominal:      Palpations: Abdomen is soft.      Comments: Right upper quadrant incision well.  LLQ kidney incision well healed.   Musculoskeletal:      Comments: RLE weakness   Skin:     General: Skin is warm.      Capillary Refill: Capillary refill takes less than 2 seconds.   Neurological:      General: No focal deficit present.      Mental Status: He is alert.   Psychiatric:         Mood and Affect: Mood normal.         ALLERGY:  Allergies[2]    LABS:  No results found for this or any " previous visit (from the past 24 hours).     Lab Results   Component Value Date    ALT 10 05/19/2025    AST 12 05/19/2025    GGT 93 (H) 05/19/2025    ALKPHOS 134 05/19/2025    BILITOT 0.8 05/19/2025         ASSESSMENT AND PLAN:    Mr. Lopez is a 65 y.o. male who underwent  transplant surgery on 4/2/2025 (Liver / Kidney).    1. Immunosuppression reviewed and adjusted       Tacrolimus: Yes - 2.5 mg po BID.  Level pending       Mycophenolate: Yes - 500 mg po BID, lowered for GI toxicity      Prednisone: Yes - 5 mg       Belatacept: No     2. Prophylaxis adherence protocol to prevent immunosuppression related infection      Valcyte: Yes - 450 mg po daily      PCP prophylaxis: Transplant Prophylactics: Bactrim    4. Hypertension         Blood Pressures         5/22/2025  0955             BP: 149/80              Current antihypertensives were evaluated        5. Wound/LISA      [place] ready for removal: N/A      LISA:  N/A    6.  GI prophylaxis        On PPI     7.  Follow up:       Labs  2 times per week       RTC: 4 week(s)    I spent a total of 30 min providing care for this patient including record review, examination, face to face counseling, and documentation.     Jason Snell MD    Transplant Surgery Attending         [1]   Patient Active Problem List  Diagnosis    Acute idiopathic thrombocytopenic purpura (Multi)    Anemia    History of CAD (coronary artery disease)    Mixed hyperlipidemia    Paroxysmal atrial fibrillation (Multi)    Type 2 diabetes mellitus with hyperglycemia, without long-term current use of insulin    Liver failure without hepatic coma (Multi)    Metabolic dysfunction-associated steatohepatitis (MASH)    Status post insertion of drug-eluting stent into left anterior descending artery for coronary artery disease    Type 2 diabetes mellitus with diabetic chronic kidney disease    History of percutaneous transluminal coronary angioplasty    Coronary artery disease involving native coronary  artery of native heart    Pre-liver transplant, listed    Pre-kidney transplant, listed    Refractory ascites    Alcoholic cirrhosis of liver with ascites (Multi)    Stage 4 chronic kidney disease (Multi)    Chronic renal impairment, stage 4 (severe) (Multi)    CKD (chronic kidney disease) stage 4, GFR 15-29 ml/min (Multi)    Acute kidney injury    Liver abscess ([facility])    Kidney transplant recipient ([facility])    Liver transplant recipient (Multi)   [2]   Allergies  Allergen Reactions    Adhesive Tape-Silicones Rash

## 2025-05-22 NOTE — PROGRESS NOTES
"SW met with Pt and Pt's wife in an exam room per Pt's wife's request. Pt's wife reported she is trying to apply for \"End of Life Care\" through SSI for Pt and needs paperwork from the team. SW inquired what that means, and Pt's wife shared it is related to lowering Pt's Medicare payments taken out of his SSI. SW informed Pt and Pt's wife that this SW was unsure what this paperwork would be but offered to research Pt's insurance plan and possible answers. Pt and Pt's wife were thankful. Pt and Pt's wife denied any other questions/concerns at this time.    Plan: SW to look into the above information.   "

## 2025-05-23 ENCOUNTER — TELEPHONE (OUTPATIENT)
Facility: HOSPITAL | Age: 65
End: 2025-05-23
Payer: COMMERCIAL

## 2025-05-23 DIAGNOSIS — Z94.4 LIVER REPLACED BY TRANSPLANT (MULTI): ICD-10-CM

## 2025-05-23 NOTE — TELEPHONE ENCOUNTER
Called patient left voicemail to return call so schedule nutrition FUV in 3 months by phone ok / Lynn

## 2025-05-27 ENCOUNTER — LAB (OUTPATIENT)
Dept: LAB | Facility: HOSPITAL | Age: 65
End: 2025-05-27
Payer: COMMERCIAL

## 2025-05-27 DIAGNOSIS — Z94.4 LIVER TRANSPLANT STATUS: Primary | ICD-10-CM

## 2025-05-27 DIAGNOSIS — Z94.4 LIVER REPLACED BY TRANSPLANT (MULTI): ICD-10-CM

## 2025-05-27 LAB
ALBUMIN SERPL BCP-MCNC: 4 G/DL (ref 3.4–5)
ALP SERPL-CCNC: 124 U/L (ref 33–136)
ALT SERPL W P-5'-P-CCNC: 16 U/L (ref 10–52)
ANION GAP SERPL CALC-SCNC: 9 MMOL/L (ref 10–20)
AST SERPL W P-5'-P-CCNC: 15 U/L (ref 9–39)
BASOPHILS # BLD AUTO: 0.09 X10*3/UL (ref 0–0.1)
BASOPHILS NFR BLD AUTO: 2.2 %
BILIRUB DIRECT SERPL-MCNC: 0.2 MG/DL (ref 0–0.3)
BILIRUB SERPL-MCNC: 0.5 MG/DL (ref 0–1.2)
BUN SERPL-MCNC: 31 MG/DL (ref 6–23)
CALCIUM SERPL-MCNC: 8.9 MG/DL (ref 8.6–10.3)
CHLORIDE SERPL-SCNC: 108 MMOL/L (ref 98–107)
CO2 SERPL-SCNC: 28 MMOL/L (ref 21–32)
CREAT SERPL-MCNC: 1.7 MG/DL (ref 0.5–1.3)
EGFRCR SERPLBLD CKD-EPI 2021: 45 ML/MIN/1.73M*2
EOSINOPHIL # BLD AUTO: 0.1 X10*3/UL (ref 0–0.7)
EOSINOPHIL NFR BLD AUTO: 2.4 %
ERYTHROCYTE [DISTWIDTH] IN BLOOD BY AUTOMATED COUNT: 16.6 % (ref 11.5–14.5)
GLUCOSE SERPL-MCNC: 85 MG/DL (ref 74–99)
HCT VFR BLD AUTO: 32.3 % (ref 41–52)
HGB BLD-MCNC: 10 G/DL (ref 13.5–17.5)
IMM GRANULOCYTES # BLD AUTO: 0.04 X10*3/UL (ref 0–0.7)
IMM GRANULOCYTES NFR BLD AUTO: 1 % (ref 0–0.9)
LYMPHOCYTES # BLD AUTO: 0.88 X10*3/UL (ref 1.2–4.8)
LYMPHOCYTES NFR BLD AUTO: 21.4 %
MAGNESIUM SERPL-MCNC: 1.88 MG/DL (ref 1.6–2.4)
MCH RBC QN AUTO: 29.9 PG (ref 26–34)
MCHC RBC AUTO-ENTMCNC: 31 G/DL (ref 32–36)
MCV RBC AUTO: 96 FL (ref 80–100)
MONOCYTES # BLD AUTO: 0.28 X10*3/UL (ref 0.1–1)
MONOCYTES NFR BLD AUTO: 6.8 %
NEUTROPHILS # BLD AUTO: 2.72 X10*3/UL (ref 1.2–7.7)
NEUTROPHILS NFR BLD AUTO: 66.2 %
NRBC BLD-RTO: 0 /100 WBCS (ref 0–0)
PHOSPHATE SERPL-MCNC: 4 MG/DL (ref 2.5–4.9)
PLATELET # BLD AUTO: 214 X10*3/UL (ref 150–450)
POTASSIUM SERPL-SCNC: 4.1 MMOL/L (ref 3.5–5.3)
PROT SERPL-MCNC: 6 G/DL (ref 6.4–8.2)
RBC # BLD AUTO: 3.35 X10*6/UL (ref 4.5–5.9)
SODIUM SERPL-SCNC: 141 MMOL/L (ref 136–145)
WBC # BLD AUTO: 4.1 X10*3/UL (ref 4.4–11.3)

## 2025-05-27 PROCEDURE — 36415 COLL VENOUS BLD VENIPUNCTURE: CPT

## 2025-05-27 PROCEDURE — 83735 ASSAY OF MAGNESIUM: CPT

## 2025-05-27 PROCEDURE — 85025 COMPLETE CBC W/AUTO DIFF WBC: CPT

## 2025-05-27 PROCEDURE — 82977 ASSAY OF GGT: CPT

## 2025-05-27 PROCEDURE — 80197 ASSAY OF TACROLIMUS: CPT

## 2025-05-27 PROCEDURE — 84100 ASSAY OF PHOSPHORUS: CPT

## 2025-05-27 PROCEDURE — 82248 BILIRUBIN DIRECT: CPT

## 2025-05-27 PROCEDURE — 80053 COMPREHEN METABOLIC PANEL: CPT

## 2025-05-28 ENCOUNTER — TELEPHONE (OUTPATIENT)
Facility: HOSPITAL | Age: 65
End: 2025-05-28
Payer: COMMERCIAL

## 2025-05-28 DIAGNOSIS — Z94.4 LIVER TRANSPLANT RECIPIENT (MULTI): ICD-10-CM

## 2025-05-28 DIAGNOSIS — Z94.0 KIDNEY REPLACED BY TRANSPLANT (HHS-HCC): ICD-10-CM

## 2025-05-28 LAB
GGT SERPL-CCNC: 86 U/L (ref 5–64)
TACROLIMUS BLD-MCNC: 6.3 NG/ML

## 2025-05-29 ENCOUNTER — APPOINTMENT (OUTPATIENT)
Facility: HOSPITAL | Age: 65
End: 2025-05-29
Payer: COMMERCIAL

## 2025-05-30 ENCOUNTER — LAB (OUTPATIENT)
Dept: LAB | Facility: HOSPITAL | Age: 65
End: 2025-05-30
Payer: COMMERCIAL

## 2025-05-30 DIAGNOSIS — Z94.4 LIVER TRANSPLANT STATUS: Primary | ICD-10-CM

## 2025-05-30 DIAGNOSIS — Z94.4 LIVER REPLACED BY TRANSPLANT (MULTI): ICD-10-CM

## 2025-05-30 PROCEDURE — 85025 COMPLETE CBC W/AUTO DIFF WBC: CPT

## 2025-05-30 PROCEDURE — 82248 BILIRUBIN DIRECT: CPT

## 2025-05-30 PROCEDURE — 84100 ASSAY OF PHOSPHORUS: CPT

## 2025-05-30 PROCEDURE — 80197 ASSAY OF TACROLIMUS: CPT

## 2025-05-30 PROCEDURE — 82977 ASSAY OF GGT: CPT

## 2025-05-30 PROCEDURE — 80053 COMPREHEN METABOLIC PANEL: CPT

## 2025-05-30 PROCEDURE — 36415 COLL VENOUS BLD VENIPUNCTURE: CPT

## 2025-05-30 PROCEDURE — 83735 ASSAY OF MAGNESIUM: CPT

## 2025-05-31 LAB
ALBUMIN SERPL BCP-MCNC: 3.7 G/DL (ref 3.4–5)
ALP SERPL-CCNC: 120 U/L (ref 33–136)
ALT SERPL W P-5'-P-CCNC: 14 U/L (ref 10–52)
ANION GAP SERPL CALC-SCNC: 9 MMOL/L (ref 10–20)
AST SERPL W P-5'-P-CCNC: 13 U/L (ref 9–39)
BASOPHILS # BLD AUTO: 0.08 X10*3/UL (ref 0–0.1)
BASOPHILS NFR BLD AUTO: 1.6 %
BILIRUB DIRECT SERPL-MCNC: 0.1 MG/DL (ref 0–0.3)
BILIRUB SERPL-MCNC: 0.4 MG/DL (ref 0–1.2)
BUN SERPL-MCNC: 28 MG/DL (ref 6–23)
CALCIUM SERPL-MCNC: 8.6 MG/DL (ref 8.6–10.3)
CHLORIDE SERPL-SCNC: 111 MMOL/L (ref 98–107)
CO2 SERPL-SCNC: 25 MMOL/L (ref 21–32)
CREAT SERPL-MCNC: 1.5 MG/DL (ref 0.5–1.3)
EGFRCR SERPLBLD CKD-EPI 2021: 52 ML/MIN/1.73M*2
EOSINOPHIL # BLD AUTO: 0.08 X10*3/UL (ref 0–0.7)
EOSINOPHIL NFR BLD AUTO: 1.6 %
ERYTHROCYTE [DISTWIDTH] IN BLOOD BY AUTOMATED COUNT: 16.1 % (ref 11.5–14.5)
GGT SERPL-CCNC: 77 U/L (ref 5–64)
GLUCOSE SERPL-MCNC: 93 MG/DL (ref 74–99)
HCT VFR BLD AUTO: 30.1 % (ref 41–52)
HGB BLD-MCNC: 9.4 G/DL (ref 13.5–17.5)
IMM GRANULOCYTES # BLD AUTO: 0.04 X10*3/UL (ref 0–0.7)
IMM GRANULOCYTES NFR BLD AUTO: 0.8 % (ref 0–0.9)
LYMPHOCYTES # BLD AUTO: 0.96 X10*3/UL (ref 1.2–4.8)
LYMPHOCYTES NFR BLD AUTO: 19 %
MAGNESIUM SERPL-MCNC: 1.76 MG/DL (ref 1.6–2.4)
MCH RBC QN AUTO: 30.4 PG (ref 26–34)
MCHC RBC AUTO-ENTMCNC: 31.2 G/DL (ref 32–36)
MCV RBC AUTO: 97 FL (ref 80–100)
MONOCYTES # BLD AUTO: 0.37 X10*3/UL (ref 0.1–1)
MONOCYTES NFR BLD AUTO: 7.3 %
NEUTROPHILS # BLD AUTO: 3.53 X10*3/UL (ref 1.2–7.7)
NEUTROPHILS NFR BLD AUTO: 69.7 %
NRBC BLD-RTO: 0 /100 WBCS (ref 0–0)
PHOSPHATE SERPL-MCNC: 3.4 MG/DL (ref 2.5–4.9)
PLATELET # BLD AUTO: 195 X10*3/UL (ref 150–450)
POTASSIUM SERPL-SCNC: 4.4 MMOL/L (ref 3.5–5.3)
PROT SERPL-MCNC: 5.6 G/DL (ref 6.4–8.2)
RBC # BLD AUTO: 3.09 X10*6/UL (ref 4.5–5.9)
SODIUM SERPL-SCNC: 141 MMOL/L (ref 136–145)
TACROLIMUS BLD-MCNC: 6.4 NG/ML
WBC # BLD AUTO: 5.1 X10*3/UL (ref 4.4–11.3)

## 2025-06-02 ENCOUNTER — LAB (OUTPATIENT)
Dept: LAB | Facility: HOSPITAL | Age: 65
End: 2025-06-02
Payer: COMMERCIAL

## 2025-06-02 DIAGNOSIS — Z94.0 KIDNEY REPLACED BY TRANSPLANT (HHS-HCC): ICD-10-CM

## 2025-06-02 DIAGNOSIS — Z94.4 LIVER TRANSPLANT STATUS: Primary | ICD-10-CM

## 2025-06-02 LAB
BASOPHILS # BLD AUTO: 0.09 X10*3/UL (ref 0–0.1)
BASOPHILS NFR BLD AUTO: 1.6 %
EOSINOPHIL # BLD AUTO: 0.1 X10*3/UL (ref 0–0.7)
EOSINOPHIL NFR BLD AUTO: 1.8 %
ERYTHROCYTE [DISTWIDTH] IN BLOOD BY AUTOMATED COUNT: 15.8 % (ref 11.5–14.5)
HCT VFR BLD AUTO: 32.3 % (ref 41–52)
HGB BLD-MCNC: 10.1 G/DL (ref 13.5–17.5)
IMM GRANULOCYTES # BLD AUTO: 0.03 X10*3/UL (ref 0–0.7)
IMM GRANULOCYTES NFR BLD AUTO: 0.5 % (ref 0–0.9)
LYMPHOCYTES # BLD AUTO: 1.01 X10*3/UL (ref 1.2–4.8)
LYMPHOCYTES NFR BLD AUTO: 17.8 %
MCH RBC QN AUTO: 29.9 PG (ref 26–34)
MCHC RBC AUTO-ENTMCNC: 31.3 G/DL (ref 32–36)
MCV RBC AUTO: 96 FL (ref 80–100)
MONOCYTES # BLD AUTO: 0.37 X10*3/UL (ref 0.1–1)
MONOCYTES NFR BLD AUTO: 6.5 %
NEUTROPHILS # BLD AUTO: 4.08 X10*3/UL (ref 1.2–7.7)
NEUTROPHILS NFR BLD AUTO: 71.8 %
NRBC BLD-RTO: 0 /100 WBCS (ref 0–0)
PLATELET # BLD AUTO: 204 X10*3/UL (ref 150–450)
RBC # BLD AUTO: 3.38 X10*6/UL (ref 4.5–5.9)
WBC # BLD AUTO: 5.7 X10*3/UL (ref 4.4–11.3)

## 2025-06-02 PROCEDURE — 85025 COMPLETE CBC W/AUTO DIFF WBC: CPT

## 2025-06-02 PROCEDURE — 82977 ASSAY OF GGT: CPT

## 2025-06-02 PROCEDURE — 82248 BILIRUBIN DIRECT: CPT

## 2025-06-02 PROCEDURE — 36415 COLL VENOUS BLD VENIPUNCTURE: CPT

## 2025-06-02 PROCEDURE — 82570 ASSAY OF URINE CREATININE: CPT

## 2025-06-02 PROCEDURE — 84100 ASSAY OF PHOSPHORUS: CPT

## 2025-06-02 PROCEDURE — 80053 COMPREHEN METABOLIC PANEL: CPT

## 2025-06-02 PROCEDURE — 83735 ASSAY OF MAGNESIUM: CPT

## 2025-06-02 PROCEDURE — 80197 ASSAY OF TACROLIMUS: CPT

## 2025-06-03 ENCOUNTER — TELEPHONE (OUTPATIENT)
Facility: HOSPITAL | Age: 65
End: 2025-06-03
Payer: COMMERCIAL

## 2025-06-03 DIAGNOSIS — Z94.0 KIDNEY REPLACED BY TRANSPLANT (HHS-HCC): ICD-10-CM

## 2025-06-03 DIAGNOSIS — Z94.4 LIVER REPLACED BY TRANSPLANT (MULTI): ICD-10-CM

## 2025-06-03 DIAGNOSIS — Z13.89 SCREENING FOR BLOOD OR PROTEIN IN URINE: ICD-10-CM

## 2025-06-03 DIAGNOSIS — Z94.4 LIVER TRANSPLANT RECIPIENT (MULTI): ICD-10-CM

## 2025-06-03 LAB
ALBUMIN SERPL BCP-MCNC: 3.9 G/DL (ref 3.4–5)
ALP SERPL-CCNC: 111 U/L (ref 33–136)
ALT SERPL W P-5'-P-CCNC: 11 U/L (ref 10–52)
ANION GAP SERPL CALC-SCNC: 10 MMOL/L (ref 10–20)
AST SERPL W P-5'-P-CCNC: 12 U/L (ref 9–39)
BILIRUB DIRECT SERPL-MCNC: 0.1 MG/DL (ref 0–0.3)
BILIRUB SERPL-MCNC: 0.4 MG/DL (ref 0–1.2)
BUN SERPL-MCNC: 27 MG/DL (ref 6–23)
CALCIUM SERPL-MCNC: 8.9 MG/DL (ref 8.6–10.3)
CHLORIDE SERPL-SCNC: 110 MMOL/L (ref 98–107)
CO2 SERPL-SCNC: 27 MMOL/L (ref 21–32)
CREAT SERPL-MCNC: 1.66 MG/DL (ref 0.5–1.3)
CREAT UR-MCNC: 86.2 MG/DL (ref 20–370)
EGFRCR SERPLBLD CKD-EPI 2021: 46 ML/MIN/1.73M*2
GGT SERPL-CCNC: 71 U/L (ref 5–64)
GLUCOSE SERPL-MCNC: 88 MG/DL (ref 74–99)
MAGNESIUM SERPL-MCNC: 1.67 MG/DL (ref 1.6–2.4)
PHOSPHATE SERPL-MCNC: 3.6 MG/DL (ref 2.5–4.9)
POTASSIUM SERPL-SCNC: 4.7 MMOL/L (ref 3.5–5.3)
PROT SERPL-MCNC: 6 G/DL (ref 6.4–8.2)
SODIUM SERPL-SCNC: 142 MMOL/L (ref 136–145)
TACROLIMUS BLD-MCNC: 7.5 NG/ML

## 2025-06-03 RX ORDER — TACROLIMUS 1 MG/1
3 CAPSULE ORAL 2 TIMES DAILY
Qty: 540 CAPSULE | Refills: 3 | Status: SHIPPED | OUTPATIENT
Start: 2025-06-03 | End: 2025-06-03 | Stop reason: SDUPTHER

## 2025-06-03 NOTE — ADDENDUM NOTE
Addended by: ERLINDA DE LA CRUZ on: 6/2/2025 10:52 PM     Modules accepted: Orders     18 yo female awaiting bariatric surgery here for upper endoscopy

## 2025-06-03 NOTE — TELEPHONE ENCOUNTER
RN received a phone call from the home care agency  Report they sent orders on 5/13 and 5/16  Need to be signed by Dr Barrett and returned.  Requested orders be resent and provided fax number

## 2025-06-04 ENCOUNTER — TELEPHONE (OUTPATIENT)
Facility: HOSPITAL | Age: 65
End: 2025-06-04
Payer: COMMERCIAL

## 2025-06-04 RX ORDER — TACROLIMUS 1 MG/1
4 CAPSULE ORAL 2 TIMES DAILY
Qty: 720 CAPSULE | Refills: 3 | Status: SHIPPED | OUTPATIENT
Start: 2025-06-04 | End: 2026-05-30

## 2025-06-04 NOTE — TELEPHONE ENCOUNTER
Called patient to advise of mychart message regarding medication dosage change, patient states will read and respond as soon as is off phone

## 2025-06-05 ENCOUNTER — LAB (OUTPATIENT)
Dept: LAB | Facility: HOSPITAL | Age: 65
End: 2025-06-05
Payer: COMMERCIAL

## 2025-06-05 DIAGNOSIS — Z94.4 LIVER TRANSPLANT STATUS: Primary | ICD-10-CM

## 2025-06-05 LAB
ALBUMIN SERPL BCP-MCNC: 4.2 G/DL (ref 3.4–5)
ALP SERPL-CCNC: 102 U/L (ref 33–136)
ALT SERPL W P-5'-P-CCNC: 9 U/L (ref 10–52)
ANION GAP SERPL CALC-SCNC: 11 MMOL/L (ref 10–20)
AST SERPL W P-5'-P-CCNC: 11 U/L (ref 9–39)
BASOPHILS # BLD AUTO: 0.07 X10*3/UL (ref 0–0.1)
BASOPHILS NFR BLD AUTO: 1.2 %
BILIRUB DIRECT SERPL-MCNC: 0.1 MG/DL (ref 0–0.3)
BILIRUB SERPL-MCNC: 0.6 MG/DL (ref 0–1.2)
BUN SERPL-MCNC: 33 MG/DL (ref 6–23)
CALCIUM SERPL-MCNC: 9.3 MG/DL (ref 8.6–10.3)
CHLORIDE SERPL-SCNC: 110 MMOL/L (ref 98–107)
CO2 SERPL-SCNC: 24 MMOL/L (ref 21–32)
CREAT SERPL-MCNC: 1.61 MG/DL (ref 0.5–1.3)
EGFRCR SERPLBLD CKD-EPI 2021: 48 ML/MIN/1.73M*2
EOSINOPHIL # BLD AUTO: 0.1 X10*3/UL (ref 0–0.7)
EOSINOPHIL NFR BLD AUTO: 1.8 %
ERYTHROCYTE [DISTWIDTH] IN BLOOD BY AUTOMATED COUNT: 15.6 % (ref 11.5–14.5)
GLUCOSE SERPL-MCNC: 89 MG/DL (ref 74–99)
HCT VFR BLD AUTO: 32.6 % (ref 41–52)
HGB BLD-MCNC: 10.4 G/DL (ref 13.5–17.5)
IMM GRANULOCYTES # BLD AUTO: 0.06 X10*3/UL (ref 0–0.7)
IMM GRANULOCYTES NFR BLD AUTO: 1.1 % (ref 0–0.9)
LYMPHOCYTES # BLD AUTO: 0.9 X10*3/UL (ref 1.2–4.8)
LYMPHOCYTES NFR BLD AUTO: 15.8 %
MAGNESIUM SERPL-MCNC: 1.75 MG/DL (ref 1.6–2.4)
MCH RBC QN AUTO: 30.3 PG (ref 26–34)
MCHC RBC AUTO-ENTMCNC: 31.9 G/DL (ref 32–36)
MCV RBC AUTO: 95 FL (ref 80–100)
MONOCYTES # BLD AUTO: 0.4 X10*3/UL (ref 0.1–1)
MONOCYTES NFR BLD AUTO: 7 %
NEUTROPHILS # BLD AUTO: 4.17 X10*3/UL (ref 1.2–7.7)
NEUTROPHILS NFR BLD AUTO: 73.1 %
NRBC BLD-RTO: 0 /100 WBCS (ref 0–0)
PHOSPHATE SERPL-MCNC: 3.4 MG/DL (ref 2.5–4.9)
PLATELET # BLD AUTO: 200 X10*3/UL (ref 150–450)
POTASSIUM SERPL-SCNC: 4.6 MMOL/L (ref 3.5–5.3)
PROT SERPL-MCNC: 6.4 G/DL (ref 6.4–8.2)
RBC # BLD AUTO: 3.43 X10*6/UL (ref 4.5–5.9)
SODIUM SERPL-SCNC: 140 MMOL/L (ref 136–145)
WBC # BLD AUTO: 5.7 X10*3/UL (ref 4.4–11.3)

## 2025-06-05 PROCEDURE — 84100 ASSAY OF PHOSPHORUS: CPT

## 2025-06-05 PROCEDURE — 82977 ASSAY OF GGT: CPT

## 2025-06-05 PROCEDURE — 80197 ASSAY OF TACROLIMUS: CPT

## 2025-06-05 PROCEDURE — 80053 COMPREHEN METABOLIC PANEL: CPT

## 2025-06-05 PROCEDURE — 83735 ASSAY OF MAGNESIUM: CPT

## 2025-06-05 PROCEDURE — 85025 COMPLETE CBC W/AUTO DIFF WBC: CPT

## 2025-06-05 PROCEDURE — 36415 COLL VENOUS BLD VENIPUNCTURE: CPT

## 2025-06-05 PROCEDURE — 82248 BILIRUBIN DIRECT: CPT

## 2025-06-06 DIAGNOSIS — Z94.4 LIVER REPLACED BY TRANSPLANT (MULTI): ICD-10-CM

## 2025-06-06 DIAGNOSIS — Z94.4 LIVER TRANSPLANT RECIPIENT (MULTI): ICD-10-CM

## 2025-06-06 LAB
GGT SERPL-CCNC: 63 U/L (ref 5–64)
TACROLIMUS BLD-MCNC: 8.2 NG/ML

## 2025-06-09 ENCOUNTER — TELEPHONE (OUTPATIENT)
Facility: HOSPITAL | Age: 65
End: 2025-06-09

## 2025-06-09 ENCOUNTER — LAB (OUTPATIENT)
Dept: LAB | Facility: HOSPITAL | Age: 65
End: 2025-06-09
Payer: COMMERCIAL

## 2025-06-09 DIAGNOSIS — Z94.4 LIVER TRANSPLANT STATUS: Primary | ICD-10-CM

## 2025-06-09 LAB
ALBUMIN SERPL BCP-MCNC: 4 G/DL (ref 3.4–5)
ALP SERPL-CCNC: 99 U/L (ref 33–136)
ALT SERPL W P-5'-P-CCNC: 7 U/L (ref 10–52)
ANION GAP SERPL CALC-SCNC: 11 MMOL/L (ref 10–20)
AST SERPL W P-5'-P-CCNC: 11 U/L (ref 9–39)
BASOPHILS # BLD AUTO: 0.06 X10*3/UL (ref 0–0.1)
BASOPHILS NFR BLD AUTO: 1.2 %
BILIRUB DIRECT SERPL-MCNC: 0.1 MG/DL (ref 0–0.3)
BILIRUB SERPL-MCNC: 0.4 MG/DL (ref 0–1.2)
BUN SERPL-MCNC: 34 MG/DL (ref 6–23)
CALCIUM SERPL-MCNC: 8.7 MG/DL (ref 8.6–10.3)
CHLORIDE SERPL-SCNC: 111 MMOL/L (ref 98–107)
CO2 SERPL-SCNC: 24 MMOL/L (ref 21–32)
CREAT SERPL-MCNC: 1.46 MG/DL (ref 0.5–1.3)
EGFRCR SERPLBLD CKD-EPI 2021: 54 ML/MIN/1.73M*2
EOSINOPHIL # BLD AUTO: 0.12 X10*3/UL (ref 0–0.7)
EOSINOPHIL NFR BLD AUTO: 2.5 %
ERYTHROCYTE [DISTWIDTH] IN BLOOD BY AUTOMATED COUNT: 15.4 % (ref 11.5–14.5)
GLUCOSE SERPL-MCNC: 87 MG/DL (ref 74–99)
HCT VFR BLD AUTO: 31.6 % (ref 41–52)
HGB BLD-MCNC: 9.7 G/DL (ref 13.5–17.5)
IMM GRANULOCYTES # BLD AUTO: 0.07 X10*3/UL (ref 0–0.7)
IMM GRANULOCYTES NFR BLD AUTO: 1.4 % (ref 0–0.9)
LYMPHOCYTES # BLD AUTO: 1.02 X10*3/UL (ref 1.2–4.8)
LYMPHOCYTES NFR BLD AUTO: 20.9 %
MAGNESIUM SERPL-MCNC: 1.72 MG/DL (ref 1.6–2.4)
MCH RBC QN AUTO: 29.8 PG (ref 26–34)
MCHC RBC AUTO-ENTMCNC: 30.7 G/DL (ref 32–36)
MCV RBC AUTO: 97 FL (ref 80–100)
MONOCYTES # BLD AUTO: 0.34 X10*3/UL (ref 0.1–1)
MONOCYTES NFR BLD AUTO: 7 %
NEUTROPHILS # BLD AUTO: 3.26 X10*3/UL (ref 1.2–7.7)
NEUTROPHILS NFR BLD AUTO: 67 %
NRBC BLD-RTO: 0 /100 WBCS (ref 0–0)
PHOSPHATE SERPL-MCNC: 3.5 MG/DL (ref 2.5–4.9)
PLATELET # BLD AUTO: 166 X10*3/UL (ref 150–450)
POTASSIUM SERPL-SCNC: 4.8 MMOL/L (ref 3.5–5.3)
PROT SERPL-MCNC: 5.8 G/DL (ref 6.4–8.2)
RBC # BLD AUTO: 3.25 X10*6/UL (ref 4.5–5.9)
SODIUM SERPL-SCNC: 141 MMOL/L (ref 136–145)
WBC # BLD AUTO: 4.9 X10*3/UL (ref 4.4–11.3)

## 2025-06-09 PROCEDURE — 80197 ASSAY OF TACROLIMUS: CPT

## 2025-06-09 PROCEDURE — 82977 ASSAY OF GGT: CPT

## 2025-06-09 PROCEDURE — 85025 COMPLETE CBC W/AUTO DIFF WBC: CPT

## 2025-06-09 PROCEDURE — 82248 BILIRUBIN DIRECT: CPT

## 2025-06-09 PROCEDURE — 83735 ASSAY OF MAGNESIUM: CPT

## 2025-06-09 PROCEDURE — 36415 COLL VENOUS BLD VENIPUNCTURE: CPT

## 2025-06-09 PROCEDURE — 84100 ASSAY OF PHOSPHORUS: CPT

## 2025-06-09 PROCEDURE — 80053 COMPREHEN METABOLIC PANEL: CPT

## 2025-06-09 NOTE — TELEPHONE ENCOUNTER
Wife of patient is calling for refill on his Mycophenolate 250mg 1 in AZM 1 in PM to [facility]

## 2025-06-10 DIAGNOSIS — Z94.4 LIVER REPLACED BY TRANSPLANT (MULTI): ICD-10-CM

## 2025-06-10 LAB
GGT SERPL-CCNC: 53 U/L (ref 5–64)
TACROLIMUS BLD-MCNC: 9.1 NG/ML

## 2025-06-10 NOTE — TELEPHONE ENCOUNTER
Wife of patient is calling back to ask if Mycophenolate has been sent.  Patient only has 1 day left of his medication.

## 2025-06-11 RX ORDER — MYCOPHENOLATE MOFETIL 250 MG/1
500 CAPSULE ORAL 2 TIMES DAILY
Qty: 360 CAPSULE | Refills: 3 | Status: SHIPPED | OUTPATIENT
Start: 2025-06-11 | End: 2026-06-06

## 2025-06-12 ENCOUNTER — LAB (OUTPATIENT)
Dept: LAB | Facility: HOSPITAL | Age: 65
End: 2025-06-12
Payer: COMMERCIAL

## 2025-06-12 DIAGNOSIS — Z94.4 LIVER TRANSPLANT STATUS: Primary | ICD-10-CM

## 2025-06-12 LAB
ALBUMIN SERPL BCP-MCNC: 3.9 G/DL (ref 3.4–5)
ALP SERPL-CCNC: 103 U/L (ref 33–136)
ALT SERPL W P-5'-P-CCNC: 9 U/L (ref 10–52)
ANION GAP SERPL CALC-SCNC: 8 MMOL/L (ref 10–20)
AST SERPL W P-5'-P-CCNC: 13 U/L (ref 9–39)
BASOPHILS # BLD AUTO: 0.08 X10*3/UL (ref 0–0.1)
BASOPHILS NFR BLD AUTO: 1.9 %
BILIRUB DIRECT SERPL-MCNC: 0.1 MG/DL (ref 0–0.3)
BILIRUB SERPL-MCNC: 0.4 MG/DL (ref 0–1.2)
BUN SERPL-MCNC: 28 MG/DL (ref 6–23)
CALCIUM SERPL-MCNC: 8.5 MG/DL (ref 8.6–10.3)
CHLORIDE SERPL-SCNC: 112 MMOL/L (ref 98–107)
CO2 SERPL-SCNC: 25 MMOL/L (ref 21–32)
CREAT SERPL-MCNC: 1.59 MG/DL (ref 0.5–1.3)
EGFRCR SERPLBLD CKD-EPI 2021: 48 ML/MIN/1.73M*2
EOSINOPHIL # BLD AUTO: 0.12 X10*3/UL (ref 0–0.7)
EOSINOPHIL NFR BLD AUTO: 2.8 %
ERYTHROCYTE [DISTWIDTH] IN BLOOD BY AUTOMATED COUNT: 15.3 % (ref 11.5–14.5)
GGT SERPL-CCNC: 50 U/L (ref 5–64)
GLUCOSE SERPL-MCNC: 81 MG/DL (ref 74–99)
HCT VFR BLD AUTO: 32.8 % (ref 41–52)
HGB BLD-MCNC: 10.1 G/DL (ref 13.5–17.5)
IMM GRANULOCYTES # BLD AUTO: 0.12 X10*3/UL (ref 0–0.7)
IMM GRANULOCYTES NFR BLD AUTO: 2.8 % (ref 0–0.9)
LYMPHOCYTES # BLD AUTO: 1.07 X10*3/UL (ref 1.2–4.8)
LYMPHOCYTES NFR BLD AUTO: 24.8 %
MAGNESIUM SERPL-MCNC: 1.69 MG/DL (ref 1.6–2.4)
MCH RBC QN AUTO: 30.5 PG (ref 26–34)
MCHC RBC AUTO-ENTMCNC: 30.8 G/DL (ref 32–36)
MCV RBC AUTO: 99 FL (ref 80–100)
MONOCYTES # BLD AUTO: 0.28 X10*3/UL (ref 0.1–1)
MONOCYTES NFR BLD AUTO: 6.5 %
NEUTROPHILS # BLD AUTO: 2.64 X10*3/UL (ref 1.2–7.7)
NEUTROPHILS NFR BLD AUTO: 61.2 %
NRBC BLD-RTO: 0 /100 WBCS (ref 0–0)
PHOSPHATE SERPL-MCNC: 4.3 MG/DL (ref 2.5–4.9)
PLATELET # BLD AUTO: 166 X10*3/UL (ref 150–450)
POTASSIUM SERPL-SCNC: 4.6 MMOL/L (ref 3.5–5.3)
PROT SERPL-MCNC: 5.7 G/DL (ref 6.4–8.2)
RBC # BLD AUTO: 3.31 X10*6/UL (ref 4.5–5.9)
SODIUM SERPL-SCNC: 140 MMOL/L (ref 136–145)
TACROLIMUS BLD-MCNC: 9.3 NG/ML
WBC # BLD AUTO: 4.3 X10*3/UL (ref 4.4–11.3)

## 2025-06-12 PROCEDURE — 82977 ASSAY OF GGT: CPT

## 2025-06-12 PROCEDURE — 83735 ASSAY OF MAGNESIUM: CPT

## 2025-06-12 PROCEDURE — 36415 COLL VENOUS BLD VENIPUNCTURE: CPT

## 2025-06-12 PROCEDURE — 85025 COMPLETE CBC W/AUTO DIFF WBC: CPT

## 2025-06-12 PROCEDURE — 80053 COMPREHEN METABOLIC PANEL: CPT

## 2025-06-12 PROCEDURE — 80197 ASSAY OF TACROLIMUS: CPT

## 2025-06-12 PROCEDURE — 84100 ASSAY OF PHOSPHORUS: CPT

## 2025-06-12 PROCEDURE — 82248 BILIRUBIN DIRECT: CPT

## 2025-06-13 DIAGNOSIS — Z94.4 LIVER REPLACED BY TRANSPLANT (MULTI): ICD-10-CM

## 2025-06-16 ENCOUNTER — LAB (OUTPATIENT)
Dept: LAB | Facility: HOSPITAL | Age: 65
End: 2025-06-16
Payer: COMMERCIAL

## 2025-06-16 DIAGNOSIS — Z94.4 LIVER TRANSPLANT STATUS: Primary | ICD-10-CM

## 2025-06-16 DIAGNOSIS — Z94.0 KIDNEY TRANSPLANT STATUS: ICD-10-CM

## 2025-06-16 LAB
ALBUMIN SERPL BCP-MCNC: 3.9 G/DL (ref 3.4–5)
ALP SERPL-CCNC: 97 U/L (ref 33–136)
ALT SERPL W P-5'-P-CCNC: 11 U/L (ref 10–52)
ANION GAP SERPL CALC-SCNC: <7 MMOL/L (ref 10–20)
AST SERPL W P-5'-P-CCNC: 16 U/L (ref 9–39)
BASOPHILS # BLD AUTO: 0.05 X10*3/UL (ref 0–0.1)
BASOPHILS NFR BLD AUTO: 1.4 %
BILIRUB DIRECT SERPL-MCNC: 0.1 MG/DL (ref 0–0.3)
BILIRUB SERPL-MCNC: 0.4 MG/DL (ref 0–1.2)
BUN SERPL-MCNC: 26 MG/DL (ref 6–23)
CALCIUM SERPL-MCNC: 8.7 MG/DL (ref 8.6–10.3)
CHLORIDE SERPL-SCNC: 109 MMOL/L (ref 98–107)
CO2 SERPL-SCNC: 29 MMOL/L (ref 21–32)
CREAT SERPL-MCNC: 1.6 MG/DL (ref 0.5–1.3)
CREAT UR-MCNC: 70.7 MG/DL (ref 20–370)
EGFRCR SERPLBLD CKD-EPI 2021: 48 ML/MIN/1.73M*2
EOSINOPHIL # BLD AUTO: 0.18 X10*3/UL (ref 0–0.7)
EOSINOPHIL NFR BLD AUTO: 4.9 %
ERYTHROCYTE [DISTWIDTH] IN BLOOD BY AUTOMATED COUNT: 14.8 % (ref 11.5–14.5)
GGT SERPL-CCNC: 52 U/L (ref 5–64)
GLUCOSE SERPL-MCNC: 83 MG/DL (ref 74–99)
HCT VFR BLD AUTO: 32.3 % (ref 41–52)
HGB BLD-MCNC: 10 G/DL (ref 13.5–17.5)
IMM GRANULOCYTES # BLD AUTO: 0.3 X10*3/UL (ref 0–0.7)
IMM GRANULOCYTES NFR BLD AUTO: 8.2 % (ref 0–0.9)
LYMPHOCYTES # BLD AUTO: 1.1 X10*3/UL (ref 1.2–4.8)
LYMPHOCYTES NFR BLD AUTO: 30 %
MAGNESIUM SERPL-MCNC: 1.79 MG/DL (ref 1.6–2.4)
MCH RBC QN AUTO: 30.1 PG (ref 26–34)
MCHC RBC AUTO-ENTMCNC: 31 G/DL (ref 32–36)
MCV RBC AUTO: 97 FL (ref 80–100)
MONOCYTES # BLD AUTO: 0.21 X10*3/UL (ref 0.1–1)
MONOCYTES NFR BLD AUTO: 5.7 %
NEUTROPHILS # BLD AUTO: 1.83 X10*3/UL (ref 1.2–7.7)
NEUTROPHILS NFR BLD AUTO: 49.8 %
NRBC BLD-RTO: 0 /100 WBCS (ref 0–0)
PHOSPHATE SERPL-MCNC: 5.1 MG/DL (ref 2.5–4.9)
PLATELET # BLD AUTO: 154 X10*3/UL (ref 150–450)
POTASSIUM SERPL-SCNC: 5.2 MMOL/L (ref 3.5–5.3)
PROT SERPL-MCNC: 5.8 G/DL (ref 6.4–8.2)
PROT UR-ACNC: 16 MG/DL (ref 5–25)
PROT/CREAT UR: 0.23 MG/MG CREAT (ref 0–0.17)
RBC # BLD AUTO: 3.32 X10*6/UL (ref 4.5–5.9)
SODIUM SERPL-SCNC: 139 MMOL/L (ref 136–145)
TACROLIMUS BLD-MCNC: 9.5 NG/ML
WBC # BLD AUTO: 3.7 X10*3/UL (ref 4.4–11.3)

## 2025-06-16 PROCEDURE — 85025 COMPLETE CBC W/AUTO DIFF WBC: CPT

## 2025-06-16 PROCEDURE — 36415 COLL VENOUS BLD VENIPUNCTURE: CPT

## 2025-06-16 PROCEDURE — 80197 ASSAY OF TACROLIMUS: CPT

## 2025-06-16 PROCEDURE — 82248 BILIRUBIN DIRECT: CPT

## 2025-06-16 PROCEDURE — 84100 ASSAY OF PHOSPHORUS: CPT

## 2025-06-16 PROCEDURE — 84156 ASSAY OF PROTEIN URINE: CPT

## 2025-06-16 PROCEDURE — 83735 ASSAY OF MAGNESIUM: CPT

## 2025-06-16 PROCEDURE — 87799 DETECT AGENT NOS DNA QUANT: CPT

## 2025-06-16 PROCEDURE — 80053 COMPREHEN METABOLIC PANEL: CPT

## 2025-06-16 PROCEDURE — 82570 ASSAY OF URINE CREATININE: CPT

## 2025-06-16 PROCEDURE — 82977 ASSAY OF GGT: CPT

## 2025-06-17 DIAGNOSIS — Z94.4 LIVER TRANSPLANT RECIPIENT (MULTI): ICD-10-CM

## 2025-06-17 DIAGNOSIS — Z94.4 LIVER REPLACED BY TRANSPLANT (MULTI): ICD-10-CM

## 2025-06-17 DIAGNOSIS — Z94.0 KIDNEY REPLACED BY TRANSPLANT (HHS-HCC): ICD-10-CM

## 2025-06-17 LAB
BKV DNA SERPL NAA+PROBE-ACNC: 24 IU/ML (ref ?–22)
BKV DNA SERPL NAA+PROBE-LOG#: 1.38 LOG IU/ML
LABORATORY COMMENT REPORT: DETECTED

## 2025-06-19 ENCOUNTER — LAB (OUTPATIENT)
Dept: LAB | Facility: HOSPITAL | Age: 65
End: 2025-06-19
Payer: COMMERCIAL

## 2025-06-19 DIAGNOSIS — Z94.0 KIDNEY REPLACED BY TRANSPLANT (HHS-HCC): ICD-10-CM

## 2025-06-19 DIAGNOSIS — Z13.89 SCREENING FOR BLOOD OR PROTEIN IN URINE: ICD-10-CM

## 2025-06-19 DIAGNOSIS — Z94.4 LIVER TRANSPLANT STATUS: Primary | ICD-10-CM

## 2025-06-19 LAB
ALBUMIN SERPL BCP-MCNC: 3.7 G/DL (ref 3.4–5)
ALP SERPL-CCNC: 97 U/L (ref 33–136)
ALT SERPL W P-5'-P-CCNC: 11 U/L (ref 10–52)
ANION GAP SERPL CALC-SCNC: 11 MMOL/L (ref 10–20)
AST SERPL W P-5'-P-CCNC: 17 U/L (ref 9–39)
BASOPHILS # BLD AUTO: 0.04 X10*3/UL (ref 0–0.1)
BASOPHILS NFR BLD AUTO: 1.5 %
BILIRUB DIRECT SERPL-MCNC: 0.1 MG/DL (ref 0–0.3)
BILIRUB SERPL-MCNC: 0.5 MG/DL (ref 0–1.2)
BUN SERPL-MCNC: 29 MG/DL (ref 6–23)
CALCIUM SERPL-MCNC: 8.8 MG/DL (ref 8.6–10.6)
CHLORIDE SERPL-SCNC: 106 MMOL/L (ref 98–107)
CO2 SERPL-SCNC: 28 MMOL/L (ref 21–32)
CREAT SERPL-MCNC: 1.69 MG/DL (ref 0.5–1.3)
CREAT UR-MCNC: 64.3 MG/DL (ref 20–370)
EGFRCR SERPLBLD CKD-EPI 2021: 45 ML/MIN/1.73M*2
EOSINOPHIL # BLD AUTO: 0.12 X10*3/UL (ref 0–0.7)
EOSINOPHIL NFR BLD AUTO: 4.4 %
ERYTHROCYTE [DISTWIDTH] IN BLOOD BY AUTOMATED COUNT: 14.1 % (ref 11.5–14.5)
GGT SERPL-CCNC: 54 U/L (ref 5–64)
GLUCOSE SERPL-MCNC: 87 MG/DL (ref 74–99)
HCT VFR BLD AUTO: 31 % (ref 41–52)
HGB BLD-MCNC: 9.7 G/DL (ref 13.5–17.5)
HOLD SPECIMEN: NORMAL
HOLD SPECIMEN: NORMAL
IMM GRANULOCYTES # BLD AUTO: 0.23 X10*3/UL (ref 0–0.7)
IMM GRANULOCYTES NFR BLD AUTO: 8.4 % (ref 0–0.9)
LYMPHOCYTES # BLD AUTO: 0.77 X10*3/UL (ref 1.2–4.8)
LYMPHOCYTES NFR BLD AUTO: 28.2 %
MAGNESIUM SERPL-MCNC: 1.7 MG/DL (ref 1.6–2.4)
MCH RBC QN AUTO: 30.5 PG (ref 26–34)
MCHC RBC AUTO-ENTMCNC: 31.3 G/DL (ref 32–36)
MCV RBC AUTO: 98 FL (ref 80–100)
MONOCYTES # BLD AUTO: 0.15 X10*3/UL (ref 0.1–1)
MONOCYTES NFR BLD AUTO: 5.5 %
NEUTROPHILS # BLD AUTO: 1.42 X10*3/UL (ref 1.2–7.7)
NEUTROPHILS NFR BLD AUTO: 52 %
NRBC BLD-RTO: 0 /100 WBCS (ref 0–0)
PHOSPHATE SERPL-MCNC: 4.4 MG/DL (ref 2.5–4.9)
PLATELET # BLD AUTO: 132 X10*3/UL (ref 150–450)
POTASSIUM SERPL-SCNC: 5.2 MMOL/L (ref 3.5–5.3)
PROT SERPL-MCNC: 5.7 G/DL (ref 6.4–8.2)
PROT UR-ACNC: 14 MG/DL (ref 5–25)
PROT/CREAT UR: 0.22 MG/MG CREAT (ref 0–0.17)
RBC # BLD AUTO: 3.18 X10*6/UL (ref 4.5–5.9)
RBC MORPH BLD: NORMAL
SODIUM SERPL-SCNC: 140 MMOL/L (ref 136–145)
TACROLIMUS BLD-MCNC: 10.6 NG/ML
WBC # BLD AUTO: 2.7 X10*3/UL (ref 4.4–11.3)

## 2025-06-19 PROCEDURE — 80053 COMPREHEN METABOLIC PANEL: CPT

## 2025-06-19 PROCEDURE — 82248 BILIRUBIN DIRECT: CPT

## 2025-06-19 PROCEDURE — 80197 ASSAY OF TACROLIMUS: CPT

## 2025-06-19 PROCEDURE — 85025 COMPLETE CBC W/AUTO DIFF WBC: CPT

## 2025-06-19 PROCEDURE — 36415 COLL VENOUS BLD VENIPUNCTURE: CPT

## 2025-06-19 PROCEDURE — 82570 ASSAY OF URINE CREATININE: CPT

## 2025-06-19 PROCEDURE — 84100 ASSAY OF PHOSPHORUS: CPT

## 2025-06-19 PROCEDURE — 84156 ASSAY OF PROTEIN URINE: CPT

## 2025-06-19 PROCEDURE — 82977 ASSAY OF GGT: CPT

## 2025-06-19 PROCEDURE — 83735 ASSAY OF MAGNESIUM: CPT

## 2025-06-19 PROCEDURE — 87799 DETECT AGENT NOS DNA QUANT: CPT

## 2025-06-20 DIAGNOSIS — Z94.0 KIDNEY REPLACED BY TRANSPLANT (HHS-HCC): ICD-10-CM

## 2025-06-20 DIAGNOSIS — Z94.4 LIVER REPLACED BY TRANSPLANT (MULTI): ICD-10-CM

## 2025-06-20 LAB
BKV DNA SERPL NAA+PROBE-ACNC: 71 IU/ML (ref ?–22)
BKV DNA SERPL NAA+PROBE-LOG#: 1.85 LOG IU/ML
HOLD SPECIMEN: NORMAL
LABORATORY COMMENT REPORT: DETECTED

## 2025-06-23 ENCOUNTER — LAB (OUTPATIENT)
Dept: LAB | Facility: HOSPITAL | Age: 65
End: 2025-06-23
Payer: COMMERCIAL

## 2025-06-23 DIAGNOSIS — Z94.4 LIVER TRANSPLANT STATUS: Primary | ICD-10-CM

## 2025-06-23 LAB
BASOPHILS # BLD AUTO: 0.03 X10*3/UL (ref 0–0.1)
BASOPHILS NFR BLD AUTO: 1.4 %
EOSINOPHIL # BLD AUTO: 0.09 X10*3/UL (ref 0–0.7)
EOSINOPHIL NFR BLD AUTO: 4.3 %
ERYTHROCYTE [DISTWIDTH] IN BLOOD BY AUTOMATED COUNT: 14.7 % (ref 11.5–14.5)
HCT VFR BLD AUTO: 30.9 % (ref 41–52)
HGB BLD-MCNC: 9.5 G/DL (ref 13.5–17.5)
IMM GRANULOCYTES # BLD AUTO: 0.2 X10*3/UL (ref 0–0.7)
IMM GRANULOCYTES NFR BLD AUTO: 9.5 % (ref 0–0.9)
LYMPHOCYTES # BLD AUTO: 0.72 X10*3/UL (ref 1.2–4.8)
LYMPHOCYTES NFR BLD AUTO: 34.1 %
MCH RBC QN AUTO: 30.7 PG (ref 26–34)
MCHC RBC AUTO-ENTMCNC: 30.7 G/DL (ref 32–36)
MCV RBC AUTO: 100 FL (ref 80–100)
MONOCYTES # BLD AUTO: 0.07 X10*3/UL (ref 0.1–1)
MONOCYTES NFR BLD AUTO: 3.3 %
NEUTROPHILS # BLD AUTO: 1 X10*3/UL (ref 1.2–7.7)
NEUTROPHILS NFR BLD AUTO: 47.4 %
NRBC BLD-RTO: 0 /100 WBCS (ref 0–0)
PLATELET # BLD AUTO: 125 X10*3/UL (ref 150–450)
RBC # BLD AUTO: 3.09 X10*6/UL (ref 4.5–5.9)
WBC # BLD AUTO: 2.1 X10*3/UL (ref 4.4–11.3)

## 2025-06-23 PROCEDURE — 83735 ASSAY OF MAGNESIUM: CPT

## 2025-06-23 PROCEDURE — 84100 ASSAY OF PHOSPHORUS: CPT

## 2025-06-23 PROCEDURE — 82977 ASSAY OF GGT: CPT

## 2025-06-23 PROCEDURE — 85025 COMPLETE CBC W/AUTO DIFF WBC: CPT

## 2025-06-23 PROCEDURE — 82248 BILIRUBIN DIRECT: CPT

## 2025-06-23 PROCEDURE — 80197 ASSAY OF TACROLIMUS: CPT

## 2025-06-23 PROCEDURE — 80053 COMPREHEN METABOLIC PANEL: CPT

## 2025-06-23 PROCEDURE — 36415 COLL VENOUS BLD VENIPUNCTURE: CPT

## 2025-06-23 RX ORDER — TACROLIMUS 1 MG/1
3 CAPSULE ORAL 2 TIMES DAILY
Qty: 540 CAPSULE | Refills: 3 | Status: SHIPPED | OUTPATIENT
Start: 2025-06-23 | End: 2025-06-24 | Stop reason: SDUPTHER

## 2025-06-24 DIAGNOSIS — Z94.4 LIVER REPLACED BY TRANSPLANT (MULTI): ICD-10-CM

## 2025-06-24 LAB
ALBUMIN SERPL BCP-MCNC: 3.7 G/DL (ref 3.4–5)
ALP SERPL-CCNC: 99 U/L (ref 33–136)
ALT SERPL W P-5'-P-CCNC: 8 U/L (ref 10–52)
ANION GAP SERPL CALC-SCNC: 9 MMOL/L (ref 10–20)
AST SERPL W P-5'-P-CCNC: 16 U/L (ref 9–39)
BILIRUB DIRECT SERPL-MCNC: 0.1 MG/DL (ref 0–0.3)
BILIRUB SERPL-MCNC: 0.4 MG/DL (ref 0–1.2)
BUN SERPL-MCNC: 29 MG/DL (ref 6–23)
CALCIUM SERPL-MCNC: 8.6 MG/DL (ref 8.6–10.3)
CHLORIDE SERPL-SCNC: 108 MMOL/L (ref 98–107)
CO2 SERPL-SCNC: 25 MMOL/L (ref 21–32)
CREAT SERPL-MCNC: 2.14 MG/DL (ref 0.5–1.3)
EGFRCR SERPLBLD CKD-EPI 2021: 34 ML/MIN/1.73M*2
GGT SERPL-CCNC: 64 U/L (ref 5–64)
GLUCOSE SERPL-MCNC: 81 MG/DL (ref 74–99)
MAGNESIUM SERPL-MCNC: 1.81 MG/DL (ref 1.6–2.4)
PHOSPHATE SERPL-MCNC: 4.2 MG/DL (ref 2.5–4.9)
POTASSIUM SERPL-SCNC: 5.4 MMOL/L (ref 3.5–5.3)
PROT SERPL-MCNC: 5.6 G/DL (ref 6.4–8.2)
SODIUM SERPL-SCNC: 137 MMOL/L (ref 136–145)
TACROLIMUS BLD-MCNC: 12 NG/ML

## 2025-06-24 RX ORDER — TACROLIMUS 1 MG/1
2 CAPSULE ORAL 2 TIMES DAILY
Qty: 360 CAPSULE | Refills: 3 | Status: SHIPPED | OUTPATIENT
Start: 2025-06-24 | End: 2025-06-26

## 2025-06-25 ENCOUNTER — LAB (OUTPATIENT)
Dept: LAB | Facility: HOSPITAL | Age: 65
End: 2025-06-25
Payer: COMMERCIAL

## 2025-06-25 ENCOUNTER — TELEPHONE (OUTPATIENT)
Facility: HOSPITAL | Age: 65
End: 2025-06-25

## 2025-06-25 DIAGNOSIS — Z94.4 LIVER REPLACED BY TRANSPLANT (MULTI): ICD-10-CM

## 2025-06-25 DIAGNOSIS — Z94.0 KIDNEY REPLACED BY TRANSPLANT (HHS-HCC): ICD-10-CM

## 2025-06-25 DIAGNOSIS — Z94.4 LIVER TRANSPLANT RECIPIENT (MULTI): ICD-10-CM

## 2025-06-25 DIAGNOSIS — T86.19 DELAYED GRAFT FUNCTION OF KIDNEY (HHS-HCC): ICD-10-CM

## 2025-06-25 DIAGNOSIS — Z94.4 LIVER TRANSPLANT STATUS: Primary | ICD-10-CM

## 2025-06-25 LAB
ALBUMIN SERPL BCP-MCNC: 3.9 G/DL (ref 3.4–5)
ALP SERPL-CCNC: 108 U/L (ref 33–136)
ALT SERPL W P-5'-P-CCNC: 9 U/L (ref 10–52)
ANION GAP SERPL CALC-SCNC: 9 MMOL/L (ref 10–20)
AST SERPL W P-5'-P-CCNC: 15 U/L (ref 9–39)
BASOPHILS # BLD AUTO: 0.03 X10*3/UL (ref 0–0.1)
BASOPHILS NFR BLD AUTO: 1.6 %
BILIRUB DIRECT SERPL-MCNC: 0.1 MG/DL (ref 0–0.3)
BILIRUB SERPL-MCNC: 0.5 MG/DL (ref 0–1.2)
BUN SERPL-MCNC: 28 MG/DL (ref 6–23)
CALCIUM SERPL-MCNC: 8.8 MG/DL (ref 8.6–10.3)
CHLORIDE SERPL-SCNC: 107 MMOL/L (ref 98–107)
CO2 SERPL-SCNC: 26 MMOL/L (ref 21–32)
CREAT SERPL-MCNC: 2.19 MG/DL (ref 0.5–1.3)
CREAT UR-MCNC: 60.6 MG/DL (ref 20–370)
EGFRCR SERPLBLD CKD-EPI 2021: 33 ML/MIN/1.73M*2
EOSINOPHIL # BLD AUTO: 0.07 X10*3/UL (ref 0–0.7)
EOSINOPHIL NFR BLD AUTO: 3.8 %
ERYTHROCYTE [DISTWIDTH] IN BLOOD BY AUTOMATED COUNT: 13.8 % (ref 11.5–14.5)
GGT SERPL-CCNC: 67 U/L (ref 5–64)
GLUCOSE SERPL-MCNC: 80 MG/DL (ref 74–99)
HCT VFR BLD AUTO: 30.7 % (ref 41–52)
HGB BLD-MCNC: 9.7 G/DL (ref 13.5–17.5)
IMM GRANULOCYTES # BLD AUTO: 0.19 X10*3/UL (ref 0–0.7)
IMM GRANULOCYTES NFR BLD AUTO: 10.3 % (ref 0–0.9)
LYMPHOCYTES # BLD AUTO: 0.65 X10*3/UL (ref 1.2–4.8)
LYMPHOCYTES NFR BLD AUTO: 35.3 %
MAGNESIUM SERPL-MCNC: 1.73 MG/DL (ref 1.6–2.4)
MCH RBC QN AUTO: 29.8 PG (ref 26–34)
MCHC RBC AUTO-ENTMCNC: 31.6 G/DL (ref 32–36)
MCV RBC AUTO: 95 FL (ref 80–100)
MONOCYTES # BLD AUTO: 0.05 X10*3/UL (ref 0.1–1)
MONOCYTES NFR BLD AUTO: 2.7 %
NEUTROPHILS # BLD AUTO: 0.85 X10*3/UL (ref 1.2–7.7)
NEUTROPHILS NFR BLD AUTO: 46.3 %
NRBC BLD-RTO: 0 /100 WBCS (ref 0–0)
PHOSPHATE SERPL-MCNC: 4.3 MG/DL (ref 2.5–4.9)
PLATELET # BLD AUTO: 142 X10*3/UL (ref 150–450)
POTASSIUM SERPL-SCNC: 5.4 MMOL/L (ref 3.5–5.3)
PROT SERPL-MCNC: 5.9 G/DL (ref 6.4–8.2)
RBC # BLD AUTO: 3.25 X10*6/UL (ref 4.5–5.9)
RBC MORPH BLD: NORMAL
SODIUM SERPL-SCNC: 137 MMOL/L (ref 136–145)
TACROLIMUS BLD-MCNC: 9 NG/ML
WBC # BLD AUTO: 1.8 X10*3/UL (ref 4.4–11.3)

## 2025-06-25 PROCEDURE — 84100 ASSAY OF PHOSPHORUS: CPT

## 2025-06-25 PROCEDURE — 80197 ASSAY OF TACROLIMUS: CPT

## 2025-06-25 PROCEDURE — 82570 ASSAY OF URINE CREATININE: CPT

## 2025-06-25 PROCEDURE — 82977 ASSAY OF GGT: CPT

## 2025-06-25 PROCEDURE — 83735 ASSAY OF MAGNESIUM: CPT

## 2025-06-25 PROCEDURE — 87799 DETECT AGENT NOS DNA QUANT: CPT

## 2025-06-25 PROCEDURE — 80053 COMPREHEN METABOLIC PANEL: CPT

## 2025-06-25 PROCEDURE — 85025 COMPLETE CBC W/AUTO DIFF WBC: CPT

## 2025-06-25 PROCEDURE — 82248 BILIRUBIN DIRECT: CPT

## 2025-06-25 PROCEDURE — 36415 COLL VENOUS BLD VENIPUNCTURE: CPT

## 2025-06-25 RX ORDER — MYCOPHENOLATE MOFETIL 250 MG/1
250 CAPSULE ORAL 2 TIMES DAILY
Qty: 180 CAPSULE | Refills: 3 | Status: SHIPPED | OUTPATIENT
Start: 2025-06-25 | End: 2025-06-26 | Stop reason: ALTCHOICE

## 2025-06-25 NOTE — TELEPHONE ENCOUNTER
RN spoke to pt  Decrease mycophenolate to 250mg twice per day  Reviewed pt may need treatment for low WBC count    Verbalized understanding

## 2025-06-26 ENCOUNTER — OFFICE VISIT (OUTPATIENT)
Facility: HOSPITAL | Age: 65
End: 2025-06-26
Payer: COMMERCIAL

## 2025-06-26 ENCOUNTER — PHARMACY VISIT (OUTPATIENT)
Dept: PHARMACY | Facility: CLINIC | Age: 65
End: 2025-06-26
Payer: COMMERCIAL

## 2025-06-26 ENCOUNTER — HOSPITAL ENCOUNTER (OUTPATIENT)
Dept: RADIOLOGY | Facility: HOSPITAL | Age: 65
Discharge: HOME | End: 2025-06-26
Payer: COMMERCIAL

## 2025-06-26 ENCOUNTER — TELEPHONE (OUTPATIENT)
Facility: HOSPITAL | Age: 65
End: 2025-06-26

## 2025-06-26 VITALS
HEART RATE: 78 BPM | SYSTOLIC BLOOD PRESSURE: 158 MMHG | WEIGHT: 247.1 LBS | DIASTOLIC BLOOD PRESSURE: 81 MMHG | BODY MASS INDEX: 30.08 KG/M2 | OXYGEN SATURATION: 97 % | TEMPERATURE: 98.2 F

## 2025-06-26 VITALS
BODY MASS INDEX: 30.08 KG/M2 | TEMPERATURE: 98.2 F | WEIGHT: 247.1 LBS | HEART RATE: 78 BPM | SYSTOLIC BLOOD PRESSURE: 158 MMHG | OXYGEN SATURATION: 97 % | DIASTOLIC BLOOD PRESSURE: 81 MMHG

## 2025-06-26 DIAGNOSIS — Z79.60 ENCOUNTER FOR IMMUNOSUPPRESSION MANAGEMENT AFTER LIVER TRANSPLANT: ICD-10-CM

## 2025-06-26 DIAGNOSIS — Z94.0 KIDNEY REPLACED BY TRANSPLANT (HHS-HCC): ICD-10-CM

## 2025-06-26 DIAGNOSIS — E11.65 TYPE 2 DIABETES MELLITUS WITH HYPERGLYCEMIA, WITHOUT LONG-TERM CURRENT USE OF INSULIN: Primary | ICD-10-CM

## 2025-06-26 DIAGNOSIS — Z94.0 KIDNEY TRANSPLANT RECIPIENT (HHS-HCC): ICD-10-CM

## 2025-06-26 DIAGNOSIS — Z94.4 ENCOUNTER FOR IMMUNOSUPPRESSION MANAGEMENT AFTER LIVER TRANSPLANT: ICD-10-CM

## 2025-06-26 DIAGNOSIS — Z94.0 IMMUNOSUPPRESSIVE MANAGEMENT ENCOUNTER FOLLOWING KIDNEY TRANSPLANT: Primary | ICD-10-CM

## 2025-06-26 DIAGNOSIS — Z79.899 IMMUNOSUPPRESSIVE MANAGEMENT ENCOUNTER FOLLOWING KIDNEY TRANSPLANT: Primary | ICD-10-CM

## 2025-06-26 DIAGNOSIS — Z94.4 LIVER TRANSPLANT RECIPIENT (MULTI): ICD-10-CM

## 2025-06-26 PROBLEM — R18.8 REFRACTORY ASCITES: Status: RESOLVED | Noted: 2025-03-22 | Resolved: 2025-06-26

## 2025-06-26 PROBLEM — Z76.82 PRE-KIDNEY TRANSPLANT, LISTED: Status: RESOLVED | Noted: 2025-03-22 | Resolved: 2025-06-26

## 2025-06-26 PROBLEM — N18.4 STAGE 4 CHRONIC KIDNEY DISEASE (MULTI): Status: RESOLVED | Noted: 2025-04-02 | Resolved: 2025-06-26

## 2025-06-26 PROBLEM — K75.0 LIVER ABSCESS (HHS-HCC): Status: RESOLVED | Noted: 2025-05-01 | Resolved: 2025-06-26

## 2025-06-26 PROBLEM — N18.4 CKD (CHRONIC KIDNEY DISEASE) STAGE 4, GFR 15-29 ML/MIN (MULTI): Status: RESOLVED | Noted: 2025-04-03 | Resolved: 2025-06-26

## 2025-06-26 PROBLEM — K72.90 LIVER FAILURE WITHOUT HEPATIC COMA (MULTI): Status: RESOLVED | Noted: 2024-11-12 | Resolved: 2025-06-26

## 2025-06-26 PROBLEM — N18.4 CHRONIC RENAL IMPAIRMENT, STAGE 4 (SEVERE) (MULTI): Status: RESOLVED | Noted: 2025-04-02 | Resolved: 2025-06-26

## 2025-06-26 PROBLEM — K70.31 ALCOHOLIC CIRRHOSIS OF LIVER WITH ASCITES (MULTI): Status: RESOLVED | Noted: 2025-04-02 | Resolved: 2025-06-26

## 2025-06-26 PROBLEM — Z76.82 PRE-LIVER TRANSPLANT, LISTED: Status: RESOLVED | Noted: 2025-03-22 | Resolved: 2025-06-26

## 2025-06-26 PROBLEM — N17.9 ACUTE KIDNEY INJURY: Status: RESOLVED | Noted: 2025-04-14 | Resolved: 2025-06-26

## 2025-06-26 LAB
BKV DNA SERPL NAA+PROBE-ACNC: 338 IU/ML (ref ?–22)
BKV DNA SERPL NAA+PROBE-LOG#: 2.53 LOG IU/ML
CMV DNA SERPL NAA+PROBE-LOG IU: NORMAL {LOG_IU}/ML
EBV DNA SPEC NAA+PROBE-LOG#: NORMAL {LOG_COPIES}/ML
LABORATORY COMMENT REPORT: DETECTED
LABORATORY COMMENT REPORT: NOT DETECTED
LABORATORY COMMENT REPORT: NOT DETECTED

## 2025-06-26 PROCEDURE — 3060F POS MICROALBUMINURIA REV: CPT | Performed by: TRANSPLANT SURGERY

## 2025-06-26 PROCEDURE — 3044F HG A1C LEVEL LT 7.0%: CPT | Performed by: PHYSICIAN ASSISTANT

## 2025-06-26 PROCEDURE — 99213 OFFICE O/P EST LOW 20 MIN: CPT | Mod: 24

## 2025-06-26 PROCEDURE — 3077F SYST BP >= 140 MM HG: CPT | Performed by: PHYSICIAN ASSISTANT

## 2025-06-26 PROCEDURE — 3079F DIAST BP 80-89 MM HG: CPT | Performed by: PHYSICIAN ASSISTANT

## 2025-06-26 PROCEDURE — 3044F HG A1C LEVEL LT 7.0%: CPT | Performed by: TRANSPLANT SURGERY

## 2025-06-26 PROCEDURE — 99213 OFFICE O/P EST LOW 20 MIN: CPT

## 2025-06-26 PROCEDURE — 99214 OFFICE O/P EST MOD 30 MIN: CPT | Performed by: PHYSICIAN ASSISTANT

## 2025-06-26 PROCEDURE — RXMED WILLOW AMBULATORY MEDICATION CHARGE

## 2025-06-26 PROCEDURE — 76776 US EXAM K TRANSPL W/DOPPLER: CPT | Performed by: RADIOLOGY

## 2025-06-26 PROCEDURE — 3077F SYST BP >= 140 MM HG: CPT | Performed by: TRANSPLANT SURGERY

## 2025-06-26 PROCEDURE — 76776 US EXAM K TRANSPL W/DOPPLER: CPT

## 2025-06-26 PROCEDURE — 3060F POS MICROALBUMINURIA REV: CPT | Performed by: PHYSICIAN ASSISTANT

## 2025-06-26 PROCEDURE — 3079F DIAST BP 80-89 MM HG: CPT | Performed by: TRANSPLANT SURGERY

## 2025-06-26 RX ORDER — TACROLIMUS 1 MG/1
1 CAPSULE ORAL 2 TIMES DAILY
Qty: 180 CAPSULE | Refills: 3 | Status: SHIPPED | OUTPATIENT
Start: 2025-06-26 | End: 2026-06-21

## 2025-06-26 RX ORDER — SEMAGLUTIDE 0.68 MG/ML
0.5 INJECTION, SOLUTION SUBCUTANEOUS
Qty: 3 ML | Refills: 3 | Status: SHIPPED | OUTPATIENT
Start: 2025-06-26

## 2025-06-26 RX ORDER — TRAMADOL HYDROCHLORIDE 50 MG/1
50 TABLET, FILM COATED ORAL EVERY 6 HOURS PRN
Qty: 15 TABLET | Refills: 0 | Status: SHIPPED | OUTPATIENT
Start: 2025-06-26 | End: 2025-07-06

## 2025-06-26 RX ORDER — ATOVAQUONE 750 MG/5ML
750 SUSPENSION ORAL DAILY
Qty: 210 ML | Refills: 3 | Status: SHIPPED | OUTPATIENT
Start: 2025-06-26 | End: 2025-12-23

## 2025-06-26 ASSESSMENT — ENCOUNTER SYMPTOMS
BACK PAIN: 1
MYALGIAS: 1

## 2025-06-26 ASSESSMENT — PAIN SCALES - GENERAL
PAINLEVEL_OUTOF10: 0-NO PAIN
PAINLEVEL_OUTOF10: 0-NO PAIN

## 2025-06-26 NOTE — PROGRESS NOTES
Patient is sent at the request of Dr. Steve Cancino for my opinion regarding Type 2 diabetes.  My final recommendations will be communicated back to the requesting provider by way of shared medical record.    Subjective   López Lopez is a 65 y.o. male who presents for initial visit for evaluation of Type 2 diabetes mellitus. The initial diagnosis of diabetes was made several years ago. The patient does not have a known family history of diabetes.    Known complications due to diabetes included peripheral neuropathy, cardiovascular disease, and chronic kidney disease    Cardiovascular risk factors include advanced age (older than 55 for men, 65 for women), diabetes mellitus, hypertension, and male gender. The patient is not on an ACE inhibitor or angiotensin II receptor blocker.  The patient has not been previously hospitalized due to diabetic ketoacidosis.     Current symptoms/problems include none. His clinical course has been stable.     Current diabetes regimen is as follows: Humulin NPH 10u qAM and nesina 12.5mg daily   - Risk/benefit of starting GLP-1 therapy to replace current DPP 4 inhibitor and insulin was discussed.  Patient is agreeable to this change in treatment    The patient is currently checking the blood glucose 3+ times per day.    Patient is using: fingerstick glucometer. written glucose log reviewed    Hypoglycemia frequency: none  Hypoglycemia awareness: Yes     Exercise: never - limited by recent liver and kidney transplant   Meal panning: He is using avoidance of concentrated sweets.    Review of Systems   Musculoskeletal:  Positive for back pain and myalgias.   All other systems reviewed and are negative.      Objective   /81 (BP Location: Right arm, Patient Position: Sitting, BP Cuff Size: Adult)   Pulse 78   Temp 36.8 °C (98.2 °F) (Temporal)   Wt 112 kg (247 lb 1.6 oz)   SpO2 97%   BMI 30.08 kg/m²   Physical Exam  Constitutional:       Appearance: Normal appearance. He is  normal weight.   HENT:      Head: Normocephalic and atraumatic.      Nose: Nose normal.      Mouth/Throat:      Mouth: Mucous membranes are dry.      Pharynx: Oropharynx is clear.   Eyes:      Extraocular Movements: Extraocular movements intact.      Conjunctiva/sclera: Conjunctivae normal.   Cardiovascular:      Rate and Rhythm: Normal rate and regular rhythm.      Pulses: Normal pulses.      Heart sounds: Normal heart sounds.   Pulmonary:      Effort: Pulmonary effort is normal.      Breath sounds: Normal breath sounds.   Skin:     General: Skin is warm and dry.   Neurological:      General: No focal deficit present.      Mental Status: He is alert and oriented to person, place, and time. Mental status is at baseline.   Psychiatric:         Mood and Affect: Mood normal.         Behavior: Behavior normal.         Thought Content: Thought content normal.         Judgment: Judgment normal.         Lab Review  Glucose (mg/dL)   Date Value   06/25/2025 80   06/23/2025 81   06/19/2025 87     Hemoglobin A1C (%)   Date Value   05/15/2025 4.4   09/03/2024 5.5   08/13/2024 6.0 (H)     Bicarbonate (mmol/L)   Date Value   06/25/2025 26   06/23/2025 25   06/19/2025 28     Urea Nitrogen (mg/dL)   Date Value   06/25/2025 28 (H)   06/23/2025 29 (H)   06/19/2025 29 (H)     Creatinine (mg/dL)   Date Value   06/25/2025 2.19 (H)   06/23/2025 2.14 (H)   06/19/2025 1.69 (H)       Health Maintenance:   Foot Exam: due for update at future appointment    Eye Exam: due for update  Lipid Panel: due for update, ordered for 6 months after transplant  Urine Albumin: due for update, ordered for 6 months after transplant    Assessment/Plan   Diagnoses and all orders for this visit:  Type 2 diabetes mellitus with hyperglycemia, without long-term current use of insulin  -     semaglutide (Ozempic) 0.25 mg or 0.5 mg (2 mg/3 mL) pen injector; Inject 0.5 mg under the skin every 7 days.  Kidney transplant recipient ([facility])  -     semaglutide  (Ozempic) 0.25 mg or 0.5 mg (2 mg/3 mL) pen injector; Inject 0.5 mg under the skin every 7 days.  Liver transplant recipient (Multi)  -     semaglutide (Ozempic) 0.25 mg or 0.5 mg (2 mg/3 mL) pen injector; Inject 0.5 mg under the skin every 7 days.        Type 2 diabetes mellitus, is at goal. A1C due for update, ordered today    RX changes: stop NPH insulin and nesina when you start new ozempic. Start ozempic 0.25mg once a weekx4 weeks, then increase to 0.5mg once weekly    Education:  interpretation of lab results, blood sugar goals, complications of diabetes mellitus, and use of insulin pen  Follow up: I recommend diabetes care be in 6 weeks.    INSULIN INSTRUCTIONS    HUMULIN NPH = stop      * you may resume as 5 units once every morning if you glucose is rising above 180mg/dL after switching to ozempic

## 2025-06-26 NOTE — PROGRESS NOTES
TRANSPLANT SURGERY FOLLOW UP    Reason for visit:    López Lopez underwent transplant surgery,  4/2/2025 (Liver / Kidney), and returns to clinic for follow up evaluation focusing on their current immunosuppression. Specifically, López Lopez's regiment was evaluated to ensure adequate levels to prevent life threatening or organ function impairing rejection while not increasing risk of adverse effects including malignancy, infection, bone health, or accelerated cardiovascular disease.  I reviewed common side effects including tremor, hair loss, GI toxicity, and agitation.  The patient reported that they were experiencing: continuing back pain, mild increase in Cr. Tac toxicity.     The long-term management of maintenance immunosuppression in organ transplant recipients remains complex given differences in clinical characteristics, comorbid conditions, and prior rejection episodes.  These factors were evaluated at this visit and used in formulating this plan of care. Immunosuppression following the transplant is medically necessary to closely monitor and to reduce the risk of post-operative complications distinct from the post operative management of the transplant surgical procedure.     Interval history  Working with physical therapy   Getting stronger    Problem List[1]    Medications:      Current Outpatient Medications   Medication Instructions    alcohol swabs (Alcohol Pads) Use 4-8 per day to check blood glucose and for injectable medications    alogliptin (NESINA) 12.5 mg, oral, Daily    amLODIPine (NORVASC) 5 mg, oral, Daily, Hold for a systolic blood pressure less than 140    atorvastatin (LIPITOR) 10 mg, oral, Daily    atovaquone (MEPRON) 750 mg, oral, Daily    blood sugar diagnostic (Blood Glucose Test) Check blood glucose 3 time per day    blood-glucose meter misc Use to check blood glucose    blood-glucose sensor (Dexcom G7 Sensor) device Use to check glucose, change every 10 days     "blood-glucose,,cont (Dexcom G7 ) misc Use as instructed    cholecalciferol (VITAMIN D-3) 50 mcg, oral, Daily    Eliquis 5 mg, oral, 2 times daily (morning and late afternoon)    HumuLIN N NPH Insulin KwikPen 10 Units, subcutaneous, Every morning, Take as directed per insulin instructions.    lancets 33 gauge misc 1 each, miscellaneous, 3 times daily    magnesium oxide (MAG-OX) 800 mg, oral, Daily, Take at noon    Ozempic 0.5 mg, subcutaneous, Every 7 days    pantoprazole (PROTONIX) 40 mg, oral, Daily, Do not crush, chew, or split.    pen needle, diabetic 32 gauge x 5/32\" needle 1 each, miscellaneous, Every morning    polyethylene glycol (GLYCOLAX, MIRALAX) 17 g, Daily    tacrolimus (PROGRAF) 1 mg, oral, 2 times daily    tamsulosin (FLOMAX) 0.4 mg, oral, Daily    traMADol (ULTRAM) 50 mg, oral, Every 6 hours PRN    traZODone (DESYREL) 25 mg, oral, Nightly       Physical Exam  Vitals:    06/26/25 0940   BP: 158/81   Pulse: 78   Temp: 36.8 °C (98.2 °F)   SpO2: 97%          Physical Exam  HENT:      Head: Normocephalic.   Cardiovascular:      Rate and Rhythm: Normal rate.      Pulses: Normal pulses.   Pulmonary:      Effort: Pulmonary effort is normal.      Breath sounds: Normal breath sounds.   Abdominal:      Palpations: Abdomen is soft.      Comments: Right upper quadrant incision well.  LLQ kidney incision well healed.   Musculoskeletal:      Comments: RLE weakness   Skin:     General: Skin is warm.      Capillary Refill: Capillary refill takes less than 2 seconds.   Neurological:      General: No focal deficit present.      Mental Status: He is alert.   Psychiatric:         Mood and Affect: Mood normal.         ALLERGY:  Allergies[2]    LABS:  No results found for this or any previous visit (from the past 24 hours).     Lab Results   Component Value Date    ALT 9 (L) 06/25/2025    AST 15 06/25/2025    GGT 67 (H) 06/25/2025    ALKPHOS 108 06/25/2025    BILITOT 0.5 06/25/2025     Lab Results "   Component Value Date    WBC 1.8 (L) 06/25/2025    HGB 9.7 (L) 06/25/2025    HCT 30.7 (L) 06/25/2025    MCV 95 06/25/2025     (L) 06/25/2025     Lab Results   Component Value Date    GLUCOSE 80 06/25/2025    CALCIUM 8.8 06/25/2025     06/25/2025    K 5.4 (H) 06/25/2025    CO2 26 06/25/2025     06/25/2025    BUN 28 (H) 06/25/2025    CREATININE 2.19 (H) 06/25/2025         ASSESSMENT AND PLAN:    Mr. Lopez is a 65 y.o. male who underwent  transplant surgery on 4/2/2025 (Liver / Kidney).    1. Immunosuppression reviewed and adjusted       Tacrolimus: Yes - decreased to 1 mg po BID.  Level 9.0  has been high. Off fluconazole       Mycophenolate: on hold for leukopenia.       Prednisone: Yes - 5 mg       Belatacept: No     2. Prophylaxis adherence protocol to prevent immunosuppression related infection      PCP prophylaxis: Transplant Prophylactics: Mepron      CMV weekly monitoring    4. Hypertension         Blood Pressures         6/26/2025  0940             BP: 158/81              Current antihypertensives were evaluated        5. Wound/LISA      Alisson ready for removal: N/A      LISA:  N/A    6.  GI prophylaxis        On PPI     7.  Follow up:       Labs  2 times per week       RTC: 4 week(s)    8. Cr increased. Plan ultrasound today. Decrease Tac. If continues to increase will need a biopsy next week.     I spent a total of 30 min providing care for this patient including record review, examination, face to face counseling, and documentation.     Jason Snell MD    Transplant Surgery Attending           [1]   Patient Active Problem List  Diagnosis    Acute idiopathic thrombocytopenic purpura (Multi)    Anemia    History of CAD (coronary artery disease)    Mixed hyperlipidemia    Paroxysmal atrial fibrillation (Multi)    Type 2 diabetes mellitus with hyperglycemia, without long-term current use of insulin    Liver failure without hepatic coma (Multi)    Metabolic dysfunction-associated  steatohepatitis (MASH)    Status post insertion of drug-eluting stent into left anterior descending artery for coronary artery disease    Type 2 diabetes mellitus with diabetic chronic kidney disease    History of percutaneous transluminal coronary angioplasty    Coronary artery disease involving native coronary artery of native heart    Pre-liver transplant, listed    Pre-kidney transplant, listed    Refractory ascites    Alcoholic cirrhosis of liver with ascites (Multi)    Stage 4 chronic kidney disease (Multi)    Chronic renal impairment, stage 4 (severe) (Multi)    CKD (chronic kidney disease) stage 4, GFR 15-29 ml/min (Multi)    Acute kidney injury    Liver abscess (HHS-HCC)    Kidney transplant recipient (HHS-HCC)    Liver transplant recipient (Multi)   [2]   Allergies  Allergen Reactions    Adhesive Tape-Silicones Rash

## 2025-06-26 NOTE — PATIENT INSTRUCTIONS
Type 2 diabetes mellitus with hyperglycemia, without long-term current use of insulin  -     semaglutide (Ozempic) 0.25 mg or 0.5 mg (2 mg/3 mL) pen injector; Inject 0.5 mg under the skin every 7 days.  Kidney transplant recipient ([facility])  -     semaglutide (Ozempic) 0.25 mg or 0.5 mg (2 mg/3 mL) pen injector; Inject 0.5 mg under the skin every 7 days.  Liver transplant recipient ([facility])  -     semaglutide (Ozempic) 0.25 mg or 0.5 mg (2 mg/3 mL) pen injector; Inject 0.5 mg under the skin every 7 days.        Type 2 diabetes mellitus, is at goal. A1C due for update, ordered today    RX changes: stop NPH insulin and nesina when you start new ozempic. Start ozempic 0.25mg once a weekx4 weeks, then increase to 0.5mg once weekly    Education:  interpretation of lab results, blood sugar goals, complications of diabetes mellitus, and use of insulin pen  Follow up: I recommend diabetes care be in 6 weeks.    INSULIN INSTRUCTIONS    HUMULIN NPH = stop      * you may resume as 5 units once every morning if you glucose is rising above 180mg/dL after switching to ozempic

## 2025-06-26 NOTE — RESULT ENCOUNTER NOTE
Care Management Follow Up    Length of Stay (days): 12    Expected Discharge Date: 01/07/2022     Concerns to be Addressed:       Patient plan of care discussed at interdisciplinary rounds: Yes   Pt will be having a PEG placement this afternoon.  He is currently sleeping. His wife and sitter are present in the room.  Anticipated Discharge Disposition: TCU      Anticipated Discharge Services:  Inpatient rehab  Anticipated Discharge DME:      Patient/family educated on Medicare website which has current facility and service quality ratings:    Education Provided on the Discharge Plan:  yes  Patient/Family in Agreement with the Plan:  yes    Referrals Placed by CM/JORGE:    Private pay costs discussed:     Additional Information:  SW met with pt's wife regarding TCU choices. She has had a change in insurance to [employer]  since 1/1/22   And is no longer under [employer].  Education was given to wife that star ratings are updated/maintained by Medicare and can be reviewed by visiting www.medicare.gov   She is reviewing alphabetical list and Medicare.gov list for TCU  Choices.    OLGA Gtz  [facility]  Care Transitions  [phone]                     Plan for kidney biopsy next week  DSA testing with next draw  Reviewed with Kidney coordinators

## 2025-06-27 DIAGNOSIS — Z94.0 KIDNEY REPLACED BY TRANSPLANT (HHS-HCC): ICD-10-CM

## 2025-06-27 DIAGNOSIS — Z94.4 LIVER REPLACED BY TRANSPLANT (MULTI): ICD-10-CM

## 2025-06-30 ENCOUNTER — LAB (OUTPATIENT)
Dept: LAB | Facility: HOSPITAL | Age: 65
End: 2025-06-30
Payer: COMMERCIAL

## 2025-06-30 DIAGNOSIS — Z94.4 LIVER TRANSPLANT STATUS: Primary | ICD-10-CM

## 2025-06-30 DIAGNOSIS — R79.1 COAGULATION TEST ABNORMALITY: Primary | ICD-10-CM

## 2025-06-30 DIAGNOSIS — Z94.4 LIVER REPLACED BY TRANSPLANT (MULTI): ICD-10-CM

## 2025-06-30 DIAGNOSIS — Z94.0 KIDNEY REPLACED BY TRANSPLANT (HHS-HCC): ICD-10-CM

## 2025-06-30 LAB
ALBUMIN SERPL BCP-MCNC: 4 G/DL (ref 3.4–5)
ALP SERPL-CCNC: 124 U/L (ref 33–136)
ALT SERPL W P-5'-P-CCNC: 10 U/L (ref 10–52)
ANION GAP SERPL CALC-SCNC: 10 MMOL/L (ref 10–20)
AST SERPL W P-5'-P-CCNC: 17 U/L (ref 9–39)
BASOPHILS # BLD MANUAL: 0.12 X10*3/UL (ref 0–0.1)
BASOPHILS NFR BLD MANUAL: 8 %
BILIRUB DIRECT SERPL-MCNC: 0.1 MG/DL (ref 0–0.3)
BILIRUB SERPL-MCNC: 0.5 MG/DL (ref 0–1.2)
BUN SERPL-MCNC: 29 MG/DL (ref 6–23)
BURR CELLS BLD QL SMEAR: ABNORMAL
CALCIUM SERPL-MCNC: 8.9 MG/DL (ref 8.6–10.3)
CHLORIDE SERPL-SCNC: 109 MMOL/L (ref 98–107)
CO2 SERPL-SCNC: 26 MMOL/L (ref 21–32)
CREAT SERPL-MCNC: 1.55 MG/DL (ref 0.5–1.3)
EGFRCR SERPLBLD CKD-EPI 2021: 50 ML/MIN/1.73M*2
EOSINOPHIL # BLD MANUAL: 0.06 X10*3/UL (ref 0–0.7)
EOSINOPHIL NFR BLD MANUAL: 4 %
ERYTHROCYTE [DISTWIDTH] IN BLOOD BY AUTOMATED COUNT: 13.4 % (ref 11.5–14.5)
GGT SERPL-CCNC: 75 U/L (ref 5–64)
GLUCOSE SERPL-MCNC: 95 MG/DL (ref 74–99)
HCT VFR BLD AUTO: 31.3 % (ref 41–52)
HGB BLD-MCNC: 10.1 G/DL (ref 13.5–17.5)
IMM GRANULOCYTES # BLD AUTO: 0.24 X10*3/UL (ref 0–0.7)
IMM GRANULOCYTES NFR BLD AUTO: 15.6 % (ref 0–0.9)
INR PPP: 1.3 (ref 0.9–1.1)
LYMPHOCYTES # BLD MANUAL: 0.54 X10*3/UL (ref 1.2–4.8)
LYMPHOCYTES NFR BLD MANUAL: 36 %
MAGNESIUM SERPL-MCNC: 1.6 MG/DL (ref 1.6–2.4)
MCH RBC QN AUTO: 30.1 PG (ref 26–34)
MCHC RBC AUTO-ENTMCNC: 32.3 G/DL (ref 32–36)
MCV RBC AUTO: 93 FL (ref 80–100)
MONOCYTES # BLD MANUAL: 0.03 X10*3/UL (ref 0.1–1)
MONOCYTES NFR BLD MANUAL: 2 %
NEUTROPHILS # BLD MANUAL: 0.76 X10*3/UL (ref 1.2–7.7)
NEUTS BAND # BLD MANUAL: 0.08 X10*3/UL (ref 0–0.7)
NEUTS BAND NFR BLD MANUAL: 5 %
NEUTS SEG # BLD MANUAL: 0.68 X10*3/UL (ref 1.2–7)
NEUTS SEG NFR BLD MANUAL: 45 %
NRBC BLD-RTO: 0 /100 WBCS (ref 0–0)
PHOSPHATE SERPL-MCNC: 4.1 MG/DL (ref 2.5–4.9)
PLATELET # BLD AUTO: 156 X10*3/UL (ref 150–450)
POTASSIUM SERPL-SCNC: 4.9 MMOL/L (ref 3.5–5.3)
PROT SERPL-MCNC: 6 G/DL (ref 6.4–8.2)
PROTHROMBIN TIME: 14.4 SECONDS (ref 9.8–12.4)
RBC # BLD AUTO: 3.35 X10*6/UL (ref 4.5–5.9)
RBC MORPH BLD: ABNORMAL
SODIUM SERPL-SCNC: 140 MMOL/L (ref 136–145)
TACROLIMUS BLD-MCNC: 3.8 NG/ML
TOTAL CELLS COUNTED BLD: 100
WBC # BLD AUTO: 1.5 X10*3/UL (ref 4.4–11.3)

## 2025-06-30 PROCEDURE — 86832 HLA CLASS I HIGH DEFIN QUAL: CPT

## 2025-06-30 PROCEDURE — 83735 ASSAY OF MAGNESIUM: CPT

## 2025-06-30 PROCEDURE — 80197 ASSAY OF TACROLIMUS: CPT

## 2025-06-30 PROCEDURE — 85027 COMPLETE CBC AUTOMATED: CPT

## 2025-06-30 PROCEDURE — 84100 ASSAY OF PHOSPHORUS: CPT

## 2025-06-30 PROCEDURE — 82248 BILIRUBIN DIRECT: CPT

## 2025-06-30 PROCEDURE — 85610 PROTHROMBIN TIME: CPT

## 2025-06-30 PROCEDURE — 82977 ASSAY OF GGT: CPT

## 2025-06-30 PROCEDURE — 86833 HLA CLASS II HIGH DEFIN QUAL: CPT

## 2025-06-30 PROCEDURE — 80053 COMPREHEN METABOLIC PANEL: CPT

## 2025-06-30 PROCEDURE — 36415 COLL VENOUS BLD VENIPUNCTURE: CPT

## 2025-06-30 PROCEDURE — 85007 BL SMEAR W/DIFF WBC COUNT: CPT

## 2025-06-30 RX ORDER — PREDNISONE 5 MG/1
5 TABLET ORAL DAILY
Qty: 90 TABLET | Refills: 3 | Status: SHIPPED | OUTPATIENT
Start: 2025-06-30 | End: 2026-06-30

## 2025-06-30 RX ORDER — MYCOPHENOLATE MOFETIL 250 MG/1
250 CAPSULE ORAL 2 TIMES DAILY
Qty: 180 CAPSULE | Refills: 3 | Status: SHIPPED | OUTPATIENT
Start: 2025-06-30 | End: 2026-06-30

## 2025-07-01 ENCOUNTER — TELEPHONE (OUTPATIENT)
Facility: HOSPITAL | Age: 65
End: 2025-07-01
Payer: COMMERCIAL

## 2025-07-01 DIAGNOSIS — Z94.4 LIVER REPLACED BY TRANSPLANT (MULTI): ICD-10-CM

## 2025-07-01 DIAGNOSIS — Z94.4 LIVER TRANSPLANT RECIPIENT (MULTI): ICD-10-CM

## 2025-07-01 DIAGNOSIS — Z94.0 KIDNEY REPLACED BY TRANSPLANT (HHS-HCC): ICD-10-CM

## 2025-07-01 LAB
HLA RESULTS: NORMAL
HLA-A+B+C AB NFR SER: NORMAL %
HLA-DP+DQ+DR AB NFR SER: NORMAL %

## 2025-07-01 RX ORDER — TACROLIMUS 1 MG/1
2 CAPSULE ORAL 2 TIMES DAILY
Qty: 360 CAPSULE | Refills: 3 | Status: SHIPPED | OUTPATIENT
Start: 2025-07-01 | End: 2025-07-11

## 2025-07-01 RX ORDER — ATOVAQUONE 750 MG/5ML
1500 SUSPENSION ORAL DAILY
Qty: 210 ML | Refills: 3 | Status: SHIPPED | OUTPATIENT
Start: 2025-07-01 | End: 2025-12-28

## 2025-07-01 NOTE — TELEPHONE ENCOUNTER
RN called to ask pt if he took his Tacrolimus prior to his lab work on 6/30    Reports had taken pill for the morning

## 2025-07-02 ENCOUNTER — NURSE ONLY (OUTPATIENT)
Facility: HOSPITAL | Age: 65
End: 2025-07-02
Payer: COMMERCIAL

## 2025-07-02 DIAGNOSIS — Z94.4 LIVER REPLACED BY TRANSPLANT (MULTI): ICD-10-CM

## 2025-07-03 ENCOUNTER — LAB (OUTPATIENT)
Dept: LAB | Facility: HOSPITAL | Age: 65
End: 2025-07-03
Payer: COMMERCIAL

## 2025-07-03 ENCOUNTER — APPOINTMENT (OUTPATIENT)
Dept: RADIOLOGY | Facility: HOSPITAL | Age: 65
End: 2025-07-03
Payer: COMMERCIAL

## 2025-07-03 DIAGNOSIS — Z94.4 LIVER TRANSPLANT STATUS: Primary | ICD-10-CM

## 2025-07-03 LAB
ALBUMIN SERPL BCP-MCNC: 4 G/DL (ref 3.4–5)
ALP SERPL-CCNC: 119 U/L (ref 33–136)
ALT SERPL W P-5'-P-CCNC: 8 U/L (ref 10–52)
ANION GAP SERPL CALC-SCNC: 10 MMOL/L (ref 10–20)
AST SERPL W P-5'-P-CCNC: 15 U/L (ref 9–39)
BASOPHILS # BLD AUTO: 0.06 X10*3/UL (ref 0–0.1)
BASOPHILS NFR BLD AUTO: 2.9 %
BILIRUB DIRECT SERPL-MCNC: 0.2 MG/DL (ref 0–0.3)
BILIRUB SERPL-MCNC: 0.7 MG/DL (ref 0–1.2)
BUN SERPL-MCNC: 29 MG/DL (ref 6–23)
CALCIUM SERPL-MCNC: 9 MG/DL (ref 8.6–10.3)
CHLORIDE SERPL-SCNC: 109 MMOL/L (ref 98–107)
CO2 SERPL-SCNC: 27 MMOL/L (ref 21–32)
CREAT SERPL-MCNC: 1.66 MG/DL (ref 0.5–1.3)
EGFRCR SERPLBLD CKD-EPI 2021: 46 ML/MIN/1.73M*2
EOSINOPHIL # BLD AUTO: 0.05 X10*3/UL (ref 0–0.7)
EOSINOPHIL NFR BLD AUTO: 2.5 %
ERYTHROCYTE [DISTWIDTH] IN BLOOD BY AUTOMATED COUNT: 13.8 % (ref 11.5–14.5)
GLUCOSE SERPL-MCNC: 94 MG/DL (ref 74–99)
HCT VFR BLD AUTO: 32.2 % (ref 41–52)
HGB BLD-MCNC: 10.1 G/DL (ref 13.5–17.5)
IMM GRANULOCYTES # BLD AUTO: 0.29 X10*3/UL (ref 0–0.7)
IMM GRANULOCYTES NFR BLD AUTO: 14.2 % (ref 0–0.9)
LYMPHOCYTES # BLD AUTO: 0.69 X10*3/UL (ref 1.2–4.8)
LYMPHOCYTES NFR BLD AUTO: 33.8 %
MAGNESIUM SERPL-MCNC: 1.77 MG/DL (ref 1.6–2.4)
MCH RBC QN AUTO: 29.7 PG (ref 26–34)
MCHC RBC AUTO-ENTMCNC: 31.4 G/DL (ref 32–36)
MCV RBC AUTO: 95 FL (ref 80–100)
MONOCYTES # BLD AUTO: 0.14 X10*3/UL (ref 0.1–1)
MONOCYTES NFR BLD AUTO: 6.9 %
NEUTROPHILS # BLD AUTO: 0.81 X10*3/UL (ref 1.2–7.7)
NEUTROPHILS NFR BLD AUTO: 39.7 %
NRBC BLD-RTO: 0 /100 WBCS (ref 0–0)
PHOSPHATE SERPL-MCNC: 4.2 MG/DL (ref 2.5–4.9)
PLATELET # BLD AUTO: 146 X10*3/UL (ref 150–450)
POTASSIUM SERPL-SCNC: 4.4 MMOL/L (ref 3.5–5.3)
PROT SERPL-MCNC: 6.1 G/DL (ref 6.4–8.2)
RBC # BLD AUTO: 3.4 X10*6/UL (ref 4.5–5.9)
RBC MORPH BLD: NORMAL
SODIUM SERPL-SCNC: 142 MMOL/L (ref 136–145)
WBC # BLD AUTO: 2 X10*3/UL (ref 4.4–11.3)

## 2025-07-03 PROCEDURE — 83735 ASSAY OF MAGNESIUM: CPT

## 2025-07-03 PROCEDURE — 80197 ASSAY OF TACROLIMUS: CPT

## 2025-07-03 PROCEDURE — 82248 BILIRUBIN DIRECT: CPT

## 2025-07-03 PROCEDURE — 85025 COMPLETE CBC W/AUTO DIFF WBC: CPT

## 2025-07-03 PROCEDURE — 84100 ASSAY OF PHOSPHORUS: CPT

## 2025-07-03 PROCEDURE — 82977 ASSAY OF GGT: CPT

## 2025-07-03 PROCEDURE — 80053 COMPREHEN METABOLIC PANEL: CPT

## 2025-07-03 PROCEDURE — 36415 COLL VENOUS BLD VENIPUNCTURE: CPT

## 2025-07-04 DIAGNOSIS — Z94.4 LIVER REPLACED BY TRANSPLANT (MULTI): ICD-10-CM

## 2025-07-04 DIAGNOSIS — Z94.0 KIDNEY REPLACED BY TRANSPLANT (HHS-HCC): ICD-10-CM

## 2025-07-04 LAB
GGT SERPL-CCNC: 67 U/L (ref 5–64)
TACROLIMUS BLD-MCNC: 5.5 NG/ML

## 2025-07-07 ENCOUNTER — SPECIALTY PHARMACY (OUTPATIENT)
Dept: PHARMACY | Facility: CLINIC | Age: 65
End: 2025-07-07

## 2025-07-07 ENCOUNTER — LAB (OUTPATIENT)
Dept: LAB | Facility: HOSPITAL | Age: 65
End: 2025-07-07
Payer: COMMERCIAL

## 2025-07-07 ENCOUNTER — TELEPHONE (OUTPATIENT)
Facility: HOSPITAL | Age: 65
End: 2025-07-07

## 2025-07-07 DIAGNOSIS — Z94.4 LIVER TRANSPLANT STATUS: Primary | ICD-10-CM

## 2025-07-07 DIAGNOSIS — Z94.0 KIDNEY REPLACED BY TRANSPLANT (HHS-HCC): ICD-10-CM

## 2025-07-07 DIAGNOSIS — Z94.4 LIVER REPLACED BY TRANSPLANT (MULTI): ICD-10-CM

## 2025-07-07 LAB
ALBUMIN SERPL BCP-MCNC: 4.1 G/DL (ref 3.4–5)
ALP SERPL-CCNC: 99 U/L (ref 33–136)
ALT SERPL W P-5'-P-CCNC: 9 U/L (ref 10–52)
ANION GAP SERPL CALC-SCNC: 12 MMOL/L (ref 10–20)
AST SERPL W P-5'-P-CCNC: 14 U/L (ref 9–39)
BILIRUB DIRECT SERPL-MCNC: 0.1 MG/DL (ref 0–0.3)
BILIRUB SERPL-MCNC: 0.5 MG/DL (ref 0–1.2)
BUN SERPL-MCNC: 32 MG/DL (ref 6–23)
CALCIUM SERPL-MCNC: 8.9 MG/DL (ref 8.6–10.3)
CHLORIDE SERPL-SCNC: 109 MMOL/L (ref 98–107)
CO2 SERPL-SCNC: 26 MMOL/L (ref 21–32)
CREAT SERPL-MCNC: 1.43 MG/DL (ref 0.5–1.3)
EGFRCR SERPLBLD CKD-EPI 2021: 55 ML/MIN/1.73M*2
GLUCOSE SERPL-MCNC: 82 MG/DL (ref 74–99)
MAGNESIUM SERPL-MCNC: 1.87 MG/DL (ref 1.6–2.4)
PHOSPHATE SERPL-MCNC: 4.3 MG/DL (ref 2.5–4.9)
POTASSIUM SERPL-SCNC: 4.5 MMOL/L (ref 3.5–5.3)
PROT SERPL-MCNC: 6 G/DL (ref 6.4–8.2)
SODIUM SERPL-SCNC: 142 MMOL/L (ref 136–145)

## 2025-07-07 PROCEDURE — 36415 COLL VENOUS BLD VENIPUNCTURE: CPT

## 2025-07-07 PROCEDURE — 80197 ASSAY OF TACROLIMUS: CPT

## 2025-07-07 PROCEDURE — 82248 BILIRUBIN DIRECT: CPT

## 2025-07-07 PROCEDURE — 85025 COMPLETE CBC W/AUTO DIFF WBC: CPT

## 2025-07-07 PROCEDURE — RXMED WILLOW AMBULATORY MEDICATION CHARGE

## 2025-07-07 PROCEDURE — 80053 COMPREHEN METABOLIC PANEL: CPT

## 2025-07-07 PROCEDURE — 83735 ASSAY OF MAGNESIUM: CPT

## 2025-07-07 PROCEDURE — 82977 ASSAY OF GGT: CPT

## 2025-07-07 PROCEDURE — 84100 ASSAY OF PHOSPHORUS: CPT

## 2025-07-07 NOTE — TELEPHONE ENCOUNTER
Labs reviewed with Dr. Noyola, plan to increase tacrolimus to 2.5mg twice daily, and stop mychophenolate, continue to watch WBC and BK virus. Clotilde Monterroso RN

## 2025-07-08 ENCOUNTER — PHARMACY VISIT (OUTPATIENT)
Dept: PHARMACY | Facility: CLINIC | Age: 65
End: 2025-07-08
Payer: COMMERCIAL

## 2025-07-08 DIAGNOSIS — Z94.4 LIVER REPLACED BY TRANSPLANT (MULTI): ICD-10-CM

## 2025-07-08 LAB
BASOPHILS # BLD AUTO: 0.04 X10*3/UL (ref 0–0.1)
BASOPHILS NFR BLD AUTO: 2.3 %
EOSINOPHIL # BLD AUTO: 0.05 X10*3/UL (ref 0–0.7)
EOSINOPHIL NFR BLD AUTO: 2.9 %
ERYTHROCYTE [DISTWIDTH] IN BLOOD BY AUTOMATED COUNT: 12.9 % (ref 11.5–14.5)
GGT SERPL-CCNC: 63 U/L (ref 5–64)
HCT VFR BLD AUTO: 30.6 % (ref 41–52)
HGB BLD-MCNC: 10 G/DL (ref 13.5–17.5)
IMM GRANULOCYTES # BLD AUTO: 0.11 X10*3/UL (ref 0–0.7)
IMM GRANULOCYTES NFR BLD AUTO: 6.3 % (ref 0–0.9)
LYMPHOCYTES # BLD AUTO: 0.78 X10*3/UL (ref 1.2–4.8)
LYMPHOCYTES NFR BLD AUTO: 44.8 %
MCH RBC QN AUTO: 29.9 PG (ref 26–34)
MCHC RBC AUTO-ENTMCNC: 32.7 G/DL (ref 32–36)
MCV RBC AUTO: 91 FL (ref 80–100)
MONOCYTES # BLD AUTO: 0.39 X10*3/UL (ref 0.1–1)
MONOCYTES NFR BLD AUTO: 22.4 %
NEUTROPHILS # BLD AUTO: 0.37 X10*3/UL (ref 1.2–7.7)
NEUTROPHILS NFR BLD AUTO: 21.3 %
NRBC BLD-RTO: 0 /100 WBCS (ref 0–0)
PLATELET # BLD AUTO: 166 X10*3/UL (ref 150–450)
RBC # BLD AUTO: 3.35 X10*6/UL (ref 4.5–5.9)
RBC MORPH BLD: NORMAL
TACROLIMUS BLD-MCNC: 4.8 NG/ML
WBC # BLD AUTO: 1.7 X10*3/UL (ref 4.4–11.3)

## 2025-07-08 RX ORDER — TACROLIMUS 0.5 MG/1
0.5 CAPSULE ORAL 2 TIMES DAILY
Qty: 60 CAPSULE | Refills: 11 | Status: SHIPPED | OUTPATIENT
Start: 2025-07-08 | End: 2025-07-11 | Stop reason: DRUGHIGH

## 2025-07-09 ENCOUNTER — TELEPHONE (OUTPATIENT)
Facility: HOSPITAL | Age: 65
End: 2025-07-09
Payer: COMMERCIAL

## 2025-07-09 NOTE — TELEPHONE ENCOUNTER
Left message to give me a call regarding labs, sent My chart message to have labs tomorrow or Friday and to ask them to get the BK virus.  Clotilde Monterroso RN

## 2025-07-10 ENCOUNTER — LAB (OUTPATIENT)
Dept: LAB | Facility: HOSPITAL | Age: 65
End: 2025-07-10
Payer: COMMERCIAL

## 2025-07-10 DIAGNOSIS — Z94.4 LIVER TRANSPLANT STATUS: Primary | ICD-10-CM

## 2025-07-10 LAB
ALBUMIN SERPL BCP-MCNC: 4.1 G/DL (ref 3.4–5)
ALP SERPL-CCNC: 127 U/L (ref 33–136)
ALT SERPL W P-5'-P-CCNC: 10 U/L (ref 10–52)
ANION GAP SERPL CALC-SCNC: 9 MMOL/L (ref 10–20)
AST SERPL W P-5'-P-CCNC: 15 U/L (ref 9–39)
BASOPHILS # BLD MANUAL: 0.03 X10*3/UL (ref 0–0.1)
BASOPHILS NFR BLD MANUAL: 1 %
BILIRUB DIRECT SERPL-MCNC: 0.1 MG/DL (ref 0–0.3)
BILIRUB SERPL-MCNC: 0.5 MG/DL (ref 0–1.2)
BUN SERPL-MCNC: 34 MG/DL (ref 6–23)
CALCIUM SERPL-MCNC: 8.8 MG/DL (ref 8.6–10.3)
CHLORIDE SERPL-SCNC: 106 MMOL/L (ref 98–107)
CO2 SERPL-SCNC: 29 MMOL/L (ref 21–32)
CREAT SERPL-MCNC: 1.49 MG/DL (ref 0.5–1.3)
EGFRCR SERPLBLD CKD-EPI 2021: 52 ML/MIN/1.73M*2
EOSINOPHIL # BLD MANUAL: 0.08 X10*3/UL (ref 0–0.7)
EOSINOPHIL NFR BLD MANUAL: 3 %
ERYTHROCYTE [DISTWIDTH] IN BLOOD BY AUTOMATED COUNT: 12.9 % (ref 11.5–14.5)
GGT SERPL-CCNC: 66 U/L (ref 5–64)
GLUCOSE SERPL-MCNC: 85 MG/DL (ref 74–99)
HCT VFR BLD AUTO: 31.5 % (ref 41–52)
HGB BLD-MCNC: 10.4 G/DL (ref 13.5–17.5)
IMM GRANULOCYTES # BLD AUTO: 0.38 X10*3/UL (ref 0–0.7)
IMM GRANULOCYTES NFR BLD AUTO: 14.8 % (ref 0–0.9)
LYMPHOCYTES # BLD MANUAL: 1.04 X10*3/UL (ref 1.2–4.8)
LYMPHOCYTES NFR BLD MANUAL: 40 %
MAGNESIUM SERPL-MCNC: 1.94 MG/DL (ref 1.6–2.4)
MCH RBC QN AUTO: 29.6 PG (ref 26–34)
MCHC RBC AUTO-ENTMCNC: 33 G/DL (ref 32–36)
MCV RBC AUTO: 90 FL (ref 80–100)
MONOCYTES # BLD MANUAL: 0.55 X10*3/UL (ref 0.1–1)
MONOCYTES NFR BLD MANUAL: 21 %
NEUTROPHILS # BLD MANUAL: 0.91 X10*3/UL (ref 1.2–7.7)
NEUTS BAND # BLD MANUAL: 0.03 X10*3/UL (ref 0–0.7)
NEUTS BAND NFR BLD MANUAL: 1 %
NEUTS SEG # BLD MANUAL: 0.88 X10*3/UL (ref 1.2–7)
NEUTS SEG NFR BLD MANUAL: 34 %
NRBC BLD-RTO: 0 /100 WBCS (ref 0–0)
PHOSPHATE SERPL-MCNC: 4.9 MG/DL (ref 2.5–4.9)
PLATELET # BLD AUTO: 177 X10*3/UL (ref 150–450)
POTASSIUM SERPL-SCNC: 5 MMOL/L (ref 3.5–5.3)
PROT SERPL-MCNC: 6.2 G/DL (ref 6.4–8.2)
RBC # BLD AUTO: 3.51 X10*6/UL (ref 4.5–5.9)
RBC MORPH BLD: ABNORMAL
SODIUM SERPL-SCNC: 139 MMOL/L (ref 136–145)
TOTAL CELLS COUNTED BLD: 100
WBC # BLD AUTO: 2.6 X10*3/UL (ref 4.4–11.3)

## 2025-07-10 PROCEDURE — 36415 COLL VENOUS BLD VENIPUNCTURE: CPT

## 2025-07-10 PROCEDURE — 85007 BL SMEAR W/DIFF WBC COUNT: CPT

## 2025-07-10 PROCEDURE — 82248 BILIRUBIN DIRECT: CPT

## 2025-07-10 PROCEDURE — 80053 COMPREHEN METABOLIC PANEL: CPT

## 2025-07-10 PROCEDURE — 80197 ASSAY OF TACROLIMUS: CPT

## 2025-07-10 PROCEDURE — 87799 DETECT AGENT NOS DNA QUANT: CPT

## 2025-07-10 PROCEDURE — 85027 COMPLETE CBC AUTOMATED: CPT

## 2025-07-10 PROCEDURE — 84100 ASSAY OF PHOSPHORUS: CPT

## 2025-07-10 PROCEDURE — 82977 ASSAY OF GGT: CPT

## 2025-07-10 PROCEDURE — 83735 ASSAY OF MAGNESIUM: CPT

## 2025-07-11 ENCOUNTER — TELEPHONE (OUTPATIENT)
Facility: HOSPITAL | Age: 65
End: 2025-07-11
Payer: COMMERCIAL

## 2025-07-11 DIAGNOSIS — Z94.0 KIDNEY REPLACED BY TRANSPLANT (HHS-HCC): ICD-10-CM

## 2025-07-11 DIAGNOSIS — Z94.4 LIVER REPLACED BY TRANSPLANT (MULTI): ICD-10-CM

## 2025-07-11 DIAGNOSIS — Z94.4 LIVER TRANSPLANT RECIPIENT (MULTI): ICD-10-CM

## 2025-07-11 LAB
BKV DNA SERPL NAA+PROBE-ACNC: 507 IU/ML (ref ?–22)
BKV DNA SERPL NAA+PROBE-LOG#: 2.71 LOG IU/ML
CMV DNA SERPL NAA+PROBE-LOG IU: NORMAL {LOG_IU}/ML
LABORATORY COMMENT REPORT: DETECTED
LABORATORY COMMENT REPORT: NOT DETECTED
TACROLIMUS BLD-MCNC: 6.2 NG/ML

## 2025-07-11 RX ORDER — TACROLIMUS 1 MG/1
3 CAPSULE ORAL 2 TIMES DAILY
Qty: 540 CAPSULE | Refills: 3 | Status: SHIPPED | OUTPATIENT
Start: 2025-07-11

## 2025-07-11 NOTE — TELEPHONE ENCOUNTER
Labs reviewed with Dr. Noyola, plan to increase tacrolimus to 3mg twice daily.  Will continue to watch BK virus weekly.  Spoke to López and wife. Clotilde Monterroso RN

## 2025-07-14 ENCOUNTER — LAB (OUTPATIENT)
Dept: LAB | Facility: HOSPITAL | Age: 65
End: 2025-07-14
Payer: COMMERCIAL

## 2025-07-14 DIAGNOSIS — Z94.0 KIDNEY TRANSPLANT STATUS: Primary | ICD-10-CM

## 2025-07-14 LAB
ALBUMIN SERPL BCP-MCNC: 4 G/DL (ref 3.4–5)
ALP SERPL-CCNC: 138 U/L (ref 33–136)
ALT SERPL W P-5'-P-CCNC: 12 U/L (ref 10–52)
ANION GAP SERPL CALC-SCNC: 10 MMOL/L (ref 10–20)
AST SERPL W P-5'-P-CCNC: 19 U/L (ref 9–39)
BASOPHILS # BLD MANUAL: 0.06 X10*3/UL (ref 0–0.1)
BASOPHILS NFR BLD MANUAL: 2 %
BILIRUB DIRECT SERPL-MCNC: 0.1 MG/DL (ref 0–0.3)
BILIRUB SERPL-MCNC: 0.4 MG/DL (ref 0–1.2)
BUN SERPL-MCNC: 30 MG/DL (ref 6–23)
CALCIUM SERPL-MCNC: 8.4 MG/DL (ref 8.6–10.3)
CHLORIDE SERPL-SCNC: 107 MMOL/L (ref 98–107)
CO2 SERPL-SCNC: 28 MMOL/L (ref 21–32)
CREAT SERPL-MCNC: 1.61 MG/DL (ref 0.5–1.3)
EGFRCR SERPLBLD CKD-EPI 2021: 48 ML/MIN/1.73M*2
EOSINOPHIL # BLD MANUAL: 0.12 X10*3/UL (ref 0–0.7)
EOSINOPHIL NFR BLD MANUAL: 4 %
ERYTHROCYTE [DISTWIDTH] IN BLOOD BY AUTOMATED COUNT: 13.2 % (ref 11.5–14.5)
GGT SERPL-CCNC: 74 U/L (ref 5–64)
GLUCOSE SERPL-MCNC: 91 MG/DL (ref 74–99)
HCT VFR BLD AUTO: 30.3 % (ref 41–52)
HGB BLD-MCNC: 9.6 G/DL (ref 13.5–17.5)
IMM GRANULOCYTES # BLD AUTO: 0.24 X10*3/UL (ref 0–0.7)
IMM GRANULOCYTES NFR BLD AUTO: 7.8 % (ref 0–0.9)
LYMPHOCYTES # BLD MANUAL: 0.68 X10*3/UL (ref 1.2–4.8)
LYMPHOCYTES NFR BLD MANUAL: 22 %
MAGNESIUM SERPL-MCNC: 2.03 MG/DL (ref 1.6–2.4)
MCH RBC QN AUTO: 29.4 PG (ref 26–34)
MCHC RBC AUTO-ENTMCNC: 31.7 G/DL (ref 32–36)
MCV RBC AUTO: 93 FL (ref 80–100)
MONOCYTES # BLD MANUAL: 0.28 X10*3/UL (ref 0.1–1)
MONOCYTES NFR BLD MANUAL: 9 %
NEUTROPHILS # BLD MANUAL: 1.95 X10*3/UL (ref 1.2–7.7)
NEUTS BAND # BLD MANUAL: 0.06 X10*3/UL (ref 0–0.7)
NEUTS BAND NFR BLD MANUAL: 2 %
NEUTS SEG # BLD MANUAL: 1.89 X10*3/UL (ref 1.2–7)
NEUTS SEG NFR BLD MANUAL: 61 %
NRBC BLD-RTO: 0 /100 WBCS (ref 0–0)
PHOSPHATE SERPL-MCNC: 4 MG/DL (ref 2.5–4.9)
PLATELET # BLD AUTO: 171 X10*3/UL (ref 150–450)
POTASSIUM SERPL-SCNC: 4.6 MMOL/L (ref 3.5–5.3)
PROT SERPL-MCNC: 6 G/DL (ref 6.4–8.2)
RBC # BLD AUTO: 3.27 X10*6/UL (ref 4.5–5.9)
RBC MORPH BLD: ABNORMAL
SODIUM SERPL-SCNC: 140 MMOL/L (ref 136–145)
TACROLIMUS BLD-MCNC: 7 NG/ML
TOTAL CELLS COUNTED BLD: 100
WBC # BLD AUTO: 3.1 X10*3/UL (ref 4.4–11.3)

## 2025-07-14 PROCEDURE — 36415 COLL VENOUS BLD VENIPUNCTURE: CPT

## 2025-07-14 PROCEDURE — 85007 BL SMEAR W/DIFF WBC COUNT: CPT

## 2025-07-14 PROCEDURE — 85027 COMPLETE CBC AUTOMATED: CPT

## 2025-07-14 PROCEDURE — 82977 ASSAY OF GGT: CPT

## 2025-07-14 PROCEDURE — 80197 ASSAY OF TACROLIMUS: CPT

## 2025-07-14 PROCEDURE — 80053 COMPREHEN METABOLIC PANEL: CPT

## 2025-07-14 PROCEDURE — 84100 ASSAY OF PHOSPHORUS: CPT

## 2025-07-14 PROCEDURE — 82248 BILIRUBIN DIRECT: CPT

## 2025-07-14 PROCEDURE — 83735 ASSAY OF MAGNESIUM: CPT

## 2025-07-14 PROCEDURE — 87799 DETECT AGENT NOS DNA QUANT: CPT

## 2025-07-15 DIAGNOSIS — Z94.4 LIVER REPLACED BY TRANSPLANT (MULTI): ICD-10-CM

## 2025-07-15 LAB
BKV DNA SERPL NAA+PROBE-ACNC: 276 IU/ML (ref ?–22)
BKV DNA SERPL NAA+PROBE-LOG#: 2.44 LOG IU/ML
CMV DNA SERPL NAA+PROBE-LOG IU: NORMAL {LOG_IU}/ML
LABORATORY COMMENT REPORT: DETECTED
LABORATORY COMMENT REPORT: NOT DETECTED

## 2025-07-17 ENCOUNTER — LAB (OUTPATIENT)
Dept: LAB | Facility: HOSPITAL | Age: 65
End: 2025-07-17
Payer: COMMERCIAL

## 2025-07-17 DIAGNOSIS — Z94.4 LIVER REPLACED BY TRANSPLANT (MULTI): ICD-10-CM

## 2025-07-17 DIAGNOSIS — Z94.4 LIVER TRANSPLANT STATUS: Primary | ICD-10-CM

## 2025-07-17 LAB
ALBUMIN SERPL BCP-MCNC: 4.1 G/DL (ref 3.4–5)
ALP SERPL-CCNC: 132 U/L (ref 33–136)
ALT SERPL W P-5'-P-CCNC: 11 U/L (ref 10–52)
ANION GAP SERPL CALC-SCNC: 12 MMOL/L (ref 10–20)
AST SERPL W P-5'-P-CCNC: 17 U/L (ref 9–39)
BASOPHILS # BLD MANUAL: 0.12 X10*3/UL (ref 0–0.1)
BASOPHILS NFR BLD MANUAL: 4 %
BILIRUB DIRECT SERPL-MCNC: 0.1 MG/DL (ref 0–0.3)
BILIRUB SERPL-MCNC: 0.6 MG/DL (ref 0–1.2)
BUN SERPL-MCNC: 34 MG/DL (ref 6–23)
CALCIUM SERPL-MCNC: 9.1 MG/DL (ref 8.6–10.3)
CHLORIDE SERPL-SCNC: 107 MMOL/L (ref 98–107)
CO2 SERPL-SCNC: 28 MMOL/L (ref 21–32)
CREAT SERPL-MCNC: 1.68 MG/DL (ref 0.5–1.3)
EGFRCR SERPLBLD CKD-EPI 2021: 45 ML/MIN/1.73M*2
EOSINOPHIL # BLD MANUAL: 0.12 X10*3/UL (ref 0–0.7)
EOSINOPHIL NFR BLD MANUAL: 4 %
ERYTHROCYTE [DISTWIDTH] IN BLOOD BY AUTOMATED COUNT: 13.2 % (ref 11.5–14.5)
GGT SERPL-CCNC: 74 U/L (ref 5–64)
GLUCOSE SERPL-MCNC: 83 MG/DL (ref 74–99)
HCT VFR BLD AUTO: 32 % (ref 41–52)
HGB BLD-MCNC: 10.4 G/DL (ref 13.5–17.5)
IMM GRANULOCYTES # BLD AUTO: 0.26 X10*3/UL (ref 0–0.7)
IMM GRANULOCYTES NFR BLD AUTO: 8.3 % (ref 0–0.9)
LYMPHOCYTES # BLD MANUAL: 0.74 X10*3/UL (ref 1.2–4.8)
LYMPHOCYTES NFR BLD MANUAL: 24 %
MAGNESIUM SERPL-MCNC: 1.93 MG/DL (ref 1.6–2.4)
MCH RBC QN AUTO: 29.8 PG (ref 26–34)
MCHC RBC AUTO-ENTMCNC: 32.5 G/DL (ref 32–36)
MCV RBC AUTO: 92 FL (ref 80–100)
MONOCYTES # BLD MANUAL: 0.25 X10*3/UL (ref 0.1–1)
MONOCYTES NFR BLD MANUAL: 8 %
NEUTS SEG # BLD MANUAL: 1.86 X10*3/UL (ref 1.2–7)
NEUTS SEG NFR BLD MANUAL: 60 %
NRBC BLD-RTO: 0 /100 WBCS (ref 0–0)
PHOSPHATE SERPL-MCNC: 4.6 MG/DL (ref 2.5–4.9)
PLATELET # BLD AUTO: 173 X10*3/UL (ref 150–450)
POTASSIUM SERPL-SCNC: 5.2 MMOL/L (ref 3.5–5.3)
PROT SERPL-MCNC: 6.3 G/DL (ref 6.4–8.2)
RBC # BLD AUTO: 3.49 X10*6/UL (ref 4.5–5.9)
RBC MORPH BLD: ABNORMAL
SODIUM SERPL-SCNC: 142 MMOL/L (ref 136–145)
TACROLIMUS BLD-MCNC: 8.1 NG/ML
TOTAL CELLS COUNTED BLD: 100
WBC # BLD AUTO: 3.1 X10*3/UL (ref 4.4–11.3)

## 2025-07-17 PROCEDURE — 36415 COLL VENOUS BLD VENIPUNCTURE: CPT

## 2025-07-17 PROCEDURE — 85027 COMPLETE CBC AUTOMATED: CPT

## 2025-07-17 PROCEDURE — 82977 ASSAY OF GGT: CPT

## 2025-07-17 PROCEDURE — 82248 BILIRUBIN DIRECT: CPT

## 2025-07-17 PROCEDURE — 87799 DETECT AGENT NOS DNA QUANT: CPT

## 2025-07-17 PROCEDURE — 84100 ASSAY OF PHOSPHORUS: CPT

## 2025-07-17 PROCEDURE — 83735 ASSAY OF MAGNESIUM: CPT

## 2025-07-17 PROCEDURE — 80197 ASSAY OF TACROLIMUS: CPT

## 2025-07-17 PROCEDURE — 85007 BL SMEAR W/DIFF WBC COUNT: CPT

## 2025-07-17 PROCEDURE — 80053 COMPREHEN METABOLIC PANEL: CPT

## 2025-07-18 DIAGNOSIS — Z94.4 LIVER REPLACED BY TRANSPLANT (MULTI): ICD-10-CM

## 2025-07-18 DIAGNOSIS — Z94.0 KIDNEY REPLACED BY TRANSPLANT (HHS-HCC): ICD-10-CM

## 2025-07-18 LAB
BKV DNA SERPL NAA+PROBE-ACNC: 276 IU/ML (ref ?–22)
BKV DNA SERPL NAA+PROBE-LOG#: 2.44 LOG IU/ML
LABORATORY COMMENT REPORT: DETECTED

## 2025-07-21 ENCOUNTER — LAB (OUTPATIENT)
Dept: LAB | Facility: HOSPITAL | Age: 65
End: 2025-07-21
Payer: COMMERCIAL

## 2025-07-21 DIAGNOSIS — Z13.89 ENCOUNTER FOR SCREENING FOR OTHER DISORDER: Primary | ICD-10-CM

## 2025-07-21 DIAGNOSIS — Z94.4 LIVER TRANSPLANT STATUS: ICD-10-CM

## 2025-07-21 DIAGNOSIS — Z94.0 KIDNEY TRANSPLANT STATUS: ICD-10-CM

## 2025-07-21 LAB
ALBUMIN SERPL BCP-MCNC: 3.9 G/DL (ref 3.4–5)
ALP SERPL-CCNC: 140 U/L (ref 33–136)
ALT SERPL W P-5'-P-CCNC: 10 U/L (ref 10–52)
ANION GAP SERPL CALC-SCNC: 10 MMOL/L (ref 10–20)
AST SERPL W P-5'-P-CCNC: 15 U/L (ref 9–39)
BASOPHILS # BLD AUTO: 0.08 X10*3/UL (ref 0–0.1)
BASOPHILS NFR BLD AUTO: 2.2 %
BILIRUB DIRECT SERPL-MCNC: 0.1 MG/DL (ref 0–0.3)
BILIRUB SERPL-MCNC: 0.5 MG/DL (ref 0–1.2)
BUN SERPL-MCNC: 35 MG/DL (ref 6–23)
CALCIUM SERPL-MCNC: 8.7 MG/DL (ref 8.6–10.3)
CHLORIDE SERPL-SCNC: 110 MMOL/L (ref 98–107)
CO2 SERPL-SCNC: 28 MMOL/L (ref 21–32)
CREAT SERPL-MCNC: 1.88 MG/DL (ref 0.5–1.3)
CREAT UR-MCNC: 81.9 MG/DL (ref 20–370)
EGFRCR SERPLBLD CKD-EPI 2021: 40 ML/MIN/1.73M*2
EOSINOPHIL # BLD AUTO: 0.19 X10*3/UL (ref 0–0.7)
EOSINOPHIL NFR BLD AUTO: 5.1 %
ERYTHROCYTE [DISTWIDTH] IN BLOOD BY AUTOMATED COUNT: 13.2 % (ref 11.5–14.5)
GGT SERPL-CCNC: 81 U/L (ref 5–64)
GLUCOSE SERPL-MCNC: 86 MG/DL (ref 74–99)
HCT VFR BLD AUTO: 32.9 % (ref 41–52)
HGB BLD-MCNC: 10.3 G/DL (ref 13.5–17.5)
IMM GRANULOCYTES # BLD AUTO: 0.36 X10*3/UL (ref 0–0.7)
IMM GRANULOCYTES NFR BLD AUTO: 9.8 % (ref 0–0.9)
LYMPHOCYTES # BLD AUTO: 0.76 X10*3/UL (ref 1.2–4.8)
LYMPHOCYTES NFR BLD AUTO: 20.6 %
MAGNESIUM SERPL-MCNC: 1.97 MG/DL (ref 1.6–2.4)
MCH RBC QN AUTO: 29.3 PG (ref 26–34)
MCHC RBC AUTO-ENTMCNC: 31.3 G/DL (ref 32–36)
MCV RBC AUTO: 94 FL (ref 80–100)
MONOCYTES # BLD AUTO: 0.66 X10*3/UL (ref 0.1–1)
MONOCYTES NFR BLD AUTO: 17.9 %
NEUTROPHILS # BLD AUTO: 1.64 X10*3/UL (ref 1.2–7.7)
NEUTROPHILS NFR BLD AUTO: 44.4 %
NRBC BLD-RTO: 0 /100 WBCS (ref 0–0)
PHOSPHATE SERPL-MCNC: 4 MG/DL (ref 2.5–4.9)
PLATELET # BLD AUTO: 161 X10*3/UL (ref 150–450)
POTASSIUM SERPL-SCNC: 4.8 MMOL/L (ref 3.5–5.3)
PROT SERPL-MCNC: 6.1 G/DL (ref 6.4–8.2)
PROT UR-ACNC: 29 MG/DL (ref 5–25)
PROT/CREAT UR: 0.35 MG/MG CREAT (ref 0–0.17)
RBC # BLD AUTO: 3.52 X10*6/UL (ref 4.5–5.9)
SODIUM SERPL-SCNC: 143 MMOL/L (ref 136–145)
WBC # BLD AUTO: 3.7 X10*3/UL (ref 4.4–11.3)

## 2025-07-21 PROCEDURE — 82977 ASSAY OF GGT: CPT

## 2025-07-21 PROCEDURE — 36415 COLL VENOUS BLD VENIPUNCTURE: CPT

## 2025-07-21 PROCEDURE — 83735 ASSAY OF MAGNESIUM: CPT

## 2025-07-21 PROCEDURE — 80053 COMPREHEN METABOLIC PANEL: CPT

## 2025-07-21 PROCEDURE — 87799 DETECT AGENT NOS DNA QUANT: CPT

## 2025-07-21 PROCEDURE — 82570 ASSAY OF URINE CREATININE: CPT

## 2025-07-21 PROCEDURE — 84100 ASSAY OF PHOSPHORUS: CPT

## 2025-07-21 PROCEDURE — 84156 ASSAY OF PROTEIN URINE: CPT

## 2025-07-21 PROCEDURE — 80197 ASSAY OF TACROLIMUS: CPT

## 2025-07-21 PROCEDURE — 82248 BILIRUBIN DIRECT: CPT

## 2025-07-21 PROCEDURE — 85025 COMPLETE CBC W/AUTO DIFF WBC: CPT

## 2025-07-22 ENCOUNTER — HOSPITAL ENCOUNTER (OUTPATIENT)
Dept: RADIOLOGY | Facility: HOSPITAL | Age: 65
Discharge: HOME | End: 2025-07-22
Payer: COMMERCIAL

## 2025-07-22 ENCOUNTER — TELEPHONE (OUTPATIENT)
Facility: HOSPITAL | Age: 65
End: 2025-07-22
Payer: COMMERCIAL

## 2025-07-22 ENCOUNTER — HOSPITAL ENCOUNTER (INPATIENT)
Facility: HOSPITAL | Age: 65
LOS: 2 days | Discharge: HOME | DRG: 699 | End: 2025-07-24
Attending: TRANSPLANT SURGERY | Admitting: TRANSPLANT SURGERY
Payer: COMMERCIAL

## 2025-07-22 VITALS
TEMPERATURE: 98.2 F | HEART RATE: 67 BPM | BODY MASS INDEX: 30.44 KG/M2 | OXYGEN SATURATION: 98 % | SYSTOLIC BLOOD PRESSURE: 132 MMHG | HEIGHT: 76 IN | WEIGHT: 250 LBS | DIASTOLIC BLOOD PRESSURE: 65 MMHG | RESPIRATION RATE: 16 BRPM

## 2025-07-22 DIAGNOSIS — Z94.0 KIDNEY REPLACED BY TRANSPLANT (HHS-HCC): ICD-10-CM

## 2025-07-22 DIAGNOSIS — Z94.4 LIVER REPLACED BY TRANSPLANT (MULTI): ICD-10-CM

## 2025-07-22 DIAGNOSIS — T86.11 ACUTE REJECTION OF KIDNEY TRANSPLANT (HHS-HCC): ICD-10-CM

## 2025-07-22 DIAGNOSIS — Z94.0 KIDNEY TRANSPLANT RECIPIENT (HHS-HCC): ICD-10-CM

## 2025-07-22 DIAGNOSIS — Z94.4 LIVER TRANSPLANT RECIPIENT (MULTI): ICD-10-CM

## 2025-07-22 DIAGNOSIS — N15.9 KIDNEY INFECTION: ICD-10-CM

## 2025-07-22 DIAGNOSIS — N18.6 ESRD (END STAGE RENAL DISEASE) (MULTI): Primary | ICD-10-CM

## 2025-07-22 DIAGNOSIS — T86.19 DELAYED GRAFT FUNCTION OF KIDNEY (HHS-HCC): ICD-10-CM

## 2025-07-22 LAB
APTT PPP: 26 SECONDS (ref 26–36)
BKV DNA SERPL NAA+PROBE-ACNC: 296 IU/ML (ref ?–22)
BKV DNA SERPL NAA+PROBE-LOG#: 2.47 LOG IU/ML
CMV DNA SERPL NAA+PROBE-LOG IU: NORMAL {LOG_IU}/ML
CREAT FLD-MCNC: 1.73 MG/DL
EBV DNA SPEC NAA+PROBE-LOG#: NORMAL {LOG_COPIES}/ML
ERYTHROCYTE [DISTWIDTH] IN BLOOD BY AUTOMATED COUNT: 12.6 % (ref 11.5–14.5)
HCT VFR BLD AUTO: 30.3 % (ref 41–52)
HGB BLD-MCNC: 9.2 G/DL (ref 13.5–17.5)
INR PPP: 1.2 (ref 0.9–1.1)
LABORATORY COMMENT REPORT: DETECTED
LABORATORY COMMENT REPORT: NOT DETECTED
LABORATORY COMMENT REPORT: NOT DETECTED
MCH RBC QN AUTO: 28.4 PG (ref 26–34)
MCHC RBC AUTO-ENTMCNC: 30.4 G/DL (ref 32–36)
MCV RBC AUTO: 94 FL (ref 80–100)
NRBC BLD-RTO: 0 /100 WBCS (ref 0–0)
PLATELET # BLD AUTO: 114 X10*3/UL (ref 150–450)
PROTHROMBIN TIME: 13.1 SECONDS (ref 9.8–12.4)
RBC # BLD AUTO: 3.24 X10*6/UL (ref 4.5–5.9)
TACROLIMUS BLD-MCNC: 8 NG/ML
WBC # BLD AUTO: 4.3 X10*3/UL (ref 4.4–11.3)

## 2025-07-22 PROCEDURE — 99222 1ST HOSP IP/OBS MODERATE 55: CPT | Performed by: TRANSPLANT SURGERY

## 2025-07-22 PROCEDURE — 2500000001 HC RX 250 WO HCPCS SELF ADMINISTERED DRUGS (ALT 637 FOR MEDICARE OP): Performed by: STUDENT IN AN ORGANIZED HEALTH CARE EDUCATION/TRAINING PROGRAM

## 2025-07-22 PROCEDURE — 80053 COMPREHEN METABOLIC PANEL: CPT | Performed by: STUDENT IN AN ORGANIZED HEALTH CARE EDUCATION/TRAINING PROGRAM

## 2025-07-22 PROCEDURE — 36415 COLL VENOUS BLD VENIPUNCTURE: CPT | Performed by: STUDENT IN AN ORGANIZED HEALTH CARE EDUCATION/TRAINING PROGRAM

## 2025-07-22 PROCEDURE — 50200 RENAL BIOPSY PERQ: CPT

## 2025-07-22 PROCEDURE — 82570 ASSAY OF URINE CREATININE: CPT

## 2025-07-22 PROCEDURE — 0TB13ZX EXCISION OF LEFT KIDNEY, PERCUTANEOUS APPROACH, DIAGNOSTIC: ICD-10-PCS | Performed by: RADIOLOGY

## 2025-07-22 PROCEDURE — 85730 THROMBOPLASTIN TIME PARTIAL: CPT | Performed by: STUDENT IN AN ORGANIZED HEALTH CARE EDUCATION/TRAINING PROGRAM

## 2025-07-22 PROCEDURE — 86901 BLOOD TYPING SEROLOGIC RH(D): CPT | Performed by: STUDENT IN AN ORGANIZED HEALTH CARE EDUCATION/TRAINING PROGRAM

## 2025-07-22 PROCEDURE — 87077 CULTURE AEROBIC IDENTIFY: CPT

## 2025-07-22 PROCEDURE — 82040 ASSAY OF SERUM ALBUMIN: CPT | Performed by: STUDENT IN AN ORGANIZED HEALTH CARE EDUCATION/TRAINING PROGRAM

## 2025-07-22 PROCEDURE — 2720000007 HC OR 272 NO HCPCS

## 2025-07-22 PROCEDURE — 2500000004 HC RX 250 GENERAL PHARMACY W/ HCPCS (ALT 636 FOR OP/ED): Performed by: STUDENT IN AN ORGANIZED HEALTH CARE EDUCATION/TRAINING PROGRAM

## 2025-07-22 PROCEDURE — 7100000009 HC PHASE TWO TIME - INITIAL BASE CHARGE

## 2025-07-22 PROCEDURE — 86832 HLA CLASS I HIGH DEFIN QUAL: CPT | Performed by: STUDENT IN AN ORGANIZED HEALTH CARE EDUCATION/TRAINING PROGRAM

## 2025-07-22 PROCEDURE — 7100000010 HC PHASE TWO TIME - EACH INCREMENTAL 1 MINUTE

## 2025-07-22 PROCEDURE — 10160 PNXR ASPIR ABSC HMTMA BULLA: CPT

## 2025-07-22 PROCEDURE — 77002 NEEDLE LOCALIZATION BY XRAY: CPT | Mod: 59

## 2025-07-22 PROCEDURE — 83735 ASSAY OF MAGNESIUM: CPT | Performed by: STUDENT IN AN ORGANIZED HEALTH CARE EDUCATION/TRAINING PROGRAM

## 2025-07-22 PROCEDURE — 84100 ASSAY OF PHOSPHORUS: CPT | Performed by: STUDENT IN AN ORGANIZED HEALTH CARE EDUCATION/TRAINING PROGRAM

## 2025-07-22 PROCEDURE — 2500000004 HC RX 250 GENERAL PHARMACY W/ HCPCS (ALT 636 FOR OP/ED): Performed by: RADIOLOGY

## 2025-07-22 PROCEDURE — 85027 COMPLETE CBC AUTOMATED: CPT | Performed by: STUDENT IN AN ORGANIZED HEALTH CARE EDUCATION/TRAINING PROGRAM

## 2025-07-22 PROCEDURE — 1100000001 HC PRIVATE ROOM DAILY

## 2025-07-22 PROCEDURE — 88350 IMFLUOR EA ADDL 1ANTB STN PX: CPT | Mod: TC,SUR | Performed by: TRANSPLANT SURGERY

## 2025-07-22 RX ORDER — ONDANSETRON 4 MG/1
4 TABLET, ORALLY DISINTEGRATING ORAL EVERY 8 HOURS PRN
Status: DISCONTINUED | OUTPATIENT
Start: 2025-07-22 | End: 2025-07-24 | Stop reason: HOSPADM

## 2025-07-22 RX ORDER — AMOXICILLIN AND CLAVULANATE POTASSIUM 875; 125 MG/1; MG/1
1 TABLET, FILM COATED ORAL 2 TIMES DAILY
Qty: 20 TABLET | Refills: 0 | Status: CANCELLED | OUTPATIENT
Start: 2025-07-22 | End: 2025-08-01

## 2025-07-22 RX ORDER — ATORVASTATIN CALCIUM 10 MG/1
10 TABLET, FILM COATED ORAL DAILY
Status: DISCONTINUED | OUTPATIENT
Start: 2025-07-23 | End: 2025-07-24 | Stop reason: HOSPADM

## 2025-07-22 RX ORDER — ATOVAQUONE 750 MG/5ML
1500 SUSPENSION ORAL DAILY
Status: DISCONTINUED | OUTPATIENT
Start: 2025-07-23 | End: 2025-07-24 | Stop reason: HOSPADM

## 2025-07-22 RX ORDER — INSULIN LISPRO 100 [IU]/ML
0-5 INJECTION, SOLUTION INTRAVENOUS; SUBCUTANEOUS
Status: DISCONTINUED | OUTPATIENT
Start: 2025-07-23 | End: 2025-07-23

## 2025-07-22 RX ORDER — TRAZODONE HYDROCHLORIDE 50 MG/1
25 TABLET ORAL NIGHTLY
Status: DISCONTINUED | OUTPATIENT
Start: 2025-07-22 | End: 2025-07-24 | Stop reason: HOSPADM

## 2025-07-22 RX ORDER — MIDAZOLAM HYDROCHLORIDE 2 MG/2ML
INJECTION, SOLUTION INTRAMUSCULAR; INTRAVENOUS
Status: COMPLETED | OUTPATIENT
Start: 2025-07-22 | End: 2025-07-22

## 2025-07-22 RX ORDER — PANTOPRAZOLE SODIUM 40 MG/1
40 TABLET, DELAYED RELEASE ORAL DAILY
Status: DISCONTINUED | OUTPATIENT
Start: 2025-07-23 | End: 2025-07-24 | Stop reason: HOSPADM

## 2025-07-22 RX ORDER — INSULIN LISPRO 100 [IU]/ML
0-5 INJECTION, SOLUTION INTRAVENOUS; SUBCUTANEOUS
Status: DISCONTINUED | OUTPATIENT
Start: 2025-07-23 | End: 2025-07-22

## 2025-07-22 RX ORDER — HEPARIN SODIUM 5000 [USP'U]/ML
5000 INJECTION, SOLUTION INTRAVENOUS; SUBCUTANEOUS EVERY 8 HOURS
Status: COMPLETED | OUTPATIENT
Start: 2025-07-22 | End: 2025-07-23

## 2025-07-22 RX ORDER — ONDANSETRON HYDROCHLORIDE 2 MG/ML
4 INJECTION, SOLUTION INTRAVENOUS EVERY 8 HOURS PRN
Status: DISCONTINUED | OUTPATIENT
Start: 2025-07-22 | End: 2025-07-24 | Stop reason: HOSPADM

## 2025-07-22 RX ORDER — POLYETHYLENE GLYCOL 3350 17 G/17G
17 POWDER, FOR SOLUTION ORAL DAILY
Status: DISCONTINUED | OUTPATIENT
Start: 2025-07-23 | End: 2025-07-22 | Stop reason: SDUPTHER

## 2025-07-22 RX ORDER — TAMSULOSIN HYDROCHLORIDE 0.4 MG/1
0.4 CAPSULE ORAL DAILY
Status: DISCONTINUED | OUTPATIENT
Start: 2025-07-23 | End: 2025-07-24 | Stop reason: HOSPADM

## 2025-07-22 RX ORDER — POLYETHYLENE GLYCOL 3350 17 G/17G
17 POWDER, FOR SOLUTION ORAL DAILY
Status: DISCONTINUED | OUTPATIENT
Start: 2025-07-23 | End: 2025-07-24 | Stop reason: HOSPADM

## 2025-07-22 RX ORDER — FENTANYL CITRATE 50 UG/ML
INJECTION, SOLUTION INTRAMUSCULAR; INTRAVENOUS
Status: COMPLETED | OUTPATIENT
Start: 2025-07-22 | End: 2025-07-22

## 2025-07-22 RX ORDER — ACETAMINOPHEN 650 MG/1
650 SUPPOSITORY RECTAL EVERY 6 HOURS
Status: DISCONTINUED | OUTPATIENT
Start: 2025-07-22 | End: 2025-07-23

## 2025-07-22 RX ORDER — ACETAMINOPHEN 160 MG/5ML
650 SOLUTION ORAL EVERY 6 HOURS
Status: DISCONTINUED | OUTPATIENT
Start: 2025-07-22 | End: 2025-07-23

## 2025-07-22 RX ORDER — AMLODIPINE BESYLATE 5 MG/1
5 TABLET ORAL DAILY
Status: DISCONTINUED | OUTPATIENT
Start: 2025-07-23 | End: 2025-07-24 | Stop reason: HOSPADM

## 2025-07-22 RX ORDER — ACETAMINOPHEN 325 MG/1
650 TABLET ORAL EVERY 6 HOURS
Status: DISCONTINUED | OUTPATIENT
Start: 2025-07-22 | End: 2025-07-24 | Stop reason: HOSPADM

## 2025-07-22 RX ORDER — TACROLIMUS 1 MG/1
3 CAPSULE ORAL 2 TIMES DAILY
Status: DISCONTINUED | OUTPATIENT
Start: 2025-07-22 | End: 2025-07-24 | Stop reason: HOSPADM

## 2025-07-22 RX ORDER — CHOLECALCIFEROL (VITAMIN D3) 25 MCG
50 TABLET ORAL DAILY
Status: DISCONTINUED | OUTPATIENT
Start: 2025-07-23 | End: 2025-07-24 | Stop reason: HOSPADM

## 2025-07-22 RX ADMIN — MIDAZOLAM HYDROCHLORIDE 2 MG: 2 INJECTION, SOLUTION INTRAMUSCULAR; INTRAVENOUS at 09:10

## 2025-07-22 RX ADMIN — FENTANYL CITRATE 100 MCG: 50 INJECTION, SOLUTION INTRAMUSCULAR; INTRAVENOUS at 09:10

## 2025-07-22 RX ADMIN — ACETAMINOPHEN 650 MG: 325 TABLET ORAL at 23:59

## 2025-07-22 RX ADMIN — HEPARIN SODIUM 5000 UNITS: 5000 INJECTION, SOLUTION INTRAVENOUS; SUBCUTANEOUS at 23:59

## 2025-07-22 ASSESSMENT — PAIN - FUNCTIONAL ASSESSMENT
PAIN_FUNCTIONAL_ASSESSMENT: 0-10

## 2025-07-22 ASSESSMENT — PAIN SCALES - GENERAL
PAINLEVEL_OUTOF10: 0 - NO PAIN
PAINLEVEL_OUTOF10: 3
PAINLEVEL_OUTOF10: 0 - NO PAIN

## 2025-07-22 NOTE — TELEPHONE ENCOUNTER
RN spoke to pt reviewed plan for admission due to kidney rejection    Pt verbalized understanding, admission pending bed assignment

## 2025-07-22 NOTE — DISCHARGE INSTRUCTIONS
Discharge information    See Kidney biopsy patient information sheet.     Ok to remove dressing on 7/23/25 at 9am.  Ok to shower on 7/23/25, no baths, jacuzzis, or swimming. Do not get submerged in a body of water (leads to increased risk of infection.)  Ok to keep open until healed. Healing is when a scab is created and falls off, usually within 5-10 days.     Look for signs and symptoms of infection:  Including: redness, swelling, discharge such as pus, or odor from site, fever > 100.5*F.     Look for bleeding from site:  If bleeding occurs hold pressure for 5 minutes with paper towel, check site if still bleeding hold for 5 more minutes  If site continues to bleed after 10 minutes call 911.    You received moderate sedation:  - Do not drive a car, or operate any machinery or power tools of any kind.  - Do not drink any alcoholic drinks.  - Do not take any over the counter medications that may cause drowsiness.  - Do not make any important decisions or sign any legal documents.  - You need to have a responsible adult accompany you home.  - You may resume your normal diet.  - We strongly suggest that a responsible adult be with you for the rest of the day and also during the night. This is for your protection and safety.     For questions related to your procedure:  Please call [phone] between the hours of 7:00am-5:00pm Monday through Friday.  Please call [phone] for Resident on call after 5:00pm weeknights, on weekends, and holidays.     In the event of an emergency call 911 or go to your nearest emergency room.

## 2025-07-22 NOTE — PRE-PROCEDURE NOTE
Interventional Radiology Preprocedure Note    Indication for procedure: Diagnoses of Delayed graft function of kidney (HHS-HCC) and Kidney replaced by transplant (HHS-HCC) were pertinent to this visit.    Relevant review of systems: NA    Relevant Labs:   Lab Results   Component Value Date    CREATININE 1.88 (H) 07/21/2025    EGFR 40 (L) 07/21/2025    INR 1.3 (H) 06/30/2025    PROTIME 14.4 (H) 06/30/2025       Planned Sedation/Anesthesia: Moderate    Airway assessment: normal    Directed physical examination:    Aox3.  Resting comfortably in bed.   Respiratory rate/effort within normal limits.  LLQ skin clean/intact. Incision healed.     Mallampati: III (soft and hard palate and base of uvula visible)    ASA Score: ASA 3 - Patient with moderate systemic disease with functional limitations    Benefits, risks and alternatives of procedure and planned sedation have been discussed with the patient and/or their representative. All questions answered and they agree to proceed.

## 2025-07-22 NOTE — POST-PROCEDURE NOTE
Interventional Radiology Brief Postprocedure Note    Attending: Galilea Mercedes MD    Assistant: Capo Melton MD    Diagnosis: delayed graft function    Description of procedure: image guided biopsy of left transplant kidney x2, gel foam insertion     7cc's of serous light yellow fluid collected from surrounding fluid collection    Anesthesia:  Local and MAC Moderate    Complications: None    Estimated Blood Loss: minimal    Medications (Filter: Administrations occurring from 0753 to 0915 on 07/22/25) As of 07/22/25 0915      fentaNYL PF (Sublimaze) injection (mcg) Total dose:  100 mcg      Date/Time Rate/Dose/Volume Action       07/22/25  0910 100 mcg Given               midazolam (Versed) injection (mg) Total dose:  2 mg      Date/Time Rate/Dose/Volume Action       07/22/25  0910 2 mg Given                   See detailed result report with images in PACS.    The patient tolerated the procedure well without incident or complication and is in stable condition.

## 2025-07-23 LAB
ABO GROUP (TYPE) IN BLOOD: NORMAL
ALBUMIN SERPL BCP-MCNC: 4 G/DL (ref 3.4–5)
ALBUMIN SERPL BCP-MCNC: 4.2 G/DL (ref 3.4–5)
ALBUMIN SERPL BCP-MCNC: 4.2 G/DL (ref 3.4–5)
ALP SERPL-CCNC: 145 U/L (ref 33–136)
ALP SERPL-CCNC: 154 U/L (ref 33–136)
ALT SERPL W P-5'-P-CCNC: 10 U/L (ref 10–52)
ALT SERPL W P-5'-P-CCNC: 11 U/L (ref 10–52)
ANION GAP SERPL CALC-SCNC: 11 MMOL/L (ref 10–20)
ANION GAP SERPL CALC-SCNC: 14 MMOL/L (ref 10–20)
ANTIBODY SCREEN: NORMAL
AST SERPL W P-5'-P-CCNC: 19 U/L (ref 9–39)
AST SERPL W P-5'-P-CCNC: 22 U/L (ref 9–39)
BILIRUB DIRECT SERPL-MCNC: 0.2 MG/DL (ref 0–0.3)
BILIRUB DIRECT SERPL-MCNC: 0.3 MG/DL (ref 0–0.3)
BILIRUB SERPL-MCNC: 0.8 MG/DL (ref 0–1.2)
BILIRUB SERPL-MCNC: 0.9 MG/DL (ref 0–1.2)
BUN SERPL-MCNC: 30 MG/DL (ref 6–23)
BUN SERPL-MCNC: 32 MG/DL (ref 6–23)
CALCIUM SERPL-MCNC: 8.8 MG/DL (ref 8.6–10.6)
CALCIUM SERPL-MCNC: 9.4 MG/DL (ref 8.6–10.6)
CHLORIDE SERPL-SCNC: 104 MMOL/L (ref 98–107)
CHLORIDE SERPL-SCNC: 107 MMOL/L (ref 98–107)
CO2 SERPL-SCNC: 22 MMOL/L (ref 21–32)
CO2 SERPL-SCNC: 24 MMOL/L (ref 21–32)
CREAT SERPL-MCNC: 1.65 MG/DL (ref 0.5–1.3)
CREAT SERPL-MCNC: 1.74 MG/DL (ref 0.5–1.3)
EGFRCR SERPLBLD CKD-EPI 2021: 44 ML/MIN/1.73M*2
EGFRCR SERPLBLD CKD-EPI 2021: 46 ML/MIN/1.73M*2
ERYTHROCYTE [DISTWIDTH] IN BLOOD BY AUTOMATED COUNT: 12.5 % (ref 11.5–14.5)
GLUCOSE BLD MANUAL STRIP-MCNC: 181 MG/DL (ref 74–99)
GLUCOSE BLD MANUAL STRIP-MCNC: 183 MG/DL (ref 74–99)
GLUCOSE BLD MANUAL STRIP-MCNC: 210 MG/DL (ref 74–99)
GLUCOSE BLD MANUAL STRIP-MCNC: 216 MG/DL (ref 74–99)
GLUCOSE SERPL-MCNC: 102 MG/DL (ref 74–99)
GLUCOSE SERPL-MCNC: 175 MG/DL (ref 74–99)
HCT VFR BLD AUTO: 33.9 % (ref 41–52)
HGB BLD-MCNC: 10.7 G/DL (ref 13.5–17.5)
LAB AP ASR DISCLAIMER: NORMAL
LABORATORY COMMENT REPORT: NORMAL
MAGNESIUM SERPL-MCNC: 1.81 MG/DL (ref 1.6–2.4)
MAGNESIUM SERPL-MCNC: 2 MG/DL (ref 1.6–2.4)
MCH RBC QN AUTO: 28.6 PG (ref 26–34)
MCHC RBC AUTO-ENTMCNC: 31.6 G/DL (ref 32–36)
MCV RBC AUTO: 91 FL (ref 80–100)
NRBC BLD-RTO: 0 /100 WBCS (ref 0–0)
PATH REPORT.COMMENTS IMP SPEC: NORMAL
PATH REPORT.FINAL DX SPEC: NORMAL
PATH REPORT.GROSS SPEC: NORMAL
PATH REPORT.MICROSCOPIC SPEC OTHER STN: NORMAL
PATH REPORT.RELEVANT HX SPEC: NORMAL
PATH REPORT.TOTAL CANCER: NORMAL
PHOSPHATE SERPL-MCNC: 3 MG/DL (ref 2.5–4.9)
PHOSPHATE SERPL-MCNC: 3.2 MG/DL (ref 2.5–4.9)
PLATELET # BLD AUTO: 112 X10*3/UL (ref 150–450)
POTASSIUM SERPL-SCNC: 4.3 MMOL/L (ref 3.5–5.3)
POTASSIUM SERPL-SCNC: 5.3 MMOL/L (ref 3.5–5.3)
PROT SERPL-MCNC: 6.8 G/DL (ref 6.4–8.2)
PROT SERPL-MCNC: 7.2 G/DL (ref 6.4–8.2)
RBC # BLD AUTO: 3.74 X10*6/UL (ref 4.5–5.9)
RH FACTOR (ANTIGEN D): NORMAL
SODIUM SERPL-SCNC: 135 MMOL/L (ref 136–145)
SODIUM SERPL-SCNC: 138 MMOL/L (ref 136–145)
TACROLIMUS BLD-MCNC: 9 NG/ML
WBC # BLD AUTO: 3.3 X10*3/UL (ref 4.4–11.3)

## 2025-07-23 PROCEDURE — 1100000001 HC PRIVATE ROOM DAILY

## 2025-07-23 PROCEDURE — 2500000002 HC RX 250 W HCPCS SELF ADMINISTERED DRUGS (ALT 637 FOR MEDICARE OP, ALT 636 FOR OP/ED)

## 2025-07-23 PROCEDURE — 99232 SBSQ HOSP IP/OBS MODERATE 35: CPT | Performed by: TRANSPLANT SURGERY

## 2025-07-23 PROCEDURE — 2500000002 HC RX 250 W HCPCS SELF ADMINISTERED DRUGS (ALT 637 FOR MEDICARE OP, ALT 636 FOR OP/ED): Performed by: STUDENT IN AN ORGANIZED HEALTH CARE EDUCATION/TRAINING PROGRAM

## 2025-07-23 PROCEDURE — 82947 ASSAY GLUCOSE BLOOD QUANT: CPT

## 2025-07-23 PROCEDURE — 83735 ASSAY OF MAGNESIUM: CPT | Performed by: STUDENT IN AN ORGANIZED HEALTH CARE EDUCATION/TRAINING PROGRAM

## 2025-07-23 PROCEDURE — 80053 COMPREHEN METABOLIC PANEL: CPT | Performed by: STUDENT IN AN ORGANIZED HEALTH CARE EDUCATION/TRAINING PROGRAM

## 2025-07-23 PROCEDURE — 80197 ASSAY OF TACROLIMUS: CPT | Performed by: STUDENT IN AN ORGANIZED HEALTH CARE EDUCATION/TRAINING PROGRAM

## 2025-07-23 PROCEDURE — 2500000004 HC RX 250 GENERAL PHARMACY W/ HCPCS (ALT 636 FOR OP/ED): Performed by: STUDENT IN AN ORGANIZED HEALTH CARE EDUCATION/TRAINING PROGRAM

## 2025-07-23 PROCEDURE — 99222 1ST HOSP IP/OBS MODERATE 55: CPT | Performed by: HOSPITALIST

## 2025-07-23 PROCEDURE — 2500000001 HC RX 250 WO HCPCS SELF ADMINISTERED DRUGS (ALT 637 FOR MEDICARE OP): Performed by: STUDENT IN AN ORGANIZED HEALTH CARE EDUCATION/TRAINING PROGRAM

## 2025-07-23 PROCEDURE — 2500000001 HC RX 250 WO HCPCS SELF ADMINISTERED DRUGS (ALT 637 FOR MEDICARE OP)

## 2025-07-23 PROCEDURE — 2500000004 HC RX 250 GENERAL PHARMACY W/ HCPCS (ALT 636 FOR OP/ED)

## 2025-07-23 PROCEDURE — RXMED WILLOW AMBULATORY MEDICATION CHARGE

## 2025-07-23 PROCEDURE — 85027 COMPLETE CBC AUTOMATED: CPT | Performed by: STUDENT IN AN ORGANIZED HEALTH CARE EDUCATION/TRAINING PROGRAM

## 2025-07-23 PROCEDURE — 36415 COLL VENOUS BLD VENIPUNCTURE: CPT | Performed by: STUDENT IN AN ORGANIZED HEALTH CARE EDUCATION/TRAINING PROGRAM

## 2025-07-23 PROCEDURE — 84100 ASSAY OF PHOSPHORUS: CPT

## 2025-07-23 RX ORDER — CLOTRIMAZOLE 10 MG/1
10 LOZENGE ORAL
Status: DISCONTINUED | OUTPATIENT
Start: 2025-07-23 | End: 2025-07-24 | Stop reason: HOSPADM

## 2025-07-23 RX ORDER — INSULIN LISPRO 100 [IU]/ML
0-10 INJECTION, SOLUTION INTRAVENOUS; SUBCUTANEOUS
Status: DISCONTINUED | OUTPATIENT
Start: 2025-07-23 | End: 2025-07-24 | Stop reason: HOSPADM

## 2025-07-23 RX ORDER — AMOXICILLIN AND CLAVULANATE POTASSIUM 875; 125 MG/1; MG/1
1 TABLET, FILM COATED ORAL EVERY 12 HOURS SCHEDULED
Status: DISCONTINUED | OUTPATIENT
Start: 2025-07-23 | End: 2025-07-23

## 2025-07-23 RX ORDER — MYCOPHENOLATE MOFETIL 250 MG/1
500 CAPSULE ORAL
Status: DISCONTINUED | OUTPATIENT
Start: 2025-07-23 | End: 2025-07-24 | Stop reason: HOSPADM

## 2025-07-23 RX ORDER — MYCOPHENOLATE MOFETIL 250 MG/1
500 CAPSULE ORAL 2 TIMES DAILY
Qty: 200 CAPSULE | Refills: 0 | Status: SHIPPED | OUTPATIENT
Start: 2025-07-23 | End: 2025-09-12

## 2025-07-23 RX ORDER — CLOTRIMAZOLE 10 MG/1
10 LOZENGE ORAL
Qty: 90 TROCHE | Refills: 0 | Status: SHIPPED | OUTPATIENT
Start: 2025-07-23 | End: 2025-07-25 | Stop reason: SDUPTHER

## 2025-07-23 RX ADMIN — PANTOPRAZOLE SODIUM 40 MG: 40 TABLET, DELAYED RELEASE ORAL at 08:45

## 2025-07-23 RX ADMIN — CLOTRIMAZOLE 10 MG: 10 LOZENGE ORAL at 11:53

## 2025-07-23 RX ADMIN — TACROLIMUS 3 MG: 1 CAPSULE ORAL at 06:17

## 2025-07-23 RX ADMIN — ATORVASTATIN CALCIUM 10 MG: 10 TABLET, FILM COATED ORAL at 08:45

## 2025-07-23 RX ADMIN — HEPARIN SODIUM 5000 UNITS: 5000 INJECTION, SOLUTION INTRAVENOUS; SUBCUTANEOUS at 23:22

## 2025-07-23 RX ADMIN — ACETAMINOPHEN 650 MG: 325 TABLET ORAL at 23:22

## 2025-07-23 RX ADMIN — INSULIN LISPRO 2 UNITS: 100 INJECTION, SOLUTION INTRAVENOUS; SUBCUTANEOUS at 11:54

## 2025-07-23 RX ADMIN — MYCOPHENOLATE MOFETIL 500 MG: 250 CAPSULE ORAL at 18:18

## 2025-07-23 RX ADMIN — DEXTROSE 500 MG: 50 INJECTION, SOLUTION INTRAVENOUS at 00:22

## 2025-07-23 RX ADMIN — TACROLIMUS 3 MG: 1 CAPSULE ORAL at 18:18

## 2025-07-23 RX ADMIN — PIPERACILLIN SODIUM AND TAZOBACTAM SODIUM 4.5 G: 4; .5 INJECTION, SOLUTION INTRAVENOUS at 23:22

## 2025-07-23 RX ADMIN — ACETAMINOPHEN 650 MG: 325 TABLET ORAL at 06:17

## 2025-07-23 RX ADMIN — AMOXICILLIN AND CLAVULANATE POTASSIUM 1 TABLET: 875; 125 TABLET, FILM COATED ORAL at 08:45

## 2025-07-23 RX ADMIN — AMLODIPINE BESYLATE 5 MG: 5 TABLET ORAL at 08:45

## 2025-07-23 RX ADMIN — Medication 50 MCG: at 08:45

## 2025-07-23 RX ADMIN — PIPERACILLIN SODIUM AND TAZOBACTAM SODIUM 4.5 G: 4; .5 INJECTION, SOLUTION INTRAVENOUS at 16:29

## 2025-07-23 RX ADMIN — ACETAMINOPHEN 650 MG: 325 TABLET ORAL at 18:18

## 2025-07-23 RX ADMIN — TRAZODONE HYDROCHLORIDE 25 MG: 50 TABLET ORAL at 21:20

## 2025-07-23 RX ADMIN — HEPARIN SODIUM 5000 UNITS: 5000 INJECTION, SOLUTION INTRAVENOUS; SUBCUTANEOUS at 15:23

## 2025-07-23 RX ADMIN — INSULIN LISPRO 2 UNITS: 100 INJECTION, SOLUTION INTRAVENOUS; SUBCUTANEOUS at 08:46

## 2025-07-23 RX ADMIN — INSULIN LISPRO 4 UNITS: 100 INJECTION, SOLUTION INTRAVENOUS; SUBCUTANEOUS at 15:23

## 2025-07-23 RX ADMIN — ATOVAQUONE 1500 MG: 750 SUSPENSION ORAL at 08:46

## 2025-07-23 RX ADMIN — HEPARIN SODIUM 5000 UNITS: 5000 INJECTION, SOLUTION INTRAVENOUS; SUBCUTANEOUS at 08:46

## 2025-07-23 RX ADMIN — MYCOPHENOLATE MOFETIL 500 MG: 250 CAPSULE ORAL at 11:54

## 2025-07-23 RX ADMIN — TAMSULOSIN HYDROCHLORIDE 0.4 MG: 0.4 CAPSULE ORAL at 08:45

## 2025-07-23 RX ADMIN — CLOTRIMAZOLE 10 MG: 10 LOZENGE ORAL at 18:18

## 2025-07-23 RX ADMIN — ACETAMINOPHEN 650 MG: 325 TABLET ORAL at 11:54

## 2025-07-23 RX ADMIN — METHYLPREDNISOLONE SODIUM SUCCINATE 250 MG: 1 INJECTION INTRAMUSCULAR; INTRAVENOUS at 15:23

## 2025-07-23 RX ADMIN — INSULIN HUMAN 10 UNITS: 100 INJECTION, SUSPENSION SUBCUTANEOUS at 08:46

## 2025-07-23 RX ADMIN — PIPERACILLIN SODIUM AND TAZOBACTAM SODIUM 4.5 G: 4; .5 INJECTION, SOLUTION INTRAVENOUS at 11:52

## 2025-07-23 SDOH — ECONOMIC STABILITY: HOUSING INSECURITY: IN THE PAST 12 MONTHS, HOW MANY TIMES HAVE YOU MOVED WHERE YOU WERE LIVING?: 0

## 2025-07-23 SDOH — SOCIAL STABILITY: SOCIAL INSECURITY: HAVE YOU HAD ANY THOUGHTS OF HARMING ANYONE ELSE?: NO

## 2025-07-23 SDOH — ECONOMIC STABILITY: FOOD INSECURITY: HOW HARD IS IT FOR YOU TO PAY FOR THE VERY BASICS LIKE FOOD, HOUSING, MEDICAL CARE, AND HEATING?: NOT HARD AT ALL

## 2025-07-23 SDOH — SOCIAL STABILITY: SOCIAL INSECURITY: HAS ANYONE EVER THREATENED TO HURT YOUR FAMILY OR YOUR PETS?: NO

## 2025-07-23 SDOH — ECONOMIC STABILITY: HOUSING INSECURITY: AT ANY TIME IN THE PAST 12 MONTHS, WERE YOU HOMELESS OR LIVING IN A SHELTER (INCLUDING NOW)?: NO

## 2025-07-23 SDOH — ECONOMIC STABILITY: FOOD INSECURITY: WITHIN THE PAST 12 MONTHS, THE FOOD YOU BOUGHT JUST DIDN'T LAST AND YOU DIDN'T HAVE MONEY TO GET MORE.: NEVER TRUE

## 2025-07-23 SDOH — ECONOMIC STABILITY: HOUSING INSECURITY: IN THE LAST 12 MONTHS, WAS THERE A TIME WHEN YOU WERE NOT ABLE TO PAY THE MORTGAGE OR RENT ON TIME?: NO

## 2025-07-23 SDOH — SOCIAL STABILITY: SOCIAL INSECURITY: WITHIN THE LAST YEAR, HAVE YOU BEEN HUMILIATED OR EMOTIONALLY ABUSED IN OTHER WAYS BY YOUR PARTNER OR EX-PARTNER?: NO

## 2025-07-23 SDOH — SOCIAL STABILITY: SOCIAL INSECURITY: WERE YOU ABLE TO COMPLETE ALL THE BEHAVIORAL HEALTH SCREENINGS?: YES

## 2025-07-23 SDOH — SOCIAL STABILITY: SOCIAL INSECURITY: DOES ANYONE TRY TO KEEP YOU FROM HAVING/CONTACTING OTHER FRIENDS OR DOING THINGS OUTSIDE YOUR HOME?: NO

## 2025-07-23 SDOH — SOCIAL STABILITY: SOCIAL INSECURITY: WITHIN THE LAST YEAR, HAVE YOU BEEN AFRAID OF YOUR PARTNER OR EX-PARTNER?: NO

## 2025-07-23 SDOH — HEALTH STABILITY: MENTAL HEALTH
DO YOU FEEL STRESS - TENSE, RESTLESS, NERVOUS, OR ANXIOUS, OR UNABLE TO SLEEP AT NIGHT BECAUSE YOUR MIND IS TROUBLED ALL THE TIME - THESE DAYS?: NOT AT ALL

## 2025-07-23 SDOH — ECONOMIC STABILITY: FOOD INSECURITY: WITHIN THE PAST 12 MONTHS, YOU WORRIED THAT YOUR FOOD WOULD RUN OUT BEFORE YOU GOT THE MONEY TO BUY MORE.: NEVER TRUE

## 2025-07-23 SDOH — SOCIAL STABILITY: SOCIAL INSECURITY: DO YOU FEEL ANYONE HAS EXPLOITED OR TAKEN ADVANTAGE OF YOU FINANCIALLY OR OF YOUR PERSONAL PROPERTY?: NO

## 2025-07-23 SDOH — ECONOMIC STABILITY: INCOME INSECURITY: IN THE PAST 12 MONTHS HAS THE ELECTRIC, GAS, OIL, OR WATER COMPANY THREATENED TO SHUT OFF SERVICES IN YOUR HOME?: NO

## 2025-07-23 SDOH — SOCIAL STABILITY: SOCIAL INSECURITY: DO YOU FEEL UNSAFE GOING BACK TO THE PLACE WHERE YOU ARE LIVING?: NO

## 2025-07-23 SDOH — ECONOMIC STABILITY: HOUSING INSECURITY: DO YOU FEEL UNSAFE GOING BACK TO THE PLACE WHERE YOU LIVE?: NO

## 2025-07-23 SDOH — SOCIAL STABILITY: SOCIAL INSECURITY: HAVE YOU HAD THOUGHTS OF HARMING ANYONE ELSE?: NO

## 2025-07-23 SDOH — SOCIAL STABILITY: SOCIAL INSECURITY: ARE THERE ANY APPARENT SIGNS OF INJURIES/BEHAVIORS THAT COULD BE RELATED TO ABUSE/NEGLECT?: NO

## 2025-07-23 SDOH — ECONOMIC STABILITY: TRANSPORTATION INSECURITY: IN THE PAST 12 MONTHS, HAS LACK OF TRANSPORTATION KEPT YOU FROM MEDICAL APPOINTMENTS OR FROM GETTING MEDICATIONS?: NO

## 2025-07-23 SDOH — SOCIAL STABILITY: SOCIAL INSECURITY: ABUSE: ADULT

## 2025-07-23 SDOH — SOCIAL STABILITY: SOCIAL INSECURITY: ARE YOU OR HAVE YOU BEEN THREATENED OR ABUSED PHYSICALLY, EMOTIONALLY, OR SEXUALLY BY ANYONE?: NO

## 2025-07-23 ASSESSMENT — COGNITIVE AND FUNCTIONAL STATUS - GENERAL
MOBILITY SCORE: 24
DAILY ACTIVITIY SCORE: 24
MOBILITY SCORE: 24
MOBILITY SCORE: 24
PATIENT BASELINE BEDBOUND: NO

## 2025-07-23 ASSESSMENT — ACTIVITIES OF DAILY LIVING (ADL)
HEARING - LEFT EAR: FUNCTIONAL
PATIENT'S MEMORY ADEQUATE TO SAFELY COMPLETE DAILY ACTIVITIES?: YES
HEARING - RIGHT EAR: FUNCTIONAL
BATHING: INDEPENDENT
LACK_OF_TRANSPORTATION: NO
FEEDING YOURSELF: INDEPENDENT
JUDGMENT_ADEQUATE_SAFELY_COMPLETE_DAILY_ACTIVITIES: YES
LACK_OF_TRANSPORTATION: NO
TOILETING: INDEPENDENT
GROOMING: INDEPENDENT
ASSISTIVE_DEVICE: EYEGLASSES
ADEQUATE_TO_COMPLETE_ADL: YES
WALKS IN HOME: INDEPENDENT
DRESSING YOURSELF: INDEPENDENT

## 2025-07-23 ASSESSMENT — ENCOUNTER SYMPTOMS
ENDOCRINE NEGATIVE: 1
BRUISES/BLEEDS EASILY: 1
RESPIRATORY NEGATIVE: 1
GASTROINTESTINAL NEGATIVE: 1
NEUROLOGICAL NEGATIVE: 1
MUSCULOSKELETAL NEGATIVE: 1
CONSTITUTIONAL NEGATIVE: 1
EYES NEGATIVE: 1
CARDIOVASCULAR NEGATIVE: 1
PSYCHIATRIC NEGATIVE: 1

## 2025-07-23 ASSESSMENT — PAIN SCALES - GENERAL
PAINLEVEL_OUTOF10: 0 - NO PAIN
PAINLEVEL_OUTOF10: 0 - NO PAIN

## 2025-07-23 ASSESSMENT — LIFESTYLE VARIABLES
AUDIT-C TOTAL SCORE: 0
HOW OFTEN DO YOU HAVE A DRINK CONTAINING ALCOHOL: NEVER
SUBSTANCE_ABUSE_PAST_12_MONTHS: NO
HOW OFTEN DO YOU HAVE 6 OR MORE DRINKS ON ONE OCCASION: NEVER
PRESCIPTION_ABUSE_PAST_12_MONTHS: NO
AUDIT-C TOTAL SCORE: 0
HOW MANY STANDARD DRINKS CONTAINING ALCOHOL DO YOU HAVE ON A TYPICAL DAY: PATIENT DOES NOT DRINK
SKIP TO QUESTIONS 9-10: 1

## 2025-07-23 ASSESSMENT — PAIN - FUNCTIONAL ASSESSMENT: PAIN_FUNCTIONAL_ASSESSMENT: 0-10

## 2025-07-23 NOTE — PROGRESS NOTES
"López Lopez is a 65 y.o. male on day 1 of admission presenting with ESRD (end stage renal disease) (Multi).    Subjective   admitted       Objective   Vitals:    07/23/25 0758   BP: 159/75   Pulse: 60   Resp: 16   Temp: 35.7 °C (96.3 °F)   SpO2: 97%       Physical Exam  Constitutional:       Appearance: Normal appearance.     Eyes:      Pupils: Pupils are equal, round, and reactive to light.       Cardiovascular:      Rate and Rhythm: Normal rate.   Pulmonary:      Effort: Pulmonary effort is normal. No respiratory distress.   Abdominal:      General: There is no distension.      Palpations: Abdomen is soft.      Comments: Incision clean, dry, intact     Musculoskeletal:         General: Normal range of motion.      Right lower leg: No edema.      Left lower leg: No edema.     Skin:     General: Skin is warm and dry.     Neurological:      General: No focal deficit present.      Mental Status: He is alert and oriented to person, place, and time.     Psychiatric:         Mood and Affect: Mood normal.         Behavior: Behavior normal.         Last Recorded Vitals  Blood pressure 159/75, pulse 60, temperature 35.7 °C (96.3 °F), temperature source Temporal, resp. rate 16, height 1.93 m (6' 4\"), weight 112 kg (248 lb), SpO2 97%.  Intake/Output last 3 Shifts:  I/O last 3 completed shifts:  In: 100 (0.9 mL/kg) [IV Piggyback:100]  Out: 1000 (8.9 mL/kg) [Urine:1000 (0.2 mL/kg/hr)]  Weight: 112.5 kg     Relevant Results  Lab Results   Component Value Date    WBC 3.3 (L) 07/23/2025    HGB 10.7 (L) 07/23/2025    HCT 33.9 (L) 07/23/2025    MCV 91 07/23/2025     (L) 07/23/2025     Lab Results   Component Value Date    GLUCOSE 175 (H) 07/23/2025    CALCIUM 9.4 07/23/2025     07/23/2025    K 5.3 07/23/2025    CO2 22 07/23/2025     07/23/2025    BUN 30 (H) 07/23/2025    CREATININE 1.65 (H) 07/23/2025     Lab Results   Component Value Date    ALT 11 07/23/2025    AST 22 07/23/2025    GGT 81 (H) 07/21/2025    " ALKPHOS 154 (H) 07/23/2025    BILITOT 0.9 07/23/2025     acetaminophen, 650 mg, oral, q6h  amLODIPine, 5 mg, oral, Daily  amoxicillin-clavulanate, 1 tablet, oral, q12h BRITTANEY  [Held by provider] apixaban, 5 mg, oral, BID  atorvastatin, 10 mg, oral, Daily  atovaquone, 1,500 mg, oral, Daily  cholecalciferol, 50 mcg, oral, Daily  heparin (porcine), 5,000 Units, subcutaneous, q8h  insulin lispro, 0-10 Units, subcutaneous, TID AC  insulin NPH (Isophane), 10 Units, subcutaneous, q24h BRITTANEY  pantoprazole, 40 mg, oral, Daily  polyethylene glycol, 17 g, oral, Daily  tacrolimus, 3 mg, oral, BID  tamsulosin, 0.4 mg, oral, Daily  traZODone, 25 mg, oral, Nightly          Assessment & Plan  ESRD (end stage renal disease) (Multi)    SLK  4/3    Liver allograft function   Stable    Kidney allograft function   Baseline, biopsy showed rejection IB, getting steroid    Sent DSA (pending)   Para-nephritic collection - culture pending. - keep zosyn for now    Immunosuppression   Tac 3/3, level 9, no change   Resume /500, previously on hold   Solumedrol 500/250/250    PPX   Mepron, restart clotrimazole   Monitor CMV (7/21 negative)    I spent 35 minutes in the professional and overall care of this patient.      Eliezer Barrett MD

## 2025-07-23 NOTE — CARE PLAN
The patient's goals for the shift include      The clinical goals for the shift include patient will remain HDS during shift      Problem: Safety - Adult  Goal: Free from fall injury  Outcome: Progressing

## 2025-07-23 NOTE — PROGRESS NOTES
07/23/25 0913   Discharge Planning   Living Arrangements Spouse/significant other   Support Systems Spouse/significant other   Assistance Needed None   Type of Residence Private residence   Home or Post Acute Services None   Expected Discharge Disposition Home   Does the patient need discharge transport arranged? No   Financial Resource Strain   How hard is it for you to pay for the very basics like food, housing, medical care, and heating? Not hard   Housing Stability   In the last 12 months, was there a time when you were not able to pay the mortgage or rent on time? N   Transportation Needs   In the past 12 months, has lack of transportation kept you from medical appointments or from getting medications? no     DC Planning:  Went in and met with the pt, confirmed demographics.     This TCC will continue to follow for home going needs and safe DC plan.    Stephanie Funes. BEBE. TCC.

## 2025-07-23 NOTE — H&P
Transplant Surgery History and Physical    Subjective   Chief Complaint/Reason for Admission: concern for rejection    HPI:  López Lopez is a 65 y.o. male with a past medical history significant for CAD s/p stent, HLD, T2DM, MASH s/p SLK 4/3/2025 who presents to [facility] as a direct admit for concerns of rejection. Had Kidney biopsied 7/21/25 for delayed graft function.    Denies any recent sick contacts, as well as fevers/chills, headache, dizziness, chest pain, cough, shortness of breath, nausea/vomiting, constipation/diarrhea, abdominal pain, weakness.    PMHx:ESRD 2/2 MASLD s/p SLK 4/2/25.  SocHx: denies alcohol, tobacco, or other recreational drug use    A 12-point ROS was performed and was unremarkable except as above.    PMH:  Medical History[1]    PSH:  Surgical History[2]    Soc Hx:  Social History     Socioeconomic History    Marital status:      Spouse name: Not on file    Number of children: Not on file    Years of education: Not on file    Highest education level: Not on file   Occupational History    Not on file   Tobacco Use    Smoking status: Former     Types: Cigarettes    Smokeless tobacco: Never   Substance and Sexual Activity    Alcohol use: Not Currently     Comment: occ    Drug use: Not Currently    Sexual activity: Not on file   Other Topics Concern    Not on file   Social History Narrative    Not on file     Social Drivers of Health     Financial Resource Strain: Low Risk  (5/2/2025)    Overall Financial Resource Strain (CARDIA)     Difficulty of Paying Living Expenses: Not very hard   Food Insecurity: No Food Insecurity (5/2/2025)    Hunger Vital Sign     Worried About Running Out of Food in the Last Year: Never true     Ran Out of Food in the Last Year: Never true   Transportation Needs: No Transportation Needs (5/2/2025)    PRAPARE - Transportation     Lack of Transportation (Medical): No     Lack of Transportation (Non-Medical): No   Physical Activity: Unknown (5/2/2025)     Exercise Vital Sign     Days of Exercise per Week: 0 days     Minutes of Exercise per Session: Patient unable to answer   Recent Concern: Physical Activity - Insufficiently Active (4/24/2025)    Exercise Vital Sign     Days of Exercise per Week: 1 day     Minutes of Exercise per Session: 10 min   Stress: No Stress Concern Present (5/2/2025)    [employer] [place] [employer] - Occupational Stress Questionnaire     Feeling of Stress : Only a little   Social Connections: Moderately Isolated (5/2/2025)    Social Connection and Isolation Panel     Frequency of Communication with Friends and Family: More than three times a week     Frequency of Social Gatherings with Friends and Family: Twice a week     Attends Zoroastrianism Services: Never     Active Member of Clubs or Organizations: No     Attends Club or Organization Meetings: Never     Marital Status:    Intimate Partner Violence: Not At Risk (5/2/2025)    Humiliation, Afraid, Rape, and Kick questionnaire     Fear of Current or Ex-Partner: No     Emotionally Abused: No     Physically Abused: No     Sexually Abused: No   Housing Stability: Low Risk  (5/2/2025)    Housing Stability Vital Sign     Unable to Pay for Housing in the Last Year: No     Number of Times Moved in the Last Year: 0     Homeless in the Last Year: No       Fam Hx:  Family History[3]     Allergies:  RX Allergies[4]    Current Medications:  Medications Ordered Prior to Encounter[5]      Objective     Vitals:  Visit Vitals  /83 (BP Location: Right arm, Patient Position: Lying)   Pulse 77   Temp 37.2 °C (99 °F) (Temporal)   Resp 18       Physical Exam:  GEN: NAD, not ill appearing   HEENT: NC/AT  RESP: Nonlabored on room air  CV: Nontachycardic, normotensive  Abd: Soft, nontender, nondistended   MSK: No gross deformities. ELI  NEURO: No focal deficits  SKIN: WWP    Labs within past 24h:  Results for orders placed or performed during the hospital encounter of 07/22/25 (from the past 24  hours)   Sterile Fluid Culture/Smear    Specimen: Aspirate; Fluid   Result Value Ref Range    Gram Stain (4+) Abundant Polymorphonuclear leukocytes (A)     Gram Stain (4+) Abundant Gram positive cocci (A)    Creatinine, Body Fluid   Result Value Ref Range    Creatinine,  Fluid 1.73 Not established mg/dL       Imaging within past 24h:  Imaging  US renal transplant biopsy  Result Date: 7/22/2025  Status post successful ultrasound guided core needle biopsy of inferior pole of the left iliac fossa transplant kidney and aspiration of adjacent fluid collection, with Gel-Foam insertion at the end. Specimen(s) Sent to pathology.   I was present for and/or performed the critical portions of the procedure and immediately available throughout the entire procedure.   I personally reviewed the image(s) / study and interpretation. I agree with the findings as stated.   Performed and dictated at [facility].   MACRO: None.   Signed by: Galilea Mercedes 7/22/2025 3:05 PM Dictation workstation:   [id number]      Cardiology, Vascular, and Other Imaging  No other imaging results found for the past 2 days           ASSESSMENT  López Lopez is a 65 y.o. male with a past medical history significant for CAD s/p stent, HLD, T2DM, MASH s/p SLK 4/3/2025 who presents to [facility] as a direct admit for concerns of rejection.    PLAN:  - Will get Solumedrol 500 mg tonight  - STAT Labs: CBC, RFP, Mg, LFTs, Coags, Tacrolimus level, Allosure, DSA  - Restart home immunosuppressive regimen. Hold remaining medications.   - Pain Control: Tylenol   - Diet: Regular diet--carb control. Sliding scale insuiln  - DVT ppx: subcutaneous Heparin, SCDs  - BMR: Miralax PRN    Patient discussed and staffed with Dr. Barrett, who agrees with plan as above.    Steve Valle MD  PGY-1 General Surgery  Transplant Surgery [id number]           [1]   Past Medical History:  Diagnosis Date    Alcoholic cirrhosis (Multi)     Anticoagulated      Ascites     CKD (chronic kidney disease) stage 4, GFR 15-29 ml/min (Multi)     Coronary artery disease     Diabetes mellitus (Multi)     Hyperlipidemia     FORMAN (nonalcoholic steatohepatitis)     Pancytopenia    [2]   Past Surgical History:  Procedure Laterality Date    CARDIAC CATHETERIZATION N/A 02/26/2025    Procedure: Left Heart Cath with Coronary Angiography and LV;  Surgeon: Cesilia Teresa MD;  Location: [facility] Cardiac Cath Lab;  Service: Cardiovascular;  Laterality: N/A;    CORONARY ANGIOPLASTY WITH STENT PLACEMENT     [3] No family history on file.  [4]   Allergies  Allergen Reactions    Adhesive Tape-Silicones Rash   [5]   Current Facility-Administered Medications on File Prior to Encounter   Medication Dose Route Frequency Provider Last Rate Last Admin    [COMPLETED] fentaNYL PF (Sublimaze) injection   intravenous Once PRN Galilea Mercedes MD   100 mcg at 07/22/25 0910    [COMPLETED] midazolam (Versed) injection   intravenous Once PRN Galilea Mercedes MD   2 mg at 07/22/25 0910     Current Outpatient Medications on File Prior to Encounter   Medication Sig Dispense Refill    alcohol swabs (Alcohol Pads) Use 4-8 per day to check blood glucose and for injectable medications 400 each 3    alogliptin (Nesina) 12.5 mg tablet Take 1 tablet (12.5 mg) by mouth once daily. 90 tablet 3    amLODIPine (Norvasc) 5 mg tablet Take 1 tablet (5 mg) by mouth once daily. Hold for a systolic blood pressure less than 140 30 tablet 11    apixaban (Eliquis) 5 mg tablet Take 1 tablet (5 mg) by mouth 2 times daily (morning and late afternoon). 60 tablet 1    atorvastatin (Lipitor) 10 mg tablet Take 1 tablet (10 mg) by mouth once daily. 30 tablet 3    atovaquone (Mepron) 750 mg/5 mL suspension Take 10 mL (1,500 mg) by mouth once daily. 210 mL 3    blood sugar diagnostic (Blood Glucose Test) Check blood glucose 3 time per day 100 each 0    blood-glucose meter misc Use to check blood glucose 1 each 0    blood-glucose sensor (Dexcom G7 Sensor)  "device Use to check glucose, change every 10 days 6 each 3    blood-glucose,,cont (Dexcom G7 ) misc Use as instructed 1 each 0    cholecalciferol (Vitamin D-3) 50 mcg (2,000 units) tablet Take 1 tablet (50 mcg) by mouth once daily. 30 tablet 11    insulin NPH, Isophane, (HumuLIN N,NovoLIN N) 100 unit/mL (3 mL) pen Inject 10 Units under the skin once daily in the morning. Take as directed per insulin instructions. 15 mL 0    lancets 33 gauge misc 1 each 3 times a day. 100 each 0    magnesium oxide (Mag-Ox) 400 mg tablet Take 2 tablets (800 mg) by mouth once daily. Take at noon 60 tablet 2    pantoprazole (Protonix) 40 mg EC tablet Take 1 tablet (40 mg) by mouth once daily. Do not crush, chew, or split. 90 tablet 3    pen needle, diabetic 32 gauge x 5/32\" needle 1 each once daily in the morning. 100 each 2    polyethylene glycol (Glycolax, Miralax) 17 gram packet Take 17 g by mouth once daily. (Patient not taking: Reported on 6/26/2025)      predniSONE (Deltasone) 5 mg tablet Take 1 tablet (5 mg) by mouth once daily. 90 tablet 3    semaglutide (Ozempic) 0.25 mg or 0.5 mg (2 mg/3 mL) pen injector Inject 0.5 mg under the skin every 7 days. 3 mL 3    tacrolimus (Prograf) 1 mg capsule Take 3 capsules (3 mg) by mouth 2 times a day. 540 capsule 3    tamsulosin (Flomax) 0.4 mg 24 hr capsule Take 1 capsule (0.4 mg) by mouth once daily. 90 capsule 3    traZODone (Desyrel) 50 mg tablet Take 0.5 tablets (25 mg) by mouth once daily at bedtime. (Patient not taking: Reported on 6/26/2025) 15 tablet 0     "

## 2025-07-23 NOTE — CARE PLAN
The patient's goals for the shift include      Problem: Pain - Adult  Goal: Verbalizes/displays adequate comfort level or baseline comfort level  Outcome: Progressing  Flowsheets (Taken 7/23/2025 0127)  Verbalizes/displays adequate comfort level or baseline comfort level:   Encourage patient to monitor pain and request assistance   Assess pain using appropriate pain scale     Problem: Safety - Adult  Goal: Free from fall injury  Outcome: Progressing  Flowsheets (Taken 7/23/2025 0127)  Free from fall injury:   Instruct family/caregiver on patient safety   Based on caregiver fall risk screen, instruct family/caregiver to ask for assistance with transferring infant if caregiver noted to have fall risk factors     The clinical goals for the shift include maintain safety throughout shift

## 2025-07-23 NOTE — CONSULTS
Reason For Consult  transplant kidney rejection s/p SLK transplant 4/3/2025    History Of Present Illness  López Lopez is a 65 y.o. male presenting with CKD 4,  MASLD s/p SLK on 4/3/2025, CAD s/p PCI, history of A-fib on Eliquis, T2DM, HTN, HLD, who is a direct admission for transplant kidney rejection.  Pt underwent  donor liver transplant on 2025.  Patient had intraoperative event of brief V. tach/A-fib RVR related to PA cath placement, which improved with amiodarone bolus.  Patient returned to the operating room on 4/3/2025 for  donor kidney transplant.  Immunosuppression regimen was initiated as tacrolimus, mycophenolate and steroid taper.  CMV, PJP and fungal prophylaxis was also started.  Patient was discharged on 2025.  Patient was readmitted 2 days later for elevated BUN and creatinine.  Patient received IV fluid and transfusion of RBC and was discharged on .  Patient was admitted again from  to  for concern of UTI and liver abscess for which he received antibiotics.  Patient has been follow-up with transplant surgery clinic as scheduled.  His creatinine level recovered to 1.5-1.6 in .  Creatinine level increased to 2.14 on 2025.  Ultrasound of the kidney at that time was unremarkable.  Transplant surgery recommended transplant kidney biopsy if no improvement of creatinine in the next week.  Patient creatinine level improved to 1.49 on 7/10/2025.  However it went to up to 1.88 on 24.  Kidney allograft biopsy was done on , which showed acute T-cell mediated rejection, Banff grade IB.  Patient was brought in as a direct admission for further evaluation and management of kidney allograft rejection.  Patient denies chest pain, shortness of breath, nausea, vomiting, diarrhea, abdominal pain, fever, chills, syncope, urinary incontinence, urgency, dysuria, hematuria.  Immunosuppression regimen before admission:  Tacrolimus 1 mg twice daily  Mycophenolate  "on hold for leukopenia  Prednisone 5 mg daily  Immunosuppression related infection prophylaxis:   PCP ppx: Atovaquone  CMV ppx: Monitor CMV level weekly    Past Medical History  He has a past medical history of Alcoholic cirrhosis (Multi), Anticoagulated, Ascites, CKD (chronic kidney disease) stage 4, GFR 15-29 ml/min (Multi), Coronary artery disease, Diabetes mellitus (Multi), Hyperlipidemia, FORMAN (nonalcoholic steatohepatitis), and Pancytopenia.    Surgical History  He has a past surgical history that includes Cardiac catheterization (N/A, 02/26/2025) and Coronary angioplasty with stent.     Social History  He reports that he has quit smoking. His smoking use included cigarettes. He has never used smokeless tobacco. He reports that he does not currently use alcohol. He reports that he does not currently use drugs.    Family History  Family History[1]     Allergies  Adhesive tape-silicones    Review of Systems  Review of Systems   Constitutional: Negative.    HENT: Negative.     Eyes: Negative.    Respiratory: Negative.     Cardiovascular: Negative.    Gastrointestinal: Negative.    Endocrine: Negative.    Genitourinary: Negative.    Musculoskeletal: Negative.    Skin: Negative.    Allergic/Immunologic: Positive for immunocompromised state.   Neurological: Negative.    Hematological:  Bruises/bleeds easily.   Psychiatric/Behavioral: Negative.           Physical Exam  General Aox3, NAD  Eyes Not pale, no jaundice  Heart RRR  Lungs: Clear to auscultation bilaterally  Abdomen Soft, NT, incision scar well healed, no tenderness on palpation  Ext: no edema BLE         I&O 24HR    Intake/Output Summary (Last 24 hours) at 7/23/2025 1423  Last data filed at 7/23/2025 1300  Gross per 24 hour   Intake 1340 ml   Output 1000 ml   Net 340 ml       Vitals 24HR  Heart Rate:  [60-77]   Temp:  [35.7 °C (96.3 °F)-37.2 °C (99 °F)]   Resp:  [16-18]   BP: (138-161)/(75-84)   Height:  [193 cm (6' 4\")]   Weight:  [112 kg (248 lb)]   SpO2: "  [97 %-98 %]       Relevant Results  Surgical Pathology Exam: [id number]   Collected 7/22/2025 09:19    FINAL DIAGNOSIS   KIDNEY ALLOGRAFT, LEFT ILIAC FOSSA, PERCUTANEOUS CORE BIOPSY:  -- CHANGES CONSISTENT WITH ACUTE T CELL-MEDIATED REJECTION, BANFF GRADE IB  -- MILD ARTERIOLAR HYALINOSIS (SEE COMMENT)           Assessment & Plan  ESRD (end stage renal disease) (Multi)    López Lopez is a 65 y.o. male presenting with CKD 4,  MASLD s/p SLK on 4/3/2025, CAD s/p PCI, history of A-fib on Eliquis, T2DM, HTN, HLD, who is a direct admission for transplant kidney rejection.    #Allograft rejection:  - Baseline creatinine is 1.5-1.6, 1.65 today  - Urinalysis result pending  - CMV and EBV not detected on 7/21, BK virus PCR quantification 96, PCR log 2.47  -AlloSure, DSA results pending  - Biopsy on 7/22 showed changes consistent with acute T-cell mediated rejection, Banff grade 1B.  -There was some fluid collection around the allograft, culture of the fluid showed abundant polymorphonuclear leukocytes and Gram positive cocci    Rejection management:  - Solu-medrol 500 ->250->250 mg  - Vanc and Zosyn for possible bacterial infection of allograft    #Immunosuppression:  -Tacrolimus 3 mg twice daily, monitor level daily  -Mycophenolate 500 milligram twice daily  -Hold home prednisone    #Suppression related infection prophylaxis  Continue atovaquone  CMV not detected    #Hypertension  - On home amlodipine 5 mg daily    #CKD-BMD  - Monitor calcium and phosphorus levels  - Check vitamin D, PTH level    #Anemia and leukopenia  -likely secondary to chronic disease  - Hemoglobin 10.7 stable  -WBC 3.3 stable    Discussed with Dr. Salina Altman MD  Nephrology fellow PGY 4       [1] No family history on file.

## 2025-07-23 NOTE — PROGRESS NOTES
07/23/25 0921   Rapid Rounds   Attendance Provider;Nurse;Care Transitions   Expected Discharge Disposition Home   Today we still await: Clinical stability   Review at Escalation Rounds No escalation needed     López Lopez is a 65 y.o. male on day 1 of admission presenting with ESRD (end stage renal disease) (Multi).    Plan per Medical/Surgical team:   Pt came in for rejection. Plan TBD    Discharge disposition: TBD    ADOD:  7/25    This TCC will continue to follow for home going needs and safe DC plan.    PAOLO ANGLIN

## 2025-07-24 ENCOUNTER — APPOINTMENT (OUTPATIENT)
Facility: HOSPITAL | Age: 65
End: 2025-07-24
Payer: COMMERCIAL

## 2025-07-24 ENCOUNTER — PHARMACY VISIT (OUTPATIENT)
Dept: PHARMACY | Facility: CLINIC | Age: 65
End: 2025-07-24
Payer: COMMERCIAL

## 2025-07-24 VITALS
DIASTOLIC BLOOD PRESSURE: 80 MMHG | HEART RATE: 60 BPM | RESPIRATION RATE: 18 BRPM | SYSTOLIC BLOOD PRESSURE: 161 MMHG | BODY MASS INDEX: 30.2 KG/M2 | OXYGEN SATURATION: 96 % | HEIGHT: 76 IN | TEMPERATURE: 97.3 F | WEIGHT: 248 LBS

## 2025-07-24 LAB
ALBUMIN SERPL BCP-MCNC: 3.8 G/DL (ref 3.4–5)
ALP SERPL-CCNC: 145 U/L (ref 33–136)
ALT SERPL W P-5'-P-CCNC: 16 U/L (ref 10–52)
ANION GAP SERPL CALC-SCNC: 14 MMOL/L (ref 10–20)
AST SERPL W P-5'-P-CCNC: 21 U/L (ref 9–39)
BILIRUB DIRECT SERPL-MCNC: 0.1 MG/DL (ref 0–0.3)
BILIRUB SERPL-MCNC: 0.6 MG/DL (ref 0–1.2)
BUN SERPL-MCNC: 37 MG/DL (ref 6–23)
CALCIUM SERPL-MCNC: 9 MG/DL (ref 8.6–10.6)
CHLORIDE SERPL-SCNC: 104 MMOL/L (ref 98–107)
CO2 SERPL-SCNC: 24 MMOL/L (ref 21–32)
CREAT SERPL-MCNC: 1.76 MG/DL (ref 0.5–1.3)
EGFRCR SERPLBLD CKD-EPI 2021: 43 ML/MIN/1.73M*2
ERYTHROCYTE [DISTWIDTH] IN BLOOD BY AUTOMATED COUNT: 12.6 % (ref 11.5–14.5)
GLUCOSE BLD MANUAL STRIP-MCNC: 181 MG/DL (ref 74–99)
GLUCOSE BLD MANUAL STRIP-MCNC: 215 MG/DL (ref 74–99)
GLUCOSE SERPL-MCNC: 234 MG/DL (ref 74–99)
HCT VFR BLD AUTO: 30.6 % (ref 41–52)
HGB BLD-MCNC: 9.7 G/DL (ref 13.5–17.5)
HLA RESULTS: NORMAL
HLA-A+B+C AB NFR SER: NORMAL %
HLA-DP+DQ+DR AB NFR SER: NORMAL %
MAGNESIUM SERPL-MCNC: 2.01 MG/DL (ref 1.6–2.4)
MCH RBC QN AUTO: 28 PG (ref 26–34)
MCHC RBC AUTO-ENTMCNC: 31.7 G/DL (ref 32–36)
MCV RBC AUTO: 88 FL (ref 80–100)
NRBC BLD-RTO: 0 /100 WBCS (ref 0–0)
PHOSPHATE SERPL-MCNC: 3.8 MG/DL (ref 2.5–4.9)
PLATELET # BLD AUTO: 130 X10*3/UL (ref 150–450)
POTASSIUM SERPL-SCNC: 4.2 MMOL/L (ref 3.5–5.3)
PROT SERPL-MCNC: 6.7 G/DL (ref 6.4–8.2)
RBC # BLD AUTO: 3.47 X10*6/UL (ref 4.5–5.9)
SODIUM SERPL-SCNC: 138 MMOL/L (ref 136–145)
TACROLIMUS BLD-MCNC: 16.3 NG/ML
WBC # BLD AUTO: 4.3 X10*3/UL (ref 4.4–11.3)

## 2025-07-24 PROCEDURE — 99238 HOSP IP/OBS DSCHRG MGMT 30/<: CPT | Performed by: TRANSPLANT SURGERY

## 2025-07-24 PROCEDURE — RXMED WILLOW AMBULATORY MEDICATION CHARGE

## 2025-07-24 PROCEDURE — 2500000002 HC RX 250 W HCPCS SELF ADMINISTERED DRUGS (ALT 637 FOR MEDICARE OP, ALT 636 FOR OP/ED): Performed by: STUDENT IN AN ORGANIZED HEALTH CARE EDUCATION/TRAINING PROGRAM

## 2025-07-24 PROCEDURE — 82947 ASSAY GLUCOSE BLOOD QUANT: CPT

## 2025-07-24 PROCEDURE — 85027 COMPLETE CBC AUTOMATED: CPT | Performed by: STUDENT IN AN ORGANIZED HEALTH CARE EDUCATION/TRAINING PROGRAM

## 2025-07-24 PROCEDURE — 82248 BILIRUBIN DIRECT: CPT | Performed by: STUDENT IN AN ORGANIZED HEALTH CARE EDUCATION/TRAINING PROGRAM

## 2025-07-24 PROCEDURE — 2500000001 HC RX 250 WO HCPCS SELF ADMINISTERED DRUGS (ALT 637 FOR MEDICARE OP)

## 2025-07-24 PROCEDURE — 2500000002 HC RX 250 W HCPCS SELF ADMINISTERED DRUGS (ALT 637 FOR MEDICARE OP, ALT 636 FOR OP/ED)

## 2025-07-24 PROCEDURE — 2500000004 HC RX 250 GENERAL PHARMACY W/ HCPCS (ALT 636 FOR OP/ED)

## 2025-07-24 PROCEDURE — 83735 ASSAY OF MAGNESIUM: CPT | Performed by: STUDENT IN AN ORGANIZED HEALTH CARE EDUCATION/TRAINING PROGRAM

## 2025-07-24 PROCEDURE — 36415 COLL VENOUS BLD VENIPUNCTURE: CPT | Performed by: STUDENT IN AN ORGANIZED HEALTH CARE EDUCATION/TRAINING PROGRAM

## 2025-07-24 PROCEDURE — 99233 SBSQ HOSP IP/OBS HIGH 50: CPT | Performed by: HOSPITALIST

## 2025-07-24 PROCEDURE — 80197 ASSAY OF TACROLIMUS: CPT | Performed by: STUDENT IN AN ORGANIZED HEALTH CARE EDUCATION/TRAINING PROGRAM

## 2025-07-24 PROCEDURE — 2500000004 HC RX 250 GENERAL PHARMACY W/ HCPCS (ALT 636 FOR OP/ED): Performed by: STUDENT IN AN ORGANIZED HEALTH CARE EDUCATION/TRAINING PROGRAM

## 2025-07-24 PROCEDURE — 2500000001 HC RX 250 WO HCPCS SELF ADMINISTERED DRUGS (ALT 637 FOR MEDICARE OP): Performed by: STUDENT IN AN ORGANIZED HEALTH CARE EDUCATION/TRAINING PROGRAM

## 2025-07-24 PROCEDURE — 84100 ASSAY OF PHOSPHORUS: CPT | Performed by: STUDENT IN AN ORGANIZED HEALTH CARE EDUCATION/TRAINING PROGRAM

## 2025-07-24 PROCEDURE — 80053 COMPREHEN METABOLIC PANEL: CPT | Performed by: STUDENT IN AN ORGANIZED HEALTH CARE EDUCATION/TRAINING PROGRAM

## 2025-07-24 RX ORDER — TACROLIMUS 1 MG/1
2 CAPSULE ORAL 2 TIMES DAILY
Start: 2025-07-25 | End: 2025-07-29 | Stop reason: SDUPTHER

## 2025-07-24 RX ORDER — PREDNISONE 5 MG/1
20 TABLET ORAL DAILY
Qty: 240 TABLET | Refills: 0 | Status: SHIPPED | OUTPATIENT
Start: 2025-07-24

## 2025-07-24 RX ADMIN — PIPERACILLIN SODIUM AND TAZOBACTAM SODIUM 4.5 G: 4; .5 INJECTION, SOLUTION INTRAVENOUS at 04:07

## 2025-07-24 RX ADMIN — ACETAMINOPHEN 650 MG: 325 TABLET ORAL at 11:41

## 2025-07-24 RX ADMIN — PANTOPRAZOLE SODIUM 40 MG: 40 TABLET, DELAYED RELEASE ORAL at 08:09

## 2025-07-24 RX ADMIN — INSULIN LISPRO 2 UNITS: 100 INJECTION, SOLUTION INTRAVENOUS; SUBCUTANEOUS at 11:41

## 2025-07-24 RX ADMIN — Medication 50 MCG: at 08:09

## 2025-07-24 RX ADMIN — ATOVAQUONE 1500 MG: 750 SUSPENSION ORAL at 08:10

## 2025-07-24 RX ADMIN — APIXABAN 5 MG: 5 TABLET, FILM COATED ORAL at 08:09

## 2025-07-24 RX ADMIN — TACROLIMUS 3 MG: 1 CAPSULE ORAL at 06:07

## 2025-07-24 RX ADMIN — PIPERACILLIN SODIUM AND TAZOBACTAM SODIUM 4.5 G: 4; .5 INJECTION, SOLUTION INTRAVENOUS at 11:41

## 2025-07-24 RX ADMIN — ATORVASTATIN CALCIUM 10 MG: 10 TABLET, FILM COATED ORAL at 08:09

## 2025-07-24 RX ADMIN — POLYETHYLENE GLYCOL 3350 17 G: 17 POWDER, FOR SOLUTION ORAL at 08:09

## 2025-07-24 RX ADMIN — ACETAMINOPHEN 650 MG: 325 TABLET ORAL at 04:03

## 2025-07-24 RX ADMIN — MYCOPHENOLATE MOFETIL 500 MG: 250 CAPSULE ORAL at 06:07

## 2025-07-24 RX ADMIN — AMLODIPINE BESYLATE 5 MG: 5 TABLET ORAL at 08:09

## 2025-07-24 RX ADMIN — INSULIN HUMAN 10 UNITS: 100 INJECTION, SUSPENSION SUBCUTANEOUS at 08:10

## 2025-07-24 RX ADMIN — INSULIN LISPRO 4 UNITS: 100 INJECTION, SOLUTION INTRAVENOUS; SUBCUTANEOUS at 08:10

## 2025-07-24 RX ADMIN — CLOTRIMAZOLE 10 MG: 10 LOZENGE ORAL at 13:41

## 2025-07-24 RX ADMIN — DEXTROSE 250 MG: 50 INJECTION, SOLUTION INTRAVENOUS at 08:09

## 2025-07-24 RX ADMIN — CLOTRIMAZOLE 10 MG: 10 LOZENGE ORAL at 08:09

## 2025-07-24 RX ADMIN — TAMSULOSIN HYDROCHLORIDE 0.4 MG: 0.4 CAPSULE ORAL at 08:09

## 2025-07-24 ASSESSMENT — PAIN - FUNCTIONAL ASSESSMENT
PAIN_FUNCTIONAL_ASSESSMENT: 0-10
PAIN_FUNCTIONAL_ASSESSMENT: 0-10

## 2025-07-24 ASSESSMENT — PAIN SCALES - GENERAL
PAINLEVEL_OUTOF10: 0 - NO PAIN
PAINLEVEL_OUTOF10: 0 - NO PAIN

## 2025-07-24 NOTE — CARE PLAN
The patient's goals for the shift include  rest and sleep    The clinical goals for the shift include Pt will remain HDS for the shift    Over the shift, the patient did make progress toward the following goals. No Barriers to progression

## 2025-07-24 NOTE — PROGRESS NOTES
López Lopez is a 65 y.o. male on day 2 of admission presenting with ESRD (end stage renal disease) (Multi).      Subjective   No events overnight. -159/67-75 mmHg.  ml.        Objective          Vitals 24HR  Heart Rate:  [60-80]   Temp:  [36 °C (96.8 °F)-36.8 °C (98.2 °F)]   Resp:  [18]   BP: (131-161)/(67-81)   SpO2:  [96 %-98 %]         Intake/Output last 3 Shifts:    Intake/Output Summary (Last 24 hours) at 7/24/2025 1237  Last data filed at 7/24/2025 1141  Gross per 24 hour   Intake 1360 ml   Output 400 ml   Net 960 ml       Physical Exam  General Aox3, NAD  Eyes Not pale, no jaundice  Heart RRR  Lungs: Clear to auscultation bilaterally  Abdomen Soft, NT, incision scar well healed, no tenderness on palpation  Ext: no edema BLE    Relevant Results                        Assessment & Plan  ESRD (end stage renal disease) (Multi)    López Lopez is a 65 y.o. male presenting with CKD 4,  MASLD s/p SLK on 4/3/2025, CAD s/p PCI, history of A-fib on Eliquis, T2DM, HTN, HLD, who is a direct admission for transplant kidney rejection.     #Allograft rejection:  - Baseline creatinine is 1.5-1.6, 1.76 today  - Urinalysis result pending  - CMV and EBV not detected on 7/21, BK virus PCR quantification 96, PCR log 2.47, will monitor BK   -AlloSure, DSA no donor specific ab detected   - Biopsy on 7/22 showed changes consistent with acute T-cell mediated rejection, Banff grade 1B.  -There was some fluid collection around the allograft, culture of the fluid showed abundant polymorphonuclear leukocytes and Gram positive cocci, final result and susceptibility pending     Rejection management:  - Solu-medrol 500 ->250->250 mg  - Vanc and Zosyn for possible bacterial infection of allograft   deescalate based on susceptibility report later    #Immunosuppression:  -Tacrolimus 3 mg twice daily, monitor level daily  -Mycophenolate 500 milligram twice daily  -Hold home prednisone     #Infection prophylaxis  Continue  atovaquone  CMV not detected   on Clotrimazole for fungus infection ppx    #Hypertension  - On home amlodipine 5 mg daily     #CKD-BMD  - Monitor calcium and phosphorus levels  - Check vitamin D, PTH level     #Anemia and leukopenia  -likely secondary to chronic disease  - Hemoglobin 10.7 stable  -WBC 3.3 stable     Discussed with Dr. Salina Altman MD  Nephrology fellow PGY 4

## 2025-07-24 NOTE — PROGRESS NOTES
07/24/25 1022   Rapid Rounds   Attendance Provider;Nurse;Care Transitions   Expected Discharge Disposition Home   Today we still await:   (Pt dc'ing today)   Review at Escalation Rounds No escalation needed     López Lopez is a 65 y.o. male on day 1 of admission presenting with ESRD (end stage renal disease) (Multi).     Plan per Medical/Surgical team:   Pt came in for rejection. Plan TBD  7/24: Home. No home going needs anticipated for this pt at this time.       Discharge disposition: Home. No home going needs anticipated for this pt at this time.       ADOD:  7/24     This TCC will continue to follow for home going needs and safe DC plan.     PAOLO ANGLIN

## 2025-07-24 NOTE — DISCHARGE SUMMARY
Discharge Diagnosis  ESRD (end stage renal disease) (Multi)    Issues Requiring Follow-Up  N/a    Test Results Pending At Discharge  Pending Labs       Order Current Status    AlloSure Kidney Collected (07/23/25 0636)            Hospital Course  López Lopez is a 65 y.o. male with a past medical history significant for CAD s/p stent, HLD, T2DM, MASH s/p SLK 4/3/2025 who presented on 7/22 to [facility] as a direct admit for concerns of rejection.  Kidney biopsy showed rejection IB, received steroid pulses x3 (Solumedrol 500 mg on 7/22, 250 mg on 7/23, and 250 mg on 7/24).  Para-nephritic fluid collection that was aspirated on 7/22 grew gram positive cocci.  He was transitioned from IV Zosyn to PO Augmentin for home-going.  Patient is discharged on 7/24/25.  Upon discharge, patient is tolerating a diet, denies pain, nausea, or vomiting, vital signs are stable, and remains afebrile. Patient was instructed to take 4 more days of his Augmentin that was prescribed prior to admission to complete treatment for his perinephric fluid cultures.          Visit Vitals  /80 (BP Location: Left arm, Patient Position: Sitting)   Pulse 60   Temp 36.3 °C (97.3 °F) (Temporal)   Resp 18     Vitals:    07/22/25 2238   Weight: 112 kg (248 lb)       Immunization History   Administered Date(s) Administered    COVID-19, mRNA, LNP-S, PF, 30 mcg/0.3 mL dose 10/25/2021, 11/16/2021    Pfizer COVID-19 vaccine, bivalent, age 12 years and older (30 mcg/0.3 mL) 11/14/2022       Results      Pertinent Physical Exam At Time of Discharge  Physical Exam  Constitutional:       Appearance: Normal appearance.   HENT:      Head: Normocephalic and atraumatic.     Cardiovascular:      Rate and Rhythm: Normal rate and regular rhythm.      Pulses: Normal pulses.   Pulmonary:      Effort: Pulmonary effort is normal.   Abdominal:      General: Abdomen is flat.      Palpations: Abdomen is soft.     Musculoskeletal:         General: Normal range of motion.  "     Cervical back: Normal range of motion.     Skin:     General: Skin is warm and dry.     Neurological:      General: No focal deficit present.      Mental Status: He is alert.         Home Medications     Medication List      START taking these medications     clotrimazole 10 mg mark; Commonly known as: Mycelex; Use 1 tablet (10   mg) in the mouth or throat 3 times a day after meals.   mycophenolate 250 mg capsule; Commonly known as: Cellcept; Take 2   capsules (500 mg) by mouth 2 times a day.     CHANGE how you take these medications     predniSONE 5 mg tablet; Commonly known as: Deltasone; Take 4 tablets (20   mg) by mouth once daily.; What changed: how much to take   tacrolimus 1 mg capsule; Commonly known as: Prograf; Take 2 capsules (2   mg) by mouth 2 times a day. Do not fill before July 25, 2025.; Start   taking on: July 25, 2025; What changed: how much to take; Notes to   patient: DO NOT TAKE evening dose on 7/24     CONTINUE taking these medications     alogliptin 12.5 mg tablet; Commonly known as: Nesina; Take 1 tablet   (12.5 mg) by mouth once daily.   amLODIPine 5 mg tablet; Commonly known as: Norvasc; Take 1 tablet (5 mg)   by mouth once daily. Hold for a systolic blood pressure less than 140   atorvastatin 10 mg tablet; Commonly known as: Lipitor; Take 1 tablet (10   mg) by mouth once daily.   atovaquone 750 mg/5 mL suspension; Commonly known as: Mepron; Take 10 mL   (1,500 mg) by mouth once daily.   cholecalciferol 50 mcg (2,000 units) tablet; Commonly known as: Vitamin   D-3; Take 1 tablet (50 mcg) by mouth once daily.   Dexcom G7  misc; Generic drug: blood-glucose,,cont; Use   as instructed   Dexcom G7 Sensor device; Generic drug: blood-glucose sensor; Use to   check glucose, change every 10 days   Easy Touch 32 gauge x 5/32\" needle; Generic drug: pen needle, diabetic;   1 each once daily in the morning.   Easy Touch Alcohol Prep Pads; Generic drug: alcohol swabs; Use 4-8 per "   day to check blood glucose and for injectable medications   Eliquis 5 mg tablet; Generic drug: apixaban; Take 1 tablet (5 mg) by   mouth 2 times daily (morning and late afternoon).   HumuLIN N NPH Insulin KwikPen 100 unit/mL (3 mL) pen; Generic drug:   insulin NPH (Isophane); Inject 10 Units under the skin once daily in the   morning. Take as directed per insulin instructions.   magnesium oxide 400 mg tablet; Commonly known as: Mag-Ox; Take 2 tablets   (800 mg) by mouth once daily. Take at noon; Notes to patient: Separate   from mycophenolate by at least 2 hours.   Ozempic 0.25 mg or 0.5 mg (2 mg/3 mL) pen injector; Generic drug:   semaglutide; Inject 0.5 mg under the skin every 7 days.   pantoprazole 40 mg EC tablet; Commonly known as: Protonix; Take 1 tablet   (40 mg) by mouth once daily. Do not crush, chew, or split.   tamsulosin 0.4 mg 24 hr capsule; Commonly known as: Flomax; Take 1   capsule (0.4 mg) by mouth once daily.   True Metrix Glucose Meter misc; Generic drug: blood-glucose meter; Use   to check blood glucose   True Metrix Glucose Test Strip; Generic drug: blood sugar diagnostic;   Check blood glucose 3 time per day   TRUEplus Lancets 33 gauge misc; Generic drug: lancets; 1 each 3 times a   day.     STOP taking these medications     polyethylene glycol 17 gram packet; Commonly known as: Glycolax, Miralax   traZODone 50 mg tablet; Commonly known as: Desyrel       Outpatient Follow-Up  Future Appointments   Date Time Provider Department [place]   8/12/2025 10:00 AM Lynn Luna RDN, [id number]   8/19/2025 11:00 AM Kathia Luna PA-C [id number] [place]       Hattie Kumar MD

## 2025-07-24 NOTE — HOSPITAL COURSE
López Lopez is a 65 y.o. 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  Kidney biopsy showed rejection IB, received steroid pulses x3 (Solumedrol 500 mg on 7/22, 250 mg on 7/23, and 250 mg on 7/24).  Para-nephritic fluid collection that was aspirated on 7/22 grew gram positive cocci.  He was transitioned from IV Zosyn to PO Augmentin for home-going.  Patient is discharged on 7/24/25.  Upon discharge, patient is tolerating a diet, denies pain, nausea, or vomiting, vital signs are stable, and remains afebrile. Patient was instructed to take 4 more days of his Augmentin that was prescribed prior to admission to complete treatment for his perinephric fluid cultures.

## 2025-07-24 NOTE — NURSING NOTE
PIV removed. AVS printed and reviewed with patient, all questions answered. Meds to beds delivered to bedside. All belongings collected.

## 2025-07-24 NOTE — PROGRESS NOTES
"López Lopez is a 65 y.o. male on day 2 of admission presenting with ESRD (end stage renal disease) (Multi).    Subjective   Admitted for kidney rejection  LALO       Objective   Vitals:    07/24/25 0726   BP: 131/76   Pulse: 80   Resp: 18   Temp: 36.6 °C (97.9 °F)   SpO2: 96%       Physical Exam  Constitutional:       Appearance: Normal appearance.     Eyes:      Pupils: Pupils are equal, round, and reactive to light.       Cardiovascular:      Rate and Rhythm: Normal rate.   Pulmonary:      Effort: Pulmonary effort is normal. No respiratory distress.   Abdominal:      General: There is no distension.      Palpations: Abdomen is soft.      Comments: Incision clean, dry, intact     Musculoskeletal:         General: Normal range of motion.      Right lower leg: No edema.      Left lower leg: No edema.     Skin:     General: Skin is warm and dry.     Neurological:      General: No focal deficit present.      Mental Status: He is alert and oriented to person, place, and time.     Psychiatric:         Mood and Affect: Mood normal.         Behavior: Behavior normal.         Last Recorded Vitals  Blood pressure 131/76, pulse 80, temperature 36.6 °C (97.9 °F), temperature source Temporal, resp. rate 18, height 1.93 m (6' 4\"), weight 112 kg (248 lb), SpO2 96%.  Intake/Output last 3 Shifts:  I/O last 3 completed shifts:  In: 1980 (17.6 mL/kg) [P.O.:1380; IV Piggyback:600]  Out: 1400 (12.4 mL/kg) [Urine:1400 (0.3 mL/kg/hr)]  Weight: 112.5 kg     Relevant Results  Lab Results   Component Value Date    WBC 4.3 (L) 07/24/2025    HGB 9.7 (L) 07/24/2025    HCT 30.6 (L) 07/24/2025    MCV 88 07/24/2025     (L) 07/24/2025     Lab Results   Component Value Date    GLUCOSE 234 (H) 07/24/2025    CALCIUM 9.0 07/24/2025     07/24/2025    K 4.2 07/24/2025    CO2 24 07/24/2025     07/24/2025    BUN 37 (H) 07/24/2025    CREATININE 1.76 (H) 07/24/2025     Lab Results   Component Value Date    ALT 16 07/24/2025    AST 21 " 07/24/2025    GGT 81 (H) 07/21/2025    ALKPHOS 145 (H) 07/24/2025    BILITOT 0.6 07/24/2025     acetaminophen, 650 mg, oral, q6h  amLODIPine, 5 mg, oral, Daily  apixaban, 5 mg, oral, BID  atorvastatin, 10 mg, oral, Daily  atovaquone, 1,500 mg, oral, Daily  cholecalciferol, 50 mcg, oral, Daily  clotrimazole, 10 mg, Mouth/Throat, TID after meals  insulin lispro, 0-10 Units, subcutaneous, TID AC  insulin NPH (Isophane), 10 Units, subcutaneous, q24h BRITTANEY  mycophenolate, 500 mg, oral, q12h BRITTANEY  pantoprazole, 40 mg, oral, Daily  piperacillin-tazobactam, 4.5 g, intravenous, q6h  polyethylene glycol, 17 g, oral, Daily  tacrolimus, 3 mg, oral, BID  tamsulosin, 0.4 mg, oral, Daily  traZODone, 25 mg, oral, Nightly          Assessment & Plan  ESRD (end stage renal disease) (Multi)    SLK  4/3    Liver allograft function   Stable    Kidney allograft function   Baseline, biopsy showed rejection IB, getting steroid    Sent DSA (pending)   Para-nephritic collection - culture pending. - keep zosyn for now    Immunosuppression   Tac 3/3, level 9, no change   Resume /500, previously on hold   Solumedrol 500/250/250    PPX   Mepron, restart clotrimazole   Monitor CMV (7/21 negative)    I spent 35 minutes in the professional and overall care of this patient.      Eliezer Barrett MD

## 2025-07-25 ENCOUNTER — TELEPHONE (OUTPATIENT)
Facility: HOSPITAL | Age: 65
End: 2025-07-25
Payer: COMMERCIAL

## 2025-07-25 DIAGNOSIS — Z94.0 KIDNEY REPLACED BY TRANSPLANT (HHS-HCC): ICD-10-CM

## 2025-07-25 DIAGNOSIS — Z94.4 LIVER REPLACED BY TRANSPLANT (MULTI): ICD-10-CM

## 2025-07-25 DIAGNOSIS — Z13.89 SCREENING FOR BLOOD OR PROTEIN IN URINE: ICD-10-CM

## 2025-07-25 DIAGNOSIS — Z94.4 LIVER TRANSPLANT RECIPIENT (MULTI): ICD-10-CM

## 2025-07-25 DIAGNOSIS — Z94.0 KIDNEY TRANSPLANT RECIPIENT (HHS-HCC): ICD-10-CM

## 2025-07-25 RX ORDER — CLOTRIMAZOLE 10 MG/1
10 LOZENGE ORAL
Qty: 90 TROCHE | Refills: 0 | Status: SHIPPED | OUTPATIENT
Start: 2025-07-25 | End: 2025-08-24

## 2025-07-25 RX ORDER — AMOXICILLIN AND CLAVULANATE POTASSIUM 875; 125 MG/1; MG/1
1 TABLET, FILM COATED ORAL 2 TIMES DAILY
Qty: 10 TABLET | Refills: 0 | Status: SHIPPED | OUTPATIENT
Start: 2025-07-25 | End: 2025-07-30

## 2025-07-25 NOTE — TELEPHONE ENCOUNTER
Patient calling stating his pharmacy was having difficulty with there faxes and could you please resend prescriptions for Augmentin and Clotrimazole to [facility]

## 2025-07-27 LAB
BACTERIA FLD CULT: ABNORMAL
GRAM STN SPEC: ABNORMAL
GRAM STN SPEC: ABNORMAL

## 2025-07-28 ENCOUNTER — LAB (OUTPATIENT)
Dept: LAB | Facility: HOSPITAL | Age: 65
End: 2025-07-28
Payer: COMMERCIAL

## 2025-07-28 DIAGNOSIS — Z94.4 LIVER TRANSPLANT STATUS: Primary | ICD-10-CM

## 2025-07-28 LAB
ALBUMIN SERPL BCP-MCNC: 3.7 G/DL (ref 3.4–5)
ALP SERPL-CCNC: 124 U/L (ref 33–136)
ALT SERPL W P-5'-P-CCNC: 20 U/L (ref 10–52)
ANION GAP SERPL CALC-SCNC: 11 MMOL/L (ref 10–20)
AST SERPL W P-5'-P-CCNC: 15 U/L (ref 9–39)
BASOPHILS # BLD MANUAL: 0 X10*3/UL (ref 0–0.1)
BASOPHILS NFR BLD MANUAL: 0 %
BILIRUB DIRECT SERPL-MCNC: 0.1 MG/DL (ref 0–0.3)
BILIRUB SERPL-MCNC: 0.5 MG/DL (ref 0–1.2)
BUN SERPL-MCNC: 32 MG/DL (ref 6–23)
CALCIUM SERPL-MCNC: 8.8 MG/DL (ref 8.6–10.3)
CHLORIDE SERPL-SCNC: 106 MMOL/L (ref 98–107)
CO2 SERPL-SCNC: 29 MMOL/L (ref 21–32)
CREAT SERPL-MCNC: 1.51 MG/DL (ref 0.5–1.3)
EGFRCR SERPLBLD CKD-EPI 2021: 52 ML/MIN/1.73M*2
EOSINOPHIL # BLD MANUAL: 0.59 X10*3/UL (ref 0–0.7)
EOSINOPHIL NFR BLD MANUAL: 13 %
ERYTHROCYTE [DISTWIDTH] IN BLOOD BY AUTOMATED COUNT: 12.6 % (ref 11.5–14.5)
GLUCOSE SERPL-MCNC: 129 MG/DL (ref 74–99)
HCT VFR BLD AUTO: 34.1 % (ref 41–52)
HGB BLD-MCNC: 10.8 G/DL (ref 13.5–17.5)
IMM GRANULOCYTES # BLD AUTO: 0.47 X10*3/UL (ref 0–0.7)
IMM GRANULOCYTES NFR BLD AUTO: 10.4 % (ref 0–0.9)
LYMPHOCYTES # BLD MANUAL: 1.53 X10*3/UL (ref 1.2–4.8)
LYMPHOCYTES NFR BLD MANUAL: 34 %
MAGNESIUM SERPL-MCNC: 1.63 MG/DL (ref 1.6–2.4)
MCH RBC QN AUTO: 28 PG (ref 26–34)
MCHC RBC AUTO-ENTMCNC: 31.7 G/DL (ref 32–36)
MCV RBC AUTO: 88 FL (ref 80–100)
METAMYELOCYTES # BLD MANUAL: 0.27 X10*3/UL
METAMYELOCYTES NFR BLD MANUAL: 6 %
MONOCYTES # BLD MANUAL: 0.5 X10*3/UL (ref 0.1–1)
MONOCYTES NFR BLD MANUAL: 11 %
NEUTROPHILS # BLD MANUAL: 1.63 X10*3/UL (ref 1.2–7.7)
NEUTS BAND # BLD MANUAL: 0.05 X10*3/UL (ref 0–0.7)
NEUTS BAND NFR BLD MANUAL: 1 %
NEUTS SEG # BLD MANUAL: 1.58 X10*3/UL (ref 1.2–7)
NEUTS SEG NFR BLD MANUAL: 35 %
NRBC BLD-RTO: 0 /100 WBCS (ref 0–0)
OVALOCYTES BLD QL SMEAR: NORMAL
PHOSPHATE SERPL-MCNC: 3.8 MG/DL (ref 2.5–4.9)
PLATELET # BLD AUTO: 173 X10*3/UL (ref 150–450)
POTASSIUM SERPL-SCNC: 4.6 MMOL/L (ref 3.5–5.3)
PROT SERPL-MCNC: 6.2 G/DL (ref 6.4–8.2)
RBC # BLD AUTO: 3.86 X10*6/UL (ref 4.5–5.9)
RBC MORPH BLD: NORMAL
SODIUM SERPL-SCNC: 141 MMOL/L (ref 136–145)
TOTAL CELLS COUNTED BLD: 100
WBC # BLD AUTO: 4.5 X10*3/UL (ref 4.4–11.3)

## 2025-07-28 PROCEDURE — 84100 ASSAY OF PHOSPHORUS: CPT

## 2025-07-28 PROCEDURE — 80053 COMPREHEN METABOLIC PANEL: CPT

## 2025-07-28 PROCEDURE — 83735 ASSAY OF MAGNESIUM: CPT

## 2025-07-28 PROCEDURE — 85007 BL SMEAR W/DIFF WBC COUNT: CPT

## 2025-07-28 PROCEDURE — 87799 DETECT AGENT NOS DNA QUANT: CPT

## 2025-07-28 PROCEDURE — 36415 COLL VENOUS BLD VENIPUNCTURE: CPT

## 2025-07-28 PROCEDURE — 82977 ASSAY OF GGT: CPT

## 2025-07-28 PROCEDURE — 85027 COMPLETE CBC AUTOMATED: CPT

## 2025-07-28 PROCEDURE — 82248 BILIRUBIN DIRECT: CPT

## 2025-07-28 PROCEDURE — 80197 ASSAY OF TACROLIMUS: CPT

## 2025-07-29 DIAGNOSIS — Z94.4 LIVER REPLACED BY TRANSPLANT (MULTI): ICD-10-CM

## 2025-07-29 LAB
BKV DNA SERPL NAA+PROBE-ACNC: 285 IU/ML (ref ?–22)
BKV DNA SERPL NAA+PROBE-LOG#: 2.45 LOG IU/ML
CMV DNA SERPL NAA+PROBE-LOG IU: NORMAL {LOG_IU}/ML
GGT SERPL-CCNC: 98 U/L (ref 5–64)
LABORATORY COMMENT REPORT: DETECTED
LABORATORY COMMENT REPORT: NOT DETECTED
TACROLIMUS BLD-MCNC: 15.3 NG/ML

## 2025-07-29 RX ORDER — TACROLIMUS 1 MG/1
1 CAPSULE ORAL 2 TIMES DAILY
Qty: 180 CAPSULE | Refills: 3 | Status: SHIPPED | OUTPATIENT
Start: 2025-07-29 | End: 2026-07-29

## 2025-07-29 NOTE — DOCUMENTATION CLARIFICATION NOTE
"    PATIENT:               CHEMA MACIAS  ACCT #:                  [id number]  MRN:                       [id number]  :                       1960  ADMIT DATE:       2025 10:32 PM  DISCH DATE:        2025 4:01 PM  RESPONDING PROVIDER #:        [id number]          PROVIDER RESPONSE TEXT:    Stage 4 CKD    CDI QUERY TEXT:    Clarification      Instruction:    Based on your assessment of the patient and the clinical information, please provide the requested documentation by clicking on the appropriate radio button and enter any additional information if prompted.    Question: Based on your medical judgment, can you please clarify which of these conditions is the most clinically supported    When answering this query, please exercise your independent professional judgment. The fact that a question is being asked, does not imply that any particular answer is desired or expected.    The patient's clinical indicators include:  Clinical Information: There is conflicting documentation in the medical record which requires clarification.    - - Nephrology Consult Notes show documentation of \"65 y.o. male presenting with CKD 4\"    - - Transplant Progress Notes show \"presenting with ESRD\"    -  DC Summary shows Final Diagnosis of \"ESRD\"      Clinical Indicators:    -  BUN 32, Cr 1.74, GFR 44    -  BUN 30, Cr 1.65, GFR 46    -  BUN 37, Cr  1.76, GFR 43      Treatment: Steroid pulse x3 (-), Nephrology Consult, Daily RFP, Strict I/O    Risk Factors:  Kidney transplant rejection  Options provided:  -- Stage 4 CKD  -- ESRD  -- Other - I will add my own diagnosis  -- Refer to Clinical Documentation Reviewer    Query created by: Kiley Hagen on 2025 5:25 PM      Electronically signed by:  GEORGIANA MARTINEZ MD 2025 8:10 AM          "

## 2025-07-31 ENCOUNTER — LAB (OUTPATIENT)
Dept: LAB | Facility: HOSPITAL | Age: 65
End: 2025-07-31
Payer: COMMERCIAL

## 2025-07-31 DIAGNOSIS — Z94.4 LIVER TRANSPLANT STATUS: Primary | ICD-10-CM

## 2025-07-31 LAB
ALBUMIN SERPL BCP-MCNC: 3.9 G/DL (ref 3.4–5)
ALP SERPL-CCNC: 130 U/L (ref 33–136)
ALT SERPL W P-5'-P-CCNC: 18 U/L (ref 10–52)
ANION GAP SERPL CALC-SCNC: 10 MMOL/L (ref 10–20)
AST SERPL W P-5'-P-CCNC: 11 U/L (ref 9–39)
BILIRUB DIRECT SERPL-MCNC: 0.1 MG/DL (ref 0–0.3)
BILIRUB SERPL-MCNC: 0.5 MG/DL (ref 0–1.2)
BUN SERPL-MCNC: 36 MG/DL (ref 6–23)
CALCIUM SERPL-MCNC: 9 MG/DL (ref 8.6–10.3)
CHLORIDE SERPL-SCNC: 103 MMOL/L (ref 98–107)
CO2 SERPL-SCNC: 29 MMOL/L (ref 21–32)
CREAT SERPL-MCNC: 1.6 MG/DL (ref 0.5–1.3)
EGFRCR SERPLBLD CKD-EPI 2021: 48 ML/MIN/1.73M*2
GLUCOSE SERPL-MCNC: 130 MG/DL (ref 74–99)
MAGNESIUM SERPL-MCNC: 1.72 MG/DL (ref 1.6–2.4)
PHOSPHATE SERPL-MCNC: 3.7 MG/DL (ref 2.5–4.9)
POTASSIUM SERPL-SCNC: 4.7 MMOL/L (ref 3.5–5.3)
PROT SERPL-MCNC: 6.5 G/DL (ref 6.4–8.2)
SODIUM SERPL-SCNC: 137 MMOL/L (ref 136–145)

## 2025-07-31 PROCEDURE — 85025 COMPLETE CBC W/AUTO DIFF WBC: CPT

## 2025-07-31 PROCEDURE — 36415 COLL VENOUS BLD VENIPUNCTURE: CPT

## 2025-07-31 PROCEDURE — 80197 ASSAY OF TACROLIMUS: CPT

## 2025-07-31 PROCEDURE — 82248 BILIRUBIN DIRECT: CPT

## 2025-07-31 PROCEDURE — 83735 ASSAY OF MAGNESIUM: CPT

## 2025-07-31 PROCEDURE — 80053 COMPREHEN METABOLIC PANEL: CPT

## 2025-07-31 PROCEDURE — 82977 ASSAY OF GGT: CPT

## 2025-07-31 PROCEDURE — 84100 ASSAY OF PHOSPHORUS: CPT

## 2025-08-01 DIAGNOSIS — Z94.4 LIVER REPLACED BY TRANSPLANT (MULTI): ICD-10-CM

## 2025-08-01 DIAGNOSIS — Z94.0 KIDNEY REPLACED BY TRANSPLANT (HHS-HCC): ICD-10-CM

## 2025-08-01 LAB
BASOPHILS # BLD AUTO: 0.07 X10*3/UL (ref 0–0.1)
BASOPHILS NFR BLD AUTO: 0.8 %
EOSINOPHIL # BLD AUTO: 0.32 X10*3/UL (ref 0–0.7)
EOSINOPHIL NFR BLD AUTO: 3.5 %
ERYTHROCYTE [DISTWIDTH] IN BLOOD BY AUTOMATED COUNT: 12.8 % (ref 11.5–14.5)
GGT SERPL-CCNC: 89 U/L (ref 5–64)
HCT VFR BLD AUTO: 35.2 % (ref 41–52)
HGB BLD-MCNC: 11.3 G/DL (ref 13.5–17.5)
IMM GRANULOCYTES # BLD AUTO: 1.05 X10*3/UL (ref 0–0.7)
IMM GRANULOCYTES NFR BLD AUTO: 11.6 % (ref 0–0.9)
LYMPHOCYTES # BLD AUTO: 1.44 X10*3/UL (ref 1.2–4.8)
LYMPHOCYTES NFR BLD AUTO: 15.8 %
MCH RBC QN AUTO: 28.8 PG (ref 26–34)
MCHC RBC AUTO-ENTMCNC: 32.1 G/DL (ref 32–36)
MCV RBC AUTO: 90 FL (ref 80–100)
MONOCYTES # BLD AUTO: 0.89 X10*3/UL (ref 0.1–1)
MONOCYTES NFR BLD AUTO: 9.8 %
NEUTROPHILS # BLD AUTO: 5.32 X10*3/UL (ref 1.2–7.7)
NEUTROPHILS NFR BLD AUTO: 58.5 %
NRBC BLD-RTO: 0 /100 WBCS (ref 0–0)
PLATELET # BLD AUTO: 177 X10*3/UL (ref 150–450)
RBC # BLD AUTO: 3.92 X10*6/UL (ref 4.5–5.9)
TACROLIMUS BLD-MCNC: 8.8 NG/ML
WBC # BLD AUTO: 9.1 X10*3/UL (ref 4.4–11.3)

## 2025-08-04 ENCOUNTER — TELEPHONE (OUTPATIENT)
Facility: HOSPITAL | Age: 65
End: 2025-08-04

## 2025-08-04 ENCOUNTER — LAB (OUTPATIENT)
Dept: LAB | Facility: HOSPITAL | Age: 65
End: 2025-08-04
Payer: COMMERCIAL

## 2025-08-04 DIAGNOSIS — R39.89 SUSPECTED UTI: ICD-10-CM

## 2025-08-04 DIAGNOSIS — Z94.4 LIVER REPLACED BY TRANSPLANT (MULTI): ICD-10-CM

## 2025-08-04 DIAGNOSIS — Z13.89 SCREENING FOR BLOOD OR PROTEIN IN URINE: ICD-10-CM

## 2025-08-04 DIAGNOSIS — Z01.818 ENCOUNTER FOR PRE-TRANSPLANT EVALUATION FOR LIVER TRANSPLANT: ICD-10-CM

## 2025-08-04 DIAGNOSIS — Z94.0 KIDNEY REPLACED BY TRANSPLANT (HHS-HCC): ICD-10-CM

## 2025-08-04 LAB
ALBUMIN SERPL BCP-MCNC: 4 G/DL (ref 3.4–5)
ALP SERPL-CCNC: 113 U/L (ref 33–136)
ALT SERPL W P-5'-P-CCNC: 13 U/L (ref 10–52)
ANION GAP SERPL CALC-SCNC: 9 MMOL/L (ref 10–20)
AST SERPL W P-5'-P-CCNC: 10 U/L (ref 9–39)
BASOPHILS # BLD AUTO: 0.05 X10*3/UL (ref 0–0.1)
BASOPHILS NFR BLD AUTO: 0.3 %
BILIRUB DIRECT SERPL-MCNC: 0.1 MG/DL (ref 0–0.3)
BILIRUB SERPL-MCNC: 0.6 MG/DL (ref 0–1.2)
BUN SERPL-MCNC: 35 MG/DL (ref 6–23)
CALCIUM SERPL-MCNC: 9.3 MG/DL (ref 8.6–10.3)
CHLORIDE SERPL-SCNC: 104 MMOL/L (ref 98–107)
CO2 SERPL-SCNC: 30 MMOL/L (ref 21–32)
CREAT SERPL-MCNC: 1.82 MG/DL (ref 0.5–1.3)
CREAT UR-MCNC: 93.6 MG/DL (ref 20–370)
EGFRCR SERPLBLD CKD-EPI 2021: 41 ML/MIN/1.73M*2
EOSINOPHIL # BLD AUTO: 0.22 X10*3/UL (ref 0–0.7)
EOSINOPHIL NFR BLD AUTO: 1.4 %
ERYTHROCYTE [DISTWIDTH] IN BLOOD BY AUTOMATED COUNT: 12.8 % (ref 11.5–14.5)
GGT SERPL-CCNC: 80 U/L (ref 5–64)
GLUCOSE SERPL-MCNC: 105 MG/DL (ref 74–99)
HCT VFR BLD AUTO: 35.8 % (ref 41–52)
HGB BLD-MCNC: 11.3 G/DL (ref 13.5–17.5)
IMM GRANULOCYTES # BLD AUTO: 0.28 X10*3/UL (ref 0–0.7)
IMM GRANULOCYTES NFR BLD AUTO: 1.8 % (ref 0–0.9)
LYMPHOCYTES # BLD AUTO: 1.61 X10*3/UL (ref 1.2–4.8)
LYMPHOCYTES NFR BLD AUTO: 10.2 %
MAGNESIUM SERPL-MCNC: 1.72 MG/DL (ref 1.6–2.4)
MCH RBC QN AUTO: 28.4 PG (ref 26–34)
MCHC RBC AUTO-ENTMCNC: 31.6 G/DL (ref 32–36)
MCV RBC AUTO: 90 FL (ref 80–100)
MONOCYTES # BLD AUTO: 0.88 X10*3/UL (ref 0.1–1)
MONOCYTES NFR BLD AUTO: 5.6 %
NEUTROPHILS # BLD AUTO: 12.67 X10*3/UL (ref 1.2–7.7)
NEUTROPHILS NFR BLD AUTO: 80.7 %
NRBC BLD-RTO: 0 /100 WBCS (ref 0–0)
PHOSPHATE SERPL-MCNC: 4.4 MG/DL (ref 2.5–4.9)
PLATELET # BLD AUTO: 166 X10*3/UL (ref 150–450)
POTASSIUM SERPL-SCNC: 4.7 MMOL/L (ref 3.5–5.3)
PROT SERPL-MCNC: 6.6 G/DL (ref 6.4–8.2)
PROT UR-ACNC: 28 MG/DL (ref 5–25)
PROT/CREAT UR: 0.3 MG/MG CREAT (ref 0–0.17)
RBC # BLD AUTO: 3.98 X10*6/UL (ref 4.5–5.9)
SODIUM SERPL-SCNC: 138 MMOL/L (ref 136–145)
TACROLIMUS BLD-MCNC: 5.3 NG/ML
WBC # BLD AUTO: 15.7 X10*3/UL (ref 4.4–11.3)

## 2025-08-04 PROCEDURE — 87799 DETECT AGENT NOS DNA QUANT: CPT

## 2025-08-04 PROCEDURE — 80197 ASSAY OF TACROLIMUS: CPT

## 2025-08-04 PROCEDURE — 82570 ASSAY OF URINE CREATININE: CPT

## 2025-08-04 PROCEDURE — 82977 ASSAY OF GGT: CPT

## 2025-08-04 PROCEDURE — 36415 COLL VENOUS BLD VENIPUNCTURE: CPT

## 2025-08-04 PROCEDURE — 85025 COMPLETE CBC W/AUTO DIFF WBC: CPT

## 2025-08-04 PROCEDURE — 82248 BILIRUBIN DIRECT: CPT

## 2025-08-04 PROCEDURE — 84100 ASSAY OF PHOSPHORUS: CPT

## 2025-08-04 PROCEDURE — 83735 ASSAY OF MAGNESIUM: CPT

## 2025-08-04 PROCEDURE — 80053 COMPREHEN METABOLIC PANEL: CPT

## 2025-08-04 PROCEDURE — 84156 ASSAY OF PROTEIN URINE: CPT

## 2025-08-04 NOTE — TELEPHONE ENCOUNTER
Called patient, spoke with López, advised that needs blood cultures, UA as well as chest x-ray done, states will get done 08/05/2025 in [place]

## 2025-08-05 ENCOUNTER — LAB (OUTPATIENT)
Dept: LAB | Facility: HOSPITAL | Age: 65
End: 2025-08-05
Payer: COMMERCIAL

## 2025-08-05 ENCOUNTER — HOSPITAL ENCOUNTER (OUTPATIENT)
Dept: RADIOLOGY | Facility: HOSPITAL | Age: 65
Discharge: HOME | End: 2025-08-05
Payer: COMMERCIAL

## 2025-08-05 DIAGNOSIS — Z94.4 LIVER TRANSPLANT RECIPIENT (MULTI): ICD-10-CM

## 2025-08-05 DIAGNOSIS — Z94.0 KIDNEY TRANSPLANT STATUS: ICD-10-CM

## 2025-08-05 DIAGNOSIS — Z94.0 KIDNEY TRANSPLANT RECIPIENT (HHS-HCC): ICD-10-CM

## 2025-08-05 DIAGNOSIS — Z94.4 LIVER REPLACED BY TRANSPLANT (MULTI): ICD-10-CM

## 2025-08-05 DIAGNOSIS — R39.89 OTHER SYMPTOMS AND SIGNS INVOLVING THE GENITOURINARY SYSTEM: ICD-10-CM

## 2025-08-05 DIAGNOSIS — Z94.4 LIVER TRANSPLANT STATUS: Primary | ICD-10-CM

## 2025-08-05 DIAGNOSIS — Z94.0 KIDNEY REPLACED BY TRANSPLANT (HHS-HCC): ICD-10-CM

## 2025-08-05 LAB
ALBUMIN SERPL BCP-MCNC: 4 G/DL (ref 3.4–5)
ALP SERPL-CCNC: 118 U/L (ref 33–136)
ALT SERPL W P-5'-P-CCNC: 11 U/L (ref 10–52)
ANION GAP SERPL CALC-SCNC: 8 MMOL/L (ref 10–20)
APPEARANCE UR: CLEAR
AST SERPL W P-5'-P-CCNC: 9 U/L (ref 9–39)
BASOPHILS # BLD AUTO: 0.03 X10*3/UL (ref 0–0.1)
BASOPHILS NFR BLD AUTO: 0.2 %
BILIRUB DIRECT SERPL-MCNC: 0.1 MG/DL (ref 0–0.3)
BILIRUB SERPL-MCNC: 0.6 MG/DL (ref 0–1.2)
BILIRUB UR STRIP.AUTO-MCNC: NEGATIVE MG/DL
BKV DNA SERPL NAA+PROBE-ACNC: 470 IU/ML (ref ?–22)
BKV DNA SERPL NAA+PROBE-LOG#: 2.67 LOG IU/ML
BUN SERPL-MCNC: 31 MG/DL (ref 6–23)
CALCIUM SERPL-MCNC: 9.1 MG/DL (ref 8.6–10.3)
CHLORIDE SERPL-SCNC: 104 MMOL/L (ref 98–107)
CO2 SERPL-SCNC: 29 MMOL/L (ref 21–32)
COLOR UR: NORMAL
CREAT SERPL-MCNC: 1.58 MG/DL (ref 0.5–1.3)
EGFRCR SERPLBLD CKD-EPI 2021: 49 ML/MIN/1.73M*2
EOSINOPHIL # BLD AUTO: 0.25 X10*3/UL (ref 0–0.7)
EOSINOPHIL NFR BLD AUTO: 2.1 %
ERYTHROCYTE [DISTWIDTH] IN BLOOD BY AUTOMATED COUNT: 12.9 % (ref 11.5–14.5)
GGT SERPL-CCNC: 71 U/L (ref 5–64)
GLUCOSE SERPL-MCNC: 103 MG/DL (ref 74–99)
GLUCOSE UR STRIP.AUTO-MCNC: NORMAL MG/DL
HCT VFR BLD AUTO: 34.2 % (ref 41–52)
HGB BLD-MCNC: 11.1 G/DL (ref 13.5–17.5)
IMM GRANULOCYTES # BLD AUTO: 0.14 X10*3/UL (ref 0–0.7)
IMM GRANULOCYTES NFR BLD AUTO: 1.1 % (ref 0–0.9)
KETONES UR STRIP.AUTO-MCNC: NEGATIVE MG/DL
LABORATORY COMMENT REPORT: DETECTED
LEUKOCYTE ESTERASE UR QL STRIP.AUTO: NEGATIVE
LYMPHOCYTES # BLD AUTO: 1.16 X10*3/UL (ref 1.2–4.8)
LYMPHOCYTES NFR BLD AUTO: 9.5 %
MAGNESIUM SERPL-MCNC: 1.69 MG/DL (ref 1.6–2.4)
MCH RBC QN AUTO: 29 PG (ref 26–34)
MCHC RBC AUTO-ENTMCNC: 32.5 G/DL (ref 32–36)
MCV RBC AUTO: 89 FL (ref 80–100)
MONOCYTES # BLD AUTO: 0.77 X10*3/UL (ref 0.1–1)
MONOCYTES NFR BLD AUTO: 6.3 %
NEUTROPHILS # BLD AUTO: 9.84 X10*3/UL (ref 1.2–7.7)
NEUTROPHILS NFR BLD AUTO: 80.8 %
NITRITE UR QL STRIP.AUTO: NEGATIVE
NRBC BLD-RTO: 0 /100 WBCS (ref 0–0)
PH UR STRIP.AUTO: 6 [PH]
PHOSPHATE SERPL-MCNC: 4.5 MG/DL (ref 2.5–4.9)
PLATELET # BLD AUTO: 150 X10*3/UL (ref 150–450)
POTASSIUM SERPL-SCNC: 4.5 MMOL/L (ref 3.5–5.3)
PROT SERPL-MCNC: 6.6 G/DL (ref 6.4–8.2)
PROT UR STRIP.AUTO-MCNC: NEGATIVE MG/DL
RBC # BLD AUTO: 3.83 X10*6/UL (ref 4.5–5.9)
RBC # UR STRIP.AUTO: NEGATIVE MG/DL
SODIUM SERPL-SCNC: 136 MMOL/L (ref 136–145)
SP GR UR STRIP.AUTO: 1.01
TACROLIMUS BLD-MCNC: 5.3 NG/ML
UROBILINOGEN UR STRIP.AUTO-MCNC: NORMAL MG/DL
WBC # BLD AUTO: 12.2 X10*3/UL (ref 4.4–11.3)

## 2025-08-05 PROCEDURE — 85025 COMPLETE CBC W/AUTO DIFF WBC: CPT

## 2025-08-05 PROCEDURE — 83735 ASSAY OF MAGNESIUM: CPT

## 2025-08-05 PROCEDURE — 84100 ASSAY OF PHOSPHORUS: CPT

## 2025-08-05 PROCEDURE — 80053 COMPREHEN METABOLIC PANEL: CPT

## 2025-08-05 PROCEDURE — 87075 CULTR BACTERIA EXCEPT BLOOD: CPT

## 2025-08-05 PROCEDURE — 80197 ASSAY OF TACROLIMUS: CPT

## 2025-08-05 PROCEDURE — 71046 X-RAY EXAM CHEST 2 VIEWS: CPT

## 2025-08-05 PROCEDURE — 71046 X-RAY EXAM CHEST 2 VIEWS: CPT | Performed by: RADIOLOGY

## 2025-08-05 PROCEDURE — 87040 BLOOD CULTURE FOR BACTERIA: CPT

## 2025-08-05 PROCEDURE — 81003 URINALYSIS AUTO W/O SCOPE: CPT

## 2025-08-05 PROCEDURE — 82248 BILIRUBIN DIRECT: CPT

## 2025-08-05 PROCEDURE — 82977 ASSAY OF GGT: CPT

## 2025-08-05 RX ORDER — TACROLIMUS 1 MG/1
CAPSULE ORAL
Qty: 270 CAPSULE | Refills: 3 | Status: SHIPPED | OUTPATIENT
Start: 2025-08-05 | End: 2026-08-05

## 2025-08-06 ENCOUNTER — SPECIALTY PHARMACY (OUTPATIENT)
Dept: PHARMACY | Facility: CLINIC | Age: 65
End: 2025-08-06

## 2025-08-06 LAB
ALLOSURE SCORE - KIDNEY: 0.26 %
CAREDX_ORDER_ID: NORMAL
CENTER_ORDER_ID: NORMAL
CLIENT SPECIMEN ID - ALLOSURE: NORMAL
DONOR RELATION - ALLOSURE: NORMAL
EBV DNA SPEC NAA+PROBE-LOG#: NORMAL {LOG_COPIES}/ML
HOLD SPECIMEN: NORMAL
LABORATORY COMMENT REPORT: NOT DETECTED
NOTES - ALLOSURE: NORMAL
TEST COMMENTS - ALLOSURE: NORMAL
TIME POST TX - ALLOSURE: NORMAL
TRANSPLANTED ORGAN - ALLOSURE: NORMAL
TX DATE - ALLOSURE/ALLOMAP: NORMAL
WP_ORDER_ID: NORMAL

## 2025-08-06 PROCEDURE — RXMED WILLOW AMBULATORY MEDICATION CHARGE

## 2025-08-07 ENCOUNTER — PHARMACY VISIT (OUTPATIENT)
Dept: PHARMACY | Facility: CLINIC | Age: 65
End: 2025-08-07
Payer: COMMERCIAL

## 2025-08-08 DIAGNOSIS — Z94.4 LIVER REPLACED BY TRANSPLANT (MULTI): ICD-10-CM

## 2025-08-08 DIAGNOSIS — Z94.0 KIDNEY REPLACED BY TRANSPLANT (HHS-HCC): ICD-10-CM

## 2025-08-09 LAB — BACTERIA BLD CULT: NORMAL

## 2025-08-11 ENCOUNTER — LAB (OUTPATIENT)
Dept: LAB | Facility: HOSPITAL | Age: 65
End: 2025-08-11
Payer: COMMERCIAL

## 2025-08-11 DIAGNOSIS — Z94.4 LIVER TRANSPLANT STATUS: Primary | ICD-10-CM

## 2025-08-11 LAB
ALBUMIN SERPL BCP-MCNC: 3.7 G/DL (ref 3.4–5)
ALP SERPL-CCNC: 105 U/L (ref 33–136)
ALT SERPL W P-5'-P-CCNC: 9 U/L (ref 10–52)
ANION GAP SERPL CALC-SCNC: 8 MMOL/L (ref 10–20)
AST SERPL W P-5'-P-CCNC: 9 U/L (ref 9–39)
BASOPHILS # BLD AUTO: 0.04 X10*3/UL (ref 0–0.1)
BASOPHILS NFR BLD AUTO: 0.4 %
BILIRUB DIRECT SERPL-MCNC: 0.1 MG/DL (ref 0–0.3)
BILIRUB SERPL-MCNC: 0.5 MG/DL (ref 0–1.2)
BUN SERPL-MCNC: 34 MG/DL (ref 6–23)
CALCIUM SERPL-MCNC: 8.8 MG/DL (ref 8.6–10.3)
CHLORIDE SERPL-SCNC: 106 MMOL/L (ref 98–107)
CO2 SERPL-SCNC: 28 MMOL/L (ref 21–32)
CREAT SERPL-MCNC: 1.64 MG/DL (ref 0.5–1.3)
EGFRCR SERPLBLD CKD-EPI 2021: 47 ML/MIN/1.73M*2
EOSINOPHIL # BLD AUTO: 0.22 X10*3/UL (ref 0–0.7)
EOSINOPHIL NFR BLD AUTO: 2 %
ERYTHROCYTE [DISTWIDTH] IN BLOOD BY AUTOMATED COUNT: 13.8 % (ref 11.5–14.5)
GLUCOSE SERPL-MCNC: 123 MG/DL (ref 74–99)
HCT VFR BLD AUTO: 34.5 % (ref 41–52)
HGB BLD-MCNC: 10.6 G/DL (ref 13.5–17.5)
IMM GRANULOCYTES # BLD AUTO: 0.08 X10*3/UL (ref 0–0.7)
IMM GRANULOCYTES NFR BLD AUTO: 0.7 % (ref 0–0.9)
LYMPHOCYTES # BLD AUTO: 1.17 X10*3/UL (ref 1.2–4.8)
LYMPHOCYTES NFR BLD AUTO: 10.7 %
MAGNESIUM SERPL-MCNC: 1.7 MG/DL (ref 1.6–2.4)
MCH RBC QN AUTO: 28.5 PG (ref 26–34)
MCHC RBC AUTO-ENTMCNC: 30.7 G/DL (ref 32–36)
MCV RBC AUTO: 93 FL (ref 80–100)
MONOCYTES # BLD AUTO: 0.68 X10*3/UL (ref 0.1–1)
MONOCYTES NFR BLD AUTO: 6.2 %
NEUTROPHILS # BLD AUTO: 8.75 X10*3/UL (ref 1.2–7.7)
NEUTROPHILS NFR BLD AUTO: 80 %
NRBC BLD-RTO: 0 /100 WBCS (ref 0–0)
PHOSPHATE SERPL-MCNC: 3 MG/DL (ref 2.5–4.9)
PLATELET # BLD AUTO: 159 X10*3/UL (ref 150–450)
POTASSIUM SERPL-SCNC: 4.1 MMOL/L (ref 3.5–5.3)
PROT SERPL-MCNC: 5.9 G/DL (ref 6.4–8.2)
RBC # BLD AUTO: 3.72 X10*6/UL (ref 4.5–5.9)
SODIUM SERPL-SCNC: 138 MMOL/L (ref 136–145)
WBC # BLD AUTO: 10.9 X10*3/UL (ref 4.4–11.3)

## 2025-08-11 PROCEDURE — 83735 ASSAY OF MAGNESIUM: CPT

## 2025-08-11 PROCEDURE — 82977 ASSAY OF GGT: CPT

## 2025-08-11 PROCEDURE — 80053 COMPREHEN METABOLIC PANEL: CPT

## 2025-08-11 PROCEDURE — 80197 ASSAY OF TACROLIMUS: CPT

## 2025-08-11 PROCEDURE — 85025 COMPLETE CBC W/AUTO DIFF WBC: CPT

## 2025-08-11 PROCEDURE — 82248 BILIRUBIN DIRECT: CPT

## 2025-08-11 PROCEDURE — 84100 ASSAY OF PHOSPHORUS: CPT

## 2025-08-11 PROCEDURE — 36415 COLL VENOUS BLD VENIPUNCTURE: CPT

## 2025-08-12 ENCOUNTER — NUTRITION (OUTPATIENT)
Facility: HOSPITAL | Age: 65
End: 2025-08-12
Payer: COMMERCIAL

## 2025-08-12 ENCOUNTER — SPECIALTY PHARMACY (OUTPATIENT)
Dept: PHARMACY | Facility: CLINIC | Age: 65
End: 2025-08-12

## 2025-08-12 DIAGNOSIS — Z94.4 LIVER TRANSPLANT RECIPIENT (MULTI): ICD-10-CM

## 2025-08-12 DIAGNOSIS — Z94.4 LIVER REPLACED BY TRANSPLANT (MULTI): ICD-10-CM

## 2025-08-12 LAB
GGT SERPL-CCNC: 57 U/L (ref 5–64)
TACROLIMUS BLD-MCNC: 8.2 NG/ML

## 2025-08-14 PROCEDURE — 82977 ASSAY OF GGT: CPT

## 2025-08-14 PROCEDURE — 84100 ASSAY OF PHOSPHORUS: CPT

## 2025-08-14 PROCEDURE — 36415 COLL VENOUS BLD VENIPUNCTURE: CPT

## 2025-08-14 PROCEDURE — 85025 COMPLETE CBC W/AUTO DIFF WBC: CPT

## 2025-08-14 PROCEDURE — 80197 ASSAY OF TACROLIMUS: CPT

## 2025-08-14 PROCEDURE — 83735 ASSAY OF MAGNESIUM: CPT

## 2025-08-14 PROCEDURE — 80053 COMPREHEN METABOLIC PANEL: CPT

## 2025-08-14 PROCEDURE — 82248 BILIRUBIN DIRECT: CPT

## 2025-08-15 ENCOUNTER — LAB (OUTPATIENT)
Dept: LAB | Facility: HOSPITAL | Age: 65
End: 2025-08-15
Payer: COMMERCIAL

## 2025-08-15 DIAGNOSIS — Z94.4 LIVER REPLACED BY TRANSPLANT (MULTI): ICD-10-CM

## 2025-08-15 DIAGNOSIS — Z94.4 LIVER TRANSPLANT STATUS: Primary | ICD-10-CM

## 2025-08-15 DIAGNOSIS — Z94.0 KIDNEY REPLACED BY TRANSPLANT (HHS-HCC): ICD-10-CM

## 2025-08-15 DIAGNOSIS — Z94.4 LIVER TRANSPLANT RECIPIENT (MULTI): ICD-10-CM

## 2025-08-15 LAB
ALBUMIN SERPL BCP-MCNC: 4.1 G/DL (ref 3.4–5)
ALP SERPL-CCNC: 101 U/L (ref 33–136)
ALT SERPL W P-5'-P-CCNC: 10 U/L (ref 10–52)
ANION GAP SERPL CALC-SCNC: 10 MMOL/L (ref 10–20)
AST SERPL W P-5'-P-CCNC: 10 U/L (ref 9–39)
BASOPHILS # BLD AUTO: 0.04 X10*3/UL (ref 0–0.1)
BASOPHILS NFR BLD AUTO: 0.5 %
BILIRUB DIRECT SERPL-MCNC: 0.2 MG/DL (ref 0–0.3)
BILIRUB SERPL-MCNC: 0.7 MG/DL (ref 0–1.2)
BUN SERPL-MCNC: 39 MG/DL (ref 6–23)
CALCIUM SERPL-MCNC: 9.5 MG/DL (ref 8.6–10.6)
CHLORIDE SERPL-SCNC: 104 MMOL/L (ref 98–107)
CO2 SERPL-SCNC: 30 MMOL/L (ref 21–32)
CREAT SERPL-MCNC: 1.89 MG/DL (ref 0.5–1.3)
EGFRCR SERPLBLD CKD-EPI 2021: 39 ML/MIN/1.73M*2
EOSINOPHIL # BLD AUTO: 0.2 X10*3/UL (ref 0–0.7)
EOSINOPHIL NFR BLD AUTO: 2.7 %
ERYTHROCYTE [DISTWIDTH] IN BLOOD BY AUTOMATED COUNT: 14.2 % (ref 11.5–14.5)
GGT SERPL-CCNC: 58 U/L (ref 5–64)
GLUCOSE SERPL-MCNC: 93 MG/DL (ref 74–99)
HCT VFR BLD AUTO: 35.4 % (ref 41–52)
HGB BLD-MCNC: 10.7 G/DL (ref 13.5–17.5)
IMM GRANULOCYTES # BLD AUTO: 0.08 X10*3/UL (ref 0–0.7)
IMM GRANULOCYTES NFR BLD AUTO: 1.1 % (ref 0–0.9)
LYMPHOCYTES # BLD AUTO: 1.28 X10*3/UL (ref 1.2–4.8)
LYMPHOCYTES NFR BLD AUTO: 17.6 %
MAGNESIUM SERPL-MCNC: 1.85 MG/DL (ref 1.6–2.4)
MCH RBC QN AUTO: 28 PG (ref 26–34)
MCHC RBC AUTO-ENTMCNC: 30.2 G/DL (ref 32–36)
MCV RBC AUTO: 93 FL (ref 80–100)
MONOCYTES # BLD AUTO: 0.52 X10*3/UL (ref 0.1–1)
MONOCYTES NFR BLD AUTO: 7.1 %
NEUTROPHILS # BLD AUTO: 5.16 X10*3/UL (ref 1.2–7.7)
NEUTROPHILS NFR BLD AUTO: 71 %
NRBC BLD-RTO: 0 /100 WBCS (ref 0–0)
PHOSPHATE SERPL-MCNC: 4.2 MG/DL (ref 2.5–4.9)
PLATELET # BLD AUTO: 147 X10*3/UL (ref 150–450)
POTASSIUM SERPL-SCNC: 4.4 MMOL/L (ref 3.5–5.3)
PROT SERPL-MCNC: 6.5 G/DL (ref 6.4–8.2)
RBC # BLD AUTO: 3.82 X10*6/UL (ref 4.5–5.9)
SODIUM SERPL-SCNC: 140 MMOL/L (ref 136–145)
TACROLIMUS BLD-MCNC: 7.5 NG/ML
WBC # BLD AUTO: 7.3 X10*3/UL (ref 4.4–11.3)

## 2025-08-15 RX ORDER — CALCIUM CARBONATE 300MG(750)
800 TABLET,CHEWABLE ORAL DAILY
Qty: 60 TABLET | Refills: 2 | Status: SHIPPED | OUTPATIENT
Start: 2025-08-15

## 2025-08-15 RX ORDER — LANOLIN ALCOHOL/MO/W.PET/CERES
CREAM (GRAM) TOPICAL
Qty: 60 TABLET | Refills: 2 | OUTPATIENT
Start: 2025-08-15

## 2025-08-18 ENCOUNTER — LAB (OUTPATIENT)
Dept: LAB | Facility: HOSPITAL | Age: 65
End: 2025-08-18
Payer: COMMERCIAL

## 2025-08-18 DIAGNOSIS — Z94.0 KIDNEY TRANSPLANT STATUS: Primary | ICD-10-CM

## 2025-08-18 LAB
ALBUMIN SERPL BCP-MCNC: 3.8 G/DL (ref 3.4–5)
ALP SERPL-CCNC: 81 U/L (ref 33–136)
ALT SERPL W P-5'-P-CCNC: 10 U/L (ref 10–52)
ANION GAP SERPL CALC-SCNC: 8 MMOL/L (ref 10–20)
AST SERPL W P-5'-P-CCNC: 9 U/L (ref 9–39)
BASOPHILS # BLD AUTO: 0.04 X10*3/UL (ref 0–0.1)
BASOPHILS NFR BLD AUTO: 0.6 %
BILIRUB DIRECT SERPL-MCNC: 0.1 MG/DL (ref 0–0.3)
BILIRUB SERPL-MCNC: 0.5 MG/DL (ref 0–1.2)
BUN SERPL-MCNC: 28 MG/DL (ref 6–23)
CALCIUM SERPL-MCNC: 9 MG/DL (ref 8.6–10.3)
CHLORIDE SERPL-SCNC: 107 MMOL/L (ref 98–107)
CO2 SERPL-SCNC: 29 MMOL/L (ref 21–32)
CREAT SERPL-MCNC: 1.69 MG/DL (ref 0.5–1.3)
CREAT UR-MCNC: 87.6 MG/DL (ref 20–370)
EGFRCR SERPLBLD CKD-EPI 2021: 45 ML/MIN/1.73M*2
EOSINOPHIL # BLD AUTO: 0.24 X10*3/UL (ref 0–0.7)
EOSINOPHIL NFR BLD AUTO: 3.5 %
ERYTHROCYTE [DISTWIDTH] IN BLOOD BY AUTOMATED COUNT: 14 % (ref 11.5–14.5)
GGT SERPL-CCNC: 51 U/L (ref 5–64)
GLUCOSE SERPL-MCNC: 93 MG/DL (ref 74–99)
HCT VFR BLD AUTO: 35 % (ref 41–52)
HGB BLD-MCNC: 10.8 G/DL (ref 13.5–17.5)
HOLD SPECIMEN: NORMAL
IMM GRANULOCYTES # BLD AUTO: 0.07 X10*3/UL (ref 0–0.7)
IMM GRANULOCYTES NFR BLD AUTO: 1 % (ref 0–0.9)
LYMPHOCYTES # BLD AUTO: 1.2 X10*3/UL (ref 1.2–4.8)
LYMPHOCYTES NFR BLD AUTO: 17.7 %
MAGNESIUM SERPL-MCNC: 1.89 MG/DL (ref 1.6–2.4)
MCH RBC QN AUTO: 28.4 PG (ref 26–34)
MCHC RBC AUTO-ENTMCNC: 30.9 G/DL (ref 32–36)
MCV RBC AUTO: 92 FL (ref 80–100)
MONOCYTES # BLD AUTO: 0.49 X10*3/UL (ref 0.1–1)
MONOCYTES NFR BLD AUTO: 7.2 %
NEUTROPHILS # BLD AUTO: 4.75 X10*3/UL (ref 1.2–7.7)
NEUTROPHILS NFR BLD AUTO: 70 %
NRBC BLD-RTO: 0 /100 WBCS (ref 0–0)
PHOSPHATE SERPL-MCNC: 4.3 MG/DL (ref 2.5–4.9)
PLATELET # BLD AUTO: 136 X10*3/UL (ref 150–450)
POTASSIUM SERPL-SCNC: 4.6 MMOL/L (ref 3.5–5.3)
PROT SERPL-MCNC: 6.3 G/DL (ref 6.4–8.2)
RBC # BLD AUTO: 3.8 X10*6/UL (ref 4.5–5.9)
SODIUM SERPL-SCNC: 139 MMOL/L (ref 136–145)
TACROLIMUS BLD-MCNC: 3.8 NG/ML
WBC # BLD AUTO: 6.8 X10*3/UL (ref 4.4–11.3)

## 2025-08-18 PROCEDURE — 83735 ASSAY OF MAGNESIUM: CPT

## 2025-08-18 PROCEDURE — 85025 COMPLETE CBC W/AUTO DIFF WBC: CPT

## 2025-08-18 PROCEDURE — 80053 COMPREHEN METABOLIC PANEL: CPT

## 2025-08-18 PROCEDURE — 80197 ASSAY OF TACROLIMUS: CPT

## 2025-08-18 PROCEDURE — 87799 DETECT AGENT NOS DNA QUANT: CPT

## 2025-08-18 PROCEDURE — 82248 BILIRUBIN DIRECT: CPT

## 2025-08-18 PROCEDURE — 84100 ASSAY OF PHOSPHORUS: CPT

## 2025-08-18 PROCEDURE — 82570 ASSAY OF URINE CREATININE: CPT

## 2025-08-18 PROCEDURE — 36415 COLL VENOUS BLD VENIPUNCTURE: CPT

## 2025-08-18 PROCEDURE — 82977 ASSAY OF GGT: CPT

## 2025-08-19 ENCOUNTER — RESULTS FOLLOW-UP (OUTPATIENT)
Dept: SURGERY | Facility: HOSPITAL | Age: 65
End: 2025-08-19

## 2025-08-19 ENCOUNTER — TELEMEDICINE (OUTPATIENT)
Dept: ENDOCRINOLOGY | Facility: CLINIC | Age: 65
End: 2025-08-19
Payer: COMMERCIAL

## 2025-08-19 ENCOUNTER — DOCUMENTATION (OUTPATIENT)
Facility: HOSPITAL | Age: 65
End: 2025-08-19

## 2025-08-19 DIAGNOSIS — Z94.0 KIDNEY TRANSPLANT RECIPIENT (HHS-HCC): ICD-10-CM

## 2025-08-19 DIAGNOSIS — Z94.4 LIVER TRANSPLANT RECIPIENT (MULTI): ICD-10-CM

## 2025-08-19 DIAGNOSIS — Z94.0 KIDNEY REPLACED BY TRANSPLANT (HHS-HCC): ICD-10-CM

## 2025-08-19 DIAGNOSIS — E11.65 TYPE 2 DIABETES MELLITUS WITH HYPERGLYCEMIA, WITHOUT LONG-TERM CURRENT USE OF INSULIN: ICD-10-CM

## 2025-08-19 DIAGNOSIS — Z94.4 LIVER REPLACED BY TRANSPLANT (MULTI): ICD-10-CM

## 2025-08-19 DIAGNOSIS — R79.89 ELEVATED SERUM CREATININE: ICD-10-CM

## 2025-08-19 DIAGNOSIS — Z01.812 BLOOD TESTS PRIOR TO TREATMENT OR PROCEDURE: ICD-10-CM

## 2025-08-19 DIAGNOSIS — T86.19 DELAYED GRAFT FUNCTION OF KIDNEY (HHS-HCC): ICD-10-CM

## 2025-08-19 DIAGNOSIS — N18.9 CHRONIC KIDNEY DISEASE, UNSPECIFIED CKD STAGE: ICD-10-CM

## 2025-08-19 LAB
BKV DNA SERPL NAA+PROBE-ACNC: 103 IU/ML (ref ?–22)
BKV DNA SERPL NAA+PROBE-LOG#: 2.01 LOG IU/ML
CMV DNA SERPL NAA+PROBE-LOG IU: NORMAL {LOG_IU}/ML
LABORATORY COMMENT REPORT: DETECTED
LABORATORY COMMENT REPORT: NOT DETECTED

## 2025-08-19 PROCEDURE — 99214 OFFICE O/P EST MOD 30 MIN: CPT | Performed by: PHYSICIAN ASSISTANT

## 2025-08-19 RX ORDER — SEMAGLUTIDE 0.68 MG/ML
0.5 INJECTION, SOLUTION SUBCUTANEOUS
Qty: 3 ML | Refills: 11 | Status: SHIPPED | OUTPATIENT
Start: 2025-08-19

## 2025-08-19 ASSESSMENT — ENCOUNTER SYMPTOMS
MYALGIAS: 1
BACK PAIN: 1

## 2025-08-20 RX ORDER — TACROLIMUS 1 MG/1
CAPSULE ORAL
Qty: 450 CAPSULE | Refills: 3 | Status: SHIPPED | OUTPATIENT
Start: 2025-08-20 | End: 2025-08-26 | Stop reason: SDUPTHER

## 2025-08-22 DIAGNOSIS — Z94.0 KIDNEY REPLACED BY TRANSPLANT (HHS-HCC): ICD-10-CM

## 2025-08-22 DIAGNOSIS — Z13.89 SCREENING FOR BLOOD OR PROTEIN IN URINE: ICD-10-CM

## 2025-08-22 DIAGNOSIS — Z94.4 LIVER REPLACED BY TRANSPLANT (MULTI): ICD-10-CM

## 2025-08-25 LAB
ALBUMIN SERPL BCP-MCNC: 4 G/DL (ref 3.4–5)
ALP SERPL-CCNC: 75 U/L (ref 33–136)
ALT SERPL W P-5'-P-CCNC: 10 U/L (ref 10–52)
ANION GAP SERPL CALC-SCNC: 14 MMOL/L (ref 10–20)
AST SERPL W P-5'-P-CCNC: 9 U/L (ref 9–39)
BASOPHILS # BLD AUTO: 0.03 X10*3/UL (ref 0–0.1)
BASOPHILS NFR BLD AUTO: 0.5 %
BILIRUB DIRECT SERPL-MCNC: 0.1 MG/DL (ref 0–0.3)
BILIRUB SERPL-MCNC: 0.5 MG/DL (ref 0–1.2)
BUN SERPL-MCNC: 27 MG/DL (ref 6–23)
CALCIUM SERPL-MCNC: 9.1 MG/DL (ref 8.6–10.3)
CHLORIDE SERPL-SCNC: 103 MMOL/L (ref 98–107)
CO2 SERPL-SCNC: 30 MMOL/L (ref 21–32)
CREAT SERPL-MCNC: 1.55 MG/DL (ref 0.5–1.3)
EGFRCR SERPLBLD CKD-EPI 2021: 50 ML/MIN/1.73M*2
EOSINOPHIL # BLD AUTO: 0.1 X10*3/UL (ref 0–0.7)
EOSINOPHIL NFR BLD AUTO: 1.6 %
ERYTHROCYTE [DISTWIDTH] IN BLOOD BY AUTOMATED COUNT: 13.9 % (ref 11.5–14.5)
GLUCOSE SERPL-MCNC: 111 MG/DL (ref 74–99)
HCT VFR BLD AUTO: 35.4 % (ref 41–52)
HGB BLD-MCNC: 11.1 G/DL (ref 13.5–17.5)
IMM GRANULOCYTES # BLD AUTO: 0.06 X10*3/UL (ref 0–0.7)
IMM GRANULOCYTES NFR BLD AUTO: 0.9 % (ref 0–0.9)
LYMPHOCYTES # BLD AUTO: 1.15 X10*3/UL (ref 1.2–4.8)
LYMPHOCYTES NFR BLD AUTO: 17.9 %
MAGNESIUM SERPL-MCNC: 2.03 MG/DL (ref 1.6–2.4)
MCH RBC QN AUTO: 28.5 PG (ref 26–34)
MCHC RBC AUTO-ENTMCNC: 31.4 G/DL (ref 32–36)
MCV RBC AUTO: 91 FL (ref 80–100)
MONOCYTES # BLD AUTO: 0.43 X10*3/UL (ref 0.1–1)
MONOCYTES NFR BLD AUTO: 6.7 %
NEUTROPHILS # BLD AUTO: 4.67 X10*3/UL (ref 1.2–7.7)
NEUTROPHILS NFR BLD AUTO: 72.4 %
NRBC BLD-RTO: 0 /100 WBCS (ref 0–0)
PHOSPHATE SERPL-MCNC: 3.7 MG/DL (ref 2.5–4.9)
PLATELET # BLD AUTO: 141 X10*3/UL (ref 150–450)
POTASSIUM SERPL-SCNC: 4.6 MMOL/L (ref 3.5–5.3)
PROT SERPL-MCNC: 6.1 G/DL (ref 6.4–8.2)
RBC # BLD AUTO: 3.9 X10*6/UL (ref 4.5–5.9)
SODIUM SERPL-SCNC: 142 MMOL/L (ref 136–145)
WBC # BLD AUTO: 6.4 X10*3/UL (ref 4.4–11.3)

## 2025-08-25 PROCEDURE — 83735 ASSAY OF MAGNESIUM: CPT

## 2025-08-25 PROCEDURE — 82977 ASSAY OF GGT: CPT

## 2025-08-25 PROCEDURE — 85025 COMPLETE CBC W/AUTO DIFF WBC: CPT

## 2025-08-25 PROCEDURE — 80197 ASSAY OF TACROLIMUS: CPT

## 2025-08-25 PROCEDURE — 84100 ASSAY OF PHOSPHORUS: CPT

## 2025-08-25 PROCEDURE — 82248 BILIRUBIN DIRECT: CPT

## 2025-08-25 PROCEDURE — 80053 COMPREHEN METABOLIC PANEL: CPT

## 2025-08-26 ENCOUNTER — TELEPHONE (OUTPATIENT)
Facility: HOSPITAL | Age: 65
End: 2025-08-26
Payer: COMMERCIAL

## 2025-08-26 ENCOUNTER — TELEPHONE (OUTPATIENT)
Dept: ADMINISTRATIVE | Age: 65
End: 2025-08-26

## 2025-08-26 DIAGNOSIS — Z94.4 LIVER REPLACED BY TRANSPLANT (MULTI): ICD-10-CM

## 2025-08-26 LAB
GGT SERPL-CCNC: 47 U/L (ref 5–64)
TACROLIMUS BLD-MCNC: 13.6 NG/ML

## 2025-08-26 RX ORDER — TACROLIMUS 1 MG/1
CAPSULE ORAL
Qty: 270 CAPSULE | Refills: 3 | Status: SHIPPED | OUTPATIENT
Start: 2025-08-26 | End: 2026-08-26

## 2025-08-28 ENCOUNTER — LAB (OUTPATIENT)
Dept: LAB | Facility: HOSPITAL | Age: 65
End: 2025-08-28
Payer: COMMERCIAL

## 2025-08-28 DIAGNOSIS — Z94.4 LIVER TRANSPLANT STATUS: Primary | ICD-10-CM

## 2025-08-28 LAB
ALBUMIN SERPL BCP-MCNC: 4.2 G/DL (ref 3.4–5)
ALP SERPL-CCNC: 152 U/L (ref 33–136)
ALT SERPL W P-5'-P-CCNC: 32 U/L (ref 10–52)
ANION GAP SERPL CALC-SCNC: 11 MMOL/L (ref 10–20)
AST SERPL W P-5'-P-CCNC: 55 U/L (ref 9–39)
BASOPHILS # BLD AUTO: 0.02 X10*3/UL (ref 0–0.1)
BASOPHILS NFR BLD AUTO: 0.3 %
BILIRUB DIRECT SERPL-MCNC: 0.4 MG/DL (ref 0–0.3)
BILIRUB SERPL-MCNC: 0.9 MG/DL (ref 0–1.2)
BUN SERPL-MCNC: 37 MG/DL (ref 6–23)
CALCIUM SERPL-MCNC: 8.9 MG/DL (ref 8.6–10.3)
CHLORIDE SERPL-SCNC: 104 MMOL/L (ref 98–107)
CO2 SERPL-SCNC: 30 MMOL/L (ref 21–32)
CREAT SERPL-MCNC: 1.53 MG/DL (ref 0.5–1.3)
EGFRCR SERPLBLD CKD-EPI 2021: 51 ML/MIN/1.73M*2
EOSINOPHIL # BLD AUTO: 0.08 X10*3/UL (ref 0–0.7)
EOSINOPHIL NFR BLD AUTO: 1.1 %
ERYTHROCYTE [DISTWIDTH] IN BLOOD BY AUTOMATED COUNT: 13.8 % (ref 11.5–14.5)
GLUCOSE SERPL-MCNC: 104 MG/DL (ref 74–99)
HCT VFR BLD AUTO: 36.5 % (ref 41–52)
HGB BLD-MCNC: 11.3 G/DL (ref 13.5–17.5)
IMM GRANULOCYTES # BLD AUTO: 0.08 X10*3/UL (ref 0–0.7)
IMM GRANULOCYTES NFR BLD AUTO: 1.1 % (ref 0–0.9)
LYMPHOCYTES # BLD AUTO: 1.21 X10*3/UL (ref 1.2–4.8)
LYMPHOCYTES NFR BLD AUTO: 16 %
MAGNESIUM SERPL-MCNC: 1.72 MG/DL (ref 1.6–2.4)
MCH RBC QN AUTO: 28 PG (ref 26–34)
MCHC RBC AUTO-ENTMCNC: 31 G/DL (ref 32–36)
MCV RBC AUTO: 90 FL (ref 80–100)
MONOCYTES # BLD AUTO: 0.54 X10*3/UL (ref 0.1–1)
MONOCYTES NFR BLD AUTO: 7.1 %
NEUTROPHILS # BLD AUTO: 5.64 X10*3/UL (ref 1.2–7.7)
NEUTROPHILS NFR BLD AUTO: 74.4 %
NRBC BLD-RTO: 0 /100 WBCS (ref 0–0)
PHOSPHATE SERPL-MCNC: 4.1 MG/DL (ref 2.5–4.9)
PLATELET # BLD AUTO: 164 X10*3/UL (ref 150–450)
POTASSIUM SERPL-SCNC: 4.5 MMOL/L (ref 3.5–5.3)
PROT SERPL-MCNC: 6.2 G/DL (ref 6.4–8.2)
RBC # BLD AUTO: 4.04 X10*6/UL (ref 4.5–5.9)
SODIUM SERPL-SCNC: 140 MMOL/L (ref 136–145)
WBC # BLD AUTO: 7.6 X10*3/UL (ref 4.4–11.3)

## 2025-08-28 PROCEDURE — 36415 COLL VENOUS BLD VENIPUNCTURE: CPT

## 2025-08-29 ENCOUNTER — TELEPHONE (OUTPATIENT)
Dept: TRANSPLANT | Facility: HOSPITAL | Age: 65
End: 2025-08-29
Payer: COMMERCIAL

## 2025-08-29 DIAGNOSIS — Z94.4 LIVER REPLACED BY TRANSPLANT (MULTI): ICD-10-CM

## 2025-08-29 DIAGNOSIS — Z94.0 KIDNEY REPLACED BY TRANSPLANT (HHS-HCC): ICD-10-CM

## 2025-08-29 DIAGNOSIS — Z94.4 LIVER TRANSPLANT RECIPIENT (MULTI): ICD-10-CM

## 2025-08-29 DIAGNOSIS — Z94.0 KIDNEY TRANSPLANT RECIPIENT (HHS-HCC): ICD-10-CM

## 2025-08-29 LAB
GGT SERPL-CCNC: 365 U/L (ref 5–64)
TACROLIMUS BLD-MCNC: 7.4 NG/ML

## 2025-08-29 RX ORDER — MYCOPHENOLATE MOFETIL 250 MG/1
1000 CAPSULE ORAL 2 TIMES DAILY
Qty: 240 CAPSULE | Refills: 11 | Status: SHIPPED | OUTPATIENT
Start: 2025-08-29 | End: 2026-08-29

## 2025-09-02 DIAGNOSIS — R79.1 COAGULATION TEST ABNORMALITY: Primary | ICD-10-CM

## 2025-09-02 PROCEDURE — 83735 ASSAY OF MAGNESIUM: CPT

## 2025-09-02 PROCEDURE — 85025 COMPLETE CBC W/AUTO DIFF WBC: CPT

## 2025-09-02 PROCEDURE — 82248 BILIRUBIN DIRECT: CPT

## 2025-09-02 PROCEDURE — 80053 COMPREHEN METABOLIC PANEL: CPT

## 2025-09-02 PROCEDURE — 84100 ASSAY OF PHOSPHORUS: CPT

## 2025-09-02 PROCEDURE — 36415 COLL VENOUS BLD VENIPUNCTURE: CPT

## 2025-09-02 PROCEDURE — 82977 ASSAY OF GGT: CPT

## 2025-09-02 PROCEDURE — 80197 ASSAY OF TACROLIMUS: CPT

## 2025-09-03 ENCOUNTER — HOSPITAL ENCOUNTER (OUTPATIENT)
Dept: RADIOLOGY | Facility: CLINIC | Age: 65
End: 2025-09-03
Payer: COMMERCIAL

## 2025-09-03 ENCOUNTER — LAB (OUTPATIENT)
Dept: LAB | Facility: HOSPITAL | Age: 65
End: 2025-09-03
Payer: COMMERCIAL

## 2025-09-03 DIAGNOSIS — Z94.4 LIVER TRANSPLANT STATUS: Primary | ICD-10-CM

## 2025-09-04 LAB
INR PPP: 1 (ref 0.9–1.1)
PROTHROMBIN TIME: 11.5 SECONDS (ref 9.8–12.4)

## 2025-09-04 PROCEDURE — 82977 ASSAY OF GGT: CPT

## 2025-09-04 PROCEDURE — 84100 ASSAY OF PHOSPHORUS: CPT

## 2025-09-04 PROCEDURE — 85025 COMPLETE CBC W/AUTO DIFF WBC: CPT

## 2025-09-04 PROCEDURE — 80197 ASSAY OF TACROLIMUS: CPT

## 2025-09-04 PROCEDURE — 83735 ASSAY OF MAGNESIUM: CPT

## 2025-09-04 PROCEDURE — 82248 BILIRUBIN DIRECT: CPT

## 2025-09-04 PROCEDURE — 80053 COMPREHEN METABOLIC PANEL: CPT

## 2025-09-05 ENCOUNTER — HOSPITAL ENCOUNTER (OUTPATIENT)
Dept: RADIOLOGY | Facility: HOSPITAL | Age: 65
Discharge: HOME | End: 2025-09-05
Payer: COMMERCIAL

## 2025-09-05 DIAGNOSIS — Z94.4 LIVER REPLACED BY TRANSPLANT (MULTI): ICD-10-CM

## 2025-09-05 PROCEDURE — 74183 MRI ABD W/O CNTR FLWD CNTR: CPT

## 2025-09-05 PROCEDURE — 2550000001 HC RX 255 CONTRASTS: Performed by: STUDENT IN AN ORGANIZED HEALTH CARE EDUCATION/TRAINING PROGRAM

## 2025-09-05 PROCEDURE — A9575 INJ GADOTERATE MEGLUMI 0.1ML: HCPCS | Performed by: STUDENT IN AN ORGANIZED HEALTH CARE EDUCATION/TRAINING PROGRAM

## 2025-09-05 RX ORDER — GADOTERATE MEGLUMINE 376.9 MG/ML
20 INJECTION INTRAVENOUS
Status: COMPLETED | OUTPATIENT
Start: 2025-09-05 | End: 2025-09-05

## 2025-09-05 RX ADMIN — GADOTERATE MEGLUMINE 20 ML: 376.9 INJECTION INTRAVENOUS at 15:25

## 2025-09-06 RX ORDER — PREDNISONE 5 MG/1
15 TABLET ORAL DAILY
Qty: 240 TABLET | Refills: 0 | Status: SHIPPED | OUTPATIENT
Start: 2025-09-06

## (undated) DEVICE — SUTURE, PDS II, 5-0, 30 IN, C1, DA, VIOLET

## (undated) DEVICE — SYRINGE, 20 CC, LUER LOCK, MONOJECT, W/O CAP, LF

## (undated) DEVICE — SUTURE, PROLENE, 6-0, 30 IN, C1, DA, BLUE

## (undated) DEVICE — HOLSTER, ELECTROSURGERY ACCESSORY, STERILE

## (undated) DEVICE — DRAPE, INCISE, ANTIMICROBIAL, IOBAN 2, STERI DRAPE, 23 X 33 IN, DISPOSABLE, STERILE

## (undated) DEVICE — PROBE, KOVEN VASCULAR, SINGLE USE

## (undated) DEVICE — COVER, TABLE, UHC

## (undated) DEVICE — Device

## (undated) DEVICE — STAPLER, ECHELON, 35 VASCULAR FLEX POWERED

## (undated) DEVICE — TUBING, SUCTION, CONNECTING, STERILE 0.25 X 120 IN., LF

## (undated) DEVICE — DRAIN, CHANNEL, HUBLESS, 1/4 ROUND FULL FLUTE, 19 FR/W 1/4" TROCAR"

## (undated) DEVICE — TRAY, SURESTEP, URINE METER, 14FR, SILICONE

## (undated) DEVICE — DRAPE, FLUID WARMER

## (undated) DEVICE — TOWEL, OR, XRAY DETECT 5 PK, WHITE, 17X26, W/DMT TAG, ST

## (undated) DEVICE — TR BAND, RADIAL COMPRESSION, STANDARD, 24CM

## (undated) DEVICE — CATHETER, ANGIO, IMPULSE, FR4, 5 FR X 100 CM

## (undated) DEVICE — DRAPE PACK, LIVERY I & II, CUSTOM, UHC

## (undated) DEVICE — CATHETER, ANGIO, IMPULSE, FL3.5, 5 FR X 100 CM

## (undated) DEVICE — SUTURE, PROLENE, 5-0, 36 IN, C1, DA, BLUE

## (undated) DEVICE — GUIDEWIRE, GO2WIRE, STEERABLE, 0.035 X 260CM, FLOPPY STRAIGHT

## (undated) DEVICE — SUTURE, VICRYL, 3-0,18 IN, SH, UNDYED

## (undated) DEVICE — DRAPE, INCISE, ANTIMICROBIAL, IOBAN 2, LARGE, 17 X 23 IN, DISPOSABLE, STERILE

## (undated) DEVICE — COVER, CART, 45 X 27 X 48 IN, CLEAR

## (undated) DEVICE — CATHETER TRAY, SURESTEP, 16FR, URINE METER W/STATLOCK

## (undated) DEVICE — SUTURE, SILK, 0, 24 IN, SUTUPAK, BLACK

## (undated) DEVICE — PAD, GROUNDING, ELECTROSURGICAL, DUAL

## (undated) DEVICE — HANDPIECE, ABC, SINGLE FUNCTION, MALLEABLE, W/CORD & HOLSTER, BEND-A-BEAM, 3 IN, LF

## (undated) DEVICE — SPONGE, HEMOSTAT, SURGICEL FIBRILLAR, ABS, 4 X 4, LF

## (undated) DEVICE — BAG, DECANTER

## (undated) DEVICE — STAPLER, SKIN, PLUS, REGULAR, 35

## (undated) DEVICE — MANIFOLD, 4 PORT NEPTUNE STANDARD

## (undated) DEVICE — LIGASURE, MARYLAND JAW, OPEN DELIVERY

## (undated) DEVICE — SUTURE, PROLENE, 1 X 60IN TP-1, BLUE

## (undated) DEVICE — COVER, EQUIPMENT, SOLUTION, SLUSH, 112 X 168 CM, LF, STERILE

## (undated) DEVICE — SUTURE, SILK, 3-0, 18 IN SH/CR, BLACK

## (undated) DEVICE — COVER, TABLE, 44 X 75 IN, DISPOSABLE, LF, STERILE

## (undated) DEVICE — PAD, GROUNDING, ELECTROSURGICAL, W/9 FT CABLE, POLYHESIVE II, ADULT, LF

## (undated) DEVICE — NEEDLE, BIOPSY, MAX CORE, 14 G X 16 CM

## (undated) DEVICE — SHEATH, GLIDESHEATH, SLENDER, 6FR 10CM

## (undated) DEVICE — RELOAD, ECHELON, VASCULAR WHITE ENDOPATH 35MM FLEX POWERED

## (undated) DEVICE — DRAPE, INSTRUMENT, W/POUCH, STERI DRAPE, 7 X 11 IN, DISPOSABLE, STERILE

## (undated) DEVICE — SUTURE, PDS II, 3-0, 27 IN, SH, VIL, MONO, LF

## (undated) DEVICE — DRESSING, ADHESIVE, ISLAND, TELFA, 4 X 10 IN

## (undated) DEVICE — DRAPE, ISOLATION, BAG, DRAW STRING CLOSURE, STERI DRAPE, LARGE, 20 X 20 IN, DISPOSABLE, STERILE